# Patient Record
Sex: MALE | Race: WHITE | NOT HISPANIC OR LATINO
[De-identification: names, ages, dates, MRNs, and addresses within clinical notes are randomized per-mention and may not be internally consistent; named-entity substitution may affect disease eponyms.]

---

## 2017-04-21 ENCOUNTER — APPOINTMENT (OUTPATIENT)
Dept: INTERNAL MEDICINE | Facility: CLINIC | Age: 42
End: 2017-04-21

## 2017-04-21 VITALS
OXYGEN SATURATION: 98 % | HEIGHT: 70 IN | HEART RATE: 82 BPM | SYSTOLIC BLOOD PRESSURE: 121 MMHG | DIASTOLIC BLOOD PRESSURE: 70 MMHG

## 2017-05-09 ENCOUNTER — LABORATORY RESULT (OUTPATIENT)
Age: 42
End: 2017-05-09

## 2017-05-10 LAB
25(OH)D3 SERPL-MCNC: 24.5 NG/ML
BASOPHILS # BLD AUTO: 0.03 K/UL
BASOPHILS NFR BLD AUTO: 0.5 %
CHOLEST SERPL-MCNC: 203 MG/DL
CHOLEST/HDLC SERPL: 5 RATIO
EOSINOPHIL # BLD AUTO: 0.15 K/UL
EOSINOPHIL NFR BLD AUTO: 2.4 %
HBA1C MFR BLD HPLC: 5.3 %
HCT VFR BLD CALC: 44.6 %
HDLC SERPL-MCNC: 42 MG/DL
HGB BLD-MCNC: 14.8 G/DL
IMM GRANULOCYTES NFR BLD AUTO: 0.2 %
LDLC SERPL CALC-MCNC: 124 MG/DL
LYMPHOCYTES # BLD AUTO: 1.83 K/UL
LYMPHOCYTES NFR BLD AUTO: 29.2 %
MAN DIFF?: NORMAL
MCHC RBC-ENTMCNC: 29.8 PG
MCHC RBC-ENTMCNC: 33.2 GM/DL
MCV RBC AUTO: 89.9 FL
MONOCYTES # BLD AUTO: 0.42 K/UL
MONOCYTES NFR BLD AUTO: 6.7 %
NEUTROPHILS # BLD AUTO: 3.82 K/UL
NEUTROPHILS NFR BLD AUTO: 61 %
PLATELET # BLD AUTO: 277 K/UL
RBC # BLD: 4.96 M/UL
RBC # FLD: 13.9 %
TRIGL SERPL-MCNC: 183 MG/DL
WBC # FLD AUTO: 6.26 K/UL

## 2017-12-01 ENCOUNTER — OUTPATIENT (OUTPATIENT)
Dept: OUTPATIENT SERVICES | Facility: HOSPITAL | Age: 42
LOS: 1 days | End: 2017-12-01
Payer: MEDICAID

## 2017-12-01 PROCEDURE — G9001: CPT

## 2017-12-17 ENCOUNTER — INPATIENT (INPATIENT)
Facility: HOSPITAL | Age: 42
LOS: 4 days | Discharge: HOME | DRG: 59 | End: 2017-12-22
Attending: HOSPITALIST | Admitting: HOSPITALIST
Payer: MEDICARE

## 2017-12-17 VITALS
SYSTOLIC BLOOD PRESSURE: 138 MMHG | WEIGHT: 250 LBS | TEMPERATURE: 99 F | RESPIRATION RATE: 15 BRPM | HEART RATE: 117 BPM | DIASTOLIC BLOOD PRESSURE: 81 MMHG

## 2017-12-17 NOTE — ED ADULT NURSE NOTE - OBJECTIVE STATEMENT
Pt received on stretcher via ems, c/o weakness. Pt stated that he has MS and is in MS exacerbation. Pt c/o weakness, frequent urination. Pt also stated he feels his nerves are shot and feels jittery. Pt denies fever, chills, nausea, vomiting, diarrhea. No acute s/s of distress.

## 2017-12-18 DIAGNOSIS — G35 MULTIPLE SCLEROSIS: ICD-10-CM

## 2017-12-18 DIAGNOSIS — E66.9 OBESITY, UNSPECIFIED: ICD-10-CM

## 2017-12-18 DIAGNOSIS — I26.99 OTHER PULMONARY EMBOLISM WITHOUT ACUTE COR PULMONALE: ICD-10-CM

## 2017-12-18 DIAGNOSIS — R32 UNSPECIFIED URINARY INCONTINENCE: ICD-10-CM

## 2017-12-18 DIAGNOSIS — L03.90 CELLULITIS, UNSPECIFIED: ICD-10-CM

## 2017-12-18 DIAGNOSIS — Z29.9 ENCOUNTER FOR PROPHYLACTIC MEASURES, UNSPECIFIED: ICD-10-CM

## 2017-12-18 DIAGNOSIS — F12.20 CANNABIS DEPENDENCE, UNCOMPLICATED: ICD-10-CM

## 2017-12-18 LAB
ALBUMIN SERPL ELPH-MCNC: 3.7 G/DL — SIGNIFICANT CHANGE UP (ref 3.3–5)
ALP SERPL-CCNC: 73 U/L — SIGNIFICANT CHANGE UP (ref 40–120)
ALT FLD-CCNC: 75 U/L — SIGNIFICANT CHANGE UP (ref 12–78)
ANION GAP SERPL CALC-SCNC: 8 MMOL/L — SIGNIFICANT CHANGE UP (ref 5–17)
ANION GAP SERPL CALC-SCNC: 9 MMOL/L — SIGNIFICANT CHANGE UP (ref 5–17)
AST SERPL-CCNC: 51 U/L — HIGH (ref 15–37)
BASOPHILS # BLD AUTO: 0 K/UL — SIGNIFICANT CHANGE UP (ref 0–0.2)
BASOPHILS # BLD AUTO: 0.1 K/UL — SIGNIFICANT CHANGE UP (ref 0–0.2)
BASOPHILS NFR BLD AUTO: 0.1 % — SIGNIFICANT CHANGE UP (ref 0–2)
BASOPHILS NFR BLD AUTO: 0.8 % — SIGNIFICANT CHANGE UP (ref 0–2)
BILIRUB SERPL-MCNC: 0.8 MG/DL — SIGNIFICANT CHANGE UP (ref 0.2–1.2)
BUN SERPL-MCNC: 13 MG/DL — SIGNIFICANT CHANGE UP (ref 7–23)
BUN SERPL-MCNC: 16 MG/DL — SIGNIFICANT CHANGE UP (ref 7–23)
CALCIUM SERPL-MCNC: 8.7 MG/DL — SIGNIFICANT CHANGE UP (ref 8.5–10.1)
CALCIUM SERPL-MCNC: 9 MG/DL — SIGNIFICANT CHANGE UP (ref 8.5–10.1)
CHLORIDE SERPL-SCNC: 107 MMOL/L — SIGNIFICANT CHANGE UP (ref 96–108)
CHLORIDE SERPL-SCNC: 108 MMOL/L — SIGNIFICANT CHANGE UP (ref 96–108)
CO2 SERPL-SCNC: 25 MMOL/L — SIGNIFICANT CHANGE UP (ref 22–31)
CO2 SERPL-SCNC: 27 MMOL/L — SIGNIFICANT CHANGE UP (ref 22–31)
CREAT SERPL-MCNC: 0.74 MG/DL — SIGNIFICANT CHANGE UP (ref 0.5–1.3)
CREAT SERPL-MCNC: 0.74 MG/DL — SIGNIFICANT CHANGE UP (ref 0.5–1.3)
EOSINOPHIL # BLD AUTO: 0 K/UL — SIGNIFICANT CHANGE UP (ref 0–0.5)
EOSINOPHIL # BLD AUTO: 0.3 K/UL — SIGNIFICANT CHANGE UP (ref 0–0.5)
EOSINOPHIL NFR BLD AUTO: 0 % — SIGNIFICANT CHANGE UP (ref 0–6)
EOSINOPHIL NFR BLD AUTO: 2 % — SIGNIFICANT CHANGE UP (ref 0–6)
GLUCOSE SERPL-MCNC: 179 MG/DL — HIGH (ref 70–99)
GLUCOSE SERPL-MCNC: 92 MG/DL — SIGNIFICANT CHANGE UP (ref 70–99)
HCT VFR BLD CALC: 42.5 % — SIGNIFICANT CHANGE UP (ref 39–50)
HCT VFR BLD CALC: 44.5 % — SIGNIFICANT CHANGE UP (ref 39–50)
HGB BLD-MCNC: 14.4 G/DL — SIGNIFICANT CHANGE UP (ref 13–17)
HGB BLD-MCNC: 15 G/DL — SIGNIFICANT CHANGE UP (ref 13–17)
LYMPHOCYTES # BLD AUTO: 0.3 K/UL — LOW (ref 1–3.3)
LYMPHOCYTES # BLD AUTO: 0.8 K/UL — LOW (ref 1–3.3)
LYMPHOCYTES # BLD AUTO: 5.2 % — LOW (ref 13–44)
LYMPHOCYTES # BLD AUTO: 6 % — LOW (ref 13–44)
MAGNESIUM SERPL-MCNC: 2.3 MG/DL — SIGNIFICANT CHANGE UP (ref 1.6–2.6)
MCHC RBC-ENTMCNC: 29.9 PG — SIGNIFICANT CHANGE UP (ref 27–34)
MCHC RBC-ENTMCNC: 30 PG — SIGNIFICANT CHANGE UP (ref 27–34)
MCHC RBC-ENTMCNC: 33.6 GM/DL — SIGNIFICANT CHANGE UP (ref 32–36)
MCHC RBC-ENTMCNC: 33.8 GM/DL — SIGNIFICANT CHANGE UP (ref 32–36)
MCV RBC AUTO: 88.6 FL — SIGNIFICANT CHANGE UP (ref 80–100)
MCV RBC AUTO: 89.3 FL — SIGNIFICANT CHANGE UP (ref 80–100)
MONOCYTES # BLD AUTO: 0 K/UL — SIGNIFICANT CHANGE UP (ref 0–0.9)
MONOCYTES # BLD AUTO: 0.9 K/UL — SIGNIFICANT CHANGE UP (ref 0–0.9)
MONOCYTES NFR BLD AUTO: 0.5 % — LOW (ref 1–9)
MONOCYTES NFR BLD AUTO: 6.6 % — SIGNIFICANT CHANGE UP (ref 1–9)
NEUTROPHILS # BLD AUTO: 11.2 K/UL — HIGH (ref 1.8–7.4)
NEUTROPHILS # BLD AUTO: 6.2 K/UL — SIGNIFICANT CHANGE UP (ref 1.8–7.4)
NEUTROPHILS NFR BLD AUTO: 84.6 % — HIGH (ref 43–77)
NEUTROPHILS NFR BLD AUTO: 94.2 % — HIGH (ref 43–77)
PLATELET # BLD AUTO: 297 K/UL — SIGNIFICANT CHANGE UP (ref 150–400)
PLATELET # BLD AUTO: 316 K/UL — SIGNIFICANT CHANGE UP (ref 150–400)
POTASSIUM SERPL-MCNC: 3.5 MMOL/L — SIGNIFICANT CHANGE UP (ref 3.5–5.3)
POTASSIUM SERPL-MCNC: 4 MMOL/L — SIGNIFICANT CHANGE UP (ref 3.5–5.3)
POTASSIUM SERPL-SCNC: 3.5 MMOL/L — SIGNIFICANT CHANGE UP (ref 3.5–5.3)
POTASSIUM SERPL-SCNC: 4 MMOL/L — SIGNIFICANT CHANGE UP (ref 3.5–5.3)
PROT SERPL-MCNC: 7.7 G/DL — SIGNIFICANT CHANGE UP (ref 6–8.3)
RBC # BLD: 4.8 M/UL — SIGNIFICANT CHANGE UP (ref 4.2–5.8)
RBC # BLD: 4.98 M/UL — SIGNIFICANT CHANGE UP (ref 4.2–5.8)
RBC # FLD: 11.6 % — SIGNIFICANT CHANGE UP (ref 10.3–14.5)
RBC # FLD: 12 % — SIGNIFICANT CHANGE UP (ref 10.3–14.5)
SODIUM SERPL-SCNC: 142 MMOL/L — SIGNIFICANT CHANGE UP (ref 135–145)
SODIUM SERPL-SCNC: 142 MMOL/L — SIGNIFICANT CHANGE UP (ref 135–145)
WBC # BLD: 13.3 K/UL — HIGH (ref 3.8–10.5)
WBC # BLD: 6.6 K/UL — SIGNIFICANT CHANGE UP (ref 3.8–10.5)
WBC # FLD AUTO: 13.3 K/UL — HIGH (ref 3.8–10.5)
WBC # FLD AUTO: 6.6 K/UL — SIGNIFICANT CHANGE UP (ref 3.8–10.5)

## 2017-12-18 PROCEDURE — 99223 1ST HOSP IP/OBS HIGH 75: CPT | Mod: AI,GC

## 2017-12-18 PROCEDURE — 99285 EMERGENCY DEPT VISIT HI MDM: CPT

## 2017-12-18 PROCEDURE — 12345: CPT | Mod: NC

## 2017-12-18 RX ORDER — NYSTATIN CREAM 100000 [USP'U]/G
1 CREAM TOPICAL DAILY
Qty: 0 | Refills: 0 | Status: DISCONTINUED | OUTPATIENT
Start: 2017-12-18 | End: 2017-12-22

## 2017-12-18 RX ORDER — SODIUM CHLORIDE 9 MG/ML
1000 INJECTION INTRAMUSCULAR; INTRAVENOUS; SUBCUTANEOUS ONCE
Qty: 0 | Refills: 0 | Status: COMPLETED | OUTPATIENT
Start: 2017-12-18 | End: 2017-12-18

## 2017-12-18 RX ORDER — FAMOTIDINE 10 MG/ML
20 INJECTION INTRAVENOUS
Qty: 0 | Refills: 0 | Status: DISCONTINUED | OUTPATIENT
Start: 2017-12-18 | End: 2017-12-22

## 2017-12-18 RX ORDER — ENOXAPARIN SODIUM 100 MG/ML
40 INJECTION SUBCUTANEOUS DAILY
Qty: 0 | Refills: 0 | Status: DISCONTINUED | OUTPATIENT
Start: 2017-12-18 | End: 2017-12-22

## 2017-12-18 RX ADMIN — Medication 116 MILLIGRAM(S): at 01:00

## 2017-12-18 RX ADMIN — ENOXAPARIN SODIUM 40 MILLIGRAM(S): 100 INJECTION SUBCUTANEOUS at 15:03

## 2017-12-18 RX ADMIN — NYSTATIN CREAM 1 APPLICATION(S): 100000 CREAM TOPICAL at 22:47

## 2017-12-18 RX ADMIN — SODIUM CHLORIDE 1000 MILLILITER(S): 9 INJECTION INTRAMUSCULAR; INTRAVENOUS; SUBCUTANEOUS at 01:40

## 2017-12-18 NOTE — H&P ADULT - NSHPSOCIALHISTORY_GEN_ALL_CORE
Patient admits to recreational TCH use multiple times a day for many years (for his MS exacerbations), admits to edible CBD gummies use daily for past couple of months. Former smoker quit 10 years ago, used 1/2 pack/day . Former social ETOH use. Had home health aide that comes in 12 hr shifts to help with all ADLs, patient is wheelchair bound and unable to ambulate.

## 2017-12-18 NOTE — H&P ADULT - PROBLEM SELECTOR PLAN 7
DVT ppx: Lovenox     IMPROVE VTE Individual Risk Assessment          RISK                                                          Points  [ 3 ] Previous VTE                                                3  [ 0 ] Thrombophilia                                             2  [ 2 ] Lower limb paralysis                                   2        (unable to hold up >15 seconds)    [ 0 ] Current Cancer                                             2         (within 6 months)  [ 0 ] Immobilization > 24 hrs                              1  [ 0 ] ICU/CCU stay > 24 hours                             1  [0  ] Age > 60                                                         1    IMPROVE VTE Score: 5

## 2017-12-18 NOTE — PROGRESS NOTE ADULT - SUBJECTIVE AND OBJECTIVE BOX
CHIEF COMPLAINT/INTERVAL HISTORY:  Pt. seen and evaluated for weakness of the LE/MS flare.  Tolerating IV steroids.  Reports having muscle spasms of the LE.  Noticing mild increase strength of the LE.    REVIEW OF SYSTEMS:  No fever, CP, or SOB.    Vital Signs Last 24 Hrs  T(C): 37.1 (18 Dec 2017 05:38), Max: 37.1 (18 Dec 2017 05:38)  T(F): 98.7 (18 Dec 2017 05:38), Max: 98.7 (18 Dec 2017 05:38)  HR: 97 (18 Dec 2017 05:38) (90 - 117)  BP: 132/86 (18 Dec 2017 05:38) (124/78 - 138/81)  BP(mean): --  RR: 16 (18 Dec 2017 05:38) (15 - 16)  SpO2: 97% (18 Dec 2017 05:38) (97% - 97%)    PHYSICAL EXAM:  GENERAL: NAD  HEENT: EOMI, hearing normal, conjunctiva and sclera clear  Chest: CTA bilaterally, no wheezing  CV: S1S2, RRR,   GI: soft, +BS, NT/ND  Musculoskeletal: no edema  Neuro: AAOx3, CN II-XII intact, 5/5 strength of upper extremities, 2/5 strength of bilateral LE  Psychiatric: affect nL, mood nL  Skin: warm and dry    LABS:                        14.4   6.6   )-----------( 297      ( 18 Dec 2017 09:53 )             42.5     12-18    142  |  108  |  13  ----------------------------<  179<H>  3.5   |  25  |  0.74    Ca    8.7      18 Dec 2017 09:53  Mg     2.3     12-18    TPro  7.7  /  Alb  3.7  /  TBili  0.8  /  DBili  x   /  AST  51<H>  /  ALT  75  /  AlkPhos  73  12-18    Assessment and Plan:  -MS flare:  continue Solu-medrol 1000mg IV daily.  Neurology f/u  -Urinary incontinence:  2/2 MS exacerbation.  Will place condom catheter.  -Hx of PE:  No longer on AC due to history of GI bleed.  -Marijuana dependence:  Pt. has been using marijuana for MS and states that it helps with his symptoms.    -VTE ppx: Lovenox 40mg SQ daily

## 2017-12-18 NOTE — ED PROVIDER NOTE - OBJECTIVE STATEMENT
43yo male bib ems with weakness today. pt has hx of MS and is bed bound, has an aid at home, and tonite could not get up off the toilet, no fever, no vomiting or diarrhea, no other copmalints

## 2017-12-18 NOTE — H&P ADULT - FAMILY HISTORY
Grandparent  Still living? Unknown  Family history of diabetes mellitus (DM), Age at diagnosis: Age Unknown  Family history of breast cancer, Age at diagnosis: Age Unknown     Aunt  Still living? Unknown  Family history of breast cancer, Age at diagnosis: Age Unknown

## 2017-12-18 NOTE — H&P ADULT - HISTORY OF PRESENT ILLNESS
42 year old male with PMHx of MS (dx 1995), PE in 2013 not on anticoagulation, b/l lower ext cellulitis presents to the ED for LE weakness. Health aid is at bedside. Pt states that he was on the toilet when he tried to get up a couple hours ago, however he was unable to even with the health aide's help.  Patient has preserved UE MS however his b/l LE felt much weaker than baseline. He admits that he has had  about 60 similar episodes in the past, many of which prompted hospital admission, with the last admission being in 2015. He states that this episode is similar to presentation with the previous ones. He admits to using daily non medical CBD gummies and inhaling THC which alleviates the symptoms as per patient. He admits to running out of THC and last use was 2 days ago. Patient also admitted to lower lumbar pain which is now resolved and worsening urinary incontinence and bowel incontinence. He admits to increased clonus in the LLE as well. Admits optic neuritis of the left eye (chronic), b/l LE neuropathy (chronic) and denies any f/c, CP, SOB, cough, dysuria, myalgias.    In the ED, V/S were: Temp: 98.6F BP: 138/81  RR 15. Sig labs include WBC 13.3, Neutr 84.6%, AST 51, Na 142, K 4.0, Cr 0.74. In the ED, 1000 mg Solumedrol and 1 L IVF bolus given.

## 2017-12-18 NOTE — CONSULT NOTE ADULT - ASSESSMENT
MS was started on solumedrol cont for total of 5 day  need gi prophylaxis   needs to have urine checked

## 2017-12-18 NOTE — H&P ADULT - ATTENDING COMMENTS
43 yo m pmh MS not on any meds except marijuana and CBD p/w weakness being admitted for MS exacerbation.  Neuro consulted for the AM.  Solumedrol ordered.

## 2017-12-18 NOTE — H&P ADULT - PROBLEM SELECTOR PLAN 1
-Admitted for MS exacerbation  -Neuro (Dr Rivas) consulted   -S/p 1000mg Solumedrol in ED, continue regiment for 5 days, infuse over 60 minutes   -Diet: Regular  -Activity: OOB with assistance  -F/u AM CBC and BMP

## 2017-12-18 NOTE — H&P ADULT - ASSESSMENT
42 year old male with PMHx of MS (dx 1995), PE in 2013 not on anticoagulation, b/l lower ext cellulitis presents to the ED for LE weakness. Admitted for Multiple Sclerosis exacerbation.

## 2017-12-18 NOTE — H&P ADULT - PROBLEM SELECTOR PLAN 4
Stable: currently not on any anticoagulation. Patient states he was on rivaroxaban in the past however he started bleeding in the anus and thus it was discontinued.

## 2017-12-18 NOTE — PROVIDER CONTACT NOTE (CHANGE IN STATUS NOTIFICATION) - SITUATION
Dr Prakash made aware that several attempts were made to place a condom catheter, not successful. MD said just keep the patient on diaper then.

## 2017-12-18 NOTE — H&P ADULT - NSHPREVIEWOFSYSTEMS_GEN_ALL_CORE
Constitutional: denies fever, chills, diaphoresis   HEENT: denies blurry vision, difficulty hearing  Respiratory: denies SOB, BEDOYA, cough, sputum production, wheezing, hemoptysis  Cardiovascular: denies CP, palpitations, edema  Gastrointestinal: denies nausea, vomiting, diarrhea, constipation, abdominal pain, melena, hematochezia   Genitourinary: admits urinary incontinence, denies dysuria, hematuria   Skin/Breast: denies rash, itching  Musculoskeletal: admits muscle weakness, denies myalgias, joint swelling  Neurologic: admits headache, dizziness, paresthesias, admits LE numbness/tingling  Psychiatric: denies feeling anxious, depressed  Hematology/Oncology: denies bruising, tender or enlarged lymph nodes

## 2017-12-18 NOTE — H&P ADULT - PROBLEM SELECTOR PLAN 3
-Patient states that he is using marijuana for MS and that it completely resolves his symptoms   -Educate the patient on drug use and monitor for any signs of THC toxicity

## 2017-12-18 NOTE — H&P ADULT - PROBLEM SELECTOR PLAN 2
-Most likely 2/2 MS exacerbation  - No iraheta catheter indicated at this time, as it would increase the risk of CAUTIs  -Continue using adult diapers

## 2017-12-18 NOTE — H&P ADULT - NSHPPHYSICALEXAM_GEN_ALL_CORE
V/S were: Temp: 98.6F BP: 138/81  RR 15  Physical Exam:  General: obese young white male, NAD  HEENT: NCAT, PERRLA, EOMI bl, moist mucous membranes   Neck: Supple, nontender, no mass  Neurology: A&Ox3, + sustained clonus, + 5 beats nystagmus to the left, + Figner to nose test b/l, + neuropathy in b/l LE R>L up to the distal femoral region, DTR: 3/4 throughout  Respiratory: CTA B/L, No W/R/R  CV: RRR, +S1/S2, no murmurs, rubs or gallops  Abdominal: Soft, NT, ND +BSx4  Extremities: No C/C/E, + peripheral pulses  MSK: MS: 1/5 in the b/l LE, 5/5 b/l UE, no joint erythema or warmth, no joint swelling   Skin: warm, dry, normal color, no rash or abnormal lesions

## 2017-12-19 LAB
ANION GAP SERPL CALC-SCNC: 6 MMOL/L — SIGNIFICANT CHANGE UP (ref 5–17)
APPEARANCE UR: ABNORMAL
BACTERIA # UR AUTO: ABNORMAL
BILIRUB UR-MCNC: NEGATIVE — SIGNIFICANT CHANGE UP
BUN SERPL-MCNC: 14 MG/DL — SIGNIFICANT CHANGE UP (ref 7–23)
CALCIUM SERPL-MCNC: 8.8 MG/DL — SIGNIFICANT CHANGE UP (ref 8.5–10.1)
CHLORIDE SERPL-SCNC: 110 MMOL/L — HIGH (ref 96–108)
CO2 SERPL-SCNC: 26 MMOL/L — SIGNIFICANT CHANGE UP (ref 22–31)
COLOR SPEC: YELLOW — SIGNIFICANT CHANGE UP
CREAT SERPL-MCNC: 0.79 MG/DL — SIGNIFICANT CHANGE UP (ref 0.5–1.3)
DIFF PNL FLD: ABNORMAL
EPI CELLS # UR: SIGNIFICANT CHANGE UP
GLUCOSE SERPL-MCNC: 122 MG/DL — HIGH (ref 70–99)
GLUCOSE UR QL: NEGATIVE — SIGNIFICANT CHANGE UP
HCT VFR BLD CALC: 44.2 % — SIGNIFICANT CHANGE UP (ref 39–50)
HGB BLD-MCNC: 14.8 G/DL — SIGNIFICANT CHANGE UP (ref 13–17)
KETONES UR-MCNC: NEGATIVE — SIGNIFICANT CHANGE UP
LEUKOCYTE ESTERASE UR-ACNC: ABNORMAL
MCHC RBC-ENTMCNC: 29.6 PG — SIGNIFICANT CHANGE UP (ref 27–34)
MCHC RBC-ENTMCNC: 33.4 GM/DL — SIGNIFICANT CHANGE UP (ref 32–36)
MCV RBC AUTO: 88.7 FL — SIGNIFICANT CHANGE UP (ref 80–100)
NITRITE UR-MCNC: POSITIVE
PH UR: 8 — SIGNIFICANT CHANGE UP (ref 5–8)
PLATELET # BLD AUTO: 319 K/UL — SIGNIFICANT CHANGE UP (ref 150–400)
POTASSIUM SERPL-MCNC: 3.6 MMOL/L — SIGNIFICANT CHANGE UP (ref 3.5–5.3)
POTASSIUM SERPL-SCNC: 3.6 MMOL/L — SIGNIFICANT CHANGE UP (ref 3.5–5.3)
PROT UR-MCNC: 25 MG/DL
RBC # BLD: 4.99 M/UL — SIGNIFICANT CHANGE UP (ref 4.2–5.8)
RBC # FLD: 11.7 % — SIGNIFICANT CHANGE UP (ref 10.3–14.5)
RBC CASTS # UR COMP ASSIST: SIGNIFICANT CHANGE UP /HPF (ref 0–4)
SODIUM SERPL-SCNC: 142 MMOL/L — SIGNIFICANT CHANGE UP (ref 135–145)
SP GR SPEC: 1.01 — SIGNIFICANT CHANGE UP (ref 1.01–1.02)
TRI-PHOS CRY UR QL COMP ASSIST: ABNORMAL
UROBILINOGEN FLD QL: NEGATIVE — SIGNIFICANT CHANGE UP
WBC # BLD: 14.2 K/UL — HIGH (ref 3.8–10.5)
WBC # FLD AUTO: 14.2 K/UL — HIGH (ref 3.8–10.5)
WBC UR QL: SIGNIFICANT CHANGE UP

## 2017-12-19 PROCEDURE — 99233 SBSQ HOSP IP/OBS HIGH 50: CPT

## 2017-12-19 RX ORDER — CEFTRIAXONE 500 MG/1
1 INJECTION, POWDER, FOR SOLUTION INTRAMUSCULAR; INTRAVENOUS ONCE
Qty: 0 | Refills: 0 | Status: COMPLETED | OUTPATIENT
Start: 2017-12-19 | End: 2017-12-19

## 2017-12-19 RX ORDER — CEFTRIAXONE 500 MG/1
1 INJECTION, POWDER, FOR SOLUTION INTRAMUSCULAR; INTRAVENOUS EVERY 24 HOURS
Qty: 0 | Refills: 0 | Status: DISCONTINUED | OUTPATIENT
Start: 2017-12-20 | End: 2017-12-22

## 2017-12-19 RX ORDER — CEFTRIAXONE 500 MG/1
INJECTION, POWDER, FOR SOLUTION INTRAMUSCULAR; INTRAVENOUS
Qty: 0 | Refills: 0 | Status: DISCONTINUED | OUTPATIENT
Start: 2017-12-19 | End: 2017-12-22

## 2017-12-19 RX ADMIN — Medication 258 MILLIGRAM(S): at 07:06

## 2017-12-19 RX ADMIN — CEFTRIAXONE 100 GRAM(S): 500 INJECTION, POWDER, FOR SOLUTION INTRAMUSCULAR; INTRAVENOUS at 18:09

## 2017-12-19 NOTE — PHYSICAL THERAPY INITIAL EVALUATION ADULT - IMPAIRMENTS CONTRIBUTING TO GAIT DEVIATIONS, PT EVAL
decreased strength/impaired postural control/impaired coordination/abnormal muscle tone/impaired motor control/impaired balance

## 2017-12-19 NOTE — PHYSICAL THERAPY INITIAL EVALUATION ADULT - PERTINENT HX OF CURRENT PROBLEM, REHAB EVAL
Patient with h/o MS. Patient came to the ER with exacerbation of MS, difficulty transferring even with assist of aide.

## 2017-12-19 NOTE — PHYSICAL THERAPY INITIAL EVALUATION ADULT - ADDITIONAL COMMENTS
Pt lives alone in a handicapped accessible apartment and has a 24 hour aide. pt required 1 person assist with transfers and mostly wheelchair bound due to Multiple Sclerosis since age 20. Pt home is handicapped accessible, no stairs and owns wheelchair, scooter, canes, rolling walker and commode. Pt needed assistance for ADLs. pt also reports having "optic neuritis" in left eye and has difficulties with his hand-eye coordination.

## 2017-12-19 NOTE — PROGRESS NOTE ADULT - SUBJECTIVE AND OBJECTIVE BOX
Neurology follow up note    KENNETH [id number]      Interval History:    Patient feels ok no new complaints.    MEDICATIONS    enoxaparin Injectable 40 milliGRAM(s) SubCutaneous daily  famotidine    Tablet 20 milliGRAM(s) Oral two times a day  nystatin Cream 1 Application(s) Topical daily PRN      Allergies    No Known Allergies    Intolerances            Vital Signs Last 24 Hrs  T(C): 36.7 (19 Dec 2017 12:05), Max: 37.4 (18 Dec 2017 18:33)  T(F): 98 (19 Dec 2017 12:05), Max: 99.3 (18 Dec 2017 18:33)  HR: 89 (19 Dec 2017 12:05) (82 - 113)  BP: 144/74 (19 Dec 2017 12:05) (117/70 - 158/74)  BP(mean): --  RR: 19 (19 Dec 2017 12:05) (16 - 20)  SpO2: 94% (19 Dec 2017 12:05) (94% - 98%)      REVIEW OF SYSTEMS:     Constitutional: No fever, chills, fatigue, weakness better  Eyes: no eye pain, visual disturbances, or discharge  ENT:  No difficulty hearing, tinnitus, vertigo; No sinus or throat pain  Neck: No pain or stiffness  Respiratory: No cough, dyspnea, wheezing   Cardiovascular: No chest pain, palpitations,   Gastrointestinal: No abdominal or epigastric pain. No nausea, vomiting  No diarrhea or constipation.   Genitourinary: No dysuria, frequency, hematuria or incontinence  Neurological: No headaches, lightheadedness, vertigo, numbness or tremors  Psychiatric: No depression, anxiety, mood swings or difficulty sleeping  Musculoskeletal: No joint pain or swelling; No muscle, back or extremity pain  Skin: No itching, burning, rashes or lesions   Lymph Nodes: No enlarged glands  Endocrine: No heat or cold intolerance; No hair loss   Allergy and Immunologic: No hives or eczema    On Neurological Examination:    Mental Status - Patient is alert, awake, oriented       Follow simple commands  Follow complex commands    Speech -   Fluent                     Cranial Nerves - Pupils 3 mm equal and reactive to light,   extraocular eye movements intact.   smile symmetric  intact bilateral NLF    Motor Exam -   Motor, bilateral upper, the patient was able to elevate roughly about 60 degrees, would say overall 3+/5.  The patient did have problems with finger-to-nose, problem with coordination.  Bilateral lower extremities, no movement.  The patient has increased tone in bilateral lower extremities.     Positive Villanueva's sign was noted in bilateral upper extremities and positive clonus in bilateral lower extremities.     Deep tendon reflexes bilateral upper +2, bilateral lower were +4.      Muscle tone - is normal all over.  No asymmetry is seen.      Sensory    Bilateral intact to light touch    GENERAL Exam: Nontoxic , No Acute Distress   	  HEENT:  normocephalic, atraumatic  		  LUNGS: Clear bilaterally    	  HEART: Normal S1S2   No murmur RRR        	  GI/ ABDOMEN:  Soft  Non tender    EXTREMITIES:   No Edema  No Clubbing  No Cyanosis No Edema    MUSCULOSKELETAL: decreased Range of Motion all 4 extremities   	   SKIN: Normal  No Ecchymosis               LABS:  CBC Full  -  ( 19 Dec 2017 08:16 )  WBC Count : 14.2 K/uL  Hemoglobin : 14.8 g/dL  Hematocrit : 44.2 %  Platelet Count - Automated : 319 K/uL  Mean Cell Volume : 88.7 fl  Mean Cell Hemoglobin : 29.6 pg  Mean Cell Hemoglobin Concentration : 33.4 gm/dL  Auto Neutrophil # : x  Auto Lymphocyte # : x  Auto Monocyte # : x  Auto Eosinophil # : x  Auto Basophil # : x  Auto Neutrophil % : x  Auto Lymphocyte % : x  Auto Monocyte % : x  Auto Eosinophil % : x  Auto Basophil % : x    Urinalysis Basic - ( 19 Dec 2017 11:49 )    Color: Yellow / Appearance: Slightly Turbid / S.010 / pH: x  Gluc: x / Ketone: Negative  / Bili: Negative / Urobili: Negative   Blood: x / Protein: 25 mg/dL / Nitrite: Positive   Leuk Esterase: Moderate / RBC: 0-2 /HPF / WBC 0-2   Sq Epi: x / Non Sq Epi: Occasional / Bacteria: Many      -    142  |  110<H>  |  14  ----------------------------<  122<H>  3.6   |  26  |  0.79    Ca    8.8      19 Dec 2017 08:16  Mg     2.3         TPro  7.7  /  Alb  3.7  /  TBili  0.8  /  DBili  x   /  AST  51<H>  /  ALT  75  /  AlkPhos  73  -    Hemoglobin A1C:     LIVER FUNCTIONS - ( 18 Dec 2017 01:22 )  Alb: 3.7 g/dL / Pro: 7.7 g/dL / ALK PHOS: 73 U/L / ALT: 75 U/L / AST: 51 U/L / GGT: x           Vitamin B12         RADIOLOGY    ANALYSIS AND PLAN:  This is a 42-year-old with a history of multiple sclerosis and episode of fall, history of spasm.    1.	For episode of fall, questionable if this is multiple sclerosis exacerbation versus possibly age-related changes.  The patient was already started on Solu-Medrol  will dc it  2.	Recommend GI protection.  3.	Physical therapy evaluation.  4.	For history of spasms, it appears to be stable.  The patient is supposed to be on medications at home but apparently does not take any.  5.	Would recommend the patient needs to have his urine checked.  6.	Would recommend aspiration precaution.  7.	Greater than 55 minutes of time spent with the patient, plan of care, reviewing data, speaking to Dr. Camargo's office and house staff. patient will follow up with patient on      Thank you for the courtesy of consultation.        Physical therapy evaluation as tolerated  OOB to chair/ambulation with assistance only if possible.

## 2017-12-19 NOTE — PHYSICAL THERAPY INITIAL EVALUATION ADULT - MANUAL MUSCLE TESTING RESULTS, REHAB EVAL
bilateral upper extremity at least 4/5 throughout , bilateral lower extremity at least 3+/5 throughout except bilateral ankle DF 2/5

## 2017-12-19 NOTE — PHYSICAL THERAPY INITIAL EVALUATION ADULT - IMPAIRMENTS CONTRIBUTING IMPAIRED BED MOBILITY, REHAB EVAL
impaired motor control/impaired balance/impaired coordination/decreased strength/impaired postural control/abnormal muscle tone

## 2017-12-19 NOTE — PHYSICAL THERAPY INITIAL EVALUATION ADULT - IMPAIRED TRANSFERS: SIT/STAND, REHAB EVAL
decreased strength/abnormal muscle tone/impaired motor control/impaired balance/impaired postural control/impaired coordination

## 2017-12-19 NOTE — PHYSICAL THERAPY INITIAL EVALUATION ADULT - CRITERIA FOR SKILLED THERAPEUTIC INTERVENTIONS
risk reduction/prevention/rehab potential/anticipated discharge recommendation/impairments found/functional limitations in following categories/therapy frequency/predicted duration of therapy intervention

## 2017-12-19 NOTE — PROGRESS NOTE ADULT - SUBJECTIVE AND OBJECTIVE BOX
CHIEF COMPLAINT/INTERVAL HISTORY:  Pt. seen and evaluated for MS flare.  Pt. on high dose IV steroids.  Reports having slight burning on urination and frequency.      REVIEW OF SYSTEMS:  No fever, CP, or SOB.    Vital Signs Last 24 Hrs  T(C): 36.9 (19 Dec 2017 04:25), Max: 37.4 (18 Dec 2017 18:33)  T(F): 98.4 (19 Dec 2017 04:25), Max: 99.3 (18 Dec 2017 18:33)  HR: 85 (19 Dec 2017 07:40) (82 - 113)  BP: 118/84 (19 Dec 2017 07:40) (116/77 - 158/74)  BP(mean): --  RR: 20 (19 Dec 2017 07:40) (16 - 20)  SpO2: 97% (19 Dec 2017 07:40) (94% - 98%)    PHYSICAL EXAM:  GENERAL: NAD  HEENT: EOMI, hearing normal, conjunctiva and sclera clear  Chest: CTA bilaterally, no wheezing  CV: S1S2, RRR,   GI: soft, +BS, NT/ND  Musculoskeletal: no edema  Neuro: AAOx3, CN II-XII intact, 5/5 strength of upper extremities, 2/5 strength of bilateral LE  Psychiatric: affect nL, mood nL  Skin: warm and dry    LABS:                        14.8   14.2  )-----------( 319      ( 19 Dec 2017 08:16 )             44.2     12-19    142  |  110<H>  |  14  ----------------------------<  122<H>  3.6   |  26  |  0.79    Ca    8.8      19 Dec 2017 08:16  Mg     2.3     12-18    TPro  7.7  /  Alb  3.7  /  TBili  0.8  /  DBili  x   /  AST  51<H>  /  ALT  75  /  AlkPhos  73  12-18      Assessment and Plan:  -MS flare:  continue Solu-medrol 1000mg IV daily.  Neurology f/u  -Urinary incontinence:  2/2 MS exacerbation.  Will check UA/Urine cx.  -Hx of PE:  No longer on AC due to history of GI bleed.  -Marijuana dependence:  Pt. has been using marijuana for MS and states that it helps with his symptoms.    -VTE ppx: Lovenox 40mg SQ daily

## 2017-12-20 DIAGNOSIS — R69 ILLNESS, UNSPECIFIED: ICD-10-CM

## 2017-12-20 LAB
ANION GAP SERPL CALC-SCNC: 6 MMOL/L — SIGNIFICANT CHANGE UP (ref 5–17)
BUN SERPL-MCNC: 19 MG/DL — SIGNIFICANT CHANGE UP (ref 7–23)
CALCIUM SERPL-MCNC: 9 MG/DL — SIGNIFICANT CHANGE UP (ref 8.5–10.1)
CHLORIDE SERPL-SCNC: 109 MMOL/L — HIGH (ref 96–108)
CO2 SERPL-SCNC: 28 MMOL/L — SIGNIFICANT CHANGE UP (ref 22–31)
CREAT SERPL-MCNC: 0.65 MG/DL — SIGNIFICANT CHANGE UP (ref 0.5–1.3)
GLUCOSE SERPL-MCNC: 94 MG/DL — SIGNIFICANT CHANGE UP (ref 70–99)
HCT VFR BLD CALC: 42.9 % — SIGNIFICANT CHANGE UP (ref 39–50)
HGB BLD-MCNC: 14.3 G/DL — SIGNIFICANT CHANGE UP (ref 13–17)
IGA FLD-MCNC: 23 MG/DL — LOW (ref 68–378)
IGG FLD-MCNC: 7 MG/DL — LOW (ref 694–1618)
IGM SERPL-MCNC: 19 MG/DL — LOW (ref 40–230)
KAPPA LC SER QL IFE: 3.39 MG/DL — HIGH (ref 0.33–1.94)
KAPPA/LAMBDA FREE LIGHT CHAIN RATIO, SERUM: 16.95 RATIO — HIGH (ref 0.26–1.65)
LAMBDA LC SER QL IFE: 0.2 MG/DL — LOW (ref 0.57–2.63)
MCHC RBC-ENTMCNC: 29.8 PG — SIGNIFICANT CHANGE UP (ref 27–34)
MCHC RBC-ENTMCNC: 33.4 GM/DL — SIGNIFICANT CHANGE UP (ref 32–36)
MCV RBC AUTO: 89.2 FL — SIGNIFICANT CHANGE UP (ref 80–100)
PLATELET # BLD AUTO: 331 K/UL — SIGNIFICANT CHANGE UP (ref 150–400)
POTASSIUM SERPL-MCNC: 3.7 MMOL/L — SIGNIFICANT CHANGE UP (ref 3.5–5.3)
POTASSIUM SERPL-SCNC: 3.7 MMOL/L — SIGNIFICANT CHANGE UP (ref 3.5–5.3)
RBC # BLD: 4.8 M/UL — SIGNIFICANT CHANGE UP (ref 4.2–5.8)
RBC # FLD: 11.6 % — SIGNIFICANT CHANGE UP (ref 10.3–14.5)
SODIUM SERPL-SCNC: 143 MMOL/L — SIGNIFICANT CHANGE UP (ref 135–145)
WBC # BLD: 13.8 K/UL — HIGH (ref 3.8–10.5)
WBC # FLD AUTO: 13.8 K/UL — HIGH (ref 3.8–10.5)

## 2017-12-20 PROCEDURE — 99233 SBSQ HOSP IP/OBS HIGH 50: CPT

## 2017-12-20 RX ADMIN — NYSTATIN CREAM 1 APPLICATION(S): 100000 CREAM TOPICAL at 05:39

## 2017-12-20 RX ADMIN — CEFTRIAXONE 100 GRAM(S): 500 INJECTION, POWDER, FOR SOLUTION INTRAMUSCULAR; INTRAVENOUS at 17:16

## 2017-12-20 NOTE — PROGRESS NOTE ADULT - SUBJECTIVE AND OBJECTIVE BOX
CHIEF COMPLAINT/INTERVAL HISTORY:  Pt. seen and evaluated for MS flare.  Pt. appears to have improved strength of lower extremities.  In no distress.  Tolerating IV antibiotics    REVIEW OF SYSTEMS:  No fever, CP, or SOB.    Vital Signs Last 24 Hrs  T(C): 36.6 (20 Dec 2017 12:06), Max: 36.9 (20 Dec 2017 00:14)  T(F): 97.9 (20 Dec 2017 12:06), Max: 98.4 (20 Dec 2017 00:14)  HR: 86 (20 Dec 2017 12:06) (73 - 109)  BP: 130/76 (20 Dec 2017 12:06) (123/73 - 147/83)  BP(mean): --  RR: 16 (20 Dec 2017 12:06) (16 - 20)  SpO2: 98% (20 Dec 2017 12:06) (95% - 98%)    PHYSICAL EXAM:  GENERAL: NAD  HEENT: EOMI, hearing normal, conjunctiva and sclera clear  Chest: CTA bilaterally, no wheezing  CV: S1S2, RRR,   GI: soft, +BS, NT/ND  Musculoskeletal: no edema  Neuro: AAOx3, CN II-XII intact, 4/5 strength of LLE and 3/5 strength of RLE  Psychiatric: affect nL, mood nL  Skin: warm and dry    LABS:                        14.3   13.8  )-----------( 331      ( 20 Dec 2017 07:00 )             42.9     12-20    143  |  109<H>  |  19  ----------------------------<  94  3.7   |  28  |  0.65    Ca    9.0      20 Dec 2017 07:00        Urinalysis Basic - ( 19 Dec 2017 11:49 )    Color: Yellow / Appearance: Slightly Turbid / S.010 / pH: x  Gluc: x / Ketone: Negative  / Bili: Negative / Urobili: Negative   Blood: x / Protein: 25 mg/dL / Nitrite: Positive   Leuk Esterase: Moderate / RBC: 0-2 /HPF / WBC 0-2   Sq Epi: x / Non Sq Epi: Occasional / Bacteria: Many        Assessment and Plan:  -MS flare:  continue Solu-medrol 1000mg IV daily.  Neurology f/u  -Urinary incontinence:  2/2 MS exacerbation.  Will check UA/Urine cx.  -Hx of PE:  No longer on AC due to history of GI bleed.  -Marijuana dependence:  Pt. has been using marijuana for MS and states that it helps with his symptoms.    -VTE ppx: Lovenox 40mg SQ daily CHIEF COMPLAINT/INTERVAL HISTORY:  Pt. seen and evaluated for MS flare.  Pt. appears to have improved strength of lower extremities.  In no distress.  Tolerating IV antibiotics    REVIEW OF SYSTEMS:  No fever, CP, or SOB.    Vital Signs Last 24 Hrs  T(C): 36.6 (20 Dec 2017 12:06), Max: 36.9 (20 Dec 2017 00:14)  T(F): 97.9 (20 Dec 2017 12:06), Max: 98.4 (20 Dec 2017 00:14)  HR: 86 (20 Dec 2017 12:06) (73 - 109)  BP: 130/76 (20 Dec 2017 12:06) (123/73 - 147/83)  BP(mean): --  RR: 16 (20 Dec 2017 12:06) (16 - 20)  SpO2: 98% (20 Dec 2017 12:06) (95% - 98%)    PHYSICAL EXAM:  GENERAL: NAD  HEENT: EOMI, hearing normal, conjunctiva and sclera clear  Chest: CTA bilaterally, no wheezing  CV: S1S2, RRR,   GI: soft, +BS, NT/ND  Musculoskeletal: no edema  Neuro: AAOx3, CN II-XII intact, 4/5 strength of LLE and 3/5 strength of RLE  Psychiatric: affect nL, mood nL  Skin: warm and dry    LABS:                        14.3   13.8  )-----------( 331      ( 20 Dec 2017 07:00 )             42.9     12-20    143  |  109<H>  |  19  ----------------------------<  94  3.7   |  28  |  0.65    Ca    9.0      20 Dec 2017 07:00        Urinalysis Basic - ( 19 Dec 2017 11:49 )    Color: Yellow / Appearance: Slightly Turbid / S.010 / pH: x  Gluc: x / Ketone: Negative  / Bili: Negative / Urobili: Negative   Blood: x / Protein: 25 mg/dL / Nitrite: Positive   Leuk Esterase: Moderate / RBC: 0-2 /HPF / WBC 0-2   Sq Epi: x / Non Sq Epi: Occasional / Bacteria: Many        Assessment and Plan:  -MS flare:  Pt. does not want IV steroids.  Now off IV solu-medrol.  Neurology f/u  -Urinary incontinence/Possible UTI:  2/2 MS exacerbation.  Continue Ceftriaxone.  Check urine cx.     -Hx of PE:  No longer on AC due to history of GI bleed.  -Marijuana dependence:  Pt. has been using marijuana for MS and states that it helps with his symptoms.    -VTE ppx: Lovenox 40mg SQ daily

## 2017-12-20 NOTE — PROGRESS NOTE ADULT - SUBJECTIVE AND OBJECTIVE BOX
Neurology follow up note    KENNETH [id number]      Interval History:    Patient feels ok no new complaints.    MEDICATIONS    cefTRIAXone   IVPB      cefTRIAXone   IVPB 1 Gram(s) IV Intermittent every 24 hours  enoxaparin Injectable 40 milliGRAM(s) SubCutaneous daily  famotidine    Tablet 20 milliGRAM(s) Oral two times a day  nystatin Cream 1 Application(s) Topical daily PRN      Allergies    No Known Allergies    Intolerances            Vital Signs Last 24 Hrs  T(C): 36.6 (20 Dec 2017 12:06), Max: 36.9 (20 Dec 2017 00:14)  T(F): 97.9 (20 Dec 2017 12:06), Max: 98.4 (20 Dec 2017 00:14)  HR: 86 (20 Dec 2017 12:06) (73 - 87)  BP: 130/76 (20 Dec 2017 12:06) (123/73 - 147/83)  BP(mean): --  RR: 16 (20 Dec 2017 12:06) (16 - 20)  SpO2: 98% (20 Dec 2017 12:06) (95% - 98%)      REVIEW OF SYSTEMS:     Constitutional: No fever, chills, fatigue, weakness better  Eyes: no eye pain, visual disturbances, or discharge  ENT:  No difficulty hearing, tinnitus, vertigo; No sinus or throat pain  Neck: No pain or stiffness  Respiratory: No cough, dyspnea, wheezing   Cardiovascular: No chest pain, palpitations,   Gastrointestinal: No abdominal or epigastric pain. No nausea, vomiting  No diarrhea or constipation.   Genitourinary: No dysuria, frequency, hematuria or incontinence  Neurological: No headaches, lightheadedness, vertigo, numbness or tremors  Psychiatric: No depression, anxiety, mood swings or difficulty sleeping  Musculoskeletal: No joint pain or swelling; No muscle, back or extremity pain  Skin: No itching, burning, rashes or lesions   Lymph Nodes: No enlarged glands  Endocrine: No heat or cold intolerance; No hair loss   Allergy and Immunologic: No hives or eczema    On Neurological Examination:    Mental Status - Patient is alert, awake, oriented       Follow simple commands  Follow complex commands    Speech -   Fluent                     Cranial Nerves - Pupils 3 mm equal and reactive to light,   extraocular eye movements intact.   smile symmetric  intact bilateral NLF    Motor Exam -   Motor, bilateral upper, the patient was able to elevate roughly about 60 degrees, would say overall 3+/5.  The patient did have problems with finger-to-nose, problem with coordination.  Bilateral lower extremities, no movement.  The patient has increased tone in bilateral lower extremities.     Positive Villanueva's sign was noted in bilateral upper extremities and positive clonus in bilateral lower extremities.     Deep tendon reflexes bilateral upper +2, bilateral lower were +4.      Muscle tone - is normal all over.  No asymmetry is seen.      Sensory    Bilateral intact to light touch    GENERAL Exam: Nontoxic , No Acute Distress   	  HEENT:  normocephalic, atraumatic  		  LUNGS: Clear bilaterally    	  HEART: Normal S1S2   No murmur RRR        	  GI/ ABDOMEN:  Soft  Non tender    EXTREMITIES:   No Edema  No Clubbing  No Cyanosis No Edema    MUSCULOSKELETAL: decreased Range of Motion all 4 extremities   	   SKIN: Normal  No Ecchymosis             LABS:  CBC Full  -  ( 20 Dec 2017 07:00 )  WBC Count : 13.8 K/uL  Hemoglobin : 14.3 g/dL  Hematocrit : 42.9 %  Platelet Count - Automated : 331 K/uL  Mean Cell Volume : 89.2 fl  Mean Cell Hemoglobin : 29.8 pg  Mean Cell Hemoglobin Concentration : 33.4 gm/dL  Auto Neutrophil # : x  Auto Lymphocyte # : x  Auto Monocyte # : x  Auto Eosinophil # : x  Auto Basophil # : x  Auto Neutrophil % : x  Auto Lymphocyte % : x  Auto Monocyte % : x  Auto Eosinophil % : x  Auto Basophil % : x    Urinalysis Basic - ( 19 Dec 2017 11:49 )    Color: Yellow / Appearance: Slightly Turbid / S.010 / pH: x  Gluc: x / Ketone: Negative  / Bili: Negative / Urobili: Negative   Blood: x / Protein: 25 mg/dL / Nitrite: Positive   Leuk Esterase: Moderate / RBC: 0-2 /HPF / WBC 0-2   Sq Epi: x / Non Sq Epi: Occasional / Bacteria: Many      12-20    143  |  109<H>  |  19  ----------------------------<  94  3.7   |  28  |  0.65    Ca    9.0      20 Dec 2017 07:00      Hemoglobin A1C:       Vitamin B12         RADIOLOGY          ANALYSIS AND PLAN:  This is a 42-year-old with a history of multiple sclerosis and episode of fall, history of spasm.    1.	For episode of fall, questionable if this is multiple sclerosis exacerbation versus possibly age-related changes.  The patient was already started on Solu-Medrol  will dc it  2.	Recommend GI protection.  3.	Physical therapy evaluation.  4.	For history of spasms, it appears to be stable.  The patient is supposed to be on medications at home but apparently does not take any.  5.	Would recommend the patient needs to have his urine checked.  6.	Would recommend aspiration precaution.  7.	Greater than 50 minutes of time spent with the patient, plan of care, reviewing data, speaking to Dr. Camargo's office and house staff. patient will follow up.    Thank you for the courtesy of consultation.        Physical therapy evaluation as tolerated  OOB to chair/ambulation with assistance only if possible.

## 2017-12-21 ENCOUNTER — TRANSCRIPTION ENCOUNTER (OUTPATIENT)
Age: 42
End: 2017-12-21

## 2017-12-21 LAB
-  AMIKACIN: SIGNIFICANT CHANGE UP
-  AMPICILLIN/SULBACTAM: SIGNIFICANT CHANGE UP
-  AMPICILLIN: SIGNIFICANT CHANGE UP
-  AZTREONAM: SIGNIFICANT CHANGE UP
-  CEFAZOLIN: SIGNIFICANT CHANGE UP
-  CEFEPIME: SIGNIFICANT CHANGE UP
-  CEFOXITIN: SIGNIFICANT CHANGE UP
-  CEFTAZIDIME: SIGNIFICANT CHANGE UP
-  CEFTRIAXONE: SIGNIFICANT CHANGE UP
-  CIPROFLOXACIN: SIGNIFICANT CHANGE UP
-  ERTAPENEM: SIGNIFICANT CHANGE UP
-  GENTAMICIN: SIGNIFICANT CHANGE UP
-  LEVOFLOXACIN: SIGNIFICANT CHANGE UP
-  MEROPENEM: SIGNIFICANT CHANGE UP
-  NITROFURANTOIN: SIGNIFICANT CHANGE UP
-  PIPERACILLIN/TAZOBACTAM: SIGNIFICANT CHANGE UP
-  TOBRAMYCIN: SIGNIFICANT CHANGE UP
-  TRIMETHOPRIM/SULFAMETHOXAZOLE: SIGNIFICANT CHANGE UP
ANION GAP SERPL CALC-SCNC: 5 MMOL/L — SIGNIFICANT CHANGE UP (ref 5–17)
BUN SERPL-MCNC: 22 MG/DL — SIGNIFICANT CHANGE UP (ref 7–23)
CALCIUM SERPL-MCNC: 8.6 MG/DL — SIGNIFICANT CHANGE UP (ref 8.5–10.1)
CHLORIDE SERPL-SCNC: 109 MMOL/L — HIGH (ref 96–108)
CO2 SERPL-SCNC: 30 MMOL/L — SIGNIFICANT CHANGE UP (ref 22–31)
CREAT SERPL-MCNC: 0.75 MG/DL — SIGNIFICANT CHANGE UP (ref 0.5–1.3)
CULTURE RESULTS: SIGNIFICANT CHANGE UP
GLUCOSE SERPL-MCNC: 82 MG/DL — SIGNIFICANT CHANGE UP (ref 70–99)
HCT VFR BLD CALC: 44.6 % — SIGNIFICANT CHANGE UP (ref 39–50)
HGB BLD-MCNC: 14.7 G/DL — SIGNIFICANT CHANGE UP (ref 13–17)
MCHC RBC-ENTMCNC: 29.6 PG — SIGNIFICANT CHANGE UP (ref 27–34)
MCHC RBC-ENTMCNC: 32.9 GM/DL — SIGNIFICANT CHANGE UP (ref 32–36)
MCV RBC AUTO: 89.9 FL — SIGNIFICANT CHANGE UP (ref 80–100)
METHOD TYPE: SIGNIFICANT CHANGE UP
ORGANISM # SPEC MICROSCOPIC CNT: SIGNIFICANT CHANGE UP
ORGANISM # SPEC MICROSCOPIC CNT: SIGNIFICANT CHANGE UP
PLATELET # BLD AUTO: 276 K/UL — SIGNIFICANT CHANGE UP (ref 150–400)
POTASSIUM SERPL-MCNC: 3.6 MMOL/L — SIGNIFICANT CHANGE UP (ref 3.5–5.3)
POTASSIUM SERPL-SCNC: 3.6 MMOL/L — SIGNIFICANT CHANGE UP (ref 3.5–5.3)
RBC # BLD: 4.96 M/UL — SIGNIFICANT CHANGE UP (ref 4.2–5.8)
RBC # FLD: 11.7 % — SIGNIFICANT CHANGE UP (ref 10.3–14.5)
SODIUM SERPL-SCNC: 144 MMOL/L — SIGNIFICANT CHANGE UP (ref 135–145)
SPECIMEN SOURCE: SIGNIFICANT CHANGE UP
WBC # BLD: 8.4 K/UL — SIGNIFICANT CHANGE UP (ref 3.8–10.5)
WBC # FLD AUTO: 8.4 K/UL — SIGNIFICANT CHANGE UP (ref 3.8–10.5)

## 2017-12-21 PROCEDURE — 99233 SBSQ HOSP IP/OBS HIGH 50: CPT

## 2017-12-21 RX ADMIN — CEFTRIAXONE 100 GRAM(S): 500 INJECTION, POWDER, FOR SOLUTION INTRAMUSCULAR; INTRAVENOUS at 17:40

## 2017-12-21 NOTE — DISCHARGE NOTE ADULT - MEDICATION SUMMARY - MEDICATIONS TO TAKE
I will START or STAY ON the medications listed below when I get home from the hospital:    reagan lift  -- Use as directed  ICD 10: Multiple sclerosis G35  ICD 10: Functional paraplegia G82.20  -- Indication: For transfering    Augmentin 875 mg-125 mg oral tablet  -- 1 tab(s) by mouth every 12 hours  -- Indication: For UTI

## 2017-12-21 NOTE — DISCHARGE NOTE ADULT - CARE PROVIDER_API CALL
Kobe Alejandra), Internal Medicine  [address] [facility]  [address]  [place] [address]  Phone: [phone]  Fax: [phone]    Shon Camargo), Neurology  [address] [place] [id number]  Phone: [phone]  Fax: [phone]

## 2017-12-21 NOTE — DISCHARGE NOTE ADULT - CARE PROVIDERS DIRECT ADDRESSES
[email or web address]

## 2017-12-21 NOTE — DISCHARGE NOTE ADULT - PLAN OF CARE
Pursue treatment with your neurologist. Follow up with your PMD in 1 week and Neurologist Dr. Camargo in 1 week, activity as tolerable, regular diet complete course of antibiotics.

## 2017-12-21 NOTE — DISCHARGE NOTE ADULT - CARE PLAN
Principal Discharge DX:	MS (multiple sclerosis)  Goal:	Pursue treatment with your neurologist.  Instructions for follow-up, activity and diet:	Follow up with your PMD in 1 week and Neurologist Dr. Camargo in 1 week, activity as tolerable, regular diet  Secondary Diagnosis:	UTI (urinary tract infection)  Goal:	complete course of antibiotics.

## 2017-12-21 NOTE — PROGRESS NOTE ADULT - SUBJECTIVE AND OBJECTIVE BOX
CHIEF COMPLAINT/INTERVAL HISTORY:  Pt. seen and evaluated for MS exacerbation.  Pt. is in no distress.  Still with signficant weakness of the lower extremities.  Tolerating IV antibiotics.    REVIEW OF SYSTEMS:  No fever, CP, or SOB.    Vital Signs Last 24 Hrs  T(C): 36.6 (21 Dec 2017 12:13), Max: 37.1 (20 Dec 2017 20:31)  T(F): 97.9 (21 Dec 2017 12:13), Max: 98.8 (20 Dec 2017 20:31)  HR: 97 (21 Dec 2017 12:13) (68 - 97)  BP: 135/84 (21 Dec 2017 12:13) (118/78 - 138/90)  BP(mean): --  RR: 16 (21 Dec 2017 12:13) (16 - 17)  SpO2: 97% (21 Dec 2017 12:13) (94% - 98%)    PHYSICAL EXAM:  GENERAL: NAD  HEENT: EOMI, hearing normal, conjunctiva and sclera clear  Chest: CTA bilaterally, no wheezing  CV: S1S2, RRR,   GI: soft, +BS, NT/ND  Musculoskeletal: no edema  Neuro: CN II-XII intact, 5/5 strength of upper extremities.  3/5 strength of bilateral LE  Psychiatric: affect nL, mood nL  Skin: warm and dry    LABS:                        14.7   8.4   )-----------( 276      ( 21 Dec 2017 07:17 )             44.6     12-21    144  |  109<H>  |  22  ----------------------------<  82  3.6   |  30  |  0.75    Ca    8.6      21 Dec 2017 07:17    Assessment and Plan: CHIEF COMPLAINT/INTERVAL HISTORY:  Pt. seen and evaluated for MS exacerbation.  Pt. is in no distress.  Still with signficant weakness of the lower extremities.  Tolerating IV antibiotics.    REVIEW OF SYSTEMS:  No fever, CP, or SOB.    Vital Signs Last 24 Hrs  T(C): 36.6 (21 Dec 2017 12:13), Max: 37.1 (20 Dec 2017 20:31)  T(F): 97.9 (21 Dec 2017 12:13), Max: 98.8 (20 Dec 2017 20:31)  HR: 97 (21 Dec 2017 12:13) (68 - 97)  BP: 135/84 (21 Dec 2017 12:13) (118/78 - 138/90)  BP(mean): --  RR: 16 (21 Dec 2017 12:13) (16 - 17)  SpO2: 97% (21 Dec 2017 12:13) (94% - 98%)    PHYSICAL EXAM:  GENERAL: NAD  HEENT: EOMI, hearing normal, conjunctiva and sclera clear  Chest: CTA bilaterally, no wheezing  CV: S1S2, RRR,   GI: soft, +BS, NT/ND  Musculoskeletal: no edema  Neuro: CN II-XII intact, 5/5 strength of upper extremities.  3/5 strength of bilateral LE  Psychiatric: affect nL, mood nL  Skin: warm and dry    LABS:                        14.7   8.4   )-----------( 276      ( 21 Dec 2017 07:17 )             44.6     12-21    144  |  109<H>  |  22  ----------------------------<  82  3.6   |  30  |  0.75    Ca    8.6      21 Dec 2017 07:17    Assessment and Plan:  -MS flare:  Pt. does not want IV steroids.  Now off IV solu-medrol.  Neurology f/u  -Urinary incontinence/UTI:  +Proteus.  Continue Ceftriaxone.  Check urine cx sensitivities    -Hx of PE:  No longer on AC due to history of GI bleed.  -Marijuana dependence:  Pt. has been using marijuana for MS and states that it helps with his symptoms.    -VTE ppx: Lovenox 40mg SQ daily

## 2017-12-21 NOTE — DISCHARGE NOTE ADULT - PATIENT PORTAL LINK FT
“You can access the [facility] Patient Portal, offered by [facility], by registering with the following website: [email or web address]”

## 2017-12-21 NOTE — PROGRESS NOTE ADULT - SUBJECTIVE AND OBJECTIVE BOX
Neurology follow up note    KENNETH [id number]      Interval History:    Patient feels ok no new complaints.    MEDICATIONS    cefTRIAXone   IVPB      cefTRIAXone   IVPB 1 Gram(s) IV Intermittent every 24 hours  enoxaparin Injectable 40 milliGRAM(s) SubCutaneous daily  famotidine    Tablet 20 milliGRAM(s) Oral two times a day  nystatin Cream 1 Application(s) Topical daily PRN      Allergies    No Known Allergies    Intolerances            Vital Signs Last 24 Hrs  T(C): 36.6 (21 Dec 2017 12:13), Max: 37.1 (20 Dec 2017 20:31)  T(F): 97.9 (21 Dec 2017 12:13), Max: 98.8 (20 Dec 2017 20:31)  HR: 97 (21 Dec 2017 12:13) (68 - 97)  BP: 135/84 (21 Dec 2017 12:13) (118/78 - 138/90)  BP(mean): --  RR: 16 (21 Dec 2017 12:13) (16 - 17)  SpO2: 97% (21 Dec 2017 12:13) (94% - 98%)        REVIEW OF SYSTEMS:     Constitutional: No fever, chills, fatigue, weakness better  Eyes: no eye pain, visual disturbances, or discharge  ENT:  No difficulty hearing, tinnitus, vertigo; No sinus or throat pain  Neck: No pain or stiffness  Respiratory: No cough, dyspnea, wheezing   Cardiovascular: No chest pain, palpitations,   Gastrointestinal: No abdominal or epigastric pain. No nausea, vomiting  No diarrhea or constipation.   Genitourinary: No dysuria, frequency, hematuria or incontinence  Neurological: No headaches, lightheadedness, vertigo, numbness or tremors  Psychiatric: No depression, anxiety, mood swings or difficulty sleeping  Musculoskeletal: No joint pain or swelling; No muscle, back or extremity pain  Skin: No itching, burning, rashes or lesions   Lymph Nodes: No enlarged glands  Endocrine: No heat or cold intolerance; No hair loss   Allergy and Immunologic: No hives or eczema    On Neurological Examination:    Mental Status - Patient is alert, awake, oriented       Follow simple commands  Follow complex commands    Speech -   Fluent                     Cranial Nerves - Pupils 3 mm equal and reactive to light,   extraocular eye movements intact.   smile symmetric  intact bilateral NLF    Motor Exam -   Motor, bilateral upper, the patient was able to elevate roughly about 60 degrees, would say overall 3+/5.  The patient did have problems with finger-to-nose, problem with coordination.  Bilateral lower extremities, no movement.  The patient has increased tone in bilateral lower extremities.     Positive Villanueva's sign was noted in bilateral upper extremities and positive clonus in bilateral lower extremities.     Deep tendon reflexes bilateral upper +2, bilateral lower were +4.      Muscle tone - is normal all over.  No asymmetry is seen.      Sensory    Bilateral intact to light touch    GENERAL Exam: Nontoxic , No Acute Distress   	  HEENT:  normocephalic, atraumatic  		  LUNGS: Clear bilaterally    	  HEART: Normal S1S2   No murmur RRR        	  GI/ ABDOMEN:  Soft  Non tender    EXTREMITIES:   No Edema  No Clubbing  No Cyanosis No Edema    MUSCULOSKELETAL: decreased Range of Motion all 4 extremities   	   SKIN: Normal  No Ecchymosis                LABS:  CBC Full  -  ( 21 Dec 2017 07:17 )  WBC Count : 8.4 K/uL  Hemoglobin : 14.7 g/dL  Hematocrit : 44.6 %  Platelet Count - Automated : 276 K/uL  Mean Cell Volume : 89.9 fl  Mean Cell Hemoglobin : 29.6 pg  Mean Cell Hemoglobin Concentration : 32.9 gm/dL  Auto Neutrophil # : x  Auto Lymphocyte # : x  Auto Monocyte # : x  Auto Eosinophil # : x  Auto Basophil # : x  Auto Neutrophil % : x  Auto Lymphocyte % : x  Auto Monocyte % : x  Auto Eosinophil % : x  Auto Basophil % : x      12-21    144  |  109<H>  |  22  ----------------------------<  82  3.6   |  30  |  0.75    Ca    8.6      21 Dec 2017 07:17      Hemoglobin A1C:       Vitamin B12         RADIOLOGY      ANALYSIS AND PLAN:  This is a 42-year-old with a history of multiple sclerosis and episode of fall, history of spasm.    1.	For episode of fall, questionable if this is multiple sclerosis exacerbation versus possibly age-related changes.  The patient was already started on Solu-Medrol  will dc it  2.	Recommend GI protection.  3.	Physical therapy evaluation.  4.	For history of spasms, it appears to be stable.  The patient is supposed to be on medications at home but apparently does not take any.  5.	Would recommend the patient needs to have his urine checked.  6.	Would recommend aspiration precaution.  7.	awaiting home services   8.	Greater than 40 minutes of time spent with the patient, plan of care, reviewing data, speaking to Dr. Camargo's office and house staff. patient will follow up.    Thank you for the courtesy of consultation.        Physical therapy evaluation as tolerated  OOB to chair/ambulation with assistance only if possible.

## 2017-12-21 NOTE — DISCHARGE NOTE ADULT - HOSPITAL COURSE
42 year old male with PMHx of MS (dx 1995), PE in 2013 not on anticoagulation, b/l lower ext cellulitis presents to the ED for LE weakness. Health aid is at bedside. Pt states that he was on the toilet when he tried to get up a couple hours ago, however he was unable to even with the health aide's help.  Patient has preserved UE MS however his b/l LE felt much weaker than baseline. He admits that he has had  about 60 similar episodes in the past, many of which prompted hospital admission, with the last admission being in 2015. He states that this episode is similar to presentation with the previous ones. He admits to using daily non medical CBD gummies and inhaling THC which alleviates the symptoms as per patient. He admits to running out of THC and last use was 2 days ago. Patient also admitted to lower lumbar pain which is now resolved and worsening urinary incontinence and bowel incontinence. He admits to increased clonus in the LLE as well. Admits optic neuritis of the left eye (chronic), b/l LE neuropathy (chronic) and denies any f/c, CP, SOB, cough, dysuria, myalgias.    In the ED, V/S were: Temp: 98.6F BP: 138/81  RR 15. Sig labs include WBC 13.3, Neutr 84.6%, AST 51, Na 142, K 4.0, Cr 0.74. In the ED, 1000 mg Solumedrol and 1 L IVF bolus given.    Pt. admitted for MS flare and continued on high dose steroids.  Reports having dysuria.  UA without WBC, although +bacteria and LE/nitrite.  Started on Ceftriaxone.  Pt. had refused further IV steroids and was taken off steroids as patient reports adverse psych response to high dose steroids in the past that required inpatient psych treatment.  Urine cx with proteus.  Pt. now will follow up with his neurologist next week for monoclonal antibody inject for his MS.      On day of discharge patient is in no distress.  Afebrile and hemodynamically stable.      Completion of discharge in 35 minutes.

## 2017-12-21 NOTE — DISCHARGE NOTE ADULT - MEDICATION SUMMARY - MEDICATIONS TO STOP TAKING
I will STOP taking the medications listed below when I get home from the hospital:    rivaroxaban 20 mg oral tablet  -- 1 tab(s) by mouth once a day    Tecfidera 240 mg oral delayed release capsule  -- 1 cap(s) by mouth 2 times a day    Lotrimin AF Cream 1% topical cream  -- Apply on skin to affected area 2 times a day    Multiple Vitamins with Minerals oral tablet  -- 1 tab(s) by mouth once a day    baclofen 10 mg oral tablet  -- 1 tab(s) by mouth 2 times a day    cefadroxil 500 mg oral capsule  -- 1 cap(s) by mouth 2 times a day  -- Finish all this medication unless otherwise directed by prescriber.    Bactrim  mg-160 mg oral tablet  -- 1 tab(s) by mouth 2 times a day x 7 days  -- Avoid prolonged or excessive exposure to direct and/or artificial sunlight while taking this medication.  Finish all this medication unless otherwise directed by prescriber.  Medication should be taken with plenty of water.

## 2017-12-22 VITALS
OXYGEN SATURATION: 97 % | SYSTOLIC BLOOD PRESSURE: 131 MMHG | DIASTOLIC BLOOD PRESSURE: 86 MMHG | RESPIRATION RATE: 15 BRPM | HEART RATE: 112 BPM | TEMPERATURE: 99 F

## 2017-12-22 PROCEDURE — 81001 URINALYSIS AUTO W/SCOPE: CPT

## 2017-12-22 PROCEDURE — 87086 URINE CULTURE/COLONY COUNT: CPT

## 2017-12-22 PROCEDURE — 97162 PT EVAL MOD COMPLEX 30 MIN: CPT

## 2017-12-22 PROCEDURE — 82784 ASSAY IGA/IGD/IGG/IGM EACH: CPT

## 2017-12-22 PROCEDURE — 97530 THERAPEUTIC ACTIVITIES: CPT

## 2017-12-22 PROCEDURE — 99285 EMERGENCY DEPT VISIT HI MDM: CPT | Mod: 25

## 2017-12-22 PROCEDURE — 83735 ASSAY OF MAGNESIUM: CPT

## 2017-12-22 PROCEDURE — 36415 COLL VENOUS BLD VENIPUNCTURE: CPT

## 2017-12-22 PROCEDURE — 85027 COMPLETE CBC AUTOMATED: CPT

## 2017-12-22 PROCEDURE — 80048 BASIC METABOLIC PNL TOTAL CA: CPT

## 2017-12-22 PROCEDURE — 96372 THER/PROPH/DIAG INJ SC/IM: CPT

## 2017-12-22 PROCEDURE — 99239 HOSP IP/OBS DSCHRG MGMT >30: CPT

## 2017-12-22 PROCEDURE — 80053 COMPREHEN METABOLIC PANEL: CPT

## 2017-12-22 PROCEDURE — 87186 SC STD MICRODIL/AGAR DIL: CPT

## 2017-12-22 RX ADMIN — CEFTRIAXONE 100 GRAM(S): 500 INJECTION, POWDER, FOR SOLUTION INTRAMUSCULAR; INTRAVENOUS at 17:53

## 2017-12-22 NOTE — PROGRESS NOTE ADULT - SUBJECTIVE AND OBJECTIVE BOX
Neurology follow up note    KENNETH [id number]      Interval History:    Patient feels ok no new complaints.    MEDICATIONS    cefTRIAXone   IVPB      cefTRIAXone   IVPB 1 Gram(s) IV Intermittent every 24 hours  enoxaparin Injectable 40 milliGRAM(s) SubCutaneous daily  famotidine    Tablet 20 milliGRAM(s) Oral two times a day  nystatin Cream 1 Application(s) Topical daily PRN      Allergies    No Known Allergies    Intolerances            Vital Signs Last 24 Hrs  T(C): 36.6 (22 Dec 2017 07:39), Max: 37.4 (22 Dec 2017 05:28)  T(F): 97.9 (22 Dec 2017 07:39), Max: 99.4 (22 Dec 2017 05:28)  HR: 92 (22 Dec 2017 07:39) (72 - 97)  BP: 138/88 (22 Dec 2017 07:39) (114/71 - 138/88)  BP(mean): --  RR: 16 (22 Dec 2017 07:39) (16 - 16)  SpO2: 95% (22 Dec 2017 07:39) (95% - 98%)      REVIEW OF SYSTEMS:     Constitutional: No fever, chills, fatigue, weakness better  Eyes: no eye pain, visual disturbances, or discharge  ENT:  No difficulty hearing, tinnitus, vertigo; No sinus or throat pain  Neck: No pain or stiffness  Respiratory: No cough, dyspnea, wheezing   Cardiovascular: No chest pain, palpitations,   Gastrointestinal: No abdominal or epigastric pain. No nausea, vomiting  No diarrhea or constipation.   Genitourinary: No dysuria, frequency, hematuria or incontinence  Neurological: No headaches, lightheadedness, vertigo, numbness or tremors  Psychiatric: No depression, anxiety, mood swings or difficulty sleeping  Musculoskeletal: No joint pain or swelling; No muscle, back or extremity pain  Skin: No itching, burning, rashes or lesions   Lymph Nodes: No enlarged glands  Endocrine: No heat or cold intolerance; No hair loss   Allergy and Immunologic: No hives or eczema    On Neurological Examination:    Mental Status - Patient is alert, awake, oriented       Follow simple commands  Follow complex commands    Speech -   Fluent                     Cranial Nerves - Pupils 3 mm equal and reactive to light,   extraocular eye movements intact.   smile symmetric  intact bilateral NLF    Motor Exam -   Motor, bilateral upper, the patient was able to elevate roughly about 60 degrees, would say overall 3+/5.  The patient did have problems with finger-to-nose, problem with coordination.  Bilateral lower extremities, no movement.  The patient has increased tone in bilateral lower extremities.     Positive Villanueva's sign was noted in bilateral upper extremities and positive clonus in bilateral lower extremities.     Deep tendon reflexes bilateral upper +2, bilateral lower were +4.      Muscle tone - is normal all over.  No asymmetry is seen.      Sensory    Bilateral intact to light touch    GENERAL Exam: Nontoxic , No Acute Distress   	  HEENT:  normocephalic, atraumatic  		  LUNGS: Clear bilaterally    	  HEART: Normal S1S2   No murmur RRR        	  GI/ ABDOMEN:  Soft  Non tender    EXTREMITIES:   No Edema  No Clubbing  No Cyanosis No Edema    MUSCULOSKELETAL: decreased Range of Motion all 4 extremities   	   SKIN: Normal  No Ecchymosis             LABS:  CBC Full  -  ( 21 Dec 2017 07:17 )  WBC Count : 8.4 K/uL  Hemoglobin : 14.7 g/dL  Hematocrit : 44.6 %  Platelet Count - Automated : 276 K/uL  Mean Cell Volume : 89.9 fl  Mean Cell Hemoglobin : 29.6 pg  Mean Cell Hemoglobin Concentration : 32.9 gm/dL  Auto Neutrophil # : x  Auto Lymphocyte # : x  Auto Monocyte # : x  Auto Eosinophil # : x  Auto Basophil # : x  Auto Neutrophil % : x  Auto Lymphocyte % : x  Auto Monocyte % : x  Auto Eosinophil % : x  Auto Basophil % : x      12-21    144  |  109<H>  |  22  ----------------------------<  82  3.6   |  30  |  0.75    Ca    8.6      21 Dec 2017 07:17      Hemoglobin A1C:       Vitamin B12         RADIOLOGY      ANALYSIS AND PLAN:  This is a 42-year-old with a history of multiple sclerosis and episode of fall, history of spasm.    1.	For episode of fall, questionable if this is multiple sclerosis exacerbation versus possibly age-related changes.  The patient was already started on Solu-Medrol  will dc it  2.	Recommend GI protection.  3.	Physical therapy evaluation.  4.	For history of spasms, it appears to be stable.  The patient is supposed to be on medications at home but apparently does not take any.  5.	Would recommend the patient needs to have his urine checked.  6.	Would recommend aspiration precaution.  7.	awaiting home services   8.	Greater than 40 minutes of time spent with the patient, plan of care, reviewing data, speaking to Dr. Camargo's office and house staff. patient will follow up.  9.	no new events will follow up as needed     Thank you for the courtesy of consultation.        Physical therapy evaluation as tolerated  OOB to chair/ambulation with assistance only if possible.

## 2017-12-22 NOTE — PROGRESS NOTE ADULT - SUBJECTIVE AND OBJECTIVE BOX
CHIEF COMPLAINT/INTERVAL HISTORY:  Pt. seen and evaluated for MS flare.  Pt. is in no distress.  Having weakness of the lower extremities.  Pt. has refused high dose steroids during this hospitalization.  Tolerating IV antibiotics for UTI.      REVIEW OF SYSTEMS:  No fever, CP, or SOB.    Vital Signs Last 24 Hrs  T(C): 36.6 (22 Dec 2017 07:39), Max: 37.4 (22 Dec 2017 05:28)  T(F): 97.9 (22 Dec 2017 07:39), Max: 99.4 (22 Dec 2017 05:28)  HR: 92 (22 Dec 2017 07:39) (72 - 97)  BP: 138/88 (22 Dec 2017 07:39) (114/71 - 138/88)  BP(mean): --  RR: 16 (22 Dec 2017 07:39) (16 - 16)  SpO2: 95% (22 Dec 2017 07:39) (95% - 98%)    PHYSICAL EXAM:  GENERAL: NAD  HEENT: EOMI, hearing normal, conjunctiva and sclera clear  Chest: CTA bilaterally, no wheezing  CV: S1S2, RRR,   GI: soft, +BS, NT/ND  Musculoskeletal: no edema  Nuero: CN intact, 5/5 strength of upper extremities, 4/5 strength of LLE, 3/5 strength of RLE  Psychiatric: affect nL, mood nL  Skin: warm and dry    LABS:                        14.7   8.4   )-----------( 276      ( 21 Dec 2017 07:17 )             44.6     12-21    144  |  109<H>  |  22  ----------------------------<  82  3.6   |  30  |  0.75    Ca    8.6      21 Dec 2017 07:17      Assessment and Plan:  -MS flare:  Pt. does not want IV steroids.  Now off IV solu-medrol.  Pt. reports that he has scheduled appointment with his neurologist on 12/27/17 for monoclono antibody treatment.    -Urinary incontinence/UTI:  +Proteus.  Will switch to augmentin 875mg PO BID.  -Hx of PE:  No longer on AC due to history of GI bleed.  -Marijuana dependence:  Pt. has been using marijuana for MS and states that it helps with his symptoms.    -VTE ppx: Lovenox 40mg SQ daily

## 2018-04-01 ENCOUNTER — OUTPATIENT (OUTPATIENT)
Dept: OUTPATIENT SERVICES | Facility: HOSPITAL | Age: 43
LOS: 1 days | End: 2018-04-01
Payer: MEDICAID

## 2018-04-01 PROCEDURE — G9001: CPT

## 2018-04-05 DIAGNOSIS — R69 ILLNESS, UNSPECIFIED: ICD-10-CM

## 2018-04-27 ENCOUNTER — RX RENEWAL (OUTPATIENT)
Age: 43
End: 2018-04-27

## 2018-04-30 ENCOUNTER — MEDICATION RENEWAL (OUTPATIENT)
Age: 43
End: 2018-04-30

## 2018-08-29 ENCOUNTER — APPOINTMENT (OUTPATIENT)
Dept: INTERNAL MEDICINE | Facility: CLINIC | Age: 43
End: 2018-08-29
Payer: COMMERCIAL

## 2018-08-29 VITALS
DIASTOLIC BLOOD PRESSURE: 77 MMHG | OXYGEN SATURATION: 97 % | HEART RATE: 67 BPM | RESPIRATION RATE: 14 BRPM | SYSTOLIC BLOOD PRESSURE: 115 MMHG

## 2018-08-29 DIAGNOSIS — E66.9 OBESITY, UNSPECIFIED: ICD-10-CM

## 2018-08-29 PROCEDURE — 99396 PREV VISIT EST AGE 40-64: CPT

## 2018-08-29 NOTE — HEALTH RISK ASSESSMENT
[Poor] : ~his/her~ mood as  poor [FreeTextEntry1] : very depressed [] : Yes [Patient not ambulatory (Wheelchair)] : Patient is not ambulatory (Wheelchair) [3] : 2) Feeling down, depressed, or hopeless for nearly every day (3) [de-identified] : chronic marijuana use [Reports changes in hearing] : Reports no changes in hearing [Reports changes in vision] : Reports changes in vision [Reports changes in dental health] : Reports no changes in dental health

## 2018-08-31 LAB
ABO + RH PNL BLD: NORMAL
ALBUMIN SERPL ELPH-MCNC: 4.7 G/DL
ALP BLD-CCNC: 76 U/L
ALT SERPL-CCNC: 45 U/L
ANION GAP SERPL CALC-SCNC: 16 MMOL/L
AST SERPL-CCNC: 25 U/L
BASOPHILS # BLD AUTO: 0.03 K/UL
BASOPHILS NFR BLD AUTO: 0.5 %
BILIRUB SERPL-MCNC: 0.4 MG/DL
BUN SERPL-MCNC: 11 MG/DL
CALCIUM SERPL-MCNC: 9.5 MG/DL
CHLORIDE SERPL-SCNC: 105 MMOL/L
CHOLEST SERPL-MCNC: 168 MG/DL
CHOLEST/HDLC SERPL: 3.9 RATIO
CO2 SERPL-SCNC: 22 MMOL/L
CREAT SERPL-MCNC: 0.78 MG/DL
EOSINOPHIL # BLD AUTO: 0.17 K/UL
EOSINOPHIL NFR BLD AUTO: 2.6 %
GLUCOSE SERPL-MCNC: 87 MG/DL
HBA1C MFR BLD HPLC: 5.2 %
HCT VFR BLD CALC: 45.2 %
HDLC SERPL-MCNC: 43 MG/DL
HGB BLD-MCNC: 15.1 G/DL
IMM GRANULOCYTES NFR BLD AUTO: 0.3 %
LDLC SERPL CALC-MCNC: 102 MG/DL
LYMPHOCYTES # BLD AUTO: 1.65 K/UL
LYMPHOCYTES NFR BLD AUTO: 25.2 %
MAN DIFF?: NORMAL
MCHC RBC-ENTMCNC: 30.2 PG
MCHC RBC-ENTMCNC: 33.4 GM/DL
MCV RBC AUTO: 90.4 FL
MONOCYTES # BLD AUTO: 0.8 K/UL
MONOCYTES NFR BLD AUTO: 12.2 %
NEUTROPHILS # BLD AUTO: 3.88 K/UL
NEUTROPHILS NFR BLD AUTO: 59.2 %
PLATELET # BLD AUTO: 243 K/UL
POTASSIUM SERPL-SCNC: 4.6 MMOL/L
PROT SERPL-MCNC: 7.5 G/DL
RBC # BLD: 5 M/UL
RBC # FLD: 13.7 %
SODIUM SERPL-SCNC: 143 MMOL/L
TRIGL SERPL-MCNC: 117 MG/DL
WBC # FLD AUTO: 6.55 K/UL

## 2019-02-14 NOTE — ED ADULT NURSE NOTE - TIMING
1043: Patient from radiology to PPU via gurney. Patient is awake and on bedrest/flat X 2 hours. VSS. LLE CMS intact. L groin incision site soft and dressing clean, dry and intact. Vascular access care management per MD order (see assessment). No c/o pain or nausea at this time. Will monitor closely. Vital signs per MD order.   1130: VSS. L groin site intact.   1215: VSS. HOB up 30 degrees.   1300: Patient ambulated to and from the bathroom without a problem.   1320: DC instructions given. Questions answered. L groin site  soft,CDI. No c/o pain or nausea. Patient wide awake. VSS. Patient met criteria for discharge.        sudden onset/worsening

## 2019-02-21 ENCOUNTER — INPATIENT (INPATIENT)
Facility: HOSPITAL | Age: 44
LOS: 4 days | Discharge: HOME HEALTH | DRG: 59 | End: 2019-02-26
Attending: FAMILY MEDICINE | Admitting: HOSPITALIST
Payer: MEDICARE

## 2019-02-21 VITALS
RESPIRATION RATE: 24 BRPM | TEMPERATURE: 98 F | SYSTOLIC BLOOD PRESSURE: 125 MMHG | DIASTOLIC BLOOD PRESSURE: 70 MMHG | HEART RATE: 85 BPM | WEIGHT: 267.64 LBS | OXYGEN SATURATION: 92 %

## 2019-02-21 DIAGNOSIS — Z29.9 ENCOUNTER FOR PROPHYLACTIC MEASURES, UNSPECIFIED: ICD-10-CM

## 2019-02-21 DIAGNOSIS — R82.90 UNSPECIFIED ABNORMAL FINDINGS IN URINE: ICD-10-CM

## 2019-02-21 DIAGNOSIS — R60.9 EDEMA, UNSPECIFIED: ICD-10-CM

## 2019-02-21 DIAGNOSIS — G35 MULTIPLE SCLEROSIS: ICD-10-CM

## 2019-02-21 DIAGNOSIS — R07.89 OTHER CHEST PAIN: ICD-10-CM

## 2019-02-21 DIAGNOSIS — F12.20 CANNABIS DEPENDENCE, UNCOMPLICATED: ICD-10-CM

## 2019-02-21 LAB
ALBUMIN SERPL ELPH-MCNC: 3.6 G/DL — SIGNIFICANT CHANGE UP (ref 3.3–5)
ALP SERPL-CCNC: 64 U/L — SIGNIFICANT CHANGE UP (ref 40–120)
ALT FLD-CCNC: 31 U/L — SIGNIFICANT CHANGE UP (ref 12–78)
ANION GAP SERPL CALC-SCNC: 9 MMOL/L — SIGNIFICANT CHANGE UP (ref 5–17)
APPEARANCE UR: ABNORMAL
AST SERPL-CCNC: 20 U/L — SIGNIFICANT CHANGE UP (ref 15–37)
BILIRUB SERPL-MCNC: 0.8 MG/DL — SIGNIFICANT CHANGE UP (ref 0.2–1.2)
BILIRUB UR-MCNC: ABNORMAL
BUN SERPL-MCNC: 12 MG/DL — SIGNIFICANT CHANGE UP (ref 7–23)
CALCIUM SERPL-MCNC: 8.5 MG/DL — SIGNIFICANT CHANGE UP (ref 8.5–10.1)
CHLORIDE SERPL-SCNC: 108 MMOL/L — SIGNIFICANT CHANGE UP (ref 96–108)
CO2 SERPL-SCNC: 26 MMOL/L — SIGNIFICANT CHANGE UP (ref 22–31)
COLOR SPEC: YELLOW — SIGNIFICANT CHANGE UP
CREAT SERPL-MCNC: 0.63 MG/DL — SIGNIFICANT CHANGE UP (ref 0.5–1.3)
DIFF PNL FLD: ABNORMAL
GLUCOSE SERPL-MCNC: 99 MG/DL — SIGNIFICANT CHANGE UP (ref 70–99)
GLUCOSE UR QL: NEGATIVE — SIGNIFICANT CHANGE UP
HCT VFR BLD CALC: 43 % — SIGNIFICANT CHANGE UP (ref 39–50)
HGB BLD-MCNC: 14.6 G/DL — SIGNIFICANT CHANGE UP (ref 13–17)
KETONES UR-MCNC: NEGATIVE — SIGNIFICANT CHANGE UP
LEUKOCYTE ESTERASE UR-ACNC: ABNORMAL
MCHC RBC-ENTMCNC: 29.9 PG — SIGNIFICANT CHANGE UP (ref 27–34)
MCHC RBC-ENTMCNC: 34 GM/DL — SIGNIFICANT CHANGE UP (ref 32–36)
MCV RBC AUTO: 88.1 FL — SIGNIFICANT CHANGE UP (ref 80–100)
NITRITE UR-MCNC: POSITIVE
NRBC # BLD: 0 /100 WBCS — SIGNIFICANT CHANGE UP (ref 0–0)
PH UR: 8 — SIGNIFICANT CHANGE UP (ref 5–8)
PLATELET # BLD AUTO: 254 K/UL — SIGNIFICANT CHANGE UP (ref 150–400)
POTASSIUM SERPL-MCNC: 3.8 MMOL/L — SIGNIFICANT CHANGE UP (ref 3.5–5.3)
POTASSIUM SERPL-SCNC: 3.8 MMOL/L — SIGNIFICANT CHANGE UP (ref 3.5–5.3)
PROT SERPL-MCNC: 7.1 G/DL — SIGNIFICANT CHANGE UP (ref 6–8.3)
PROT UR-MCNC: 75 MG/DL
RBC # BLD: 4.88 M/UL — SIGNIFICANT CHANGE UP (ref 4.2–5.8)
RBC # FLD: 13.2 % — SIGNIFICANT CHANGE UP (ref 10.3–14.5)
SODIUM SERPL-SCNC: 143 MMOL/L — SIGNIFICANT CHANGE UP (ref 135–145)
SP GR SPEC: 1.01 — SIGNIFICANT CHANGE UP (ref 1.01–1.02)
UROBILINOGEN FLD QL: 1
WBC # BLD: 9.48 K/UL — SIGNIFICANT CHANGE UP (ref 3.8–10.5)
WBC # FLD AUTO: 9.48 K/UL — SIGNIFICANT CHANGE UP (ref 3.8–10.5)

## 2019-02-21 PROCEDURE — 99285 EMERGENCY DEPT VISIT HI MDM: CPT

## 2019-02-21 PROCEDURE — 93010 ELECTROCARDIOGRAM REPORT: CPT

## 2019-02-21 PROCEDURE — 99223 1ST HOSP IP/OBS HIGH 75: CPT | Mod: AI,GC

## 2019-02-21 PROCEDURE — 71045 X-RAY EXAM CHEST 1 VIEW: CPT | Mod: 26

## 2019-02-21 RX ORDER — SODIUM CHLORIDE 9 MG/ML
1000 INJECTION INTRAMUSCULAR; INTRAVENOUS; SUBCUTANEOUS ONCE
Qty: 0 | Refills: 0 | Status: COMPLETED | OUTPATIENT
Start: 2019-02-21 | End: 2019-02-21

## 2019-02-21 RX ORDER — ENOXAPARIN SODIUM 100 MG/ML
40 INJECTION SUBCUTANEOUS DAILY
Qty: 0 | Refills: 0 | Status: DISCONTINUED | OUTPATIENT
Start: 2019-02-21 | End: 2019-02-26

## 2019-02-21 RX ADMIN — Medication 58 MILLIGRAM(S): at 19:45

## 2019-02-21 RX ADMIN — SODIUM CHLORIDE 1000 MILLILITER(S): 9 INJECTION INTRAMUSCULAR; INTRAVENOUS; SUBCUTANEOUS at 19:53

## 2019-02-21 RX ADMIN — SODIUM CHLORIDE 1000 MILLILITER(S): 9 INJECTION INTRAMUSCULAR; INTRAVENOUS; SUBCUTANEOUS at 19:52

## 2019-02-21 RX ADMIN — SODIUM CHLORIDE 1000 MILLILITER(S): 9 INJECTION INTRAMUSCULAR; INTRAVENOUS; SUBCUTANEOUS at 18:52

## 2019-02-21 RX ADMIN — SODIUM CHLORIDE 1000 MILLILITER(S): 9 INJECTION INTRAMUSCULAR; INTRAVENOUS; SUBCUTANEOUS at 18:53

## 2019-02-21 NOTE — H&P ADULT - PROBLEM/PLAN-1
Medicare Wellness Visit  Plan for Preventive Care      An important way to stay healthy is to use preventive services provided by doctors and health care providers.  Preventive services can find health problems early when treatment works best and can keep you from getting certain diseases or illnesses.  Medicare pays for many preventive services to help keep you healthy. To complete your personal preventive care plan, you are in need of the following Health Maintenance screening services. The next due date is listed after the specific service.     Health Maintenance Summary     Topic Due On Due Status Completed On Postpone Until Reason    Colorectal Cancer Screening - Colonoscopy Jan 1, 2018 Not Due Jan 1, 2008      Immunization - Td/Tdap Sep 14, 2021 Not Due Sep 14, 2011      Immunization-Zoster  Completed Dec 20, 2013      Immunization - Pneumococcal Dec 16, 2016 Overdue       Medicare Wellness Visit Jan 5, 2018 Not Due Jan 5, 2017      Immunization-Influenza Sep 1, 2016 Postponed  Apr 1, 2017 Patient Refused           Preventive Care for Women and Men    Cardiovascular Screening:  · Blood tests for cholesterol, lipid and triglyceride levels. High levels increase your risk for heart disease and stroke. High levels can be treated with medications, diet and exercise. Lowering your levels can help keep your heart and blood vessels healthy.  Your provider will order these tests if necessary.    · An xray to detect if you have an abdominal aortic aneurysm (AAA).  Annually 9,000 deaths in the [place] are AAA-related.  You may have no symptoms; as many as 1 in 3 will rupture if left untreated.  Early diagnosis allows for more effective treatment and cure.  If you have a family history of AAA or are a male age 65-75 who has smoked, you are at higher risk of an AAA.  Your provider can order this test if needed.    Colorectal Screening:  · There are several tests to check for colorectal cancer. You and your doctor  will discuss what test is best for you and when to have it done.   · Screening Colonoscopy: exam of the entire colon, seen through a flexible lighted tube.  · Flexible Sigmoidoscopy: exam of the sigmoid portion (last third) of the colon and rectum, seen through a flexible lighted tube.  · Cologuard DNA stool test: a sample of your stool is used to screen for cancer and unseen blood in your stool.  · Fecal Occult Blood Test: a sample of your stool is studied to find any unseen blood    Flu Shot:  · An immunization that helps to prevent influenza. You should get this every year. The best time to get the shot is in the fall.    Pneumococcal Shot:  · An immunization that helps prevent several types of pneumonia. There are now 2 recommended vaccines: previously recommended vaccine that covers 23 types of pneumonia and more recently recommended vaccine that covers 13 additional types of pneumonia.  They are given at least 12 months apart.  Booster immunizations are generally not needed.    Hepatitis B Shot:  · An immunization that helps to protect people from getting Hepatitis B. Hepatitis B is a virus that spreads through contact with infected blood or body fluids. Many people with the virus do not have symptoms.  The virus can lead to serious problems, such as liver disease. Some people are at higher risk than others. Your doctor will tell you if this shot is recommended for you.    Diabetes Screening:  · A test to measure glucose (sugar) in your blood called a fasting blood sugar. Fasting means you cannot have food or drink for at least 8 hours before the test. This test can detect diabetes long before you may notice symptoms.    Glaucoma Screening:  · Glaucoma screening is performed by your eye doctor. The test measures the fluid pressure inside your eyes to detect if you have glaucoma     Hepatitis C Screening:  · A blood test to determine if you have hepatitis C virus, which attacks the liver and is a major cause of  chronic liver disease.  Medicare will cover single screening for all adults born between 1945 & 1965, or high risk patients (current/past history of illicit injection drug use, blood transfusion prior to 1992).  High risk patients with ongoing illicit injection drug use can be screened annually.    Smoking and Tobacco-Use Cessation Counseling:  · Tobacco is the single greatest cause of disease and premature death in our country today. Medication and counseling together can increase a person’s chance of quitting for good.   · Medicare covers 2 quitting attempts per year, with 4 sessions per attempt (8 sessions in a twelve month period)    Preventive Care for Women    Screening Mammograms and Breast Exams:  · An x-ray of your breasts to check for breast cancer before you or your doctor may be able to feel it.  Breast cancer found early can usually be treated with success    Pelvic Exams and Pap Tests:  · An exam to check for cervical and vaginal cancer. A Pap test is a lab test in which cells are taken from your cervix and sent to the lab to detect signs of cervical cancer. When cancer of the cervix is found early, chances for a cure are good. Testing can generally end at age 65, or if a woman has a hysterectomy for a benign condition. A woman's primary care physician will advise more frequent testing if certain abnormalities are found.    Bone Mass Measurements:  · A painless x-ray measurement of your bone density to see if you are at risk for suffering a broken bone. Density refers to the amount of bone or how tightly the bone tissue is packed.    Preventive Care for Men    Prostate Screening:  · PSA - a prostate cancer blood test. The [place] Preventive Services Task Force recommends against routine screening of healthy men with no signs or symptoms of prostate disease. Men should not ignore urinary symptoms, and discuss their family history with their doctor/provider.      Medicare pays for many preventive  services to keep you healthy. For some of these services, you might have to pay a deductible, coinsurance, and / or copayment.  The amounts vary depending on the type of services you need and the kind of Medicare health plan you have.                 DISPLAY PLAN FREE TEXT

## 2019-02-21 NOTE — ED PROVIDER NOTE - CLINICAL SUMMARY MEDICAL DECISION MAKING FREE TEXT BOX
44 yo white male with MS now with increasing weakness mostly in lower extremities requiring evaluation, labs, ecg and meds. DDX=MSFlare/Dehydration/Infection/Metabolic Abnormalities

## 2019-02-21 NOTE — ED PROVIDER NOTE - CONSTITUTIONAL, MLM
normal... Weak appearing white male, well nourished, awake, alert, oriented to person, place, time/situation and in no apparent distress.

## 2019-02-21 NOTE — ED ADULT NURSE REASSESSMENT NOTE - NSIMPLEMENTINTERV_GEN_ALL_ED
Implemented All Fall with Harm Risk Interventions:  [place] to call system. Call bell, personal items and telephone within reach. Instruct patient to call for assistance. Room bathroom lighting operational. Non-slip footwear when patient is off stretcher. Physically safe environment: no spills, clutter or unnecessary equipment. Stretcher in lowest position, wheels locked, appropriate side rails in place. Provide visual cue, wrist band, yellow gown, etc. Monitor gait and stability. Monitor for mental status changes and reorient to person, place, and time. Review medications for side effects contributing to fall risk. Reinforce activity limits and safety measures with patient and family. Provide visual clues: red socks.

## 2019-02-21 NOTE — H&P ADULT - ASSESSMENT
44 y/o m pmhx MS, LE cellulitis, PE presented to the ED with increase weakness x2 days admitted with MS exacerbation.

## 2019-02-21 NOTE — H&P ADULT - HISTORY OF PRESENT ILLNESS
44 y/o m pmhx MS, LE cellulitis, PE presented to the ED with increase weakness x2 days. Pt reported he has had worsening and more frequent exacerbations in the past two years despite trials of multiple medications. He reported that his LE are very weak and that today his UE are weaker than usual as well. He is currently not on any medications for MS, including baclofen because none of the medications have helped his pain or prevented exacerbations. He reported that he uses THC and marijuana at home which does control his symptoms well. He cannot afford to get this from a dispensary so he buys it on the street. He also reported that the only medication that he has had pain relief with in the past is a Fentanyl patch- he is willing to try this but refuses any other pain medications. He denies SOB, but reports a "heaviness" when breathing due to his weakened muscles. He states that he has researched immunotherapy trials for MS and he will be pursuing that next-if this does not work then he is going to "move to a state where assisted suicide is legal". Denies CP, palpitations. Pt is bowel and bladder incontinent (wears condom cath).     In the ED: HR 90, oxygen 95%on RA  CBC/BMP WNL, UA: turbid, small bili, 75 protein, 1 urobili, positive nitrites, mod LE, moderate blood, many bacteria, moderate triphosphate crystals  CXR: No acute disease (unofficial read)  EKG: NSR at 98bpm (unofficial read) 42 y/o m pmhx MS, LE cellulitis, hx of PE in 2012 no longer on a/c given patient refusal due to GIB presented to the ED with increase weakness x2 days. Pt reported he has had worsening and more frequent exacerbations in the past two years despite trials of multiple medications. He reported that his LE are very weak and that today his UE are weaker than usual as well as he cannot transfer from bed to wheelchair. He is currently not on any medications for MS, including baclofen because none of the medications have helped his pain or prevented exacerbations. He reported that he uses THC and marijuana at home which does control his symptoms well. He cannot afford to get this from a dispensary so he buys it on the street. He also reported that the only medication that he has had pain relief with in the past is a Fentanyl patch- he is willing to try this but refuses any other pain medications. He denies SOB, but reports a "heaviness" when breathing due to his weakened muscles. He states that he has researched immunotherapy trials for MS and he will be pursuing that next-if this does not work then he is going to "move to a state where assisted suicide is legal". Denies CP, palpitations. Pt is bowel and bladder incontinent (wears condom cath).     In the ED: HR 90, oxygen 99, %on RA, 126/81, afebrile  CBC/BMP WNL, UA: turbid, small bili, 75 protein, 1 urobili, positive nitrites, mod LE, moderate blood, many bacteria, moderate triphosphate crystals  CXR: No acute disease (unofficial read)  EKG: NSR at 98bpm (unofficial read)

## 2019-02-21 NOTE — ED ADULT NURSE NOTE - NSIMPLEMENTINTERV_GEN_ALL_ED
Implemented All Universal Safety Interventions:  [place] to call system. Call bell, personal items and telephone within reach. Instruct patient to call for assistance. Room bathroom lighting operational. Non-slip footwear when patient is off stretcher. Physically safe environment: no spills, clutter or unnecessary equipment. Stretcher in lowest position, wheels locked, appropriate side rails in place.

## 2019-02-21 NOTE — H&P ADULT - PROBLEM SELECTOR PLAN 2
-F/u b/l LE dopplers -F/u b/l LE dopplers to r/o DVT given immbolity and remote hx of PE  -patient says he would refuse a/c even if positive DVT as hx of GIB while on a/c, explained to him risks of DVT propagation to cause deadly PE

## 2019-02-21 NOTE — H&P ADULT - NSHPREVIEWOFSYSTEMS_GEN_ALL_CORE
Constitutional: denies fever, chills, diaphoresis   HEENT: denies blurry vision, double vision, eye pain, difficulty hearing  Respiratory: denies SOB, BEDOYA, cough, sputum production, wheezing, hemoptysis  Cardiovascular: denies CP, palpitations, admits edema  Gastrointestinal: denies nausea, vomiting, diarrhea, constipation, abdominal pain, melena, hematochezia   Genitourinary: denies dysuria, frequency, urgency, hematuria   Skin/Breast: denies rash, itching  Musculoskeletal: denies myalgias, joint swelling, admits muscle weakness  Neurologic: denies headache, admits to severe weakness, numbness/tingling  Psychiatric: denies anxiety, depression, not acutely suicidal- does wish to seek assisted suicide in the future if condition does not get under control  ROS negative except as noted above

## 2019-02-21 NOTE — H&P ADULT - PROBLEM SELECTOR PLAN 6
-Lovenox for DVT ppx  IMPROVE VTE Individual Risk Assessment          RISK                                                          Points  [ x ] Previous VTE                                                3  [  ] Thrombophilia                                             2  [x  ] Lower limb paralysis                                   2        (unable to hold up >15 seconds)    [  ] Current Cancer                                             2         (within 6 months)  [x  ] Immobilization > 24 hrs                              1  [  ] ICU/CCU stay > 24 hours                             1  [  ] Age > 60                                                         1    IMPROVE VTE Score: 6

## 2019-02-21 NOTE — H&P ADULT - PROBLEM SELECTOR PLAN 1
Admit to [facility]  -Dr. Rivas consulted, appreciate recs  -Continue IV solumed  -Pain control  -F/u am labs Admit to [facility]  -Dr. Rivas consulted, appreciate recs  -Continue IV solumed  -Pain control  -F/u am labs  -F/u urine culture, does not complain of symptoms at this time Admit to [facility]  -Dr. Rivas consulted, appreciate recs  -Continue IV solumed  -Pt refusing pain medication at this time  -F/u trops x2  -F/u am labs  -F/u urine culture, does not complain of symptoms at this time Admit to [facility]  -Dr. Rivas consulted, appreciate recs  -s/p 1gram solumedrol in ER  -will c/w solumedrol 80mg q6 until seen by neuro  -Pt refusing any pain medication at this time  -fall risk

## 2019-02-21 NOTE — H&P ADULT - NSHPLABSRESULTS_GEN_ALL_CORE
personally reviewed labs  cbc and cmp wnl  UA moderate LE, positive LE, moderate bacteria (chronically positive)       personally reviewed CXR: clear lungs      personally reviewed EKG: NSR @ 98, no acute ischemic changes

## 2019-02-21 NOTE — H&P ADULT - NSHPSOCIALHISTORY_GEN_ALL_CORE
Lives at home with 24 hour aide  Denies ETOH  Denies smoking history  Admits to regular marijuana/THC use for pain  Adamantly refused flu shot

## 2019-02-21 NOTE — ED PROVIDER NOTE - OBJECTIVE STATEMENT
44 yo white male with H/O Multiple Sclerosis with Secondary Paraparesis with Chronic Indwelling Steele who now presents to ED c/o generalized weakness and fatigue with feeling that his legs are weaker than usual. Denies any fever, chills, nausea, vomiting, diarrhea, cough, SOB, abdominal or chest pain. Was seen by Dr. Angulo last and given pulse dose of steroids (12/17). Patient has decided to stop taking all his medications for MS.

## 2019-02-21 NOTE — ED ADULT NURSE REASSESSMENT NOTE - NS ED NURSE REASSESS COMMENT FT1
Report taken from Shankar MARTINES RN. Pt received alert and oriented x 4 c/o upper and lower extremity pain r/t ms flare up. Pt is being evaluated by admitting MD and reseident. Awaiting orders at this time. No acute distress. 22 g noted to right thumb.

## 2019-02-21 NOTE — H&P ADULT - NSHPPHYSICALEXAM_GEN_ALL_CORE
Physical Exam:  General: Well developed, obese, moderate distress 2/2 pain  HEENT: NCAT, PERRLA, EOMI bl, moist mucous membranes   Neck: Supple, nontender, no mass  Neurology: A&Ox3, CN II-XII grossly intact, sensation intact, paralysis of b/l LE, L.  strength 4/5, R.  strength 2/5 (baseline per patient), decreased sensation to b/l LE, decreased sensation to b/l UE  Respiratory: CTA B/L, No W/R/R  CV: RRR, +S1/S2, no murmurs, rubs or gallops  Abdominal: Soft, NT, ND +BSx4  Extremities: positive for erythema b/l LE from ankle to knee, b/l LE edema  Skin: warm, dry, non-painful, non-pruritis raised, pink rash on extensor surface of LUE, 4cm x 2cm scab on LLE (pt reports from fall 2 weeks ago) Physical Exam:  General: Well developed, obese, moderate distress 2/2 pain  HEENT: NCAT, PERRLA, EOMI bl, moist mucous membranes   Neck: Supple, nontender, no mass  Neurology: A&Ox3, CN II-XII grossly intact, sensation intact, paralysis of b/l LE, L.  strength 4/5, R.  strength 2/5 (baseline per patient), decreased sensation to b/l LE, decreased sensation to b/l UE  Respiratory: CTA B/L, No W/R/R  CV: RRR, +S1/S2, no murmurs, rubs or gallops  Abdominal: Soft, NT, ND +BSx4  Extremities: positive for erythema b/l LE from ankle to knee, trace b/l pitting LE edema,   Skin: warm, dry, non-painful, non-pruritis raised, pink rash on extensor surface of LUE, 4cm x 2cm scab on LLE (pt reports from fall 2 weeks ago) Physical Exam:  General: Well developed, obese, moderate distress 2/2 pain  HEENT: NCAT, PERRLA, EOMI bl, moist mucous membranes   Neck: Supple, nontender, no mass  Neurology: A&Ox3, CN II-XII grossly intact, sensation intact, paralysis of b/l LE, L.  strength 4/5, R.  strength 2/5 (baseline per patient), decreased sensation to b/l LE, decreased sensation to b/l UE  Respiratory: CTA B/L, No W/R/R  CV: RRR, +S1/S2, no murmurs, rubs or gallops  Abdominal: Soft, NT, ND +BSx4  Extremities: , 1+ b/l pitting LE edema,   Skin: warm, dry, non-painful, non-pruritis raised, pink rash on extensor surface of LUE, 4cm x 2cm scab on LLE (pt reports from fall 2 weeks ago), chronic venous changes, no sign of celuitis

## 2019-02-21 NOTE — H&P ADULT - PROBLEM SELECTOR PLAN 5
-Lovenox for DVT ppx  IMPROVE VTE Individual Risk Assessment          RISK                                                          Points  [ x ] Previous VTE                                                3  [  ] Thrombophilia                                             2  [x  ] Lower limb paralysis                                   2        (unable to hold up >15 seconds)    [  ] Current Cancer                                             2         (within 6 months)  [x  ] Immobilization > 24 hrs                              1  [  ] ICU/CCU stay > 24 hours                             1  [  ] Age > 60                                                         1    IMPROVE VTE Score: 6 chronically positive UA, no symptoms, afebrile, no leukocytosis, nontoxic, likely colonized  -will hold off on antibiotics

## 2019-02-22 DIAGNOSIS — G62.9 POLYNEUROPATHY, UNSPECIFIED: ICD-10-CM

## 2019-02-22 LAB
ANION GAP SERPL CALC-SCNC: 9 MMOL/L — SIGNIFICANT CHANGE UP (ref 5–17)
BUN SERPL-MCNC: 11 MG/DL — SIGNIFICANT CHANGE UP (ref 7–23)
CALCIUM SERPL-MCNC: 8.6 MG/DL — SIGNIFICANT CHANGE UP (ref 8.5–10.1)
CHLORIDE SERPL-SCNC: 110 MMOL/L — HIGH (ref 96–108)
CK MB BLD-MCNC: 1.4 % — SIGNIFICANT CHANGE UP (ref 0–3.5)
CK MB CFR SERPL CALC: 3.2 NG/ML — SIGNIFICANT CHANGE UP (ref 0–3.6)
CK SERPL-CCNC: 222 U/L — SIGNIFICANT CHANGE UP (ref 26–308)
CO2 SERPL-SCNC: 26 MMOL/L — SIGNIFICANT CHANGE UP (ref 22–31)
CREAT SERPL-MCNC: 0.64 MG/DL — SIGNIFICANT CHANGE UP (ref 0.5–1.3)
CULTURE RESULTS: SIGNIFICANT CHANGE UP
GLUCOSE SERPL-MCNC: 132 MG/DL — HIGH (ref 70–99)
HCT VFR BLD CALC: 39.8 % — SIGNIFICANT CHANGE UP (ref 39–50)
HGB BLD-MCNC: 13.6 G/DL — SIGNIFICANT CHANGE UP (ref 13–17)
MCHC RBC-ENTMCNC: 29.8 PG — SIGNIFICANT CHANGE UP (ref 27–34)
MCHC RBC-ENTMCNC: 34.2 GM/DL — SIGNIFICANT CHANGE UP (ref 32–36)
MCV RBC AUTO: 87.3 FL — SIGNIFICANT CHANGE UP (ref 80–100)
NRBC # BLD: 0 /100 WBCS — SIGNIFICANT CHANGE UP (ref 0–0)
PLATELET # BLD AUTO: 243 K/UL — SIGNIFICANT CHANGE UP (ref 150–400)
POTASSIUM SERPL-MCNC: 3.8 MMOL/L — SIGNIFICANT CHANGE UP (ref 3.5–5.3)
POTASSIUM SERPL-SCNC: 3.8 MMOL/L — SIGNIFICANT CHANGE UP (ref 3.5–5.3)
RBC # BLD: 4.56 M/UL — SIGNIFICANT CHANGE UP (ref 4.2–5.8)
RBC # FLD: 12.8 % — SIGNIFICANT CHANGE UP (ref 10.3–14.5)
SODIUM SERPL-SCNC: 145 MMOL/L — SIGNIFICANT CHANGE UP (ref 135–145)
SPECIMEN SOURCE: SIGNIFICANT CHANGE UP
TROPONIN I SERPL-MCNC: <.015 NG/ML — SIGNIFICANT CHANGE UP (ref 0.01–0.04)
WBC # BLD: 6.31 K/UL — SIGNIFICANT CHANGE UP (ref 3.8–10.5)
WBC # FLD AUTO: 6.31 K/UL — SIGNIFICANT CHANGE UP (ref 3.8–10.5)

## 2019-02-22 PROCEDURE — 99233 SBSQ HOSP IP/OBS HIGH 50: CPT

## 2019-02-22 RX ORDER — PANTOPRAZOLE SODIUM 20 MG/1
40 TABLET, DELAYED RELEASE ORAL
Qty: 0 | Refills: 0 | Status: DISCONTINUED | OUTPATIENT
Start: 2019-02-22 | End: 2019-02-26

## 2019-02-22 RX ORDER — FENTANYL CITRATE 50 UG/ML
1 INJECTION INTRAVENOUS
Qty: 0 | Refills: 0 | Status: DISCONTINUED | OUTPATIENT
Start: 2019-02-22 | End: 2019-02-26

## 2019-02-22 RX ADMIN — FENTANYL CITRATE 1 PATCH: 50 INJECTION INTRAVENOUS at 13:47

## 2019-02-22 RX ADMIN — Medication 80 MILLIGRAM(S): at 11:42

## 2019-02-22 RX ADMIN — Medication 80 MILLIGRAM(S): at 07:21

## 2019-02-22 RX ADMIN — ENOXAPARIN SODIUM 40 MILLIGRAM(S): 100 INJECTION SUBCUTANEOUS at 11:41

## 2019-02-22 RX ADMIN — FENTANYL CITRATE 1 PATCH: 50 INJECTION INTRAVENOUS at 19:45

## 2019-02-22 RX ADMIN — Medication 80 MILLIGRAM(S): at 01:36

## 2019-02-22 RX ADMIN — Medication 258 MILLIGRAM(S): at 13:51

## 2019-02-22 NOTE — PROGRESS NOTE ADULT - PROBLEM SELECTOR PLAN 1
Admit to [facility]  -Dr. Rivas consulted, appreciate recs  -s/p 1gram solumedrol in ER  -continue 1 gram q24h.  -fall risk

## 2019-02-22 NOTE — CHART NOTE - NSCHARTNOTEFT_GEN_A_CORE
Called for concern for bedbugs, NA claimed to see bug but not caught. Patient with no skin complaints other than L arm itchy for a while but denies knowing rash present.     T(C): 37.3 (02-21-19 @ 20:36), Max: 37.3 (02-21-19 @ 20:36)  HR: 89 (02-21-19 @ 20:36) (85 - 90)  BP: 111/72 (02-21-19 @ 20:36) (111/72 - 126/81)  RR: 16 (02-21-19 @ 20:36) (15 - 24)  SpO2: 99% (02-21-19 @ 20:36) (92% - 99%)    INTEGUMENT: Telangiectasias noted on cheeks L>R; Scattered erythematous papules on extensor surface of L forearm with lightly erythematous excoriated patches on posterior RUE; no lesions noted on chest or abdomen, few scattered papules on posterior neck at base of hairline; hyperpigmentation under pannus with scaling may represent resolving candidiasis; b/l erythema of lower extremities 2/2 venous stasis with evidence of healed ulcers; L anterior shin with crusted over healing injurious ulcer; B/l posterior upper thighs with hyperpigmentation/scaling and lichenification representing eczema vs healing candidiasis vs healed pressure ulcers    RNs with concern for bed bugs based on papules seen on L extensor surface of forearm; no bugs noted on skin exam; unsure etiology but likely not bedbugs  - NA claims found bug but bug not caught/isolated so cannot identify  - No wheals found on exam however papules could possible represent bites depending on context  - Patient denies pets at home or history of bedbugs  - Could be eczematous skin changes vs folliculitis  - Will isolate for now and place on contact  - Continue to monitor, if bugs found; will place in biohazard bag  - RN to alert if change in status Called for concern for bedbugs, NA claimed to see bug but not caught. Patient with no skin complaints other than L arm itchy for a while but denies knowing rash present. Patient with history of MS not on treatment. Full skin exam performed with help of nurses.    T(C): 37.3 (02-21-19 @ 20:36), Max: 37.3 (02-21-19 @ 20:36)  HR: 89 (02-21-19 @ 20:36) (85 - 90)  BP: 111/72 (02-21-19 @ 20:36) (111/72 - 126/81)  RR: 16 (02-21-19 @ 20:36) (15 - 24)  SpO2: 99% (02-21-19 @ 20:36) (92% - 99%)    SKIN: Telangiectasias noted on cheeks L>R; Scattered erythematous papules on extensor surface of L forearm with lightly erythematous excoriated patches on posterior RUE; no lesions noted on chest or abdomen, few scattered papules on posterior neck at base of hairline and mid-back; hyperpigmentation under pannus with scaling may represent resolving candidiasis; b/l erythema of lower extremities 2/2 venous stasis with evidence of healed ulcers and overlying scaling; L anterior shin with crusted over healing injurious ulcer (injury from Rebeca lift recently); B/l posterior upper thighs with hyperpigmentation/scaling and lichenification representing eczema vs healing candidiasis vs healed pressure ulcers    RNs with concern for bed bugs based on papules seen on L extensor surface of forearm; no bugs noted on skin exam; unsure etiology but unsure if this truly indicated bedbugs based on clinical presentation  - NA claims found bug on pillow but bug not caught/isolated so cannot identify; No additional bugs identified on exam  - No wheals found on exam however papules could possible represent bites depending on context; patient unsure when rash first was present  - Patient denies pets at home or history of bedbugs in house or close contacts  - Involvement of concerning rash is isolated to distinct distribution of arm, may represent contact allergy  - Possibly eczematous skin changes vs folliculitis  - Will isolate for now and place on contact; nurse manager states that [facility] policy requires contact if any suspicion for bedbugs  - Continue to monitor, if bugs found; will place in biohazard bag and analyze more closely  - RN to alert if change in status

## 2019-02-22 NOTE — PROGRESS NOTE ADULT - SUBJECTIVE AND OBJECTIVE BOX
ADMISSION HPI:  44 y/o m pmhx MS, LE cellulitis, hx of PE in 2012 no longer on a/c given patient refusal due to GIB presented to the ED with increase weakness x2 days. Pt reported he has had worsening and more frequent exacerbations in the past two years despite trials of multiple medications. He reported that his LE are very weak and that today his UE are weaker than usual as well as he cannot transfer from bed to wheelchair. He is currently not on any medications for MS, including baclofen because none of the medications have helped his pain or prevented exacerbations. He reported that he uses THC and marijuana at home which does control his symptoms well. He cannot afford to get this from a dispensary so he buys it on the street. He also reported that the only medication that he has had pain relief with in the past is a Fentanyl patch- he is willing to try this but refuses any other pain medications. He denies SOB, but reports a "heaviness" when breathing due to his weakened muscles. He states that he has researched immunotherapy trials for MS and he will be pursuing that next-if this does not work then he is going to "move to a state where assisted suicide is legal". Denies CP, palpitations. Pt is bowel and bladder incontinent (wears condom cath).     In the ED: HR 90, oxygen 99, %on RA, 126/81, afebrile  CBC/BMP WNL, UA: turbid, small bili, 75 protein, 1 urobili, positive nitrites, mod LE, moderate blood, many bacteria, moderate triphosphate crystals  CXR: No acute disease (unofficial read)  EKG: NSR at 98bpm (unofficial read) (21 Feb 2019 20:22)    INTERVAL HPI:  Patient seen and examined at bedside. Small bug seen overnight, and patient now on isolation for suspected bedbugs. Complains of severe leg pain. Requesting Fentanyl patch for pain control.    REVIEW OF SYSTEMS:    CONSTITUTIONAL: +weakness, no fevers, or chills  EYES/ENT: No visual changes, no throat pain   RESPIRATORY: No cough, wheezing, hemoptysis; No shortness of breath  CARDIOVASCULAR: No chest pain or palpitations  GASTROINTESTINAL: No abdominal pain, nausea, vomiting, or diarrhea  GENITOURINARY: No dysuria, frequency or hematuria  NEUROLOGICAL: No dizziness, neuropathy in both legs.  SKIN: No itching, burning, rashes, or lesions   All other review of systems is negative unless indicated above.    VITAL SIGNS:  Vital Signs Last 24 Hrs  T(C): 36.3 (02-22-19 @ 05:26), Max: 37.3 (02-21-19 @ 20:36)  T(F): 97.4 (02-22-19 @ 05:26), Max: 99.2 (02-21-19 @ 20:36)  HR: 96 (02-22-19 @ 05:26) (89 - 96)  BP: 126/74 (02-22-19 @ 05:26) (111/72 - 126/81)  BP(mean): --  RR: 20 (02-22-19 @ 05:26) (15 - 20)  SpO2: 95% (02-22-19 @ 05:26) (95% - 99%)      PHYSICAL EXAM:     GENERAL: patine in distress due to pain, non-toxic appearing  HEENT: NC/AT, EOMI, neck supple, MMM  RESPIRATORY: LCTAB/L, no rhonchi, rales, or wheezing  CARDIOVASCULAR: RRR, no murmurs, gallops, rubs  ABDOMINAL: soft, non-tender, non-distended, positive bowel sounds   EXTREMITIES: no clubbing, cyanosis, or edema  NEUROLOGICAL: alert and oriented x 3, non-focal  SKIN: left shin eschar, no surrounding erythema or drainage.  MUSCULOSKELETAL: no gross joint deformity                          13.6   6.31  )-----------( 243      ( 22 Feb 2019 05:06 )             39.8     02-22    145  |  110<H>  |  11  ----------------------------<  132<H>  3.8   |  26  |  0.64    Ca    8.6      22 Feb 2019 05:06    TPro  7.1  /  Alb  3.6  /  TBili  0.8  /  DBili  x   /  AST  20  /  ALT  31  /  AlkPhos  64  02-21        MEDICATIONS  (STANDING):  enoxaparin Injectable 40 milliGRAM(s) SubCutaneous daily  fentaNYL   Patch  12 MICROgram(s)/Hr. 1 Patch Transdermal every 48 hours  methylPREDNISolone sodium succinate IVPB 1000 milliGRAM(s) IV Intermittent every 24 hours    MEDICATIONS  (PRN):

## 2019-02-22 NOTE — PROGRESS NOTE ADULT - PROBLEM SELECTOR PLAN 3
patient reports whole body heaviness including in chest but not pleuritic and no chest pain, satting 99% on RA, normal vitals  -symptoms likely due to MS flare, very low suspicion for PE, will hold off on CTA for now, f/u LE duplex  -EKG without ischemic changes, add on set up trops, if elevated then trend

## 2019-02-22 NOTE — PHYSICAL THERAPY INITIAL EVALUATION ADULT - PERTINENT HX OF CURRENT PROBLEM, REHAB EVAL
As per H&P "44 y/o m pmhx MS, LE cellulitis, hx of PE in 2012 no longer on a/c given patient refusal due to GIB presented to the ED with increase weakness x2 days. Pt reported he has had worsening and more frequent exacerbations in the past two years despite trials of multiple medications. He reported that his LE are very weak and that today his UE are weaker than usual as well as he cannot transfer from bed to wheelchair."

## 2019-02-22 NOTE — PROGRESS NOTE ADULT - PROBLEM SELECTOR PLAN 2
-F/u b/l LE dopplers to r/o DVT given immobility and remote hx of PE although patient reports he would refuse A/C even if testing is positive.

## 2019-02-22 NOTE — OCCUPATIONAL THERAPY INITIAL EVALUATION ADULT - ADDITIONAL COMMENTS
Lives in a home alone with 24 hour care from aide. Has hospital bed, reagan, W/C, commode and showerseat.

## 2019-02-22 NOTE — PHYSICAL THERAPY INITIAL EVALUATION ADULT - ADDITIONAL COMMENTS
Prior to arriving at the hospital, pt was living in private home with 24 hour care, no ABDOULAYE. Pt states he is reagan lift dependent for transfers into and out of bed. Pt has W/C, showerseat and requires assistance for all ADLs

## 2019-02-22 NOTE — PROGRESS NOTE ADULT - ASSESSMENT
42 y/o m pmhx MS, LE cellulitis, PE presented to the ED with increase weakness x2 days admitted with MS exacerbation.

## 2019-02-22 NOTE — PROGRESS NOTE ADULT - PROBLEM SELECTOR PLAN 5
chronically positive UA, no symptoms, afebrile, no leukocytosis, nontoxic, likely colonized  -will hold off on antibiotics

## 2019-02-23 LAB
ANION GAP SERPL CALC-SCNC: 9 MMOL/L — SIGNIFICANT CHANGE UP (ref 5–17)
BUN SERPL-MCNC: 13 MG/DL — SIGNIFICANT CHANGE UP (ref 7–23)
CALCIUM SERPL-MCNC: 8.7 MG/DL — SIGNIFICANT CHANGE UP (ref 8.5–10.1)
CHLORIDE SERPL-SCNC: 109 MMOL/L — HIGH (ref 96–108)
CO2 SERPL-SCNC: 27 MMOL/L — SIGNIFICANT CHANGE UP (ref 22–31)
CREAT SERPL-MCNC: 0.65 MG/DL — SIGNIFICANT CHANGE UP (ref 0.5–1.3)
GLUCOSE SERPL-MCNC: 127 MG/DL — HIGH (ref 70–99)
HCT VFR BLD CALC: 41.6 % — SIGNIFICANT CHANGE UP (ref 39–50)
HGB BLD-MCNC: 14.3 G/DL — SIGNIFICANT CHANGE UP (ref 13–17)
MCHC RBC-ENTMCNC: 30 PG — SIGNIFICANT CHANGE UP (ref 27–34)
MCHC RBC-ENTMCNC: 34.4 GM/DL — SIGNIFICANT CHANGE UP (ref 32–36)
MCV RBC AUTO: 87.2 FL — SIGNIFICANT CHANGE UP (ref 80–100)
NRBC # BLD: 0 /100 WBCS — SIGNIFICANT CHANGE UP (ref 0–0)
PLATELET # BLD AUTO: 298 K/UL — SIGNIFICANT CHANGE UP (ref 150–400)
POTASSIUM SERPL-MCNC: 3.9 MMOL/L — SIGNIFICANT CHANGE UP (ref 3.5–5.3)
POTASSIUM SERPL-SCNC: 3.9 MMOL/L — SIGNIFICANT CHANGE UP (ref 3.5–5.3)
RBC # BLD: 4.77 M/UL — SIGNIFICANT CHANGE UP (ref 4.2–5.8)
RBC # FLD: 12.7 % — SIGNIFICANT CHANGE UP (ref 10.3–14.5)
SODIUM SERPL-SCNC: 145 MMOL/L — SIGNIFICANT CHANGE UP (ref 135–145)
WBC # BLD: 15.02 K/UL — HIGH (ref 3.8–10.5)
WBC # FLD AUTO: 15.02 K/UL — HIGH (ref 3.8–10.5)

## 2019-02-23 PROCEDURE — 93970 EXTREMITY STUDY: CPT | Mod: 26

## 2019-02-23 PROCEDURE — 99233 SBSQ HOSP IP/OBS HIGH 50: CPT

## 2019-02-23 RX ADMIN — PANTOPRAZOLE SODIUM 40 MILLIGRAM(S): 20 TABLET, DELAYED RELEASE ORAL at 05:45

## 2019-02-23 RX ADMIN — FENTANYL CITRATE 1 PATCH: 50 INJECTION INTRAVENOUS at 07:00

## 2019-02-23 RX ADMIN — FENTANYL CITRATE 1 PATCH: 50 INJECTION INTRAVENOUS at 20:36

## 2019-02-23 RX ADMIN — Medication 258 MILLIGRAM(S): at 13:29

## 2019-02-23 RX ADMIN — ENOXAPARIN SODIUM 40 MILLIGRAM(S): 100 INJECTION SUBCUTANEOUS at 13:30

## 2019-02-23 NOTE — PROGRESS NOTE ADULT - PROBLEM SELECTOR PLAN 3
patient reported whole body heaviness including in chest but not pleuritic and no chest pain, satting 99% on RA, normal vitals at time of admission. This has improved.   f/u LE duplex to r/o DVT.  -EKG without changes and trops WNL x2.

## 2019-02-23 NOTE — PROGRESS NOTE ADULT - PROBLEM SELECTOR PLAN 1
Admit to [facility]  -Dr. Rivas consulted, recs appreciated.  -s/p 1gram solumedrol in ER  -continue 1 gram q24h x 3 more days (5 days total)  -fall risk

## 2019-02-23 NOTE — PROGRESS NOTE ADULT - PROBLEM SELECTOR PLAN 6
Uses marijuana at home.  Had severe pain yesterday which responded well to low dose Fentanyl patch.  Will continue for now.

## 2019-02-23 NOTE — PROGRESS NOTE ADULT - SUBJECTIVE AND OBJECTIVE BOX
ADMISSION HPI:  42 y/o m pmhx MS, LE cellulitis, hx of PE in 2012 no longer on a/c given patient refusal due to GIB presented to the ED with increase weakness x2 days. Pt reported he has had worsening and more frequent exacerbations in the past two years despite trials of multiple medications. He reported that his LE are very weak and that today his UE are weaker than usual as well as he cannot transfer from bed to wheelchair. He is currently not on any medications for MS, including baclofen because none of the medications have helped his pain or prevented exacerbations. He reported that he uses THC and marijuana at home which does control his symptoms well. He cannot afford to get this from a dispensary so he buys it on the street. He also reported that the only medication that he has had pain relief with in the past is a Fentanyl patch- he is willing to try this but refuses any other pain medications. He denies SOB, but reports a "heaviness" when breathing due to his weakened muscles. He states that he has researched immunotherapy trials for MS and he will be pursuing that next-if this does not work then he is going to "move to a state where assisted suicide is legal". Denies CP, palpitations. Pt is bowel and bladder incontinent (wears condom cath).     In the ED: HR 90, oxygen 99, %on RA, 126/81, afebrile  CBC/BMP WNL, UA: turbid, small bili, 75 protein, 1 urobili, positive nitrites, mod LE, moderate blood, many bacteria, moderate triphosphate crystals  CXR: No acute disease (unofficial read)  EKG: NSR at 98bpm (unofficial read) (21 Feb 2019 20:22)    INTERVAL HPI:  Patient seen and examined at bedside. Pain is drastically better today after Fentanyl patch applied yesterday. Patient denies any SOB, CP, palpitations. No N/V/D.     REVIEW OF SYSTEMS:    CONSTITUTIONAL: +weakness, no fevers, or chills  EYES/ENT: No visual changes, no throat pain   RESPIRATORY: No cough, wheezing, hemoptysis; No shortness of breath  CARDIOVASCULAR: No chest pain or palpitations  GASTROINTESTINAL: No abdominal pain, nausea, vomiting, or diarrhea  GENITOURINARY: No dysuria, frequency or hematuria  NEUROLOGICAL: No dizziness, neuropathy in both legs.  SKIN: No itching, burning, rashes, or lesions   All other review of systems is negative unless indicated above.    VITAL SIGNS:  Vital Signs Last 24 Hrs  T(C): 36.9 (23 Feb 2019 04:44), Max: 36.9 (23 Feb 2019 04:44)  T(F): 98.4 (23 Feb 2019 04:44), Max: 98.4 (23 Feb 2019 04:44)  HR: 89 (23 Feb 2019 04:44) (89 - 89)  BP: 132/82 (23 Feb 2019 04:44) (131/76 - 135/75)  BP(mean): --  RR: 19 (23 Feb 2019 04:44) (19 - 20)  SpO2: 96% (23 Feb 2019 04:44) (95% - 96%)    PHYSICAL EXAM:     GENERAL: no acute distress, non-toxic appearing  HEENT: NC/AT, EOMI, neck supple, MMM  RESPIRATORY: LCTAB/L, no rhonchi, rales, or wheezing  CARDIOVASCULAR: RRR, no murmurs, gallops, rubs  ABDOMINAL: soft, non-tender, non-distended, positive bowel sounds   EXTREMITIES: no clubbing, cyanosis, or edema  NEUROLOGICAL: alert and oriented x 3, non-focal  SKIN: left shin eschar, no surrounding erythema or drainage, firm edema in b/l LEs, chronic appearing  MUSCULOSKELETAL: no gross joint deformity                          14.3   15.02 )-----------( 298      ( 23 Feb 2019 09:24 )             41.6   02-23    145  |  109<H>  |  13  ----------------------------<  127<H>  3.9   |  27  |  0.65    Ca    8.7      23 Feb 2019 09:24    TPro  7.1  /  Alb  3.6  /  TBili  0.8  /  DBili  x   /  AST  20  /  ALT  31  /  AlkPhos  64  02-21        MEDICATIONS  (STANDING):  enoxaparin Injectable 40 milliGRAM(s) SubCutaneous daily  fentaNYL   Patch  12 MICROgram(s)/Hr. 1 Patch Transdermal every 48 hours  methylPREDNISolone sodium succinate IVPB 1000 milliGRAM(s) IV Intermittent every 24 hours    MEDICATIONS  (PRN):

## 2019-02-24 LAB
ANION GAP SERPL CALC-SCNC: 8 MMOL/L — SIGNIFICANT CHANGE UP (ref 5–17)
BUN SERPL-MCNC: 17 MG/DL — SIGNIFICANT CHANGE UP (ref 7–23)
CALCIUM SERPL-MCNC: 8.7 MG/DL — SIGNIFICANT CHANGE UP (ref 8.5–10.1)
CHLORIDE SERPL-SCNC: 108 MMOL/L — SIGNIFICANT CHANGE UP (ref 96–108)
CO2 SERPL-SCNC: 28 MMOL/L — SIGNIFICANT CHANGE UP (ref 22–31)
CREAT SERPL-MCNC: 0.7 MG/DL — SIGNIFICANT CHANGE UP (ref 0.5–1.3)
GLUCOSE SERPL-MCNC: 140 MG/DL — HIGH (ref 70–99)
HCT VFR BLD CALC: 41 % — SIGNIFICANT CHANGE UP (ref 39–50)
HGB BLD-MCNC: 14.3 G/DL — SIGNIFICANT CHANGE UP (ref 13–17)
MCHC RBC-ENTMCNC: 30 PG — SIGNIFICANT CHANGE UP (ref 27–34)
MCHC RBC-ENTMCNC: 34.9 GM/DL — SIGNIFICANT CHANGE UP (ref 32–36)
MCV RBC AUTO: 86 FL — SIGNIFICANT CHANGE UP (ref 80–100)
NRBC # BLD: 0 /100 WBCS — SIGNIFICANT CHANGE UP (ref 0–0)
PLATELET # BLD AUTO: 286 K/UL — SIGNIFICANT CHANGE UP (ref 150–400)
POTASSIUM SERPL-MCNC: 3.7 MMOL/L — SIGNIFICANT CHANGE UP (ref 3.5–5.3)
POTASSIUM SERPL-SCNC: 3.7 MMOL/L — SIGNIFICANT CHANGE UP (ref 3.5–5.3)
RBC # BLD: 4.77 M/UL — SIGNIFICANT CHANGE UP (ref 4.2–5.8)
RBC # FLD: 12.7 % — SIGNIFICANT CHANGE UP (ref 10.3–14.5)
SODIUM SERPL-SCNC: 144 MMOL/L — SIGNIFICANT CHANGE UP (ref 135–145)
WBC # BLD: 11.48 K/UL — HIGH (ref 3.8–10.5)
WBC # FLD AUTO: 11.48 K/UL — HIGH (ref 3.8–10.5)

## 2019-02-24 PROCEDURE — 99233 SBSQ HOSP IP/OBS HIGH 50: CPT

## 2019-02-24 RX ORDER — HYDROGEN PEROXIDE 0.3 KG/100L
1 LIQUID TOPICAL
Qty: 0 | Refills: 0 | Status: DISCONTINUED | OUTPATIENT
Start: 2019-02-24 | End: 2019-02-26

## 2019-02-24 RX ORDER — BACITRACIN ZINC 500 UNIT/G
1 OINTMENT IN PACKET (EA) TOPICAL
Qty: 0 | Refills: 0 | Status: DISCONTINUED | OUTPATIENT
Start: 2019-02-24 | End: 2019-02-26

## 2019-02-24 RX ADMIN — FENTANYL CITRATE 1 PATCH: 50 INJECTION INTRAVENOUS at 06:04

## 2019-02-24 RX ADMIN — FENTANYL CITRATE 1 PATCH: 50 INJECTION INTRAVENOUS at 13:16

## 2019-02-24 RX ADMIN — Medication 1 APPLICATION(S): at 18:09

## 2019-02-24 RX ADMIN — HYDROGEN PEROXIDE 1 APPLICATION(S): 0.3 LIQUID TOPICAL at 18:09

## 2019-02-24 RX ADMIN — FENTANYL CITRATE 1 PATCH: 50 INJECTION INTRAVENOUS at 13:24

## 2019-02-24 RX ADMIN — ENOXAPARIN SODIUM 40 MILLIGRAM(S): 100 INJECTION SUBCUTANEOUS at 13:24

## 2019-02-24 RX ADMIN — PANTOPRAZOLE SODIUM 40 MILLIGRAM(S): 20 TABLET, DELAYED RELEASE ORAL at 06:03

## 2019-02-24 RX ADMIN — Medication 258 MILLIGRAM(S): at 13:16

## 2019-02-24 RX ADMIN — FENTANYL CITRATE 1 PATCH: 50 INJECTION INTRAVENOUS at 20:04

## 2019-02-24 NOTE — PROGRESS NOTE ADULT - PROBLEM SELECTOR PLAN 1
-s/p 1gram solumedrol in ER  -continue 1 gram q24h x 2 more days (5 days total)  -fall risk  -Neuro Dr. Rivas following.

## 2019-02-24 NOTE — PROGRESS NOTE ADULT - SUBJECTIVE AND OBJECTIVE BOX
Neurology Follow up note    KENNETH [id number]    HPI:  44 y/o m pmhx MS, LE cellulitis, hx of PE in 2012 no longer on a/c given patient refusal due to GIB presented to the ED with increase weakness x2 days. Pt reported he has had worsening and more frequent exacerbations in the past two years despite trials of multiple medications. He reported that his LE are very weak and that today his UE are weaker than usual as well as he cannot transfer from bed to wheelchair. He is currently not on any medications for MS, including baclofen because none of the medications have helped his pain or prevented exacerbations. He reported that he uses THC and marijuana at home which does control his symptoms well. He cannot afford to get this from a dispensary so he buys it on the street. He also reported that the only medication that he has had pain relief with in the past is a Fentanyl patch- he is willing to try this but refuses any other pain medications. He denies SOB, but reports a "heaviness" when breathing due to his weakened muscles. He states that he has researched immunotherapy trials for MS and he will be pursuing that next-if this does not work then he is going to "move to a state where assisted suicide is legal". Denies CP, palpitations. Pt is bowel and bladder incontinent (wears condom cath).     In the ED: HR 90, oxygen 99, %on RA, 126/81, afebrile  CBC/BMP WNL, UA: turbid, small bili, 75 protein, 1 urobili, positive nitrites, mod LE, moderate blood, many bacteria, moderate triphosphate crystals  CXR: No acute disease (unofficial read)  EKG: NSR at 98bpm (unofficial read) (21 Feb 2019 20:22)      Interval History - doing better    Patient is seen, chart was reviewed and case was discussed with the treatment team.  Pt is not in any distress.   Lying on bed comfortably.   No events reported overnight.   No clinical seizure was reported.  Sitting on chair bed comfortably.    is at bedside.    Vital Signs Last 24 Hrs  T(C): 36.4 (24 Feb 2019 05:12), Max: 36.6 (23 Feb 2019 20:33)  T(F): 97.6 (24 Feb 2019 05:12), Max: 97.8 (23 Feb 2019 20:33)  HR: 65 (24 Feb 2019 05:12) (65 - 81)  BP: 122/72 (24 Feb 2019 05:12) (122/72 - 143/85)  BP(mean): --  RR: 18 (24 Feb 2019 05:12) (18 - 18)  SpO2: 95% (24 Feb 2019 05:12) (95% - 96%)        REVIEW OF SYSTEMS:    Constitutional: No fever, weight loss or fatigue  Eyes: No eye pain, visual disturbances, or discharge  ENT:  No difficulty hearing, tinnitus, vertigo; No sinus or throat pain  Neck: No pain or stiffness  Respiratory: No cough, wheezing, chills or hemoptysis  Cardiovascular: No chest pain, palpitations, shortness of breath, dizziness or leg swelling  Gastrointestinal: No abdominal or epigastric pain. No nausea, vomiting or hematemesis;  Genitourinary: No dysuria, frequency, hematuria or incontinence  Neurological: No headaches, memory loss, loss of strength, numbness or tremors  Psychiatric: No depression, anxiety, mood swings or difficulty sleepin  Skin: No itching, burning, rashes or lesions   Lymph Nodes: No enlarged glands  Endocrine: No heat or cold intolerance; No hair loss, No h/o diabetes or thyroid dysfunction  Allergy and Immunologic: No hives or eczema    On Neurological Examination:    Mental Status - Pt is alert, awake, oriented X3.Follows commands well and able to answer questions appropriately.  Mood and affect  normal    Speech - dysarthria.    Cranial Nerves - Pupils 3 mm equal and reactive to light, extraocular eye movements intact.   Pt has no facial asymmetry. Facial sensation is intact.Tongue - is in midline.    Muscle tone - is increased,    Motor Exam - 5/5 of UE.  LE 3/5.    Sensory Exam -. Pt withdraws all extremities equally on stimulation. No asymmetry seen. No complaints of tingling, numbness.      Deep tendon Reflexes - 2 plus all over.          Neck Supple -  Yes.     MEDICATIONS    BACItracin   Ointment 1 Application(s) Topical two times a day  enoxaparin Injectable 40 milliGRAM(s) SubCutaneous daily  fentaNYL   Patch  12 MICROgram(s)/Hr. 1 Patch Transdermal every 48 hours  hydrogen peroxide 3% Solution 1 Application(s) Topical two times a day  methylPREDNISolone sodium succinate IVPB 1000 milliGRAM(s) IV Intermittent every 24 hours  pantoprazole    Tablet 40 milliGRAM(s) Oral before breakfast      Allergies    No Known Drug Allergies  Originally Entered as [Unknown] reaction to [KNA] (Unknown)    Intolerances        LABS:  CBC Full  -  ( 24 Feb 2019 09:50 )  WBC Count : 11.48 K/uL  Hemoglobin : 14.3 g/dL  Hematocrit : 41.0 %  Platelet Count - Automated : 286 K/uL  Mean Cell Volume : 86.0 fl  Mean Cell Hemoglobin : 30.0 pg  Mean Cell Hemoglobin Concentration : 34.9 gm/dL  Auto Neutrophil # : x  Auto Lymphocyte # : x  Auto Monocyte # : x  Auto Eosinophil # : x  % : x      02-24    144  |  108  |  17  ----------------------------<  140<H>  3.7   |  28  |  0.70    Ca    8.7      24 Feb 2019 09:50      Hemoglobin A1C:     Vitamin B12     RADIOLOGY    ASSESSMENT AND PLAN:      EXACERBATION OF MS.    CONTINUE IV SOLUMEDROL.  Physical therapy evaluation.  OOB to chair/ambulation with assistance only.  Pain is accessed and addressed.  Would continue to follow.

## 2019-02-24 NOTE — PROGRESS NOTE ADULT - ATTENDING COMMENTS
Dispo: Will be medically stable for discharge tomorrow, but will need aid from Case management team regarding DME and insurance issues.

## 2019-02-24 NOTE — PROGRESS NOTE ADULT - SUBJECTIVE AND OBJECTIVE BOX
ADMISSION HPI:  44 y/o m pmhx MS, LE cellulitis, hx of PE in 2012 no longer on a/c given patient refusal due to GIB presented to the ED with increase weakness x2 days. Pt reported he has had worsening and more frequent exacerbations in the past two years despite trials of multiple medications. He reported that his LE are very weak and that today his UE are weaker than usual as well as he cannot transfer from bed to wheelchair. He is currently not on any medications for MS, including baclofen because none of the medications have helped his pain or prevented exacerbations. He reported that he uses THC and marijuana at home which does control his symptoms well. He cannot afford to get this from a dispensary so he buys it on the street. He also reported that the only medication that he has had pain relief with in the past is a Fentanyl patch- he is willing to try this but refuses any other pain medications. He denies SOB, but reports a "heaviness" when breathing due to his weakened muscles. He states that he has researched immunotherapy trials for MS and he will be pursuing that next-if this does not work then he is going to "move to a state where assisted suicide is legal". Denies CP, palpitations. Pt is bowel and bladder incontinent (wears condom cath).     In the ED: HR 90, oxygen 99, %on RA, 126/81, afebrile  CBC/BMP WNL, UA: turbid, small bili, 75 protein, 1 urobili, positive nitrites, mod LE, moderate blood, many bacteria, moderate triphosphate crystals  CXR: No acute disease (unofficial read)  EKG: NSR at 98bpm (unofficial read) (21 Feb 2019 20:22)    INTERVAL HPI:  Patient seen and examined at bedside. Pain is better today. Patient denies any SOB, CP, palpitations. No N/V/D.     REVIEW OF SYSTEMS:    CONSTITUTIONAL: +weakness, no fevers, or chills  EYES/ENT: No visual changes, no throat pain   RESPIRATORY: No cough, wheezing, hemoptysis; No shortness of breath  CARDIOVASCULAR: No chest pain or palpitations  GASTROINTESTINAL: No abdominal pain, nausea, vomiting, or diarrhea  GENITOURINARY: No dysuria, frequency or hematuria  NEUROLOGICAL: No dizziness, neuropathy in both legs.  SKIN: No itching, burning, rashes, or lesions   All other review of systems is negative unless indicated above.    VITAL SIGNS:  Vital Signs Last 24 Hrs  T(C): 36.4 (24 Feb 2019 05:12), Max: 36.6 (23 Feb 2019 14:11)  T(F): 97.6 (24 Feb 2019 05:12), Max: 97.8 (23 Feb 2019 14:11)  HR: 65 (24 Feb 2019 05:12) (65 - 82)  BP: 122/72 (24 Feb 2019 05:12) (121/65 - 143/85)  BP(mean): --  RR: 18 (24 Feb 2019 05:12) (18 - 18)  SpO2: 95% (24 Feb 2019 05:12) (95% - 97%)    PHYSICAL EXAM:     GENERAL: no acute distress, non-toxic appearing  HEENT: NC/AT, EOMI, neck supple, MMM  RESPIRATORY: LCTAB/L, no rhonchi, rales, or wheezing  CARDIOVASCULAR: RRR, no murmurs, gallops, rubs  ABDOMINAL: soft, non-tender, non-distended, positive bowel sounds   EXTREMITIES: no clubbing, cyanosis, or edema  NEUROLOGICAL: alert and oriented x 3, non-focal  SKIN: left shin eschar, no surrounding erythema or drainage, firm edema in b/l LEs, chronic appearing  MUSCULOSKELETAL: no gross joint deformity                           14.3   11.48 )-----------( 286      ( 24 Feb 2019 09:50 )             41.0   02-24    144  |  108  |  17  ----------------------------<  140<H>  3.7   |  28  |  0.70    Ca    8.7      24 Feb 2019 09:50      MEDICATIONS  (STANDING):  BACItracin   Ointment 1 Application(s) Topical two times a day  enoxaparin Injectable 40 milliGRAM(s) SubCutaneous daily  fentaNYL   Patch  12 MICROgram(s)/Hr. 1 Patch Transdermal every 48 hours  hydrogen peroxide 3% Solution 1 Application(s) Topical two times a day  methylPREDNISolone sodium succinate IVPB 1000 milliGRAM(s) IV Intermittent every 24 hours  pantoprazole    Tablet 40 milliGRAM(s) Oral before breakfast    MEDICATIONS  (PRN):

## 2019-02-24 NOTE — PROGRESS NOTE ADULT - PROBLEM SELECTOR PLAN 3
Patient reported whole body heaviness including in chest but not pleuritic and no chest pain, satting 99% on RA, normal vitals at time of admission. This has improved.   DVT ruled out, PE unlikely  -EKG without changes and trops WNL x2.

## 2019-02-25 ENCOUNTER — TRANSCRIPTION ENCOUNTER (OUTPATIENT)
Age: 44
End: 2019-02-25

## 2019-02-25 LAB
ANION GAP SERPL CALC-SCNC: 8 MMOL/L — SIGNIFICANT CHANGE UP (ref 5–17)
BUN SERPL-MCNC: 18 MG/DL — SIGNIFICANT CHANGE UP (ref 7–23)
CALCIUM SERPL-MCNC: 8.6 MG/DL — SIGNIFICANT CHANGE UP (ref 8.5–10.1)
CHLORIDE SERPL-SCNC: 106 MMOL/L — SIGNIFICANT CHANGE UP (ref 96–108)
CO2 SERPL-SCNC: 28 MMOL/L — SIGNIFICANT CHANGE UP (ref 22–31)
CREAT SERPL-MCNC: 0.7 MG/DL — SIGNIFICANT CHANGE UP (ref 0.5–1.3)
GLUCOSE SERPL-MCNC: 174 MG/DL — HIGH (ref 70–99)
HCT VFR BLD CALC: 41.8 % — SIGNIFICANT CHANGE UP (ref 39–50)
HGB BLD-MCNC: 14.6 G/DL — SIGNIFICANT CHANGE UP (ref 13–17)
MCHC RBC-ENTMCNC: 30 PG — SIGNIFICANT CHANGE UP (ref 27–34)
MCHC RBC-ENTMCNC: 34.9 GM/DL — SIGNIFICANT CHANGE UP (ref 32–36)
MCV RBC AUTO: 85.8 FL — SIGNIFICANT CHANGE UP (ref 80–100)
NRBC # BLD: 0 /100 WBCS — SIGNIFICANT CHANGE UP (ref 0–0)
PLATELET # BLD AUTO: 302 K/UL — SIGNIFICANT CHANGE UP (ref 150–400)
POTASSIUM SERPL-MCNC: 3.7 MMOL/L — SIGNIFICANT CHANGE UP (ref 3.5–5.3)
POTASSIUM SERPL-SCNC: 3.7 MMOL/L — SIGNIFICANT CHANGE UP (ref 3.5–5.3)
RBC # BLD: 4.87 M/UL — SIGNIFICANT CHANGE UP (ref 4.2–5.8)
RBC # FLD: 12.2 % — SIGNIFICANT CHANGE UP (ref 10.3–14.5)
SODIUM SERPL-SCNC: 142 MMOL/L — SIGNIFICANT CHANGE UP (ref 135–145)
WBC # BLD: 8.22 K/UL — SIGNIFICANT CHANGE UP (ref 3.8–10.5)
WBC # FLD AUTO: 8.22 K/UL — SIGNIFICANT CHANGE UP (ref 3.8–10.5)

## 2019-02-25 PROCEDURE — 99233 SBSQ HOSP IP/OBS HIGH 50: CPT

## 2019-02-25 RX ORDER — POLYETHYLENE GLYCOL 3350 17 G/17G
17 POWDER, FOR SOLUTION ORAL ONCE
Qty: 0 | Refills: 0 | Status: COMPLETED | OUTPATIENT
Start: 2019-02-25 | End: 2019-02-25

## 2019-02-25 RX ADMIN — Medication 1 APPLICATION(S): at 05:55

## 2019-02-25 RX ADMIN — HYDROGEN PEROXIDE 1 APPLICATION(S): 0.3 LIQUID TOPICAL at 18:41

## 2019-02-25 RX ADMIN — FENTANYL CITRATE 1 PATCH: 50 INJECTION INTRAVENOUS at 19:45

## 2019-02-25 RX ADMIN — PANTOPRAZOLE SODIUM 40 MILLIGRAM(S): 20 TABLET, DELAYED RELEASE ORAL at 07:11

## 2019-02-25 RX ADMIN — PANTOPRAZOLE SODIUM 40 MILLIGRAM(S): 20 TABLET, DELAYED RELEASE ORAL at 05:55

## 2019-02-25 RX ADMIN — ENOXAPARIN SODIUM 40 MILLIGRAM(S): 100 INJECTION SUBCUTANEOUS at 14:22

## 2019-02-25 RX ADMIN — FENTANYL CITRATE 1 PATCH: 50 INJECTION INTRAVENOUS at 07:18

## 2019-02-25 RX ADMIN — Medication 1 APPLICATION(S): at 18:41

## 2019-02-25 RX ADMIN — HYDROGEN PEROXIDE 1 APPLICATION(S): 0.3 LIQUID TOPICAL at 05:54

## 2019-02-25 RX ADMIN — Medication 258 MILLIGRAM(S): at 14:22

## 2019-02-25 NOTE — DISCHARGE NOTE ADULT - HOSPITAL COURSE
42 y/o m pmhx MS, LE cellulitis, hx of PE in 2012 no longer on a/c given patient refusal due to GIB presented to the ED with increase weakness x2 days. Pt reported he has had worsening and more frequent exacerbations in the past two years despite trials of multiple medications. He reported that his LE are very weak and that his UE are weaker than usual as well as he cannot transfer from bed to wheelchair. He is currently not on any medications for MS, including baclofen because none of the medications have helped his pain or prevented exacerbations. He reported that he uses THC oil and smokes marijuana at home which does control his symptoms well. He cannot afford to get this from a dispensary so he buys it on the street. He also reported that the only medication that he has had pain relief with in the past is a Fentanyl patch.      Patient admitted for exacerbation of MS. He was seen in consultation by Dr. Rivas from neurology and was treated with Solumedrol 1 gram daily x 5 doses. His symptoms improved drastically. He was treated with a low dose Fentanyl patch for his severe neuropathic pain. Following completion of his steroid regimen, he was felt to be medically stable for discharge home and outpatient follow-up. 42 y/o m pmhx MS, LE cellulitis, hx of PE in 2012 no longer on a/c given patient refusal due to GIB presented to the ED with increase weakness x2 days. Pt reported he has had worsening and more frequent exacerbations in the past two years despite trials of multiple medications. He reported that his LE are very weak and that his UE are weaker than usual as well as he cannot transfer from bed to wheelchair. He is currently not on any medications for MS, including baclofen because none of the medications have helped his pain or prevented exacerbations. He reported that he uses THC oil and smokes marijuana at home which does control his symptoms well. He cannot afford to get this from a dispensary so he buys it on the street. He also reported that the only medication that he has had pain relief with in the past is a Fentanyl patch.      Patient admitted for exacerbation of MS. He was seen in consultation by Dr. Rivas from neurology and was treated with Solumedrol 1 gram daily x 5 doses. His symptoms improved drastically. He was treated with a low dose Fentanyl patch for his severe neuropathic pain. Following completion of his steroid regimen, he was felt to be medically stable for discharge home and outpatient follow-up.     Seen and examined on day of discharge:  Vital Signs Last 24 Hrs  T(C): 36.6 (26 Feb 2019 04:50), Max: 36.6 (25 Feb 2019 21:15)  T(F): 97.9 (26 Feb 2019 04:50), Max: 97.9 (26 Feb 2019 04:50)  HR: 81 (26 Feb 2019 04:50) (77 - 84)  BP: 134/83 (26 Feb 2019 04:50) (123/78 - 141/85)  BP(mean): --  RR: 18 (26 Feb 2019 04:50) (18 - 18)  SpO2: 95% (26 Feb 2019 04:50) (93% - 97%)    PHYSICAL EXAM:     GENERAL: no acute distress, non-toxic appearing  HEENT: NC/AT, EOMI, neck supple, MMM  RESPIRATORY: LCTAB/L, no rhonchi, rales, or wheezing  CARDIOVASCULAR: RRR, no murmurs, gallops, rubs  ABDOMINAL: soft, non-tender, non-distended, positive bowel sounds   EXTREMITIES: no clubbing, cyanosis, or edema  NEUROLOGICAL: alert and oriented x 3, non-focal  SKIN: left shin eschar, no surrounding erythema or drainage, firm edema in b/l LEs, chronic appearing  MUSCULOSKELETAL: no gross joint deformity    Time spent: 40 minutes.

## 2019-02-25 NOTE — PROGRESS NOTE ADULT - ATTENDING COMMENTS
Dispo: Patient is medically stable for discharge home. To work with CM to re-instate home health aide and regarding his concerns over DME in the home.

## 2019-02-25 NOTE — DISCHARGE NOTE ADULT - PATIENT PORTAL LINK FT
You can access the [facility] Patient Portal, offered by [facility], by registering with the following website: [email or web address]

## 2019-02-25 NOTE — PROGRESS NOTE ADULT - SUBJECTIVE AND OBJECTIVE BOX
Neurology follow up note    KENNETH [id number]      Interval History:    Patient upset about his hospital stay     MEDICATIONS    BACItracin   Ointment 1 Application(s) Topical two times a day  enoxaparin Injectable 40 milliGRAM(s) SubCutaneous daily  fentaNYL   Patch  12 MICROgram(s)/Hr. 1 Patch Transdermal every 48 hours  hydrogen peroxide 3% Solution 1 Application(s) Topical two times a day  pantoprazole    Tablet 40 milliGRAM(s) Oral before breakfast      Allergies    No Known Drug Allergies  Originally Entered as [Unknown] reaction to [KNA] (Unknown)    Intolerances            Vital Signs Last 24 Hrs  T(C): 36.4 (25 Feb 2019 13:56), Max: 36.4 (24 Feb 2019 21:00)  T(F): 97.6 (25 Feb 2019 13:56), Max: 97.6 (24 Feb 2019 21:00)  HR: 77 (25 Feb 2019 13:56) (65 - 77)  BP: 123/78 (25 Feb 2019 13:56) (122/75 - 136/79)  BP(mean): --  RR: 18 (25 Feb 2019 13:56) (18 - 18)  SpO2: 97% (25 Feb 2019 13:56) (92% - 97%)    REVIEW OF SYSTEMS:    Constitutional:  No fever, chills, or night sweats.    Head:  No headaches.    Eyes:  No double vision or blurry vision.    Ears:  No ringing in the ears.  Neck:  No neck pain.    Respiratory:  No shortness of breath.    Cardiovascular:  No chest pain.    Abdomen:  No nausea, vomiting, or abdominal pain.    Extremities/Neurological:  Positive numbness and tingling in the extremities not new.    Musculoskeletal:  Positive history of spasms.  Genitourinary:  Has a catheter.    PHYSICAL EXAMINATION:    HEENT:  Head:  Normocephalic and atraumatic.    Eyes:  No scleral icterus.    Ears:  Hearing bilaterally was intact.    NECK:  Supple.    RESPIRATORY:  Good air entry bilaterally.    CARDIOVASCULAR:  S1 and S2 are heard.    ABDOMEN:  Soft and nontender.    EXTREMITIES:  No clubbing or cyanosis were noted.      NEUROLOGIC:  The patient is awake and alert.    Extraocular movements were intact.  Pupils were equal, round, and reactive bilaterally, 3 mm to 2 mm.    Speech was fluent.  Smile was symmetric.    Motor:  Was able to elevate arm to roughly about 60 degrees, would say overall strength was 4/5.    Bilateral lower extremities, no movement of his legs.  Tone was increased.  Positive Villanueva's sign was noted in bilateral upper extremities and positive clonus in bilateral lower extremities.  Deep tendon reflexes bilaterally are +2, bilateral lower were +4.  Sensory:  Bilateral upper and lower intact to light touch.              LABS:  CBC Full  -  ( 25 Feb 2019 09:01 )  WBC Count : 8.22 K/uL  Hemoglobin : 14.6 g/dL  Hematocrit : 41.8 %  Platelet Count - Automated : 302 K/uL  Mean Cell Volume : 85.8 fl  Mean Cell Hemoglobin : 30.0 pg  Mean Cell Hemoglobin Concentration : 34.9 gm/dL  Auto Neutrophil # : x  Auto Lymphocyte # : x  Auto Monocyte # : x  Auto Eosinophil # : x  Auto Basophil # : x  Auto Neutrophil % : x  Auto Lymphocyte % : x  Auto Monocyte % : x  Auto Eosinophil % : x  Auto Basophil % : x      02-25    142  |  106  |  18  ----------------------------<  174<H>  3.7   |  28  |  0.70    Ca    8.6      25 Feb 2019 09:01      Hemoglobin A1C:       Vitamin B12         RADIOLOGY    ANALYSIS AND PLAN:  This is a 43-year-old with a history of multiple sclerosis and leg weakness.  1.	Clinical impression is most likely multiple sclerosis exacerbation.  2.	The patient is on Solu-Medrol, I would recommend a total of five days' treatment, 1000 mg once a day  today day 5  3.	I would recommend GI prophylaxis.  4.	I would recommend Physical Therapy evaluation.  5.	I would recommend fall precautions.  6.	Greater than 45 minutes of time was spent with the patient, plan of care, reviewing data, and speaking to the family and multidisciplinary healthcare team.    Thank you for the courtesy of this consultation.

## 2019-02-25 NOTE — PROGRESS NOTE ADULT - NSHPATTENDINGPLANDISCUSS_GEN_ALL_CORE
patient re: pain control, steroids, management of MS, neurology consultation.
patient re: pain control, steroids, management of MS, outpatient pain management.

## 2019-02-25 NOTE — DISCHARGE NOTE ADULT - MEDICATION SUMMARY - MEDICATIONS TO TAKE
I will START or STAY ON the medications listed below when I get home from the hospital:    fentaNYL 12 mcg/hr transdermal film, extended release  -- 1 patch by transdermal patch every 48 hours MDD:1 patch  -- Indication: For Neuropathy    bacitracin 500 units/g topical ointment  -- 1 application on skin 2 times a day  -- Indication: For leg wound    hydrogen peroxide 3% topical solution  -- 1 application on skin 2 times a day  -- Indication: For leg wound

## 2019-02-25 NOTE — DISCHARGE NOTE ADULT - MEDICATION SUMMARY - MEDICATIONS TO STOP TAKING
I will STOP taking the medications listed below when I get home from the hospital:    reagan lift  -- Use as directed  ICD 10: Multiple sclerosis G35  ICD 10: Functional paraplegia G82.20    Augmentin 875 mg-125 mg oral tablet  -- 1 tab(s) by mouth every 12 hours

## 2019-02-25 NOTE — DISCHARGE NOTE ADULT - CARE PROVIDER_API CALL
Shon Camargo)  Neurology  [address] [place] [id number]  Phone: [phone]  Fax: [phone]  Follow Up Time:

## 2019-02-25 NOTE — DISCHARGE NOTE ADULT - CARE PLAN
Principal Discharge DX:	Multiple sclerosis exacerbation  Goal:	management of symptoms.  Assessment and plan of treatment:	You were treated with 5 days of steroids and showed improvement.  Follow up with your neurologist to discuss further treatment options.  Secondary Diagnosis:	Abnormal urinalysis  Assessment and plan of treatment:	This likely represents colonization and not active infection.  No indication for antibiotics at this time.  Secondary Diagnosis:	Neuropathy  Assessment and plan of treatment:	You are being prescribed fentanyl patches. Use one patch at a time and change every 48 hours.  You will need to follow up with your neurologist or a pain management physician to have this prescribed outpatient.  Secondary Diagnosis:	Functional quadriplegia secondary to MS  Assessment and plan of treatment:	Home health aide  Rebeca lift for transfers.

## 2019-02-25 NOTE — PROGRESS NOTE ADULT - SUBJECTIVE AND OBJECTIVE BOX
ADMISSION HPI:  42 y/o m pmhx MS, LE cellulitis, hx of PE in 2012 no longer on a/c given patient refusal due to GIB presented to the ED with increase weakness x2 days. Pt reported he has had worsening and more frequent exacerbations in the past two years despite trials of multiple medications. He reported that his LE are very weak and that today his UE are weaker than usual as well as he cannot transfer from bed to wheelchair. He is currently not on any medications for MS, including baclofen because none of the medications have helped his pain or prevented exacerbations. He reported that he uses THC and marijuana at home which does control his symptoms well. He cannot afford to get this from a dispensary so he buys it on the street. He also reported that the only medication that he has had pain relief with in the past is a Fentanyl patch- he is willing to try this but refuses any other pain medications. He denies SOB, but reports a "heaviness" when breathing due to his weakened muscles. He states that he has researched immunotherapy trials for MS and he will be pursuing that next-if this does not work then he is going to "move to a state where assisted suicide is legal". Denies CP, palpitations. Pt is bowel and bladder incontinent (wears condom cath).     In the ED: HR 90, oxygen 99, %on RA, 126/81, afebrile  CBC/BMP WNL, UA: turbid, small bili, 75 protein, 1 urobili, positive nitrites, mod LE, moderate blood, many bacteria, moderate triphosphate crystals  CXR: No acute disease (unofficial read)  EKG: NSR at 98bpm (unofficial read) (21 Feb 2019 20:22)    INTERVAL HPI:  Patient seen and examined at bedside. Denies pain. Strength has improved. Patient denies any SOB, CP, palpitations. No N/V/D.     REVIEW OF SYSTEMS:    CONSTITUTIONAL: +weakness, no fevers, or chills  EYES/ENT: No visual changes, no throat pain   RESPIRATORY: No cough, wheezing, hemoptysis; No shortness of breath  CARDIOVASCULAR: No chest pain or palpitations  GASTROINTESTINAL: No abdominal pain, nausea, vomiting, or diarrhea  GENITOURINARY: No dysuria, frequency or hematuria  NEUROLOGICAL: No dizziness, neuropathy in both legs.  SKIN: No itching, burning, rashes, or lesions   All other review of systems is negative unless indicated above.    VITAL SIGNS:  Vital Signs Last 24 Hrs  T(C): 36.4 (25 Feb 2019 13:56), Max: 36.4 (24 Feb 2019 21:00)  T(F): 97.6 (25 Feb 2019 13:56), Max: 97.6 (24 Feb 2019 21:00)  HR: 77 (25 Feb 2019 13:56) (65 - 77)  BP: 123/78 (25 Feb 2019 13:56) (122/75 - 136/79)  BP(mean): --  RR: 18 (25 Feb 2019 13:56) (18 - 18)  SpO2: 97% (25 Feb 2019 13:56) (92% - 97%)    PHYSICAL EXAM:     GENERAL: no acute distress, non-toxic appearing  HEENT: NC/AT, EOMI, neck supple, MMM  RESPIRATORY: LCTAB/L, no rhonchi, rales, or wheezing  CARDIOVASCULAR: RRR, no murmurs, gallops, rubs  ABDOMINAL: soft, non-tender, non-distended, positive bowel sounds   EXTREMITIES: no clubbing, cyanosis, or edema  NEUROLOGICAL: alert and oriented x 3, non-focal  SKIN: left shin eschar, no surrounding erythema or drainage, firm edema in b/l LEs, chronic appearing  MUSCULOSKELETAL: no gross joint deformity                        14.6   8.22  )-----------( 302      ( 25 Feb 2019 09:01 )             41.8   02-25    142  |  106  |  18  ----------------------------<  174<H>  3.7   |  28  |  0.70    Ca    8.6      25 Feb 2019 09:01      MEDICATIONS  (STANDING):  BACItracin   Ointment 1 Application(s) Topical two times a day  enoxaparin Injectable 40 milliGRAM(s) SubCutaneous daily  fentaNYL   Patch  12 MICROgram(s)/Hr. 1 Patch Transdermal every 48 hours  hydrogen peroxide 3% Solution 1 Application(s) Topical two times a day  pantoprazole    Tablet 40 milliGRAM(s) Oral before breakfast    MEDICATIONS  (PRN):

## 2019-02-25 NOTE — DISCHARGE NOTE ADULT - PLAN OF CARE
management of symptoms. You were treated with 5 days of steroids and showed improvement.  Follow up with your neurologist to discuss further treatment options. This likely represents colonization and not active infection.  No indication for antibiotics at this time. You are being prescribed fentanyl patches. Use one patch at a time and change every 48 hours.  You will need to follow up with your neurologist or a pain management physician to have this prescribed outpatient. Home health aide  Rebeca lift for transfers.

## 2019-02-25 NOTE — PROGRESS NOTE ADULT - PROBLEM SELECTOR PLAN 1
-s/p 1gram solumedrol in ER  -Solumedrol 1g daily x 5 doses (last dose today)  -fall risk  -Neuro Dr. Rivas following.

## 2019-02-25 NOTE — PROGRESS NOTE ADULT - PROBLEM SELECTOR PLAN 6
Uses marijuana at home.  Pain has responded well to low dose Fentanyl patch.  Will continue for now. Explained that he will have to follow up with his neurologist for this after discharge.

## 2019-02-26 VITALS
RESPIRATION RATE: 18 BRPM | SYSTOLIC BLOOD PRESSURE: 130 MMHG | DIASTOLIC BLOOD PRESSURE: 80 MMHG | TEMPERATURE: 98 F | HEART RATE: 80 BPM | OXYGEN SATURATION: 95 %

## 2019-02-26 LAB
ANION GAP SERPL CALC-SCNC: 6 MMOL/L — SIGNIFICANT CHANGE UP (ref 5–17)
BUN SERPL-MCNC: 23 MG/DL — SIGNIFICANT CHANGE UP (ref 7–23)
CALCIUM SERPL-MCNC: 8.1 MG/DL — LOW (ref 8.5–10.1)
CHLORIDE SERPL-SCNC: 107 MMOL/L — SIGNIFICANT CHANGE UP (ref 96–108)
CO2 SERPL-SCNC: 30 MMOL/L — SIGNIFICANT CHANGE UP (ref 22–31)
CREAT SERPL-MCNC: 0.75 MG/DL — SIGNIFICANT CHANGE UP (ref 0.5–1.3)
GLUCOSE SERPL-MCNC: 78 MG/DL — SIGNIFICANT CHANGE UP (ref 70–99)
HCT VFR BLD CALC: 44.1 % — SIGNIFICANT CHANGE UP (ref 39–50)
HGB BLD-MCNC: 15.3 G/DL — SIGNIFICANT CHANGE UP (ref 13–17)
MCHC RBC-ENTMCNC: 30.2 PG — SIGNIFICANT CHANGE UP (ref 27–34)
MCHC RBC-ENTMCNC: 34.7 GM/DL — SIGNIFICANT CHANGE UP (ref 32–36)
MCV RBC AUTO: 87.2 FL — SIGNIFICANT CHANGE UP (ref 80–100)
NRBC # BLD: 0 /100 WBCS — SIGNIFICANT CHANGE UP (ref 0–0)
PLATELET # BLD AUTO: 279 K/UL — SIGNIFICANT CHANGE UP (ref 150–400)
POTASSIUM SERPL-MCNC: 3.9 MMOL/L — SIGNIFICANT CHANGE UP (ref 3.5–5.3)
POTASSIUM SERPL-SCNC: 3.9 MMOL/L — SIGNIFICANT CHANGE UP (ref 3.5–5.3)
RBC # BLD: 5.06 M/UL — SIGNIFICANT CHANGE UP (ref 4.2–5.8)
RBC # FLD: 12.9 % — SIGNIFICANT CHANGE UP (ref 10.3–14.5)
SODIUM SERPL-SCNC: 143 MMOL/L — SIGNIFICANT CHANGE UP (ref 135–145)
WBC # BLD: 10.74 K/UL — HIGH (ref 3.8–10.5)
WBC # FLD AUTO: 10.74 K/UL — HIGH (ref 3.8–10.5)

## 2019-02-26 PROCEDURE — 99285 EMERGENCY DEPT VISIT HI MDM: CPT | Mod: 25

## 2019-02-26 PROCEDURE — 80048 BASIC METABOLIC PNL TOTAL CA: CPT

## 2019-02-26 PROCEDURE — 85027 COMPLETE CBC AUTOMATED: CPT

## 2019-02-26 PROCEDURE — 93970 EXTREMITY STUDY: CPT

## 2019-02-26 PROCEDURE — 71045 X-RAY EXAM CHEST 1 VIEW: CPT

## 2019-02-26 PROCEDURE — 97161 PT EVAL LOW COMPLEX 20 MIN: CPT

## 2019-02-26 PROCEDURE — 99239 HOSP IP/OBS DSCHRG MGMT >30: CPT

## 2019-02-26 PROCEDURE — 97166 OT EVAL MOD COMPLEX 45 MIN: CPT

## 2019-02-26 PROCEDURE — 82550 ASSAY OF CK (CPK): CPT

## 2019-02-26 PROCEDURE — 80053 COMPREHEN METABOLIC PANEL: CPT

## 2019-02-26 PROCEDURE — 81001 URINALYSIS AUTO W/SCOPE: CPT

## 2019-02-26 PROCEDURE — 87086 URINE CULTURE/COLONY COUNT: CPT

## 2019-02-26 PROCEDURE — 84484 ASSAY OF TROPONIN QUANT: CPT

## 2019-02-26 PROCEDURE — 82553 CREATINE MB FRACTION: CPT

## 2019-02-26 PROCEDURE — 36415 COLL VENOUS BLD VENIPUNCTURE: CPT

## 2019-02-26 PROCEDURE — 93005 ELECTROCARDIOGRAM TRACING: CPT

## 2019-02-26 RX ORDER — FENTANYL CITRATE 50 UG/ML
1 INJECTION INTRAVENOUS
Qty: 5 | Refills: 0
Start: 2019-02-26

## 2019-02-26 RX ORDER — BACITRACIN ZINC 500 UNIT/G
1 OINTMENT IN PACKET (EA) TOPICAL
Qty: 0 | Refills: 0 | DISCHARGE
Start: 2019-02-26

## 2019-02-26 RX ORDER — HYDROGEN PEROXIDE 0.3 KG/100L
1 LIQUID TOPICAL
Qty: 0 | Refills: 0 | DISCHARGE
Start: 2019-02-26

## 2019-02-26 RX ADMIN — FENTANYL CITRATE 1 PATCH: 50 INJECTION INTRAVENOUS at 12:00

## 2019-02-26 RX ADMIN — Medication 1 APPLICATION(S): at 05:43

## 2019-02-26 RX ADMIN — PANTOPRAZOLE SODIUM 40 MILLIGRAM(S): 20 TABLET, DELAYED RELEASE ORAL at 05:43

## 2019-02-26 RX ADMIN — ENOXAPARIN SODIUM 40 MILLIGRAM(S): 100 INJECTION SUBCUTANEOUS at 12:01

## 2019-02-26 RX ADMIN — HYDROGEN PEROXIDE 1 APPLICATION(S): 0.3 LIQUID TOPICAL at 05:43

## 2019-02-26 RX ADMIN — FENTANYL CITRATE 1 PATCH: 50 INJECTION INTRAVENOUS at 07:01

## 2019-02-26 NOTE — PROGRESS NOTE ADULT - SUBJECTIVE AND OBJECTIVE BOX
Neurology follow up note    KENNETH [id number]      Interval History:    Patient feels ok no new complaints.    MEDICATIONS    BACItracin   Ointment 1 Application(s) Topical two times a day  enoxaparin Injectable 40 milliGRAM(s) SubCutaneous daily  fentaNYL   Patch  12 MICROgram(s)/Hr. 1 Patch Transdermal every 48 hours  hydrogen peroxide 3% Solution 1 Application(s) Topical two times a day  pantoprazole    Tablet 40 milliGRAM(s) Oral before breakfast      Allergies    No Known Drug Allergies  Originally Entered as [Unknown] reaction to [KNA] (Unknown)    Intolerances            Vital Signs Last 24 Hrs  T(C): 36.6 (26 Feb 2019 04:50), Max: 36.6 (25 Feb 2019 21:15)  T(F): 97.9 (26 Feb 2019 04:50), Max: 97.9 (26 Feb 2019 04:50)  HR: 81 (26 Feb 2019 04:50) (77 - 84)  BP: 134/83 (26 Feb 2019 04:50) (123/78 - 141/85)  BP(mean): --  RR: 18 (26 Feb 2019 04:50) (18 - 18)  SpO2: 95% (26 Feb 2019 04:50) (93% - 97%)    REVIEW OF SYSTEMS:    Constitutional:  No fever, chills, or night sweats.    Head:  No headaches.    Eyes:  No double vision or blurry vision.    Ears:  No ringing in the ears.  Neck:  No neck pain.    Respiratory:  No shortness of breath.    Cardiovascular:  No chest pain.    Abdomen:  No nausea, vomiting, or abdominal pain.    Extremities/Neurological:  Positive numbness and tingling in the extremities not new.    Musculoskeletal:  Positive history of spasms.  Genitourinary:  Has a catheter.    PHYSICAL EXAMINATION:    HEENT:  Head:  Normocephalic and atraumatic.    Eyes:  No scleral icterus.    Ears:  Hearing bilaterally was intact.    NECK:  Supple.    RESPIRATORY:  Good air entry bilaterally.    CARDIOVASCULAR:  S1 and S2 are heard.    ABDOMEN:  Soft and nontender.    EXTREMITIES:  No clubbing or cyanosis were noted.      NEUROLOGIC:  The patient is awake and alert.    Extraocular movements were intact.  Pupils were equal, round, and reactive bilaterally, 3 mm to 2 mm.    Speech was fluent.  Smile was symmetric.    Motor:  Was able to elevate arm to roughly about 60 degrees, would say overall strength was 4/5.    Bilateral lower extremities, no movement of his legs.  Tone was increased.  Positive Villanueva's sign was noted in bilateral upper extremities and positive clonus in bilateral lower extremities.  Deep tendon reflexes bilaterally are +2, bilateral lower were +4.  Sensory:  Bilateral upper and lower intact to light touch.              LABS:  CBC Full  -  ( 26 Feb 2019 08:22 )  WBC Count : 10.74 K/uL  Hemoglobin : 15.3 g/dL  Hematocrit : 44.1 %  Platelet Count - Automated : 279 K/uL  Mean Cell Volume : 87.2 fl  Mean Cell Hemoglobin : 30.2 pg  Mean Cell Hemoglobin Concentration : 34.7 gm/dL  Auto Neutrophil # : x  Auto Lymphocyte # : x  Auto Monocyte # : x  Auto Eosinophil # : x  Auto Basophil # : x  Auto Neutrophil % : x  Auto Lymphocyte % : x  Auto Monocyte % : x  Auto Eosinophil % : x  Auto Basophil % : x      02-26    143  |  107  |  23  ----------------------------<  78  3.9   |  30  |  0.75    Ca    8.1<L>      26 Feb 2019 08:22      Hemoglobin A1C:       Vitamin B12         RADIOLOGY    ANALYSIS AND PLAN:  This is a 43-year-old with a history of multiple sclerosis and leg weakness.  1.	Clinical impression is most likely multiple sclerosis exacerbation.  2.	S/P Solu-Medrol  3.	I would recommend GI prophylaxis.  4.	I would recommend Physical Therapy evaluation.  5.	I would recommend fall precautions.  6.	Greater than 45 minutes of time was spent with the patient, plan of care, reviewing data, and speaking to the family and multidisciplinary healthcare team.    Thank you for the courtesy of this consultation.

## 2019-03-04 ENCOUNTER — APPOINTMENT (OUTPATIENT)
Dept: INTERNAL MEDICINE | Facility: CLINIC | Age: 44
End: 2019-03-04
Payer: MEDICARE

## 2019-03-04 VITALS
OXYGEN SATURATION: 98 % | HEART RATE: 84 BPM | DIASTOLIC BLOOD PRESSURE: 60 MMHG | HEIGHT: 70 IN | RESPIRATION RATE: 14 BRPM | SYSTOLIC BLOOD PRESSURE: 109 MMHG

## 2019-03-04 DIAGNOSIS — G62.9 POLYNEUROPATHY, UNSPECIFIED: ICD-10-CM

## 2019-03-04 PROCEDURE — 99024 POSTOP FOLLOW-UP VISIT: CPT

## 2019-03-04 NOTE — PLAN
[FreeTextEntry1] : Neurology f/u indicated.\par Pt. requesting electronic reagan lift and wheel chair. Needs neurology eval.\par Neuropathic pain: Pt. responding to low dose Fentanyl. Presciption renewed. Explained to patient that neurology has to prescribe the patch in the future to continue.\par

## 2019-03-04 NOTE — HISTORY OF PRESENT ILLNESS
[Post-hospitalization from ___ Hospital] : Post-hospitalization from [unfilled] Hospital [Admitted on: ___] : The patient was admitted on [unfilled] [Discharged on ___] : discharged on [unfilled] [Discharge Summary] : discharge summary [FreeTextEntry2] : Pt. with worsening fatigue and neuropathic pain from an MS exacerbation.\par He responded well to steroids. Pain controlled with a Duragesic patch, 12mcg.\par He continues to have pain, and weakness.

## 2019-03-04 NOTE — PHYSICAL EXAM
[Normal Rhythm/Effort] : normal respiratory rhythm and effort [Clear Bilaterally] : the lungs were clear to auscultation bilaterally [Normal to Percussion] : the lungs were normal to percussion [Normal Rate] : normal [Normal S1] : normal S1 [Normal S2] : normal S2 [S3] : no S3 [S4] : no S4 [No Murmur] : no murmurs heard [No Pitting Edema] : no pitting edema present [Right Carotid Bruit] : no bruit heard over the right carotid [Left Carotid Bruit] : no bruit heard over the left carotid [Right Femoral Bruit] : no bruit heard over the right femoral artery [Left Femoral Bruit] : no bruit heard over the left femoral artery [2+] : left 2+ [No Abnormalities] : the abdominal aorta was not enlarged and no bruit was heard [Normal] : normal [Soft, Nontender] : the abdomen was soft and nontender [No Mass] : no masses were palpated [No HSM] : no hepatosplenomegaly noted

## 2019-03-04 NOTE — REVIEW OF SYSTEMS
[Fatigue] : fatigue [Negative] : Gastrointestinal [FreeTextEntry2] : See HPI [de-identified] : Wheelchair bound.

## 2020-02-14 NOTE — PROGRESS NOTE ADULT - PROBLEM/PLAN-1
DISPLAY PLAN FREE TEXT Regular rate and rhythm, Heart sounds S1 S2 present, no murmurs, rubs or gallops

## 2020-04-01 ENCOUNTER — APPOINTMENT (OUTPATIENT)
Dept: INTERNAL MEDICINE | Facility: CLINIC | Age: 45
End: 2020-04-01

## 2020-06-12 ENCOUNTER — APPOINTMENT (OUTPATIENT)
Dept: INTERNAL MEDICINE | Facility: CLINIC | Age: 45
End: 2020-06-12

## 2020-11-16 NOTE — H&P ADULT - PROBLEM SELECTOR PLAN 4
details… -Lovenox for DVT ppx  IMPROVE VTE Individual Risk Assessment          RISK                                                          Points  [ x ] Previous VTE                                                3  [  ] Thrombophilia                                             2  [x  ] Lower limb paralysis                                   2        (unable to hold up >15 seconds)    [  ] Current Cancer                                             2         (within 6 months)  [x  ] Immobilization > 24 hrs                              1  [  ] ICU/CCU stay > 24 hours                             1  [  ] Age > 60                                                         1    IMPROVE VTE Score: 6 -Uses for pain relief at home

## 2021-02-26 DIAGNOSIS — Z20.822 CONTACT WITH AND (SUSPECTED) EXPOSURE TO COVID-19: ICD-10-CM

## 2021-06-21 ENCOUNTER — APPOINTMENT (OUTPATIENT)
Dept: INTERNAL MEDICINE | Facility: CLINIC | Age: 46
End: 2021-06-21
Payer: MEDICARE

## 2021-06-21 DIAGNOSIS — R32 UNSPECIFIED URINARY INCONTINENCE: ICD-10-CM

## 2021-06-21 DIAGNOSIS — N52.9 MALE ERECTILE DYSFUNCTION, UNSPECIFIED: ICD-10-CM

## 2021-06-21 PROCEDURE — 99442: CPT

## 2021-11-22 NOTE — PROGRESS NOTE ADULT - PROVIDER SPECIALTY LIST ADULT
Neurology [Chaperone Present] : A chaperone was present in the examining room during all aspects of the physical examination [Appropriately responsive] : appropriately responsive [Alert] : alert [No Acute Distress] : no acute distress [No Lymphadenopathy] : no lymphadenopathy [No Murmurs] : no murmurs [Non-tender] : non-tender [Non-distended] : non-distended [No HSM] : No HSM [No Lesions] : no lesions [No Mass] : no mass [Oriented x3] : oriented x3 [Examination Of The Breasts] : a normal appearance [Breast Mass Left Breast ___cm] : no mass was palpable [___cm] : a ~M [unfilled] ~Ucm superior medial quadrant mass was palpated [Deep] : deep [Soft] : soft [Firm] : firm [Mobile] : mobile [Nontender] : nontender [___cm Radial Distance from the Nipple] : [unfilled] ~Ucm radially from the nipple [Labia Majora] : normal [Labia Minora] : normal [Normal] : normal [FreeTextEntry6] : unable to palpate uterus or ovaries-likely retroverted uterus. No pain on palpation

## 2022-04-03 ENCOUNTER — FORM ENCOUNTER (OUTPATIENT)
Age: 47
End: 2022-04-03

## 2022-04-13 ENCOUNTER — NON-APPOINTMENT (OUTPATIENT)
Age: 47
End: 2022-04-13

## 2022-04-13 ENCOUNTER — APPOINTMENT (OUTPATIENT)
Dept: HOME HEALTH SERVICES | Facility: HOME HEALTH | Age: 47
End: 2022-04-13

## 2022-04-13 ENCOUNTER — APPOINTMENT (OUTPATIENT)
Dept: HOME HEALTH SERVICES | Facility: HOME HEALTH | Age: 47
End: 2022-04-13
Payer: COMMERCIAL

## 2022-04-13 VITALS
SYSTOLIC BLOOD PRESSURE: 130 MMHG | RESPIRATION RATE: 14 BRPM | DIASTOLIC BLOOD PRESSURE: 70 MMHG | HEART RATE: 88 BPM | TEMPERATURE: 97.7 F | OXYGEN SATURATION: 99 %

## 2022-04-13 DIAGNOSIS — Z80.3 FAMILY HISTORY OF MALIGNANT NEOPLASM OF BREAST: ICD-10-CM

## 2022-04-13 PROCEDURE — 99345 HOME/RES VST NEW HIGH MDM 75: CPT | Mod: 25

## 2022-04-13 PROCEDURE — 99497 ADVNCD CARE PLAN 30 MIN: CPT

## 2022-04-13 RX ORDER — FENTANYL 12 UG/H
12 PATCH, EXTENDED RELEASE TRANSDERMAL
Qty: 1 | Refills: 0 | Status: DISCONTINUED | COMMUNITY
Start: 2019-03-04 | End: 2022-04-13

## 2022-04-15 ENCOUNTER — NON-APPOINTMENT (OUTPATIENT)
Age: 47
End: 2022-04-15

## 2022-04-24 ENCOUNTER — FORM ENCOUNTER (OUTPATIENT)
Age: 47
End: 2022-04-24

## 2022-04-25 NOTE — CURRENT MEDS
[Medication and Allergies Reconciled] : medication and allergies reconciled [Takes no medications] : Patient takes no medications

## 2022-04-28 ENCOUNTER — FORM ENCOUNTER (OUTPATIENT)
Age: 47
End: 2022-04-28

## 2022-05-06 ENCOUNTER — NON-APPOINTMENT (OUTPATIENT)
Age: 47
End: 2022-05-06

## 2022-05-11 ENCOUNTER — APPOINTMENT (OUTPATIENT)
Dept: HOME HEALTH SERVICES | Facility: HOME HEALTH | Age: 47
End: 2022-05-11

## 2022-05-16 ENCOUNTER — NON-APPOINTMENT (OUTPATIENT)
Age: 47
End: 2022-05-16

## 2022-05-23 ENCOUNTER — TRANSCRIPTION ENCOUNTER (OUTPATIENT)
Age: 47
End: 2022-05-23

## 2022-05-23 ENCOUNTER — APPOINTMENT (OUTPATIENT)
Dept: HOME HEALTH SERVICES | Facility: HOME HEALTH | Age: 47
End: 2022-05-23
Payer: MEDICARE

## 2022-05-23 VITALS
TEMPERATURE: 97 F | HEART RATE: 105 BPM | OXYGEN SATURATION: 98 % | DIASTOLIC BLOOD PRESSURE: 72 MMHG | RESPIRATION RATE: 16 BRPM | SYSTOLIC BLOOD PRESSURE: 128 MMHG

## 2022-05-23 DIAGNOSIS — Z86.69 PERSONAL HISTORY OF OTHER DISEASES OF THE NERVOUS SYSTEM AND SENSE ORGANS: ICD-10-CM

## 2022-05-23 DIAGNOSIS — N39.0 URINARY TRACT INFECTION, SITE NOT SPECIFIED: ICD-10-CM

## 2022-05-23 PROCEDURE — 99496 TRANSJ CARE MGMT HIGH F2F 7D: CPT

## 2022-05-23 NOTE — PHYSICAL EXAM
[No Acute Distress] : no acute distress [Well Nourished] : well nourished [Well Developed] : well developed [Normal Sclera/Conjunctiva] : normal sclera/conjunctiva [PERRL] : pupils equal, round and reactive to light [Normal Outer Ear/Nose] : the ears and nose were normal in appearance [Normal Oropharynx] : the oropharynx was normal [No JVD] : no jugular venous distention [Supple] : the neck was supple [No Respiratory Distress] : no respiratory distress [Clear to Auscultation] : lungs were clear to auscultation bilaterally [No Accessory Muscle Use] : no accessory muscle use [No LE Varicosities] : there were no varicosital changes in the lower extremities [Pedal Pulses Present] : the pedal pulses are present [No CVA Tenderness] : no ~M costovertebral angle tenderness [No Spinal Tenderness] : no spinal tenderness [Oriented x3] : oriented to person, place, and time [Normal Affect] : the affect was normal [Normal Mood] : the mood was normal [de-identified] : tachycardia [de-identified] : nephrostomy tube in place  [de-identified] : bed-bound. non-ambulatory. right sided weaker than left  [de-identified] : bed-bound. generalized weakness, right weaker than left

## 2022-05-23 NOTE — CHRONIC CARE ASSESSMENT
[Inadequate social support] : inadequate social support [Can not Exercise (Disability)] : Exercise: The patient can not exercise due to disability [None] : The patient does not exercise [General Adherence] : and is generally adherent [PPS Score: ____] : Palliative Performance Scale (PPS) Score: [unfilled] [de-identified] : continue diet as tolerated

## 2022-05-23 NOTE — PHYSICAL EXAM
[No Acute Distress] : no acute distress [Well Nourished] : well nourished [Normal Sclera/Conjunctiva] : normal sclera/conjunctiva [PERRL] : pupils equal, round and reactive to light [Normal Outer Ear/Nose] : the ears and nose were normal in appearance [Normal Oropharynx] : the oropharynx was normal [No JVD] : no jugular venous distention [Supple] : the neck was supple [No Respiratory Distress] : no respiratory distress [Clear to Auscultation] : lungs were clear to auscultation bilaterally [No Accessory Muscle Use] : no accessory muscle use [No LE Varicosities] : there were no varicosital changes in the lower extremities [Pedal Pulses Present] : the pedal pulses are present [Normal Bowel Sounds] : normal bowel sounds [Non Tender] : non-tender [Soft] : abdomen soft [No Masses] : no abdominal mass palpated [No CVA Tenderness] : no ~M costovertebral angle tenderness [No Spinal Tenderness] : no spinal tenderness [No Rash] : no rash [No Skin Lesions] : no skin lesions [Acne] : no acne [Cranial Nerves Intact] : cranial nerves 2-12 were intact [Oriented x3] : oriented to person, place, and time [Normal Affect] : the affect was normal [Normal Mood] : the mood was normal [de-identified] : non-ambulatory. bed-bound. right side weaker than left  [de-identified] : bed-bound. generalized weakness. right side weaker than left

## 2022-05-23 NOTE — REVIEW OF SYSTEMS
[Vision Problems] : vision problems [Lower Ext Edema] : lower extremity edema [Constipation] : constipation [Incontinence] : incontinence [Joint Pain] : joint pain [Joint Stiffness] : joint stiffness [Muscle Weakness] : muscle weakness [Unsteady Walk] : ataxia [Anxiety] : anxiety [Depression] : depression [Negative] : Heme/Lymph [FreeTextEntry3] : optic neuritis in left eye  [de-identified] : dry skin on right side

## 2022-05-23 NOTE — HISTORY OF PRESENT ILLNESS
[Patient] : patient [FreeTextEntry1] : MS [FreeTextEntry2] : PMH: 47 y/o male with history of MS, bed-bound, PE, depression. Patient is being seen today for post hospital discharge. \par \par Transitional care management macro\par KENNETH SAUL is being seen after discharge home from [facility]. He  was admitted on 05/14/22 for UTI and discharged home on 05/21/22.\par Post-discharge contact was made within 2 days of discharge home.\par Discharge medications were reviewed and reconciled with the current medication list and medications in home\par \par Patient went to [facility] on 5/14/22 for difficulty breathing - he was transferred to [place]. At [place], found to have UTI & kidney stone. Discharged home with nephrostomy tube & oral abx. \par As per patient - he is feeling better - just word. Spasms are basically none --- seems to have resolved & is smoking less. \par \par \par Patient lives in apartment with HHA. Does have sister & brother, but they do not come around. His cousin is his HCP. \par He used to live in his parents basement -- he moved out because of lack of family support. \par Has 24hr/aide. Weekend aide is here all weekend. \par \par MS - first diagnosed at 20. Then, he was in remission for seven years, then he had another attack in 2013 - wheelchair bound since 2013\par Has not gotten OOB since 2020. \par Does not see a neurologist - said he has tried MS medications, but his body "rejected" it. He would be interested in seeing neurologist for a new medication - NRF-2. \par Mood/Behavior: "depends" If he has no pain, in a great mood. WHen he has pain, does not like to be bothered. Always depressed - manages with marijuana. He also makes jokes and tries to have up-beat attitude. \par Appetite: eats well. Gets "Moms meals" from insurance - will eat three meals/day. Also, snacks in between. HHA will feed patient, patient not able to hold spoon/fork. No coughing with liquids. \par GI/: incontinent of urine & stool. Some constipation. Does have daily BM's, but still feels gassy. \par Sleep: sleeps well - now, is sleeping in his own place with AC. Also, spasms are not waking him up, so he is sleeping much better. \par \par Patient denies fever, cough, trouble breathing, rash, vomiting and diarrhea. Patient has not been in close contact with someone covid positive. \par N95, gloves, eye wear and gown used during visit: Y. Total face to face time with patient is 80min.\par \par

## 2022-05-23 NOTE — REVIEW OF SYSTEMS
[Vision Problems] : vision problems [Lower Ext Edema] : lower extremity edema [Constipation] : constipation [Incontinence] : incontinence [Joint Pain] : joint pain [Joint Stiffness] : joint stiffness [Muscle Weakness] : muscle weakness [Unsteady Walk] : ataxia [Anxiety] : anxiety [Depression] : depression [Negative] : Constitutional [FreeTextEntry3] : optic neuritis in left eye  [de-identified] : dry skin on right side

## 2022-05-23 NOTE — REASON FOR VISIT
[Post Hospitalization] : a post hospitalization visit [Formal Caregiver] : formal caregiver [Pre-Visit Preparation] : pre-visit preparation was done [Intercurrent Specialty/Sub-specialty Visits] : the patient has no intercurrent specialty/sub-specialty visits [FreeTextEntry2] : chart review

## 2022-05-23 NOTE — HEALTH RISK ASSESSMENT
[HRA Reviewed] : Health risk assessment reviewed [Independent] : managing finances [Some assistance needed] : using telephone [Full assistance needed] : using transportation [Patient not ambulatory (Wheelchair)] : Patient is not ambulatory (Wheelchair) [Yes] : The patient does have visual impairment [FreeTextEntry7] : no meds

## 2022-05-23 NOTE — COUNSELING
[Overweight - ( BMI 25.1 - 29.9 )] : overweight -  ( BMI 25.1 - 29.9 ) [Continue diet as tolerated] : continue diet as tolerated based on goals of care [Smoke/CO Detectors] : smoke/CO detectors [Remove clutter and unsafe carpeting to avoid falls] : remove clutter and unsafe carpeting to avoid falls [] : diabetic screening [Improve mobility] : improve mobility [Minimize unnecessary interventions] : minimize unnecessary interventions [Completed DNR] : completed DNR [Completed Medical Orders for Life-Sustaining Treatment] : completed medical orders for life-sustaining treatment [DNR] : Code Status: DNR [Limited] : Treatment Guidelines: Limited [DNI] : Intubation: DNI [[facility]: _____] [facility]last [facility] date: [unfilled] [ - New patient with 2 or more chronic conditions; CCM discussed and patient-centered care plan established] : New patient with 2 or more chronic conditions; CCM discussed and patient-centered care plan established

## 2022-05-23 NOTE — LETTER HEADER
[Care Plan reviewed and provided to patient/caregiver] : Care plan reviewed and provided to patient/caregiver [Patient/Caregiver agrees to have other providers send summary of their care to this office] : Patient/caregiver agrees to have other providers send summary of their care to this office

## 2022-05-23 NOTE — HISTORY OF PRESENT ILLNESS
[Patient] : patient [FreeTextEntry1] : MS [FreeTextEntry2] : PMH: 45 y/o male with history of MS, bed-bound, PE, depression. Patient is being seen today to enroll into the [facility]. \par \par Patient lives in apartment with HHA. Does have sister & brother, but they do not come around. His cousin is his HCP. \par He used to live in his parents basement -- he moved out because of lack of family support. \par Has 24hr/aide. Weekend aide is here all weekend. \par \par MS - first diagnosed at 20. Then, he was in remission for seven years, then he had another attack in 2013 - wheelchair bound since 2013\par Has not gotten OOB since 2020. \par Does not see a neurologist - said he has tried MS medications, but his body "rejected" it. He would be interested in seeing neurologist for a new medication - NRF-2. \par Pain: gets intermittent painful spasms throughout the day, rates at 11 of 10. Once the spasms passes - he is not in any pain. While in the hospital, does get baclofen and fentanyl for pain/spasms. Is not interested in anything besides his marijuana for his pain. \par \par Mood/Behavior: "depends" If he has no pain, in a great mood. WHen he has pain, does not like to be bothered. Always depressed - manages with marijuana. He also makes jokes and tries to have up-beat attitude. \par Appetite: eats well. Gets "Moms meals" from insurance - will eat three meals/day. Also, snacks in between. HHA will feed patient, patient not able to hold spoon/fork. No coughing with liquids. \par GI/: incontinent of urine & stool. Some constipation. Does have daily BM's, but still feels gassy. \par Sleep: sleeps well. Does not get 8hrs/night. He will fall asleep & then a spasm will wake him up - he will smoke and then go back to sleep. He states the spams will wake him up every 20mins - the most sleep he will get will be an hour. He is used to his sleep cycle & believes he gets sufficient rest. \par \par Patient denies fever, cough, trouble breathing, rash, vomiting and diarrhea. Patient has not been in close contact with someone covid positive. \par N95, gloves, eye wear and gown used during visit: Y. Total face to face time with patient is 80min.\par \par

## 2022-05-27 ENCOUNTER — NON-APPOINTMENT (OUTPATIENT)
Age: 47
End: 2022-05-27

## 2022-06-02 ENCOUNTER — FORM ENCOUNTER (OUTPATIENT)
Age: 47
End: 2022-06-02

## 2022-06-14 ENCOUNTER — NON-APPOINTMENT (OUTPATIENT)
Age: 47
End: 2022-06-14

## 2022-06-14 ENCOUNTER — EMERGENCY (EMERGENCY)
Facility: HOSPITAL | Age: 47
LOS: 1 days | Discharge: HOME | End: 2022-06-14
Attending: STUDENT IN AN ORGANIZED HEALTH CARE EDUCATION/TRAINING PROGRAM | Admitting: STUDENT IN AN ORGANIZED HEALTH CARE EDUCATION/TRAINING PROGRAM
Payer: MEDICARE

## 2022-06-14 ENCOUNTER — TRANSCRIPTION ENCOUNTER (OUTPATIENT)
Age: 47
End: 2022-06-14

## 2022-06-14 VITALS
HEART RATE: 86 BPM | TEMPERATURE: 98 F | OXYGEN SATURATION: 97 % | DIASTOLIC BLOOD PRESSURE: 74 MMHG | HEIGHT: 70 IN | WEIGHT: 229.94 LBS | SYSTOLIC BLOOD PRESSURE: 109 MMHG | RESPIRATION RATE: 18 BRPM

## 2022-06-14 VITALS
DIASTOLIC BLOOD PRESSURE: 82 MMHG | TEMPERATURE: 98 F | SYSTOLIC BLOOD PRESSURE: 123 MMHG | RESPIRATION RATE: 16 BRPM | HEART RATE: 85 BPM | OXYGEN SATURATION: 96 %

## 2022-06-14 LAB
ALBUMIN SERPL ELPH-MCNC: 2.8 G/DL — LOW (ref 3.3–5)
ALP SERPL-CCNC: 67 U/L — SIGNIFICANT CHANGE UP (ref 40–120)
ALT FLD-CCNC: 26 U/L — SIGNIFICANT CHANGE UP (ref 12–78)
ANION GAP SERPL CALC-SCNC: 3 MMOL/L — LOW (ref 5–17)
APPEARANCE UR: ABNORMAL
AST SERPL-CCNC: 26 U/L — SIGNIFICANT CHANGE UP (ref 15–37)
BASOPHILS # BLD AUTO: 0.06 K/UL — SIGNIFICANT CHANGE UP (ref 0–0.2)
BASOPHILS NFR BLD AUTO: 0.6 % — SIGNIFICANT CHANGE UP (ref 0–2)
BILIRUB SERPL-MCNC: 0.2 MG/DL — SIGNIFICANT CHANGE UP (ref 0.2–1.2)
BILIRUB UR-MCNC: NEGATIVE — SIGNIFICANT CHANGE UP
BUN SERPL-MCNC: 14 MG/DL — SIGNIFICANT CHANGE UP (ref 7–23)
CALCIUM SERPL-MCNC: 8.9 MG/DL — SIGNIFICANT CHANGE UP (ref 8.5–10.1)
CHLORIDE SERPL-SCNC: 112 MMOL/L — HIGH (ref 96–108)
CO2 SERPL-SCNC: 29 MMOL/L — SIGNIFICANT CHANGE UP (ref 22–31)
COLOR SPEC: YELLOW — SIGNIFICANT CHANGE UP
CREAT SERPL-MCNC: 0.62 MG/DL — SIGNIFICANT CHANGE UP (ref 0.5–1.3)
DIFF PNL FLD: ABNORMAL
EGFR: 120 ML/MIN/1.73M2 — SIGNIFICANT CHANGE UP
EOSINOPHIL # BLD AUTO: 0.29 K/UL — SIGNIFICANT CHANGE UP (ref 0–0.5)
EOSINOPHIL NFR BLD AUTO: 3 % — SIGNIFICANT CHANGE UP (ref 0–6)
GLUCOSE SERPL-MCNC: 88 MG/DL — SIGNIFICANT CHANGE UP (ref 70–99)
GLUCOSE UR QL: NEGATIVE — SIGNIFICANT CHANGE UP
HCT VFR BLD CALC: 37.1 % — LOW (ref 39–50)
HGB BLD-MCNC: 11.6 G/DL — LOW (ref 13–17)
IMM GRANULOCYTES NFR BLD AUTO: 0.5 % — SIGNIFICANT CHANGE UP (ref 0–1.5)
KETONES UR-MCNC: NEGATIVE — SIGNIFICANT CHANGE UP
LACTATE SERPL-SCNC: 1 MMOL/L — SIGNIFICANT CHANGE UP (ref 0.7–2)
LEUKOCYTE ESTERASE UR-ACNC: ABNORMAL
LYMPHOCYTES # BLD AUTO: 2.58 K/UL — SIGNIFICANT CHANGE UP (ref 1–3.3)
LYMPHOCYTES # BLD AUTO: 27 % — SIGNIFICANT CHANGE UP (ref 13–44)
MCHC RBC-ENTMCNC: 28.3 PG — SIGNIFICANT CHANGE UP (ref 27–34)
MCHC RBC-ENTMCNC: 31.3 GM/DL — LOW (ref 32–36)
MCV RBC AUTO: 90.5 FL — SIGNIFICANT CHANGE UP (ref 80–100)
MONOCYTES # BLD AUTO: 0.74 K/UL — SIGNIFICANT CHANGE UP (ref 0–0.9)
MONOCYTES NFR BLD AUTO: 7.7 % — SIGNIFICANT CHANGE UP (ref 2–14)
NEUTROPHILS # BLD AUTO: 5.85 K/UL — SIGNIFICANT CHANGE UP (ref 1.8–7.4)
NEUTROPHILS NFR BLD AUTO: 61.2 % — SIGNIFICANT CHANGE UP (ref 43–77)
NITRITE UR-MCNC: POSITIVE
NRBC # BLD: 0 /100 WBCS — SIGNIFICANT CHANGE UP (ref 0–0)
PH UR: 6 — SIGNIFICANT CHANGE UP (ref 5–8)
PLATELET # BLD AUTO: 348 K/UL — SIGNIFICANT CHANGE UP (ref 150–400)
POTASSIUM SERPL-MCNC: 4.2 MMOL/L — SIGNIFICANT CHANGE UP (ref 3.5–5.3)
POTASSIUM SERPL-SCNC: 4.2 MMOL/L — SIGNIFICANT CHANGE UP (ref 3.5–5.3)
PROT SERPL-MCNC: 7.1 G/DL — SIGNIFICANT CHANGE UP (ref 6–8.3)
PROT UR-MCNC: 30 MG/DL
RBC # BLD: 4.1 M/UL — LOW (ref 4.2–5.8)
RBC # FLD: 14.7 % — HIGH (ref 10.3–14.5)
SODIUM SERPL-SCNC: 144 MMOL/L — SIGNIFICANT CHANGE UP (ref 135–145)
SP GR SPEC: 1.02 — SIGNIFICANT CHANGE UP (ref 1.01–1.02)
UROBILINOGEN FLD QL: NEGATIVE — SIGNIFICANT CHANGE UP
WBC # BLD: 9.57 K/UL — SIGNIFICANT CHANGE UP (ref 3.8–10.5)
WBC # FLD AUTO: 9.57 K/UL — SIGNIFICANT CHANGE UP (ref 3.8–10.5)

## 2022-06-14 PROCEDURE — 36415 COLL VENOUS BLD VENIPUNCTURE: CPT

## 2022-06-14 PROCEDURE — 87040 BLOOD CULTURE FOR BACTERIA: CPT

## 2022-06-14 PROCEDURE — 99284 EMERGENCY DEPT VISIT MOD MDM: CPT | Mod: 25

## 2022-06-14 PROCEDURE — 83605 ASSAY OF LACTIC ACID: CPT

## 2022-06-14 PROCEDURE — 74176 CT ABD & PELVIS W/O CONTRAST: CPT | Mod: MG

## 2022-06-14 PROCEDURE — 12001 RPR S/N/AX/GEN/TRNK 2.5CM/<: CPT

## 2022-06-14 PROCEDURE — G1004: CPT

## 2022-06-14 PROCEDURE — 80053 COMPREHEN METABOLIC PANEL: CPT

## 2022-06-14 PROCEDURE — 81001 URINALYSIS AUTO W/SCOPE: CPT

## 2022-06-14 PROCEDURE — 99285 EMERGENCY DEPT VISIT HI MDM: CPT

## 2022-06-14 PROCEDURE — 96365 THER/PROPH/DIAG IV INF INIT: CPT | Mod: XU

## 2022-06-14 PROCEDURE — 85025 COMPLETE CBC W/AUTO DIFF WBC: CPT

## 2022-06-14 PROCEDURE — 74176 CT ABD & PELVIS W/O CONTRAST: CPT | Mod: 26,MG

## 2022-06-14 PROCEDURE — 87086 URINE CULTURE/COLONY COUNT: CPT

## 2022-06-14 RX ORDER — CEPHALEXIN 500 MG
1 CAPSULE ORAL
Qty: 28 | Refills: 0
Start: 2022-06-14 | End: 2022-06-20

## 2022-06-14 RX ORDER — CEFTRIAXONE 500 MG/1
1000 INJECTION, POWDER, FOR SOLUTION INTRAMUSCULAR; INTRAVENOUS ONCE
Refills: 0 | Status: COMPLETED | OUTPATIENT
Start: 2022-06-14 | End: 2022-06-14

## 2022-06-14 RX ORDER — SODIUM CHLORIDE 9 MG/ML
1000 INJECTION INTRAMUSCULAR; INTRAVENOUS; SUBCUTANEOUS ONCE
Refills: 0 | Status: COMPLETED | OUTPATIENT
Start: 2022-06-14 | End: 2022-06-14

## 2022-06-14 RX ADMIN — CEFTRIAXONE 1000 MILLIGRAM(S): 500 INJECTION, POWDER, FOR SOLUTION INTRAMUSCULAR; INTRAVENOUS at 20:36

## 2022-06-14 RX ADMIN — CEFTRIAXONE 100 MILLIGRAM(S): 500 INJECTION, POWDER, FOR SOLUTION INTRAMUSCULAR; INTRAVENOUS at 20:02

## 2022-06-14 RX ADMIN — SODIUM CHLORIDE 1000 MILLILITER(S): 9 INJECTION INTRAMUSCULAR; INTRAVENOUS; SUBCUTANEOUS at 17:24

## 2022-06-14 NOTE — ED ADULT NURSE REASSESSMENT NOTE - NS ED NURSE REASSESS COMMENT FT1
pt reavaluated and medicated as ordered and nephrostomy tube secured by NP and vital signs stable d/c home awaiting EMS transport

## 2022-06-14 NOTE — ED ADULT TRIAGE NOTE - CHIEF COMPLAINT QUOTE
c/o problem with his nephrostomy tube ,inserted the 20th of may c/o problem with his nephrostomy tube ,inserted the 20th of may, visiting nurse told him this morning that the stiches holding the tube is out

## 2022-06-14 NOTE — ED PROVIDER NOTE - PROGRESS NOTE DETAILS
Attempted to call urologist on dc patients Dr. Benjamín Akbar, last visit from patient was 2017, patient would be considered new patient. Case discussed with Dr. Little, labs and imaging results, recommends suturing tube in place and dc with home with keflex, recommend close urologist follow up. based on extensive kidney stones, recommend follow up with Dr. Magaña. Patient does not need to be admitted at this time. Attempted to call urologist on dc patients from [place] Dr. Benjamín Akbar, last visit from patient was 2017, patient would be considered new patient. Results and plan discussed with patient and family who are at bedside, agreeable with plan and understand importance of following up with urology. Patient understands strict return precautions for fever or pain.

## 2022-06-14 NOTE — ED PROVIDER NOTE - CLINICAL SUMMARY MEDICAL DECISION MAKING FREE TEXT BOX
47 yo male with h/o MS bed bound, presents to the ED  concern of left sided nephrostomy tube ?placement, was told by visiting nurse today that the sutures were out. As per patient he was at [facility] 11mm ?kidney stone then transferred to [place] for procedure and nephrostomy tube, dced on 5/22 on cefuroxime. Patient very unclear with what procedure was done at [place]. Patient was suppose to follow up for nephrostomy tube removal 1 week after being dced however patient never followed up as he is bedbound. On dc papers from [place], patient suppose to follow up with Dr. Benjamín Akbar. Patient with no additional complaints. Patient poor historian and unable to clarify details of what procedure was done and what the outpatient follow up plan was. Denies fever, chills, chest pain, sob, abd pain, N/V, UE/LE weakness or paresthesias. urine draining into nephrostomy tube and patient urinating   let nephrostomy tube in place with urine In bag, abdomen nontender, foul smell and purulence surrounding tube, will check labs, and CT, dw urology re need for tube removal     pmd: SHUBHAM Sinha

## 2022-06-14 NOTE — ED ADULT NURSE NOTE - NSICDXPASTMEDICALHX_GEN_ALL_CORE_FT
PAST MEDICAL HISTORY:  Cellulitis november 2014    MS (multiple sclerosis) since 1995    PE (pulmonary embolism) 2013

## 2022-06-14 NOTE — ED PROVIDER NOTE - CARE PROVIDER_API CALL
Adrienne Magaña)  Urology  [address] [place] [address]  Phone: [phone]  Fax: [phone]  Follow Up Time: 1-3 Days

## 2022-06-14 NOTE — ED PROVIDER NOTE - NS ED ATTENDING STATEMENT MOD
This was a shared visit with the ALISON. I reviewed and verified the documentation and independently performed the documented:

## 2022-06-14 NOTE — ED PROVIDER NOTE - PATIENT PORTAL LINK FT
You can access the FollowMyHealth Patient Portal offered by [facility] by registering at the following website: [email or web address]. By joining [employer]’s FollowMyHealth portal, you will also be able to view your health information using other applications (apps) compatible with our system.

## 2022-06-14 NOTE — ED ADULT NURSE NOTE - OBJECTIVE STATEMENT
46yr old male arrived to ED, c/o nephrostomy tube possible being pulled out, drainage noted in nephrostomy drainage bag, drainage noted around tube site, patient denies pain at this time., denies chest pain or shortness of breath. 46yr old male arrived to ED, c/o nephrostomy tube stiches possibly pulled out, drainage noted in nephrostomy drainage bag, foul smelling drainage noted around tube site, patient denies pain at this time, denies chest pain or shortness of breath, speech clear, no facial droop noted

## 2022-06-14 NOTE — ED ADULT NURSE NOTE - CADM POA URETHRAL CATHETER
Fili 1/9/2020  Worsening of his GERD lately -- due to eating probably -- ribs, [place] breakfast burrito and corndogs    No

## 2022-06-14 NOTE — ED ADULT NURSE NOTE - NSICDXFAMILYHX_GEN_ALL_CORE_FT
FAMILY HISTORY:  Grandparent  Still living? Unknown  Family history of breast cancer, Age at diagnosis: Age Unknown  Family history of diabetes mellitus (DM), Age at diagnosis: Age Unknown    Aunt  Still living? Unknown  Family history of breast cancer, Age at diagnosis: Age Unknown

## 2022-06-14 NOTE — ED PROVIDER NOTE - OBJECTIVE STATEMENT
45 yo male with h/o MS presents to the ED  concern of left sided nephrostomy tube ?placement, was told by visiting nurse today that the sutures were out. As per patient he was at [facility] ?kidney stone then transferred to [place] for procedure and nephrostomy tube, dced on 5/22 on cefuroxime. Patient very unclear with what procedure was done at [place]. Patient was suppose to follow up for nephrostomy tube removal 1 week after being dced however patient never followed up. On dc papers from [place], patient suppose to follow up with Dr. Benjamín Akbar. Patient with no additional complaints. Patient poor historian and unable to clarify details of what procedure was done and what the outpatient follow up plan was. Denies fever, chills, chest pain, sob, abd pain, N/V, UE/LE weakness or paresthesias. urine draining into nephrostomy tube and patient urinating     pmd: SHUBHAM Sinha 47 yo male with h/o MS presents to the ED  concern of left sided nephrostomy tube ?placement, was told by visiting nurse today that the sutures were out. As per patient he was at [facility] ?kidney stone then transferred to [place] for procedure and nephrostomy tube, dced on 5/22 on cefuroxime. Patient very unclear with what procedure was done at [place]. Patient was suppose to follow up for nephrostomy tube removal 1 week after being dced however patient never followed up. On dc papers from [place], patient suppose to follow up with Dr. Benjamín Akbar. Patient with no additional complaints. Patient poor historian and unable to clarify details of what procedure was done and what the outpatient follow up plan was. Denies fever, chills, chest pain, sob, abd pain, N/V, UE/LE weakness or paresthesias. urine draining into nephrostomy tube and patient urinating without difficulty.     pmd: SHUBHAM Sinha

## 2022-06-15 LAB
CULTURE RESULTS: SIGNIFICANT CHANGE UP
SPECIMEN SOURCE: SIGNIFICANT CHANGE UP

## 2022-06-18 LAB
CULTURE RESULTS: SIGNIFICANT CHANGE UP
SPECIMEN SOURCE: SIGNIFICANT CHANGE UP

## 2022-06-19 LAB
CULTURE RESULTS: SIGNIFICANT CHANGE UP
CULTURE RESULTS: SIGNIFICANT CHANGE UP
SPECIMEN SOURCE: SIGNIFICANT CHANGE UP
SPECIMEN SOURCE: SIGNIFICANT CHANGE UP

## 2022-07-20 ENCOUNTER — APPOINTMENT (OUTPATIENT)
Dept: HOME HEALTH SERVICES | Facility: HOME HEALTH | Age: 47
End: 2022-07-20

## 2022-07-20 VITALS
RESPIRATION RATE: 16 BRPM | OXYGEN SATURATION: 98 % | DIASTOLIC BLOOD PRESSURE: 80 MMHG | SYSTOLIC BLOOD PRESSURE: 118 MMHG | HEART RATE: 65 BPM | TEMPERATURE: 96 F

## 2022-07-20 DIAGNOSIS — Z79.899 OTHER LONG TERM (CURRENT) DRUG THERAPY: ICD-10-CM

## 2022-07-20 PROCEDURE — 99349 HOME/RES VST EST MOD MDM 40: CPT

## 2022-07-20 RX ORDER — CEFUROXIME AXETIL 500 MG/1
500 TABLET ORAL
Qty: 12 | Refills: 0 | Status: DISCONTINUED | COMMUNITY
Start: 2022-05-23 | End: 2022-07-20

## 2022-07-20 NOTE — HISTORY OF PRESENT ILLNESS
[Patient] : patient [FreeTextEntry1] : MS [FreeTextEntry2] : PMH: 48 y/o male with history of MS, bed-bound, PE, depression. Patient is being seen today for routine follow-up of chronic conditions. \par \par Interval events: admitted to Hospital for kidney stone/UTI. Discharged home with nephrostomy tube. \par \par Patient lives in apartment with HHA. At today's visit, patient seen laying in bed - he is beginning to have spasms again. After he got the nephrostomy tube,spasms seemed to have stopped - but, now they have come back again. \par As per patient - his urine is clear, no odor/drainage. \par Has 24hr/aide. Weekend aide is here all weekend. \par \par MS - first diagnosed at 20. Then, he was in remission for seven years, then he had another attack in 2013 - wheelchair bound since 2013. He did see neurologist in the past. He would like to see neurologist again - gave him number for neurologist. \par Has not gotten OOB since 2020. \par \par Pain: gets intermittent painful spasms throughout the day, rates at 11 of 10. Once the spasms passes - he is not in any pain.. \par \par Mood/Behavior: "depends" If he has no pain, in a great mood. WHen he has pain, does not like to be bothered. Always depressed - manages with marijuana. He also makes jokes and tries to have up-beat attitude. He has very supportive friends. His parents will also come by once a week or so, but he is not close with them. \par Appetite: eats well. Gets "Moms meals" from insurance - will eat three meals/day. Also, snacks in between. HHA will feed patient, patient not able to hold spoon/fork. No coughing with liquids. \par GI/: has nephrostomy tube - making good urine. BM today. \par Sleep: sleeps well. Does not get 8hrs/night. He will fall asleep & then a spasm will wake him up - he will smoke and then go back to sleep. He states the spams will wake him up every 20mins - the most sleep he will get will be an hour. He is used to his sleep cycle & believes he gets sufficient rest. \par \par Patient denies fever, cough, trouble breathing, rash, vomiting and diarrhea. Patient has not been in close contact with someone covid positive. \par N95, gloves, eye wear and gown used during visit: Y. Total face to face time with patient is 80min.\par \par

## 2022-07-20 NOTE — COUNSELING
[Overweight - ( BMI 25.1 - 29.9 )] : overweight -  ( BMI 25.1 - 29.9 ) [Continue diet as tolerated] : continue diet as tolerated based on goals of care [Smoke/CO Detectors] : smoke/CO detectors [Remove clutter and unsafe carpeting to avoid falls] : remove clutter and unsafe carpeting to avoid falls [] : diabetic screening [Improve mobility] : improve mobility [Minimize unnecessary interventions] : minimize unnecessary interventions [Completed DNR] : completed DNR [Completed Medical Orders for Life-Sustaining Treatment] : completed medical orders for life-sustaining treatment [DNR] : Code Status: DNR [Limited] : Treatment Guidelines: Limited [DNI] : Intubation: DNI [[facility]: _____] [facility]/last verification date: [unfilled]

## 2022-07-20 NOTE — REVIEW OF SYSTEMS
[Vision Problems] : vision problems [Lower Ext Edema] : lower extremity edema [Constipation] : constipation [Incontinence] : incontinence [Joint Pain] : joint pain [Joint Stiffness] : joint stiffness [Muscle Weakness] : muscle weakness [Unsteady Walk] : ataxia [Anxiety] : anxiety [Depression] : depression [Negative] : Heme/Lymph [FreeTextEntry3] : optic neuritis in left eye  [de-identified] : dry skin on right side

## 2022-07-20 NOTE — PHYSICAL EXAM
[No Acute Distress] : no acute distress [Well Nourished] : well nourished [Normal Sclera/Conjunctiva] : normal sclera/conjunctiva [PERRL] : pupils equal, round and reactive to light [Normal Outer Ear/Nose] : the ears and nose were normal in appearance [Normal Oropharynx] : the oropharynx was normal [No JVD] : no jugular venous distention [Supple] : the neck was supple [No Respiratory Distress] : no respiratory distress [Clear to Auscultation] : lungs were clear to auscultation bilaterally [No Accessory Muscle Use] : no accessory muscle use [No LE Varicosities] : there were no varicosital changes in the lower extremities [Pedal Pulses Present] : the pedal pulses are present [Normal Bowel Sounds] : normal bowel sounds [Non Tender] : non-tender [Soft] : abdomen soft [No Masses] : no abdominal mass palpated [No CVA Tenderness] : no ~M costovertebral angle tenderness [No Spinal Tenderness] : no spinal tenderness [No Rash] : no rash [No Skin Lesions] : no skin lesions [Cranial Nerves Intact] : cranial nerves 2-12 were intact [Oriented x3] : oriented to person, place, and time [Normal Affect] : the affect was normal [Normal Mood] : the mood was normal [Acne] : no acne [de-identified] : non-ambulatory. bed-bound. right side weaker than left  [de-identified] : bed-bound. generalized weakness. right side weaker than left

## 2022-07-28 ENCOUNTER — FORM ENCOUNTER (OUTPATIENT)
Age: 47
End: 2022-07-28

## 2022-08-11 NOTE — PATIENT PROFILE ADULT - NSPROEDAREADYLEARN_GEN_A_NUR
none Dupixent Pregnancy And Lactation Text: This medication likely crosses the placenta but the risk for the fetus is uncertain. This medication is excreted in breast milk.

## 2022-08-15 ENCOUNTER — APPOINTMENT (OUTPATIENT)
Dept: UROLOGY | Facility: CLINIC | Age: 47
End: 2022-08-15

## 2022-08-17 ENCOUNTER — APPOINTMENT (OUTPATIENT)
Dept: HOME HEALTH SERVICES | Facility: HOME HEALTH | Age: 47
End: 2022-08-17

## 2022-09-28 ENCOUNTER — NON-APPOINTMENT (OUTPATIENT)
Age: 47
End: 2022-09-28

## 2022-10-11 ENCOUNTER — APPOINTMENT (OUTPATIENT)
Dept: HOME HEALTH SERVICES | Facility: HOME HEALTH | Age: 47
End: 2022-10-11

## 2022-10-11 VITALS
SYSTOLIC BLOOD PRESSURE: 140 MMHG | RESPIRATION RATE: 14 BRPM | TEMPERATURE: 97.2 F | DIASTOLIC BLOOD PRESSURE: 68 MMHG | OXYGEN SATURATION: 96 % | HEART RATE: 80 BPM

## 2022-10-11 DIAGNOSIS — Z23 ENCOUNTER FOR IMMUNIZATION: ICD-10-CM

## 2022-10-11 PROCEDURE — 99349 HOME/RES VST EST MOD MDM 40: CPT

## 2022-10-17 PROBLEM — Z23 ENCOUNTER FOR IMMUNIZATION: Status: ACTIVE | Noted: 2022-10-17

## 2022-10-17 NOTE — COUNSELING
[Overweight - ( BMI 25.1 - 29.9 )] : overweight -  ( BMI 25.1 - 29.9 ) [Continue diet as tolerated] : continue diet as tolerated based on goals of care [Smoke/CO Detectors] : smoke/CO detectors [Remove clutter and unsafe carpeting to avoid falls] : remove clutter and unsafe carpeting to avoid falls [] : diabetic screening [Improve mobility] : improve mobility [Minimize unnecessary interventions] : minimize unnecessary interventions [Completed DNR] : completed DNR [Completed Medical Orders for Life-Sustaining Treatment] : completed medical orders for life-sustaining treatment [DNR] : Code Status: DNR [Limited] : Treatment Guidelines: Limited [DNI] : Intubation: DNI [[facility]: _____] [facility]/last verification date: [unfilled]

## 2022-10-17 NOTE — REVIEW OF SYSTEMS
[Vision Problems] : vision problems [Lower Ext Edema] : lower extremity edema [Constipation] : constipation [Incontinence] : incontinence [Joint Pain] : joint pain [Joint Stiffness] : joint stiffness [Muscle Weakness] : muscle weakness [Unsteady Walk] : ataxia [Anxiety] : anxiety [Depression] : depression [Negative] : Heme/Lymph [FreeTextEntry3] : optic neuritis in left eye  [de-identified] : dry skin on right side

## 2022-10-17 NOTE — PHYSICAL EXAM
[No Acute Distress] : no acute distress [Well Nourished] : well nourished [Normal Sclera/Conjunctiva] : normal sclera/conjunctiva [PERRL] : pupils equal, round and reactive to light [Normal Outer Ear/Nose] : the ears and nose were normal in appearance [Normal Oropharynx] : the oropharynx was normal [No JVD] : no jugular venous distention [Supple] : the neck was supple [No Respiratory Distress] : no respiratory distress [Clear to Auscultation] : lungs were clear to auscultation bilaterally [No Accessory Muscle Use] : no accessory muscle use [No LE Varicosities] : there were no varicosital changes in the lower extremities [Pedal Pulses Present] : the pedal pulses are present [Normal Bowel Sounds] : normal bowel sounds [Non Tender] : non-tender [Soft] : abdomen soft [No Masses] : no abdominal mass palpated [No CVA Tenderness] : no ~M costovertebral angle tenderness [No Spinal Tenderness] : no spinal tenderness [No Rash] : no rash [No Skin Lesions] : no skin lesions [Cranial Nerves Intact] : cranial nerves 2-12 were intact [Oriented x3] : oriented to person, place, and time [Normal Affect] : the affect was normal [Normal Mood] : the mood was normal [Acne] : no acne [de-identified] : non-ambulatory. bed-bound. right side weaker than left  [de-identified] : bed-bound. generalized weakness. right side weaker than left

## 2022-10-17 NOTE — HISTORY OF PRESENT ILLNESS
[Patient] : patient [FreeTextEntry1] : MS [FreeTextEntry2] : PMH: 46 y/o male with history of MS, bed-bound, PE, depression. Patient is being seen today for routine follow-up of chronic conditions. \par \par Interval events: \par \par Patient lives in apartment with HHA. At today's visit, patient seen laying in bed - he is beginning to have spasms again. After he got the nephrostomy tube,spasms seemed to have stopped - but, now they have come back again. His nephrostomy tube fell out and he was never able to secure  an appointment with urology.  \par As per patient - his urine is clear, no odor/drainage. He does have some pain right before he urinates, but not as he is urinating. \par Has 24hr/aide. Weekend aide is here all weekend. \par \par MS - first diagnosed at 20. Then, he was in remission for seven years, then he had another attack in 2013 - wheelchair bound since 2013. He did see neurologist in the past. He would like to see neurologist again - gave him number for neurologist. \par Has not gotten OOB since 2020. \par \par Pain: gets intermittent painful spasms throughout the day, rates at 11 of 10. Once the spasms passes - he is not in any pain.. \par \par Mood/Behavior: "depends" If he has no pain, in a great mood. WHen he has pain, does not like to be bothered. Always depressed - manages with marijuana. He also makes jokes and tries to have up-beat attitude. He has very supportive friends. His parents will also come by once a week or so, but he is not close with them. \par Appetite: eats well. Gets "Moms meals" from insurance - will eat three meals/day. Also, snacks in between. HHA will feed patient, patient not able to hold spoon/fork. No coughing with liquids. \par GI/: incontinent of urine and stool. Regular BM's. Urine is non odorous. \par Sleep: sleeps well. Does not get 8hrs/night. He will fall asleep & then a spasm will wake him up - he will smoke and then go back to sleep. He states the spams will wake him up every 20mins - the most sleep he will get will be an hour. He is used to his sleep cycle & believes he gets sufficient rest. \par \par Patient denies fever, cough, trouble breathing, rash, vomiting and diarrhea. Patient has not been in close contact with someone covid positive. \par N95, gloves, eye wear and gown used during visit: Y. Total face to face time with patient is 30min.\par \par

## 2022-11-09 ENCOUNTER — APPOINTMENT (OUTPATIENT)
Dept: HOME HEALTH SERVICES | Facility: HOME HEALTH | Age: 47
End: 2022-11-09

## 2022-11-22 ENCOUNTER — NON-APPOINTMENT (OUTPATIENT)
Age: 47
End: 2022-11-22

## 2022-12-07 ENCOUNTER — NON-APPOINTMENT (OUTPATIENT)
Age: 47
End: 2022-12-07

## 2022-12-07 DIAGNOSIS — M79.2 NEURALGIA AND NEURITIS, UNSPECIFIED: ICD-10-CM

## 2022-12-19 ENCOUNTER — APPOINTMENT (OUTPATIENT)
Dept: NEUROLOGY | Facility: CLINIC | Age: 47
End: 2022-12-19

## 2022-12-28 ENCOUNTER — APPOINTMENT (OUTPATIENT)
Dept: HOME HEALTH SERVICES | Facility: HOME HEALTH | Age: 47
End: 2022-12-28

## 2023-01-03 ENCOUNTER — NON-APPOINTMENT (OUTPATIENT)
Age: 48
End: 2023-01-03

## 2023-01-04 ENCOUNTER — APPOINTMENT (OUTPATIENT)
Dept: HOME HEALTH SERVICES | Facility: HOME HEALTH | Age: 48
End: 2023-01-04
Payer: MEDICARE

## 2023-01-04 VITALS
RESPIRATION RATE: 16 BRPM | SYSTOLIC BLOOD PRESSURE: 142 MMHG | OXYGEN SATURATION: 97 % | DIASTOLIC BLOOD PRESSURE: 80 MMHG | HEART RATE: 75 BPM

## 2023-01-04 DIAGNOSIS — Z93.6 OTHER ARTIFICIAL OPENINGS OF URINARY TRACT STATUS: ICD-10-CM

## 2023-01-04 PROCEDURE — 99349 HOME/RES VST EST MOD MDM 40: CPT

## 2023-01-04 NOTE — HISTORY OF PRESENT ILLNESS
[Patient] : patient [FreeTextEntry1] : MS [FreeTextEntry2] : PMH: 48 y/o male with history of MS, bed-bound, PE, depression. Patient is being seen today for routine follow-up of chronic conditions. \par \par Interval events: CP response for acute pain - was prescribed fentanyl patch.. but, did not receive from pharmacy. \par \par Patient lives in apartment with HHA. At today's visit, patient seen laying in bed - he is beginning to have spasms again. After he got the nephrostomy tube,spasms seemed to have stopped - but, now they have come back again. His nephrostomy tube fell out and he was never able to secure  an appointment with urology.  He does have appointment this week \par Patient in usual state of health -- frustrated at his MS diagnosis and how it prevents from living life he once did. 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 He does have friends who come by to see him. \par Has 24hr/aide. Weekend aide is here all weekend. \par \par MS - first diagnosed at 20. Then, he was in remission for seven years, then he had another attack in 2013 - wheelchair bound since 2013. He did see neurologist in the past. He would like to see neurologist again. \par Has not gotten OOB since 2020. Not able to bend his knees. \par \par Pain: gets intermittent painful spasms throughout the day, rates at 11 of 10. Once the spasms passes - he is not in any pain.. \par \par Mood/Behavior: "depends" If he has no pain, in a great mood. WHen he has pain, does not like to be bothered. Always depressed - manages with marijuana. He also makes jokes and tries to have up-beat attitude. He has very supportive friends. His parents will also come by once a week or so, but he is not close with them. \par Appetite: eats well. Gets "Moms meals" from insurance - will eat three meals/day. Also, snacks in between. HHA will feed patient, patient not able to hold spoon/fork. No coughing with liquids. \par GI/: incontinent of urine and stool. Regular BM's. Urine is non odorous. \par Sleep: sleeps well. Does not get 8hrs/night. He will fall asleep & then a spasm will wake him up - he will smoke and then go back to sleep. He states the spams will wake him up every 20mins - the most sleep he will get will be an hour. He is used to his sleep cycle & believes he gets sufficient rest. \par \par Patient denies fever, cough, trouble breathing, rash, vomiting and diarrhea. Patient has not been in close contact with someone covid positive. \par N95, gloves, eye wear and gown used during visit: Y. Total face to face time with patient is 30min.\par \par

## 2023-01-04 NOTE — REVIEW OF SYSTEMS
[Vision Problems] : vision problems [Lower Ext Edema] : lower extremity edema [Constipation] : constipation [Incontinence] : incontinence [Joint Pain] : joint pain [Joint Stiffness] : joint stiffness [Muscle Weakness] : muscle weakness [Unsteady Walk] : ataxia [Anxiety] : anxiety [Depression] : depression [Negative] : Heme/Lymph [FreeTextEntry3] : optic neuritis in left eye  [de-identified] : dry skin on right side

## 2023-01-04 NOTE — COUNSELING
[Overweight - ( BMI 25.1 - 29.9 )] : overweight -  ( BMI 25.1 - 29.9 ) [Continue diet as tolerated] : continue diet as tolerated based on goals of care [Smoke/CO Detectors] : smoke/CO detectors [Remove clutter and unsafe carpeting to avoid falls] : remove clutter and unsafe carpeting to avoid falls [] : diabetic screening [Improve mobility] : improve mobility [Minimize unnecessary interventions] : minimize unnecessary interventions [Completed DNR] : completed DNR [Completed Medical Orders for Life-Sustaining Treatment] : completed medical orders for life-sustaining treatment [DNR] : Code Status: DNR [Limited] : Treatment Guidelines: Limited [DNI] : Intubation: DNI [[facility]: _____] [facility]/last verification date: [unfilled]

## 2023-01-04 NOTE — PHYSICAL EXAM
[No Acute Distress] : no acute distress [Well Nourished] : well nourished [Normal Sclera/Conjunctiva] : normal sclera/conjunctiva [PERRL] : pupils equal, round and reactive to light [Normal Outer Ear/Nose] : the ears and nose were normal in appearance [Normal Oropharynx] : the oropharynx was normal [No JVD] : no jugular venous distention [Supple] : the neck was supple [No Respiratory Distress] : no respiratory distress [Clear to Auscultation] : lungs were clear to auscultation bilaterally [No Accessory Muscle Use] : no accessory muscle use [No LE Varicosities] : there were no varicosital changes in the lower extremities [Pedal Pulses Present] : the pedal pulses are present [Normal Bowel Sounds] : normal bowel sounds [Non Tender] : non-tender [Soft] : abdomen soft [No Masses] : no abdominal mass palpated [No CVA Tenderness] : no ~M costovertebral angle tenderness [No Spinal Tenderness] : no spinal tenderness [No Rash] : no rash [No Skin Lesions] : no skin lesions [Cranial Nerves Intact] : cranial nerves 2-12 were intact [Oriented x3] : oriented to person, place, and time [Normal Affect] : the affect was normal [Normal Mood] : the mood was normal [Acne] : no acne [de-identified] : non-ambulatory. bed-bound. right side weaker than left  [de-identified] : bed-bound. generalized weakness. right side weaker than left

## 2023-01-09 ENCOUNTER — APPOINTMENT (OUTPATIENT)
Dept: UROLOGY | Facility: CLINIC | Age: 48
End: 2023-01-09

## 2023-01-19 NOTE — H&P ADULT - PROBLEM SELECTOR PLAN 3
Nonverbal and generally spends time in wheelchair at baseline  Family very active in care    Dependent on all IADLs and most ADLS (occasionally able to feed self and signal when need bathroom)  Home VNA referral given  Podiatry referral given  Informed brother to search [employer] for fork/spoon assistive eating devices  Will fill out handicap placard  Patient does occasionally ambulate with walker and family interested in home VNA to improve strength and mobility/ROM  F/u 6 months  Repeat bmp, cbc, and b12 -Lovenox for DVT ppx  IMPROVE VTE Individual Risk Assessment          RISK                                                          Points  [ x ] Previous VTE                                                3  [  ] Thrombophilia                                             2  [x  ] Lower limb paralysis                                   2        (unable to hold up >15 seconds)    [  ] Current Cancer                                             2         (within 6 months)  [x  ] Immobilization > 24 hrs                              1  [  ] ICU/CCU stay > 24 hours                             1  [  ] Age > 60                                                         1    IMPROVE VTE Score: 6 -Uses for pain relief at home patient reports whole body heaviness including in chest but not pleuritic and no chest pain, satting 99% on RA, normal vitals  -symptoms likely due to MS flare, very low suspicion for PE, will hold off on CTA for now, f/u LE duplex  -EKG without ischemic changes, add on set up trops, if elevated then trend

## 2023-02-03 ENCOUNTER — APPOINTMENT (OUTPATIENT)
Dept: HOME HEALTH SERVICES | Facility: HOME HEALTH | Age: 48
End: 2023-02-03

## 2023-02-07 ENCOUNTER — TRANSCRIPTION ENCOUNTER (OUTPATIENT)
Age: 48
End: 2023-02-07

## 2023-02-16 ENCOUNTER — NON-APPOINTMENT (OUTPATIENT)
Age: 48
End: 2023-02-16

## 2023-02-28 ENCOUNTER — NON-APPOINTMENT (OUTPATIENT)
Age: 48
End: 2023-02-28

## 2023-03-03 ENCOUNTER — APPOINTMENT (OUTPATIENT)
Dept: HOME HEALTH SERVICES | Facility: HOME HEALTH | Age: 48
End: 2023-03-03

## 2023-03-03 ENCOUNTER — NON-APPOINTMENT (OUTPATIENT)
Age: 48
End: 2023-03-03

## 2023-03-05 VITALS
HEART RATE: 72 BPM | TEMPERATURE: 97.2 F | RESPIRATION RATE: 16 BRPM | SYSTOLIC BLOOD PRESSURE: 130 MMHG | DIASTOLIC BLOOD PRESSURE: 76 MMHG | OXYGEN SATURATION: 97 %

## 2023-03-09 ENCOUNTER — APPOINTMENT (OUTPATIENT)
Dept: HOME HEALTH SERVICES | Facility: HOME HEALTH | Age: 48
End: 2023-03-09
Payer: MEDICARE

## 2023-03-09 VITALS
DIASTOLIC BLOOD PRESSURE: 80 MMHG | SYSTOLIC BLOOD PRESSURE: 140 MMHG | HEART RATE: 82 BPM | OXYGEN SATURATION: 98 % | TEMPERATURE: 98 F | RESPIRATION RATE: 16 BRPM

## 2023-03-09 PROCEDURE — 99496 TRANSJ CARE MGMT HIGH F2F 7D: CPT

## 2023-03-09 RX ORDER — FENTANYL 12 UG/H
12 PATCH, EXTENDED RELEASE TRANSDERMAL
Qty: 20 | Refills: 0 | Status: DISCONTINUED | COMMUNITY
Start: 2022-12-07 | End: 2023-03-09

## 2023-03-09 RX ORDER — OCRELIZUMAB 300 MG/10ML
300 INJECTION INTRAVENOUS
Qty: 1 | Refills: 0 | Status: DISCONTINUED | COMMUNITY
Start: 2018-08-29 | End: 2023-03-09

## 2023-03-09 NOTE — PHYSICAL EXAM
[No Acute Distress] : no acute distress [Well Nourished] : well nourished [Normal Sclera/Conjunctiva] : normal sclera/conjunctiva [PERRL] : pupils equal, round and reactive to light [Normal Outer Ear/Nose] : the ears and nose were normal in appearance [Normal Oropharynx] : the oropharynx was normal [No JVD] : no jugular venous distention [Supple] : the neck was supple [No Respiratory Distress] : no respiratory distress [Clear to Auscultation] : lungs were clear to auscultation bilaterally [No Accessory Muscle Use] : no accessory muscle use [No LE Varicosities] : there were no varicosital changes in the lower extremities [Pedal Pulses Present] : the pedal pulses are present [Normal Bowel Sounds] : normal bowel sounds [Non Tender] : non-tender [Soft] : abdomen soft [No Masses] : no abdominal mass palpated [No CVA Tenderness] : no ~M costovertebral angle tenderness [No Spinal Tenderness] : no spinal tenderness [No Rash] : no rash [No Skin Lesions] : no skin lesions [Cranial Nerves Intact] : cranial nerves 2-12 were intact [Oriented x3] : oriented to person, place, and time [Normal Affect] : the affect was normal [Normal Mood] : the mood was normal [Acne] : no acne [de-identified] : non-ambulatory. bed-bound. right side weaker than left  [de-identified] : bed-bound. generalized weakness. right side weaker than left

## 2023-03-09 NOTE — REASON FOR VISIT
[Post Hospitalization] : a post hospitalization visit [Formal Caregiver] : formal caregiver [Pre-Visit Preparation] : pre-visit preparation was done [FreeTextEntry2] : chart review

## 2023-03-09 NOTE — REVIEW OF SYSTEMS
[Vision Problems] : vision problems [Lower Ext Edema] : lower extremity edema [Constipation] : constipation [Incontinence] : incontinence [Joint Pain] : joint pain [Joint Stiffness] : joint stiffness [Muscle Weakness] : muscle weakness [Unsteady Walk] : ataxia [Anxiety] : anxiety [Depression] : depression [Negative] : Heme/Lymph [FreeTextEntry3] : optic neuritis in left eye  [FreeTextEntry8] : iraheta in place  [de-identified] : dry skin on right side

## 2023-03-09 NOTE — HISTORY OF PRESENT ILLNESS
[Patient] : patient [FreeTextEntry1] : MS [FreeTextEntry2] : PMH: 48 y/o male with history of MS, bed-bound, PE, depression. Patient is being seen today for post hospital discharge visit. \par \par Transitional care management macro\par KENNETH SAUL is being seen after discharge home from [facility]. He  was admitted on 02/27/2023 for pyleonephritis and discharged home on 03/02/2023. \par Post-discharge contact was made within 2 days of discharge home.\par Discharge medications were reviewed and reconciled with the current medication list and medications in home\par  \par Patient lives in medicaid apartment with HHA. At today's visit, patient seen laying in bed - iraheta in place (was placed in the hospital). \par Patient still has stones - has f/u appt with urology - he had two previous appointments, but medicaid did not pick him up. \par Has 24hr/aide. Weekend aide is here all weekend. \par \par MS - first diagnosed at 20. Then, he was in remission for seven years, then he had another attack in 2013 - wheelchair bound since 2013. He did see neurologist in the past. He would like to see neurologist again. \par Has not gotten OOB since 2020. Not able to bend his knees. \par \par Pain: gets intermittent painful spasms throughout the day, rates at 11 of 10. Once the spasms passes - he is not in any pain.. He was discharged with tramadol - but, is not taking. He is managing his pain with weed - he has tramadol in the home if he needs it. \par \par Mood/Behavior: "depends" If he has no pain, in a great mood. WHen he has pain, does not like to be bothered. Always depressed - manages with marijuana. He also makes jokes and tries to have up-beat attitude. He has very supportive friends. His parents will also come by once a week or so, but he is not close with them. \par Appetite: eats well. Gets "Moms meals" from insurance - will eat three meals/day. Also, snacks in between. HHA will feed patient, patient not able to hold spoon/fork. No coughing with liquids. \par GI/: incontinent of urine and stool. Regular BM's. Urine is non odorous. \par Sleep: sleeps well. Does not get 8hrs/night. He will fall asleep & then a spasm will wake him up - he will smoke and then go back to sleep. He states the spams will wake him up every 20mins - the most sleep he will get will be an hour. He is used to his sleep cycle & believes he gets sufficient rest. \par \par Patient denies fever, cough, trouble breathing, rash, vomiting and diarrhea. Patient has not been in close contact with someone covid positive. \par N95, gloves, eye wear and gown used during visit: Y. Total face to face time with patient is 30min.\par \par

## 2023-03-21 ENCOUNTER — NON-APPOINTMENT (OUTPATIENT)
Age: 48
End: 2023-03-21

## 2023-03-30 ENCOUNTER — OUTPATIENT (OUTPATIENT)
Dept: OUTPATIENT SERVICES | Facility: HOSPITAL | Age: 48
LOS: 1 days | End: 2023-03-30
Payer: MEDICARE

## 2023-03-30 ENCOUNTER — APPOINTMENT (OUTPATIENT)
Dept: UROLOGY | Facility: CLINIC | Age: 48
End: 2023-03-30
Payer: MEDICARE

## 2023-03-30 PROCEDURE — 51702 INSERT TEMP BLADDER CATH: CPT

## 2023-03-30 PROCEDURE — 99205 OFFICE O/P NEW HI 60 MIN: CPT

## 2023-03-31 DIAGNOSIS — N31.9 NEUROMUSCULAR DYSFUNCTION OF BLADDER, UNSPECIFIED: ICD-10-CM

## 2023-03-31 DIAGNOSIS — R35.0 FREQUENCY OF MICTURITION: ICD-10-CM

## 2023-03-31 DIAGNOSIS — N20.0 CALCULUS OF KIDNEY: ICD-10-CM

## 2023-04-10 ENCOUNTER — APPOINTMENT (OUTPATIENT)
Dept: CT IMAGING | Facility: IMAGING CENTER | Age: 48
End: 2023-04-10
Payer: MEDICARE

## 2023-04-10 ENCOUNTER — RESULT REVIEW (OUTPATIENT)
Age: 48
End: 2023-04-10

## 2023-04-10 ENCOUNTER — OUTPATIENT (OUTPATIENT)
Dept: OUTPATIENT SERVICES | Facility: HOSPITAL | Age: 48
LOS: 1 days | End: 2023-04-10
Payer: MEDICARE

## 2023-04-10 DIAGNOSIS — N20.0 CALCULUS OF KIDNEY: ICD-10-CM

## 2023-04-10 PROCEDURE — 76937 US GUIDE VASCULAR ACCESS: CPT

## 2023-04-10 PROCEDURE — 76937 US GUIDE VASCULAR ACCESS: CPT | Mod: 26

## 2023-04-10 PROCEDURE — 74178 CT ABD&PLV WO CNTR FLWD CNTR: CPT | Mod: 26

## 2023-04-10 PROCEDURE — 74178 CT ABD&PLV WO CNTR FLWD CNTR: CPT

## 2023-04-10 PROCEDURE — 36410 VNPNXR 3YR/> PHY/QHP DX/THER: CPT | Mod: 59

## 2023-04-11 ENCOUNTER — APPOINTMENT (OUTPATIENT)
Dept: HOME HEALTH SERVICES | Facility: HOME HEALTH | Age: 48
End: 2023-04-11
Payer: MEDICARE

## 2023-04-11 VITALS
TEMPERATURE: 36.7 F | OXYGEN SATURATION: 97 % | DIASTOLIC BLOOD PRESSURE: 70 MMHG | SYSTOLIC BLOOD PRESSURE: 134 MMHG | HEART RATE: 95 BPM | RESPIRATION RATE: 16 BRPM

## 2023-04-11 PROCEDURE — 99349 HOME/RES VST EST MOD MDM 40: CPT

## 2023-04-13 ENCOUNTER — NON-APPOINTMENT (OUTPATIENT)
Age: 48
End: 2023-04-13

## 2023-04-25 ENCOUNTER — OUTPATIENT (OUTPATIENT)
Dept: OUTPATIENT SERVICES | Facility: HOSPITAL | Age: 48
LOS: 1 days | End: 2023-04-25
Payer: MEDICARE

## 2023-04-25 ENCOUNTER — APPOINTMENT (OUTPATIENT)
Dept: UROLOGY | Facility: CLINIC | Age: 48
End: 2023-04-25
Payer: MEDICARE

## 2023-04-25 VITALS — OXYGEN SATURATION: 99 % | HEART RATE: 79 BPM | DIASTOLIC BLOOD PRESSURE: 74 MMHG | SYSTOLIC BLOOD PRESSURE: 105 MMHG

## 2023-04-25 DIAGNOSIS — R35.0 FREQUENCY OF MICTURITION: ICD-10-CM

## 2023-04-25 PROCEDURE — 51797 INTRAABDOMINAL PRESSURE TEST: CPT | Mod: 26

## 2023-04-25 PROCEDURE — 51728 CYSTOMETROGRAM W/VP: CPT | Mod: 26

## 2023-04-25 PROCEDURE — 51741 ELECTRO-UROFLOWMETRY FIRST: CPT

## 2023-04-25 PROCEDURE — 51784 ANAL/URINARY MUSCLE STUDY: CPT | Mod: 26

## 2023-04-25 PROCEDURE — 51797 INTRAABDOMINAL PRESSURE TEST: CPT

## 2023-04-25 PROCEDURE — 51741 ELECTRO-UROFLOWMETRY FIRST: CPT | Mod: 26

## 2023-04-25 PROCEDURE — 51728 CYSTOMETROGRAM W/VP: CPT

## 2023-04-25 PROCEDURE — 51784 ANAL/URINARY MUSCLE STUDY: CPT

## 2023-04-26 ENCOUNTER — NON-APPOINTMENT (OUTPATIENT)
Age: 48
End: 2023-04-26

## 2023-05-02 PROBLEM — R35.0 URINARY FREQUENCY: Status: ACTIVE | Noted: 2023-03-30

## 2023-05-02 NOTE — COUNSELING
[Overweight - ( BMI 25.1 - 29.9 )] : overweight -  ( BMI 25.1 - 29.9 ) [Continue diet as tolerated] : continue diet as tolerated based on goals of care [Smoke/CO Detectors] : smoke/CO detectors [Remove clutter and unsafe carpeting to avoid falls] : remove clutter and unsafe carpeting to avoid falls [] : diabetic screening [Improve mobility] : improve mobility [Minimize unnecessary interventions] : minimize unnecessary interventions [Completed DNR] : completed DNR [Completed Medical Orders for Life-Sustaining Treatment] : completed medical orders for life-sustaining treatment [DNR] : Code Status: DNR [Limited] : Treatment Guidelines: Limited [DNI] : Intubation: DNI [[facility] Date: _____] [facility] Com[facility]/last verification date: [unfilled]

## 2023-05-02 NOTE — HEALTH RISK ASSESSMENT
[HRA Reviewed] : Health risk assessment reviewed [Independent] : managing finances [Some assistance needed] : using telephone [Full assistance needed] : using transportation [FreeTextEntry7] : no meds [Patient not ambulatory (Wheelchair)] : Patient is not ambulatory (Wheelchair) [Yes] : The patient does have visual impairment

## 2023-05-02 NOTE — PHYSICAL EXAM
[Normal Voice/Communication] : normal voice communication [No LAD] : no lymphadenopathy [Normal Rate] : heart rate was normal  [Regular Rhythm] : with a regular rhythm [Normal S1, S2] : normal S1 and S2 [Kyphosis] : no kyphosis present [No Gross Sensory Deficits] : no gross sensory deficits [No Acute Distress] : no acute distress [Well Nourished] : well nourished [Normal Sclera/Conjunctiva] : normal sclera/conjunctiva [PERRL] : pupils equal, round and reactive to light [Normal Outer Ear/Nose] : the ears and nose were normal in appearance [Normal Oropharynx] : the oropharynx was normal [No JVD] : no jugular venous distention [Supple] : the neck was supple [No Respiratory Distress] : no respiratory distress [Clear to Auscultation] : lungs were clear to auscultation bilaterally [No Accessory Muscle Use] : no accessory muscle use [No LE Varicosities] : there were no varicosital changes in the lower extremities [Pedal Pulses Present] : the pedal pulses are present [Normal Bowel Sounds] : normal bowel sounds [Non Tender] : non-tender [Soft] : abdomen soft [No Masses] : no abdominal mass palpated [No CVA Tenderness] : no ~M costovertebral angle tenderness [No Spinal Tenderness] : no spinal tenderness [No Rash] : no rash [No Skin Lesions] : no skin lesions [Acne] : no acne [Cranial Nerves Intact] : cranial nerves 2-12 were intact [Oriented x3] : oriented to person, place, and time [Normal Affect] : the affect was normal [Normal Mood] : the mood was normal [de-identified] : non-ambulatory. bed-bound. right side weaker than left  [de-identified] : bed-bound. generalized weakness. right side weaker than left

## 2023-05-02 NOTE — HISTORY OF PRESENT ILLNESS
[Patient] : patient [FreeTextEntry1] : MS [FreeTextEntry2] : PMH: 46 y/o male with history of MS, bed-bound, PE, depression. Patient is being seen today for routine follow-up of chronic conditions. \par \par Interval events: admitted to [facility] for kidney stones.  Patient also saw urology for kidney stones. \par \par Patient lives in apartment with HHA. At today's visit, patient seen laying in bed - he is beginning to have spasms again. Manages pain with weed, which works well for him. \par Patient in usual state of health -- frustrated at his MS diagnosis and how it prevents from living life he once did. 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 He does have friends who come by to see him. \par Has 24hr/aide. Weekend aide is here all weekend. \par urine: iraheta in place, draining clear urine. has follow-up with urology for stone management. \par MS - first diagnosed at 20. Then, he was in remission for seven years, then he had another attack in 2013 - wheelchair bound since 2013. He did see neurologist in the past. He would like to see neurologist again. \par Has not gotten OOB since 2020. Not able to bend his knees. \par \par Pain: gets intermittent painful spasms throughout the day, rates at 11 of 10. Once the spasms passes - he is not in any pain.. \par \par Mood/Behavior: "depends" If he has no pain, in a great mood. WHen he has pain, does not like to be bothered. Always depressed - manages with marijuana. He also makes jokes and tries to have up-beat attitude. He has very supportive friends. His parents will also come by once a week or so, but he is not close with them. \par Appetite: eats well. Gets "Moms meals" from insurance - will eat three meals/day. Also, snacks in between. HHA will feed patient, patient not able to hold spoon/fork. No coughing with liquids. \par GI/: incontinent of urine and stool. Regular BM's. Urine is non odorous. \par Sleep: sleeps well. Does not get 8hrs/night. He will fall asleep & then a spasm will wake him up - he will smoke and then go back to sleep. He states the spams will wake him up every 20mins - the most sleep he will get will be an hour. He is used to his sleep cycle & believes he gets sufficient rest. \par \par Patient denies fever, cough, trouble breathing, rash, vomiting and diarrhea. Patient has not been in close contact with someone covid positive. \par N95, gloves, eye wear and gown used during visit: Y. Total face to face time with patient is 30min.\par \par

## 2023-05-02 NOTE — REVIEW OF SYSTEMS
[Vision Problems] : vision problems [Lower Ext Edema] : lower extremity edema [Constipation] : constipation [Incontinence] : incontinence [Joint Pain] : joint pain [Joint Stiffness] : joint stiffness [Muscle Weakness] : muscle weakness [Unsteady Walk] : ataxia [Anxiety] : anxiety [Depression] : depression [Negative] : Heme/Lymph [FreeTextEntry3] : optic neuritis in left eye  [de-identified] : dry skin on right side

## 2023-05-03 LAB — BACTERIA UR CULT: ABNORMAL

## 2023-05-03 RX ORDER — CIPROFLOXACIN HYDROCHLORIDE 500 MG/1
500 TABLET, FILM COATED ORAL
Qty: 20 | Refills: 0 | Status: DISCONTINUED | COMMUNITY
Start: 2023-04-15 | End: 2023-05-03

## 2023-05-03 NOTE — PHYSICAL EXAM
[General Appearance - Well Developed] : well developed [General Appearance - Well Nourished] : well nourished [Heart Rate And Rhythm] : Heart rate and rhythm were normal [] : no respiratory distress [Respiration, Rhythm And Depth] : normal respiratory rhythm and effort [Bowel Sounds] : normal bowel sounds [Abdomen Soft] : soft [Normal Station and Gait] : the gait and station were normal for the patient's age [Skin Color & Pigmentation] : normal skin color and pigmentation [No Focal Deficits] : no focal deficits [Oriented To Time, Place, And Person] : oriented to person, place, and time [Not Anxious] : not anxious [FreeTextEntry1] : iraheta bag with purple bag syndrome, obese; no abdominal incisions

## 2023-05-03 NOTE — HISTORY OF PRESENT ILLNESS
[Urinary Urgency] : urinary urgency [Urinary Frequency] : urinary frequency [FreeTextEntry1] : : 1975 \par Referring Provider: none \par \par HPI: Mr. KENNETH SAUL is a 47 year yo M with a PMHx notable for MS since , bed-bound, PE, depression. Patient is being seen today for consultation of bilateral kidney stones and neurogenic bladder. He has a history of MS - first diagnosed at 20. Then, he was in remission for seven years, then he had another attack in  - wheelchair bound since . He did see neurologist in the past. From a urologic standpoint, he has significant urinary frequency and since February has had a iraheta catheter in place given his difficulty with urinary frequency. He has not had established care with a urologist. Previously in  had a L PCN in place for ureteral and kidney stones, was supposed to have inpatient stone surgery ideally but then was discharged. His L PCN has since fallen out. No overt fevers/chills or symptoms of UTI. \par \par Would like to consider surgery for removal of kidney stones and bladder optimization. We discussed that he will likely need UDS, staged bilateral PCNL and possible SPT given likely long term catheter dependence given neurologic status. \par \par Anticoagulation: None\par All: NKDA\par Social: nonsmoker, lives in home currently\par PMHx: MS, bed-bound, depression, nephrolithiasis, neurogenic bladder\par FHx: None\par PSHx: None\par Labs: Cr 0.78 mg/dL\par \par Imaging:  CT with L PCN in place with kidney stones, large lower pole stone and ureteral stone noted, R sided kidney stone\par  [Urinary Incontinence] : no urinary incontinence [Urinary Retention] : no urinary retention

## 2023-05-03 NOTE — ASSESSMENT
[FreeTextEntry1] : 48 yo M with advanced MS here today with bilateral large kidney stones L >R and neurogenic bladder

## 2023-05-05 ENCOUNTER — NON-APPOINTMENT (OUTPATIENT)
Age: 48
End: 2023-05-05

## 2023-05-08 ENCOUNTER — APPOINTMENT (OUTPATIENT)
Dept: UROLOGY | Facility: CLINIC | Age: 48
End: 2023-05-08

## 2023-05-15 ENCOUNTER — OUTPATIENT (OUTPATIENT)
Dept: OUTPATIENT SERVICES | Facility: HOSPITAL | Age: 48
LOS: 1 days | End: 2023-05-15
Payer: MEDICARE

## 2023-05-15 VITALS
TEMPERATURE: 97 F | DIASTOLIC BLOOD PRESSURE: 83 MMHG | HEART RATE: 76 BPM | RESPIRATION RATE: 16 BRPM | HEIGHT: 70 IN | OXYGEN SATURATION: 95 % | WEIGHT: 250 LBS | SYSTOLIC BLOOD PRESSURE: 141 MMHG

## 2023-05-15 DIAGNOSIS — Z01.818 ENCOUNTER FOR OTHER PREPROCEDURAL EXAMINATION: ICD-10-CM

## 2023-05-15 DIAGNOSIS — Z29.9 ENCOUNTER FOR PROPHYLACTIC MEASURES, UNSPECIFIED: ICD-10-CM

## 2023-05-15 DIAGNOSIS — N20.0 CALCULUS OF KIDNEY: ICD-10-CM

## 2023-05-15 DIAGNOSIS — Z93.6 OTHER ARTIFICIAL OPENINGS OF URINARY TRACT STATUS: Chronic | ICD-10-CM

## 2023-05-15 LAB
ANION GAP SERPL CALC-SCNC: 11 MMOL/L — SIGNIFICANT CHANGE UP (ref 5–17)
BLD GP AB SCN SERPL QL: NEGATIVE — SIGNIFICANT CHANGE UP
BUN SERPL-MCNC: 20 MG/DL — SIGNIFICANT CHANGE UP (ref 7–23)
CALCIUM SERPL-MCNC: 9.4 MG/DL — SIGNIFICANT CHANGE UP (ref 8.4–10.5)
CHLORIDE SERPL-SCNC: 107 MMOL/L — SIGNIFICANT CHANGE UP (ref 96–108)
CO2 SERPL-SCNC: 24 MMOL/L — SIGNIFICANT CHANGE UP (ref 22–31)
CREAT SERPL-MCNC: 1.09 MG/DL — SIGNIFICANT CHANGE UP (ref 0.5–1.3)
EGFR: 85 ML/MIN/1.73M2 — SIGNIFICANT CHANGE UP
GLUCOSE SERPL-MCNC: 84 MG/DL — SIGNIFICANT CHANGE UP (ref 70–99)
HCT VFR BLD CALC: 38.3 % — LOW (ref 39–50)
HGB BLD-MCNC: 12.3 G/DL — LOW (ref 13–17)
MCHC RBC-ENTMCNC: 27.3 PG — SIGNIFICANT CHANGE UP (ref 27–34)
MCHC RBC-ENTMCNC: 32.1 GM/DL — SIGNIFICANT CHANGE UP (ref 32–36)
MCV RBC AUTO: 85.1 FL — SIGNIFICANT CHANGE UP (ref 80–100)
NRBC # BLD: 0 /100 WBCS — SIGNIFICANT CHANGE UP (ref 0–0)
PLATELET # BLD AUTO: 331 K/UL — SIGNIFICANT CHANGE UP (ref 150–400)
POTASSIUM SERPL-MCNC: 4 MMOL/L — SIGNIFICANT CHANGE UP (ref 3.5–5.3)
POTASSIUM SERPL-SCNC: 4 MMOL/L — SIGNIFICANT CHANGE UP (ref 3.5–5.3)
RBC # BLD: 4.5 M/UL — SIGNIFICANT CHANGE UP (ref 4.2–5.8)
RBC # FLD: 14.9 % — HIGH (ref 10.3–14.5)
RH IG SCN BLD-IMP: POSITIVE — SIGNIFICANT CHANGE UP
SODIUM SERPL-SCNC: 142 MMOL/L — SIGNIFICANT CHANGE UP (ref 135–145)
WBC # BLD: 8.89 K/UL — SIGNIFICANT CHANGE UP (ref 3.8–10.5)
WBC # FLD AUTO: 8.89 K/UL — SIGNIFICANT CHANGE UP (ref 3.8–10.5)

## 2023-05-15 PROCEDURE — 86900 BLOOD TYPING SEROLOGIC ABO: CPT

## 2023-05-15 PROCEDURE — 87086 URINE CULTURE/COLONY COUNT: CPT

## 2023-05-15 PROCEDURE — 87186 SC STD MICRODIL/AGAR DIL: CPT

## 2023-05-15 PROCEDURE — 85027 COMPLETE CBC AUTOMATED: CPT

## 2023-05-15 PROCEDURE — 36415 COLL VENOUS BLD VENIPUNCTURE: CPT

## 2023-05-15 PROCEDURE — G0463: CPT

## 2023-05-15 PROCEDURE — 86901 BLOOD TYPING SEROLOGIC RH(D): CPT

## 2023-05-15 PROCEDURE — 80048 BASIC METABOLIC PNL TOTAL CA: CPT

## 2023-05-15 PROCEDURE — 86850 RBC ANTIBODY SCREEN: CPT

## 2023-05-15 NOTE — H&P PST ADULT - NSANTHOSAYNRD_GEN_A_CORE
No. PALOMA screening performed.  STOP BANG Legend: 0-2 = LOW Risk; 3-4 = INTERMEDIATE Risk; 5-8 = HIGH Risk

## 2023-05-15 NOTE — H&P PST ADULT - PROBLEM SELECTOR PLAN 1
Cystoscopy, Left Percutaneous Stone Extraction, Possible Suprapubic Catheter  -cbc, bmp, type and screen, urine culture @ PST  - last medical note on chart  -ABO on admit

## 2023-05-15 NOTE — H&P PST ADULT - NSICDXPASTMEDICALHX_GEN_ALL_CORE_FT
PAST MEDICAL HISTORY:  Cellulitis november 2014    Environmental allergies     MS (multiple sclerosis) since 1995    Nephrolithiasis     PE (pulmonary embolism) 2013

## 2023-05-15 NOTE — H&P PST ADULT - ASSESSMENT
denies loose, shaky or removable teeth  CAPRINI VTE 2.0 SCORE [CLOT updated 2019]    AGE RELATED RISK FACTORS                                                       MOBILITY RELATED FACTORS  [x] Age 41-60 years                                            (1 Point)                    [ ] Bed rest                                                        (1 Point)  [ ] Age: 61-74 years                                           (2 Points)                  [ ] Plaster cast                                                   (2 Points)  [ ] Age= 75 years                                              (3 Points)                    [x] Bed bound for more than 72 hours                 (2 Points)    DISEASE RELATED RISK FACTORS                                               GENDER SPECIFIC FACTORS  [ ] Edema in the lower extremities                       (1 Point)              [ ] Pregnancy                                                     (1 Point)  [ ] Varicose veins                                               (1 Point)                     [ ] Post-partum < 6 weeks                                   (1 Point)             [x] BMI > 25 Kg/m2                                            (1 Point)                     [ ] Hormonal therapy  or oral contraception          (1 Point)                 [ ] Sepsis (in the previous month)                        (1 Point)               [ ] History of pregnancy complications                 (1 point)  [ ] Pneumonia or serious lung disease                                               [ ] Unexplained or recurrent                     (1 Point)           (in the previous month)                               (1 Point)  [ ] Abnormal pulmonary function test                     (1 Point)                 SURGERY RELATED RISK FACTORS  [ ] Acute myocardial infarction                              (1 Point)               [ ]  Section                                             (1 Point)  [ ] Congestive heart failure (in the previous month)  (1 Point)      [ ] Minor surgery                                                  (1 Point)   [ ] Inflammatory bowel disease                             (1 Point)               [ ] Arthroscopic surgery                                        (2 Points)  [ ] Central venous access                                      (2 Points)                [x] General surgery lasting more than 45 minutes (2 points)  [ ] Malignancy- Present or previous                   (2 Points)                [ ] Elective arthroplasty                                         (5 points)    [ ] Stroke (in the previous month)                          (5 Points)                                                                                                                                                           HEMATOLOGY RELATED FACTORS                                                 TRAUMA RELATED RISK FACTORS  [ ] Prior episodes of VTE                                     (3 Points)                [ ] Fracture of the hip, pelvis, or leg                       (5 Points)  [ ] Positive family history for VTE                         (3 Points)             [ ] Acute spinal cord injury (in the previous month)  (5 Points)  [ ] Prothrombin 75962 A                                     (3 Points)               [ ] Paralysis  (less than 1 month)                             (5 Points)  [ ] Factor V Leiden                                             (3 Points)                  [ ] Multiple Trauma within 1 month                        (5 Points)  [ ] Lupus anticoagulants                                     (3 Points)                                                           [ ] Anticardiolipin antibodies                               (3 Points)                                                       [ ] High homocysteine in the blood                      (3 Points)                                             [ ] Other congenital or acquired thrombophilia      (3 Points)                                                [ ] Heparin induced thrombocytopenia                  (3 Points)                                     Total Score [ 6  ]

## 2023-05-15 NOTE — H&P PST ADULT - HISTORY OF PRESENT ILLNESS
47 year old male with a PMH of Advanced MS (diagnosed 1995), Quadriplegia, PE (2019), depression, neurogenic bladder with chronic iraheta catheter, Nephrolithiasis, S/P Left PCN in the past who presents to [facility] prior to scheduled Cystoscopy, Left Percutaneous Stone Extraction, Possible Suprapubic Catheter on 5/17/2023. Patient underwent CT A/P which demonstrated bilateral non-obstructing renal calculi. He Denies fever, chills, gross hematuria.

## 2023-05-17 ENCOUNTER — INPATIENT (INPATIENT)
Facility: HOSPITAL | Age: 48
LOS: 14 days | Discharge: HOME HEALTH | DRG: 659 | End: 2023-06-01
Attending: INTERNAL MEDICINE | Admitting: UROLOGY
Payer: MEDICARE

## 2023-05-17 ENCOUNTER — APPOINTMENT (OUTPATIENT)
Dept: UROLOGY | Facility: HOSPITAL | Age: 48
End: 2023-05-17

## 2023-05-17 VITALS
RESPIRATION RATE: 19 BRPM | TEMPERATURE: 98 F | HEIGHT: 70 IN | HEART RATE: 90 BPM | OXYGEN SATURATION: 96 % | DIASTOLIC BLOOD PRESSURE: 90 MMHG | SYSTOLIC BLOOD PRESSURE: 130 MMHG

## 2023-05-17 DIAGNOSIS — Z93.6 OTHER ARTIFICIAL OPENINGS OF URINARY TRACT STATUS: Chronic | ICD-10-CM

## 2023-05-17 DIAGNOSIS — N23 UNSPECIFIED RENAL COLIC: ICD-10-CM

## 2023-05-17 PROBLEM — Z91.09 OTHER ALLERGY STATUS, OTHER THAN TO DRUGS AND BIOLOGICAL SUBSTANCES: Chronic | Status: ACTIVE | Noted: 2023-05-15

## 2023-05-17 PROBLEM — N20.0 CALCULUS OF KIDNEY: Chronic | Status: ACTIVE | Noted: 2023-05-15

## 2023-05-17 LAB
ALBUMIN SERPL ELPH-MCNC: 3.8 G/DL — SIGNIFICANT CHANGE UP (ref 3.3–5)
ALP SERPL-CCNC: 62 U/L — SIGNIFICANT CHANGE UP (ref 40–120)
ALT FLD-CCNC: 18 U/L — SIGNIFICANT CHANGE UP (ref 10–45)
ANION GAP SERPL CALC-SCNC: 10 MMOL/L — SIGNIFICANT CHANGE UP (ref 5–17)
APTT BLD: 32.7 SEC — SIGNIFICANT CHANGE UP (ref 27.5–35.5)
AST SERPL-CCNC: 17 U/L — SIGNIFICANT CHANGE UP (ref 10–40)
BASE EXCESS BLDV CALC-SCNC: 4.3 MMOL/L — HIGH (ref -2–3)
BASOPHILS # BLD AUTO: 0.05 K/UL — SIGNIFICANT CHANGE UP (ref 0–0.2)
BASOPHILS NFR BLD AUTO: 0.6 % — SIGNIFICANT CHANGE UP (ref 0–2)
BILIRUB SERPL-MCNC: 0.2 MG/DL — SIGNIFICANT CHANGE UP (ref 0.2–1.2)
BUN SERPL-MCNC: 19 MG/DL — SIGNIFICANT CHANGE UP (ref 7–23)
CA-I SERPL-SCNC: 1.23 MMOL/L — SIGNIFICANT CHANGE UP (ref 1.15–1.33)
CALCIUM SERPL-MCNC: 9.3 MG/DL — SIGNIFICANT CHANGE UP (ref 8.4–10.5)
CHLORIDE BLDV-SCNC: 108 MMOL/L — SIGNIFICANT CHANGE UP (ref 96–108)
CHLORIDE SERPL-SCNC: 107 MMOL/L — SIGNIFICANT CHANGE UP (ref 96–108)
CO2 BLDV-SCNC: 31 MMOL/L — HIGH (ref 22–26)
CO2 SERPL-SCNC: 26 MMOL/L — SIGNIFICANT CHANGE UP (ref 22–31)
CREAT SERPL-MCNC: 1.13 MG/DL — SIGNIFICANT CHANGE UP (ref 0.5–1.3)
EGFR: 81 ML/MIN/1.73M2 — SIGNIFICANT CHANGE UP
EOSINOPHIL # BLD AUTO: 0.33 K/UL — SIGNIFICANT CHANGE UP (ref 0–0.5)
EOSINOPHIL NFR BLD AUTO: 3.9 % — SIGNIFICANT CHANGE UP (ref 0–6)
GAS PNL BLDV: 141 MMOL/L — SIGNIFICANT CHANGE UP (ref 136–145)
GAS PNL BLDV: SIGNIFICANT CHANGE UP
GAS PNL BLDV: SIGNIFICANT CHANGE UP
GLUCOSE BLDV-MCNC: 89 MG/DL — SIGNIFICANT CHANGE UP (ref 70–99)
GLUCOSE SERPL-MCNC: 88 MG/DL — SIGNIFICANT CHANGE UP (ref 70–99)
HCO3 BLDV-SCNC: 30 MMOL/L — HIGH (ref 22–29)
HCT VFR BLD CALC: 39 % — SIGNIFICANT CHANGE UP (ref 39–50)
HCT VFR BLDA CALC: 39 % — SIGNIFICANT CHANGE UP (ref 39–51)
HGB BLD CALC-MCNC: 12.9 G/DL — SIGNIFICANT CHANGE UP (ref 12.6–17.4)
HGB BLD-MCNC: 12.1 G/DL — LOW (ref 13–17)
IMM GRANULOCYTES NFR BLD AUTO: 0.4 % — SIGNIFICANT CHANGE UP (ref 0–0.9)
INR BLD: 1.12 RATIO — SIGNIFICANT CHANGE UP (ref 0.88–1.16)
LACTATE BLDV-MCNC: 1.3 MMOL/L — SIGNIFICANT CHANGE UP (ref 0.5–2)
LYMPHOCYTES # BLD AUTO: 2.51 K/UL — SIGNIFICANT CHANGE UP (ref 1–3.3)
LYMPHOCYTES # BLD AUTO: 29.5 % — SIGNIFICANT CHANGE UP (ref 13–44)
MCHC RBC-ENTMCNC: 27.2 PG — SIGNIFICANT CHANGE UP (ref 27–34)
MCHC RBC-ENTMCNC: 31 GM/DL — LOW (ref 32–36)
MCV RBC AUTO: 87.6 FL — SIGNIFICANT CHANGE UP (ref 80–100)
MONOCYTES # BLD AUTO: 0.67 K/UL — SIGNIFICANT CHANGE UP (ref 0–0.9)
MONOCYTES NFR BLD AUTO: 7.9 % — SIGNIFICANT CHANGE UP (ref 2–14)
NEUTROPHILS # BLD AUTO: 4.91 K/UL — SIGNIFICANT CHANGE UP (ref 1.8–7.4)
NEUTROPHILS NFR BLD AUTO: 57.7 % — SIGNIFICANT CHANGE UP (ref 43–77)
NRBC # BLD: 0 /100 WBCS — SIGNIFICANT CHANGE UP (ref 0–0)
PCO2 BLDV: 47 MMHG — SIGNIFICANT CHANGE UP (ref 42–55)
PH BLDV: 7.41 — SIGNIFICANT CHANGE UP (ref 7.32–7.43)
PLATELET # BLD AUTO: 355 K/UL — SIGNIFICANT CHANGE UP (ref 150–400)
PO2 BLDV: 43 MMHG — SIGNIFICANT CHANGE UP (ref 25–45)
POTASSIUM BLDV-SCNC: 4.5 MMOL/L — SIGNIFICANT CHANGE UP (ref 3.5–5.1)
POTASSIUM SERPL-MCNC: 4.1 MMOL/L — SIGNIFICANT CHANGE UP (ref 3.5–5.3)
POTASSIUM SERPL-SCNC: 4.1 MMOL/L — SIGNIFICANT CHANGE UP (ref 3.5–5.3)
PROT SERPL-MCNC: 7.5 G/DL — SIGNIFICANT CHANGE UP (ref 6–8.3)
PROTHROM AB SERPL-ACNC: 13 SEC — SIGNIFICANT CHANGE UP (ref 10.5–13.4)
RBC # BLD: 4.45 M/UL — SIGNIFICANT CHANGE UP (ref 4.2–5.8)
RBC # FLD: 15.1 % — HIGH (ref 10.3–14.5)
SAO2 % BLDV: 79.2 % — SIGNIFICANT CHANGE UP (ref 67–88)
SODIUM SERPL-SCNC: 143 MMOL/L — SIGNIFICANT CHANGE UP (ref 135–145)
WBC # BLD: 8.5 K/UL — SIGNIFICANT CHANGE UP (ref 3.8–10.5)
WBC # FLD AUTO: 8.5 K/UL — SIGNIFICANT CHANGE UP (ref 3.8–10.5)

## 2023-05-17 PROCEDURE — 99285 EMERGENCY DEPT VISIT HI MDM: CPT

## 2023-05-17 PROCEDURE — 99223 1ST HOSP IP/OBS HIGH 75: CPT

## 2023-05-17 RX ORDER — HEPARIN SODIUM 5000 [USP'U]/ML
5000 INJECTION INTRAVENOUS; SUBCUTANEOUS EVERY 8 HOURS
Refills: 0 | Status: DISCONTINUED | OUTPATIENT
Start: 2023-05-17 | End: 2023-05-24

## 2023-05-17 RX ORDER — ACETAMINOPHEN 500 MG
1000 TABLET ORAL ONCE
Refills: 0 | Status: COMPLETED | OUTPATIENT
Start: 2023-05-17 | End: 2023-05-17

## 2023-05-17 RX ORDER — SENNA PLUS 8.6 MG/1
2 TABLET ORAL AT BEDTIME
Refills: 0 | Status: DISCONTINUED | OUTPATIENT
Start: 2023-05-17 | End: 2023-05-24

## 2023-05-17 RX ORDER — MORPHINE SULFATE 50 MG/1
4 CAPSULE, EXTENDED RELEASE ORAL ONCE
Refills: 0 | Status: DISCONTINUED | OUTPATIENT
Start: 2023-05-17 | End: 2023-05-17

## 2023-05-17 RX ORDER — ACETAMINOPHEN 500 MG
650 TABLET ORAL EVERY 6 HOURS
Refills: 0 | Status: DISCONTINUED | OUTPATIENT
Start: 2023-05-17 | End: 2023-05-24

## 2023-05-17 RX ORDER — SODIUM CHLORIDE 9 MG/ML
1000 INJECTION, SOLUTION INTRAVENOUS
Refills: 0 | Status: DISCONTINUED | OUTPATIENT
Start: 2023-05-17 | End: 2023-05-20

## 2023-05-17 RX ORDER — ONDANSETRON 8 MG/1
4 TABLET, FILM COATED ORAL EVERY 6 HOURS
Refills: 0 | Status: DISCONTINUED | OUTPATIENT
Start: 2023-05-17 | End: 2023-05-24

## 2023-05-17 RX ORDER — SODIUM CHLORIDE 9 MG/ML
1000 INJECTION, SOLUTION INTRAVENOUS ONCE
Refills: 0 | Status: COMPLETED | OUTPATIENT
Start: 2023-05-17 | End: 2023-05-17

## 2023-05-17 RX ADMIN — HEPARIN SODIUM 5000 UNIT(S): 5000 INJECTION INTRAVENOUS; SUBCUTANEOUS at 22:49

## 2023-05-17 RX ADMIN — Medication 1 APPLICATION(S): at 22:49

## 2023-05-17 RX ADMIN — Medication 400 MILLIGRAM(S): at 12:31

## 2023-05-17 RX ADMIN — SODIUM CHLORIDE 75 MILLILITER(S): 9 INJECTION, SOLUTION INTRAVENOUS at 17:44

## 2023-05-17 RX ADMIN — MORPHINE SULFATE 4 MILLIGRAM(S): 50 CAPSULE, EXTENDED RELEASE ORAL at 12:37

## 2023-05-17 RX ADMIN — SODIUM CHLORIDE 1000 MILLILITER(S): 9 INJECTION, SOLUTION INTRAVENOUS at 12:37

## 2023-05-17 NOTE — CHART NOTE - NSCHARTNOTEFT_GEN_A_CORE
Full note to follow:   Okay to d/c isolation precautions from a dermatologic perspective     ddx: sebopsoriasis - plan for topical treatment   KOH prep sent, see full note to follow

## 2023-05-17 NOTE — CONSULT NOTE ADULT - ASSESSMENT
47 year old male with a PMH of Advanced MS (diagnosed 1995), Quadriplegia, PE (2019), depression, neurogenic bladder with chronic iraheta catheter, Nephrolithiasis, S/P Left PCN in the past who presents to [facility] on 5/17/23 for scheduled Cystoscopy, Left Percutaneous Stone Extraction, Procedure Abated as patient presented with diffuse rash x 2 weeks       Rash Seborrheic  Dermatitis   Derm ID consulted   triamcinolone cream   Ketoconazole     MS Supportive care     Patient with no Cardiac history medically optimized fro the procedure     .

## 2023-05-17 NOTE — ED PROVIDER NOTE - OBJECTIVE STATEMENT
46yo M with PMH of advanced MS (dx 1995), Quadriplegia, PE, depression, neurogenic bladder with chronic iraheta catheter, nephrolithiasis, recent CT a/p with b/l non-obstructing renal calculi, scheduled for cystoscopy, L Percutaneous Stone Extraction, and possible suprapubic cath today BIBEMS from home c/o pain and missed surgery appointment. 48yo M with PMH of advanced MS (dx 1995), Quadriplegia, PE, depression, neurogenic bladder with chronic iraheta catheter, nephrolithiasis, recent CT a/p with b/l non-obstructing renal calculi, scheduled for cystoscopy, L Percutaneous Stone Extraction, and possible suprapubic cath today BIBEMS from home c/o pain and missed surgery appointment. Patient reports his ride did not come this morning and he missed scheduled surgery. Reports intermittent back pain radiating to his b/l buttock x 3 days, worsening today. Reports marijuana is the only thing that helps pain, did not take any pain meds today. Spoke with his urologist Dr. Rooney and was advised to come to ED for admission/surgery. Denies fever/chills, n/v, abdominal pain, testicular pain, hematuria. 48yo M with PMH of advanced MS (dx 1995), Quadriplegia, PE, depression, neurogenic bladder with chronic iraheta catheter, nephrolithiasis, recent CT a/p with b/l non-obstructing renal calculi, scheduled for cystoscopy, L Percutaneous Stone Extraction, and possible suprapubic cath today BIBEMS from home with HHA at bedside c/o pain and missed surgery appointment. Patient reports his ride did not come this morning and he missed scheduled surgery. Reports intermittent back pain radiating to his b/l buttock x 3 days, worsening today. Reports marijuana is the only thing that helps pain, did not take any pain meds today. Spoke with his urologist Dr. Rooney and was advised to come to ED for admission/surgery. Denies fever/chills, n/v, abdominal pain, testicular pain, hematuria.

## 2023-05-17 NOTE — H&P ADULT - HISTORY OF PRESENT ILLNESS
47 year old male with a PMH of Advanced MS (diagnosed 1995), Quadriplegia, PE (2019), depression, neurogenic bladder with chronic iraheta catheter, Nephrolithiasis, S/P Left PCN in the past who presents to PST prior to scheduled Cystoscopy, Left Percutaneous Stone Extraction, Possible Suprapubic Catheter on 5/17/2023     47 year old male with a PMH of Advanced MS (diagnosed 1995), Quadriplegia, PE (2019), depression, neurogenic bladder with chronic iraheta catheter, Nephrolithiasis, S/P Left PCN in the past who presents to the ER for persistent flank pain.  He is scheduled Cystoscopy, Left Percutaneous Stone Extraction, Possible Suprapubic Catheter today, 5/17 for known nephrolithiasis.  However, he also states that he noticed a rash on his body that started after he started taking flomax and the antibiotic (he was given cipro).  Denies fevers, chills, N/V, CP, SOB. 47 year old male with a PMH of Advanced MS (diagnosed 1995), Quadriplegia, PE (2019), depression, neurogenic bladder with chronic iraheta catheter, Nephrolithiasis, S/P Left PCN in the past who presents to the ER for persistent flank pain.  He is scheduled Cystoscopy, Left Percutaneous Stone Extraction, Possible Suprapubic Catheter today, 5/17 for known nephrolithiasis.  However, he also states that he noticed a rash on his body that started after he started taking flomax and the antibiotic (he was given cipro).  Denies fevers, chills, N/V, CP, SOB.  Pt takes no home medications

## 2023-05-17 NOTE — H&P ADULT - ASSESSMENT
47 year old male with PMHx of MS, quadriplegia, neurogenic bladder s/p SPT tube, with known nephrolithiasis; also found to a diffuse rash over trunk forearms, thighs    -keep NPO  -CBC/BMP  -isolation precautions in ER for rash  -urgent ID consult to clear patient prior to OR for L PCNL today  -case d/w Dr Rooney 47 year old male with PMHx of MS, quadriplegia, neurogenic bladder s/p SPT tube, with known nephrolithiasis; also found to a diffuse rash over trunk forearms, thighs    -keep NPO  -CBC/BMP  -isolation precautions in ER for rash  -ID consult for rash  -Dermatology consult for rash  -possible medicine transfer  -case d/w Dr Rooney

## 2023-05-17 NOTE — ED ADULT NURSE NOTE - OBJECTIVE STATEMENT
Pt brought in by ambulance from home with complaint of "I need surgery."  Pt bed bound with hx MS scheduled for cystoscopy, L Percutaneous Stone Extraction, and possible suprapubic cath today was unable to get ambulette to take him here today so was advised by Dr Rooney to come through ER for admission. Pt brought in by ambulance from home with complaint of "I need surgery."  Pt bed bound with hx MS scheduled for cystoscopy, L Percutaneous Stone Extraction, and possible suprapubic cath today was unable to get ambulette to take him here today so was advised by Dr Rooney to come through ER for admission.  Upon arrival to ER pt is noted to have diffuse red rash over entire chest and flaky skin b/l arms with some redness, pt says he noticed it shortly after beginning Flomax last week.  Pt conversive, abdomen soft/non-distended/non-tender, +lumbar back pain radiating to buttocks, denies chest pain, shortness of breath, cough, abdominal pain, nausea, vomiting, diarrhea, fevers, chills.  Steele in place draining cloudy yellow urine.

## 2023-05-17 NOTE — ED PROVIDER NOTE - PROGRESS NOTE DETAILS
Spoke with urology, no need for t&s, pt to be NPO, reviewed + prelim UCx, plan to d/w Dr. Rooney and give abx in OR, no abx at this time. - JENNIFER CabanC D/w Urology, plan for admission to Dr. Rooney. - Nanette Espino PA-C

## 2023-05-17 NOTE — ED ADULT TRIAGE NOTE - PAIN: PRESENCE, MLM
complains of pain/discomfort

## 2023-05-17 NOTE — CONSULT NOTE ADULT - SUBJECTIVE AND OBJECTIVE BOX
HPI:  47 year old male with a PMH of Advanced MS (diagnosed 1995), Quadriplegia, PE (2019), depression, neurogenic bladder with chronic iraheta catheter, Nephrolithiasis, S/P Left PCN in the past who presents to the ER for persistent flank pain.  He is scheduled Cystoscopy, Left Percutaneous Stone Extraction, Possible Suprapubic Catheter today, 5/17 for known nephrolithiasis.  However, he also states that he noticed a rash on his body that started after he started taking flomax and the antibiotic (he was given cipro).  Denies fevers, chills, N/V, CP, SOB.  Pt takes no home medications (17 May 2023 13:52)    Derm HPI:   Rash x 2 weeks, no h/o psoriasis. Maybe mildly itchy. No prior tx.       PAST MEDICAL & SURGICAL HISTORY:  MS (multiple sclerosis)  since 1995      PE (pulmonary embolism)  2013      Cellulitis  november 2014      Nephrolithiasis      Environmental allergies      Nephrostomy status          Review of Systems:    All other review of systems negative, except as noted in HPI    MEDICATIONS  (STANDING):  clotrimazole 1% Cream 1 Application(s) Topical two times a day  dextrose 5% + sodium chloride 0.45%. 1000 milliLiter(s) IV Continuous <Continuous>  heparin   Injectable 5000 Unit(s) SubCutaneous every 8 hours  triamcinolone 0.1% Cream 1 Application(s) Topical every 12 hours    ALLERGIES: No Known Drug Allergies  Originally Entered as [Unknown] reaction to [KNA]        SOCIAL HISTORY:  ____________________________________  Social History:  Former smoker, quit 20 years ago  FAMILY HISTORY:  Family history of diabetes mellitus (DM) (Grandparent)    Family history of breast cancer (Grandparent, Aunt)          VITAL SIGNS LAST 24 HOURS:  T(F): 98.1 (05-17 @ 17:51), Max: 98.1 (05-17 @ 17:51)  HR: 78 (05-17 @ 17:51) (78 - 90)  BP: 149/100 (05-17 @ 17:51) (130/90 - 149/100)  RR: 18 (05-17 @ 17:51) (18 - 20)    PHYSICAL EXAM:     The patient was alert and conversant and in no apparent distress.  There was no visible lymphadenopathy.  Conjunctiva were non injected  There was no clubbing or edema of extremities.  The scalp, hair, face, eyebrows, lips, OP, neck, chest, back,   extremities X 4, nails were examined.  There was no hyperhidrosis or bromhidrosis.    Of note on skin exam:   - well demarcated erythematous plaques on the trunk + arms >> LE with greasy scale   - mild scale in the scalp   ____________________________________    LABS:                        12.1   8.50  )-----------( 355      ( 17 May 2023 14:01 )             39.0     05-17    143  |  107  |  19  ----------------------------<  88  4.1   |  26  |  1.13    Ca    9.3      17 May 2023 14:01    TPro  7.5  /  Alb  3.8  /  TBili  0.2  /  DBili  x   /  AST  17  /  ALT  18  /  AlkPhos  62  05-17    PT/INR - ( 17 May 2023 14:01 )   PT: 13.0 sec;   INR: 1.12 ratio         PTT - ( 17 May 2023 14:01 )  PTT:32.7 sec  
HPI:  47 year old male with a PMH of Advanced MS (diagnosed 1995), Quadriplegia, PE (2019), depression, neurogenic bladder with chronic iraheta catheter, Nephrolithiasis, S/P Left PCN in the past who presents to the ER for persistent flank pain.  He is scheduled Cystoscopy, Left Percutaneous Stone Extraction, Possible Suprapubic Catheter today, 5/17 for known nephrolithiasis.  However, he also states that he noticed a rash on his body that started after he started taking flomax and the antibiotic (he was given cipro).  Denies fevers, chills, N/V, CP, SOB.  Pt takes no home medications (17 May 2023 13:52)      PAST MEDICAL & SURGICAL HISTORY:  MS (multiple sclerosis)  since 1995      PE (pulmonary embolism)  2013      Cellulitis  november 2014      Nephrolithiasis      Environmental allergies      Nephrostomy status          Review of Systems:   CONSTITUTIONAL: No fever, weight loss, or fatigue  EYES: No eye pain, visual disturbances, or discharge  ENMT:  No difficulty hearing, tinnitus, vertigo; No sinus or throat pain  NECK: No pain or stiffness  BREASTS: No pain, masses, or nipple discharge  RESPIRATORY: No cough, wheezing, chills or hemoptysis; No shortness of breath  CARDIOVASCULAR: No chest pain, palpitations, dizziness, or leg swelling  GASTROINTESTINAL: No abdominal or epigastric pain. No nausea, vomiting, or hematemesis; No diarrhea or constipation. No melena or hematochezia.  GENITOURINARY: No dysuria, frequency, hematuria, or incontinence  NEUROLOGICAL: No headaches, memory loss, loss of strength, numbness, or tremors  SKIN: No itching, burning, rashes, or lesions   LYMPH NODES: No enlarged glands  ENDOCRINE: No heat or cold intolerance; No hair loss  MUSCULOSKELETAL: No joint pain or swelling; No muscle, back, or extremity pain  PSYCHIATRIC: No depression, anxiety, mood swings, or difficulty sleeping  HEME/LYMPH: No easy bruising, or bleeding gums  ALLERY AND IMMUNOLOGIC: No hives or eczema    Allergies    No Known Drug Allergies  Originally Entered as [Unknown] reaction to [KNA] (Unknown)    Intolerances        Social History:     FAMILY HISTORY:  Family history of diabetes mellitus (DM) (Grandparent)    Family history of breast cancer (Grandparent, Aunt)        MEDICATIONS  (STANDING):  clotrimazole 1% Cream 1 Application(s) Topical two times a day  dextrose 5% + sodium chloride 0.45%. 1000 milliLiter(s) (75 mL/Hr) IV Continuous <Continuous>  heparin   Injectable 5000 Unit(s) SubCutaneous every 8 hours  triamcinolone 0.1% Cream 1 Application(s) Topical every 12 hours    MEDICATIONS  (PRN):  acetaminophen     Tablet .. 650 milliGRAM(s) Oral every 6 hours PRN Temp greater or equal to 38.5C (101.3F), Mild Pain (1 - 3)  ondansetron Injectable 4 milliGRAM(s) IV Push every 6 hours PRN Nausea  senna 2 Tablet(s) Oral at bedtime PRN Constipation        CAPILLARY BLOOD GLUCOSE        I&O's Summary      PHYSICAL EXAM:  GENERAL: NAD, well-developed  HEAD:  Atraumatic, Normocephalic  EYES: EOMI, PERRLA, conjunctiva and sclera clear  NECK: Supple, No JVD  CHEST/LUNG: Clear to auscultation bilaterally; No wheeze  HEART: Regular rate and rhythm; No murmurs, rubs, or gallops  ABDOMEN: Soft, Nontender, Nondistended; Bowel sounds present  EXTREMITIES:  2+ Peripheral Pulses, No clubbing, cyanosis, or edema  PSYCH: AAOx3  NEUROLOGY: non-focal  SKIN: No rashes or lesions    LABS:                        12.1   8.50  )-----------( 355      ( 17 May 2023 14:01 )             39.0     05-17    143  |  107  |  19  ----------------------------<  88  4.1   |  26  |  1.13    Ca    9.3      17 May 2023 14:01    TPro  7.5  /  Alb  3.8  /  TBili  0.2  /  DBili  x   /  AST  17  /  ALT  18  /  AlkPhos  62  05-17    PT/INR - ( 17 May 2023 14:01 )   PT: 13.0 sec;   INR: 1.12 ratio         PTT - ( 17 May 2023 14:01 )  PTT:32.7 sec          RADIOLOGY & ADDITIONAL TESTS:    Imaging Personally Reviewed:    Consultant(s) Notes Reviewed:      Care Discussed with Consultants/Other Providers:  
Patient is a 47y old  Male who presents with a chief complaint of pt scheduled for left PCNL (17 May 2023 13:52)      HPI:  47 year old male with a PMH of Advanced MS (diagnosed 1995), Quadriplegia, PE (2019), depression, neurogenic bladder with chronic iraheta catheter, Nephrolithiasis, S/P Left PCN in the past who presents to the ER for scheduled Cystoscopy, Left Percutaneous Stone Extraction, Possible Suprapubic Catheter 5/17 for known nephrolithiasis.  However, he also states that he noticed a rash on his body that started after he started taking flomax (~1-2 weeks ago) and the antibiotic (he was given cipro).  Denies fevers, chills, N/V, CP, SOB.  Pt takes no home medications (17 May 2023 13:52)     prior hospital charts reviewed [  ]  primary team notes reviewed [ x ]  other consultant notes reviewed [x  ]    PAST MEDICAL & SURGICAL HISTORY:  MS (multiple sclerosis)  since 1995      PE (pulmonary embolism)  2013      Cellulitis  november 2014      Nephrolithiasis      Environmental allergies      Nephrostomy status          Allergies  No Known Drug Allergies  Originally Entered as [Unknown] reaction to [KNA] (Unknown)    ANTIMICROBIALS (past 90 days)  MEDICATIONS  (STANDING):      ANTIMICROBIALS:      OTHER MEDS: MEDICATIONS  (STANDING):  acetaminophen     Tablet .. 650 every 6 hours PRN  heparin   Injectable 5000 every 8 hours  ondansetron Injectable 4 every 6 hours PRN  senna 2 at bedtime PRN    SOCIAL HISTORY:  denies smoking or etoh use.     FAMILY HISTORY:  Family history of diabetes mellitus (DM) (Grandparent)    Family history of breast cancer (Grandparent, Aunt)      REVIEW OF SYSTEMS  [  ] ROS unobtainable because:    x] All other systems negative except as noted below:	    Constitutional:  [ ] fever [ ] chills  [ ] weight loss  [ ] weakness  Skin:  [ x] rash [ ] phlebitis	  Eyes: [ ] icterus [ ] pain  [ ] discharge	  ENMT: [ ] sore throat  [ ] thrush [ ] ulcers [ ] exudates  Respiratory: [ ] dyspnea [ ] hemoptysis [ ] cough [ ] sputum	  Cardiovascular:  [ ] chest pain [ ] palpitations [ ] edema	  Gastrointestinal:  [ ] nausea [ ] vomiting [ ] diarrhea [ ] constipation [ ] pain	  Genitourinary:  [ ] dysuria [ ] frequency [ ] hematuria [ ] discharge [ ] flank pain  [ ] incontinence  Musculoskeletal:  [ ] myalgias [ ] arthralgias [ ] arthritis  [ ] back pain  Neurological:  [ ] headache [ ] seizures  [ ] confusion/altered mental status  Psychiatric:  [ ] anxiety [ ] depression	  Hematology/Lymphatics:  [ ] lymphadenopathy  Endocrine:  [ ] adrenal [ ] thyroid  Allergic/Immunologic:	 [ ] transplant [ ] seasonal    Vital Signs Last 24 Hrs  T(F): 97.8 (05-17-23 @ 11:16), Max: 97.8 (05-17-23 @ 11:16)  Vital Signs Last 24 Hrs  HR: 81 (05-17-23 @ 12:41) (81 - 90)  BP: 139/90 (05-17-23 @ 12:41) (130/90 - 139/90)  RR: 20 (05-17-23 @ 12:41)  SpO2: 98% (05-17-23 @ 12:41) (96% - 98%)  Wt(kg): --    PHYSICAL EXAMINATION:  General: Alert and Awake, NAD  HEENT: PERRL, EOMI, No subconjunctival hemorrhages, Oropharynx Clear, MMM  Neck: Supple, No TELMA  Cardiac: RRR, No M/R/G  Resp: CTAB, No Wh/Rh/Ra  Abdomen: NBS, NT/ND, No HSM, No rigidity or guarding  MSK: No LE edema. No stigmata of IE. No evidence of phlebitis. No evidence of synovitis.  : + iraheta  Skin: Scaling rash on bilateral forearms, chest and abdomen with papular rash and patches. Also with papular / patch involvement of the legs. No palm / sole involvement. Minimal back involvement. . Skin is warm and dry to the touch.   Neuro: Alert and Awake. CN 2-12 Grossly intact. Moves all four extremities spontaneously.  Psych: Calm, Pleasant, Cooperative                          12.1   8.50  )-----------( 355      ( 17 May 2023 14:01 )             39.0     05-17    143  |  107  |  19  ----------------------------<  88  4.1   |  26  |  1.13    Ca    9.3      17 May 2023 14:01    TPro  7.5  /  Alb  3.8  /  TBili  0.2  /  DBili  x   /  AST  17  /  ALT  18  /  AlkPhos  62  05-17    MICROBIOLOGY:    Culture - Urine (collected 15 May 2023 19:18)  Source: Catheterized Catheterized  Preliminary Report (17 May 2023 06:17):    >100,000 CFU/ml Escherichia coli    RADIOLOGY:    <The imaging below has been reviewed and visualized by me independently. Findings as detailed in report below>    < from: CT Abdomen and Pelvis w/wo IV Cont (04.10.23 @ 10:45) >  IMPRESSION:  Patient has developed a new right obstructive uropathy with delayed   nephrogram and mild to moderate right hydronephrosis secondary to 1.2 cm   calculus at the UPJ.    Both kidneys also contain bilateral nonobstructing renal calculi.    Previously seen indeterminate lesion right kidney corresponds to a   nonenhancing cyst.    Urinary bladder calculi      < end of copied text >

## 2023-05-17 NOTE — ED PROVIDER NOTE - CARE PLAN
1 Principal Discharge DX:	Renal colic   Principal Discharge DX:	Renal colic  Secondary Diagnosis:	Dermatitis

## 2023-05-17 NOTE — CONSULT NOTE ADULT - ASSESSMENT
47 year old male with a PMH of Advanced MS (diagnosed 1995), Quadriplegia, PE (2019), depression, neurogenic bladder with chronic iraheta catheter, Nephrolithiasis, S/P Left PCN in the past who presents to [facility] on 5/17/23 for scheduled Cystoscopy, Left Percutaneous Stone Extraction, Possible Suprapubic Catheter 5/17 for known nephrolithiasis.     Developed rash on his UE, LE, Chest and Abdomen over the last 2 weeks  Noted starting to develop gradual onset of rash since Flomax initiation.   Denies any other OTC medications  Denies taking any treatment for the rash  Denies being on any immunosuppressive medications for MS recently.     Rash on UE forearms and is scaling which may be suggestive of tinea infection  The rash on chest and abdomen is more atypical and more extensive than what is anticipated with Tinea corporis    #Rash, Pre-Procedural Evaluation  --Recommend dermatology consult to help confirm diagnosis  --Recommend checking HIV Test and Hgb A1C  --Patient doesn't have significant back involvement with rash so PCNL may be technically feasible but if procedure is elective it would be best to wait until Dermatology's recommendations / implementing treatment.     #Positive Urine Culture (E coli on 5/15 UCx)  Present despite preceding Ciprofloxacin course  Likely representative of colonization of iraheta catheter   --If ultimately proceeding with urologic procedure would empirically cover with Ceftriaxone for estela-operative coverage    I will continue to follow. Please feel free to contact me with any further questions.    Smith Verdin M.D.  [facility] Division of Infectious Disease  8AM-5PM Monday - Friday: Available on [employer] Teams  After Hours and Holidays (or if no response on [employer] Teams): Please contact the Infectious Diseases Office at [phone]    The above assessment and plan were discussed with Urology PA 47 year old male with a PMH of Advanced MS (diagnosed 1995), Quadriplegia, PE (2019), depression, neurogenic bladder with chronic iraheta catheter, Nephrolithiasis, S/P Left PCN in the past who presents to [facility] on 5/17/23 for scheduled Cystoscopy, Left Percutaneous Stone Extraction, Possible Suprapubic Catheter 5/17 for known nephrolithiasis.     Developed rash on his UE, LE, Chest and Abdomen over the last 2 weeks  Noted starting to develop gradual onset of rash since Flomax initiation.   Denies any other OTC medications  Denies taking any treatment for the rash  Denies being on any immunosuppressive medications for MS recently.     Rash on UE forearms and is scaling which may be suggestive of tinea infection  The rash on chest and abdomen is more atypical and more extensive than what is anticipated with Tinea corporis    #Rash, Pre-Procedural Evaluation  --Recommend dermatology consult to help confirm diagnosis  --Recommend checking HIV Test and Hgb A1C  --Patient doesn't have significant back involvement with rash so Left Percutaneous Stone Extraction may be technically feasible but if procedure is elective it would be best to wait until Dermatology's recommendations / implementing treatment.     #Positive Urine Culture (E coli on 5/15 UCx)  Present despite preceding Ciprofloxacin course  Likely representative of colonization of iraheta catheter   --If ultimately proceeding with urologic procedure would empirically cover with Ceftriaxone for estela-operative coverage    I will continue to follow. Please feel free to contact me with any further questions.    Smith Verdin M.D.  [facility] Division of Infectious Disease  8AM-5PM Monday - Friday: Available on [employer] Teams  After Hours and Holidays (or if no response on [employer] Teams): Please contact the Infectious Diseases Office at [phone]    The above assessment and plan were discussed with Urology PA

## 2023-05-17 NOTE — CONSULT NOTE ADULT - ASSESSMENT
___________________________  ASSESSMENT AND PLAN  Sebopsoriasis / seborrheic dermatitis      Recommendations:   - Start ketoconazole cream BID until resolved ; if not available inpatient may substitute for clotrimazole   - Start ketoconazole shampoo 2-3x/week if feasible   - START triamcinolone 0.1% ointment BID for 2 weeks on / 1 week off. Repeat cycles as necessary. Please ask the pharmacy to dispense multiple tubes or 454g jar at once to cover the large body surface area affected.    Patient can follow up with us in the [facility] located at [address] [place] [address] [address], [place] [address] upon discharge. Our office will call to schedule an appointment but if patient does not hear from us within a few days of discharge, please instruct patient to call our office. Office phone number is [phone].    - Dermatology will sign off.    Thank you for allowing us to participate in the care of this patient. Please re-consult Dermatology for any new or worsening developments.    The patient's chart was reviewed in addition to being seen and examined at bedside with the dermatology attending Dr. Ventura.  Recommendations were communicated with the primary team.  Please page [phone] with a 10-digit call-back number for further related questions.    Usha Lima MD  Resident Physician, PGY-2  [facility]  Pager: [phone]  Office: [phone]

## 2023-05-17 NOTE — ED ADULT NURSE NOTE - NSSEPSISSUSPECTED_ED_A_ED
71yo M w/ pmh as above p/w transient wetzel catheter obstruction 2/2 to hematuria w/ blood clots (obstruction self-resolved PTA), consulted urology and pt was irrigated at bedside w/ good return of blood clots. Was going to DC pt when noticed his FS glucose is 443, compliant w/ Janumet and yet finger sticks have been in 300s-400s for weeks. Accepted to the CDU for endocrine evaluation/recommendations. No

## 2023-05-17 NOTE — CONSULT NOTE ADULT - ATTENDING COMMENTS
Favor extensive sebopsoriasis, likely propagated by bedbound status and neurogenic disease. KOH stain performed to rule out diffuse tinea-- however this is unlikely. Topical treatment as above. May benefit from weekly PO fluconazole (as anti yeast which is thought to be a secondary factor in this inflammatory rash)

## 2023-05-17 NOTE — ED PROVIDER NOTE - ATTENDING APP SHARED VISIT CONTRIBUTION OF CARE
Pt sent in by Urologist for planned procedure for cystoscopy and possible stone removal.  Pt with iraheta catheter already in place, was started on flomax one week ago, and then noted rash of chest/abdomen and arms.  Few days later, pt was prescribed antibiotics for a UTI (cannot recall which one yet), and noticed it getting slightly worse but felt rash was initially with timing of flomax. Denies fever. Not itchy. No prior rash like this.   On exam, pt with maculopapular rash of chest/abdomen, warm, red, with plaque, some raised borders. +fluorescence with UV lamp, mostly green and white. Similar appearance of dorsum of arms b/l and upper thighs with similar fluoroscence.  Spares face, neck, back.  No weeping. No annular lesions. Nonblanching.  No signs of urticaria.  No mucous membrane involvement.    MDM: advanced multiple sclerosis patient with urinary retention and kidney stone with +bacterial colonization for planned cystoscopy and possible stone retrieval.  Urology consult, pre-op labs.  Pt also with new dermatitis of mostly trunk and dorsal extremity surfaces, unclear etiology.  Not typical allergic dermatitis, could consider erythema multiforme but not typical appearance.  Would consider invasive fungal rash given exam and risk factors.  WIll place patient on contact isolation, consult ID.  Defer further management to inpatient team.     Progress Note 1345: pt admitted to urology, made aware of dermatitis and concerns.  Paged ID fellow, pt placed in isolation. Stable for admission.

## 2023-05-17 NOTE — H&P ADULT - NSHPPHYSICALEXAM_GEN_ALL_CORE
PHYSICAL EXAM:      Constitutional: NAD, A+Ox3    Neck: no JVD    Gastrointestinal: soft, NT/ND, + diffuse rash over chest and abdomen with flaky skin; +SPT draining urine with sediment    Skin: +rash with flaky/dry skin over forearms and thighs, chest, abdomen    Musculoskeletal: FROM

## 2023-05-18 ENCOUNTER — NON-APPOINTMENT (OUTPATIENT)
Age: 48
End: 2023-05-18

## 2023-05-18 LAB
-  AMIKACIN: SIGNIFICANT CHANGE UP
-  AMOXICILLIN/CLAVULANIC ACID: SIGNIFICANT CHANGE UP
-  AMPICILLIN/SULBACTAM: SIGNIFICANT CHANGE UP
-  AMPICILLIN: SIGNIFICANT CHANGE UP
-  AZTREONAM: SIGNIFICANT CHANGE UP
-  CEFAZOLIN: SIGNIFICANT CHANGE UP
-  CEFEPIME: SIGNIFICANT CHANGE UP
-  CEFOXITIN: SIGNIFICANT CHANGE UP
-  CEFTRIAXONE: SIGNIFICANT CHANGE UP
-  CEFUROXIME: SIGNIFICANT CHANGE UP
-  CIPROFLOXACIN: SIGNIFICANT CHANGE UP
-  ERTAPENEM: SIGNIFICANT CHANGE UP
-  GENTAMICIN: SIGNIFICANT CHANGE UP
-  IMIPENEM: SIGNIFICANT CHANGE UP
-  LEVOFLOXACIN: SIGNIFICANT CHANGE UP
-  MEROPENEM: SIGNIFICANT CHANGE UP
-  NITROFURANTOIN: SIGNIFICANT CHANGE UP
-  PIPERACILLIN/TAZOBACTAM: SIGNIFICANT CHANGE UP
-  TOBRAMYCIN: SIGNIFICANT CHANGE UP
-  TRIMETHOPRIM/SULFAMETHOXAZOLE: SIGNIFICANT CHANGE UP
ANION GAP SERPL CALC-SCNC: 13 MMOL/L — SIGNIFICANT CHANGE UP (ref 5–17)
APPEARANCE UR: ABNORMAL
BACTERIA # UR AUTO: ABNORMAL
BASOPHILS # BLD AUTO: 0.06 K/UL — SIGNIFICANT CHANGE UP (ref 0–0.2)
BASOPHILS NFR BLD AUTO: 0.8 % — SIGNIFICANT CHANGE UP (ref 0–2)
BILIRUB UR-MCNC: NEGATIVE — SIGNIFICANT CHANGE UP
BUN SERPL-MCNC: 20 MG/DL — SIGNIFICANT CHANGE UP (ref 7–23)
CALCIUM SERPL-MCNC: 8.9 MG/DL — SIGNIFICANT CHANGE UP (ref 8.4–10.5)
CALCOFLUOR WHITE SPEC: SIGNIFICANT CHANGE UP
CHLORIDE SERPL-SCNC: 108 MMOL/L — SIGNIFICANT CHANGE UP (ref 96–108)
CO2 SERPL-SCNC: 22 MMOL/L — SIGNIFICANT CHANGE UP (ref 22–31)
COLOR SPEC: COLORLESS — SIGNIFICANT CHANGE UP
COMMENT - URINE: SIGNIFICANT CHANGE UP
CREAT SERPL-MCNC: 1.17 MG/DL — SIGNIFICANT CHANGE UP (ref 0.5–1.3)
CULTURE RESULTS: SIGNIFICANT CHANGE UP
DIFF PNL FLD: ABNORMAL
EGFR: 78 ML/MIN/1.73M2 — SIGNIFICANT CHANGE UP
EOSINOPHIL # BLD AUTO: 0.35 K/UL — SIGNIFICANT CHANGE UP (ref 0–0.5)
EOSINOPHIL NFR BLD AUTO: 4.4 % — SIGNIFICANT CHANGE UP (ref 0–6)
EPI CELLS # UR: 1 — SIGNIFICANT CHANGE UP
GLUCOSE SERPL-MCNC: 75 MG/DL — SIGNIFICANT CHANGE UP (ref 70–99)
GLUCOSE UR QL: NEGATIVE — SIGNIFICANT CHANGE UP
HCT VFR BLD CALC: 39.7 % — SIGNIFICANT CHANGE UP (ref 39–50)
HGB BLD-MCNC: 12.9 G/DL — LOW (ref 13–17)
HIV 1 & 2 AB SERPL IA.RAPID: SIGNIFICANT CHANGE UP
HYALINE CASTS # UR AUTO: 2 /LPF — SIGNIFICANT CHANGE UP (ref 0–7)
IMM GRANULOCYTES NFR BLD AUTO: 0.6 % — SIGNIFICANT CHANGE UP (ref 0–0.9)
KETONES UR-MCNC: NEGATIVE — SIGNIFICANT CHANGE UP
LEUKOCYTE ESTERASE UR-ACNC: ABNORMAL
LYMPHOCYTES # BLD AUTO: 2.39 K/UL — SIGNIFICANT CHANGE UP (ref 1–3.3)
LYMPHOCYTES # BLD AUTO: 29.9 % — SIGNIFICANT CHANGE UP (ref 13–44)
MCHC RBC-ENTMCNC: 27.9 PG — SIGNIFICANT CHANGE UP (ref 27–34)
MCHC RBC-ENTMCNC: 32.5 GM/DL — SIGNIFICANT CHANGE UP (ref 32–36)
MCV RBC AUTO: 85.9 FL — SIGNIFICANT CHANGE UP (ref 80–100)
METHOD TYPE: SIGNIFICANT CHANGE UP
MONOCYTES # BLD AUTO: 0.69 K/UL — SIGNIFICANT CHANGE UP (ref 0–0.9)
MONOCYTES NFR BLD AUTO: 8.6 % — SIGNIFICANT CHANGE UP (ref 2–14)
NEUTROPHILS # BLD AUTO: 4.44 K/UL — SIGNIFICANT CHANGE UP (ref 1.8–7.4)
NEUTROPHILS NFR BLD AUTO: 55.7 % — SIGNIFICANT CHANGE UP (ref 43–77)
NITRITE UR-MCNC: POSITIVE
NRBC # BLD: 0 /100 WBCS — SIGNIFICANT CHANGE UP (ref 0–0)
ORGANISM # SPEC MICROSCOPIC CNT: SIGNIFICANT CHANGE UP
ORGANISM # SPEC MICROSCOPIC CNT: SIGNIFICANT CHANGE UP
PH UR: 7.5 — SIGNIFICANT CHANGE UP (ref 5–8)
PLATELET # BLD AUTO: 354 K/UL — SIGNIFICANT CHANGE UP (ref 150–400)
POTASSIUM SERPL-MCNC: 4.1 MMOL/L — SIGNIFICANT CHANGE UP (ref 3.5–5.3)
POTASSIUM SERPL-SCNC: 4.1 MMOL/L — SIGNIFICANT CHANGE UP (ref 3.5–5.3)
PROT UR-MCNC: ABNORMAL
RBC # BLD: 4.62 M/UL — SIGNIFICANT CHANGE UP (ref 4.2–5.8)
RBC # FLD: 14.8 % — HIGH (ref 10.3–14.5)
RBC CASTS # UR COMP ASSIST: 7 /HPF — HIGH (ref 0–4)
SODIUM SERPL-SCNC: 143 MMOL/L — SIGNIFICANT CHANGE UP (ref 135–145)
SP GR SPEC: 1.01 — SIGNIFICANT CHANGE UP (ref 1.01–1.02)
SPECIMEN SOURCE: SIGNIFICANT CHANGE UP
SPECIMEN SOURCE: SIGNIFICANT CHANGE UP
UROBILINOGEN FLD QL: NEGATIVE — SIGNIFICANT CHANGE UP
WBC # BLD: 7.98 K/UL — SIGNIFICANT CHANGE UP (ref 3.8–10.5)
WBC # FLD AUTO: 7.98 K/UL — SIGNIFICANT CHANGE UP (ref 3.8–10.5)
WBC UR QL: 21 /HPF — HIGH (ref 0–5)

## 2023-05-18 PROCEDURE — 99222 1ST HOSP IP/OBS MODERATE 55: CPT

## 2023-05-18 PROCEDURE — 99232 SBSQ HOSP IP/OBS MODERATE 35: CPT

## 2023-05-18 RX ORDER — KETOCONAZOLE 20 MG/G
1 AEROSOL, FOAM TOPICAL
Refills: 0 | Status: DISCONTINUED | OUTPATIENT
Start: 2023-05-18 | End: 2023-05-24

## 2023-05-18 RX ADMIN — HEPARIN SODIUM 5000 UNIT(S): 5000 INJECTION INTRAVENOUS; SUBCUTANEOUS at 06:47

## 2023-05-18 RX ADMIN — KETOCONAZOLE 1 APPLICATION(S): 20 AEROSOL, FOAM TOPICAL at 08:14

## 2023-05-18 RX ADMIN — Medication 1 APPLICATION(S): at 06:47

## 2023-05-18 RX ADMIN — HEPARIN SODIUM 5000 UNIT(S): 5000 INJECTION INTRAVENOUS; SUBCUTANEOUS at 21:01

## 2023-05-18 RX ADMIN — Medication 1 APPLICATION(S): at 17:00

## 2023-05-18 RX ADMIN — HEPARIN SODIUM 5000 UNIT(S): 5000 INJECTION INTRAVENOUS; SUBCUTANEOUS at 13:38

## 2023-05-18 NOTE — PROVIDER CONTACT NOTE (OTHER) - SITUATION
Pt verbalized that he "wants to die" multiple times. On further eval, pt stated that he has had multiple SI attempts in the past & hospitalized at [facility]. Pt doesn't want to see psych here

## 2023-05-18 NOTE — PATIENT PROFILE ADULT - FALL HARM RISK - HARM RISK INTERVENTIONS

## 2023-05-18 NOTE — BH CONSULTATION LIAISON ASSESSMENT NOTE - RISK ASSESSMENT
Risk: Chronic pain, poor reaction to psychiatric medications in past  Protective: Ep[employer], 24H HHA, forthcoming, willing to participate in treatment

## 2023-05-18 NOTE — BH CONSULTATION LIAISON ASSESSMENT NOTE - DESCRIPTION
REports he was dx'd with MS in 1995. Since then his quality of life began to decline. Reports having poor relationship with parents and his brother and sister. Only speaks to his parents at this point

## 2023-05-18 NOTE — PROVIDER CONTACT NOTE (OTHER) - ASSESSMENT
Pt stated he's seen psych in the past who prescribed meds, soon after taking meds he attempts SI. He doesn't want to see psych here because "it doesn't help". Pt has been saying he wants to die multiple times today because he is miserable d/t his condition

## 2023-05-18 NOTE — PROGRESS NOTE ADULT - ASSESSMENT
47 year old male with a PMH of Advanced MS (diagnosed 1995), Quadriplegia, PE (2019), depression, neurogenic bladder with chronic iraheta catheter, Nephrolithiasis, S/P Left PCN in the past who presents to [facility] on 5/17/23 for scheduled Cystoscopy, Left Percutaneous Stone Extraction, Possible Suprapubic Catheter 5/17 for known nephrolithiasis.     Developed rash on his UE, LE, Chest and Abdomen over the last 2 weeks  Noted starting to develop gradual onset of rash since Flomax initiation.   Denies any other OTC medications  Denies taking any treatment for the rash  Denies being on any immunosuppressive medications for MS recently.     Per Dermatology concern for Sebopsoriasis / seborrheic dermatitis      #Rash, Pre-Procedural Evaluation  HIV Negative  --Dermatology recommendations appreciated - continue Ketoconazole Cream BID and Ketoconazole Shampoo   --Follow up on HgBA1C  --Patient doesn't have significant back involvement with rash so Left Percutaneous Stone Extraction may be technically feasible but if procedure is elective it would be best to wait until improvement / resolution of rash.     #Positive Urine Culture (E coli on 5/15 UCx)  Present despite preceding Ciprofloxacin course  Likely representative of colonization of iraheta catheter   --If ultimately proceeding with urologic procedure would empirically cover with Ceftriaxone for estela-operative coverage    I will continue to follow. Please feel free to contact me with any further questions.    Smith Verdin M.D.  [facility] Division of Infectious Disease  8AM-5PM Monday - Friday: Available on [employer] Teams  After Hours and Holidays (or if no response on [employer] Teams): Please contact the Infectious Diseases Office at [phone] 47 year old male with a PMH of Advanced MS (diagnosed 1995), Quadriplegia, PE (2019), depression, neurogenic bladder with chronic iraheta catheter, Nephrolithiasis, S/P Left PCN in the past who presents to [facility] on 5/17/23 for scheduled Cystoscopy, Left Percutaneous Stone Extraction, Possible Suprapubic Catheter 5/17 for known nephrolithiasis.     Developed rash on his UE, LE, Chest and Abdomen over the last 2 weeks  Noted starting to develop gradual onset of rash since Flomax initiation.   Denies any other OTC medications  Denies taking any treatment for the rash  Denies being on any immunosuppressive medications for MS recently.     Per Dermatology concern for Sebopsoriasis / seborrheic dermatitis      #Rash, Pre-Procedural Evaluation  HIV Negative  --Dermatology recommendations appreciated - continue Ketoconazole Cream BID and Ketoconazole Shampoo   --Follow up on HgBA1C  --Patient doesn't have significant back involvement with rash so Left Percutaneous Stone Extraction may be technically feasible but if procedure is elective it would be best to wait until improvement / resolution of rash.     #Positive Urine Culture (E coli on 5/15 UCx)  Present despite preceding Ciprofloxacin course  Likely representative of colonization of iraheta catheter   --If ultimately proceeding with urologic procedure would empirically cover with Cefepime (given prior positive cultures with Pseudomonas) for estela-operative coverage    I will continue to follow. Please feel free to contact me with any further questions.    Smith Verdin M.D.  [facility] Division of Infectious Disease  8AM-5PM Monday - Friday: Available on [employer] Teams  After Hours and Holidays (or if no response on [employer] Teams): Please contact the Infectious Diseases Office at [phone]

## 2023-05-18 NOTE — BH CONSULTATION LIAISON ASSESSMENT NOTE - NSBHREFERDETAILS_PSY_A_CORE_FT
dvanced MS (dx 1995), Quadriplegia, PE, depression, neurogenic bladder with chronic iraheta catheter, nephrolithiasis, recent CT a/p with b/l non-obstructing renal calculi, scheduled for cystoscopy, L Percutaneous Stone Extraction, and possible suprapubic cath today BIBEMS from home with HHA at bedside c/o pain

## 2023-05-18 NOTE — BH CONSULTATION LIAISON ASSESSMENT NOTE - NSBHINFOSOURCE_PSY_ALL_CORE
DATE OF ADMISSION:  10/18/2018    CHIEF COMPLAINT:  Previous  section and desires an elective repeat.    HISTORY OF PRESENT ILLNESS:  This is a 30-year-old  2, para 1    female who had a primary  section for breech fetal presentation in   .  During this pregnancy, patient has wanted to have a trial of labor for   a vaginal birth after .  She has been 2 cm dilated since 37 weeks and   the patient came in to see me yesterday complaining just tired of being   pregnant and with occasional contractions and at this time since her cervix is   still 2, she wants to proceed with a repeat  section.  I reviewed   with the patient the risks, benefits, indications and alternatives to repeat    section versus a trial of labor after  section and the   patient and her  now want to proceed with a repeat  section.    PAST MEDICAL HISTORY:  1.  Migraines.  2.  Oral HSV.    PAST SURGICAL HISTORY:   section in , laparoscopic fulguration of   endometriosis in , tonsillectomy in , and ankle surgery in .    MEDICATIONS:  1.  She is on Valtrex 500 mg 1 p.o. daily.  2.  Prenatal vitamins.  3.  Flonase.  4.  Zyrtec.  5.  Propranolol.    ALLERGIES:  SULFA.    OBSTETRICAL HISTORY:  She is a  2, para 1.  On 2013 at 38 weeks,   she had a primary  section for breech fetal presentation and labor, 6   pound 14 ounce infant.    GYNECOLOGIC HISTORY:  Patient started menstruating at age 12, has menstrual   cycles every 28 days, lasts about 5 days.  No history of STDs.  Patient has   oral herpes, but otherwise no genital herpes.  Her last Pap smear was on   2018, negative Pap, negative HPV, negative chlamydia, negative   gonorrhea.    SOCIAL HISTORY:  Patient is a teacher.  She is .  She denies tobacco,   alcohol or drug use.    PHYSICAL EXAMINATION:  VITAL SIGNS:  Blood pressure 129/56, total weight gain 28  pounds.  GENERAL:  Pleasant female, in no acute distress.  LUNGS:  Clear to auscultation bilaterally.  CARDIOVASCULAR:  Regular rhythm.  No murmur.  ABDOMEN:  Soft, gravid.  Leopold's to 7 pounds.  EXTREMITIES:  No calf tenderness.    PRENATAL LABS:  Group B strep is negative.  1-hour glucose is 70.  H and H at   28 weeks 11.3 and 35.1 and platelets are 169.  Quadruple screen was negative,   hepatitis C is negative.  Cystic fibrosis is negative, rubella immune,   hepatitis B surface antigen negative.  She is O positive, antibody screen   negative, chlamydia and gonorrhea negative.  Pap smear is negative.    IMAGING:  An ultrasound performed, the fetus is in the vertex presentation.    The placenta is anterior, but not low lying.  Vertex fetal presentation, 6   pounds 2 ounces on 2018.    ASSESSMENT AND PLAN:  A 30-year-old  2, para 1, at 39 weeks and 1 day   with a due date of 10/24/2018.  1.  Previous  section.  Patient at this time is scheduled for a repeat    section.  She initially wanted a trial of labor, but at this time as   she has been 2 cm dilated since 35 weeks and does not desire to wait to go   into labor; therefore, patient will have a  section tomorrow.  Risks,   benefits, indications and alternatives have been discussed with the patient   and she has no unanswered questions and wants to proceed.  2.  Group B streptococcus is negative.  3.  History of herpes simplex virus oral infection.  Patient has been on   Valtrex 500 mg 1 p.o. daily.       ____________________________________     MD ALICIA BRYAN / PERRY    DD:  10/17/2018 19:48:18  DT:  10/17/2018 20:25:24    D#:  [id number]  Job#:  [id number]   Patient

## 2023-05-18 NOTE — BH CONSULTATION LIAISON ASSESSMENT NOTE - GENERAL APPEARANCE
Please clarify if vaccine will happen  2/24/2021 12:15 PM Lynn COVID VACCINE p Covid Vaccine  [id number] [facility]   
Deformities present

## 2023-05-18 NOTE — PROGRESS NOTE ADULT - ASSESSMENT
47 year old male with PMHx of MS, quadriplegia, neurogenic bladder s/p SPT tube, with known nephrolithiasis; also found to a diffuse rash over trunk forearms, thighs. Admitted to medicine for management of rash and other comorbidities    --Appreciate derm recs. Rash likely seborrheic dermatitis v psoriasis. C/w antifungal and steroids  --Abx per ID. C/w cipro for now  --Patient now expressing suicidal ideation. Appreciating psych recs  --C/w medical management  --Will plan for OR next Wed, 5/24 for PCNL and SPT placement.

## 2023-05-18 NOTE — BH CONSULTATION LIAISON ASSESSMENT NOTE - NSBHCHARTREVIEWVS_PSY_A_CORE FT
Vital Signs Last 24 Hrs  T(C): 36.2 (18 May 2023 13:11), Max: 36.8 (18 May 2023 05:20)  T(F): 97.1 (18 May 2023 13:11), Max: 98.2 (18 May 2023 05:20)  HR: 92 (18 May 2023 13:11) (78 - 92)  BP: 131/94 (18 May 2023 13:11) (124/78 - 149/100)  BP(mean): --  RR: 18 (18 May 2023 13:11) (18 - 18)  SpO2: 98% (18 May 2023 13:11) (96% - 98%)    Parameters below as of 18 May 2023 13:11  Patient On (Oxygen Delivery Method): room air

## 2023-05-18 NOTE — PROGRESS NOTE ADULT - SUBJECTIVE AND OBJECTIVE BOX
DAILY PROGRESS NOTE:         24 hr events:  admitted from ER for trunk rash. PCNL cancelled  per medical team, pt expressing suicidal ideation.     Objective:    Vital Signs Last 24 Hrs  T(C): 36.2 (18 May 2023 13:11), Max: 36.8 (18 May 2023 05:20)  T(F): 97.1 (18 May 2023 13:11), Max: 98.2 (18 May 2023 05:20)  HR: 92 (18 May 2023 13:11) (78 - 92)  BP: 131/94 (18 May 2023 13:11) (124/78 - 149/100)  BP(mean): --  RR: 18 (18 May 2023 13:11) (18 - 18)  SpO2: 98% (18 May 2023 13:11) (96% - 98%)    Parameters below as of 18 May 2023 13:11  Patient On (Oxygen Delivery Method): room air        I&O's Detail    17 May 2023 07:01  -  18 May 2023 07:00  --------------------------------------------------------  IN:    dextrose 5% + sodium chloride 0.45%: 900 mL    Oral Fluid: 300 mL  Total IN: 1200 mL    OUT:    Indwelling Catheter - Urethral (mL): 1850 mL  Total OUT: 1850 mL    Total NET: -650 mL      18 May 2023 07:01  -  18 May 2023 15:52  --------------------------------------------------------  IN:    Oral Fluid: 600 mL  Total IN: 600 mL    OUT:    Indwelling Catheter - Urethral (mL): 1200 mL  Total OUT: 1200 mL    Total NET: -600 mL          Physical Exam:    General: NAD, well-nourished  Resp: Breathing comfortably on RA  CV: regular rate   Skin: erythematous rash over the trunk w/ flaking     Laboratory Results:                          12.9   7.98  )-----------( 354      ( 18 May 2023 08:38 )             39.7     05-18    143  |  108  |  20  ----------------------------<  75  4.1   |  22  |  1.17    Ca    8.9      18 May 2023 08:38    TPro  7.5  /  Alb  3.8  /  TBili  0.2  /  DBili  x   /  AST  17  /  ALT  18  /  AlkPhos  62  05-17    PT/INR - ( 17 May 2023 14:01 )   PT: 13.0 sec;   INR: 1.12 ratio         PTT - ( 17 May 2023 14:01 )  PTT:32.7 sec  Urinalysis Basic - ( 18 May 2023 00:06 )    Color: Colorless / Appearance: Slightly Turbid / S.013 / pH: x  Gluc: x / Ketone: Negative  / Bili: Negative / Urobili: Negative   Blood: x / Protein: 30 mg/dL / Nitrite: Positive   Leuk Esterase: Large / RBC: 7 /hpf / WBC 21 /HPF   Sq Epi: x / Non Sq Epi: x / Bacteria: Many

## 2023-05-18 NOTE — PROGRESS NOTE ADULT - SUBJECTIVE AND OBJECTIVE BOX
Follow Up:  Rash    Interval History: afebrile. no acute events.     REVIEW OF SYSTEMS  [  ] ROS unobtainable because:    [ x ] All other systems negative except as noted below    Constitutional:  [ ] fever [ ] chills  [ ] weight loss  [ ] weakness  Skin:  [x ] rash [ ] phlebitis	  Eyes: [ ] icterus [ ] pain  [ ] discharge	  ENMT: [ ] sore throat  [ ] thrush [ ] ulcers [ ] exudates  Respiratory: [ ] dyspnea [ ] hemoptysis [ ] cough [ ] sputum	  Cardiovascular:  [ ] chest pain [ ] palpitations [ ] edema	  Gastrointestinal:  [ ] nausea [ ] vomiting [ ] diarrhea [ ] constipation [ ] pain	  Genitourinary:  [ ] dysuria [ ] frequency [ ] hematuria [ ] discharge [ ] flank pain  [ ] incontinence  Musculoskeletal:  [ ] myalgias [ ] arthralgias [ ] arthritis  [ ] back pain  Neurological:  [ ] headache [ ] seizures  [ ] confusion/altered mental status    Allergies  No Known Drug Allergies  Originally Entered as [Unknown] reaction to [KNA] (Unknown)        ANTIMICROBIALS:      OTHER MEDS:  MEDICATIONS  (STANDING):  acetaminophen     Tablet .. 650 every 6 hours PRN  heparin   Injectable 5000 every 8 hours  ondansetron Injectable 4 every 6 hours PRN  senna 2 at bedtime PRN      Vital Signs Last 24 Hrs  T(C): 36.2 (18 May 2023 13:11), Max: 36.8 (18 May 2023 05:20)  T(F): 97.1 (18 May 2023 13:11), Max: 98.2 (18 May 2023 05:20)  HR: 92 (18 May 2023 13:11) (78 - 92)  BP: 131/94 (18 May 2023 13:11) (124/78 - 149/100)  BP(mean): --  RR: 18 (18 May 2023 13:11) (18 - 18)  SpO2: 98% (18 May 2023 13:11) (96% - 98%)    Parameters below as of 18 May 2023 13:11  Patient On (Oxygen Delivery Method): room air    PHYSICAL EXAMINATION:  General: Alert and Awake, NAD  HEENT: Normocephalic / Atraumatic  Resp: No accessory muscles of respiration utilized  Abdomen: Not distended.  MSK: No LE edema.   : No iraheta  Skin: Scaling rash on bilateral forearms, chest and abdomen with papular rash and patches. Also with papular / patch involvement of the legs. No palm / sole involvement. Minimal back involvement. . Skin is warm and dry to the touch.   Neuro: Alert and Awake. CN 2-12 Grossly intact. Moves all four extremities spontaneously.  Psych: Calm, Pleasant, Cooperative                          12.9   7.98  )-----------( 354      ( 18 May 2023 08:38 )             39.7           143  |  108  |  20  ----------------------------<  75  4.1   |  22  |  1.17    Ca    8.9      18 May 2023 08:38    TPro  7.5  /  Alb  3.8  /  TBili  0.2  /  DBili  x   /  AST  17  /  ALT  18  /  AlkPhos  62  -      Urinalysis Basic - ( 18 May 2023 00:06 )    Color: Colorless / Appearance: Slightly Turbid / S.013 / pH: x  Gluc: x / Ketone: Negative  / Bili: Negative / Urobili: Negative   Blood: x / Protein: 30 mg/dL / Nitrite: Positive   Leuk Esterase: Large / RBC: 7 /hpf / WBC 21 /HPF   Sq Epi: x / Non Sq Epi: x / Bacteria: Many        MICROBIOLOGY:  theresa Nunez  23 --  --  --      Catheterized Catheterized  05-15-23   >100,000 CFU/ml Escherichia coli  --  Escherichia coli    RADIOLOGY:    <The imaging below has been reviewed and visualized by me independently. Findings as detailed in report below>    < from: CT Abdomen and Pelvis w/wo IV Cont (04.10.23 @ 10:45) >  IMPRESSION:  Patient has developed a new right obstructive uropathy with delayed   nephrogram and mild to moderate right hydronephrosis secondary to 1.2 cm   calculus at the UPJ.    Both kidneys also contain bilateral nonobstructing renal calculi.    Previously seen indeterminate lesion right kidney corresponds to a   nonenhancing cyst.    Urinary bladder calculi      < end of copied text >

## 2023-05-18 NOTE — BH CONSULTATION LIAISON ASSESSMENT NOTE - NSBHSATHC_PSY_A_CORE FT
Reports smoking MJ daily x several years as it is the only substance that alleviates any of his pain

## 2023-05-18 NOTE — BH CONSULTATION LIAISON ASSESSMENT NOTE - HPI (INCLUDE ILLNESS QUALITY, SEVERITY, DURATION, TIMING, CONTEXT, MODIFYING FACTORS, ASSOCIATED SIGNS AND SYMPTOMS)
This is a 48 y/o M, domiciled at home w/ 24 hour HHA, disabled, pphx of depression, 3 remote suicide attempts in context of antidepressant med trials triggering SI, 1 inpatient hospitalization, no hx of SIB, hi, current daily MJ use to alleviate chronic pain, denies other substances and pmhx of advanced MS (dx 1995), Quadriplegia, PE, neurogenic bladder with chronic iraheta catheter, nephrolithiasis, recent CT a/p with b/l non-obstructing renal calculi, scheduled for cystoscopy, L Percutaneous Stone Extraction BIBEMS from home c/o pain and missed surgery appointment. Patient's ride did not come this morning and he missed scheduled surgery.      Psychiatry consulted for evaluation of suicidality     Patient seen and evaluated, is awake, easy to engage, A&OX4. He is forthcoming regarding his medical and psychiatric history. Says he currently cannot stand being alive "because every day is like torture. I pray every day that something will happen to me that will end my life". He states that he would never act on these thoughts because "I am a man of god and I know that if I kill myself, that would prevent me from getting to [facility] and that is the only place I would want to go".  He reports having these chronic passive suicidal thoughts for years and will not act on them. He denies any active suicidal ideation, intention, method, planning. Faith is his strongest protective factor. While talking to patient, urology team came in informing patient that there was a chance he may be discharged today and return to the hospital next week for his planned surgery at which time patient said "I can't go home, what if something happens and I get worse? I would rather wait here to get treated before returning home".   Patient denies that he is experiencing depression and reports that he is only mad and upset over his current medical conditions that completely limit his quality of life. He does note that he has tried three antidepressants in the past (only remembers wellbutrin) and that each of them caused him to have suicidal thoughts and lead to him having suicide attempts. He states that one attempt was via overdose and one was via carbon monoxide poisoning, does not recall what happened with third attempt. Reports he only sought medical attention for one of them and was psychiatrically hospitalized at that time. Denies manic and psychotic sxs, no delusions elicited.     Patient provided verbal consent to speak with his parents and friends   Called Rajesh Quezada [phone], phone did not ring, left VM with writer's callback number

## 2023-05-18 NOTE — BH CONSULTATION LIAISON ASSESSMENT NOTE - SUMMARY
This is a 48 y/o M, domiciled at home w/ 24 hour HHA, disabled, pphx of depression, 3 remote suicide attempts in context of antidepressant med trials triggering SI, 1 inpatient hospitalization, no hx of SIB, hi, current daily MJ use to alleviate chronic pain, denies other substances and pmhx of advanced MS (dx 1995), Quadriplegia, PE, neurogenic bladder with chronic iraheta catheter, nephrolithiasis, recent CT a/p with b/l non-obstructing renal calculi, scheduled for cystoscopy, L Percutaneous Stone Extraction BIBEMS from home c/o pain and missed surgery appointment. Patient's ride did not come this morning and he missed scheduled surgery.      Psychiatry consulted for evaluation of suicidality     Patient seen and evaluated, is awake, easy to engage, A&OX4. He is forthcoming regarding his medical and psychiatric history. Says he currently cannot stand being alive "because every day is like torture. I pray every day that something will happen to me that will end my life". 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  He reports having these chronic passive suicidal thoughts for years and will not act on them. He denies any active suicidal ideation, intention, method, planning. [employer] is his strongest protective factor. While talking to patient, urology team came in informing patient that there was a chance he may be discharged today and return to the hospital next week for his planned surgery at which time patient said "I can't go home, what if something happens and I get worse? I would rather wait here to get treated before returning home".   Patient denies that he is experiencing depression and reports that he is only mad and upset over his current medical conditions that completely limit his quality of life. He does note that he has tried three antidepressants in the past (only remembers wellbutrin) and that each of them caused him to have suicidal thoughts and lead to him having suicide attempts. He states that one attempt was via overdose and one was via carbon monoxide poisoning, does not recall what happened with third attempt. Reports he only sought medical attention for one of them and was psychiatrically hospitalized at that time. Denies manic and psychotic sxs, no delusions elicited.     Patient visibly uncomfortable throughout encounter in pain. Notes his pain and discomfort have been what drives his passive SI. Daily MJ use is only alleviating factor of pain. Previous trials of antidepressants have triggered SI leading to suicide attempts. Given his severe chronic pain, patient would benefit from multidisciplinary treatment approach including but not limited to pain management, psychiatric medications, psychotherapy, medical management, relaxation techniques.     Recommendations:  - No indication for psychiatric CO as patient currently endorsing chronic passive SI that has not changed in nature anytime recently  - Labs: tsh, b12, folate, vit d, b1, b6, mag, phos, UA, utox  - EKG to monitor QTc  - Medications:   # Anxiety: ativan 0.5mg po q6h PRN; if ineffective can increase to 1mg Q6H PRN   #Will consider initiation of mood stabilizer if pt amenable pending further discussion   - Consults: Patient advocate, pain management  - Dispo: [facility] [phone]

## 2023-05-18 NOTE — BH CONSULTATION LIAISON ASSESSMENT NOTE - NSBHCHARTREVIEWLAB_PSY_A_CORE FT
12.8   9.00  )-----------( 322      ( 19 May 2023 06:52 )             40.7     05-18    143  |  108  |  20  ----------------------------<  75  4.1   |  22  |  1.17    Ca    8.9      18 May 2023 08:38  Phos  2.8     05-18  Mg     1.9     05-18    TPro  7.5  /  Alb  3.8  /  TBili  0.2  /  DBili  x   /  AST  17  /  ALT  18  /  AlkPhos  62  05-17

## 2023-05-18 NOTE — PROGRESS NOTE ADULT - ASSESSMENT
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

## 2023-05-18 NOTE — PROGRESS NOTE ADULT - SUBJECTIVE AND OBJECTIVE BOX
Patient is a 47y old  Male who presents with a chief complaint of pt scheduled for left PCNL (18 May 2023 15:07)      SUBJECTIVE / OVERNIGHT EVENTS: Patient reports suicidal ideation       T(C): 37.1 (23 @ 21:10), Max: 37.1 (23 @ 21:10)  HR: 90 (23 @ 21:10) (90 - 102)  BP: 124/78 (23 @ 21:10) (124/78 - 141/91)  RR: 18 (23 @ 21:10) (18 - 18)  SpO2: 98% (23 @ 21:10) (98% - 98%)    MEDICATIONS  (STANDING):  clotrimazole 1% Cream 1 Application(s) Topical two times a day  dextrose 5% + sodium chloride 0.45%. 1000 milliLiter(s) (75 mL/Hr) IV Continuous <Continuous>  heparin   Injectable 5000 Unit(s) SubCutaneous every 8 hours  ketoconazole 2% Shampoo 1 Application(s) Topical <User Schedule>  triamcinolone 0.1% Cream 1 Application(s) Topical every 12 hours    MEDICATIONS  (PRN):  acetaminophen     Tablet .. 650 milliGRAM(s) Oral every 6 hours PRN Temp greater or equal to 38.5C (101.3F), Mild Pain (1 - 3)  ondansetron Injectable 4 milliGRAM(s) IV Push every 6 hours PRN Nausea  senna 2 Tablet(s) Oral at bedtime PRN Constipation      CAPILLARY BLOOD GLUCOSE        I&O's Summary    17 May 2023 07:  -  18 May 2023 07:00  --------------------------------------------------------  IN: 1200 mL / OUT: 1850 mL / NET: -650 mL    18 May 2023 07:  -  18 May 2023 22:58  --------------------------------------------------------  IN: 2100 mL / OUT: 2100 mL / NET: 0 mL      T(C): 37.1 (23 @ 21:10), Max: 37.1 (23 @ 21:10)  HR: 90 (23 @ 21:10) (90 - 102)  BP: 124/78 (23 @ 21:10) (124/78 - 141/91)  RR: 18 (23 @ 21:10) (18 - 18)  SpO2: 98% (23 @ 21:10) (98% - 98%)    PHYSICAL EXAM:  GENERAL: NAD, well-developed  HEAD:  Atraumatic, Normocephalic  EYES: EOMI, PERRLA, conjunctiva and sclera clear  NECK: Supple, No JVD  CHEST/LUNG: Clear to auscultation bilaterally; No wheeze  HEART: Regular rate and rhythm; No murmurs, rubs, or gallops  ABDOMEN: Soft, Nontender, Nondistended; Bowel sounds present  EXTREMITIES:  2+ Peripheral Pulses, No clubbing, cyanosis, or edema  PSYCH: AAOx3  NEUROLOGY: non-focal  SKIN: Rash diffuse     LABS:                        12.9   7.98  )-----------( 354      ( 18 May 2023 08:38 )             39.7         143  |  108  |  20  ----------------------------<  75  4.1   |  22  |  1.17    Ca    8.9      18 May 2023 08:38    TPro  7.5  /  Alb  3.8  /  TBili  0.2  /  DBili  x   /  AST  17  /  ALT  18  /  AlkPhos  62  -    PT/INR - ( 17 May 2023 14:01 )   PT: 13.0 sec;   INR: 1.12 ratio         PTT - ( 17 May 2023 14:01 )  PTT:32.7 sec      Urinalysis Basic - ( 18 May 2023 00:06 )    Color: Colorless / Appearance: Slightly Turbid / S.013 / pH: x  Gluc: x / Ketone: Negative  / Bili: Negative / Urobili: Negative   Blood: x / Protein: 30 mg/dL / Nitrite: Positive   Leuk Esterase: Large / RBC: 7 /hpf / WBC 21 /HPF   Sq Epi: x / Non Sq Epi: x / Bacteria: Many        RADIOLOGY & ADDITIONAL TESTS:    Imaging Personally Reviewed:    Consultant(s) Notes Reviewed:      Care Discussed with Consultants/Other Providers:

## 2023-05-18 NOTE — BH CONSULTATION LIAISON ASSESSMENT NOTE - NSBHATTESTCOMMENTATTENDFT_PSY_A_CORE
This is a 46 y/o M, domiciled at home w/ 24 hour HHA, disabled, pphx of depression, 3 remote suicide attempts in context of antidepressant med trials triggering SI, 1 inpatient hospitalization, no hx of SIB, hi, current daily MJ use to alleviate chronic pain, denies other substances and pmhx of advanced MS (dx 1995), Quadriplegia, PE, neurogenic bladder with chronic iraheta catheter, nephrolithiasis, recent CT a/p with b/l non-obstructing renal calculi, scheduled for cystoscopy, L Percutaneous Stone Extraction BIBEMS from home c/o pain and missed surgery appointment. Patient's ride did not come this morning and he missed scheduled surgery.      Psychiatry consulted for evaluation of suicidality     Patient seen and evaluated, is awake, easy to engage, A&OX4. He is forthcoming regarding his medical and psychiatric hx, reports SI int eh past when he took antidipressants. pt frustrated dealing with chronic pain, medical issues. pt denies current plan. no access to guns. pt not interested in standing psych meds at this time. can f/u at [facility] clinic, can give ativan prn anxiety. pain mgt referral

## 2023-05-19 LAB
A1C WITH ESTIMATED AVERAGE GLUCOSE RESULT: 5.5 % — SIGNIFICANT CHANGE UP (ref 4–5.6)
AMPHET UR-MCNC: NEGATIVE — SIGNIFICANT CHANGE UP
BARBITURATES UR SCN-MCNC: NEGATIVE — SIGNIFICANT CHANGE UP
BENZODIAZ UR-MCNC: NEGATIVE — SIGNIFICANT CHANGE UP
COCAINE METAB.OTHER UR-MCNC: NEGATIVE — SIGNIFICANT CHANGE UP
ESTIMATED AVERAGE GLUCOSE: 111 MG/DL — SIGNIFICANT CHANGE UP (ref 68–114)
FUNGITELL: <31 PG/ML — SIGNIFICANT CHANGE UP
HCT VFR BLD CALC: 40.7 % — SIGNIFICANT CHANGE UP (ref 39–50)
HGB BLD-MCNC: 12.8 G/DL — LOW (ref 13–17)
MAGNESIUM SERPL-MCNC: 1.9 MG/DL — SIGNIFICANT CHANGE UP (ref 1.6–2.6)
MCHC RBC-ENTMCNC: 27.6 PG — SIGNIFICANT CHANGE UP (ref 27–34)
MCHC RBC-ENTMCNC: 31.4 GM/DL — LOW (ref 32–36)
MCV RBC AUTO: 87.7 FL — SIGNIFICANT CHANGE UP (ref 80–100)
METHADONE UR-MCNC: NEGATIVE — SIGNIFICANT CHANGE UP
NRBC # BLD: 0 /100 WBCS — SIGNIFICANT CHANGE UP (ref 0–0)
OPIATES UR-MCNC: NEGATIVE — SIGNIFICANT CHANGE UP
OXYCODONE UR-MCNC: NEGATIVE — SIGNIFICANT CHANGE UP
PCP SPEC-MCNC: SIGNIFICANT CHANGE UP
PCP UR-MCNC: NEGATIVE — SIGNIFICANT CHANGE UP
PHOSPHATE SERPL-MCNC: 2.8 MG/DL — SIGNIFICANT CHANGE UP (ref 2.5–4.5)
PLATELET # BLD AUTO: 322 K/UL — SIGNIFICANT CHANGE UP (ref 150–400)
RBC # BLD: 4.64 M/UL — SIGNIFICANT CHANGE UP (ref 4.2–5.8)
RBC # FLD: 15.2 % — HIGH (ref 10.3–14.5)
THC UR QL: POSITIVE
WBC # BLD: 9 K/UL — SIGNIFICANT CHANGE UP (ref 3.8–10.5)
WBC # FLD AUTO: 9 K/UL — SIGNIFICANT CHANGE UP (ref 3.8–10.5)

## 2023-05-19 PROCEDURE — 93010 ELECTROCARDIOGRAM REPORT: CPT

## 2023-05-19 RX ORDER — TRAMADOL HYDROCHLORIDE 50 MG/1
25 TABLET ORAL EVERY 8 HOURS
Refills: 0 | Status: DISCONTINUED | OUTPATIENT
Start: 2023-05-19 | End: 2023-05-24

## 2023-05-19 RX ADMIN — HEPARIN SODIUM 5000 UNIT(S): 5000 INJECTION INTRAVENOUS; SUBCUTANEOUS at 21:46

## 2023-05-19 RX ADMIN — TRAMADOL HYDROCHLORIDE 25 MILLIGRAM(S): 50 TABLET ORAL at 15:30

## 2023-05-19 RX ADMIN — Medication 1 APPLICATION(S): at 18:11

## 2023-05-19 RX ADMIN — TRAMADOL HYDROCHLORIDE 25 MILLIGRAM(S): 50 TABLET ORAL at 14:26

## 2023-05-19 RX ADMIN — Medication 1 APPLICATION(S): at 05:26

## 2023-05-19 RX ADMIN — HEPARIN SODIUM 5000 UNIT(S): 5000 INJECTION INTRAVENOUS; SUBCUTANEOUS at 14:26

## 2023-05-19 RX ADMIN — HEPARIN SODIUM 5000 UNIT(S): 5000 INJECTION INTRAVENOUS; SUBCUTANEOUS at 05:25

## 2023-05-19 NOTE — PROGRESS NOTE ADULT - ASSESSMENT
47 year old male with PMHx of MS, quadriplegia, neurogenic bladder, known nephrolithiasis; also found to a diffuse rash over trunk forearms, thighs. Admitted to medicine for management of rash and other comorbidities    --Appreciate derm recs. Rash likely seborrheic dermatitis v psoriasis. C/w antifungal and steroids per derm  --UCx w/ Ecoli resistant to cipro. In this setting and hx of Pseudomonal UTI, would recommend starting IV cefepime and continuing this through surgery date.   --Please exchange iraheta catheter  --Appreciate psych consults.   --C/w medical management  --Will plan for OR next Wed, 5/24 for PCNL and SPT placement.

## 2023-05-19 NOTE — PROGRESS NOTE ADULT - ASSESSMENT
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

## 2023-05-19 NOTE — PROGRESS NOTE ADULT - SUBJECTIVE AND OBJECTIVE BOX
DAILY PROGRESS NOTE:         24 hr events:  s/p eval by psychiatry.   rash improving w/ ointment per derm     Objective:    Vital Signs Last 24 Hrs  T(C): 36.3 (19 May 2023 09:28), Max: 37.1 (18 May 2023 21:10)  T(F): 97.3 (19 May 2023 09:28), Max: 98.7 (18 May 2023 21:10)  HR: 83 (19 May 2023 09:28) (82 - 102)  BP: 148/89 (19 May 2023 09:28) (122/83 - 148/92)  BP(mean): --  RR: 18 (19 May 2023 09:28) (18 - 18)  SpO2: 98% (19 May 2023 09:28) (96% - 98%)    Parameters below as of 19 May 2023 09:28  Patient On (Oxygen Delivery Method): room air        I&O's Detail    18 May 2023 07:01  -  19 May 2023 07:00  --------------------------------------------------------  IN:    dextrose 5% + sodium chloride 0.45%: 1725 mL    Oral Fluid: 1140 mL  Total IN: 2865 mL    OUT:    Indwelling Catheter - Urethral (mL): 2850 mL  Total OUT: 2850 mL    Total NET: 15 mL      19 May 2023 07:01  -  19 May 2023 12:08  --------------------------------------------------------  IN:    Oral Fluid: 360 mL  Total IN: 360 mL    OUT:  Total OUT: 0 mL    Total NET: 360 mL          Physical Exam:    General: NAD, well-nourished  Resp: Breathing comfortably on RA  CV: regular rate   Skin: extremity erythema improving     Laboratory Results:                          12.8   9.00  )-----------( 322      ( 19 May 2023 06:52 )             40.7     05-18    143  |  108  |  20  ----------------------------<  75  4.1   |  22  |  1.17    Ca    8.9      18 May 2023 08:38  Phos  2.8     05-  Mg     1.9     05-    TPro  7.5  /  Alb  3.8  /  TBili  0.2  /  DBili  x   /  AST  17  /  ALT  18  /  AlkPhos  62  05-17    PT/INR - ( 17 May 2023 14:01 )   PT: 13.0 sec;   INR: 1.12 ratio         PTT - ( 17 May 2023 14:01 )  PTT:32.7 sec  Urinalysis Basic - ( 18 May 2023 00:06 )    Color: Colorless / Appearance: Slightly Turbid / S.013 / pH: x  Gluc: x / Ketone: Negative  / Bili: Negative / Urobili: Negative   Blood: x / Protein: 30 mg/dL / Nitrite: Positive   Leuk Esterase: Large / RBC: 7 /hpf / WBC 21 /HPF   Sq Epi: x / Non Sq Epi: x / Bacteria: Many

## 2023-05-19 NOTE — CHART NOTE - NSCHARTNOTEFT_GEN_A_CORE
Dermatology Chart Note  KOH -     Sebopsoriasis / seborrheic dermatitis ,  likely propagated by bedbound status and neurogenic disease    Recommendations:   - Discussed results with pt - no contraindication to procedures from dermatologic perspective. This is a chronic condition and will likely wax and wane.   - c/w ketoconazole cream BID until resolved ; if not available inpatient may substitute for clotrimazole   - c/w ketoconazole shampoo 2-3x/week if feasible   - c/w triamcinolone 0.1% ointment BID for 2 weeks on / 1 week off. Repeat cycles as necessary.   - May benefit from weekly PO fluconazole (as anti yeast which is thought to be a secondary factor in this inflammatory rash) .      Discussed with primary team.  Discussed with attending, Dr. Lorenzo Lima MD   Resident Physician, PGY2  [facility]  Office: [phone]  Pager: [phone]   **Please page with [phone] callback # for further related questions.**

## 2023-05-19 NOTE — PROGRESS NOTE ADULT - SUBJECTIVE AND OBJECTIVE BOX
Patient is a 47y old  Male who presents with a chief complaint of pt scheduled for left PCNL (18 May 2023 15:07)      SUBJECTIVE / OVERNIGHT EVENTS: Patient reports suicidal ideation       T(C): 36.6 (05-19-23 @ 20:09), Max: 36.8 (05-19-23 @ 16:12)  HR: 86 (05-19-23 @ 20:09) (84 - 89)  BP: 153/92 (05-19-23 @ 20:09) (148/96 - 153/92)  RR: 18 (05-19-23 @ 20:09) (18 - 18)  SpO2: 97% (05-19-23 @ 20:09) (95% - 97%)      MEDICATIONS  (STANDING):  clotrimazole 1% Cream 1 Application(s) Topical two times a day  dextrose 5% + sodium chloride 0.45%. 1000 milliLiter(s) (75 mL/Hr) IV Continuous <Continuous>  heparin   Injectable 5000 Unit(s) SubCutaneous every 8 hours  ketoconazole 2% Shampoo 1 Application(s) Topical <User Schedule>  triamcinolone 0.1% Cream 1 Application(s) Topical every 12 hours    MEDICATIONS  (PRN):  acetaminophen     Tablet .. 650 milliGRAM(s) Oral every 6 hours PRN Temp greater or equal to 38.5C (101.3F), Mild Pain (1 - 3)  ondansetron Injectable 4 milliGRAM(s) IV Push every 6 hours PRN Nausea  senna 2 Tablet(s) Oral at bedtime PRN Constipation  traMADol 25 milliGRAM(s) Oral every 8 hours PRN Moderate Pain (4 - 6)  CAPILLARY BLOOD GLUCOSE          PHYSICAL EXAM:  GENERAL: NAD, well-developed  HEAD:  Atraumatic, Normocephalic  EYES: EOMI, PERRLA, conjunctiva and sclera clear  NECK: Supple, No JVD  CHEST/LUNG: Clear to auscultation bilaterally; No wheeze  HEART: Regular rate and rhythm; No murmurs, rubs, or gallops  ABDOMEN: Soft, Nontender, Nondistended; Bowel sounds present  EXTREMITIES:  2+ Peripheral Pulses, No clubbing, cyanosis, or edema  PSYCH: AAOx3  NEUROLOGY: non-focal  SKIN: Rash diffuse                             12.8   9.00  )-----------( 322      ( 19 May 2023 06:52 )             40.7                     RADIOLOGY & ADDITIONAL TESTS:    Imaging Personally Reviewed:    Consultant(s) Notes Reviewed:      Care Discussed with Consultants/Other Providers:

## 2023-05-20 RX ORDER — CEFEPIME 1 G/1
2000 INJECTION, POWDER, FOR SOLUTION INTRAMUSCULAR; INTRAVENOUS ONCE
Refills: 0 | Status: COMPLETED | OUTPATIENT
Start: 2023-05-20 | End: 2023-05-20

## 2023-05-20 RX ORDER — SODIUM CHLORIDE 9 MG/ML
1000 INJECTION INTRAMUSCULAR; INTRAVENOUS; SUBCUTANEOUS
Refills: 0 | Status: DISCONTINUED | OUTPATIENT
Start: 2023-05-20 | End: 2023-05-31

## 2023-05-20 RX ORDER — CEFEPIME 1 G/1
2000 INJECTION, POWDER, FOR SOLUTION INTRAMUSCULAR; INTRAVENOUS EVERY 12 HOURS
Refills: 0 | Status: DISCONTINUED | OUTPATIENT
Start: 2023-05-21 | End: 2023-05-24

## 2023-05-20 RX ORDER — CEFEPIME 1 G/1
INJECTION, POWDER, FOR SOLUTION INTRAMUSCULAR; INTRAVENOUS
Refills: 0 | Status: DISCONTINUED | OUTPATIENT
Start: 2023-05-20 | End: 2023-05-24

## 2023-05-20 RX ORDER — CEFEPIME 1 G/1
2000 INJECTION, POWDER, FOR SOLUTION INTRAMUSCULAR; INTRAVENOUS EVERY 12 HOURS
Refills: 0 | Status: DISCONTINUED | OUTPATIENT
Start: 2023-05-20 | End: 2023-05-20

## 2023-05-20 RX ADMIN — Medication 1 APPLICATION(S): at 18:32

## 2023-05-20 RX ADMIN — Medication 1 APPLICATION(S): at 05:55

## 2023-05-20 RX ADMIN — HEPARIN SODIUM 5000 UNIT(S): 5000 INJECTION INTRAVENOUS; SUBCUTANEOUS at 14:26

## 2023-05-20 RX ADMIN — Medication 1 APPLICATION(S): at 18:53

## 2023-05-20 RX ADMIN — SODIUM CHLORIDE 50 MILLILITER(S): 9 INJECTION INTRAMUSCULAR; INTRAVENOUS; SUBCUTANEOUS at 14:28

## 2023-05-20 RX ADMIN — HEPARIN SODIUM 5000 UNIT(S): 5000 INJECTION INTRAVENOUS; SUBCUTANEOUS at 21:40

## 2023-05-20 RX ADMIN — TRAMADOL HYDROCHLORIDE 25 MILLIGRAM(S): 50 TABLET ORAL at 08:38

## 2023-05-20 RX ADMIN — HEPARIN SODIUM 5000 UNIT(S): 5000 INJECTION INTRAVENOUS; SUBCUTANEOUS at 05:54

## 2023-05-20 RX ADMIN — CEFEPIME 100 MILLIGRAM(S): 1 INJECTION, POWDER, FOR SOLUTION INTRAMUSCULAR; INTRAVENOUS at 14:57

## 2023-05-20 RX ADMIN — TRAMADOL HYDROCHLORIDE 25 MILLIGRAM(S): 50 TABLET ORAL at 09:30

## 2023-05-20 NOTE — PROGRESS NOTE ADULT - ASSESSMENT
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

## 2023-05-20 NOTE — PROGRESS NOTE ADULT - SUBJECTIVE AND OBJECTIVE BOX
Patient is a 47y old  Male who presents with a chief complaint of pt scheduled for left PCNL (18 May 2023 15:07)      SUBJECTIVE / OVERNIGHT EVENTS: Patient fells ok   No events       T(C): 36.7 (05-20-23 @ 17:21), Max: 36.7 (05-20-23 @ 17:21)  HR: 104 (05-20-23 @ 17:21) (87 - 104)  BP: 139/96 (05-20-23 @ 17:21) (119/66 - 139/96)  RR: 18 (05-20-23 @ 17:21) (18 - 20)  SpO2: 95% (05-20-23 @ 17:21) (95% - 97%)      MEDICATIONS  (STANDING):  cefepime   IVPB      clotrimazole 1% Cream 1 Application(s) Topical two times a day  heparin   Injectable 5000 Unit(s) SubCutaneous every 8 hours  ketoconazole 2% Shampoo 1 Application(s) Topical <User Schedule>  sodium chloride 0.9%. 1000 milliLiter(s) (50 mL/Hr) IV Continuous <Continuous>  triamcinolone 0.1% Cream 1 Application(s) Topical every 12 hours    MEDICATIONS  (PRN):  acetaminophen     Tablet .. 650 milliGRAM(s) Oral every 6 hours PRN Temp greater or equal to 38.5C (101.3F), Mild Pain (1 - 3)  ondansetron Injectable 4 milliGRAM(s) IV Push every 6 hours PRN Nausea  senna 2 Tablet(s) Oral at bedtime PRN Constipation  traMADol 25 milliGRAM(s) Oral every 8 hours PRN Moderate Pain (4 - 6)            PHYSICAL EXAM:  GENERAL: NAD, well-developed  HEAD:  Atraumatic, Normocephalic  EYES: EOMI, PERRLA, conjunctiva and sclera clear  NECK: Supple, No JVD  CHEST/LUNG: Clear to auscultation bilaterally; No wheeze  HEART: Regular rate and rhythm; No murmurs, rubs, or gallops  ABDOMEN: Soft, Nontender, Nondistended; Bowel sounds present  EXTREMITIES:  2+ Peripheral Pulses, No clubbing, cyanosis, or edema  PSYCH: AAOx3  NEUROLOGY: non-focal  SKIN: Rash diffuse                                        12.8   9.00  )-----------( 322      ( 19 May 2023 06:52 )             40.7                     RADIOLOGY & ADDITIONAL TESTS:    Imaging Personally Reviewed:    Consultant(s) Notes Reviewed:      Care Discussed with Consultants/Other Providers:

## 2023-05-21 RX ADMIN — HEPARIN SODIUM 5000 UNIT(S): 5000 INJECTION INTRAVENOUS; SUBCUTANEOUS at 21:57

## 2023-05-21 RX ADMIN — TRAMADOL HYDROCHLORIDE 25 MILLIGRAM(S): 50 TABLET ORAL at 03:15

## 2023-05-21 RX ADMIN — TRAMADOL HYDROCHLORIDE 25 MILLIGRAM(S): 50 TABLET ORAL at 02:15

## 2023-05-21 RX ADMIN — CEFEPIME 100 MILLIGRAM(S): 1 INJECTION, POWDER, FOR SOLUTION INTRAMUSCULAR; INTRAVENOUS at 05:32

## 2023-05-21 RX ADMIN — Medication 1 APPLICATION(S): at 05:32

## 2023-05-21 RX ADMIN — HEPARIN SODIUM 5000 UNIT(S): 5000 INJECTION INTRAVENOUS; SUBCUTANEOUS at 05:31

## 2023-05-21 RX ADMIN — HEPARIN SODIUM 5000 UNIT(S): 5000 INJECTION INTRAVENOUS; SUBCUTANEOUS at 13:15

## 2023-05-21 RX ADMIN — Medication 1 APPLICATION(S): at 17:58

## 2023-05-21 RX ADMIN — CEFEPIME 100 MILLIGRAM(S): 1 INJECTION, POWDER, FOR SOLUTION INTRAMUSCULAR; INTRAVENOUS at 17:27

## 2023-05-22 PROCEDURE — 99232 SBSQ HOSP IP/OBS MODERATE 35: CPT | Mod: GC

## 2023-05-22 PROCEDURE — 99232 SBSQ HOSP IP/OBS MODERATE 35: CPT

## 2023-05-22 RX ADMIN — Medication 1 APPLICATION(S): at 06:26

## 2023-05-22 RX ADMIN — HEPARIN SODIUM 5000 UNIT(S): 5000 INJECTION INTRAVENOUS; SUBCUTANEOUS at 22:20

## 2023-05-22 RX ADMIN — CEFEPIME 100 MILLIGRAM(S): 1 INJECTION, POWDER, FOR SOLUTION INTRAMUSCULAR; INTRAVENOUS at 06:25

## 2023-05-22 RX ADMIN — Medication 1 APPLICATION(S): at 18:14

## 2023-05-22 RX ADMIN — TRAMADOL HYDROCHLORIDE 25 MILLIGRAM(S): 50 TABLET ORAL at 22:19

## 2023-05-22 RX ADMIN — HEPARIN SODIUM 5000 UNIT(S): 5000 INJECTION INTRAVENOUS; SUBCUTANEOUS at 06:25

## 2023-05-22 RX ADMIN — SENNA PLUS 2 TABLET(S): 8.6 TABLET ORAL at 22:19

## 2023-05-22 RX ADMIN — TRAMADOL HYDROCHLORIDE 25 MILLIGRAM(S): 50 TABLET ORAL at 14:00

## 2023-05-22 RX ADMIN — Medication 1 APPLICATION(S): at 06:25

## 2023-05-22 RX ADMIN — TRAMADOL HYDROCHLORIDE 25 MILLIGRAM(S): 50 TABLET ORAL at 13:14

## 2023-05-22 RX ADMIN — HEPARIN SODIUM 5000 UNIT(S): 5000 INJECTION INTRAVENOUS; SUBCUTANEOUS at 13:14

## 2023-05-22 RX ADMIN — CEFEPIME 100 MILLIGRAM(S): 1 INJECTION, POWDER, FOR SOLUTION INTRAMUSCULAR; INTRAVENOUS at 18:14

## 2023-05-22 RX ADMIN — TRAMADOL HYDROCHLORIDE 25 MILLIGRAM(S): 50 TABLET ORAL at 23:19

## 2023-05-22 NOTE — PROGRESS NOTE ADULT - SUBJECTIVE AND OBJECTIVE BOX
DAILY PROGRESS NOTE:         24 hr events:  seen on rounds     Objective:    Vital Signs Last 24 Hrs  T(C): 36.7 (22 May 2023 09:16), Max: 36.9 (21 May 2023 21:12)  T(F): 98.1 (22 May 2023 09:16), Max: 98.4 (21 May 2023 21:12)  HR: 99 (22 May 2023 09:16) (79 - 100)  BP: 138/96 (22 May 2023 09:16) (118/80 - 138/96)  BP(mean): --  RR: 18 (22 May 2023 09:16) (18 - 18)  SpO2: 97% (22 May 2023 09:16) (94% - 97%)    Parameters below as of 22 May 2023 09:16  Patient On (Oxygen Delivery Method): room air        I&O's Detail    21 May 2023 07:01  -  22 May 2023 07:00  --------------------------------------------------------  IN:    Oral Fluid: 780 mL  Total IN: 780 mL    OUT:    Indwelling Catheter - Urethral (mL): 2200 mL  Total OUT: 2200 mL    Total NET: -1420 mL      22 May 2023 07:01  -  22 May 2023 11:59  --------------------------------------------------------  IN:    Oral Fluid: 240 mL  Total IN: 240 mL    OUT:    Voided (mL): 400 mL  Total OUT: 400 mL    Total NET: -160 mL          Physical Exam:    General: NAD, well-nourished  Resp: Breathing comfortably on RA  CV: regular rate   skin: erythema on extremities and trunk has improved       Laboratory Results:

## 2023-05-22 NOTE — PROGRESS NOTE ADULT - ASSESSMENT
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

## 2023-05-22 NOTE — PROGRESS NOTE ADULT - ATTENDING COMMENTS
Had long conversation with legal and risk management. This patient has the capacity to consent for his own procedures and while risk for PCNL is minimal it still carries risk for sepsis and transfusion. We may elect for serial URS if need be in order to minimize perioperative risk for the patient.

## 2023-05-22 NOTE — PROGRESS NOTE ADULT - SUBJECTIVE AND OBJECTIVE BOX
Patient is a 47y old  Male who presents with a chief complaint of pt scheduled for left PCNL (22 May 2023 15:35)      SUBJECTIVE / OVERNIGHT EVENTS:    Events noted.  CONSTITUTIONAL: No fever,  or fatigue  RESPIRATORY: No cough, wheezing,  No shortness of breath  CARDIOVASCULAR: No chest pain, palpitations, dizziness, or leg swelling  GASTROINTESTINAL: No abdominal or epigastric pain. No nausea, vomiting.      MEDICATIONS  (STANDING):  cefepime   IVPB      cefepime   IVPB 2000 milliGRAM(s) IV Intermittent every 12 hours  clotrimazole 1% Cream 1 Application(s) Topical two times a day  heparin   Injectable 5000 Unit(s) SubCutaneous every 8 hours  ketoconazole 2% Shampoo 1 Application(s) Topical <User Schedule>  sodium chloride 0.9%. 1000 milliLiter(s) (50 mL/Hr) IV Continuous <Continuous>  triamcinolone 0.1% Cream 1 Application(s) Topical every 12 hours    MEDICATIONS  (PRN):  acetaminophen     Tablet .. 650 milliGRAM(s) Oral every 6 hours PRN Temp greater or equal to 38.5C (101.3F), Mild Pain (1 - 3)  ondansetron Injectable 4 milliGRAM(s) IV Push every 6 hours PRN Nausea  senna 2 Tablet(s) Oral at bedtime PRN Constipation  traMADol 25 milliGRAM(s) Oral every 8 hours PRN Moderate Pain (4 - 6)        CAPILLARY BLOOD GLUCOSE        I&O's Summary    21 May 2023 07:01  -  22 May 2023 07:00  --------------------------------------------------------  IN: 780 mL / OUT: 2200 mL / NET: -1420 mL    22 May 2023 07:01  -  22 May 2023 22:21  --------------------------------------------------------  IN: 770 mL / OUT: 1075 mL / NET: -305 mL        T(C): 36.7 (05-22-23 @ 17:28), Max: 36.9 (05-22-23 @ 13:28)  HR: 105 (05-22-23 @ 17:28) (79 - 105)  BP: 157/94 (05-22-23 @ 17:28) (128/88 - 157/94)  RR: 18 (05-22-23 @ 17:28) (18 - 18)  SpO2: 96% (05-22-23 @ 17:28) (94% - 97%)    PHYSICAL EXAM:  GENERAL: NAD  NECK: Supple, No JVD  CHEST/LUNG: Clear to auscultation bilaterally; No wheezing.  HEART: Regular rate and rhythm; No murmurs, rubs, or gallops  ABDOMEN: Soft, Nontender, Nondistended; Bowel sounds present  EXTREMITIES:   No edema  NEUROLOGY: AAO X 3      LABS:                  CAPILLARY BLOOD GLUCOSE            RADIOLOGY & ADDITIONAL TESTS:    Imaging Personally Reviewed:    Consultant(s) Notes Reviewed:      Care Discussed with Consultants/Other Providers:    Onel Salvador MD, CMD, FACP    [address] [place] [address]  Office Tel: [phone]  Cell: [phone]

## 2023-05-22 NOTE — PROGRESS NOTE ADULT - ASSESSMENT
47 year old male with a PMH of Advanced MS (diagnosed 1995), Quadriplegia, PE (2019), depression, neurogenic bladder with chronic iraheta catheter, Nephrolithiasis, S/P Left PCN in the past who presents to [facility] on 5/17/23 for scheduled Cystoscopy, Left Percutaneous Stone Extraction, Possible Suprapubic Catheter 5/17 for known nephrolithiasis.     Developed rash on his UE, LE, Chest and Abdomen over the last 2 weeks  Noted starting to develop gradual onset of rash since Flomax initiation.   Denies any other OTC medications  Denies taking any treatment for the rash  Denies being on any immunosuppressive medications for MS recently.     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 Will defer to urology.     I will sign off at this time. Please feel free to contact me with any further questions or concerns.    Smith Verdin M.D.  [facility] Division of Infectious Disease  8AM-5PM Monday - Friday: Available on [employer] Teams  After Hours and Holidays (or if no response on [employer] Teams): Please contact the Infectious Diseases Office at [phone]

## 2023-05-22 NOTE — PROGRESS NOTE ADULT - ASSESSMENT
47 year old male with PMHx of MS, quadriplegia, neurogenic bladder, known nephrolithiasis; also found to a diffuse rash over trunk forearms, thighs. Admitted to medicine for management of rash and other comorbidities    --Appreciate derm recs. Rash likely seborrheic dermatitis v psoriasis. C/w antifungal and steroids per derm  --UCx w/ Ecoli resistant to cipro. In this setting and hx of Pseudomonal UTI, would recommend starting IV cefepime and continuing this through surgery date.   --Appreciate psych consults.   --C/w medical management  --Consented patient this morning for left PCNL. In course of conversation patient made it clearly known that he would not want blood. Counseled the patient regarding the high bleeding risk for this surgery and pt reaffirmed that he does not want blood. Furthermore, he stated he has a DNR and that he would want this continued fully through the perioperative period. While the patient did agree to having a breathing tube placed for the duration of general anesthesia, patient expresses his desire to decline chest compressions or further heroic efforts for resuscitation during the perioperative period. He would like to not rescind his DNR for the surgery.  --Given his medical comorbidities, expressed suicidality for which  psych was consulted and the desire to avoid DNR rescinding for the surgery will discuss case w/ risk management prior to proceeding with surgery.

## 2023-05-22 NOTE — PROGRESS NOTE ADULT - SUBJECTIVE AND OBJECTIVE BOX
Follow Up:      Interval History:    REVIEW OF SYSTEMS  [  ] ROS unobtainable because:    [  ] All other systems negative except as noted below    Constitutional:  [ ] fever [ ] chills  [ ] weight loss  [ ] weakness  Skin:  [ ] rash [ ] phlebitis	  Eyes: [ ] icterus [ ] pain  [ ] discharge	  ENMT: [ ] sore throat  [ ] thrush [ ] ulcers [ ] exudates  Respiratory: [ ] dyspnea [ ] hemoptysis [ ] cough [ ] sputum	  Cardiovascular:  [ ] chest pain [ ] palpitations [ ] edema	  Gastrointestinal:  [ ] nausea [ ] vomiting [ ] diarrhea [ ] constipation [ ] pain	  Genitourinary:  [ ] dysuria [ ] frequency [ ] hematuria [ ] discharge [ ] flank pain  [ ] incontinence  Musculoskeletal:  [ ] myalgias [ ] arthralgias [ ] arthritis  [ ] back pain  Neurological:  [ ] headache [ ] seizures  [ ] confusion/altered mental status    Allergies  Flomax (Rash)  Originally Entered as [Unknown] reaction to [KNA] (Unknown)        ANTIMICROBIALS:  cefepime   IVPB    cefepime   IVPB 2000 every 12 hours      OTHER MEDS:  MEDICATIONS  (STANDING):  acetaminophen     Tablet .. 650 every 6 hours PRN  heparin   Injectable 5000 every 8 hours  ondansetron Injectable 4 every 6 hours PRN  senna 2 at bedtime PRN  traMADol 25 every 8 hours PRN      Vital Signs Last 24 Hrs  T(C): 36.9 (22 May 2023 13:28), Max: 36.9 (21 May 2023 21:12)  T(F): 98.4 (22 May 2023 13:28), Max: 98.4 (21 May 2023 21:12)  HR: 100 (22 May 2023 13:28) (79 - 100)  BP: 134/84 (22 May 2023 13:28) (118/80 - 138/96)  BP(mean): --  RR: 18 (22 May 2023 13:28) (18 - 18)  SpO2: 97% (22 May 2023 13:28) (94% - 97%)    Parameters below as of 22 May 2023 13:28  Patient On (Oxygen Delivery Method): room air        PHYSICAL EXAMINATION:  General: Alert and Awake, NAD  HEENT: PERRL, EOMI  Neck: Supple  Cardiac: RRR, No M/R/G  Resp: CTAB, No Wh/Rh/Ra  Abdomen: NBS, NT/ND, No HSM, No rigidity or guarding  MSK: No LE edema. No Calf tenderness  : No iraheta  Skin: No rashes or lesions. Skin is warm and dry to the touch.   Neuro: Alert and Awake. CN 2-12 Grossly intact. Moves all four extremities spontaneously.  Psych: Calm, Pleasant, Cooperative                    MICROBIOLOGY:  v  Clean Catch Clean Catch (Midstream)  05-18-23   >100,000 CFU/ml Escherichia coli  --  Escherichia coli      .KOH Other  05-17-23 --  --  --      Catheterized Catheterized  05-15-23   >100,000 CFU/ml Escherichia coli  --  Escherichia coli                RADIOLOGY:    <The imaging below has been reviewed and visualized by me independently. Findings as detailed in report below> Follow Up:  Rash    Interval History: rash is improving. afebrile. no acute events.     REVIEW OF SYSTEMS  [  ] ROS unobtainable because:    [ x ] All other systems negative except as noted below    Constitutional:  [ ] fever [ ] chills  [ ] weight loss  [ ] weakness  Skin:  [ x] rash [ ] phlebitis	  Eyes: [ ] icterus [ ] pain  [ ] discharge	  ENMT: [ ] sore throat  [ ] thrush [ ] ulcers [ ] exudates  Respiratory: [ ] dyspnea [ ] hemoptysis [ ] cough [ ] sputum	  Cardiovascular:  [ ] chest pain [ ] palpitations [ ] edema	  Gastrointestinal:  [ ] nausea [ ] vomiting [ ] diarrhea [ ] constipation [ ] pain	  Genitourinary:  [ ] dysuria [ ] frequency [ ] hematuria [ ] discharge [ ] flank pain  [ ] incontinence  Musculoskeletal:  [ ] myalgias [ ] arthralgias [ ] arthritis  [ ] back pain  Neurological:  [ ] headache [ ] seizures  [ ] confusion/altered mental status    Allergies  Flomax (Rash)  Originally Entered as [Unknown] reaction to [KNA] (Unknown)        ANTIMICROBIALS:  cefepime   IVPB    cefepime   IVPB 2000 every 12 hours      OTHER MEDS:  MEDICATIONS  (STANDING):  acetaminophen     Tablet .. 650 every 6 hours PRN  heparin   Injectable 5000 every 8 hours  ondansetron Injectable 4 every 6 hours PRN  senna 2 at bedtime PRN  traMADol 25 every 8 hours PRN      Vital Signs Last 24 Hrs  T(C): 36.9 (22 May 2023 13:28), Max: 36.9 (21 May 2023 21:12)  T(F): 98.4 (22 May 2023 13:28), Max: 98.4 (21 May 2023 21:12)  HR: 100 (22 May 2023 13:28) (79 - 100)  BP: 134/84 (22 May 2023 13:28) (118/80 - 138/96)  BP(mean): --  RR: 18 (22 May 2023 13:28) (18 - 18)  SpO2: 97% (22 May 2023 13:28) (94% - 97%)    Parameters below as of 22 May 2023 13:28  Patient On (Oxygen Delivery Method): room air        PHYSICAL EXAMINATION:  General: Alert and Awake, NAD  HEENT: Normocephalic / Atraumatic  Resp: No accessory muscles of respiration utilized  Abdomen: Not distended.  MSK: No LE edema.   : + iraheta  Skin: Scaling rash on bilateral forearms improved, minimal residual rash on abdomen and chest  Neuro: Alert and Awake. CN 2-12 Grossly intact. Moves all four extremities spontaneously.  Psych: Calm, Pleasant, Cooperative                    MICROBIOLOGY:  v  Clean Catch Clean Catch (Midstream)  05-18-23   >100,000 CFU/ml Escherichia coli  --  Escherichia coli      .KOH Other  05-17-23 --  --  --      Catheterized Catheterized  05-15-23   >100,000 CFU/ml Escherichia coli  --  Escherichia coli                RADIOLOGY:    <The imaging below has been reviewed and visualized by me independently. Findings as detailed in report below>    < from: CT Abdomen and Pelvis w/wo IV Cont (04.10.23 @ 10:45) >  IMPRESSION:  Patient has developed a new right obstructive uropathy with delayed   nephrogram and mild to moderate right hydronephrosis secondary to 1.2 cm     < end of copied text >

## 2023-05-23 ENCOUNTER — TRANSCRIPTION ENCOUNTER (OUTPATIENT)
Age: 48
End: 2023-05-23

## 2023-05-23 RX ADMIN — CEFEPIME 100 MILLIGRAM(S): 1 INJECTION, POWDER, FOR SOLUTION INTRAMUSCULAR; INTRAVENOUS at 17:57

## 2023-05-23 RX ADMIN — SENNA PLUS 2 TABLET(S): 8.6 TABLET ORAL at 21:27

## 2023-05-23 RX ADMIN — KETOCONAZOLE 1 APPLICATION(S): 20 AEROSOL, FOAM TOPICAL at 13:11

## 2023-05-23 RX ADMIN — Medication 1 APPLICATION(S): at 17:57

## 2023-05-23 RX ADMIN — HEPARIN SODIUM 5000 UNIT(S): 5000 INJECTION INTRAVENOUS; SUBCUTANEOUS at 21:27

## 2023-05-23 RX ADMIN — HEPARIN SODIUM 5000 UNIT(S): 5000 INJECTION INTRAVENOUS; SUBCUTANEOUS at 05:07

## 2023-05-23 RX ADMIN — Medication 1 APPLICATION(S): at 05:07

## 2023-05-23 RX ADMIN — CEFEPIME 100 MILLIGRAM(S): 1 INJECTION, POWDER, FOR SOLUTION INTRAMUSCULAR; INTRAVENOUS at 05:07

## 2023-05-23 RX ADMIN — HEPARIN SODIUM 5000 UNIT(S): 5000 INJECTION INTRAVENOUS; SUBCUTANEOUS at 13:10

## 2023-05-23 NOTE — PROGRESS NOTE ADULT - ASSESSMENT
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

## 2023-05-23 NOTE — PROGRESS NOTE ADULT - SUBJECTIVE AND OBJECTIVE BOX
DAILY PROGRESS NOTE:         24 hr events:  naeon  itching on left flank, no rash seen     Objective:    Vital Signs Last 24 Hrs  T(C): 36.4 (23 May 2023 05:14), Max: 36.9 (22 May 2023 13:28)  T(F): 97.6 (23 May 2023 05:14), Max: 98.4 (22 May 2023 13:28)  HR: 98 (23 May 2023 05:14) (87 - 106)  BP: 138/85 (23 May 2023 05:14) (113/85 - 157/94)  BP(mean): --  RR: 18 (23 May 2023 05:14) (18 - 18)  SpO2: 98% (23 May 2023 05:14) (96% - 98%)    Parameters below as of 23 May 2023 05:14  Patient On (Oxygen Delivery Method): room air        I&O's Detail    22 May 2023 07:01  -  23 May 2023 07:00  --------------------------------------------------------  IN:    IV PiggyBack: 50 mL    Oral Fluid: 1080 mL  Total IN: 1130 mL    OUT:    Indwelling Catheter - Urethral (mL): 1775 mL  Total OUT: 1775 mL    Total NET: -645 mL          Physical Exam:    General: NAD, well-nourished  Resp: Breathing comfortably on RA  CV: regular rate   : myrtle storey/ william        Laboratory Results:

## 2023-05-23 NOTE — PROGRESS NOTE ADULT - SUBJECTIVE AND OBJECTIVE BOX
Patient is a 47y old  Male who presents with a chief complaint of pt scheduled for left PCNL (22 May 2023 15:35)      SUBJECTIVE / OVERNIGHT EVENTS:    Events noted.  CONSTITUTIONAL: No fever,  or fatigue  RESPIRATORY: No cough, wheezing,  No shortness of breath  CARDIOVASCULAR: No chest pain, palpitations, dizziness, or leg swelling  GASTROINTESTINAL: No abdominal or epigastric pain. No nausea, vomiting.      T(C): 36.6 (05-23-23 @ 21:32), Max: 36.6 (05-23-23 @ 21:32)  HR: 101 (05-23-23 @ 21:32) (95 - 101)  BP: 121/88 (05-23-23 @ 21:32) (121/88 - 141/95)  RR: 18 (05-23-23 @ 21:32) (17 - 18)  SpO2: 96% (05-23-23 @ 21:32) (95% - 96%)      MEDICATIONS  (STANDING):  cefepime   IVPB      cefepime   IVPB 2000 milliGRAM(s) IV Intermittent every 12 hours  clotrimazole 1% Cream 1 Application(s) Topical two times a day  heparin   Injectable 5000 Unit(s) SubCutaneous every 8 hours  ketoconazole 2% Shampoo 1 Application(s) Topical <User Schedule>  sodium chloride 0.9%. 1000 milliLiter(s) (50 mL/Hr) IV Continuous <Continuous>  triamcinolone 0.1% Cream 1 Application(s) Topical every 12 hours    MEDICATIONS  (PRN):  acetaminophen     Tablet .. 650 milliGRAM(s) Oral every 6 hours PRN Temp greater or equal to 38.5C (101.3F), Mild Pain (1 - 3)  ondansetron Injectable 4 milliGRAM(s) IV Push every 6 hours PRN Nausea  senna 2 Tablet(s) Oral at bedtime PRN Constipation  traMADol 25 milliGRAM(s) Oral every 8 hours PRN Moderate Pain (4 - 6)    CAPILLARY BLOOD GLUCOSE        PHYSICAL EXAM:  GENERAL: NAD  NECK: Supple, No JVD  CHEST/LUNG: Clear to auscultation bilaterally; No wheezing.  HEART: Regular rate and rhythm; No murmurs, rubs, or gallops  ABDOMEN: Soft, Nontender, Nondistended; Bowel sounds present  EXTREMITIES:   No edema  NEUROLOGY: AAO X 3            CAPILLARY BLOOD GLUCOSE            RADIOLOGY & ADDITIONAL TESTS:    Imaging Personally Reviewed:    Consultant(s) Notes Reviewed:      Care Discussed with Consultants/Other Providers:

## 2023-05-23 NOTE — PROGRESS NOTE ADULT - ASSESSMENT
47 year old male with PMHx of MS, quadriplegia, neurogenic bladder, known nephrolithiasis; also found to a diffuse rash over trunk forearms, thighs. Admitted to medicine for management of rash and other comorbidities    --Appreciate derm recs. Rash likely seborrheic dermatitis v psoriasis. C/w antifungal and steroids per derm  --UCx w/ Ecoli resistant to cipro. In this setting and hx of Pseudomonal UTI, would recommend starting IV cefepime and continuing this through surgery date.   --Appreciate psych consults.   --C/w medical management  --Pending d/w risk management prior to surgery tmrw for L PCNL/SPT. See progress note from 5/22 for full details of pt conversation            47 year old male with PMHx of MS, quadriplegia, neurogenic bladder, known nephrolithiasis; also found to a diffuse rash over trunk forearms, thighs. Admitted to medicine for management of rash and other comorbidities    --Appreciate derm recs. Rash likely seborrheic dermatitis v psoriasis. C/w antifungal and steroids per derm  --UCx w/ Ecoli resistant to cipro. In this setting and hx of Pseudomonal UTI, would recommend starting IV cefepime and continuing this through surgery date.   --Appreciate psych consults.   --C/w medical management  --Planned for surgery tmrw for L PCNL/SPT. See progress note from 5/22 for full details of pt conversation regarding patient desire to continue his DNR through the surgery  --Please order AM labs  --Please order STAT COVID  --NPO after midnight

## 2023-05-24 ENCOUNTER — RESULT REVIEW (OUTPATIENT)
Age: 48
End: 2023-05-24

## 2023-05-24 LAB
ANION GAP SERPL CALC-SCNC: 12 MMOL/L — SIGNIFICANT CHANGE UP (ref 5–17)
ANION GAP SERPL CALC-SCNC: 14 MMOL/L — SIGNIFICANT CHANGE UP (ref 5–17)
APTT BLD: 30.4 SEC — SIGNIFICANT CHANGE UP (ref 27.5–35.5)
BUN SERPL-MCNC: 29 MG/DL — HIGH (ref 7–23)
BUN SERPL-MCNC: 30 MG/DL — HIGH (ref 7–23)
CALCIUM SERPL-MCNC: 9.2 MG/DL — SIGNIFICANT CHANGE UP (ref 8.4–10.5)
CALCIUM SERPL-MCNC: 9.6 MG/DL — SIGNIFICANT CHANGE UP (ref 8.4–10.5)
CHLORIDE SERPL-SCNC: 107 MMOL/L — SIGNIFICANT CHANGE UP (ref 96–108)
CHLORIDE SERPL-SCNC: 109 MMOL/L — HIGH (ref 96–108)
CO2 SERPL-SCNC: 19 MMOL/L — LOW (ref 22–31)
CO2 SERPL-SCNC: 20 MMOL/L — LOW (ref 22–31)
CREAT SERPL-MCNC: 1.05 MG/DL — SIGNIFICANT CHANGE UP (ref 0.5–1.3)
CREAT SERPL-MCNC: 1.2 MG/DL — SIGNIFICANT CHANGE UP (ref 0.5–1.3)
EGFR: 76 ML/MIN/1.73M2 — SIGNIFICANT CHANGE UP
EGFR: 89 ML/MIN/1.73M2 — SIGNIFICANT CHANGE UP
GAS PNL BLDA: SIGNIFICANT CHANGE UP
GLUCOSE SERPL-MCNC: 107 MG/DL — HIGH (ref 70–99)
GLUCOSE SERPL-MCNC: 107 MG/DL — HIGH (ref 70–99)
HCT VFR BLD CALC: 38.6 % — LOW (ref 39–50)
HCT VFR BLD CALC: 39.2 % — SIGNIFICANT CHANGE UP (ref 39–50)
HGB BLD-MCNC: 12.5 G/DL — LOW (ref 13–17)
HGB BLD-MCNC: 12.9 G/DL — LOW (ref 13–17)
INR BLD: 1.13 RATIO — SIGNIFICANT CHANGE UP (ref 0.88–1.16)
MAGNESIUM SERPL-MCNC: 2 MG/DL — SIGNIFICANT CHANGE UP (ref 1.6–2.6)
MCHC RBC-ENTMCNC: 28.1 PG — SIGNIFICANT CHANGE UP (ref 27–34)
MCHC RBC-ENTMCNC: 28.4 PG — SIGNIFICANT CHANGE UP (ref 27–34)
MCHC RBC-ENTMCNC: 32.4 GM/DL — SIGNIFICANT CHANGE UP (ref 32–36)
MCHC RBC-ENTMCNC: 32.9 GM/DL — SIGNIFICANT CHANGE UP (ref 32–36)
MCV RBC AUTO: 86.3 FL — SIGNIFICANT CHANGE UP (ref 80–100)
MCV RBC AUTO: 86.7 FL — SIGNIFICANT CHANGE UP (ref 80–100)
NRBC # BLD: 0 /100 WBCS — SIGNIFICANT CHANGE UP (ref 0–0)
NRBC # BLD: 0 /100 WBCS — SIGNIFICANT CHANGE UP (ref 0–0)
PHOSPHATE SERPL-MCNC: 3.1 MG/DL — SIGNIFICANT CHANGE UP (ref 2.5–4.5)
PLATELET # BLD AUTO: 305 K/UL — SIGNIFICANT CHANGE UP (ref 150–400)
PLATELET # BLD AUTO: 326 K/UL — SIGNIFICANT CHANGE UP (ref 150–400)
POTASSIUM SERPL-MCNC: 3.9 MMOL/L — SIGNIFICANT CHANGE UP (ref 3.5–5.3)
POTASSIUM SERPL-MCNC: 4.2 MMOL/L — SIGNIFICANT CHANGE UP (ref 3.5–5.3)
POTASSIUM SERPL-SCNC: 3.9 MMOL/L — SIGNIFICANT CHANGE UP (ref 3.5–5.3)
POTASSIUM SERPL-SCNC: 4.2 MMOL/L — SIGNIFICANT CHANGE UP (ref 3.5–5.3)
PROTHROM AB SERPL-ACNC: 13.1 SEC — SIGNIFICANT CHANGE UP (ref 10.5–13.4)
RBC # BLD: 4.45 M/UL — SIGNIFICANT CHANGE UP (ref 4.2–5.8)
RBC # BLD: 4.54 M/UL — SIGNIFICANT CHANGE UP (ref 4.2–5.8)
RBC # FLD: 15.5 % — HIGH (ref 10.3–14.5)
RBC # FLD: 15.7 % — HIGH (ref 10.3–14.5)
SARS-COV-2 RNA SPEC QL NAA+PROBE: SIGNIFICANT CHANGE UP
SODIUM SERPL-SCNC: 140 MMOL/L — SIGNIFICANT CHANGE UP (ref 135–145)
SODIUM SERPL-SCNC: 141 MMOL/L — SIGNIFICANT CHANGE UP (ref 135–145)
TROPONIN T, HIGH SENSITIVITY RESULT: 34 NG/L — SIGNIFICANT CHANGE UP (ref 0–51)
WBC # BLD: 10.4 K/UL — SIGNIFICANT CHANGE UP (ref 3.8–10.5)
WBC # BLD: 12.25 K/UL — HIGH (ref 3.8–10.5)
WBC # FLD AUTO: 10.4 K/UL — SIGNIFICANT CHANGE UP (ref 3.8–10.5)
WBC # FLD AUTO: 12.25 K/UL — HIGH (ref 3.8–10.5)

## 2023-05-24 PROCEDURE — 71045 X-RAY EXAM CHEST 1 VIEW: CPT | Mod: 26

## 2023-05-24 PROCEDURE — 93010 ELECTROCARDIOGRAM REPORT: CPT

## 2023-05-24 PROCEDURE — 51040 INCISE & DRAIN BLADDER: CPT

## 2023-05-24 PROCEDURE — 52356 CYSTO/URETERO W/LITHOTRIPSY: CPT | Mod: RT

## 2023-05-24 PROCEDURE — 53899 UNLISTED PX URINARY SYSTEM: CPT

## 2023-05-24 PROCEDURE — 88300 SURGICAL PATH GROSS: CPT | Mod: 26

## 2023-05-24 DEVICE — URETERAL SHEATH NAVIGATOR HD 12/14FR X 46CM: Type: IMPLANTABLE DEVICE | Status: FUNCTIONAL

## 2023-05-24 DEVICE — STENT URET INLAY OPTIMA 6FRX28CM: Type: IMPLANTABLE DEVICE | Status: FUNCTIONAL

## 2023-05-24 DEVICE — LASER FIBER SOLTIVE 200 BALL TIP: Type: IMPLANTABLE DEVICE | Status: FUNCTIONAL

## 2023-05-24 DEVICE — CATH INTRODUCER LAWERENCE SUPRA-FOLEY 16FR: Type: IMPLANTABLE DEVICE | Status: FUNCTIONAL

## 2023-05-24 DEVICE — CATH STEERABLE IRR ASPIRATION 70CM: Type: IMPLANTABLE DEVICE | Status: FUNCTIONAL

## 2023-05-24 DEVICE — URETERAL CATH OPEN END 5FR 70CM: Type: IMPLANTABLE DEVICE | Status: FUNCTIONAL

## 2023-05-24 DEVICE — STONE BASKET ZEROTIP NITINOL 4-WIRE 1.9FR 120CM X 12MM: Type: IMPLANTABLE DEVICE | Status: FUNCTIONAL

## 2023-05-24 DEVICE — NEPHROSTOMY BALLOON CATH NEPHROMAX 24FR 17CM PTFE: Type: IMPLANTABLE DEVICE | Status: FUNCTIONAL

## 2023-05-24 DEVICE — GUIDEWIRE SENSOR DUAL-FLEX NITINOL STRAIGHT .035" X 150CM: Type: IMPLANTABLE DEVICE | Status: FUNCTIONAL

## 2023-05-24 RX ORDER — ACETAMINOPHEN 500 MG
650 TABLET ORAL EVERY 6 HOURS
Refills: 0 | Status: DISCONTINUED | OUTPATIENT
Start: 2023-05-24 | End: 2023-05-31

## 2023-05-24 RX ORDER — HYDROMORPHONE HYDROCHLORIDE 2 MG/ML
1 INJECTION INTRAMUSCULAR; INTRAVENOUS; SUBCUTANEOUS ONCE
Refills: 0 | Status: DISCONTINUED | OUTPATIENT
Start: 2023-05-24 | End: 2023-05-24

## 2023-05-24 RX ORDER — OXYBUTYNIN CHLORIDE 5 MG
5 TABLET ORAL ONCE
Refills: 0 | Status: COMPLETED | OUTPATIENT
Start: 2023-05-24 | End: 2023-05-24

## 2023-05-24 RX ORDER — CEFEPIME 1 G/1
2000 INJECTION, POWDER, FOR SOLUTION INTRAMUSCULAR; INTRAVENOUS EVERY 12 HOURS
Refills: 0 | Status: DISCONTINUED | OUTPATIENT
Start: 2023-05-24 | End: 2023-05-31

## 2023-05-24 RX ORDER — ONDANSETRON 8 MG/1
4 TABLET, FILM COATED ORAL ONCE
Refills: 0 | Status: DISCONTINUED | OUTPATIENT
Start: 2023-05-24 | End: 2023-05-25

## 2023-05-24 RX ORDER — SENNA PLUS 8.6 MG/1
2 TABLET ORAL AT BEDTIME
Refills: 0 | Status: DISCONTINUED | OUTPATIENT
Start: 2023-05-24 | End: 2023-05-31

## 2023-05-24 RX ORDER — FENTANYL CITRATE 50 UG/ML
25 INJECTION INTRAVENOUS
Refills: 0 | Status: DISCONTINUED | OUTPATIENT
Start: 2023-05-24 | End: 2023-05-25

## 2023-05-24 RX ORDER — HEPARIN SODIUM 5000 [USP'U]/ML
5000 INJECTION INTRAVENOUS; SUBCUTANEOUS EVERY 8 HOURS
Refills: 0 | Status: DISCONTINUED | OUTPATIENT
Start: 2023-05-24 | End: 2023-05-31

## 2023-05-24 RX ORDER — HYDROMORPHONE HYDROCHLORIDE 2 MG/ML
0.5 INJECTION INTRAMUSCULAR; INTRAVENOUS; SUBCUTANEOUS ONCE
Refills: 0 | Status: DISCONTINUED | OUTPATIENT
Start: 2023-05-24 | End: 2023-05-24

## 2023-05-24 RX ORDER — TRAMADOL HYDROCHLORIDE 50 MG/1
25 TABLET ORAL EVERY 8 HOURS
Refills: 0 | Status: DISCONTINUED | OUTPATIENT
Start: 2023-05-24 | End: 2023-05-31

## 2023-05-24 RX ORDER — ONDANSETRON 8 MG/1
4 TABLET, FILM COATED ORAL EVERY 6 HOURS
Refills: 0 | Status: DISCONTINUED | OUTPATIENT
Start: 2023-05-24 | End: 2023-05-31

## 2023-05-24 RX ADMIN — FENTANYL CITRATE 25 MICROGRAM(S): 50 INJECTION INTRAVENOUS at 22:35

## 2023-05-24 RX ADMIN — CEFEPIME 100 MILLIGRAM(S): 1 INJECTION, POWDER, FOR SOLUTION INTRAMUSCULAR; INTRAVENOUS at 05:20

## 2023-05-24 RX ADMIN — Medication 5 MILLIGRAM(S): at 23:30

## 2023-05-24 RX ADMIN — HEPARIN SODIUM 5000 UNIT(S): 5000 INJECTION INTRAVENOUS; SUBCUTANEOUS at 13:09

## 2023-05-24 RX ADMIN — HYDROMORPHONE HYDROCHLORIDE 0.5 MILLIGRAM(S): 2 INJECTION INTRAMUSCULAR; INTRAVENOUS; SUBCUTANEOUS at 23:30

## 2023-05-24 RX ADMIN — HYDROMORPHONE HYDROCHLORIDE 1 MILLIGRAM(S): 2 INJECTION INTRAMUSCULAR; INTRAVENOUS; SUBCUTANEOUS at 23:15

## 2023-05-24 RX ADMIN — Medication 1 APPLICATION(S): at 05:20

## 2023-05-24 RX ADMIN — Medication 1 APPLICATION(S): at 05:19

## 2023-05-24 RX ADMIN — FENTANYL CITRATE 25 MICROGRAM(S): 50 INJECTION INTRAVENOUS at 22:50

## 2023-05-24 RX ADMIN — HEPARIN SODIUM 5000 UNIT(S): 5000 INJECTION INTRAVENOUS; SUBCUTANEOUS at 05:19

## 2023-05-24 RX ADMIN — HYDROMORPHONE HYDROCHLORIDE 1 MILLIGRAM(S): 2 INJECTION INTRAMUSCULAR; INTRAVENOUS; SUBCUTANEOUS at 23:00

## 2023-05-24 RX ADMIN — HYDROMORPHONE HYDROCHLORIDE 0.5 MILLIGRAM(S): 2 INJECTION INTRAMUSCULAR; INTRAVENOUS; SUBCUTANEOUS at 23:15

## 2023-05-24 NOTE — PROGRESS NOTE ADULT - SUBJECTIVE AND OBJECTIVE BOX
Patient is a 47y old  Male who presents with a chief complaint of pt scheduled for left PCNL (22 May 2023 15:35)      SUBJECTIVE / OVERNIGHT EVENTS:    Events noted.  CONSTITUTIONAL: No fever,  or fatigue  RESPIRATORY: No cough, wheezing,  No shortness of breath  CARDIOVASCULAR: No chest pain, palpitations, dizziness, or leg swelling  GASTROINTESTINAL: No abdominal or epigastric pain. No nausea, vomiting.    T(C): 36.2 (05-24-23 @ 21:00), Max: 36.9 (05-24-23 @ 18:20)  HR: 87 (05-24-23 @ 21:45) (87 - 98)  BP: 143/92 (05-24-23 @ 21:45) (115/76 - 148/87)  RR: 16 (05-24-23 @ 21:00) (16 - 18)  SpO2: 97% (05-24-23 @ 21:45) (95% - 99%)        MEDICATIONS  (STANDING):  cefepime   IVPB 2000 milliGRAM(s) IV Intermittent every 12 hours  heparin   Injectable 5000 Unit(s) SubCutaneous every 8 hours  sodium chloride 0.9%. 1000 milliLiter(s) (50 mL/Hr) IV Continuous <Continuous>    MEDICATIONS  (PRN):  acetaminophen     Tablet .. 650 milliGRAM(s) Oral every 6 hours PRN Temp greater or equal to 38.5C (101.3F), Mild Pain (1 - 3)  fentaNYL    Injectable 25 MICROGram(s) IV Push every 5 minutes PRN Moderate Pain (4 - 6)  ondansetron Injectable 4 milliGRAM(s) IV Push once PRN Nausea and/or Vomiting  ondansetron Injectable 4 milliGRAM(s) IV Push every 6 hours PRN Nausea  senna 2 Tablet(s) Oral at bedtime PRN Constipation  traMADol 25 milliGRAM(s) Oral every 8 hours PRN Moderate Pain (4 - 6)                 PHYSICAL EXAM:  GENERAL: NAD  NECK: Supple, No JVD  CHEST/LUNG: Clear to auscultation bilaterally; No wheezing.  HEART: Regular rate and rhythm; No murmurs, rubs, or gallops  ABDOMEN: Soft, Nontender, Nondistended; Bowel sounds present  EXTREMITIES:   No edema  NEUROLOGY: AAO X 3                            12.5   10.40 )-----------( 326      ( 24 May 2023 04:21 )             38.6             PT/INR - ( 24 May 2023 04:22 )   PT: 13.1 sec;   INR: 1.13 ratio         PTT - ( 24 May 2023 04:21 )  PTT:30.4 sec  141|109|30<107  3.9|20|1.20  9.6,--,--  05-24 @ 04:21    RADIOLOGY & ADDITIONAL TESTS:    Imaging Personally Reviewed:    Consultant(s) Notes Reviewed:      Care Discussed with Consultants/Other Providers:

## 2023-05-24 NOTE — PROGRESS NOTE ADULT - ASSESSMENT
47 year old male with PMHx of MS, quadriplegia, neurogenic bladder, known nephrolithiasis; also found to a diffuse rash over trunk forearms, thighs. Admitted to medicine for management of rash and other comorbidities    --Appreciate derm recs. Rash likely seborrheic dermatitis v psoriasis. C/w antifungal and steroids per derm  --UCx w/ Ecoli resistant to cipro. In this setting and hx of Pseudomonal UTI, would recommend starting IV cefepime and continuing this through surgery date.   --Appreciate psych consults.   --C/w medical management  --Planned for surgery today for L PCNL/SPT/L URS. See progress note from 5/22 for full details of pt conversation regarding patient desire to continue his DNR through the surgery  --AM labs, renny reviewed.   --Keep NPO

## 2023-05-24 NOTE — CHART NOTE - NSCHARTNOTEFT_GEN_A_CORE
46 y/o M with a PMH of Advanced MS (diagnosed 1995), Quadriplegia, PE (2019), depression, neurogenic bladder with chronic iraheta catheter, Nephrolithiasis, S/P Left PCN in the past who presents to the ER for persistent flank pain. Patient is now s/p cystoscopy, bladder stone removal, right ureteroscopy, laser lithotripsy, right ureteral stent, and suprapubic tube placement.      Called to bedside RN approximately 21:25, patient endorsing 8/10 chest pain and shortness of breath, stating, "It feels like there are bricks sitting on my chest" Patient denies fevers, chills, palpitations, N/V. Patient has remained hemodynamically stable during PACU stay. HR 81 NSR, /93, 98% on while on 2L NC, afebrile. Patient placed on 100% non-rebreather, EKG performed showing NSR with no apparent evidence of ischemia. ABG with lytes, CBC, BMP, mag, phos, troponins, and chest x-ray performed.     Dr. Pena, Dr. Diop and urology team notified regarding PACU course. Patient remains hemodynamically stable at this time. Troponins negative       MEDICATIONS  (STANDING):  cefepime   IVPB 2000 milliGRAM(s) IV Intermittent every 12 hours  heparin   Injectable 5000 Unit(s) SubCutaneous every 8 hours  sodium chloride 0.9%. 1000 milliLiter(s) (50 mL/Hr) IV Continuous <Continuous>    MEDICATIONS  (PRN):  acetaminophen     Tablet .. 650 milliGRAM(s) Oral every 6 hours PRN Temp greater or equal to 38.5C (101.3F), Mild Pain (1 - 3)  fentaNYL    Injectable 25 MICROGram(s) IV Push every 5 minutes PRN Moderate Pain (4 - 6)  ondansetron Injectable 4 milliGRAM(s) IV Push once PRN Nausea and/or Vomiting  ondansetron Injectable 4 milliGRAM(s) IV Push every 6 hours PRN Nausea  senna 2 Tablet(s) Oral at bedtime PRN Constipation  traMADol 25 milliGRAM(s) Oral every 8 hours PRN Moderate Pain (4 - 6)      PHYSICAL EXAM:  ICU Vital Signs Last 24 Hrs  T(C): 36.2 (24 May 2023 21:00), Max: 36.9 (24 May 2023 18:20)  T(F): 97.2 (24 May 2023 21:00), Max: 98.4 (24 May 2023 18:20)  HR: 87 (24 May 2023 21:45) (72 - 101)  BP: 143/92 (24 May 2023 21:45) (115/76 - 148/87)  BP(mean): 112 (24 May 2023 21:45) (91 - 112)  ABP: --  ABP(mean): --  RR: 16 (24 May 2023 21:00) (16 - 18)  SpO2: 97% (24 May 2023 21:45) (95% - 99%)    O2 Parameters below as of 24 May 2023 21:45  Patient On (Oxygen Delivery Method): nasal cannula  O2 Flow (L/min): 2        I&O's Summary    23 May 2023 07:01  -  24 May 2023 07:00  --------------------------------------------------------  IN: 1160 mL / OUT: 1850 mL / NET: -690 mL    24 May 2023 07:01  -  24 May 2023 22:15  --------------------------------------------------------  IN: 0 mL / OUT: 950 mL / NET: -950 mL        PHYSICAL EXAM:  GENERAL: No acute distress  HEAD:  Atraumatic, Normocephalic  NECK: Supple, no lymphadenopathy, no JVD  CHEST/LUNG: Clear to auscultation bilaterally; No wheezes, rales, or rhonchi  HEART: Regular rate and rhythm. Normal S1/S2. No murmurs, rubs, or gallops. Normotensive, non-tachycardic   ABDOMEN: Soft, non-tender, non-distended  GENITOURINARY: Iraheta is in place, slight blood tinged post-procedure stent placement  NEUROLOGY: A&O x 3, no focal deficits  SKIN: No rashes, lesions or breakdown. Post-op surgical site clean, dry, intact.      LABS:  ABG - ( 24 May 2023 21:56 )  pH, Arterial: 7.37  pH, Blood: x     /  pCO2: 40    /  pO2: 97    / HCO3: 23    / Base Excess: -2.0  /  SaO2: 98.4                            12.9   12.25 )-----------( 305      ( 24 May 2023 21:57 )             39.2     05-24    141  |  109<H>  |  30<H>  ----------------------------<  107<H>  3.9   |  20<L>  |  1.20    Ca    9.6      24 May 2023 04:21      A/P:    47 year old male with a PMH of Advanced MS (diagnosed 1995), Quadriplegia, PE (2019), depression, neurogenic bladder with chronic iraheta catheter, Nephrolithiasis. Patient is now s/p cystoscopy, bladder stone removal, right ureteroscopy, laser lithotripsy, right ureteral stent, and suprapubic tube placement. Post-operatively c/o chest pain     -EKG performed - NSR no s/s of ischemia   -ABG, CBC, BMP, mag, phos, troponins negative  -Chest x-ray performed no acute abnormalities noted   -Patient now on NC and tolerating well  -Patient stated chest pain feels better upon re-assessment    Urology team aware of PACU course, will continue to monitor . 46 y/o M with a PMH of Advanced MS (diagnosed 1995), Quadriplegia, PE (2019), depression, neurogenic bladder with chronic iraheta catheter, Nephrolithiasis, S/P Left PCN in the past who presents to the ER for persistent flank pain. Patient is now s/p cystoscopy, bladder stone removal, right ureteroscopy, laser lithotripsy, right ureteral stent, and suprapubic tube placement.      Called to bedside RN approximately 21:25, patient endorsing 8/10 chest pain and shortness of breath, stating, "It feels like there are bricks sitting on my chest" Patient denies fevers, chills, palpitations, N/V. Patient has remained hemodynamically stable during PACU stay. HR 81 NSR, /93, 98% on while on 2L NC, afebrile. Patient placed on 100% non-rebreather, EKG performed showing NSR with no apparent evidence of ischemia. ABG with lytes, CBC, BMP, mag, phos, troponins, and chest x-ray performed.     Dr. Pena, Dr. Diop and urology team notified regarding PACU course. Patient remains hemodynamically stable at this time. Troponins negative       MEDICATIONS  (STANDING):  cefepime   IVPB 2000 milliGRAM(s) IV Intermittent every 12 hours  heparin   Injectable 5000 Unit(s) SubCutaneous every 8 hours  sodium chloride 0.9%. 1000 milliLiter(s) (50 mL/Hr) IV Continuous <Continuous>    MEDICATIONS  (PRN):  acetaminophen     Tablet .. 650 milliGRAM(s) Oral every 6 hours PRN Temp greater or equal to 38.5C (101.3F), Mild Pain (1 - 3)  fentaNYL    Injectable 25 MICROGram(s) IV Push every 5 minutes PRN Moderate Pain (4 - 6)  ondansetron Injectable 4 milliGRAM(s) IV Push once PRN Nausea and/or Vomiting  ondansetron Injectable 4 milliGRAM(s) IV Push every 6 hours PRN Nausea  senna 2 Tablet(s) Oral at bedtime PRN Constipation  traMADol 25 milliGRAM(s) Oral every 8 hours PRN Moderate Pain (4 - 6)      PHYSICAL EXAM:  ICU Vital Signs Last 24 Hrs  T(C): 36.2 (24 May 2023 21:00), Max: 36.9 (24 May 2023 18:20)  T(F): 97.2 (24 May 2023 21:00), Max: 98.4 (24 May 2023 18:20)  HR: 87 (24 May 2023 21:45) (72 - 101)  BP: 143/92 (24 May 2023 21:45) (115/76 - 148/87)  BP(mean): 112 (24 May 2023 21:45) (91 - 112)  ABP: --  ABP(mean): --  RR: 16 (24 May 2023 21:00) (16 - 18)  SpO2: 97% (24 May 2023 21:45) (95% - 99%)    O2 Parameters below as of 24 May 2023 21:45  Patient On (Oxygen Delivery Method): nasal cannula  O2 Flow (L/min): 2        I&O's Summary    23 May 2023 07:01  -  24 May 2023 07:00  --------------------------------------------------------  IN: 1160 mL / OUT: 1850 mL / NET: -690 mL    24 May 2023 07:01  -  24 May 2023 22:15  --------------------------------------------------------  IN: 0 mL / OUT: 950 mL / NET: -950 mL        PHYSICAL EXAM:  GENERAL: No acute distress  HEAD:  Atraumatic, Normocephalic  NECK: Supple, no lymphadenopathy, no JVD  CHEST/LUNG: Clear to auscultation bilaterally; No wheezes, rales, or rhonchi  HEART: Regular rate and rhythm. Normal S1/S2. No murmurs, rubs, or gallops. Normotensive, non-tachycardic   ABDOMEN: Soft, non-tender, non-distended  GENITOURINARY: Iraheta is in place, slight blood tinged post-procedure stent placement  NEUROLOGY: A&O x 3, no focal deficits  SKIN: Post-op surgical site clean, dry, intact.      LABS:  ABG - ( 24 May 2023 21:56 )  pH, Arterial: 7.37  pH, Blood: x     /  pCO2: 40    /  pO2: 97    / HCO3: 23    / Base Excess: -2.0  /  SaO2: 98.4                            12.9   12.25 )-----------( 305      ( 24 May 2023 21:57 )             39.2     05-24    141  |  109<H>  |  30<H>  ----------------------------<  107<H>  3.9   |  20<L>  |  1.20    Ca    9.6      24 May 2023 04:21      A/P:    47 year old male with a PMH of Advanced MS (diagnosed 1995), Quadriplegia, PE (2019), depression, neurogenic bladder with chronic iraheta catheter, Nephrolithiasis. Patient is now s/p cystoscopy, bladder stone removal, right ureteroscopy, laser lithotripsy, right ureteral stent, and suprapubic tube placement. Post-operatively c/o chest pain     -EKG performed - NSR no s/s of ischemia   -ABG, CBC, BMP, mag, phos, troponins negative  -Chest x-ray performed no acute abnormalities noted - no focal abnormalities on preliminary     -Patient now on NC and tolerating well  -Patient stated chest pain feels better upon re-assessment    Urology team aware of PACU course, will continue to monitor.

## 2023-05-24 NOTE — PRE-ANESTHESIA EVALUATION ADULT - NSANTHPMHFT_GEN_ALL_CORE
47 year old male with PMHx of MS, quadriplegia, neurogenic bladder s/p SPT tube, with known nephrolithiasis

## 2023-05-24 NOTE — PROGRESS NOTE ADULT - ASSESSMENT
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

## 2023-05-24 NOTE — PROGRESS NOTE ADULT - SUBJECTIVE AND OBJECTIVE BOX
DAILY PROGRESS NOTE:         24 hr events:  planned for OR today     Objective:    Vital Signs Last 24 Hrs  T(C): 36.4 (24 May 2023 08:46), Max: 36.7 (24 May 2023 01:31)  T(F): 97.6 (24 May 2023 08:46), Max: 98 (24 May 2023 01:31)  HR: 72 (24 May 2023 08:46) (72 - 101)  BP: 138/88 (24 May 2023 08:46) (121/88 - 141/95)  BP(mean): --  RR: 18 (24 May 2023 08:46) (17 - 18)  SpO2: 97% (24 May 2023 08:46) (95% - 98%)    Parameters below as of 24 May 2023 08:46  Patient On (Oxygen Delivery Method): room air        I&O's Detail    23 May 2023 07:01  -  24 May 2023 07:00  --------------------------------------------------------  IN:    IV PiggyBack: 50 mL    Oral Fluid: 1110 mL  Total IN: 1160 mL    OUT:    Indwelling Catheter - Urethral (mL): 1850 mL  Total OUT: 1850 mL    Total NET: -690 mL      24 May 2023 07:01  -  24 May 2023 11:48  --------------------------------------------------------  IN:  Total IN: 0 mL    OUT:    Indwelling Catheter - Urethral (mL): 350 mL    Oral Fluid: 0 mL  Total OUT: 350 mL    Total NET: -350 mL          Physical Exam:    General: NAD, well-nourished  Resp: Breathing comfortably on RA  Laboratory Results:                          12.5   10.40 )-----------( 326      ( 24 May 2023 04:21 )             38.6     05-24    141  |  109<H>  |  30<H>  ----------------------------<  107<H>  3.9   |  20<L>  |  1.20    Ca    9.6      24 May 2023 04:21      PT/INR - ( 24 May 2023 04:22 )   PT: 13.1 sec;   INR: 1.13 ratio         PTT - ( 24 May 2023 04:21 )  PTT:30.4 sec

## 2023-05-25 RX ORDER — DIAZEPAM 5 MG
2 TABLET ORAL EVERY 6 HOURS
Refills: 0 | Status: DISCONTINUED | OUTPATIENT
Start: 2023-05-25 | End: 2023-05-25

## 2023-05-25 RX ORDER — DIAZEPAM 5 MG
2 TABLET ORAL EVERY 6 HOURS
Refills: 0 | Status: DISCONTINUED | OUTPATIENT
Start: 2023-05-25 | End: 2023-05-31

## 2023-05-25 RX ORDER — OXYBUTYNIN CHLORIDE 5 MG
5 TABLET ORAL EVERY 8 HOURS
Refills: 0 | Status: DISCONTINUED | OUTPATIENT
Start: 2023-05-25 | End: 2023-05-26

## 2023-05-25 RX ADMIN — TRAMADOL HYDROCHLORIDE 25 MILLIGRAM(S): 50 TABLET ORAL at 09:33

## 2023-05-25 RX ADMIN — Medication 2 MILLIGRAM(S): at 13:38

## 2023-05-25 RX ADMIN — CEFEPIME 100 MILLIGRAM(S): 1 INJECTION, POWDER, FOR SOLUTION INTRAMUSCULAR; INTRAVENOUS at 05:13

## 2023-05-25 RX ADMIN — TRAMADOL HYDROCHLORIDE 25 MILLIGRAM(S): 50 TABLET ORAL at 01:49

## 2023-05-25 RX ADMIN — TRAMADOL HYDROCHLORIDE 25 MILLIGRAM(S): 50 TABLET ORAL at 10:33

## 2023-05-25 RX ADMIN — HEPARIN SODIUM 5000 UNIT(S): 5000 INJECTION INTRAVENOUS; SUBCUTANEOUS at 05:13

## 2023-05-25 RX ADMIN — Medication 5 MILLIGRAM(S): at 03:06

## 2023-05-25 RX ADMIN — HEPARIN SODIUM 5000 UNIT(S): 5000 INJECTION INTRAVENOUS; SUBCUTANEOUS at 21:20

## 2023-05-25 RX ADMIN — CEFEPIME 100 MILLIGRAM(S): 1 INJECTION, POWDER, FOR SOLUTION INTRAMUSCULAR; INTRAVENOUS at 17:10

## 2023-05-25 RX ADMIN — TRAMADOL HYDROCHLORIDE 25 MILLIGRAM(S): 50 TABLET ORAL at 02:49

## 2023-05-25 NOTE — PROGRESS NOTE ADULT - SUBJECTIVE AND OBJECTIVE BOX
Patient is a 47y old  Male who presents with a chief complaint of pt scheduled for left PCNL (25 May 2023 09:28)      SUBJECTIVE / OVERNIGHT EVENTS:     MEDICATIONS  (STANDING):  cefepime   IVPB 2000 milliGRAM(s) IV Intermittent every 12 hours  heparin   Injectable 5000 Unit(s) SubCutaneous every 8 hours  sodium chloride 0.9%. 1000 milliLiter(s) (50 mL/Hr) IV Continuous <Continuous>    MEDICATIONS  (PRN):  acetaminophen     Tablet .. 650 milliGRAM(s) Oral every 6 hours PRN Temp greater or equal to 38.5C (101.3F), Mild Pain (1 - 3)  diazepam    Tablet 2 milliGRAM(s) Oral every 6 hours PRN SEVERE BLADDER SPASM  ondansetron Injectable 4 milliGRAM(s) IV Push every 6 hours PRN Nausea  oxybutynin 5 milliGRAM(s) Oral every 8 hours PRN Bladder spasms  senna 2 Tablet(s) Oral at bedtime PRN Constipation  traMADol 25 milliGRAM(s) Oral every 8 hours PRN Moderate Pain (4 - 6)      CAPILLARY BLOOD GLUCOSE        I&O's Summary    24 May 2023 07:01  -  25 May 2023 07:00  --------------------------------------------------------  IN: 370 mL / OUT: 1700 mL / NET: -1330 mL    25 May 2023 07:01  -  25 May 2023 21:18  --------------------------------------------------------  IN: 570 mL / OUT: 1150 mL / NET: -580 mL      T(C): 36.6 (05-25-23 @ 17:33), Max: 36.7 (05-25-23 @ 13:03)  HR: 84 (05-25-23 @ 17:33) (84 - 98)  BP: 136/90 (05-25-23 @ 17:33) (136/90 - 166/99)  RR: 18 (05-25-23 @ 17:33) (18 - 18)  SpO2: 94% (05-25-23 @ 17:33) (94% - 96%)    PHYSICAL EXAM:  GENERAL: NAD, well-developed  HEAD:  Atraumatic, Normocephalic  EYES: EOMI, PERRLA, conjunctiva and sclera clear  NECK: Supple, No JVD  CHEST/LUNG: Clear to auscultation bilaterally; No wheeze  HEART: Regular rate and rhythm; No murmurs, rubs, or gallops  ABDOMEN: Soft, Nontender, Nondistended; Bowel sounds present  EXTREMITIES:  2+ Peripheral Pulses, No clubbing, cyanosis, or edema  PSYCH: AAOx3  NEUROLOGY: non-focal  SKIN: No rashes or lesions    LABS:                        12.9   12.25 )-----------( 305      ( 24 May 2023 21:57 )             39.2     05-24    140  |  107  |  29<H>  ----------------------------<  107<H>  4.2   |  19<L>  |  1.05    Ca    9.2      24 May 2023 21:57  Phos  3.1     05-24  Mg     2.0     05-24      PT/INR - ( 24 May 2023 04:22 )   PT: 13.1 sec;   INR: 1.13 ratio         PTT - ( 24 May 2023 04:21 )  PTT:30.4 sec          RADIOLOGY & ADDITIONAL TESTS:    Imaging Personally Reviewed:    Consultant(s) Notes Reviewed:      Care Discussed with Consultants/Other Providers:

## 2023-05-25 NOTE — PROGRESS NOTE ADULT - ASSESSMENT
A/P: 47y Male s/p R URS, LL, stent placement, cystolithalopaxy, SPT placement  -encouraged incentive spirometry given symptoms  -recommend repeat troponins in AM, can consider cardiology eval if no improvement in AM/or soon if change in clinical symptoms/status  -acceptable for urine output with recent suprapubic tube/ureteroscopy to be intermittently blood tinged, however currently yellow  -recommend ditropan 5mg q8h prn for bladder spasms  -can try valium 2mg q6h for severe or intractable bladder spasms  -recommend AM labs/AM troponins

## 2023-05-25 NOTE — PROGRESS NOTE ADULT - SUBJECTIVE AND OBJECTIVE BOX
DAILY PROGRESS NOTE:         24 hr events:  s/p l urs, spt placement, cystolithalopaxy  continues to have very bothersome bladder spasms.     Objective:    Vital Signs Last 24 Hrs  T(C): 36.6 (25 May 2023 04:36), Max: 36.9 (24 May 2023 18:20)  T(F): 97.8 (25 May 2023 04:36), Max: 98.4 (24 May 2023 18:20)  HR: 90 (25 May 2023 04:36) (77 - 98)  BP: 128/89 (25 May 2023 04:36) (115/76 - 148/87)  BP(mean): 103 (25 May 2023 00:30) (91 - 114)  RR: 18 (25 May 2023 04:36) (16 - 18)  SpO2: 97% (25 May 2023 04:36) (95% - 100%)    Parameters below as of 25 May 2023 04:36  Patient On (Oxygen Delivery Method): room air        I&O's Detail    24 May 2023 07:01  -  25 May 2023 07:00  --------------------------------------------------------  IN:    Oral Fluid: 120 mL    sodium chloride 0.9%: 250 mL  Total IN: 370 mL    OUT:    Indwelling Catheter - Suprapubic (mL): 350 mL    Indwelling Catheter - Urethral (mL): 1350 mL  Total OUT: 1700 mL    Total NET: -1330 mL          Physical Exam:    General: NAD, well-nourished  Resp: Breathing comfortably on RA  CV: regular rate   : spt to drainage, cyu. some drainage around iraheta onto dressing.     Laboratory Results:                          12.9   12.25 )-----------( 305      ( 24 May 2023 21:57 )             39.2     05-24    140  |  107  |  29<H>  ----------------------------<  107<H>  4.2   |  19<L>  |  1.05    Ca    9.2      24 May 2023 21:57  Phos  3.1     05-24  Mg     2.0     05-24      PT/INR - ( 24 May 2023 04:22 )   PT: 13.1 sec;   INR: 1.13 ratio         PTT - ( 24 May 2023 04:21 )  PTT:30.4 sec

## 2023-05-25 NOTE — PROGRESS NOTE ADULT - ASSESSMENT
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 In this setting and hx of Pseudomonal UTI, continue on IV cefepime.         s/p L URS/SPT/cystolithalopaxy   --Lower abdominal cramps is likely bladder spasms. Continue w/ ditropan and valium as needed for spasms    -     Rash likely seborrheic dermatitis v psoriasis. C/w antifungal and steroids per derm      Patient medically optimized for the procedure

## 2023-05-25 NOTE — PROGRESS NOTE ADULT - ASSESSMENT
47 year old male with PMHx of MS, quadriplegia, neurogenic bladder, known nephrolithiasis; also found to a diffuse rash over trunk forearms, thighs. Admitted to medicine for management of rash and other comorbidities    --Now s/p L URS/SPT/cystolithalopaxy   --Lower abdominal cramps is likely bladder spasms. Continue w/ ditropan and valium as needed for spasms along with other PO/IV pain medication   --Appreciate derm recs. Rash likely seborrheic dermatitis v psoriasis. C/w antifungal and steroids per derm  --UCx w/ Ecoli resistant to cipro. In this setting and hx of Pseudomonal UTI, continue on IV cefepime.   --Appreciate psych consults.   --C/w medical management

## 2023-05-25 NOTE — PROGRESS NOTE ADULT - SUBJECTIVE AND OBJECTIVE BOX
Post op Check    Pt seen and examined at bedside. Initially post op, he complaining of chest pain and sob. EKG was done, which showed NSR and CXR was done, which is benign on prelim read. Vital signs stable. He was also evaluated and cleared by anesthesia. Upon reassessment, states he is having suprapubic pain, which is worse than his chest pain. States that now his chest pain and SOB have resolved, however he feels like he is having trouble taking the full deep breath in. Appears comfortable and breathing well with conversation.  Suprapubic pain is improved with ditropan.     Vital Signs Last 24 Hrs  T(C): 36 (24 May 2023 23:45), Max: 36.9 (24 May 2023 18:20)  T(F): 96.8 (24 May 2023 23:45), Max: 98.4 (24 May 2023 18:20)  HR: 84 (25 May 2023 00:00) (72 - 101)  BP: 134/82 (25 May 2023 00:00) (115/76 - 148/87)  BP(mean): 103 (25 May 2023 00:00) (91 - 114)  RR: 17 (25 May 2023 00:00) (16 - 18)  SpO2: 98% (25 May 2023 00:00) (95% - 100%)    Parameters below as of 25 May 2023 00:00  Patient On (Oxygen Delivery Method): room air        I&O's Summary    23 May 2023 07:01  -  24 May 2023 07:00  --------------------------------------------------------  IN: 1160 mL / OUT: 1850 mL / NET: -690 mL    24 May 2023 07:01  -  25 May 2023 00:27  --------------------------------------------------------  IN: 250 mL / OUT: 1350 mL / NET: -1100 mL        Physical Exam  Gen: NAD, A&Ox3  Pulm: off nasal cannula, no subcostal retractions  Abd: Soft, NT, ND  : SPT in place, output layered peach and yellow                          12.9   12.25 )-----------( 305      ( 24 May 2023 21:57 )             39.2       05-24    140  |  107  |  29<H>  ----------------------------<  107<H>  4.2   |  19<L>  |  1.05    Ca    9.2      24 May 2023 21:57  Phos  3.1     05-24  Mg     2.0     05-24

## 2023-05-26 LAB
ANION GAP SERPL CALC-SCNC: 13 MMOL/L — SIGNIFICANT CHANGE UP (ref 5–17)
BUN SERPL-MCNC: 30 MG/DL — HIGH (ref 7–23)
CALCIUM SERPL-MCNC: 8.8 MG/DL — SIGNIFICANT CHANGE UP (ref 8.4–10.5)
CHLORIDE SERPL-SCNC: 105 MMOL/L — SIGNIFICANT CHANGE UP (ref 96–108)
CO2 SERPL-SCNC: 21 MMOL/L — LOW (ref 22–31)
CREAT SERPL-MCNC: 1.32 MG/DL — HIGH (ref 0.5–1.3)
EGFR: 67 ML/MIN/1.73M2 — SIGNIFICANT CHANGE UP
GLUCOSE SERPL-MCNC: 96 MG/DL — SIGNIFICANT CHANGE UP (ref 70–99)
HCT VFR BLD CALC: 39.2 % — SIGNIFICANT CHANGE UP (ref 39–50)
HGB BLD-MCNC: 12.7 G/DL — LOW (ref 13–17)
MCHC RBC-ENTMCNC: 28 PG — SIGNIFICANT CHANGE UP (ref 27–34)
MCHC RBC-ENTMCNC: 32.4 GM/DL — SIGNIFICANT CHANGE UP (ref 32–36)
MCV RBC AUTO: 86.3 FL — SIGNIFICANT CHANGE UP (ref 80–100)
NRBC # BLD: 0 /100 WBCS — SIGNIFICANT CHANGE UP (ref 0–0)
PLATELET # BLD AUTO: 300 K/UL — SIGNIFICANT CHANGE UP (ref 150–400)
POTASSIUM SERPL-MCNC: 3.9 MMOL/L — SIGNIFICANT CHANGE UP (ref 3.5–5.3)
POTASSIUM SERPL-SCNC: 3.9 MMOL/L — SIGNIFICANT CHANGE UP (ref 3.5–5.3)
RBC # BLD: 4.54 M/UL — SIGNIFICANT CHANGE UP (ref 4.2–5.8)
RBC # FLD: 15.8 % — HIGH (ref 10.3–14.5)
SODIUM SERPL-SCNC: 139 MMOL/L — SIGNIFICANT CHANGE UP (ref 135–145)
WBC # BLD: 13.42 K/UL — HIGH (ref 3.8–10.5)
WBC # FLD AUTO: 13.42 K/UL — HIGH (ref 3.8–10.5)

## 2023-05-26 PROCEDURE — 99231 SBSQ HOSP IP/OBS SF/LOW 25: CPT

## 2023-05-26 RX ORDER — OXYBUTYNIN CHLORIDE 5 MG
5 TABLET ORAL EVERY 8 HOURS
Refills: 0 | Status: DISCONTINUED | OUTPATIENT
Start: 2023-05-26 | End: 2023-05-31

## 2023-05-26 RX ADMIN — Medication 5 MILLIGRAM(S): at 12:58

## 2023-05-26 RX ADMIN — Medication 2 MILLIGRAM(S): at 18:00

## 2023-05-26 RX ADMIN — HEPARIN SODIUM 5000 UNIT(S): 5000 INJECTION INTRAVENOUS; SUBCUTANEOUS at 05:04

## 2023-05-26 RX ADMIN — CEFEPIME 100 MILLIGRAM(S): 1 INJECTION, POWDER, FOR SOLUTION INTRAMUSCULAR; INTRAVENOUS at 18:00

## 2023-05-26 RX ADMIN — HEPARIN SODIUM 5000 UNIT(S): 5000 INJECTION INTRAVENOUS; SUBCUTANEOUS at 12:59

## 2023-05-26 RX ADMIN — TRAMADOL HYDROCHLORIDE 25 MILLIGRAM(S): 50 TABLET ORAL at 05:04

## 2023-05-26 RX ADMIN — Medication 5 MILLIGRAM(S): at 21:42

## 2023-05-26 RX ADMIN — HEPARIN SODIUM 5000 UNIT(S): 5000 INJECTION INTRAVENOUS; SUBCUTANEOUS at 21:42

## 2023-05-26 RX ADMIN — Medication 2 MILLIGRAM(S): at 08:35

## 2023-05-26 RX ADMIN — TRAMADOL HYDROCHLORIDE 25 MILLIGRAM(S): 50 TABLET ORAL at 06:00

## 2023-05-26 RX ADMIN — CEFEPIME 100 MILLIGRAM(S): 1 INJECTION, POWDER, FOR SOLUTION INTRAMUSCULAR; INTRAVENOUS at 05:04

## 2023-05-26 NOTE — PROGRESS NOTE ADULT - ASSESSMENT
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 In this setting and hx of Pseudomonal UTI, continue on IV cefepime.         s/p L URS/SPT/cystolithalopaxy   --Lower abdominal cramps is likely bladder spasms. Continue w/ ditropan and valium as needed for spasms    -     Rash likely seborrheic dermatitis v psoriasis. C/w antifungal and steroids per derm      Patient medically optimized for the procedure

## 2023-05-26 NOTE — PROGRESS NOTE ADULT - SUBJECTIVE AND OBJECTIVE BOX
Patient is a 47y old  Male who presents with a chief complaint of pt scheduled for left PCNL (25 May 2023 09:28)      SUBJECTIVE / OVERNIGHT EVENTS: no events   Patient reports bladder spasms     MEDICATIONS  (STANDING):  cefepime   IVPB 2000 milliGRAM(s) IV Intermittent every 12 hours  heparin   Injectable 5000 Unit(s) SubCutaneous every 8 hours  sodium chloride 0.9%. 1000 milliLiter(s) (50 mL/Hr) IV Continuous <Continuous>    MEDICATIONS  (PRN):  acetaminophen     Tablet .. 650 milliGRAM(s) Oral every 6 hours PRN Temp greater or equal to 38.5C (101.3F), Mild Pain (1 - 3)  diazepam    Tablet 2 milliGRAM(s) Oral every 6 hours PRN SEVERE BLADDER SPASM  ondansetron Injectable 4 milliGRAM(s) IV Push every 6 hours PRN Nausea  oxybutynin 5 milliGRAM(s) Oral every 8 hours PRN Bladder spasms  senna 2 Tablet(s) Oral at bedtime PRN Constipation  traMADol 25 milliGRAM(s) Oral every 8 hours PRN Moderate Pain (4 - 6)      CAPILLARY BLOOD GLUCOSE        I&O's Summary    24 May 2023 07:01  -  25 May 2023 07:00  --------------------------------------------------------  IN: 370 mL / OUT: 1700 mL / NET: -1330 mL    25 May 2023 07:01  -  25 May 2023 21:18  --------------------------------------------------------  IN: 570 mL / OUT: 1150 mL / NET: -580 mL      T(C): 36.6 (05-25-23 @ 17:33), Max: 36.7 (05-25-23 @ 13:03)  HR: 84 (05-25-23 @ 17:33) (84 - 98)  BP: 136/90 (05-25-23 @ 17:33) (136/90 - 166/99)  RR: 18 (05-25-23 @ 17:33) (18 - 18)  SpO2: 94% (05-25-23 @ 17:33) (94% - 96%)    PHYSICAL EXAM:  GENERAL: NAD, well-developed  HEAD:  Atraumatic, Normocephalic  EYES: EOMI, PERRLA, conjunctiva and sclera clear  NECK: Supple, No JVD  CHEST/LUNG: Clear to auscultation bilaterally; No wheeze  HEART: Regular rate and rhythm; No murmurs, rubs, or gallops  ABDOMEN: Soft, Nontender, Nondistended; Bowel sounds present  EXTREMITIES:  2+ Peripheral Pulses, No clubbing, cyanosis, or edema  PSYCH: AAOx3  NEUROLOGY: non-focal  SKIN: No rashes or lesions    LABS:                        12.9   12.25 )-----------( 305      ( 24 May 2023 21:57 )             39.2     05-24    140  |  107  |  29<H>  ----------------------------<  107<H>  4.2   |  19<L>  |  1.05    Ca    9.2      24 May 2023 21:57  Phos  3.1     05-24  Mg     2.0     05-24      PT/INR - ( 24 May 2023 04:22 )   PT: 13.1 sec;   INR: 1.13 ratio         PTT - ( 24 May 2023 04:21 )  PTT:30.4 sec          RADIOLOGY & ADDITIONAL TESTS:    Imaging Personally Reviewed:    Consultant(s) Notes Reviewed:      Care Discussed with Consultants/Other Providers:

## 2023-05-26 NOTE — PROGRESS NOTE ADULT - ASSESSMENT
47 year old male with PMHx of MS, quadriplegia, neurogenic bladder, known nephrolithiasis; also found to a diffuse rash over trunk forearms, thighs. Admitted to medicine for management of rash and other comorbidities      --Now s/p L URS/SPT/cystolithalopaxy   --Lower abdominal cramps are  bladder spasms. Continue w/ ditropan - will make standing - and valium as needed for spasms along with other PO/IV pain medication     SPT irrigated successfully with NS.

## 2023-05-26 NOTE — PROGRESS NOTE ADULT - SUBJECTIVE AND OBJECTIVE BOX
Subjective  called by RN for severe lower abd pain - intermittent sudden and associated with the sensation of uriantion. RN has successfully irrigated spt.    Objective    Vital signs  T(F): , Max: 98.3 (05-25-23 @ 21:36)  HR: 100 (05-26-23 @ 10:17)  BP: 112/80 (05-26-23 @ 10:17)  SpO2: 94% (05-26-23 @ 10:17)  Wt(kg): --    Output     05-25 @ 07:01  -  05-26 @ 07:00  --------------------------------------------------------  IN: 930 mL / OUT: 1650 mL / NET: -720 mL    05-26 @ 07:01  -  05-26 @ 10:19  --------------------------------------------------------  IN: 340 mL / OUT: 250 mL / NET: 90 mL        Gen awake alert nad axox3  Abd obese soft ntnd   spt in place dressing c/d/i   urine yellow with sediment       Labs      05-24 @ 21:57    WBC 12.25 / Hct 39.2  / SCr 1.05     Culture - Urine (05.18.23 @ 00:06)    -  Meropenem: S <=1   -  Nitrofurantoin: S <=32 Should not be used to treat pyelonephritis   -  Piperacillin/Tazobactam: S <=8   -  Tobramycin: R >8   -  Trimethoprim/Sulfamethoxazole: S <=0.5/9.5   -  Cefazolin: S <=2 For uncomplicated UTI with K. pneumoniae, E. coli, or P. mirablis: CHADD <=16 is sensitive and CHADD >=32 is resistant. This also predicts results for oral agents cefaclor, cefdinir, cefpodoxime, cefprozil, cefuroxime axetil, cephalexin and locarbef for uncomplicated UTI. Note that some isolates may be susceptible to these agents while testing resistant to cefazolin.   -  Cefepime: S <=2   -  Cefoxitin: S <=8   -  Ceftriaxone: S <=1 Enterobacter, Klebsiella aerogenes, Citrobacter, and Serratia may develop resistance during prolonged therapy   -  Cefuroxime: S 8   -  Ciprofloxacin: R >2   -  Ertapenem: S <=0.5   -  Gentamicin: R >8   -  Imipenem: S <=1   -  Levofloxacin: R >4   -  Amikacin: S <=16   -  Amoxicillin/Clavulanic Acid: I 16/8   -  Ampicillin: R >16 These ampicillin results predict results for amoxicillin   -  Ampicillin/Sulbactam: S 8/4 Enterobacter, Klebsiella aerogenes, Citrobacter, and Serratia may develop resistance during prolonged therapy (3-4 days)   -  Aztreonam: S <=4   Specimen Source: Clean Catch Clean Catch (Midstream)   Culture Results:   >100,000 CFU/ml Escherichia coli   Organism Identification: Escherichia coli   Organism: Escherichia coli   Method Type: CHADD

## 2023-05-27 RX ORDER — SODIUM CHLORIDE 9 MG/ML
1000 INJECTION INTRAMUSCULAR; INTRAVENOUS; SUBCUTANEOUS
Refills: 0 | Status: DISCONTINUED | OUTPATIENT
Start: 2023-05-27 | End: 2023-05-31

## 2023-05-27 RX ADMIN — HEPARIN SODIUM 5000 UNIT(S): 5000 INJECTION INTRAVENOUS; SUBCUTANEOUS at 13:58

## 2023-05-27 RX ADMIN — TRAMADOL HYDROCHLORIDE 25 MILLIGRAM(S): 50 TABLET ORAL at 04:41

## 2023-05-27 RX ADMIN — CEFEPIME 100 MILLIGRAM(S): 1 INJECTION, POWDER, FOR SOLUTION INTRAMUSCULAR; INTRAVENOUS at 17:49

## 2023-05-27 RX ADMIN — Medication 2 MILLIGRAM(S): at 00:15

## 2023-05-27 RX ADMIN — CEFEPIME 100 MILLIGRAM(S): 1 INJECTION, POWDER, FOR SOLUTION INTRAMUSCULAR; INTRAVENOUS at 05:45

## 2023-05-27 RX ADMIN — Medication 2 MILLIGRAM(S): at 16:46

## 2023-05-27 RX ADMIN — HEPARIN SODIUM 5000 UNIT(S): 5000 INJECTION INTRAVENOUS; SUBCUTANEOUS at 05:45

## 2023-05-27 RX ADMIN — TRAMADOL HYDROCHLORIDE 25 MILLIGRAM(S): 50 TABLET ORAL at 05:41

## 2023-05-27 RX ADMIN — Medication 5 MILLIGRAM(S): at 05:45

## 2023-05-27 RX ADMIN — TRAMADOL HYDROCHLORIDE 25 MILLIGRAM(S): 50 TABLET ORAL at 18:55

## 2023-05-27 RX ADMIN — TRAMADOL HYDROCHLORIDE 25 MILLIGRAM(S): 50 TABLET ORAL at 17:55

## 2023-05-27 RX ADMIN — HEPARIN SODIUM 5000 UNIT(S): 5000 INJECTION INTRAVENOUS; SUBCUTANEOUS at 21:14

## 2023-05-27 RX ADMIN — Medication 5 MILLIGRAM(S): at 13:58

## 2023-05-27 RX ADMIN — Medication 5 MILLIGRAM(S): at 21:14

## 2023-05-27 RX ADMIN — SODIUM CHLORIDE 75 MILLILITER(S): 9 INJECTION INTRAMUSCULAR; INTRAVENOUS; SUBCUTANEOUS at 19:32

## 2023-05-27 NOTE — PROGRESS NOTE ADULT - SUBJECTIVE AND OBJECTIVE BOX
Patient is a 47y old  Male who presents with a chief complaint of pt scheduled for left PCNL (25 May 2023 09:28)      SUBJECTIVE / OVERNIGHT EVENTS: no events   Patient reports bladder spasms     T(C): 36.8 (05-27-23 @ 17:30), Max: 36.8 (05-27-23 @ 17:30)  HR: 102 (05-27-23 @ 17:30) (96 - 102)  BP: 128/80 (05-27-23 @ 17:30) (121/84 - 147/97)  RR: 18 (05-27-23 @ 17:30) (17 - 18)  SpO2: 95% (05-27-23 @ 17:30) (95% - 97%)        MEDICATIONS  (STANDING):  cefepime   IVPB 2000 milliGRAM(s) IV Intermittent every 12 hours  heparin   Injectable 5000 Unit(s) SubCutaneous every 8 hours  oxybutynin 5 milliGRAM(s) Oral every 8 hours  sodium chloride 0.9%. 1000 milliLiter(s) (50 mL/Hr) IV Continuous <Continuous>  sodium chloride 0.9%. 1000 milliLiter(s) (75 mL/Hr) IV Continuous <Continuous>    MEDICATIONS  (PRN):  acetaminophen     Tablet .. 650 milliGRAM(s) Oral every 6 hours PRN Temp greater or equal to 38.5C (101.3F), Mild Pain (1 - 3)  diazepam    Tablet 2 milliGRAM(s) Oral every 6 hours PRN SEVERE BLADDER SPASM  ondansetron Injectable 4 milliGRAM(s) IV Push every 6 hours PRN Nausea  senna 2 Tablet(s) Oral at bedtime PRN Constipation  traMADol 25 milliGRAM(s) Oral every 8 hours PRN Moderate Pain (4 - 6)      PHYSICAL EXAM:  GENERAL: NAD, well-developed  HEAD:  Atraumatic, Normocephalic  EYES: EOMI, PERRLA, conjunctiva and sclera clear  NECK: Supple, No JVD  CHEST/LUNG: Clear to auscultation bilaterally; No wheeze  HEART: Regular rate and rhythm; No murmurs, rubs, or gallops  ABDOMEN: Soft, Nontender, Nondistended; Bowel sounds present  EXTREMITIES:  2+ Peripheral Pulses, No clubbing, cyanosis, or edema  PSYCH: AAOx3  NEUROLOGY: non-focal  SKIN: No rashes or lesions                          12.7   13.42 )-----------( 300      ( 26 May 2023 16:41 )             39.2               CAPILLARY BLOOD GLUCOSE            RADIOLOGY & ADDITIONAL TESTS:    Imaging Personally Reviewed:    Consultant(s) Notes Reviewed:      Care Discussed with Consultants/Other Providers:

## 2023-05-28 LAB
ANION GAP SERPL CALC-SCNC: 12 MMOL/L — SIGNIFICANT CHANGE UP (ref 5–17)
BUN SERPL-MCNC: 30 MG/DL — HIGH (ref 7–23)
CALCIUM SERPL-MCNC: 9.6 MG/DL — SIGNIFICANT CHANGE UP (ref 8.4–10.5)
CHLORIDE SERPL-SCNC: 108 MMOL/L — SIGNIFICANT CHANGE UP (ref 96–108)
CO2 SERPL-SCNC: 21 MMOL/L — LOW (ref 22–31)
CREAT SERPL-MCNC: 1.19 MG/DL — SIGNIFICANT CHANGE UP (ref 0.5–1.3)
EGFR: 76 ML/MIN/1.73M2 — SIGNIFICANT CHANGE UP
GLUCOSE SERPL-MCNC: 104 MG/DL — HIGH (ref 70–99)
POTASSIUM SERPL-MCNC: 4.2 MMOL/L — SIGNIFICANT CHANGE UP (ref 3.5–5.3)
POTASSIUM SERPL-SCNC: 4.2 MMOL/L — SIGNIFICANT CHANGE UP (ref 3.5–5.3)
SODIUM SERPL-SCNC: 141 MMOL/L — SIGNIFICANT CHANGE UP (ref 135–145)

## 2023-05-28 RX ADMIN — Medication 5 MILLIGRAM(S): at 06:24

## 2023-05-28 RX ADMIN — HEPARIN SODIUM 5000 UNIT(S): 5000 INJECTION INTRAVENOUS; SUBCUTANEOUS at 06:25

## 2023-05-28 RX ADMIN — CEFEPIME 100 MILLIGRAM(S): 1 INJECTION, POWDER, FOR SOLUTION INTRAMUSCULAR; INTRAVENOUS at 06:25

## 2023-05-28 RX ADMIN — HEPARIN SODIUM 5000 UNIT(S): 5000 INJECTION INTRAVENOUS; SUBCUTANEOUS at 12:02

## 2023-05-28 RX ADMIN — SODIUM CHLORIDE 75 MILLILITER(S): 9 INJECTION INTRAMUSCULAR; INTRAVENOUS; SUBCUTANEOUS at 18:22

## 2023-05-28 RX ADMIN — Medication 2 MILLIGRAM(S): at 01:50

## 2023-05-28 RX ADMIN — HEPARIN SODIUM 5000 UNIT(S): 5000 INJECTION INTRAVENOUS; SUBCUTANEOUS at 21:43

## 2023-05-28 RX ADMIN — CEFEPIME 100 MILLIGRAM(S): 1 INJECTION, POWDER, FOR SOLUTION INTRAMUSCULAR; INTRAVENOUS at 18:22

## 2023-05-28 RX ADMIN — Medication 1 ENEMA: at 11:52

## 2023-05-28 RX ADMIN — Medication 5 MILLIGRAM(S): at 13:25

## 2023-05-28 RX ADMIN — Medication 5 MILLIGRAM(S): at 21:43

## 2023-05-28 RX ADMIN — TRAMADOL HYDROCHLORIDE 25 MILLIGRAM(S): 50 TABLET ORAL at 06:23

## 2023-05-28 RX ADMIN — TRAMADOL HYDROCHLORIDE 25 MILLIGRAM(S): 50 TABLET ORAL at 07:23

## 2023-05-28 NOTE — PROGRESS NOTE ADULT - SUBJECTIVE AND OBJECTIVE BOX
Subjective  No acute events overnight. In discomfort this morning due to feeling very constipated. Continuing to experience R leg spasticity.    Objective    Vital signs  T(F): , Max: 98.3 (05-27-23 @ 17:30)  HR: 99 (05-28-23 @ 10:11)  BP: 105/66 (05-28-23 @ 10:11)  SpO2: 96% (05-28-23 @ 10:11)  Wt(kg): --    Output         Gen: NAD  Abd: soft, nontender, nondistended  : SP tube in place, draining clear yellow urine    Labs      05-28 @ 07:02    WBC --    / Hct --    / SCr 1.19     05-26 @ 16:42    WBC --    / Hct --    / SCr 1.32

## 2023-05-28 NOTE — PROGRESS NOTE ADULT - SUBJECTIVE AND OBJECTIVE BOX
Patient is a 47y old  Male who presents with a chief complaint of pt scheduled for left PCNL (25 May 2023 09:28)      SUBJECTIVE / OVERNIGHT EVENTS: no events   Patient reports bladder spasms     T(C): 36.7 (05-28-23 @ 17:29), Max: 36.7 (05-28-23 @ 17:29)  HR: 97 (05-28-23 @ 17:29) (73 - 97)  BP: 130/91 (05-28-23 @ 17:29) (124/86 - 130/91)  RR: 18 (05-28-23 @ 17:29) (18 - 18)  SpO2: 97% (05-28-23 @ 17:29) (97% - 99%)      MEDICATIONS  (STANDING):  cefepime   IVPB 2000 milliGRAM(s) IV Intermittent every 12 hours  heparin   Injectable 5000 Unit(s) SubCutaneous every 8 hours  oxybutynin 5 milliGRAM(s) Oral every 8 hours  sodium chloride 0.9%. 1000 milliLiter(s) (50 mL/Hr) IV Continuous <Continuous>  sodium chloride 0.9%. 1000 milliLiter(s) (75 mL/Hr) IV Continuous <Continuous>    MEDICATIONS  (PRN):  acetaminophen     Tablet .. 650 milliGRAM(s) Oral every 6 hours PRN Temp greater or equal to 38.5C (101.3F), Mild Pain (1 - 3)  diazepam    Tablet 2 milliGRAM(s) Oral every 6 hours PRN SEVERE BLADDER SPASM  ondansetron Injectable 4 milliGRAM(s) IV Push every 6 hours PRN Nausea  senna 2 Tablet(s) Oral at bedtime PRN Constipation  traMADol 25 milliGRAM(s) Oral every 8 hours PRN Moderate Pain (4 - 6)  PHYSICAL EXAM:  GENERAL: NAD, well-developed  HEAD:  Atraumatic, Normocephalic  EYES: EOMI, PERRLA, conjunctiva and sclera clear  NECK: Supple, No JVD  CHEST/LUNG: Clear to auscultation bilaterally; No wheeze  HEART: Regular rate and rhythm; No murmurs, rubs, or gallops  ABDOMEN: Soft, Nontender, Nondistended; Bowel sounds present  EXTREMITIES:  2+ Peripheral Pulses, No clubbing, cyanosis, or edema  PSYCH: AAOx3  NEUROLOGY: non-focal  SKIN: No rashes or lesions                       141|108|30<104  4.2|21|1.19  9.6,--,--  05-28 @ 07:02      RADIOLOGY & ADDITIONAL TESTS:    Imaging Personally Reviewed:    Consultant(s) Notes Reviewed:      Care Discussed with Consultants/Other Providers:

## 2023-05-28 NOTE — PROGRESS NOTE ADULT - ASSESSMENT
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 Continue w/ ditropan and valium as needed for spasms    - Stage 2 URS planning for Wednesday, May 31      -     Rash likely seborrheic dermatitis v psoriasis. C/w antifungal and steroids per derm      Patient medically optimized for the procedure

## 2023-05-28 NOTE — PROGRESS NOTE ADULT - ASSESSMENT
47 year old male with PMHx of MS, quadriplegia, neurogenic bladder, known nephrolithiasis; also found to a diffuse rash over trunk forearms, thighs. Admitted to medicine for management of rash and other comorbidities    - Labs reviewed  - Continue oxybutynin/valium for bladder spasms PRN  - Stage 2 URS planning for Wednesday, May 31

## 2023-05-29 RX ADMIN — CEFEPIME 100 MILLIGRAM(S): 1 INJECTION, POWDER, FOR SOLUTION INTRAMUSCULAR; INTRAVENOUS at 05:17

## 2023-05-29 RX ADMIN — HEPARIN SODIUM 5000 UNIT(S): 5000 INJECTION INTRAVENOUS; SUBCUTANEOUS at 21:58

## 2023-05-29 RX ADMIN — Medication 5 MILLIGRAM(S): at 05:16

## 2023-05-29 RX ADMIN — TRAMADOL HYDROCHLORIDE 25 MILLIGRAM(S): 50 TABLET ORAL at 05:16

## 2023-05-29 RX ADMIN — TRAMADOL HYDROCHLORIDE 25 MILLIGRAM(S): 50 TABLET ORAL at 06:16

## 2023-05-29 RX ADMIN — Medication 5 MILLIGRAM(S): at 13:27

## 2023-05-29 RX ADMIN — Medication 2 MILLIGRAM(S): at 08:50

## 2023-05-29 RX ADMIN — CEFEPIME 100 MILLIGRAM(S): 1 INJECTION, POWDER, FOR SOLUTION INTRAMUSCULAR; INTRAVENOUS at 17:25

## 2023-05-29 RX ADMIN — Medication 5 MILLIGRAM(S): at 21:58

## 2023-05-29 RX ADMIN — HEPARIN SODIUM 5000 UNIT(S): 5000 INJECTION INTRAVENOUS; SUBCUTANEOUS at 05:17

## 2023-05-29 RX ADMIN — HEPARIN SODIUM 5000 UNIT(S): 5000 INJECTION INTRAVENOUS; SUBCUTANEOUS at 13:28

## 2023-05-29 NOTE — PROGRESS NOTE ADULT - SUBJECTIVE AND OBJECTIVE BOX
DAILY PROGRESS NOTE:         24 hr events:  naeon  spasms are improving    Objective:    Vital Signs Last 24 Hrs  T(C): 36.7 (29 May 2023 08:57), Max: 36.7 (28 May 2023 17:29)  T(F): 98 (29 May 2023 08:57), Max: 98 (28 May 2023 17:29)  HR: 115 (29 May 2023 08:57) (73 - 115)  BP: 125/79 (29 May 2023 08:57) (114/78 - 130/91)  BP(mean): --  RR: 18 (29 May 2023 08:57) (18 - 18)  SpO2: 98% (29 May 2023 08:57) (97% - 99%)    Parameters below as of 29 May 2023 08:57  Patient On (Oxygen Delivery Method): room air        I&O's Detail    28 May 2023 07:01  -  29 May 2023 07:00  --------------------------------------------------------  IN:    IV PiggyBack: 100 mL    Oral Fluid: 1140 mL    sodium chloride 0.9%: 900 mL  Total IN: 2140 mL    OUT:    Indwelling Catheter - Suprapubic (mL): 2000 mL  Total OUT: 2000 mL    Total NET: 140 mL      29 May 2023 07:01  -  29 May 2023 11:54  --------------------------------------------------------  IN:    Oral Fluid: 240 mL  Total IN: 240 mL    OUT:    Indwelling Catheter - Suprapubic (mL): 300 mL  Total OUT: 300 mL    Total NET: -60 mL          Physical Exam:    General: NAD, well-nourished  Resp: Breathing comfortably on RA  CV: regular rate   : spt w/ cyu     Laboratory Results:      05-28    141  |  108  |  30<H>  ----------------------------<  104<H>  4.2   |  21<L>  |  1.19    Ca    9.6      28 May 2023 07:02

## 2023-05-29 NOTE — PROGRESS NOTE ADULT - ASSESSMENT
47 year old male with PMHx of MS, quadriplegia, neurogenic bladder, known nephrolithiasis; also found to a diffuse rash over trunk forearms, thighs. Admitted to medicine for management of rash and other comorbidities    - Labs reviewed  - Continue oxybutynin/valium for bladder spasms PRN  - Stage 2 URS planning for Wednesday, May 31.   - please documented medical clearance   - please continue IV cefepime

## 2023-05-29 NOTE — PROGRESS NOTE ADULT - SUBJECTIVE AND OBJECTIVE BOX
Patient is a 47y old  Male who presents with a chief complaint of pt scheduled for left PCNL (25 May 2023 09:28)      SUBJECTIVE / OVERNIGHT EVENTS: no events       T(C): 36.8 (05-29-23 @ 21:11), Max: 36.8 (05-29-23 @ 21:11)  HR: 95 (05-29-23 @ 21:11) (76 - 104)  BP: 123/86 (05-29-23 @ 21:11) (123/86 - 128/88)  RR: 18 (05-29-23 @ 21:11) (18 - 18)  SpO2: 98% (05-29-23 @ 21:11) (96% - 98%)      MEDICATIONS  (STANDING):  cefepime   IVPB 2000 milliGRAM(s) IV Intermittent every 12 hours  heparin   Injectable 5000 Unit(s) SubCutaneous every 8 hours  oxybutynin 5 milliGRAM(s) Oral every 8 hours  sodium chloride 0.9%. 1000 milliLiter(s) (50 mL/Hr) IV Continuous <Continuous>  sodium chloride 0.9%. 1000 milliLiter(s) (75 mL/Hr) IV Continuous <Continuous>    MEDICATIONS  (PRN):  acetaminophen     Tablet .. 650 milliGRAM(s) Oral every 6 hours PRN Temp greater or equal to 38.5C (101.3F), Mild Pain (1 - 3)  diazepam    Tablet 2 milliGRAM(s) Oral every 6 hours PRN SEVERE BLADDER SPASM  ondansetron Injectable 4 milliGRAM(s) IV Push every 6 hours PRN Nausea  senna 2 Tablet(s) Oral at bedtime PRN Constipation  traMADol 25 milliGRAM(s) Oral every 8 hours PRN Moderate Pain (4 - 6)          PHYSICAL EXAM:  GENERAL: NAD, well-developed  HEAD:  Atraumatic, Normocephalic  EYES: EOMI, PERRLA, conjunctiva and sclera clear  NECK: Supple, No JVD  CHEST/LUNG: Clear to auscultation bilaterally; No wheeze  HEART: Regular rate and rhythm; No murmurs, rubs, or gallops  ABDOMEN: Soft, Nontender, Nondistended; Bowel sounds present  EXTREMITIES:  2+ Peripheral Pulses, No clubbing, cyanosis, or edema  PSYCH: AAOx3  NEUROLOGY: non-focal  SKIN: No rashes or lesions                     RADIOLOGY & ADDITIONAL TESTS:    Imaging Personally Reviewed:    Consultant(s) Notes Reviewed:      Care Discussed with Consultants/Other Providers:

## 2023-05-30 LAB
ANION GAP SERPL CALC-SCNC: 13 MMOL/L — SIGNIFICANT CHANGE UP (ref 5–17)
BUN SERPL-MCNC: 32 MG/DL — HIGH (ref 7–23)
CALCIUM SERPL-MCNC: 10 MG/DL — SIGNIFICANT CHANGE UP (ref 8.4–10.5)
CHLORIDE SERPL-SCNC: 108 MMOL/L — SIGNIFICANT CHANGE UP (ref 96–108)
CO2 SERPL-SCNC: 20 MMOL/L — LOW (ref 22–31)
CREAT SERPL-MCNC: 1.25 MG/DL — SIGNIFICANT CHANGE UP (ref 0.5–1.3)
EGFR: 72 ML/MIN/1.73M2 — SIGNIFICANT CHANGE UP
GLUCOSE SERPL-MCNC: 89 MG/DL — SIGNIFICANT CHANGE UP (ref 70–99)
HCT VFR BLD CALC: 38.3 % — LOW (ref 39–50)
HGB BLD-MCNC: 12.1 G/DL — LOW (ref 13–17)
MCHC RBC-ENTMCNC: 27.9 PG — SIGNIFICANT CHANGE UP (ref 27–34)
MCHC RBC-ENTMCNC: 31.6 GM/DL — LOW (ref 32–36)
MCV RBC AUTO: 88.2 FL — SIGNIFICANT CHANGE UP (ref 80–100)
NRBC # BLD: 0 /100 WBCS — SIGNIFICANT CHANGE UP (ref 0–0)
PLATELET # BLD AUTO: 323 K/UL — SIGNIFICANT CHANGE UP (ref 150–400)
POTASSIUM SERPL-MCNC: 4 MMOL/L — SIGNIFICANT CHANGE UP (ref 3.5–5.3)
POTASSIUM SERPL-SCNC: 4 MMOL/L — SIGNIFICANT CHANGE UP (ref 3.5–5.3)
RBC # BLD: 4.34 M/UL — SIGNIFICANT CHANGE UP (ref 4.2–5.8)
RBC # FLD: 15.8 % — HIGH (ref 10.3–14.5)
SODIUM SERPL-SCNC: 141 MMOL/L — SIGNIFICANT CHANGE UP (ref 135–145)
WBC # BLD: 9.73 K/UL — SIGNIFICANT CHANGE UP (ref 3.8–10.5)
WBC # FLD AUTO: 9.73 K/UL — SIGNIFICANT CHANGE UP (ref 3.8–10.5)

## 2023-05-30 RX ADMIN — HEPARIN SODIUM 5000 UNIT(S): 5000 INJECTION INTRAVENOUS; SUBCUTANEOUS at 22:16

## 2023-05-30 RX ADMIN — Medication 5 MILLIGRAM(S): at 06:47

## 2023-05-30 RX ADMIN — CEFEPIME 100 MILLIGRAM(S): 1 INJECTION, POWDER, FOR SOLUTION INTRAMUSCULAR; INTRAVENOUS at 18:28

## 2023-05-30 RX ADMIN — HEPARIN SODIUM 5000 UNIT(S): 5000 INJECTION INTRAVENOUS; SUBCUTANEOUS at 06:46

## 2023-05-30 RX ADMIN — CEFEPIME 100 MILLIGRAM(S): 1 INJECTION, POWDER, FOR SOLUTION INTRAMUSCULAR; INTRAVENOUS at 06:47

## 2023-05-30 RX ADMIN — Medication 5 MILLIGRAM(S): at 22:16

## 2023-05-30 RX ADMIN — HEPARIN SODIUM 5000 UNIT(S): 5000 INJECTION INTRAVENOUS; SUBCUTANEOUS at 12:06

## 2023-05-30 NOTE — PROGRESS NOTE ADULT - SUBJECTIVE AND OBJECTIVE BOX
Patient is a 47y old  Male who presents with a chief complaint of pt scheduled for left PCNL (25 May 2023 09:28)      SUBJECTIVE / OVERNIGHT EVENTS: no events       T(C): 36.4 (05-30-23 @ 21:28), Max: 36.7 (05-30-23 @ 18:04)  HR: 76 (05-30-23 @ 21:28) (76 - 100)  BP: 132/86 (05-30-23 @ 21:28) (130/84 - 132/86)  RR: 18 (05-30-23 @ 21:28) (18 - 18)  SpO2: 99% (05-30-23 @ 21:28) (98% - 99%)    MEDICATIONS  (STANDING):  cefepime   IVPB 2000 milliGRAM(s) IV Intermittent every 12 hours  heparin   Injectable 5000 Unit(s) SubCutaneous every 8 hours  oxybutynin 5 milliGRAM(s) Oral every 8 hours  sodium chloride 0.9%. 1000 milliLiter(s) (50 mL/Hr) IV Continuous <Continuous>  sodium chloride 0.9%. 1000 milliLiter(s) (75 mL/Hr) IV Continuous <Continuous>    MEDICATIONS  (PRN):  acetaminophen     Tablet .. 650 milliGRAM(s) Oral every 6 hours PRN Temp greater or equal to 38.5C (101.3F), Mild Pain (1 - 3)  diazepam    Tablet 2 milliGRAM(s) Oral every 6 hours PRN SEVERE BLADDER SPASM  ondansetron Injectable 4 milliGRAM(s) IV Push every 6 hours PRN Nausea  senna 2 Tablet(s) Oral at bedtime PRN Constipation  traMADol 25 milliGRAM(s) Oral every 8 hours PRN Moderate Pain (4 - 6)          PHYSICAL EXAM:  GENERAL: NAD, well-developed  HEAD:  Atraumatic, Normocephalic  EYES: EOMI, PERRLA, conjunctiva and sclera clear  NECK: Supple, No JVD  CHEST/LUNG: Clear to auscultation bilaterally; No wheeze  HEART: Regular rate and rhythm; No murmurs, rubs, or gallops  ABDOMEN: Soft, Nontender, Nondistended; Bowel sounds present  EXTREMITIES:  2+ Peripheral Pulses, No clubbing, cyanosis, or edema  PSYCH: AAOx3  NEUROLOGY: non-focal  SKIN: No rashes or lesions                                 12.1   9.73  )-----------( 323      ( 30 May 2023 08:23 )             38.3               141|108|32<89  4.0|20|1.25  10.0,--,--  05-30 @ 08:23    CAPILLARY BLOOD GLUCOSE              RADIOLOGY & ADDITIONAL TESTS:    Imaging Personally Reviewed:    Consultant(s) Notes Reviewed:      Care Discussed with Consultants/Other Providers:

## 2023-05-30 NOTE — PROGRESS NOTE ADULT - ASSESSMENT
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 Continue w/ ditropan and valium as needed for spasms    - Stage 2 URS planning for Wednesday, May 31      -Patient medically optimized for the procedure

## 2023-05-30 NOTE — PROGRESS NOTE ADULT - ASSESSMENT
47 year old male with PMHx of MS, quadriplegia, neurogenic bladder, known nephrolithiasis; also found to a diffuse rash over trunk forearms, thighs. Admitted to medicine for management of rash and other comorbidities    - Labs reviewed  - Continue oxybutynin/valium for bladder spasms PRN  - Stage 2 URS planning for Wednesday, May 31.   - please documented medical clearance   - please continue IV cefepime   - Will consent once procedure confirmed for tomorrow. Booking sheet sent this AM.

## 2023-05-30 NOTE — PROGRESS NOTE ADULT - SUBJECTIVE AND OBJECTIVE BOX
24 hr events:  naeon  spasms are improving    Objective    Vital signs  T(F): , Max: 98.3 (05-29-23 @ 21:11)  HR: 92 (05-30-23 @ 05:09)  BP: 123/69 (05-30-23 @ 05:09)  SpO2: 98% (05-30-23 @ 05:09)  Wt(kg): --    Output       General: NAD, well-nourished  Resp: Breathing comfortably on RA  CV: regular rate   : spt w/ cyu     Labs

## 2023-05-31 ENCOUNTER — TRANSCRIPTION ENCOUNTER (OUTPATIENT)
Age: 48
End: 2023-05-31

## 2023-05-31 ENCOUNTER — RESULT REVIEW (OUTPATIENT)
Age: 48
End: 2023-05-31

## 2023-05-31 LAB
ANION GAP SERPL CALC-SCNC: 13 MMOL/L — SIGNIFICANT CHANGE UP (ref 5–17)
APTT BLD: 30.2 SEC — SIGNIFICANT CHANGE UP (ref 27.5–35.5)
BLD GP AB SCN SERPL QL: NEGATIVE — SIGNIFICANT CHANGE UP
BUN SERPL-MCNC: 33 MG/DL — HIGH (ref 7–23)
CALCIUM SERPL-MCNC: 9.6 MG/DL — SIGNIFICANT CHANGE UP (ref 8.4–10.5)
CHLORIDE SERPL-SCNC: 106 MMOL/L — SIGNIFICANT CHANGE UP (ref 96–108)
CO2 SERPL-SCNC: 20 MMOL/L — LOW (ref 22–31)
CREAT SERPL-MCNC: 1.17 MG/DL — SIGNIFICANT CHANGE UP (ref 0.5–1.3)
EGFR: 78 ML/MIN/1.73M2 — SIGNIFICANT CHANGE UP
GLUCOSE SERPL-MCNC: 97 MG/DL — SIGNIFICANT CHANGE UP (ref 70–99)
HCT VFR BLD CALC: 36.7 % — LOW (ref 39–50)
HGB BLD-MCNC: 11.9 G/DL — LOW (ref 13–17)
INR BLD: 1.13 RATIO — SIGNIFICANT CHANGE UP (ref 0.88–1.16)
MCHC RBC-ENTMCNC: 28.1 PG — SIGNIFICANT CHANGE UP (ref 27–34)
MCHC RBC-ENTMCNC: 32.4 GM/DL — SIGNIFICANT CHANGE UP (ref 32–36)
MCV RBC AUTO: 86.6 FL — SIGNIFICANT CHANGE UP (ref 80–100)
NRBC # BLD: 0 /100 WBCS — SIGNIFICANT CHANGE UP (ref 0–0)
PLATELET # BLD AUTO: 296 K/UL — SIGNIFICANT CHANGE UP (ref 150–400)
POTASSIUM SERPL-MCNC: 4.5 MMOL/L — SIGNIFICANT CHANGE UP (ref 3.5–5.3)
POTASSIUM SERPL-SCNC: 4.5 MMOL/L — SIGNIFICANT CHANGE UP (ref 3.5–5.3)
PROTHROM AB SERPL-ACNC: 13 SEC — SIGNIFICANT CHANGE UP (ref 10.5–13.4)
RBC # BLD: 4.24 M/UL — SIGNIFICANT CHANGE UP (ref 4.2–5.8)
RBC # FLD: 15.7 % — HIGH (ref 10.3–14.5)
RH IG SCN BLD-IMP: POSITIVE — SIGNIFICANT CHANGE UP
SODIUM SERPL-SCNC: 139 MMOL/L — SIGNIFICANT CHANGE UP (ref 135–145)
WBC # BLD: 9.42 K/UL — SIGNIFICANT CHANGE UP (ref 3.8–10.5)
WBC # FLD AUTO: 9.42 K/UL — SIGNIFICANT CHANGE UP (ref 3.8–10.5)

## 2023-05-31 PROCEDURE — 88300 SURGICAL PATH GROSS: CPT | Mod: 26

## 2023-05-31 PROCEDURE — 52356 CYSTO/URETERO W/LITHOTRIPSY: CPT | Mod: LT

## 2023-05-31 PROCEDURE — 74420 UROGRAPHY RTRGR +-KUB: CPT | Mod: 26

## 2023-05-31 DEVICE — CATH ANG BERENSTN 5FRX65CM
Type: IMPLANTABLE DEVICE | Site: LEFT | Status: NON-FUNCTIONAL
Removed: 2023-05-31

## 2023-05-31 DEVICE — STENT URET 7FR 26CM
Type: IMPLANTABLE DEVICE | Site: LEFT | Status: NON-FUNCTIONAL
Removed: 2023-05-31

## 2023-05-31 DEVICE — URETERAL CATH DUAL LUMEN 10FR 54CM
Type: IMPLANTABLE DEVICE | Site: LEFT | Status: NON-FUNCTIONAL
Removed: 2023-05-31

## 2023-05-31 RX ORDER — OXYBUTYNIN CHLORIDE 5 MG
5 TABLET ORAL EVERY 8 HOURS
Refills: 0 | Status: DISCONTINUED | OUTPATIENT
Start: 2023-05-31 | End: 2023-06-01

## 2023-05-31 RX ORDER — BENZOCAINE AND MENTHOL 5; 1 G/100ML; G/100ML
1 LIQUID ORAL ONCE
Refills: 0 | Status: COMPLETED | OUTPATIENT
Start: 2023-05-31 | End: 2023-05-31

## 2023-05-31 RX ORDER — ACETAMINOPHEN 500 MG
650 TABLET ORAL EVERY 6 HOURS
Refills: 0 | Status: DISCONTINUED | OUTPATIENT
Start: 2023-05-31 | End: 2023-06-01

## 2023-05-31 RX ORDER — ONDANSETRON 8 MG/1
4 TABLET, FILM COATED ORAL ONCE
Refills: 0 | Status: DISCONTINUED | OUTPATIENT
Start: 2023-05-31 | End: 2023-05-31

## 2023-05-31 RX ORDER — SENNA PLUS 8.6 MG/1
2 TABLET ORAL AT BEDTIME
Refills: 0 | Status: DISCONTINUED | OUTPATIENT
Start: 2023-05-31 | End: 2023-06-01

## 2023-05-31 RX ORDER — FENTANYL CITRATE 50 UG/ML
25 INJECTION INTRAVENOUS
Refills: 0 | Status: DISCONTINUED | OUTPATIENT
Start: 2023-05-31 | End: 2023-05-31

## 2023-05-31 RX ORDER — HEPARIN SODIUM 5000 [USP'U]/ML
5000 INJECTION INTRAVENOUS; SUBCUTANEOUS EVERY 8 HOURS
Refills: 0 | Status: DISCONTINUED | OUTPATIENT
Start: 2023-05-31 | End: 2023-06-01

## 2023-05-31 RX ORDER — DIAZEPAM 5 MG
2 TABLET ORAL EVERY 6 HOURS
Refills: 0 | Status: DISCONTINUED | OUTPATIENT
Start: 2023-05-31 | End: 2023-06-01

## 2023-05-31 RX ORDER — SODIUM CHLORIDE 9 MG/ML
1000 INJECTION INTRAMUSCULAR; INTRAVENOUS; SUBCUTANEOUS
Refills: 0 | Status: DISCONTINUED | OUTPATIENT
Start: 2023-05-31 | End: 2023-06-01

## 2023-05-31 RX ORDER — CEFEPIME 1 G/1
2000 INJECTION, POWDER, FOR SOLUTION INTRAMUSCULAR; INTRAVENOUS EVERY 12 HOURS
Refills: 0 | Status: DISCONTINUED | OUTPATIENT
Start: 2023-05-31 | End: 2023-06-01

## 2023-05-31 RX ADMIN — SODIUM CHLORIDE 75 MILLILITER(S): 9 INJECTION INTRAMUSCULAR; INTRAVENOUS; SUBCUTANEOUS at 20:46

## 2023-05-31 RX ADMIN — Medication 5 MILLIGRAM(S): at 05:01

## 2023-05-31 RX ADMIN — HEPARIN SODIUM 5000 UNIT(S): 5000 INJECTION INTRAVENOUS; SUBCUTANEOUS at 21:34

## 2023-05-31 RX ADMIN — Medication 5 MILLIGRAM(S): at 21:34

## 2023-05-31 RX ADMIN — CEFEPIME 100 MILLIGRAM(S): 1 INJECTION, POWDER, FOR SOLUTION INTRAMUSCULAR; INTRAVENOUS at 05:00

## 2023-05-31 RX ADMIN — HEPARIN SODIUM 5000 UNIT(S): 5000 INJECTION INTRAVENOUS; SUBCUTANEOUS at 05:01

## 2023-05-31 RX ADMIN — Medication 5 MILLIGRAM(S): at 13:12

## 2023-05-31 RX ADMIN — BENZOCAINE AND MENTHOL 1 LOZENGE: 5; 1 LIQUID ORAL at 20:46

## 2023-05-31 RX ADMIN — HEPARIN SODIUM 5000 UNIT(S): 5000 INJECTION INTRAVENOUS; SUBCUTANEOUS at 13:12

## 2023-05-31 NOTE — DISCHARGE NOTE PROVIDER - NSDCFUSCHEDAPPT_GEN_ALL_CORE_FT
El Rooney  [facility]  UROLOGY [address]  Scheduled Appointment: 06/02/2023    El Rooney  [facility]  [facility]  Scheduled Appointment: 06/02/2023

## 2023-05-31 NOTE — PROGRESS NOTE ADULT - ASSESSMENT
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 Continue w/ ditropan and valium as needed for spasms    - Stage 2 URS planning for Wednesday, Today May 31      -Patient medically optimized for the procedure

## 2023-05-31 NOTE — DISCHARGE NOTE PROVIDER - NSDCCPCAREPLAN_GEN_ALL_CORE_FT
PRINCIPAL DISCHARGE DIAGNOSIS  Diagnosis: Nephrolithiasis  Assessment and Plan of Treatment: You underwent procedures in the OR with urology. You ahd a suprapubic tube placed and two procedures to break up the stones. You had a stent placed to the left kidney/ureter. Right side stent was removed.  Follow up with Dr. Rooney after discharge. Follow instructions from your surgical team.      SECONDARY DISCHARGE DIAGNOSES  Diagnosis: Dermatitis  Assessment and Plan of Treatment: You were treated with ketoconazole cream and triamcinolone. Follow up with [facility] located at [address] [place] [address] [address], [place] [address] upon discharge. Our office will call to schedule an appointment but if patient does not hear from us within a few days of discharge, please instruct patient to call our office. Office phone number is [phone]..     PRINCIPAL DISCHARGE DIAGNOSIS  Diagnosis: Nephrolithiasis  Assessment and Plan of Treatment: You underwent procedures in the OR with urology. You ahd a suprapubic tube placed and two procedures to break up the stones. You had a stent placed to the left kidney/ureter. Right side stent was removed. Take Oxybutynin every 8 hours as needed for bladder spasm. Take Valium every 6 hours as needed for SEVERE intractable bladder spasms.   Follow up with Dr. Rooney after discharge. Follow instructions from your surgical team.      SECONDARY DISCHARGE DIAGNOSES  Diagnosis: Dermatitis  Assessment and Plan of Treatment: You were treated with ketoconazole cream and triamcinolone. Follow up with [facility] located at [address] [place] [address]. [address], [place] [address] upon discharge. Our office will call to schedule an appointment but if patient does not hear from us within a few days of discharge, please instruct patient to call our office. Office phone number is [phone]..

## 2023-05-31 NOTE — DISCHARGE NOTE PROVIDER - NSFOLLOWUPCLINICS_GEN_ALL_ED_FT
[facility]  Psychiatry  [address] [place] [address]  Phone: [phone]  Fax:     [facility] [place]  Dermatology  [address] [place] [address], [address]  [place] [address]  Phone: [phone]  Fax: [phone]

## 2023-05-31 NOTE — DISCHARGE NOTE PROVIDER - HOSPITAL COURSE
HPI:  47 year old male with a PMH of Advanced MS (diagnosed 1995), Quadriplegia, PE (2019), depression, neurogenic bladder with chronic iraheta catheter, Nephrolithiasis, S/P Left PCN in the past who presents to the ER for persistent flank pain.  He is scheduled Cystoscopy, Left Percutaneous Stone Extraction, Possible Suprapubic Catheter today, 5/17 for known nephrolithiasis.  However, he also states that he noticed a rash on his body that started after he started the antibiotic (he was given cipro).  Denies fevers, chills, N/V, CP, SOB   47 year old male with a PMH of Advanced MS (diagnosed 1995), Quadriplegia, PE (2019), depression, neurogenic bladder with chronic iraheta catheter, Nephrolithiasis, S/P Left PCN in the past who presents to the ER for persistent flank pain.  He is scheduled Cystoscopy, Left Percutaneous Stone Extraction, Possible Suprapubic Catheter today, 5/17 for known nephrolithiasis.  However, he also states that he noticed a rash on his body that started after he started the antibiotic (he was given cipro).  Denies fevers, chills, N/V, CP, SOB    In the ED, Dermatology was consulted for skin rash. Fungal YESI was negative. Rash was treated with ketoconazole and triamcinolone. Urine Culture was collected, grew E.coli despite prior course of Cipro. Infectious Disease was consulted. 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 Behavioral Health was consulted for depression and anxiety, hx of suicidal ideation.    Urology took patient for OR on 5/24 for percutaneous nephrolithotomy and suprapubic tube placement. Bladder spasms were treated with oxybutynin and prn diazepam. Patient returned to the OR on 5/31 for left ureteroscopy, laser lithotripsy, L stent placement, R stent removal.    Discharge plan with medication reconciliation discussed with attending Dr. Mendoza. Patient is medically cleared for discharge home with resumption of HHA, outpatient urology, derm and behavioral health follow ups       47 year old male with a PMH of Advanced MS (diagnosed 1995), Quadriplegia, PE (2019), depression, neurogenic bladder with chronic iraheta catheter, Nephrolithiasis, S/P Left PCN in the past who presents to the ER for persistent flank pain.  He is scheduled Cystoscopy, Left Percutaneous Stone Extraction, Possible Suprapubic Catheter today, 5/17 for known nephrolithiasis.  However, he also states that he noticed a rash on his body that started after he started the antibiotic (he was given cipro).  Denies fevers, chills, N/V, CP, SOB    In the ED, Dermatology was consulted for skin rash. Fungal YESI was negative. Rash was treated with ketoconazole and triamcinolone. Urine Culture was collected, grew E.coli despite prior course of Cipro. Infectious Disease was consulted. 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 Behavioral Health was consulted for depression and anxiety, hx of suicidal ideation.    Urology took patient for OR on 5/24 for percutaneous nephrolithotomy and suprapubic tube placement. Bladder spasms were treated with oxybutynin and prn diazepam. Patient returned to the OR on 5/31 for left ureteroscopy, laser lithotripsy, L stent placement, R stent removal. Urology and ID determined no need for further antibiotics after all OR procedures were completed.    Discharge plan with medication reconciliation discussed with attending Dr. Mendoza. Patient is medically cleared for discharge home with resumption of HHA, outpatient urology, derm and behavioral health follow ups    Patient is being discharged with suprapubic tube in place and L stent attached on a string to penis.

## 2023-05-31 NOTE — PROGRESS NOTE ADULT - ASSESSMENT
47 year old male with PMHx of MS, quadriplegia, neurogenic bladder, known nephrolithiasis; also found to a diffuse rash over trunk forearms, thighs. Admitted to medicine for management of rash and other comorbidities    - Labs reviewed  - Continue oxybutynin/valium for bladder spasms PRN  - Stage 2 URS planning for Wednesday, May 31.   - NPO since midnight   - please continue IV cefepime   - Preop labs reviewed  - Plan for OR today  - discharge planning after OR

## 2023-05-31 NOTE — DISCHARGE NOTE PROVIDER - NSDCMRMEDTOKEN_GEN_ALL_CORE_FT
acetaminophen 325 mg oral tablet: 2 tab(s) orally every 6 hours As needed Temp greater or equal to 38.5C (101.3F), Mild Pain (1 - 3)  diazePAM 2 mg oral tablet: 1 tab(s) orally every 6 hours as needed for SEVERE BLADDER SPASM MDD: 4 tabs  oxyBUTYnin 5 mg oral tablet: 1 tab(s) orally every 8 hours as needed for  bladder spasms

## 2023-05-31 NOTE — PROGRESS NOTE ADULT - SUBJECTIVE AND OBJECTIVE BOX
Subjective  Denies fevers, chills, nausea, vomiting, SOB, CP.  NPO since midnight    Objective    Vital signs  T(F): , Max: 98.1 (05-30-23 @ 18:04)  HR: 92 (05-31-23 @ 05:00)  BP: 132/92 (05-31-23 @ 05:00)  SpO2: 96% (05-31-23 @ 05:00)  Wt(kg): --    Output     General: NAD, well-nourished  Resp: Breathing comfortably on RA  CV: regular rate   : spt w/ cyu     Labs      05-31 @ 05:15    WBC 9.42  / Hct 36.7  / SCr --       05-31 @ 05:14    WBC --    / Hct --    / SCr 1.17

## 2023-05-31 NOTE — DISCHARGE NOTE PROVIDER - CARE PROVIDER_API CALL
El Rooney  Urology  [address] [place] [id number]  Phone: [phone]  Fax: [phone]  Scheduled Appointment: 06/02/2023

## 2023-05-31 NOTE — PROGRESS NOTE ADULT - SUBJECTIVE AND OBJECTIVE BOX
Patient is a 47y old  Male who presents with a chief complaint of pt scheduled for left PCNL (25 May 2023 09:28)      SUBJECTIVE / OVERNIGHT EVENTS: no events       T(C): 36.4 (05-31-23 @ 19:35), Max: 37 (05-31-23 @ 13:46)  HR: 82 (05-31-23 @ 20:35) (79 - 95)  BP: 149/88 (05-31-23 @ 20:35) (112/81 - 153/87)  RR: 18 (05-31-23 @ 20:35) (17 - 20)  SpO2: 95% (05-31-23 @ 20:35) (94% - 99%)    MEDICATIONS  (STANDING):  benzocaine/menthol Lozenge 1 Lozenge Oral once  cefepime   IVPB 2000 milliGRAM(s) IV Intermittent every 12 hours  heparin   Injectable 5000 Unit(s) SubCutaneous every 8 hours  oxybutynin 5 milliGRAM(s) Oral every 8 hours  sodium chloride 0.9%. 1000 milliLiter(s) (75 mL/Hr) IV Continuous <Continuous>    MEDICATIONS  (PRN):  acetaminophen     Tablet .. 650 milliGRAM(s) Oral every 6 hours PRN Temp greater or equal to 38.5C (101.3F), Mild Pain (1 - 3)  diazepam    Tablet 2 milliGRAM(s) Oral every 6 hours PRN SEVERE BLADDER SPASM  fentaNYL    Injectable 25 MICROGram(s) IV Push every 15 minutes PRN Moderate Pain (4 - 6)  ondansetron Injectable 4 milliGRAM(s) IV Push once PRN Nausea and/or Vomiting  senna 2 Tablet(s) Oral at bedtime PRN Constipation    PHYSICAL EXAM:  GENERAL: NAD, well-developed  HEAD:  Atraumatic, Normocephalic  EYES: EOMI, PERRLA, conjunctiva and sclera clear  NECK: Supple, No JVD  CHEST/LUNG: Clear to auscultation bilaterally; No wheeze  HEART: Regular rate and rhythm; No murmurs, rubs, or gallops  ABDOMEN: Soft, Nontender, Nondistended; Bowel sounds present  EXTREMITIES:  2+ Peripheral Pulses, No clubbing, cyanosis, or edema  PSYCH: AAOx3  NEUROLOGY: non-focal  SKIN: No rashes or lesions                                                   11.9   9.42  )-----------( 296      ( 31 May 2023 05:15 )             36.7             PT/INR - ( 31 May 2023 06:27 )   PT: 13.0 sec;   INR: 1.13 ratio         PTT - ( 31 May 2023 06:27 )  PTT:30.2 sec  139|106|33<97  4.5|20|1.17  9.6,--,--  05-31 @ 05:14    CAPILLARY BLOOD GLUCOSE              RADIOLOGY & ADDITIONAL TESTS:    Imaging Personally Reviewed:    Consultant(s) Notes Reviewed:      Care Discussed with Consultants/Other Providers:

## 2023-05-31 NOTE — DISCHARGE NOTE PROVIDER - NSDCFUADDINST_GEN_ALL_CORE_FT
Patient is being discharged with suprapubic tube in place and L stent attached on a string to penis.

## 2023-06-01 ENCOUNTER — TRANSCRIPTION ENCOUNTER (OUTPATIENT)
Age: 48
End: 2023-06-01

## 2023-06-01 VITALS
RESPIRATION RATE: 18 BRPM | DIASTOLIC BLOOD PRESSURE: 94 MMHG | TEMPERATURE: 98 F | SYSTOLIC BLOOD PRESSURE: 137 MMHG | HEART RATE: 90 BPM | OXYGEN SATURATION: 97 %

## 2023-06-01 LAB
ANION GAP SERPL CALC-SCNC: 14 MMOL/L — SIGNIFICANT CHANGE UP (ref 5–17)
BUN SERPL-MCNC: 29 MG/DL — HIGH (ref 7–23)
CALCIUM SERPL-MCNC: 9.7 MG/DL — SIGNIFICANT CHANGE UP (ref 8.4–10.5)
CHLORIDE SERPL-SCNC: 104 MMOL/L — SIGNIFICANT CHANGE UP (ref 96–108)
CO2 SERPL-SCNC: 20 MMOL/L — LOW (ref 22–31)
CREAT SERPL-MCNC: 1.19 MG/DL — SIGNIFICANT CHANGE UP (ref 0.5–1.3)
EGFR: 76 ML/MIN/1.73M2 — SIGNIFICANT CHANGE UP
GLUCOSE SERPL-MCNC: 109 MG/DL — HIGH (ref 70–99)
HCT VFR BLD CALC: 38.8 % — LOW (ref 39–50)
HGB BLD-MCNC: 12.7 G/DL — LOW (ref 13–17)
MCHC RBC-ENTMCNC: 28.2 PG — SIGNIFICANT CHANGE UP (ref 27–34)
MCHC RBC-ENTMCNC: 32.7 GM/DL — SIGNIFICANT CHANGE UP (ref 32–36)
MCV RBC AUTO: 86.2 FL — SIGNIFICANT CHANGE UP (ref 80–100)
NRBC # BLD: 0 /100 WBCS — SIGNIFICANT CHANGE UP (ref 0–0)
PLATELET # BLD AUTO: 338 K/UL — SIGNIFICANT CHANGE UP (ref 150–400)
POTASSIUM SERPL-MCNC: 4.1 MMOL/L — SIGNIFICANT CHANGE UP (ref 3.5–5.3)
POTASSIUM SERPL-SCNC: 4.1 MMOL/L — SIGNIFICANT CHANGE UP (ref 3.5–5.3)
RBC # BLD: 4.5 M/UL — SIGNIFICANT CHANGE UP (ref 4.2–5.8)
RBC # FLD: 15.1 % — HIGH (ref 10.3–14.5)
SODIUM SERPL-SCNC: 138 MMOL/L — SIGNIFICANT CHANGE UP (ref 135–145)
WBC # BLD: 8.87 K/UL — SIGNIFICANT CHANGE UP (ref 3.8–10.5)
WBC # FLD AUTO: 8.87 K/UL — SIGNIFICANT CHANGE UP (ref 3.8–10.5)

## 2023-06-01 PROCEDURE — 80053 COMPREHEN METABOLIC PANEL: CPT

## 2023-06-01 PROCEDURE — C1769: CPT

## 2023-06-01 PROCEDURE — 85730 THROMBOPLASTIN TIME PARTIAL: CPT

## 2023-06-01 PROCEDURE — 82947 ASSAY GLUCOSE BLOOD QUANT: CPT

## 2023-06-01 PROCEDURE — 88300 SURGICAL PATH GROSS: CPT

## 2023-06-01 PROCEDURE — 87186 SC STD MICRODIL/AGAR DIL: CPT

## 2023-06-01 PROCEDURE — 71045 X-RAY EXAM CHEST 1 VIEW: CPT

## 2023-06-01 PROCEDURE — 82330 ASSAY OF CALCIUM: CPT

## 2023-06-01 PROCEDURE — C1887: CPT

## 2023-06-01 PROCEDURE — C9399: CPT

## 2023-06-01 PROCEDURE — 83735 ASSAY OF MAGNESIUM: CPT

## 2023-06-01 PROCEDURE — 93005 ELECTROCARDIOGRAM TRACING: CPT

## 2023-06-01 PROCEDURE — 96374 THER/PROPH/DIAG INJ IV PUSH: CPT

## 2023-06-01 PROCEDURE — 83605 ASSAY OF LACTIC ACID: CPT

## 2023-06-01 PROCEDURE — 82365 CALCULUS SPECTROSCOPY: CPT

## 2023-06-01 PROCEDURE — 36415 COLL VENOUS BLD VENIPUNCTURE: CPT

## 2023-06-01 PROCEDURE — 81001 URINALYSIS AUTO W/SCOPE: CPT

## 2023-06-01 PROCEDURE — 85025 COMPLETE CBC W/AUTO DIFF WBC: CPT

## 2023-06-01 PROCEDURE — 80048 BASIC METABOLIC PNL TOTAL CA: CPT

## 2023-06-01 PROCEDURE — 87449 NOS EACH ORGANISM AG IA: CPT

## 2023-06-01 PROCEDURE — 99285 EMERGENCY DEPT VISIT HI MDM: CPT

## 2023-06-01 PROCEDURE — 80307 DRUG TEST PRSMV CHEM ANLYZR: CPT

## 2023-06-01 PROCEDURE — 86850 RBC ANTIBODY SCREEN: CPT

## 2023-06-01 PROCEDURE — 84484 ASSAY OF TROPONIN QUANT: CPT

## 2023-06-01 PROCEDURE — 87220 TISSUE EXAM FOR FUNGI: CPT

## 2023-06-01 PROCEDURE — C1726: CPT

## 2023-06-01 PROCEDURE — 84132 ASSAY OF SERUM POTASSIUM: CPT

## 2023-06-01 PROCEDURE — C2617: CPT

## 2023-06-01 PROCEDURE — 85610 PROTHROMBIN TIME: CPT

## 2023-06-01 PROCEDURE — 84100 ASSAY OF PHOSPHORUS: CPT

## 2023-06-01 PROCEDURE — C1758: CPT

## 2023-06-01 PROCEDURE — 87635 SARS-COV-2 COVID-19 AMP PRB: CPT

## 2023-06-01 PROCEDURE — 82803 BLOOD GASES ANY COMBINATION: CPT

## 2023-06-01 PROCEDURE — 85027 COMPLETE CBC AUTOMATED: CPT

## 2023-06-01 PROCEDURE — 76000 FLUOROSCOPY <1 HR PHYS/QHP: CPT

## 2023-06-01 PROCEDURE — 86901 BLOOD TYPING SEROLOGIC RH(D): CPT

## 2023-06-01 PROCEDURE — 83036 HEMOGLOBIN GLYCOSYLATED A1C: CPT

## 2023-06-01 PROCEDURE — 86900 BLOOD TYPING SEROLOGIC ABO: CPT

## 2023-06-01 PROCEDURE — C2627: CPT

## 2023-06-01 PROCEDURE — 82435 ASSAY OF BLOOD CHLORIDE: CPT

## 2023-06-01 PROCEDURE — C1889: CPT

## 2023-06-01 PROCEDURE — 96375 TX/PRO/DX INJ NEW DRUG ADDON: CPT

## 2023-06-01 PROCEDURE — 84295 ASSAY OF SERUM SODIUM: CPT

## 2023-06-01 PROCEDURE — 87086 URINE CULTURE/COLONY COUNT: CPT

## 2023-06-01 PROCEDURE — 85014 HEMATOCRIT: CPT

## 2023-06-01 PROCEDURE — 86703 HIV-1/HIV-2 1 RESULT ANTBDY: CPT

## 2023-06-01 PROCEDURE — C2625: CPT

## 2023-06-01 PROCEDURE — 85018 HEMOGLOBIN: CPT

## 2023-06-01 RX ORDER — OXYBUTYNIN CHLORIDE 5 MG
1 TABLET ORAL
Qty: 90 | Refills: 0
Start: 2023-06-01 | End: 2023-06-30

## 2023-06-01 RX ORDER — DIAZEPAM 5 MG
1 TABLET ORAL
Qty: 28 | Refills: 0
Start: 2023-06-01 | End: 2023-06-07

## 2023-06-01 RX ORDER — ACETAMINOPHEN 500 MG
2 TABLET ORAL
Qty: 0 | Refills: 0 | DISCHARGE
Start: 2023-06-01

## 2023-06-01 RX ADMIN — Medication 5 MILLIGRAM(S): at 13:48

## 2023-06-01 RX ADMIN — Medication 5 MILLIGRAM(S): at 06:44

## 2023-06-01 RX ADMIN — CEFEPIME 100 MILLIGRAM(S): 1 INJECTION, POWDER, FOR SOLUTION INTRAMUSCULAR; INTRAVENOUS at 06:43

## 2023-06-01 RX ADMIN — HEPARIN SODIUM 5000 UNIT(S): 5000 INJECTION INTRAVENOUS; SUBCUTANEOUS at 06:42

## 2023-06-01 NOTE — PROGRESS NOTE ADULT - SUBJECTIVE AND OBJECTIVE BOX
Patient is a 47y old  Male who presents with a chief complaint of pt scheduled for left PCNL (25 May 2023 09:28)      SUBJECTIVE / OVERNIGHT EVENTS: no events       MEDICATIONS  (STANDING):    MEDICATIONS  (PRN):  PHYSICAL EXAM:  GENERAL: NAD, well-developed  HEAD:  Atraumatic, Normocephalic  EYES: EOMI, PERRLA, conjunctiva and sclera clear  NECK: Supple, No JVD  CHEST/LUNG: Clear to auscultation bilaterally; No wheeze  HEART: Regular rate and rhythm; No murmurs, rubs, or gallops  ABDOMEN: Soft, Nontender, Nondistended; Bowel sounds present  EXTREMITIES:  2+ Peripheral Pulses, No clubbing, cyanosis, or edema  PSYCH: AAOx3  NEUROLOGY: non-focal  SKIN: No rashes or lesions                                                              12.7   8.87  )-----------( 338      ( 01 Jun 2023 07:13 )             38.8                 CAPILLARY BLOOD GLUCOSE              RADIOLOGY & ADDITIONAL TESTS:    Imaging Personally Reviewed:    Consultant(s) Notes Reviewed:      Care Discussed with Consultants/Other Providers:

## 2023-06-01 NOTE — DISCHARGE NOTE NURSING/CASE MANAGEMENT/SOCIAL WORK - NSDCCRTYPESERV_GEN_ALL_CORE_FT
Resumption of 24/7 [facility] home health aides. Home health aide will be at your home at 2pm on 6/1 to receive you.

## 2023-06-01 NOTE — DISCHARGE NOTE NURSING/CASE MANAGEMENT/SOCIAL WORK - NSDCPEFALRISK_GEN_ALL_CORE
For information on Fall & Injury Prevention, visit: [email or web address] OR  [email or web address] OR  https://www.cdc.gov/steadi/patient.html

## 2023-06-01 NOTE — PROGRESS NOTE ADULT - REASON FOR ADMISSION
pt scheduled for left PCNL

## 2023-06-01 NOTE — PROGRESS NOTE ADULT - SUBJECTIVE AND OBJECTIVE BOX
Post op Check    Pt seen and examined without complaints. Pain is controlled. Denies SOB/CP/N/V.     Vital Signs Last 24 Hrs  T(C): 36.6 (01 Jun 2023 01:28), Max: 37 (31 May 2023 13:46)  T(F): 97.9 (01 Jun 2023 01:28), Max: 98.6 (31 May 2023 13:46)  HR: 86 (01 Jun 2023 01:28) (74 - 97)  BP: 131/82 (01 Jun 2023 01:28) (111/76 - 164/77)  BP(mean): 117 (31 May 2023 21:30) (107 - 118)  RR: 18 (01 Jun 2023 01:28) (17 - 20)  SpO2: 97% (01 Jun 2023 01:28) (94% - 99%)    Parameters below as of 01 Jun 2023 01:28  Patient On (Oxygen Delivery Method): room air        I&O's Summary    30 May 2023 07:01  -  31 May 2023 07:00  --------------------------------------------------------  IN: 1190 mL / OUT: 2825 mL / NET: -1635 mL    31 May 2023 07:01  -  01 Jun 2023 02:26  --------------------------------------------------------  IN: 390 mL / OUT: 1450 mL / NET: -1060 mL        Physical Exam  Gen: NAD, A&Ox3  Pulm: No respiratory distress, no subcostal retractions  CV: RRR, no JVD  Abd: Soft, NT, ND  : SPT in place, urine light peach/yellow; stent on a string to phallus                          11.9   9.42  )-----------( 296      ( 31 May 2023 05:15 )             36.7       05-31    139  |  106  |  33<H>  ----------------------------<  97  4.5   |  20<L>  |  1.17    Ca    9.6      31 May 2023 05:14

## 2023-06-01 NOTE — PROGRESS NOTE ADULT - ASSESSMENT
A/P: 47y Male s/p L URS, laser lithotripsy, L stent placement, R stent removal  -acceptable for urine output with recent suprapubic tube/ureteroscopy to be intermittently blood tinged, however currently yellow/peach  -recommend ditropan 5mg q8h prn for bladder spasms  -can try valium 2mg q6h for severe or intractable bladder spasms  -encourage hydration per primary team protocol

## 2023-06-01 NOTE — PROGRESS NOTE ADULT - ASSESSMENT
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 Continue w/ ditropan and valium as needed for spasms    s/p - Stage 2 URS d/c planning

## 2023-06-01 NOTE — PROGRESS NOTE ADULT - ASSESSMENT
47 year old male with PMHx of MS, quadriplegia, neurogenic bladder, known nephrolithiasis; also found to a diffuse rash over trunk forearms, thighs. Admitted to medicine for management of rash and other comorbidities    - Labs reviewed  - Continue oxybutynin/valium for bladder spasms PRN  - S/p Stage 2 L URS on 5/31. R stent removed, now has SPT and L stent attached on a string to penis.    - Plan for discharge today with stent attached to penis  - No antibiotics needed on discharge  - Follow up with Dr. Rooney in 2 weeks for stent removal in the office

## 2023-06-01 NOTE — PROGRESS NOTE ADULT - SUBJECTIVE AND OBJECTIVE BOX
Subjective  Pt seen and examined without complaints. Pain is controlled. Denies SOB/CP/N/V.     Objective    Vital signs  T(F): , Max: 98.6 (05-31-23 @ 13:46)  HR: 82 (06-01-23 @ 09:06)  BP: 138/98 (06-01-23 @ 09:06)  SpO2: 98% (06-01-23 @ 09:06)  Wt(kg): --    Output     Physical Exam  Gen: NAD, A&Ox3  Pulm: No respiratory distress, no subcostal retractions  CV: RRR, no JVD  Abd: Soft, NT, ND  : SPT in place, urine light peach/yellow; stent on a string to phallus    Labs      06-01 @ 07:14    WBC --    / Hct --    / SCr 1.19     06-01 @ 07:13    WBC 8.87  / Hct 38.8  / SCr --             Urine Cx: ?  Blood Cx: ?    Imaging

## 2023-06-01 NOTE — DISCHARGE NOTE NURSING/CASE MANAGEMENT/SOCIAL WORK - PATIENT PORTAL LINK FT
You can access the FollowMyHealth Patient Portal offered by [facility] by registering at the following website: [email or web address]. By joining [facility]’s FollowMyHealth portal, you will also be able to view your health information using other applications (apps) compatible with our system.

## 2023-06-01 NOTE — PROGRESS NOTE ADULT - PROVIDER SPECIALTY LIST ADULT
Internal Medicine
Urology
Hospitalist
Hospitalist
Infectious Disease
Internal Medicine
Urology
Urology
Hospitalist
Infectious Disease
Internal Medicine
Urology
Hospitalist
Internal Medicine
Urology

## 2023-06-02 ENCOUNTER — LABORATORY RESULT (OUTPATIENT)
Age: 48
End: 2023-06-02

## 2023-06-02 ENCOUNTER — APPOINTMENT (OUTPATIENT)
Dept: UROLOGY | Facility: HOSPITAL | Age: 48
End: 2023-06-02

## 2023-06-02 ENCOUNTER — APPOINTMENT (OUTPATIENT)
Dept: HOME HEALTH SERVICES | Facility: HOME HEALTH | Age: 48
End: 2023-06-02

## 2023-06-02 ENCOUNTER — NON-APPOINTMENT (OUTPATIENT)
Age: 48
End: 2023-06-02

## 2023-06-02 DIAGNOSIS — R30.0 DYSURIA: ICD-10-CM

## 2023-06-05 LAB
APPEARANCE: ABNORMAL
BILIRUBIN URINE: NEGATIVE
BLOOD URINE: ABNORMAL
COLOR: YELLOW
GLUCOSE QUALITATIVE U: NEGATIVE MG/DL
KETONES URINE: NEGATIVE MG/DL
LEUKOCYTE ESTERASE URINE: ABNORMAL
NITRITE URINE: NEGATIVE
PH URINE: 6
PROTEIN URINE: 100 MG/DL
SPECIFIC GRAVITY URINE: 1.02
UROBILINOGEN URINE: 0.2 MG/DL

## 2023-06-06 ENCOUNTER — APPOINTMENT (OUTPATIENT)
Dept: UROLOGY | Facility: CLINIC | Age: 48
End: 2023-06-06

## 2023-06-06 ENCOUNTER — NON-APPOINTMENT (OUTPATIENT)
Age: 48
End: 2023-06-06

## 2023-06-06 LAB
CELL MATERIAL STONE EST-MCNT: SIGNIFICANT CHANGE UP
CELL MATERIAL STONE EST-MCNT: SIGNIFICANT CHANGE UP
LABORATORY COMMENT REPORT: SIGNIFICANT CHANGE UP
LABORATORY COMMENT REPORT: SIGNIFICANT CHANGE UP
NIDUS STONE QN: SIGNIFICANT CHANGE UP
NIDUS STONE QN: SIGNIFICANT CHANGE UP

## 2023-06-07 ENCOUNTER — TRANSCRIPTION ENCOUNTER (OUTPATIENT)
Age: 48
End: 2023-06-07

## 2023-06-07 ENCOUNTER — APPOINTMENT (OUTPATIENT)
Dept: HOME HEALTH SERVICES | Facility: HOME HEALTH | Age: 48
End: 2023-06-07
Payer: MEDICARE

## 2023-06-07 VITALS
DIASTOLIC BLOOD PRESSURE: 80 MMHG | OXYGEN SATURATION: 97 % | SYSTOLIC BLOOD PRESSURE: 122 MMHG | HEART RATE: 100 BPM | TEMPERATURE: 36.7 F | RESPIRATION RATE: 16 BRPM

## 2023-06-07 LAB
BACTERIA UR CULT: ABNORMAL
SURGICAL PATHOLOGY STUDY: SIGNIFICANT CHANGE UP

## 2023-06-07 PROCEDURE — 99496 TRANSJ CARE MGMT HIGH F2F 7D: CPT

## 2023-06-09 ENCOUNTER — APPOINTMENT (OUTPATIENT)
Dept: HOME HEALTH SERVICES | Facility: HOME HEALTH | Age: 48
End: 2023-06-09

## 2023-06-09 LAB
CELL MATERIAL STONE EST-MCNT: SIGNIFICANT CHANGE UP
LABORATORY COMMENT REPORT: SIGNIFICANT CHANGE UP
NIDUS STONE QN: SIGNIFICANT CHANGE UP

## 2023-06-13 ENCOUNTER — NON-APPOINTMENT (OUTPATIENT)
Age: 48
End: 2023-06-13

## 2023-06-14 RX ORDER — NITROFURANTOIN (MONOHYDRATE/MACROCRYSTALS) 25; 75 MG/1; MG/1
100 CAPSULE ORAL TWICE DAILY
Qty: 20 | Refills: 0 | Status: DISCONTINUED | COMMUNITY
Start: 2023-06-02 | End: 2023-06-14

## 2023-06-14 RX ORDER — CIPROFLOXACIN HYDROCHLORIDE 500 MG/1
500 TABLET, FILM COATED ORAL
Qty: 20 | Refills: 0 | Status: DISCONTINUED | COMMUNITY
Start: 2023-05-03 | End: 2023-06-14

## 2023-06-14 RX ORDER — TAMSULOSIN HYDROCHLORIDE 0.4 MG/1
0.4 CAPSULE ORAL
Qty: 1 | Refills: 3 | Status: DISCONTINUED | COMMUNITY
Start: 2023-03-09 | End: 2023-06-14

## 2023-06-14 NOTE — HISTORY OF PRESENT ILLNESS
[Patient] : patient [FreeTextEntry1] : MS [FreeTextEntry2] : PMH: 46 y/o male with history of MS, bed-bound, PE, depression. Patient is being seen today for post hospital discharge visit. \par \par Patient admitted [facility] - now has suprapubic tube in place - needs to make follow up appt with urology. He has L stent attached on a string  to penis and the suprapubic tube is sutured in place. He has some leakage - which is okay, as long as draining into iraheta. \par \par He did call earlier today and yesterday c/o constipation - his aide gave him an enema & he did have a large BM this morning/afternoon. \par Patient lives in apartment with [facility]. At today's visit, patient seen laying in bed - he is beginning to have spasms again. Manages pain with weed, which works well for him. He is also on \par Patient in usual state of health -- frustrated at his MS diagnosis and how it prevents from living life he once did. 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 He does have friends who come by to see him. \par Has 24hr/aide. Weekend aide is here all weekend. \par urine: iraheta in place, draining clear urine. has follow-up with urology for stone management. \par MS - first diagnosed at 20. Then, he was in remission for seven years, then he had another attack in 2013 - wheelchair bound since 2013. He did see neurologist in the past. He would like to see neurologist again. \par Has not gotten OOB since 2020. Not able to bend his knees. \par \par Pain: gets intermittent painful spasms throughout the day, rates at 11 of 10. Once the spasms passes - he is not in any pain.. \par \par Mood/Behavior: "depends" If he has no pain, in a great mood. WHen he has pain, does not like to be bothered. Always depressed - manages with marijuana. He also makes jokes and tries to have up-beat attitude. He has very supportive friends. His parents will also come by once a week or so, but he is not close with them. \par Appetite: eats well. Gets "Moms meals" from insurance - will eat three meals/day. Also, snacks in between. HHA will feed patient, patient not able to hold spoon/fork. No coughing with liquids. \par \par Sleep: sleeps well. Does not get 8hrs/night. He will fall asleep & then a spasm will wake him up - he will smoke and then go back to sleep. He states the spams will wake him up every 20mins - the most sleep he will get will be an hour. He is used to his sleep cycle & believes he gets sufficient rest. \par \par \par

## 2023-06-14 NOTE — REVIEW OF SYSTEMS
[Vision Problems] : vision problems [Lower Ext Edema] : lower extremity edema [Constipation] : constipation [Incontinence] : incontinence [Joint Pain] : joint pain [Joint Stiffness] : joint stiffness [Muscle Weakness] : muscle weakness [Unsteady Walk] : ataxia [Anxiety] : anxiety [Depression] : depression [Negative] : Heme/Lymph [FreeTextEntry3] : optic neuritis in left eye  [de-identified] : dry skin on right side

## 2023-06-14 NOTE — PHYSICAL EXAM
[Normal Voice/Communication] : normal voice communication [No LAD] : no lymphadenopathy [Normal Rate] : heart rate was normal  [Regular Rhythm] : with a regular rhythm [Normal S1, S2] : normal S1 and S2 [No Gross Sensory Deficits] : no gross sensory deficits [No Acute Distress] : no acute distress [Well Nourished] : well nourished [Normal Sclera/Conjunctiva] : normal sclera/conjunctiva [PERRL] : pupils equal, round and reactive to light [Normal Outer Ear/Nose] : the ears and nose were normal in appearance [Normal Oropharynx] : the oropharynx was normal [No JVD] : no jugular venous distention [Supple] : the neck was supple [No Respiratory Distress] : no respiratory distress [Clear to Auscultation] : lungs were clear to auscultation bilaterally [No Accessory Muscle Use] : no accessory muscle use [No LE Varicosities] : there were no varicosital changes in the lower extremities [Pedal Pulses Present] : the pedal pulses are present [Normal Bowel Sounds] : normal bowel sounds [Non Tender] : non-tender [Soft] : abdomen soft [No Masses] : no abdominal mass palpated [No CVA Tenderness] : no ~M costovertebral angle tenderness [No Spinal Tenderness] : no spinal tenderness [No Rash] : no rash [No Skin Lesions] : no skin lesions [Cranial Nerves Intact] : cranial nerves 2-12 were intact [Oriented x3] : oriented to person, place, and time [Normal Affect] : the affect was normal [Normal Mood] : the mood was normal [Kyphosis] : no kyphosis present [Acne] : no acne [de-identified] : non-ambulatory. bed-bound. right side weaker than left  [de-identified] : bed-bound. generalized weakness. right side weaker than left

## 2023-06-14 NOTE — REASON FOR VISIT
[Post-hospitalization from ___ Hospital] : Post-hospitalization from [unfilled] Hospital [Admitted on: ___] : The patient was admitted on [unfilled] [Discharged on ___] : discharged on [unfilled] [Discharge Med List] : discharge medication list [Med Reconciliation] : medication reconciliation has been completed [Patient Contacted By: ____] : and contacted by [unfilled] [FreeTextEntry2] : Hospital Course: \par Discharge Date	01-Jun-2023 \par Admission Date	17-May-2023 13:09 \par Reason for Admission	pt scheduled for left PCNL \par Hospital Course	 \par 47 year old male with a PMH of Advanced MS (diagnosed 1995), Quadriplegia, PE \par (2019), depression, neurogenic bladder with chronic iraheta catheter, \par Nephrolithiasis, S/P Left PCN in the past who presents to the ER for persistent \par flank pain.  He is scheduled Cystoscopy, Left Percutaneous Stone Extraction, \par Possible Suprapubic Catheter today, 5/17 for known nephrolithiasis.  However, \par he also states that he noticed a rash on his body that started after he started \par the antibiotic (he was given cipro).  Denies fevers, chills, N/V, CP, SOB \par \par In the ED, Dermatology was consulted for skin rash. Fungal KOH was negative. \par Rash was treated with ketoconazole and triamcinolone. Urine Culture was \par collected, grew E.coli despite prior course of Cipro. Infectious Disease was \par consulted. 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 Behavioral Health was consulted for \par depression and anxiety, hx of suicidal ideation. \par \par Urology took patient for OR on 5/24 for percutaneous nephrolithotomy and \par suprapubic tube placement. Bladder spasms were treated with oxybutynin and prn \par diazepam. Patient returned to the OR on 5/31 for left ureteroscopy, laser \par lithotripsy, L stent placement, R stent removal. Urology and ID determined no \par need for further antibiotics after all OR procedures were completed. \par \par Discharge plan with medication reconciliation discussed with attending  \kip Mendoza. Patient is medically cleared for discharge home with resumption of \par HHA, outpatient urology, derm and behavioral health follow ups \par \par Patient is being discharged with suprapubic tube in place and L stent attached \par on a string to penis. \par

## 2023-06-15 ENCOUNTER — OUTPATIENT (OUTPATIENT)
Dept: OUTPATIENT SERVICES | Facility: HOSPITAL | Age: 48
LOS: 1 days | End: 2023-06-15
Payer: MEDICARE

## 2023-06-15 ENCOUNTER — APPOINTMENT (OUTPATIENT)
Dept: UROLOGY | Facility: CLINIC | Age: 48
End: 2023-06-15
Payer: MEDICARE

## 2023-06-15 DIAGNOSIS — R30.0 DYSURIA: ICD-10-CM

## 2023-06-15 DIAGNOSIS — R35.0 FREQUENCY OF MICTURITION: ICD-10-CM

## 2023-06-15 DIAGNOSIS — Z93.6 OTHER ARTIFICIAL OPENINGS OF URINARY TRACT STATUS: Chronic | ICD-10-CM

## 2023-06-15 PROCEDURE — 51705 CHANGE OF BLADDER TUBE: CPT

## 2023-06-15 PROCEDURE — 99214 OFFICE O/P EST MOD 30 MIN: CPT | Mod: 25

## 2023-06-15 PROCEDURE — C2627: CPT

## 2023-06-15 NOTE — PHYSICAL EXAM
[General Appearance - Well Developed] : well developed [General Appearance - Well Nourished] : well nourished [Bowel Sounds] : normal bowel sounds [Abdomen Soft] : soft [FreeTextEntry1] : iraheta bag with orange tinted urine, SPT in place [Skin Color & Pigmentation] : normal skin color and pigmentation [Heart Rate And Rhythm] : Heart rate and rhythm were normal [] : no respiratory distress [Oriented To Time, Place, And Person] : oriented to person, place, and time [Respiration, Rhythm And Depth] : normal respiratory rhythm and effort [Not Anxious] : not anxious [Normal Station and Gait] : the gait and station were normal for the patient's age [No Focal Deficits] : no focal deficits

## 2023-06-15 NOTE — HISTORY OF PRESENT ILLNESS
[FreeTextEntry1] : : 1975 \par Referring Provider: none \par \par HPI: Mr. KENNETH SAUL is a 47 year yo M with a PMHx notable for MS since , bed-bound, PE, depression. Patient is being seen today for consultation of bilateral kidney stones and neurogenic bladder. He has a history of MS - first diagnosed at 20. Then, he was in remission for seven years, then he had another attack in  - wheelchair bound since . He did see neurologist in the past. From a urologic standpoint, he has significant urinary frequency and since February has had a iraheta catheter in place given his difficulty with urinary frequency. He has not had established care with a urologist. Previously in  had a L PCN in place for ureteral and kidney stones, was supposed to have inpatient stone surgery ideally but then was discharged. His L PCN has since fallen out. No overt fevers/chills or symptoms of UTI. \par \par Would like to consider surgery for removal of kidney stones and bladder optimization. We discussed that he will likely need UDS, staged bilateral PCNL and possible SPT given likely long term catheter dependence given neurologic status. \par \par Anticoagulation: None\par All: NKDA\par Social: nonsmoker, lives in home currently\par PMHx: MS, bed-bound, depression, nephrolithiasis, neurogenic bladder\par FHx: None\par PSHx: None\par Labs: Cr 0.78 mg/dL\par \par Imaging:  CT with L PCN in place with kidney stones, large lower pole stone and ureteral stone noted, R sided kidney stone\par \par \par 6/15:\par Given DNR/DNI status (refused to waive) underwent bilateral URS/LL//Stent with CVAC and SPT Placement. Here today for stent removal and stone counseling with SPT exchange. Diet modification reviewed at length- increasing fluids (primarily water), citrus is good, and decreasing/moderating salt, animal flesh protein, oxalate containing foods, and moderation of calcium intake (1000 mg/day is USRDA).\par \par I reviewed with the patient the risks of metabolic stone disease given their underlying risk parameters (all of which include large stones, multiple stones, bilateral stones, family history, and young age), and the indications for 24 hour urine metabolic assessment.\par \par We also discussed benefits of regular exercise and weight loss as independent risk reducers for stones. SPT exchanged today.  [Urinary Incontinence] : no urinary incontinence [Urinary Retention] : no urinary retention [Urinary Urgency] : urinary urgency [Urinary Frequency] : urinary frequency

## 2023-06-16 DIAGNOSIS — N32.89 OTHER SPECIFIED DISORDERS OF BLADDER: ICD-10-CM

## 2023-06-16 DIAGNOSIS — R30.0 DYSURIA: ICD-10-CM

## 2023-06-16 DIAGNOSIS — N20.0 CALCULUS OF KIDNEY: ICD-10-CM

## 2023-07-11 ENCOUNTER — TRANSCRIPTION ENCOUNTER (OUTPATIENT)
Age: 48
End: 2023-07-11

## 2023-07-12 ENCOUNTER — APPOINTMENT (OUTPATIENT)
Dept: HOME HEALTH SERVICES | Facility: HOME HEALTH | Age: 48
End: 2023-07-12
Payer: MEDICARE

## 2023-07-12 VITALS
HEART RATE: 103 BPM | TEMPERATURE: 36.6 F | OXYGEN SATURATION: 98 % | RESPIRATION RATE: 16 BRPM | SYSTOLIC BLOOD PRESSURE: 110 MMHG | DIASTOLIC BLOOD PRESSURE: 74 MMHG

## 2023-07-12 DIAGNOSIS — Z86.69 PERSONAL HISTORY OF OTHER DISEASES OF THE NERVOUS SYSTEM AND SENSE ORGANS: ICD-10-CM

## 2023-07-12 PROCEDURE — 99349 HOME/RES VST EST MOD MDM 40: CPT

## 2023-07-12 NOTE — REVIEW OF SYSTEMS
[Vision Problems] : vision problems [Lower Ext Edema] : lower extremity edema [Constipation] : constipation [Incontinence] : incontinence [Joint Pain] : joint pain [Joint Stiffness] : joint stiffness [Muscle Weakness] : muscle weakness [Unsteady Walk] : ataxia [Anxiety] : anxiety [Depression] : depression [Negative] : Heme/Lymph [FreeTextEntry3] : optic neuritis in left eye  [FreeTextEntry8] : suprapubic iraheta in place  [de-identified] : dry skin on right side

## 2023-07-12 NOTE — COUNSELING
[Overweight - ( BMI 25.1 - 29.9 )] : overweight -  ( BMI 25.1 - 29.9 ) [Smoker, not interested in quitting] : smoker, not interested in quitting [Medical alert] : medical alert [DNR] : Code Status: DNR [Limited] : Treatment Guidelines: Limited [DNI] : Intubation: DNI [[facility] Date: _____] [facility]: [unfilled] [FreeTextEntry2] : bed-bound, non-ambulatory.

## 2023-07-12 NOTE — REASON FOR VISIT
[Follow-Up] : a follow-up visit [Pre-Visit Preparation] : pre-visit preparation was done [Intercurrent Specialty/Sub-specialty Visits] : the patient has intercurrent specialty/sub-specialty visits [FreeTextEntry2] : chart review  [FreeTextEntry3] : urology

## 2023-07-12 NOTE — PHYSICAL EXAM
[No Acute Distress] : no acute distress [Normal Voice/Communication] : normal voice communication [Normal Sclera/Conjunctiva] : normal sclera/conjunctiva [PERRL] : pupils equal, round and reactive to light [Normal Outer Ear/Nose] : the ears and nose were normal in appearance [Normal Oropharynx] : the oropharynx was normal [No JVD] : no jugular venous distention [Supple] : the neck was supple [No LAD] : no lymphadenopathy [No Respiratory Distress] : no respiratory distress [Clear to Auscultation] : lungs were clear to auscultation bilaterally [Normal Rate] : heart rate was normal  [Regular Rhythm] : with a regular rhythm [Normal S1, S2] : normal S1 and S2 [No LE Varicosities] : there were no varicosital changes in the lower extremities [Pedal Pulses Present] : the pedal pulses are present [Normal Bowel Sounds] : normal bowel sounds [Non Tender] : non-tender [Soft] : abdomen soft [No Masses] : no abdominal mass palpated [No Spinal Tenderness] : no spinal tenderness [No Rash] : no rash [No Skin Lesions] : no skin lesions [Cranial Nerves Intact] : cranial nerves 2-12 were intact [No Gross Sensory Deficits] : no gross sensory deficits [Kyphosis] : no kyphosis present [Acne] : no acne [de-identified] : bed-bound, non-ambulatory. right side weaker than left  [de-identified] : bed-bound. generalized weakness. right side weaker than left

## 2023-07-12 NOTE — HEALTH RISK ASSESSMENT
[HRA Reviewed] : Health risk assessment reviewed [Some assistance needed] : managing finances [Full assistance needed] : using transportation [Patient not ambulatory (Wheelchair)] : Patient is not ambulatory (Wheelchair) [FreeTextEntry8] : na

## 2023-07-12 NOTE — HISTORY OF PRESENT ILLNESS
[Patient] : patient [FreeTextEntry1] : MS [FreeTextEntry2] : PMH: 47 y/o male with history of MS, bed-bound, PE, depression, neurogenic bladder, suprapubic Iraheta. Patient is being seen today for routine follow-up of chronic conditions. \par \par \par  \par  At today's visit, patient seen laying in bed - he has suprapubic iraheta in place - urine draining well. He is going back to urologist on 7/18/23 for cath exchange. \par Patient in usual state of health -- frustrated at his MS diagnosis and how it prevents from living life he once did. 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 He does have friends who come by to see him. \par He does have painful spasms - manages with tramadol, oxybutin and smoking weed. Most recently had tramadol yesterday. \par Has 24hr/aide. Weekend aide is here all weekend. \par urine: iraheta in place, draining clear urine. has follow-up with urology for stone management. \par MS - first diagnosed at 20. Then, he was in remission for seven years, then he had another attack in 2013 - wheelchair bound since 2013. Bed-bound X three years (2020).  \par Has not gotten OOB since 2020. Not able to bend his knees. \par \par Pain: gets intermittent painful spasms throughout the day, rates at 11 of 10. Once the spasms passes - he is not in any pain.. \par \par Mood/Behavior: "depends" If he has no pain, in a great mood. WHen he has pain, does not like to be bothered. Always depressed - manages with marijuana. He also makes jokes and tries to have up-beat attitude. He has very supportive friends. His parents will also come by once a week or so, but he is not close with them. \par Appetite: eats well. Gets "Moms meals" from insurance - will eat three meals/day. Also, snacks in between. HHA will feed patient, patient not able to hold spoon/fork. No coughing with liquids. \par \par Sleep: sleeps well. Does not get 8hrs/night. He will fall asleep & then a spasm will wake him up - he will smoke and then go back to sleep. He states the spams will wake him up every 20mins - the most sleep he will get will be an hour. He is used to his sleep cycle & believes he gets sufficient rest. \par \par \par

## 2023-07-13 ENCOUNTER — NON-APPOINTMENT (OUTPATIENT)
Age: 48
End: 2023-07-13

## 2023-07-14 ENCOUNTER — NON-APPOINTMENT (OUTPATIENT)
Age: 48
End: 2023-07-14

## 2023-07-18 ENCOUNTER — APPOINTMENT (OUTPATIENT)
Dept: UROLOGY | Facility: CLINIC | Age: 48
End: 2023-07-18
Payer: MEDICARE

## 2023-07-18 ENCOUNTER — OUTPATIENT (OUTPATIENT)
Dept: OUTPATIENT SERVICES | Facility: HOSPITAL | Age: 48
LOS: 1 days | End: 2023-07-18
Payer: MEDICARE

## 2023-07-18 VITALS
SYSTOLIC BLOOD PRESSURE: 130 MMHG | HEART RATE: 73 BPM | OXYGEN SATURATION: 96 % | RESPIRATION RATE: 16 BRPM | DIASTOLIC BLOOD PRESSURE: 90 MMHG

## 2023-07-18 DIAGNOSIS — R35.0 FREQUENCY OF MICTURITION: ICD-10-CM

## 2023-07-18 DIAGNOSIS — N20.0 CALCULUS OF KIDNEY: ICD-10-CM

## 2023-07-18 DIAGNOSIS — Z93.6 OTHER ARTIFICIAL OPENINGS OF URINARY TRACT STATUS: Chronic | ICD-10-CM

## 2023-07-18 PROCEDURE — 99213 OFFICE O/P EST LOW 20 MIN: CPT | Mod: 25

## 2023-07-18 PROCEDURE — 51705 CHANGE OF BLADDER TUBE: CPT | Mod: 58

## 2023-07-18 PROCEDURE — 51705 CHANGE OF BLADDER TUBE: CPT

## 2023-07-18 PROCEDURE — C2627: CPT

## 2023-07-18 NOTE — PHYSICAL EXAM
[General Appearance - Well Developed] : well developed [General Appearance - Well Nourished] : well nourished [Bowel Sounds] : normal bowel sounds [Abdomen Soft] : soft [FreeTextEntry1] : iraheta bag with orange tinted urine, SPT in place [Skin Color & Pigmentation] : normal skin color and pigmentation [Heart Rate And Rhythm] : Heart rate and rhythm were normal [] : no respiratory distress [Respiration, Rhythm And Depth] : normal respiratory rhythm and effort [Oriented To Time, Place, And Person] : oriented to person, place, and time [Not Anxious] : not anxious [Normal Station and Gait] : the gait and station were normal for the patient's age [No Focal Deficits] : no focal deficits

## 2023-07-18 NOTE — HISTORY OF PRESENT ILLNESS
[FreeTextEntry1] : : 1975 \par Referring Provider: none \par \par HPI: Mr. KENNETH SAUL is a 47 year yo M with a PMHx notable for MS since , bed-bound, PE, depression. Patient is being seen today for consultation of bilateral kidney stones and neurogenic bladder. He has a history of MS - first diagnosed at 20. Then, he was in remission for seven years, then he had another attack in  - wheelchair bound since . He did see neurologist in the past. From a urologic standpoint, he has significant urinary frequency and since February has had a iraheta catheter in place given his difficulty with urinary frequency. He has not had established care with a urologist. Previously in  had a L PCN in place for ureteral and kidney stones, was supposed to have inpatient stone surgery ideally but then was discharged. His L PCN has since fallen out. No overt fevers/chills or symptoms of UTI. \par \par Would like to consider surgery for removal of kidney stones and bladder optimization. We discussed that he will likely need UDS, staged bilateral PCNL and possible SPT given likely long term catheter dependence given neurologic status. \par \par Anticoagulation: None\par All: NKDA\par Social: nonsmoker, lives in home currently\par PMHx: MS, bed-bound, depression, nephrolithiasis, neurogenic bladder\par FHx: None\par PSHx: None\par Labs: Cr 0.78 mg/dL\par \par Imaging:  CT with L PCN in place with kidney stones, large lower pole stone and ureteral stone noted, R sided kidney stone\par \par \par 6/15:\par Given DNR/DNI status (refused to waive) underwent bilateral URS/LL//Stent with CVAC and SPT Placement. Here today for stent removal and stone counseling with SPT exchange. Diet modification reviewed at length- increasing fluids (primarily water), citrus is good, and decreasing/moderating salt, animal flesh protein, oxalate containing foods, and moderation of calcium intake (1000 mg/day is USRDA).\par \par I reviewed with the patient the risks of metabolic stone disease given their underlying risk parameters (all of which include large stones, multiple stones, bilateral stones, family history, and young age), and the indications for 24 hour urine metabolic assessment.\par \par We also discussed benefits of regular exercise and weight loss as independent risk reducers for stones. SPT exchanged today. \par \par :\par Here today for follow up SPT and kidney stones. Has not done litholink. Discussed importance of increasing citrus intake and High fluid intake. The goal should be to drink enough to produce 2.5 liters (quarts) of urine daily. Most studies have shown that high fluid volume intake will decrease the risk of stone formation. Research generally indicates that there appears to be little difference between hard and soft water in the formation of kidney stones. Lemon juice added to the water may also aid with increasing urine pH, urine citric acid and reducing stone formation.\par  [Urinary Incontinence] : no urinary incontinence [Urinary Retention] : no urinary retention [Urinary Urgency] : urinary urgency [Urinary Frequency] : urinary frequency

## 2023-07-19 DIAGNOSIS — N31.9 NEUROMUSCULAR DYSFUNCTION OF BLADDER, UNSPECIFIED: ICD-10-CM

## 2023-07-19 DIAGNOSIS — N20.0 CALCULUS OF KIDNEY: ICD-10-CM

## 2023-07-21 ENCOUNTER — APPOINTMENT (OUTPATIENT)
Dept: HOME HEALTH SERVICES | Facility: HOME HEALTH | Age: 48
End: 2023-07-21

## 2023-07-22 LAB — SURGICAL PATHOLOGY STUDY: SIGNIFICANT CHANGE UP

## 2023-08-14 ENCOUNTER — APPOINTMENT (OUTPATIENT)
Dept: HOME HEALTH SERVICES | Facility: HOME HEALTH | Age: 48
End: 2023-08-14

## 2023-08-22 ENCOUNTER — APPOINTMENT (OUTPATIENT)
Dept: UROLOGY | Facility: CLINIC | Age: 48
End: 2023-08-22
Payer: MEDICARE

## 2023-08-22 ENCOUNTER — OUTPATIENT (OUTPATIENT)
Dept: OUTPATIENT SERVICES | Facility: HOSPITAL | Age: 48
LOS: 1 days | End: 2023-08-22
Payer: MEDICARE

## 2023-08-22 VITALS — SYSTOLIC BLOOD PRESSURE: 123 MMHG | HEART RATE: 73 BPM | DIASTOLIC BLOOD PRESSURE: 85 MMHG

## 2023-08-22 DIAGNOSIS — R35.0 FREQUENCY OF MICTURITION: ICD-10-CM

## 2023-08-22 DIAGNOSIS — R33.9 RETENTION OF URINE, UNSPECIFIED: ICD-10-CM

## 2023-08-22 DIAGNOSIS — N31.9 NEUROMUSCULAR DYSFUNCTION OF BLADDER, UNSPECIFIED: ICD-10-CM

## 2023-08-22 DIAGNOSIS — Z93.6 OTHER ARTIFICIAL OPENINGS OF URINARY TRACT STATUS: Chronic | ICD-10-CM

## 2023-08-22 PROCEDURE — 51702 INSERT TEMP BLADDER CATH: CPT

## 2023-08-22 PROCEDURE — C2627: CPT

## 2023-08-22 PROCEDURE — 51702 INSERT TEMP BLADDER CATH: CPT | Mod: 58

## 2023-08-22 PROCEDURE — 51705 CHANGE OF BLADDER TUBE: CPT

## 2023-08-24 DIAGNOSIS — Z93.59 OTHER CYSTOSTOMY STATUS: ICD-10-CM

## 2023-08-24 DIAGNOSIS — R33.9 RETENTION OF URINE, UNSPECIFIED: ICD-10-CM

## 2023-08-24 DIAGNOSIS — N31.9 NEUROMUSCULAR DYSFUNCTION OF BLADDER, UNSPECIFIED: ICD-10-CM

## 2023-08-28 LAB — BACTERIA UR CULT: ABNORMAL

## 2023-09-06 ENCOUNTER — NON-APPOINTMENT (OUTPATIENT)
Age: 48
End: 2023-09-06

## 2023-09-07 ENCOUNTER — APPOINTMENT (OUTPATIENT)
Dept: HOME HEALTH SERVICES | Facility: HOME HEALTH | Age: 48
End: 2023-09-07

## 2023-09-07 ENCOUNTER — NON-APPOINTMENT (OUTPATIENT)
Age: 48
End: 2023-09-07

## 2023-09-07 VITALS
DIASTOLIC BLOOD PRESSURE: 84 MMHG | RESPIRATION RATE: 16 BRPM | HEART RATE: 80 BPM | OXYGEN SATURATION: 98 % | SYSTOLIC BLOOD PRESSURE: 128 MMHG | TEMPERATURE: 99.1 F

## 2023-09-07 RX ORDER — LORATADINE 10 MG
17 TABLET,DISINTEGRATING ORAL DAILY
Qty: 3 | Refills: 2 | Status: COMPLETED | COMMUNITY
Start: 2023-06-06 | End: 2023-09-07

## 2023-09-07 RX ORDER — CEPHALEXIN 500 MG/1
500 TABLET ORAL 3 TIMES DAILY
Qty: 15 | Refills: 0 | Status: COMPLETED | COMMUNITY
Start: 2023-08-28 | End: 2023-09-07

## 2023-09-07 RX ORDER — OXYBUTYNIN CHLORIDE 5 MG/1
5 TABLET ORAL EVERY 8 HOURS
Qty: 180 | Refills: 3 | Status: COMPLETED | COMMUNITY
Start: 2023-06-14 | End: 2023-09-07

## 2023-09-07 RX ORDER — CIPROFLOXACIN HYDROCHLORIDE 500 MG/1
500 TABLET, FILM COATED ORAL
Qty: 10 | Refills: 0 | Status: COMPLETED | COMMUNITY
Start: 2023-08-28 | End: 2023-09-07

## 2023-09-07 RX ORDER — PHENAZOPYRIDINE 100 MG/1
100 TABLET, FILM COATED ORAL 3 TIMES DAILY
Qty: 9 | Refills: 0 | Status: COMPLETED | COMMUNITY
End: 2023-09-07

## 2023-09-08 ENCOUNTER — INPATIENT (INPATIENT)
Facility: HOSPITAL | Age: 48
LOS: 4 days | Discharge: HOME | DRG: 698 | End: 2023-09-13
Attending: INTERNAL MEDICINE | Admitting: HOSPITALIST
Payer: MEDICARE

## 2023-09-08 ENCOUNTER — NON-APPOINTMENT (OUTPATIENT)
Age: 48
End: 2023-09-08

## 2023-09-08 VITALS
RESPIRATION RATE: 16 BRPM | HEART RATE: 101 BPM | TEMPERATURE: 98 F | DIASTOLIC BLOOD PRESSURE: 75 MMHG | WEIGHT: 250 LBS | OXYGEN SATURATION: 96 % | SYSTOLIC BLOOD PRESSURE: 108 MMHG | HEIGHT: 70 IN

## 2023-09-08 DIAGNOSIS — Z93.6 OTHER ARTIFICIAL OPENINGS OF URINARY TRACT STATUS: Chronic | ICD-10-CM

## 2023-09-08 DIAGNOSIS — N17.9 ACUTE KIDNEY FAILURE, UNSPECIFIED: ICD-10-CM

## 2023-09-08 LAB
ALBUMIN SERPL ELPH-MCNC: 2 G/DL — LOW (ref 3.3–5)
ALBUMIN SERPL ELPH-MCNC: 2.6 G/DL — LOW (ref 3.3–5)
ALP SERPL-CCNC: 108 U/L — SIGNIFICANT CHANGE UP (ref 40–120)
ALP SERPL-CCNC: 92 U/L — SIGNIFICANT CHANGE UP (ref 40–120)
ALT FLD-CCNC: 32 U/L — SIGNIFICANT CHANGE UP (ref 10–45)
ALT FLD-CCNC: 36 U/L — SIGNIFICANT CHANGE UP (ref 10–45)
ANION GAP SERPL CALC-SCNC: 17 MMOL/L — SIGNIFICANT CHANGE UP (ref 5–17)
ANION GAP SERPL CALC-SCNC: 17 MMOL/L — SIGNIFICANT CHANGE UP (ref 5–17)
ANION GAP SERPL CALC-SCNC: 18 MMOL/L — HIGH (ref 5–17)
ANION GAP SERPL CALC-SCNC: 19 MMOL/L — HIGH (ref 5–17)
APPEARANCE UR: ABNORMAL
APTT BLD: 26.8 SEC — SIGNIFICANT CHANGE UP (ref 24.5–35.6)
APTT BLD: 31.7 SEC — SIGNIFICANT CHANGE UP (ref 24.5–35.6)
AST SERPL-CCNC: 21 U/L — SIGNIFICANT CHANGE UP (ref 10–40)
AST SERPL-CCNC: 52 U/L — HIGH (ref 10–40)
BACTERIA # UR AUTO: ABNORMAL /HPF
BASOPHILS # BLD AUTO: 0.04 K/UL — SIGNIFICANT CHANGE UP (ref 0–0.2)
BASOPHILS # BLD AUTO: 0.05 K/UL — SIGNIFICANT CHANGE UP (ref 0–0.2)
BASOPHILS NFR BLD AUTO: 0.3 % — SIGNIFICANT CHANGE UP (ref 0–2)
BASOPHILS NFR BLD AUTO: 0.3 % — SIGNIFICANT CHANGE UP (ref 0–2)
BILIRUB SERPL-MCNC: 0.4 MG/DL — SIGNIFICANT CHANGE UP (ref 0.2–1.2)
BILIRUB SERPL-MCNC: 0.6 MG/DL — SIGNIFICANT CHANGE UP (ref 0.2–1.2)
BILIRUB UR-MCNC: ABNORMAL
BUN SERPL-MCNC: 115 MG/DL — HIGH (ref 7–23)
BUN SERPL-MCNC: 119 MG/DL — HIGH (ref 7–23)
BUN SERPL-MCNC: 120 MG/DL — HIGH (ref 7–23)
BUN SERPL-MCNC: 124 MG/DL — HIGH (ref 7–23)
CALCIUM SERPL-MCNC: 8.1 MG/DL — LOW (ref 8.4–10.5)
CALCIUM SERPL-MCNC: 8.3 MG/DL — LOW (ref 8.4–10.5)
CALCIUM SERPL-MCNC: 8.6 MG/DL — SIGNIFICANT CHANGE UP (ref 8.4–10.5)
CALCIUM SERPL-MCNC: 9.3 MG/DL — SIGNIFICANT CHANGE UP (ref 8.4–10.5)
CHLORIDE SERPL-SCNC: 96 MMOL/L — SIGNIFICANT CHANGE UP (ref 96–108)
CHLORIDE SERPL-SCNC: 97 MMOL/L — SIGNIFICANT CHANGE UP (ref 96–108)
CHLORIDE SERPL-SCNC: 98 MMOL/L — SIGNIFICANT CHANGE UP (ref 96–108)
CHLORIDE SERPL-SCNC: 99 MMOL/L — SIGNIFICANT CHANGE UP (ref 96–108)
CO2 SERPL-SCNC: 16 MMOL/L — LOW (ref 22–31)
CO2 SERPL-SCNC: 17 MMOL/L — LOW (ref 22–31)
CO2 SERPL-SCNC: 18 MMOL/L — LOW (ref 22–31)
CO2 SERPL-SCNC: 21 MMOL/L — LOW (ref 22–31)
COLOR SPEC: ABNORMAL
CREAT SERPL-MCNC: 10.13 MG/DL — HIGH (ref 0.5–1.3)
CREAT SERPL-MCNC: 10.58 MG/DL — HIGH (ref 0.5–1.3)
CREAT SERPL-MCNC: 10.69 MG/DL — HIGH (ref 0.5–1.3)
CREAT SERPL-MCNC: 11.34 MG/DL — HIGH (ref 0.5–1.3)
DIFF PNL FLD: ABNORMAL
EGFR: 5 ML/MIN/1.73M2 — LOW
EGFR: 5 ML/MIN/1.73M2 — LOW
EGFR: 6 ML/MIN/1.73M2 — LOW
EGFR: 6 ML/MIN/1.73M2 — LOW
EOSINOPHIL # BLD AUTO: 0.13 K/UL — SIGNIFICANT CHANGE UP (ref 0–0.5)
EOSINOPHIL # BLD AUTO: 0.16 K/UL — SIGNIFICANT CHANGE UP (ref 0–0.5)
EOSINOPHIL NFR BLD AUTO: 1 % — SIGNIFICANT CHANGE UP (ref 0–6)
EOSINOPHIL NFR BLD AUTO: 1.1 % — SIGNIFICANT CHANGE UP (ref 0–6)
EPI CELLS # UR: 0 — SIGNIFICANT CHANGE UP
GLUCOSE BLDC GLUCOMTR-MCNC: 114 MG/DL — HIGH (ref 70–99)
GLUCOSE BLDC GLUCOMTR-MCNC: 114 MG/DL — HIGH (ref 70–99)
GLUCOSE SERPL-MCNC: 114 MG/DL — HIGH (ref 70–99)
GLUCOSE SERPL-MCNC: 136 MG/DL — HIGH (ref 70–99)
GLUCOSE SERPL-MCNC: 203 MG/DL — HIGH (ref 70–99)
GLUCOSE SERPL-MCNC: 97 MG/DL — SIGNIFICANT CHANGE UP (ref 70–99)
GLUCOSE UR QL: NEGATIVE MG/DL — SIGNIFICANT CHANGE UP
HCT VFR BLD CALC: 33 % — LOW (ref 39–50)
HCT VFR BLD CALC: 38.4 % — LOW (ref 39–50)
HGB BLD-MCNC: 10.8 G/DL — LOW (ref 13–17)
HGB BLD-MCNC: 12.9 G/DL — LOW (ref 13–17)
IMM GRANULOCYTES NFR BLD AUTO: 0.8 % — SIGNIFICANT CHANGE UP (ref 0–0.9)
IMM GRANULOCYTES NFR BLD AUTO: 0.9 % — SIGNIFICANT CHANGE UP (ref 0–0.9)
INR BLD: 1.31 RATIO — HIGH (ref 0.85–1.18)
INR BLD: 1.36 RATIO — HIGH (ref 0.85–1.18)
KETONES UR-MCNC: NEGATIVE MG/DL — SIGNIFICANT CHANGE UP
LACTATE SERPL-SCNC: 0.9 MMOL/L — SIGNIFICANT CHANGE UP (ref 0.7–2)
LEUKOCYTE ESTERASE UR-ACNC: ABNORMAL
LIDOCAIN IGE QN: 55 U/L — SIGNIFICANT CHANGE UP (ref 16–77)
LYMPHOCYTES # BLD AUTO: 1.19 K/UL — SIGNIFICANT CHANGE UP (ref 1–3.3)
LYMPHOCYTES # BLD AUTO: 1.3 K/UL — SIGNIFICANT CHANGE UP (ref 1–3.3)
LYMPHOCYTES # BLD AUTO: 8.5 % — LOW (ref 13–44)
LYMPHOCYTES # BLD AUTO: 9.7 % — LOW (ref 13–44)
MAGNESIUM SERPL-MCNC: 3 MG/DL — HIGH (ref 1.6–2.6)
MCHC RBC-ENTMCNC: 29.7 PG — SIGNIFICANT CHANGE UP (ref 27–34)
MCHC RBC-ENTMCNC: 30.1 PG — SIGNIFICANT CHANGE UP (ref 27–34)
MCHC RBC-ENTMCNC: 32.7 GM/DL — SIGNIFICANT CHANGE UP (ref 32–36)
MCHC RBC-ENTMCNC: 33.6 GM/DL — SIGNIFICANT CHANGE UP (ref 32–36)
MCV RBC AUTO: 89.5 FL — SIGNIFICANT CHANGE UP (ref 80–100)
MCV RBC AUTO: 90.7 FL — SIGNIFICANT CHANGE UP (ref 80–100)
MONOCYTES # BLD AUTO: 0.56 K/UL — SIGNIFICANT CHANGE UP (ref 0–0.9)
MONOCYTES # BLD AUTO: 1.75 K/UL — HIGH (ref 0–0.9)
MONOCYTES NFR BLD AUTO: 11.5 % — SIGNIFICANT CHANGE UP (ref 2–14)
MONOCYTES NFR BLD AUTO: 4.6 % — SIGNIFICANT CHANGE UP (ref 2–14)
NEUTROPHILS # BLD AUTO: 10.26 K/UL — HIGH (ref 1.8–7.4)
NEUTROPHILS # BLD AUTO: 11.86 K/UL — HIGH (ref 1.8–7.4)
NEUTROPHILS NFR BLD AUTO: 77.8 % — HIGH (ref 43–77)
NEUTROPHILS NFR BLD AUTO: 83.5 % — HIGH (ref 43–77)
NITRITE UR-MCNC: POSITIVE
NRBC # BLD: 0 /100 WBCS — SIGNIFICANT CHANGE UP (ref 0–0)
NRBC # BLD: 0 /100 WBCS — SIGNIFICANT CHANGE UP (ref 0–0)
PH UR: 7.5 — SIGNIFICANT CHANGE UP (ref 5–8)
PLATELET # BLD AUTO: 313 K/UL — SIGNIFICANT CHANGE UP (ref 150–400)
PLATELET # BLD AUTO: 332 K/UL — SIGNIFICANT CHANGE UP (ref 150–400)
POTASSIUM SERPL-MCNC: 5.2 MMOL/L — SIGNIFICANT CHANGE UP (ref 3.5–5.3)
POTASSIUM SERPL-MCNC: 5.7 MMOL/L — HIGH (ref 3.5–5.3)
POTASSIUM SERPL-MCNC: 6.3 MMOL/L — CRITICAL HIGH (ref 3.5–5.3)
POTASSIUM SERPL-MCNC: 7 MMOL/L — CRITICAL HIGH (ref 3.5–5.3)
POTASSIUM SERPL-SCNC: 5.2 MMOL/L — SIGNIFICANT CHANGE UP (ref 3.5–5.3)
POTASSIUM SERPL-SCNC: 5.7 MMOL/L — HIGH (ref 3.5–5.3)
POTASSIUM SERPL-SCNC: 6.3 MMOL/L — CRITICAL HIGH (ref 3.5–5.3)
POTASSIUM SERPL-SCNC: 7 MMOL/L — CRITICAL HIGH (ref 3.5–5.3)
PROT SERPL-MCNC: 6.7 G/DL — SIGNIFICANT CHANGE UP (ref 6–8.3)
PROT SERPL-MCNC: 7.8 G/DL — SIGNIFICANT CHANGE UP (ref 6–8.3)
PROT UR-MCNC: 300 MG/DL
PROTHROM AB SERPL-ACNC: 14.8 SEC — HIGH (ref 9.5–13)
PROTHROM AB SERPL-ACNC: 15.4 SEC — HIGH (ref 9.5–13)
RAPID RVP RESULT: DETECTED
RBC # BLD: 3.64 M/UL — LOW (ref 4.2–5.8)
RBC # BLD: 4.29 M/UL — SIGNIFICANT CHANGE UP (ref 4.2–5.8)
RBC # FLD: 14.2 % — SIGNIFICANT CHANGE UP (ref 10.3–14.5)
RBC # FLD: 14.5 % — SIGNIFICANT CHANGE UP (ref 10.3–14.5)
RBC CASTS # UR COMP ASSIST: 30 /HPF — HIGH (ref 0–4)
RV+EV RNA SPEC QL NAA+PROBE: DETECTED
SARS-COV-2 RNA SPEC QL NAA+PROBE: SIGNIFICANT CHANGE UP
SODIUM SERPL-SCNC: 131 MMOL/L — LOW (ref 135–145)
SODIUM SERPL-SCNC: 134 MMOL/L — LOW (ref 135–145)
SP GR SPEC: 1.01 — SIGNIFICANT CHANGE UP (ref 1–1.03)
UROBILINOGEN FLD QL: 0.2 MG/DL — SIGNIFICANT CHANGE UP (ref 0.2–1)
WBC # BLD: 12.28 K/UL — HIGH (ref 3.8–10.5)
WBC # BLD: 15.26 K/UL — HIGH (ref 3.8–10.5)
WBC # FLD AUTO: 12.28 K/UL — HIGH (ref 3.8–10.5)
WBC # FLD AUTO: 15.26 K/UL — HIGH (ref 3.8–10.5)
WBC UR QL: >50 /HPF — HIGH (ref 0–5)

## 2023-09-08 PROCEDURE — 93010 ELECTROCARDIOGRAM REPORT: CPT

## 2023-09-08 PROCEDURE — 99285 EMERGENCY DEPT VISIT HI MDM: CPT | Mod: 25

## 2023-09-08 PROCEDURE — 99285 EMERGENCY DEPT VISIT HI MDM: CPT | Mod: GC

## 2023-09-08 PROCEDURE — 99223 1ST HOSP IP/OBS HIGH 75: CPT

## 2023-09-08 PROCEDURE — G1004: CPT

## 2023-09-08 PROCEDURE — 51702 INSERT TEMP BLADDER CATH: CPT

## 2023-09-08 PROCEDURE — 74176 CT ABD & PELVIS W/O CONTRAST: CPT | Mod: 26,MG

## 2023-09-08 PROCEDURE — 71045 X-RAY EXAM CHEST 1 VIEW: CPT | Mod: 26

## 2023-09-08 RX ORDER — IPRATROPIUM/ALBUTEROL SULFATE 18-103MCG
3 AEROSOL WITH ADAPTER (GRAM) INHALATION ONCE
Refills: 0 | Status: COMPLETED | OUTPATIENT
Start: 2023-09-08 | End: 2023-09-08

## 2023-09-08 RX ORDER — MORPHINE SULFATE 50 MG/1
4 CAPSULE, EXTENDED RELEASE ORAL ONCE
Refills: 0 | Status: DISCONTINUED | OUTPATIENT
Start: 2023-09-08 | End: 2023-09-08

## 2023-09-08 RX ORDER — KETOROLAC TROMETHAMINE 30 MG/ML
15 SYRINGE (ML) INJECTION ONCE
Refills: 0 | Status: DISCONTINUED | OUTPATIENT
Start: 2023-09-08 | End: 2023-09-08

## 2023-09-08 RX ORDER — ACETAMINOPHEN 500 MG
650 TABLET ORAL EVERY 6 HOURS
Refills: 0 | Status: DISCONTINUED | OUTPATIENT
Start: 2023-09-08 | End: 2023-09-13

## 2023-09-08 RX ORDER — ACETAMINOPHEN 500 MG
1000 TABLET ORAL ONCE
Refills: 0 | Status: COMPLETED | OUTPATIENT
Start: 2023-09-08 | End: 2023-09-08

## 2023-09-08 RX ORDER — LANOLIN ALCOHOL/MO/W.PET/CERES
3 CREAM (GRAM) TOPICAL AT BEDTIME
Refills: 0 | Status: DISCONTINUED | OUTPATIENT
Start: 2023-09-08 | End: 2023-09-13

## 2023-09-08 RX ORDER — TRAMADOL HYDROCHLORIDE 50 MG/1
50 TABLET ORAL
Refills: 0 | Status: DISCONTINUED | OUTPATIENT
Start: 2023-09-08 | End: 2023-09-13

## 2023-09-08 RX ORDER — IOHEXOL 300 MG/ML
30 INJECTION, SOLUTION INTRAVENOUS ONCE
Refills: 0 | Status: DISCONTINUED | OUTPATIENT
Start: 2023-09-08 | End: 2023-09-13

## 2023-09-08 RX ORDER — INFLUENZA VIRUS VACCINE 15; 15; 15; 15 UG/.5ML; UG/.5ML; UG/.5ML; UG/.5ML
0.5 SUSPENSION INTRAMUSCULAR ONCE
Refills: 0 | Status: DISCONTINUED | OUTPATIENT
Start: 2023-09-08 | End: 2023-09-13

## 2023-09-08 RX ORDER — ONDANSETRON 8 MG/1
4 TABLET, FILM COATED ORAL EVERY 8 HOURS
Refills: 0 | Status: DISCONTINUED | OUTPATIENT
Start: 2023-09-08 | End: 2023-09-13

## 2023-09-08 RX ORDER — SODIUM ZIRCONIUM CYCLOSILICATE 10 G/10G
10 POWDER, FOR SUSPENSION ORAL EVERY 8 HOURS
Refills: 0 | Status: DISCONTINUED | OUTPATIENT
Start: 2023-09-08 | End: 2023-09-09

## 2023-09-08 RX ORDER — DIATRIZOATE MEGLUMINE 180 MG/ML
30 INJECTION, SOLUTION INTRAVESICAL ONCE
Refills: 0 | Status: DISCONTINUED | OUTPATIENT
Start: 2023-09-08 | End: 2023-09-08

## 2023-09-08 RX ORDER — SODIUM CHLORIDE 9 MG/ML
1000 INJECTION INTRAMUSCULAR; INTRAVENOUS; SUBCUTANEOUS ONCE
Refills: 0 | Status: COMPLETED | OUTPATIENT
Start: 2023-09-08 | End: 2023-09-08

## 2023-09-08 RX ORDER — INSULIN HUMAN 100 [IU]/ML
5 INJECTION, SOLUTION SUBCUTANEOUS ONCE
Refills: 0 | Status: COMPLETED | OUTPATIENT
Start: 2023-09-08 | End: 2023-09-08

## 2023-09-08 RX ORDER — INSULIN HUMAN 100 [IU]/ML
2.5 INJECTION, SOLUTION SUBCUTANEOUS ONCE
Refills: 0 | Status: DISCONTINUED | OUTPATIENT
Start: 2023-09-08 | End: 2023-09-08

## 2023-09-08 RX ORDER — DEXTROSE 50 % IN WATER 50 %
50 SYRINGE (ML) INTRAVENOUS ONCE
Refills: 0 | Status: COMPLETED | OUTPATIENT
Start: 2023-09-08 | End: 2023-09-08

## 2023-09-08 RX ORDER — HEPARIN SODIUM 5000 [USP'U]/ML
5000 INJECTION INTRAVENOUS; SUBCUTANEOUS EVERY 12 HOURS
Refills: 0 | Status: DISCONTINUED | OUTPATIENT
Start: 2023-09-08 | End: 2023-09-13

## 2023-09-08 RX ORDER — PIPERACILLIN AND TAZOBACTAM 4; .5 G/20ML; G/20ML
3.38 INJECTION, POWDER, LYOPHILIZED, FOR SOLUTION INTRAVENOUS ONCE
Refills: 0 | Status: COMPLETED | OUTPATIENT
Start: 2023-09-08 | End: 2023-09-08

## 2023-09-08 RX ORDER — SODIUM CHLORIDE 9 MG/ML
1000 INJECTION, SOLUTION INTRAVENOUS
Refills: 0 | Status: DISCONTINUED | OUTPATIENT
Start: 2023-09-08 | End: 2023-09-11

## 2023-09-08 RX ORDER — CEFTRIAXONE 500 MG/1
1000 INJECTION, POWDER, FOR SOLUTION INTRAMUSCULAR; INTRAVENOUS EVERY 24 HOURS
Refills: 0 | Status: DISCONTINUED | OUTPATIENT
Start: 2023-09-08 | End: 2023-09-11

## 2023-09-08 RX ADMIN — MORPHINE SULFATE 4 MILLIGRAM(S): 50 CAPSULE, EXTENDED RELEASE ORAL at 14:23

## 2023-09-08 RX ADMIN — Medication 400 MILLIGRAM(S): at 12:46

## 2023-09-08 RX ADMIN — TRAMADOL HYDROCHLORIDE 50 MILLIGRAM(S): 50 TABLET ORAL at 23:09

## 2023-09-08 RX ADMIN — SODIUM CHLORIDE 1000 MILLILITER(S): 9 INJECTION INTRAMUSCULAR; INTRAVENOUS; SUBCUTANEOUS at 12:43

## 2023-09-08 RX ADMIN — PIPERACILLIN AND TAZOBACTAM 3.38 GRAM(S): 4; .5 INJECTION, POWDER, LYOPHILIZED, FOR SOLUTION INTRAVENOUS at 14:30

## 2023-09-08 RX ADMIN — SODIUM CHLORIDE 1000 MILLILITER(S): 9 INJECTION INTRAMUSCULAR; INTRAVENOUS; SUBCUTANEOUS at 13:49

## 2023-09-08 RX ADMIN — Medication 15 MILLIGRAM(S): at 18:09

## 2023-09-08 RX ADMIN — Medication 3 MILLILITER(S): at 15:58

## 2023-09-08 RX ADMIN — PIPERACILLIN AND TAZOBACTAM 200 GRAM(S): 4; .5 INJECTION, POWDER, LYOPHILIZED, FOR SOLUTION INTRAVENOUS at 14:03

## 2023-09-08 RX ADMIN — SODIUM CHLORIDE 100 MILLILITER(S): 9 INJECTION, SOLUTION INTRAVENOUS at 18:14

## 2023-09-08 RX ADMIN — SODIUM CHLORIDE 100 MILLILITER(S): 9 INJECTION, SOLUTION INTRAVENOUS at 22:54

## 2023-09-08 RX ADMIN — Medication 1000 MILLIGRAM(S): at 14:23

## 2023-09-08 RX ADMIN — Medication 100 MILLIGRAM(S): at 22:54

## 2023-09-08 RX ADMIN — HEPARIN SODIUM 5000 UNIT(S): 5000 INJECTION INTRAVENOUS; SUBCUTANEOUS at 19:34

## 2023-09-08 RX ADMIN — Medication 15 MILLIGRAM(S): at 15:58

## 2023-09-08 RX ADMIN — Medication 50 MILLILITER(S): at 13:37

## 2023-09-08 RX ADMIN — CEFTRIAXONE 100 MILLIGRAM(S): 500 INJECTION, POWDER, FOR SOLUTION INTRAMUSCULAR; INTRAVENOUS at 18:03

## 2023-09-08 RX ADMIN — SODIUM ZIRCONIUM CYCLOSILICATE 10 GRAM(S): 10 POWDER, FOR SUSPENSION ORAL at 22:54

## 2023-09-08 RX ADMIN — Medication 1000 MILLIGRAM(S): at 13:48

## 2023-09-08 RX ADMIN — MORPHINE SULFATE 4 MILLIGRAM(S): 50 CAPSULE, EXTENDED RELEASE ORAL at 12:46

## 2023-09-08 RX ADMIN — INSULIN HUMAN 5 UNIT(S): 100 INJECTION, SOLUTION SUBCUTANEOUS at 13:47

## 2023-09-08 NOTE — H&P ADULT - NSHPREVIEWOFSYSTEMS_GEN_ALL_CORE
CONSTITUTIONAL: No fever, weight loss, or fatigue  EYES: No eye pain, visual disturbances, or discharge  ENMT:  No difficulty hearing, tinnitus, vertigo; No sinus or throat pain  NECK: No pain or stiffness  RESPIRATORY: No cough, wheezing, chills or hemoptysis; No shortness of breath  CARDIOVASCULAR: No chest pain, palpitations, dizziness, or leg swelling  GASTROINTESTINAL: + lower abdominal pain; No nausea, vomiting, or hematemesis; No diarrhea or constipation. No melena or hematochezia.  GENITOURINARY: No dysuria, frequency, hematuria, or incontinence, discomfort to right inner thigh  NEUROLOGICAL: No headaches, memory loss, loss of strength, numbness, or tremors  SKIN: No itching, burning, rashes, or lesions   MUSCULOSKELETAL: No joint pain or swelling; No muscle, back, or extremity pain  PSYCHIATRIC: No depression, anxiety, mood swings, or difficulty sleeping  ALLERGY AND IMMUNOLOGIC: No hives or eczema    ALL ROS REVIEWED AND NORMAL EXCEPT AS STATED ABOVE

## 2023-09-08 NOTE — H&P ADULT - HISTORY OF PRESENT ILLNESS
48 year old male with PMHx of MS, quadriplegia, neurogenic bladder with known nephrolithiasis comes to ED after having significant low abdominal pain from iraheta being placed at home yesterday. Patient reports that he had a iraheta placed and since placement he has been having pain in his lower abdomen. No nausea or vomiting but the pain was bad enough that he call to come to the ED. In the ED, the iraheta was removed and there was significant amount of blood that came out. Urology was consulted. They came to the ED and placed a iraheta. Patient states he feels better. He feels a "little off" . His creatinine is also elevated to 11.3 Nephrology was consulted as well and she reports no need for urgent HD. Patient denies any other complaints.

## 2023-09-08 NOTE — ED ADULT TRIAGE NOTE - CHIEF COMPLAINT QUOTE
BIb EMS from home for c/o left flank pain x 3 days and blood noted in iraheta catheter today. Iraheta placed yesterday. Denies taking any blood thinners.

## 2023-09-08 NOTE — ED PROVIDER NOTE - NS ED ROS FT
GENERAL: No fever, no chills  EYES: No change in vision  HEENT: No trouble swallowing or speaking  CARDIAC: No chest pain  PULMONARY: No cough, no SOB  GI: No abdominal pain, no nausea, no vomiting, no diarrhea, no constipation  : +hematuria   SKIN: No rashes  NEURO: No headache, no numbness  MSK: No joint pain  Otherwise as HPI or negative.

## 2023-09-08 NOTE — ED PROVIDER NOTE - PROGRESS NOTE DETAILS
Efren, PGY3 - per bladder scan patient has >450cc of urine in bladder. iraheta was removed with subsequent tam blood coming from urethra. Concern for urethral injury, consider iraheta catheter incorrectly placed. Urology paged. Efren, PGY3 - Dr. Delgado placed iraheta at bedside with subsequent drainage of blood tinted urine. Due to elevated cr, will get a dry CT. patient tba. nephrology consulted for concern for emergent dialysis, nephrology holding off at this time Shailesh Bolanos [facility] Attending   spoke with Lab, 7.0 k IS HEMOLYZED  pending repeat Shailesh Bolanos ER Attending   patient noted to have 500cc in bladder on arrival to ER  irhaeta pulled, likely was not in the bladder, balloon was likely in urethra  gross bleeding noted from penis. concern for urethral trauma. Controlled with pressure dressing.  Urology consulted, Labs show kidney failure with elevated K, given insulin d50. ekg does not show peaked T, wide qrs, or flat Ps  Nephrology consulted, kidney failure likely 2/2 to post obstructive uropathy. will not perform RRT at this time  Urology arrived bedside, replaced iraheta.   repeat labs sent, stable for floor admission

## 2023-09-08 NOTE — ED PROVIDER NOTE - OBJECTIVE STATEMENT
49yo man with PMH advanced MS (diagnosed 1995), quadriplegia, PE (2019), neurogenic bladder with suprapubic iraheta catheter, nephrolithiasis, presenting with left flank pain for 4 days and heamturia x1 day. Per chart review patient had a recent hospitalization in May 2023 for flank pain, during admission had suprapubic catheter placed, and had left ureter stent placed and right stent removed. Urologist Dr. Rooney. Due to suprapubic catheter leakage he transitioned back to iraheta catheters last month. Patient states that he started having hematuria after his iraheta was changed yesterday, intermittent suprapubic pain.  Patient also endorsing 1 month of cough and congestion, denies fevers or chills.  During coughing episodes has some chest pain, however does not have chest pain or shortness of breath at baseline.

## 2023-09-08 NOTE — CONSULT NOTE ADULT - SUBJECTIVE AND OBJECTIVE BOX
NEPHROLOGY CONSULTATION    CHIEF COMPLAINT: abd pain    HPI:  Pt is 48 year old male with PMH of MS, quadriplegia, neurogenic bladder with known nephrolithiasis comes to ED after having significant low abdominal pain. Patient reports that he had a iraheta placed and since placement he has been having pain in his lower abdomen. No nausea or vomiting. In the ED, the iraheta was determined not to be in place and was removed, there was significant amount of blood that came out. Urology was consulted. New iraheta was placed. On initial blood work noted to have NAY, hyperkalemia. Denies CP, SOB, D/C/F/C.    ROS:  as above    Allergies:  ciprofloxacin (Rash Mild to Mod)    PAST MEDICAL & SURGICAL HISTORY:  MS (multiple sclerosis)  since 1995  PE (pulmonary embolism)  2013  Cellulitis  november 2014  Nephrolithiasis  Environmental allergies  Nephrostomy status    SOCIAL HISTORY:  negative    FAMILY HISTORY:  Family history of diabetes mellitus (DM) (Grandparent)  Family history of breast cancer (Grandparent, Aunt)    MEDICATIONS  (STANDING):  benzonatate 100 milliGRAM(s) Oral three times a day  cefTRIAXone   IVPB 1000 milliGRAM(s) IV Intermittent every 24 hours  dextrose 5% 1000 milliLiter(s) (100 mL/Hr) IV Continuous <Continuous>  heparin   Injectable 5000 Unit(s) SubCutaneous every 12 hours  iohexol 300 mG (iodine)/mL Oral Solution 30 milliLiter(s) Oral once  sodium zirconium cyclosilicate 10 Gram(s) Oral every 8 hours    Vital Signs Last 24 Hrs  T(C): 36.8 (09-08-23 @ 19:56), Max: 36.8 (09-08-23 @ 16:02)  T(F): 98.3 (09-08-23 @ 19:56), Max: 98.3 (09-08-23 @ 19:56)  HR: 103 (09-08-23 @ 19:56) (101 - 119)  BP: 114/62 (09-08-23 @ 19:56) (108/75 - 141/86)  RR: 17 (09-08-23 @ 19:56) (16 - 17)  SpO2: 94% (09-08-23 @ 19:56) (94% - 98%)    s1s2  b/l air entry  soft, ND  tr edema    LABS:                        10.8   12.28 )-----------( 313      ( 08 Sep 2023 14:15 )             33.0     09-08    134<L>  |  98  |  119<H>  ----------------------------<  114<H>  5.7<H>   |  17<L>  |  10.58<H>    Ca    8.6      08 Sep 2023 16:18  Mg     3.0     09-08    TPro  6.7  /  Alb  2.0<L>  /  TBili  0.6  /  DBili  x   /  AST  52<H>  /  ALT  32  /  AlkPhos  92  09-08    LIVER FUNCTIONS - ( 08 Sep 2023 14:15 )  Alb: 2.0 g/dL / Pro: 6.7 g/dL / ALK PHOS: 92 U/L / ALT: 32 U/L / AST: 52 U/L / GGT: x           PT/INR - ( 08 Sep 2023 16:18 )   PT: 15.4 sec;   INR: 1.36 ratio       PTT - ( 08 Sep 2023 16:18 )  PTT:26.8 sec    A/P:    Obstructive NAY iso iraheta not in place (Cr 1.19 - 7/1/23)  Hematuria iso above   Iraheta w/good UO  CT A/P noted   input noted  IVF w/Bicarb  Lokelma as ordered  Low K diet  Avoid nephrotoxins  Will f/u BMP, UO, urine cx  Hoping to avoid the need for RRT    [phone]  
  HPI:  Patient is a 48y Male who presented with MS and neurogenic bladder presents to ED with blood in Steele after it was changed yesterday by Visiting RN. Upon deflating balloon and removal there was clots and copious amount of bleeding.  Appears malaised     16 F Coude inserted without difficult with cloudy light pink urine. Penile bleeding had subsided.   Currently with ARF CR. 10.+    PAST MEDICAL & SURGICAL HISTORY:  MS (multiple sclerosis)  since 1995      PE (pulmonary embolism)  2013      Cellulitis  november 2014      Nephrolithiasis      Environmental allergies      Nephrostomy status        FAMILY HISTORY:  Family history of diabetes mellitus (DM) (Grandparent)    Family history of breast cancer (Grandparent, Aunt)      SOCIAL HISTORY:   Tobacco hx:  MEDICATIONS  (STANDING):  albuterol/ipratropium for Nebulization. 3 milliLiter(s) Nebulizer once  iohexol 300 mG (iodine)/mL Oral Solution 30 milliLiter(s) Oral once  ketorolac   Injectable 15 milliGRAM(s) IV Push once    MEDICATIONS  (PRN):    Allergies    Originally Entered as [Unknown] reaction to [KNA] (Unknown)  ciprofloxacin (Rash (Mild to Mod))    Intolerances        REVIEW OF SYSTEMS: Pertinent positives and negatives as stated in HPI, otherwise negative    Vital signs  T(C): 36.7 (09-08-23 @ 11:25), Max: 36.7 (09-08-23 @ 11:25)  HR: 108 (09-08-23 @ 13:49)  BP: 125/73 (09-08-23 @ 13:49)  SpO2: 98% (09-08-23 @ 13:49)  Wt(kg): --    Output      Physical Exam  Gen: NAD  Pulm: No respiratory distress, no subcostal retractions  CV: RRR, no JVD  Abd: Soft, NT, ND  : Uncircumcised blood at meatus.  Testes descended bilaterally.    MSK: No edema present, minimal sensation.     LABS:        09-08 @ 14:15    WBC 12.28 / Hct 33.0  / SCr 10.69    09-08 @ 12:41    WBC 15.26 / Hct 38.4  / SCr 11.34    09-08    131<L>  |  97  |  115<H>  ----------------------------<  203<H>  7.0<HH>   |  16<L>  |  10.69<H>    Ca    8.1<L>      08 Sep 2023 14:15  Mg     3.0     09-08    TPro  6.7  /  Alb  2.0<L>  /  TBili  0.6  /  DBili  x   /  AST  52<H>  /  ALT  32  /  AlkPhos  92  09-08    PT/INR - ( 08 Sep 2023 12:41 )   PT: 14.8 sec;   INR: 1.31 ratio         PTT - ( 08 Sep 2023 12:41 )  PTT:31.7 sec  Urinalysis Basic - ( 08 Sep 2023 15:05 )    Urinalysis + Microscopic Examination (09.08.23 @ 15:05)    pH Urine: 7.5   Urine Appearance: Turbid   Color: Orange   Specific Gravity: 1.014   Protein, Urine: 300 mg/dL   Glucose Qualitative, Urine: Negative mg/dL   Ketone - Urine: Negative mg/dL   Blood, Urine: Large   Bilirubin: Small   Urobilinogen: 0.2 mg/dL   Leukocyte Esterase Concentration: Large   Nitrite: Positive        ACC: [id number] EXAM:  CT ABDOMEN AND PELVIS   ORDERED BY: CHAPARRO ALMANZA     PROCEDURE DATE:  09/08/2023          INTERPRETATION:  CLINICAL INFORMATION: Left flank pain    COMPARISON: None.    CONTRAST/COMPLICATIONS:  IV Contrast: NONE  Oral Contrast:NONE  Complications: None reported at time of study completion    PROCEDURE:  CT of the Abdomen and Pelvis was performed.  Sagittal and coronal reformats were performed.    FINDINGS:  LOWER CHEST: Trace left pleural effusion.    LIVER: Marked diffusesteatosis. Question of a 1.9 cm hyperattenuating   lesion medial segment of the left hepatic lobe (2, 39) versus focal fatty   sparing correlate with hepatic MR.  BILE DUCTS: Normal caliber.  GALLBLADDER: Within normal limits.  SPLEEN: Within normal limits.  PANCREAS: Within normal limits.  ADRENALS: Within normal limits.  KIDNEYS/URETERS: Several punctate nonobstructive left renal calculi. Mild   left hydroureteronephrosis without struggling calculus may reflect a   recently expelled calculus. Bilateral perinephric stranding is   nonspecific. Correlate for infection.    BLADDER: Partially decompressed with Steele.  REPRODUCTIVE ORGANS: Normal prostate    BOWEL: No bowel obstruction. Appendix no appendicitis  PERITONEUM: No ascites.  VESSELS: Nonaneurysmal.  RETROPERITONEUM/LYMPH NODES: No lymphadenopathy.  ABDOMINAL WALL: Within normal limits.  BONES: Degenerative changes.    IMPRESSION:  Mild left hydroureteronephrosis without obstructing calculus may reflect   a recently expelled calculus.  Nonobstructive left nephrolithiasis.  Indeterminate hyperattenuation hepatic lobe, focal fatty sparing versus   lesion. Correlate with hepatic MR      I/P  Urine retention with displaced Steele causing hematuria.  Irrigate as needed. Hydronephrosis ? Retention. Unsure  IV abx. Monitor for sepsis.  Nephrology consult     Irrigate as needed  Expect blood in Steele, and around Steele.

## 2023-09-08 NOTE — ED ADULT NURSE NOTE - OBJECTIVE STATEMENT
c/o blood in urine iraheta bag and discomfort around meatus and pelvic area. also co left flank pain when coughs

## 2023-09-08 NOTE — ED ADULT NURSE REASSESSMENT NOTE - NS ED NURSE REASSESS COMMENT FT1
blood noted around meatus and pelvic area-gross hematuria noted in urine bag-pt states this 19F iraheta was placed by homecare nurse yesterday and he noticed blood in bag and "leaking" around meatus.  iraheta balloon deflated of 10ml and only distal portion of iraheta cath tip seems to be in urethra- blood noted from meatus on removal-Dr Bolanos present and aware

## 2023-09-08 NOTE — ED PROVIDER NOTE - CARE PLAN
1 Principal Discharge DX:	Acute kidney failure  Secondary Diagnosis:	Malfunction of Steele catheter

## 2023-09-08 NOTE — ED PROVIDER NOTE - PHYSICAL EXAMINATION
Gen: NAD, AOx3, able to make needs known, non-toxic  Head: NCAT  HEENT: EOMI, normal conjunctiva  Lung: CTAB, no respiratory distress, significant congestion in the upper airways resulting in poor auscultation quality of the bilateral lung fields, speaking in full sentences  CV: RRR, no M/R/G, pulses bilaterally   Abd: soft, NTND, no guarding, +L CVA tenderness  MSK: no visible bony deformities  Neuro: No focal sensory or motor deficits  Skin: Warm, well perfused, no rash  Psych: normal affect

## 2023-09-08 NOTE — H&P ADULT - NSHPPHYSICALEXAM_GEN_ALL_CORE
T(C): 36.8 (09-08-23 @ 16:02), Max: 36.8 (09-08-23 @ 16:02)  HR: 108 (09-08-23 @ 13:49) (101 - 108)  BP: 125/73 (09-08-23 @ 13:49) (108/75 - 141/86)  RR: 17 (09-08-23 @ 13:49) (16 - 17)  SpO2: 98% (09-08-23 @ 13:49) (96% - 98%)  Wt(kg): --Vital Signs Last 24 Hrs  T(C): 36.8 (08 Sep 2023 16:02), Max: 36.8 (08 Sep 2023 16:02)  T(F): 98.2 (08 Sep 2023 16:02), Max: 98.2 (08 Sep 2023 16:02)  HR: 108 (08 Sep 2023 13:49) (101 - 108)  BP: 125/73 (08 Sep 2023 13:49) (108/75 - 141/86)  BP(mean): --  RR: 17 (08 Sep 2023 13:49) (16 - 17)  SpO2: 98% (08 Sep 2023 13:49) (96% - 98%)    Parameters below as of 08 Sep 2023 13:49  Patient On (Oxygen Delivery Method): room air        PHYSICAL EXAM:  GENERAL: NAD  HENT:  Atraumatic, Normocephalic; No tonsillar erythema, exudates, or enlargement; Moist mucous membranes;   EYES: EOMI, PERRLA, conjunctiva and sclera clear, no lid-lag  NECK: Supple, No JVD, Normal thyroid  NERVOUS SYSTEM:  CN II - XII intact; Sensation intact; Motor Strength 5/5 B/L upper and lower extremities  CHEST/LUNG: Clear to percussion bilaterally; No rales, rhonchi, wheezing, or rubs; normal respiratory effort, no intercostal retractions; No pitting edema  HEART: Regular rate and rhythm; No murmurs, rubs, or gallops  ABDOMEN: Soft, Nontender, Nondistended; Bowel sounds present; No HSM  MUSCULOSKELETAL/EXTREMITIES:  2+ Peripheral Pulses, No clubbing, or digital cyanosis; swelling to B/L LE  SKIN: No rashes or lesions; normal texture and temperature  PSYCH: Appropriate affect, Alert & Oriented x 3, some confusion  : + iraheta with orangish urine

## 2023-09-08 NOTE — H&P ADULT - ASSESSMENT
48 year old male with PMHx of MS, quadriplegia, neurogenic bladder with known nephrolithiasis; also found to a diffuse rash over trunk fore    #Acute Renal Failure due to Iraheta Misplacement  #Hyperkalemia, Uremia  -Urology on board  -Nephrology on board  -EKG WNL, repeat EKG ordered for AM  -Continue telemetry monitoring.  -Given cocktail for hyperkalemia in the ED.   -IVFs as per nephrology  -monitor I/O strictly.   -monitor BPM, Mg and Phos q 6hrs     #History MS, quadreplegia, neurogenic bladder  -iraheta placed in ED 9/8  -Continue with Ceftriaxone  -F/U UCx  -Continue w/ ditropan and valium as needed for spasms      DVT Prophylaxis with heparin    Updated mother Kady [phone]   48 year old male with PMHx of MS, quadriplegia, neurogenic bladder with known nephrolithiasis; also found to a diffuse rash over trunk fore    #Acute Renal Failure due to Iraheta Misplacement  #Hyperkalemia, Uremia  -Urology on board  -Nephrology on board  -EKG WNL, repeat EKG ordered for AM  -Continue telemetry monitoring.  -Given cocktail for hyperkalemia in the ED.   -IVFs as per nephrology  -monitor I/O strictly.   -monitor BPM, Mg and Phos q 6hrs     #History MS, quadreplegia, neurogenic bladder  -iraheta placed in ED 9/8  -Continue with Ceftriaxone  -F/U UCx  -Continue w/ ditropan and valium as needed for spasms      #Cough due to Rhinovirus  -Symptomatic treatment  -Teri Hunter ATC    DVT Prophylaxis with heparin    Updated mother Kady [phone], aware of plan

## 2023-09-08 NOTE — ED PROVIDER NOTE - CLINICAL SUMMARY MEDICAL DECISION MAKING FREE TEXT BOX
Efren, PGY3 - 48-year-old man with PMH of advanced MS, quadriplegia, residing in a group home, presenting with left flank pain for 4 days, hematuria from Iraheta catheter and intermittent suprapubic fullness since it was replaced yesterday.  Labs, CAT scan, UA and urine culture.  Consider nephrolithiasis, however would be less likely considering the patient still has his left ureteral stent. +bladder scan, consider clotting of iraheta catheter vs incorrectly placed. Urology consult as needed. *The above represents an initial assessment/impression. Please refer to progress notes for potential changes in patient clinical course*

## 2023-09-08 NOTE — ED PROVIDER NOTE - ATTENDING CONTRIBUTION TO CARE
Dr. Bolanos: I have personally performed a face to face bedside history and physical examination of this patient. I have discussed the history, examination, review of systems, assessment and plan of management with the resident. I have reviewed the electronic medical record and amended it to reflect my history, review of systems, physical exam, assessment and plan.    48-year-old man with PMH of advanced MS, quadriplegia, residing in a group home, presenting with left flank pain for 4 days, hematuria from Iraheta catheter and intermittent suprapubic fullness since it was replaced yesterday.  also endorsing cough for past few days    Vital signs reviewed  GENERAL: Patient nontoxic appearing, NAD  HEAD: NCAT  EYES: Anicteric  ENT: MMM  NECK: Supple, non tender  RESPIRATORY: coarse breath sounds   CARDIOVASCULAR: Regular rate and rhythm  ABDOMEN: Soft. lower abd tenderness, iraheta not draining   MUSCULOSKELETAL/EXTREMITIES: quadraplegic   SKIN:  Warm and dry  NEURO: AAOx3. No gross FND.  PSYCHIATRIC: Cooperative. Affect appropriate.     ddx likely iraheta malfunction, uti, pyelo, pneumonia  plan for labs, bcx, abx, replace iraheta  likely admission

## 2023-09-09 LAB
ALBUMIN SERPL ELPH-MCNC: 2.2 G/DL — LOW (ref 3.3–5)
ALP SERPL-CCNC: 97 U/L — SIGNIFICANT CHANGE UP (ref 40–120)
ALT FLD-CCNC: 29 U/L — SIGNIFICANT CHANGE UP (ref 10–45)
ANION GAP SERPL CALC-SCNC: 14 MMOL/L — SIGNIFICANT CHANGE UP (ref 5–17)
AST SERPL-CCNC: 20 U/L — SIGNIFICANT CHANGE UP (ref 10–40)
BILIRUB SERPL-MCNC: 0.3 MG/DL — SIGNIFICANT CHANGE UP (ref 0.2–1.2)
BUN SERPL-MCNC: 108 MG/DL — HIGH (ref 7–23)
CALCIUM SERPL-MCNC: 8.3 MG/DL — LOW (ref 8.4–10.5)
CHLORIDE SERPL-SCNC: 103 MMOL/L — SIGNIFICANT CHANGE UP (ref 96–108)
CO2 SERPL-SCNC: 21 MMOL/L — LOW (ref 22–31)
CREAT SERPL-MCNC: 7.39 MG/DL — HIGH (ref 0.5–1.3)
EGFR: 8 ML/MIN/1.73M2 — LOW
GLUCOSE SERPL-MCNC: 103 MG/DL — HIGH (ref 70–99)
HCT VFR BLD CALC: 31.2 % — LOW (ref 39–50)
HGB BLD-MCNC: 10.3 G/DL — LOW (ref 13–17)
MAGNESIUM SERPL-MCNC: 2.6 MG/DL — SIGNIFICANT CHANGE UP (ref 1.6–2.6)
MCHC RBC-ENTMCNC: 29.3 PG — SIGNIFICANT CHANGE UP (ref 27–34)
MCHC RBC-ENTMCNC: 33 GM/DL — SIGNIFICANT CHANGE UP (ref 32–36)
MCV RBC AUTO: 88.6 FL — SIGNIFICANT CHANGE UP (ref 80–100)
NRBC # BLD: 0 /100 WBCS — SIGNIFICANT CHANGE UP (ref 0–0)
PHOSPHATE SERPL-MCNC: 5.7 MG/DL — HIGH (ref 2.5–4.5)
PLATELET # BLD AUTO: 303 K/UL — SIGNIFICANT CHANGE UP (ref 150–400)
POTASSIUM SERPL-MCNC: 3.8 MMOL/L — SIGNIFICANT CHANGE UP (ref 3.5–5.3)
POTASSIUM SERPL-SCNC: 3.8 MMOL/L — SIGNIFICANT CHANGE UP (ref 3.5–5.3)
PROT SERPL-MCNC: 6.6 G/DL — SIGNIFICANT CHANGE UP (ref 6–8.3)
RBC # BLD: 3.52 M/UL — LOW (ref 4.2–5.8)
RBC # FLD: 14.3 % — SIGNIFICANT CHANGE UP (ref 10.3–14.5)
SODIUM SERPL-SCNC: 138 MMOL/L — SIGNIFICANT CHANGE UP (ref 135–145)
WBC # BLD: 12.34 K/UL — HIGH (ref 3.8–10.5)
WBC # FLD AUTO: 12.34 K/UL — HIGH (ref 3.8–10.5)

## 2023-09-09 PROCEDURE — 99233 SBSQ HOSP IP/OBS HIGH 50: CPT

## 2023-09-09 PROCEDURE — 93970 EXTREMITY STUDY: CPT | Mod: 26

## 2023-09-09 RX ORDER — OXYCODONE HYDROCHLORIDE 5 MG/1
5 TABLET ORAL EVERY 6 HOURS
Refills: 0 | Status: DISCONTINUED | OUTPATIENT
Start: 2023-09-09 | End: 2023-09-13

## 2023-09-09 RX ORDER — POLYETHYLENE GLYCOL 3350 17 G/17G
17 POWDER, FOR SOLUTION ORAL DAILY
Refills: 0 | Status: DISCONTINUED | OUTPATIENT
Start: 2023-09-09 | End: 2023-09-13

## 2023-09-09 RX ADMIN — Medication 100 MILLIGRAM(S): at 15:05

## 2023-09-09 RX ADMIN — TRAMADOL HYDROCHLORIDE 50 MILLIGRAM(S): 50 TABLET ORAL at 00:50

## 2023-09-09 RX ADMIN — HEPARIN SODIUM 5000 UNIT(S): 5000 INJECTION INTRAVENOUS; SUBCUTANEOUS at 06:05

## 2023-09-09 RX ADMIN — HEPARIN SODIUM 5000 UNIT(S): 5000 INJECTION INTRAVENOUS; SUBCUTANEOUS at 17:30

## 2023-09-09 RX ADMIN — SODIUM ZIRCONIUM CYCLOSILICATE 10 GRAM(S): 10 POWDER, FOR SUSPENSION ORAL at 06:05

## 2023-09-09 RX ADMIN — Medication 100 MILLIGRAM(S): at 21:35

## 2023-09-09 RX ADMIN — SODIUM CHLORIDE 100 MILLILITER(S): 9 INJECTION, SOLUTION INTRAVENOUS at 21:35

## 2023-09-09 RX ADMIN — Medication 100 MILLIGRAM(S): at 06:05

## 2023-09-09 RX ADMIN — CEFTRIAXONE 100 MILLIGRAM(S): 500 INJECTION, POWDER, FOR SOLUTION INTRAMUSCULAR; INTRAVENOUS at 17:30

## 2023-09-09 NOTE — PROGRESS NOTE ADULT - SUBJECTIVE AND OBJECTIVE BOX
Medicine Progress Note    Patient is a 48y old  Male who presents with a chief complaint of Lower abdominal pain (08 Sep 2023 20:13)      SUBJECTIVE / OVERNIGHT EVENTS:    ADDITIONAL REVIEW OF SYSTEMS:    MEDICATIONS  (STANDING):  benzonatate 100 milliGRAM(s) Oral three times a day  cefTRIAXone   IVPB 1000 milliGRAM(s) IV Intermittent every 24 hours  dextrose 5% 1000 milliLiter(s) (100 mL/Hr) IV Continuous <Continuous>  heparin   Injectable 5000 Unit(s) SubCutaneous every 12 hours  influenza   Vaccine 0.5 milliLiter(s) IntraMuscular once  iohexol 300 mG (iodine)/mL Oral Solution 30 milliLiter(s) Oral once  sodium zirconium cyclosilicate 10 Gram(s) Oral every 8 hours    MEDICATIONS  (PRN):  acetaminophen     Tablet .. 650 milliGRAM(s) Oral every 6 hours PRN Mild Pain (1 - 3)  melatonin 3 milliGRAM(s) Oral at bedtime PRN Insomnia  ondansetron Injectable 4 milliGRAM(s) IV Push every 8 hours PRN Nausea and/or Vomiting  traMADol 50 milliGRAM(s) Oral two times a day PRN Moderate Pain (4 - 6)    CAPILLARY BLOOD GLUCOSE      POCT Blood Glucose.: 114 mg/dL (08 Sep 2023 16:14)  POCT Blood Glucose.: 114 mg/dL (08 Sep 2023 13:41)    I&O's Summary    08 Sep 2023 07:01  -  09 Sep 2023 07:00  --------------------------------------------------------  IN: 1240 mL / OUT: 2400 mL / NET: -1160 mL        PHYSICAL EXAM:  Vital Signs Last 24 Hrs  T(C): 36.4 (09 Sep 2023 06:06), Max: 36.8 (08 Sep 2023 16:02)  T(F): 97.5 (09 Sep 2023 06:06), Max: 98.3 (08 Sep 2023 19:56)  HR: 101 (09 Sep 2023 06:06) (101 - 119)  BP: 113/75 (09 Sep 2023 06:06) (108/75 - 141/86)  BP(mean): --  RR: 16 (09 Sep 2023 06:06) (16 - 17)  SpO2: 97% (09 Sep 2023 06:06) (94% - 98%)    Parameters below as of 09 Sep 2023 06:06  Patient On (Oxygen Delivery Method): room air        LABS:                        10.8   12.28 )-----------( 313      ( 08 Sep 2023 14:15 )             33.0     09-08    134<L>  |  99  |  124<H>  ----------------------------<  136<H>  5.2   |  18<L>  |  10.13<H>    Ca    8.3<L>      08 Sep 2023 20:21  Mg     3.0     09-08    TPro  6.7  /  Alb  2.0<L>  /  TBili  0.6  /  DBili  x   /  AST  52<H>  /  ALT  32  /  AlkPhos  92  09-08    PT/INR - ( 08 Sep 2023 16:18 )   PT: 15.4 sec;   INR: 1.36 ratio         PTT - ( 08 Sep 2023 16:18 )  PTT:26.8 sec      Urinalysis Basic - ( 08 Sep 2023 20:21 )    Color: x / Appearance: x / SG: x / pH: x  Gluc: 136 mg/dL / Ketone: x  / Bili: x / Urobili: x   Blood: x / Protein: x / Nitrite: x   Leuk Esterase: x / RBC: x / WBC x   Sq Epi: x / Non Sq Epi: x / Bacteria: x        SARS-CoV-2: NotDetec (08 Sep 2023 12:50)  COVID-19 PCR: NotDetec (23 May 2023 23:24)      RADIOLOGY & ADDITIONAL TESTS:  Imaging from Last 24 Hours:    Electrocardiogram/QTc Interval:    COORDINATION OF CARE:  Care Discussed with Consultants/Other Providers:   Medicine Progress Note    Patient is a 48y old  Male who presents with a chief complaint of Lower abdominal pain (08 Sep 2023 20:13)      SUBJECTIVE / OVERNIGHT EVENTS:  pain over the left thigh when moved to clean him. left left pain. chronic b/l leg weakness due to MS. bedbound.     ADDITIONAL REVIEW OF SYSTEMS:  denied fever/chills/CP/SOB/cough/palpitation/dizziness/abdominal pian/nausea/vomiting/diarrhoea/constipation/dysuria/leg or calf pain/headaches.all other ROS neg    MEDICATIONS  (STANDING):  benzonatate 100 milliGRAM(s) Oral three times a day  cefTRIAXone   IVPB 1000 milliGRAM(s) IV Intermittent every 24 hours  dextrose 5% 1000 milliLiter(s) (100 mL/Hr) IV Continuous <Continuous>  heparin   Injectable 5000 Unit(s) SubCutaneous every 12 hours  influenza   Vaccine 0.5 milliLiter(s) IntraMuscular once  iohexol 300 mG (iodine)/mL Oral Solution 30 milliLiter(s) Oral once  sodium zirconium cyclosilicate 10 Gram(s) Oral every 8 hours    MEDICATIONS  (PRN):  acetaminophen     Tablet .. 650 milliGRAM(s) Oral every 6 hours PRN Mild Pain (1 - 3)  melatonin 3 milliGRAM(s) Oral at bedtime PRN Insomnia  ondansetron Injectable 4 milliGRAM(s) IV Push every 8 hours PRN Nausea and/or Vomiting  traMADol 50 milliGRAM(s) Oral two times a day PRN Moderate Pain (4 - 6)    CAPILLARY BLOOD GLUCOSE      POCT Blood Glucose.: 114 mg/dL (08 Sep 2023 16:14)  POCT Blood Glucose.: 114 mg/dL (08 Sep 2023 13:41)    I&O's Summary    08 Sep 2023 07:01  -  09 Sep 2023 07:00  --------------------------------------------------------  IN: 1240 mL / OUT: 2400 mL / NET: -1160 mL        PHYSICAL EXAM:  Vital Signs Last 24 Hrs  T(C): 36.4 (09 Sep 2023 06:06), Max: 36.8 (08 Sep 2023 16:02)  T(F): 97.5 (09 Sep 2023 06:06), Max: 98.3 (08 Sep 2023 19:56)  HR: 101 (09 Sep 2023 06:06) (101 - 119)  BP: 113/75 (09 Sep 2023 06:06) (108/75 - 141/86)  BP(mean): --  RR: 16 (09 Sep 2023 06:06) (16 - 17)  SpO2: 97% (09 Sep 2023 06:06) (94% - 98%)    Parameters below as of 09 Sep 2023 06:06  Patient On (Oxygen Delivery Method): room air    GENERAL: Not in distress. Alert    HEENT: AT/NC. clear conjuctiva, MMM.   no pallor or icterus  CARDIOVASCULAR: RRR S1, S2. No murmur/rubs/gallop  LUNGS: BLAE+, no rales, no wheezing, no rhonchi.  [ dianne lungs, did not want to turn]  ABDOMEN: ND. Soft,  NT, no guarding / rebound / rigidity. BS normoactive. bladder not distended  EXTREMITIES: no edema. no leg or calf TP.  SKIN: no rash. or skin break or ulcer on exposed skin. No cellulitis.  NEUROLOGIC: AAO*3. b/l LE weakness strength is symmetric, sensation intact, speech fluent.    PSYCHIATRIC: Calm.  No agitation.          LABS:                        10.8   12.28 )-----------( 313      ( 08 Sep 2023 14:15 )             33.0     09-08    134<L>  |  99  |  124<H>  ----------------------------<  136<H>  5.2   |  18<L>  |  10.13<H>    Ca    8.3<L>      08 Sep 2023 20:21  Mg     3.0     09-08    TPro  6.7  /  Alb  2.0<L>  /  TBili  0.6  /  DBili  x   /  AST  52<H>  /  ALT  32  /  AlkPhos  92  09-08    PT/INR - ( 08 Sep 2023 16:18 )   PT: 15.4 sec;   INR: 1.36 ratio         PTT - ( 08 Sep 2023 16:18 )  PTT:26.8 sec      Urinalysis Basic - ( 08 Sep 2023 20:21 )    Color: x / Appearance: x / SG: x / pH: x  Gluc: 136 mg/dL / Ketone: x  / Bili: x / Urobili: x   Blood: x / Protein: x / Nitrite: x   Leuk Esterase: x / RBC: x / WBC x   Sq Epi: x / Non Sq Epi: x / Bacteria: x        SARS-CoV-2: NotDetec (08 Sep 2023 12:50)  COVID-19 PCR: NotDetec (23 May 2023 23:24)      RADIOLOGY & ADDITIONAL TESTS:  Imaging from Last 24 Hours:    Electrocardiogram/QTc Interval:    COORDINATION OF CARE:  Care Discussed with Consultants/Other Providers:

## 2023-09-09 NOTE — PROGRESS NOTE ADULT - ASSESSMENT
48 year old male with PMHx of MS, quadriplegia, neurogenic bladder with known nephrolithiasis; also found to a diffuse rash over trunk fore    #Acute Renal Failure due to Iraheta Misplacement  #Hyperkalemia, Uremia  -Urology on board  -Nephrology on board  -EKG WNL, f/u repeat EKG ordered in AM  -Continue telemetry monitoring.  -Given cocktail for hyperkalemia in the ED.   -On Lokelma 4 doses  -IVFs as per nephrology  - sodibicarb  -monitor I/O strictly.   -monitor BPM, Mg and Phos q 6hrs   - no plan for RRT now    #History MS, quadreplegia, neurogenic bladder  -iraheta placed in ED 9/8  -Continue with Ceftriaxone  -F/U UCx  -Continue w/ ditropan and valium as needed for spasms      #Cough due to Rhinovirus  -Symptomatic treatment  -Teri Hunter ATC    DVT Prophylaxis with heparin    Update mother Kady [phone], aware of plan   48 year old male with PMHx of MS, quadriplegia, neurogenic bladder with known nephrolithiasis; also found to a diffuse rash over trunk fore    #Acute Renal Failure due to Iraheta Misplacement  #Hyperkalemia, Uremia  -Urology on board  -Nephrology on board  -EKG WNL, f/u repeat EKG ordered in AM  -Continue telemetry monitoring.  -Given cocktail for hyperkalemia in the ED.   -On Lokelma 4 doses  -IVFs as per nephrology  - sodibicarb  -monitor I/O strictly.   -monitor BPM, Mg and Phos q 6hrs   - no plan for RRT now  - irrigate iraheta PRN  - baldder scan Q8hrs to ensure no retention    # b/l leg swelling  - Duplex  - elevate legs  - ace wrap    #History MS, quadriplegia  #  neurogenic bladder  -iraheta placed in ED 9/8  -Continue with Ceftriaxone  -F/U UCx  -Continue w/ ditropan and valium as needed for spasms      #Cough due to Rhinovirus  -Symptomatic treatment  -Teri Hunter ATC    DVT Prophylaxis with heparin    will Update mother Kady [phone],

## 2023-09-09 NOTE — PROGRESS NOTE ADULT - SUBJECTIVE AND OBJECTIVE BOX
[facility] NEPHROLOGY SERVICES, [facility]  NEPHROLOGY AND HYPERTENSION  [address]  [address]  [place] [address]  [phone]    MD CATHIE SHELBY, MD MARY JANE HARDWICK MD CHRISTOPHER CAPUTO, MD SKINNY GODOY MD          Patient events noted    MEDICATIONS  (STANDING):  benzonatate 100 milliGRAM(s) Oral three times a day  cefTRIAXone   IVPB 1000 milliGRAM(s) IV Intermittent every 24 hours  dextrose 5% 1000 milliLiter(s) (100 mL/Hr) IV Continuous <Continuous>  heparin   Injectable 5000 Unit(s) SubCutaneous every 12 hours  influenza   Vaccine 0.5 milliLiter(s) IntraMuscular once  iohexol 300 mG (iodine)/mL Oral Solution 30 milliLiter(s) Oral once    MEDICATIONS  (PRN):  acetaminophen     Tablet .. 650 milliGRAM(s) Oral every 6 hours PRN Mild Pain (1 - 3)  melatonin 3 milliGRAM(s) Oral at bedtime PRN Insomnia  ondansetron Injectable 4 milliGRAM(s) IV Push every 8 hours PRN Nausea and/or Vomiting  oxyCODONE    IR 5 milliGRAM(s) Oral every 6 hours PRN Severe Pain (7 - 10)  polyethylene glycol 3350 17 Gram(s) Oral daily PRN Constipation  traMADol 50 milliGRAM(s) Oral two times a day PRN Moderate Pain (4 - 6)      09-08-23 @ 07:01  -  09-09-23 @ 07:00  --------------------------------------------------------  IN: 1240 mL / OUT: 2400 mL / NET: -1160 mL    09-09-23 @ 07:01  -  09-09-23 @ 20:53  --------------------------------------------------------  IN: 400 mL / OUT: 1200 mL / NET: -800 mL      PHYSICAL EXAM:      T(C): 36.8 (09-09-23 @ 20:05), Max: 36.9 (09-09-23 @ 16:39)  HR: 115 (09-09-23 @ 20:05) (101 - 120)  BP: 116/81 (09-09-23 @ 20:05) (113/75 - 127/80)  RR: 17 (09-09-23 @ 20:05) (16 - 18)  SpO2: 96% (09-09-23 @ 20:05) (96% - 98%)  Wt(kg): --  Lungs clear  Heart S1S2  Abd soft NT ND  Extremities:   tr edema                                    10.3   12.34 )-----------( 303      ( 09 Sep 2023 07:30 )             31.2     09-09    138  |  103  |  108<H>  ----------------------------<  103<H>  3.8   |  21<L>  |  7.39<H>    Ca    8.3<L>      09 Sep 2023 07:30  Phos  5.7     09-09  Mg     2.6     09-09    TPro  6.6  /  Alb  2.2<L>  /  TBili  0.3  /  DBili  x   /  AST  20  /  ALT  29  /  AlkPhos  97  09-09      LIVER FUNCTIONS - ( 09 Sep 2023 07:30 )  Alb: 2.2 g/dL / Pro: 6.6 g/dL / ALK PHOS: 97 U/L / ALT: 29 U/L / AST: 20 U/L / GGT: x           Creatinine Trend: 7.39<--, 10.13<--, 10.58<--, 10.69<--, 11.34<--      A/P:    Obstructive NAY iso iraheta not in place (Cr 1.19 - 7/1/23)  Hematuria iso above   Iraheta w/good UO  CT A/P noted   input noted  Lokelma as ordered  Low K diet  Avoid nephrotoxins  Will f/u BMP, UO, urine cx  Hoping to avoid the need for RRT    Gerber Moreno MD

## 2023-09-10 ENCOUNTER — TRANSCRIPTION ENCOUNTER (OUTPATIENT)
Age: 48
End: 2023-09-10

## 2023-09-10 LAB
ANION GAP SERPL CALC-SCNC: 8 MMOL/L — SIGNIFICANT CHANGE UP (ref 5–17)
BUN SERPL-MCNC: 75 MG/DL — HIGH (ref 7–23)
CALCIUM SERPL-MCNC: 8.6 MG/DL — SIGNIFICANT CHANGE UP (ref 8.4–10.5)
CHLORIDE SERPL-SCNC: 105 MMOL/L — SIGNIFICANT CHANGE UP (ref 96–108)
CO2 SERPL-SCNC: 32 MMOL/L — HIGH (ref 22–31)
CREAT SERPL-MCNC: 3.82 MG/DL — HIGH (ref 0.5–1.3)
EGFR: 19 ML/MIN/1.73M2 — LOW
GLUCOSE BLDC GLUCOMTR-MCNC: 111 MG/DL — HIGH (ref 70–99)
GLUCOSE SERPL-MCNC: 135 MG/DL — HIGH (ref 70–99)
POTASSIUM SERPL-MCNC: 3.5 MMOL/L — SIGNIFICANT CHANGE UP (ref 3.5–5.3)
POTASSIUM SERPL-SCNC: 3.5 MMOL/L — SIGNIFICANT CHANGE UP (ref 3.5–5.3)
SODIUM SERPL-SCNC: 145 MMOL/L — SIGNIFICANT CHANGE UP (ref 135–145)

## 2023-09-10 PROCEDURE — 99233 SBSQ HOSP IP/OBS HIGH 50: CPT

## 2023-09-10 PROCEDURE — 71045 X-RAY EXAM CHEST 1 VIEW: CPT | Mod: 26

## 2023-09-10 RX ORDER — FLUTICASONE PROPIONATE 50 MCG
1 SPRAY, SUSPENSION NASAL
Refills: 0 | Status: DISCONTINUED | OUTPATIENT
Start: 2023-09-10 | End: 2023-09-13

## 2023-09-10 RX ADMIN — Medication 1200 MILLIGRAM(S): at 17:20

## 2023-09-10 RX ADMIN — Medication 100 MILLIGRAM(S): at 05:39

## 2023-09-10 RX ADMIN — HEPARIN SODIUM 5000 UNIT(S): 5000 INJECTION INTRAVENOUS; SUBCUTANEOUS at 05:38

## 2023-09-10 RX ADMIN — Medication 100 MILLIGRAM(S): at 13:32

## 2023-09-10 RX ADMIN — Medication 100 MILLIGRAM(S): at 21:11

## 2023-09-10 RX ADMIN — SODIUM CHLORIDE 100 MILLILITER(S): 9 INJECTION, SOLUTION INTRAVENOUS at 10:49

## 2023-09-10 RX ADMIN — CEFTRIAXONE 100 MILLIGRAM(S): 500 INJECTION, POWDER, FOR SOLUTION INTRAMUSCULAR; INTRAVENOUS at 17:20

## 2023-09-10 RX ADMIN — SODIUM CHLORIDE 100 MILLILITER(S): 9 INJECTION, SOLUTION INTRAVENOUS at 20:34

## 2023-09-10 RX ADMIN — HEPARIN SODIUM 5000 UNIT(S): 5000 INJECTION INTRAVENOUS; SUBCUTANEOUS at 17:21

## 2023-09-10 NOTE — PROGRESS NOTE ADULT - SUBJECTIVE AND OBJECTIVE BOX
Medicine Progress Note    Patient is a 48y old  Male who presents with a chief complaint of Lower abdominal pain (10 Sep 2023 07:42)      SUBJECTIVE / OVERNIGHT EVENTS:  cough. no PND. reported no difficulty in swallowing.  on CTX. UC pending    ADDITIONAL REVIEW OF SYSTEMS:  denied fever/chills/CP/SOB/cough/palpitation/dizziness/abdominal pian/nausea/vomiting/diarrhoea/constipation/dysuria/leg or calf pain/headaches.all other ROS neg    MEDICATIONS  (STANDING):  benzonatate 100 milliGRAM(s) Oral three times a day  cefTRIAXone   IVPB 1000 milliGRAM(s) IV Intermittent every 24 hours  dextrose 5% 1000 milliLiter(s) (100 mL/Hr) IV Continuous <Continuous>  heparin   Injectable 5000 Unit(s) SubCutaneous every 12 hours  influenza   Vaccine 0.5 milliLiter(s) IntraMuscular once  iohexol 300 mG (iodine)/mL Oral Solution 30 milliLiter(s) Oral once    MEDICATIONS  (PRN):  acetaminophen     Tablet .. 650 milliGRAM(s) Oral every 6 hours PRN Mild Pain (1 - 3)  melatonin 3 milliGRAM(s) Oral at bedtime PRN Insomnia  ondansetron Injectable 4 milliGRAM(s) IV Push every 8 hours PRN Nausea and/or Vomiting  oxyCODONE    IR 5 milliGRAM(s) Oral every 6 hours PRN Severe Pain (7 - 10)  polyethylene glycol 3350 17 Gram(s) Oral daily PRN Constipation  traMADol 50 milliGRAM(s) Oral two times a day PRN Moderate Pain (4 - 6)    CAPILLARY BLOOD GLUCOSE      POCT Blood Glucose.: 111 mg/dL (10 Sep 2023 08:25)    I&O's Summary    09 Sep 2023 07:01  -  10 Sep 2023 07:00  --------------------------------------------------------  IN: 1300 mL / OUT: 5000 mL / NET: -3700 mL        PHYSICAL EXAM:  Vital Signs Last 24 Hrs  T(C): 37.3 (10 Sep 2023 05:38), Max: 37.3 (10 Sep 2023 05:38)  T(F): 99.2 (10 Sep 2023 05:38), Max: 99.2 (10 Sep 2023 05:38)  HR: 102 (10 Sep 2023 05:38) (102 - 120)  BP: 124/83 (10 Sep 2023 05:38) (116/81 - 127/80)  BP(mean): --  RR: 17 (10 Sep 2023 05:38) (17 - 18)  SpO2: 96% (10 Sep 2023 05:38) (96% - 98%)    Parameters below as of 10 Sep 2023 05:38  Patient On (Oxygen Delivery Method): room air      GENERAL: Not in distress. Alert    HEENT: AT/NC. clear conjuctiva, MMM.   no pallor or icterus  CARDIOVASCULAR: RRR S1, S2. No murmur/rubs/gallop  LUNGS: BLAE+, no rales, no wheezing, no rhonchi.  [ ant lungs, did not want to turn]  ABDOMEN: ND. Soft,  NT, no guarding / rebound / rigidity. BS normoactive. bladder not distended. iraheta is draining light yellow urine  EXTREMITIES: no edema. no leg or calf TP.  SKIN: no rash.   NEUROLOGIC: AAO*3. b/l LE weakness strength is symmetric, sensation intact, speech fluent.    PSYCHIATRIC: Calm.  No agitation.    LABS:                        10.3   12.34 )-----------( 303      ( 09 Sep 2023 07:30 )             31.2     09-09    138  |  103  |  108<H>  ----------------------------<  103<H>  3.8   |  21<L>  |  7.39<H>    Ca    8.3<L>      09 Sep 2023 07:30  Phos  5.7     09-09  Mg     2.6     09-09    TPro  6.6  /  Alb  2.2<L>  /  TBili  0.3  /  DBili  x   /  AST  20  /  ALT  29  /  AlkPhos  97  09-09    PT/INR - ( 08 Sep 2023 16:18 )   PT: 15.4 sec;   INR: 1.36 ratio         PTT - ( 08 Sep 2023 16:18 )  PTT:26.8 sec      Urinalysis Basic - ( 09 Sep 2023 07:30 )    Color: x / Appearance: x / SG: x / pH: x  Gluc: 103 mg/dL / Ketone: x  / Bili: x / Urobili: x   Blood: x / Protein: x / Nitrite: x   Leuk Esterase: x / RBC: x / WBC x   Sq Epi: x / Non Sq Epi: x / Bacteria: x        SARS-CoV-2: NotDetec (08 Sep 2023 12:50)  COVID-19 PCR: NotDetec (23 May 2023 23:24)      RADIOLOGY & ADDITIONAL TESTS:  Imaging from Last 24 Hours:    Electrocardiogram/QTc Interval:    COORDINATION OF CARE:  Care Discussed with Consultants/Other Providers:

## 2023-09-10 NOTE — DISCHARGE NOTE PROVIDER - PROVIDER TOKENS
PROVIDER:[TOKEN:[[id number]],FOLLOWUP:[2 weeks]],PROVIDER:[TOKEN:[[id number]],FOLLOWUP:[1 week]],FREE:[LAST:[house call MD],PHONE:[(   )    -],FAX:[(   )    -],FOLLOWUP:[1-3 days]]

## 2023-09-10 NOTE — DISCHARGE NOTE PROVIDER - CARE PROVIDERS DIRECT ADDRESSES
[email or web address]

## 2023-09-10 NOTE — PROGRESS NOTE ADULT - SUBJECTIVE AND OBJECTIVE BOX
[facility] NEPHROLOGY SERVICES, [facility]  NEPHROLOGY AND HYPERTENSION  [address]  [address]  [place] [address]  [phone]    MD CATHIE SHELBY MD YELENA ROSENBERG, MD BINNY KOSHY, MD CHRISTOPHER CAPUTO, MD EDWARD BOVER, MD          Patient events noted    MEDICATIONS  (STANDING):  benzonatate 100 milliGRAM(s) Oral three times a day  cefTRIAXone   IVPB 1000 milliGRAM(s) IV Intermittent every 24 hours  dextrose 5% 1000 milliLiter(s) (100 mL/Hr) IV Continuous <Continuous>  fluticasone propionate 50 MICROgram(s)/spray Nasal Spray 1 Spray(s) Both Nostrils two times a day  guaiFENesin ER 1200 milliGRAM(s) Oral every 12 hours  heparin   Injectable 5000 Unit(s) SubCutaneous every 12 hours  influenza   Vaccine 0.5 milliLiter(s) IntraMuscular once  iohexol 300 mG (iodine)/mL Oral Solution 30 milliLiter(s) Oral once    MEDICATIONS  (PRN):  acetaminophen     Tablet .. 650 milliGRAM(s) Oral every 6 hours PRN Mild Pain (1 - 3)  melatonin 3 milliGRAM(s) Oral at bedtime PRN Insomnia  ondansetron Injectable 4 milliGRAM(s) IV Push every 8 hours PRN Nausea and/or Vomiting  oxyCODONE    IR 5 milliGRAM(s) Oral every 6 hours PRN Severe Pain (7 - 10)  polyethylene glycol 3350 17 Gram(s) Oral daily PRN Constipation  traMADol 50 milliGRAM(s) Oral two times a day PRN Moderate Pain (4 - 6)      09-09-23 @ 07:01  -  09-10-23 @ 07:00  --------------------------------------------------------  IN: 1300 mL / OUT: 5000 mL / NET: -3700 mL      PHYSICAL EXAM:      T(C): 36.8 (09-10-23 @ 20:37), Max: 37.3 (09-10-23 @ 05:38)  HR: 98 (09-10-23 @ 20:37) (97 - 102)  BP: 125/82 (09-10-23 @ 20:37) (109/69 - 125/82)  RR: 17 (09-10-23 @ 20:37) (17 - 17)  SpO2: 97% (09-10-23 @ 20:37) (96% - 99%)  Wt(kg): --  Lungs clear  Heart S1S2  Abd soft NT ND  Extremities:   tr edema                                    10.3   12.34 )-----------( 303      ( 09 Sep 2023 07:30 )             31.2     09-10    145  |  105  |  75<H>  ----------------------------<  135<H>  3.5   |  32<H>  |  3.82<H>    Ca    8.6      10 Sep 2023 10:15  Phos  5.7     09-09  Mg     2.6     09-09    TPro  6.6  /  Alb  2.2<L>  /  TBili  0.3  /  DBili  x   /  AST  20  /  ALT  29  /  AlkPhos  97  09-09      LIVER FUNCTIONS - ( 09 Sep 2023 07:30 )  Alb: 2.2 g/dL / Pro: 6.6 g/dL / ALK PHOS: 97 U/L / ALT: 29 U/L / AST: 20 U/L / GGT: x           Creatinine Trend: 3.82<--, 7.39<--, 10.13<--, 10.58<--, 10.69<--, 11.34<--      A/P:    Obstructive NAY iso iraheta not in place (Cr 1.19 - 7/1/23)  Hematuria iso above   Iraheta w/good UO  CT A/P noted   input noted  Lokelma as ordered  Low K diet  Avoid nephrotoxins  Will f/u BMP, UO, urine cx  Hoping to avoid the need for RRT    Gerber Moreno MD [facility] NEPHROLOGY SERVICES, [facility]  NEPHROLOGY AND HYPERTENSION  [address]  [address]  [place] [address]  [phone]    MD CATHIE SHELBY MD YELENA ROSENBERG, MD BINNY KOSHY, MD CHRISTOPHER CAPUTO, MD EDWARD BOVER, MD          Patient events noted    MEDICATIONS  (STANDING):  benzonatate 100 milliGRAM(s) Oral three times a day  cefTRIAXone   IVPB 1000 milliGRAM(s) IV Intermittent every 24 hours  dextrose 5% 1000 milliLiter(s) (100 mL/Hr) IV Continuous <Continuous>  fluticasone propionate 50 MICROgram(s)/spray Nasal Spray 1 Spray(s) Both Nostrils two times a day  guaiFENesin ER 1200 milliGRAM(s) Oral every 12 hours  heparin   Injectable 5000 Unit(s) SubCutaneous every 12 hours  influenza   Vaccine 0.5 milliLiter(s) IntraMuscular once  iohexol 300 mG (iodine)/mL Oral Solution 30 milliLiter(s) Oral once    MEDICATIONS  (PRN):  acetaminophen     Tablet .. 650 milliGRAM(s) Oral every 6 hours PRN Mild Pain (1 - 3)  melatonin 3 milliGRAM(s) Oral at bedtime PRN Insomnia  ondansetron Injectable 4 milliGRAM(s) IV Push every 8 hours PRN Nausea and/or Vomiting  oxyCODONE    IR 5 milliGRAM(s) Oral every 6 hours PRN Severe Pain (7 - 10)  polyethylene glycol 3350 17 Gram(s) Oral daily PRN Constipation  traMADol 50 milliGRAM(s) Oral two times a day PRN Moderate Pain (4 - 6)      09-09-23 @ 07:01  -  09-10-23 @ 07:00  --------------------------------------------------------  IN: 1300 mL / OUT: 5000 mL / NET: -3700 mL      PHYSICAL EXAM:      T(C): 36.8 (09-10-23 @ 20:37), Max: 37.3 (09-10-23 @ 05:38)  HR: 98 (09-10-23 @ 20:37) (97 - 102)  BP: 125/82 (09-10-23 @ 20:37) (109/69 - 125/82)  RR: 17 (09-10-23 @ 20:37) (17 - 17)  SpO2: 97% (09-10-23 @ 20:37) (96% - 99%)  Wt(kg): --  Lungs clear  Heart S1S2  Abd soft NT ND  Extremities:   tr edema                                    10.3   12.34 )-----------( 303      ( 09 Sep 2023 07:30 )             31.2     09-10    145  |  105  |  75<H>  ----------------------------<  135<H>  3.5   |  32<H>  |  3.82<H>    Ca    8.6      10 Sep 2023 10:15  Phos  5.7     09-09  Mg     2.6     09-09    TPro  6.6  /  Alb  2.2<L>  /  TBili  0.3  /  DBili  x   /  AST  20  /  ALT  29  /  AlkPhos  97  09-09      LIVER FUNCTIONS - ( 09 Sep 2023 07:30 )  Alb: 2.2 g/dL / Pro: 6.6 g/dL / ALK PHOS: 97 U/L / ALT: 29 U/L / AST: 20 U/L / GGT: x           Creatinine Trend: 3.82<--, 7.39<--, 10.13<--, 10.58<--, 10.69<--, 11.34<--      A/P:  NAY post renal azotemia; improving with iraheta  Obstructive NAY iso iraheta not in place (Cr 1.19 - 7/1/23)  Hematuria iso above   Iraheta w/good UO  CT A/P noted   input noted  Low K diet  Avoid nephrotoxins  Will f/u BMP, UO, urine cx      Gerber Moreno MD

## 2023-09-10 NOTE — PROGRESS NOTE ADULT - ASSESSMENT
48 year old male with PMHx of MS, quadriplegia, neurogenic bladder with known nephrolithiasis; also found to a diffuse rash over trunk fore    #Acute Renal Failure due to Iraheta Misplacement  #Hyperkalemia, Uremia  -Urology on board  -Nephrology on board  -EKG WNL, f/u repeat EKG ordered in AM  -DC telemetry monitoring.  -Given cocktail for hyperkalemia in the ED.   -On Lokelma 4 doses  -IVFs as per nephrology  - sodibicarb  -monitor I/O strictly.   -monitor BPM, Mg and Phos q 6hrs   - no plan for RRT now  - irrigate iraheta PRN  - bladder scan Q8hrs to ensure no retention. can DC if  no retention    # b/l leg swelling  - Duplex neg  - elevate legs  - ace wrap    #History MS, quadriplegia  #  neurogenic bladder  -iraheta placed in ED 9/8  -Continue with Ceftriaxone  -F/U UCx  -Continue w/ ditropan and valium as needed for spasms      #Cough due to Rhinovirus  -Symptomatic treatment  -Teri Hunter ATC    DVT Prophylaxis with heparin    will Update mother Kady [phone],   48 year old male with PMHx of MS, quadriplegia, neurogenic bladder with known nephrolithiasis; also found to a diffuse rash over trunk fore    #Acute Renal Failure due to Iraheta Misplacement  #Hyperkalemia, Uremia  -Urology on board  -Nephrology on board  -EKG WNL, f/u repeat EKG ordered in AM  -DC telemetry monitoring.  -Given cocktail for hyperkalemia in the ED.   -On Lokelma 4 doses  -IVFs as per nephrology  - sodibicarb  -monitor I/O strictly.   -monitor BPM, Mg and Phos q 6hrs   - no plan for RRT now  - irrigate iraheta PRN  - bladder scan Q8hrs to ensure no retention. can DC if  no retention    # b/l leg swelling  - Duplex neg  - elevate legs  - ace wrap    #History MS, quadriplegia  #  neurogenic bladder  # UTI  -iraheta placed in ED 9/8  -Continue with Ceftriaxone  -F/U UCx  -Continue w/ ditropan and valium as needed for spasms      #Cough due to Rhinovirus  - Tylenol PRN for fever  -Teri Hunter ATC  -  added Mucinex, flobase  - SLP eval to r/o aspiration as patient has MS and bedbound   - aspiration precautions    DVT Prophylaxis with heparin    will Update mother Kayd [phone],   48 year old male with PMHx of MS, quadriplegia, neurogenic bladder with known nephrolithiasis; also found to a diffuse rash over trunk fore    #Acute Renal Failure due to Iraheta Misplacement  #Hyperkalemia, Uremia  -Urology on board  -Nephrology on board  -EKG WNL, f/u repeat EKG ordered in AM  -DC telemetry monitoring.  -Given cocktail for hyperkalemia in the ED.   -On Lokelma 4 doses  -IVFs as per nephrology  - sodibicarb  -monitor I/O strictly.   -monitor BPM, Mg and Phos q 6hrs   - no plan for RRT now  - irrigate iraheta PRN  - bladder scan Q8hrs to ensure no retention. can DC if  no retention    # b/l leg swelling  - Duplex neg  - elevate legs  - ace wrap    #History MS, quadriplegia  #  neurogenic bladder  # UTI  -iraheta placed in ED 9/8  -Continue with Ceftriaxone  -F/U UCx  -Continue w/ ditropan and valium as needed for spasms      #Cough due to Rhinovirus  - Tylenol PRN for fever  -Teri Hunter ATC  -  added Mucinex, flobase  - SLP eval to r/o aspiration as patient has MS and bedbound   - aspiration precautions    DVT Prophylaxis with heparin  Dipso: anticipates DC home with home care tomorrow. needs SLP eval and UC pending    will Update mother Kady [phone],   48 year old male with PMHx of MS, quadriplegia, neurogenic bladder with known nephrolithiasis; also found to a diffuse rash over trunk fore    #Acute Renal Failure due to Iraheta Misplacement  #Hyperkalemia, Uremia  -Urology on board  -Nephrology on board  -EKG WNL, f/u repeat EKG ordered in AM  -DC telemetry monitoring.  -Given cocktail for hyperkalemia in the ED.   -On Lokelma 4 doses  -IVFs as per nephrology  - sodibicarb  -monitor I/O strictly.   -monitor BPM, Mg and Phos q 6hrs   - no plan for RRT now  - irrigate iraheta PRN  - bladder scan Q8hrs to ensure no retention. can DC if  no retention    # b/l leg swelling  - Duplex neg  - elevate legs  - ace wrap    #History MS, quadriplegia  #  neurogenic bladder  # UTI  -iraheta placed in ED 9/8  -Continue with Ceftriaxone  -F/U UCx  -Continue w/ ditropan and valium as needed for spasms      #Cough due to Rhinovirus  - Tylenol PRN for fever  -Teri uHnter ATC  -  added Mucinex, flobase  - SLP eval to r/o aspiration as patient has MS and bedbound   - aspiration precautions    DVT Prophylaxis with heparin  Dipso: anticipates DC home with home care tomorrow. needs SLP eval and UC pending. informed CM about broken hospital bed. patient needs to call vendor to replace it    will Update mother Kady [phone], patient says he will inform family

## 2023-09-10 NOTE — DISCHARGE NOTE PROVIDER - CARE PROVIDER_API CALL
Brandon Sharma  Urology  [address] [facility] [address], [address]  [place] [address]  Phone: [phone]  Fax: [phone]  Follow Up Time: 2 weeks    Amy Marinelli  Nephrology  [address] [facility], [address] [place] [id number]  Phone: [phone]  Fax: [phone]  Follow Up Time: 1 week    house call MD,   Phone: (   )    -  Fax: (   )    -  Follow Up Time: 1-3 days

## 2023-09-10 NOTE — DISCHARGE NOTE PROVIDER - NSDCFUADDINST_GEN_ALL_CORE_FT
please bring all DC papers and medications to all health care providers. Return to ER if symptoms recur and any new concerning symptoms.  please bring all DC papers and medications to all health care providers. Return to ER if symptoms recur and any new concerning symptoms.     please monitor urine output through iraheta. watch for lower abdominal fullness/pain/bladder distension/less urine out. visiting RN needs to monitor for the same. if any sign of less drainage via iraheta noted, RN needs to flush it with normal saline to make sure it is not clogged. if suspected malfunction or clogged iraheta, RN needs to change iraheta and monitor drainage. come to ER if you suspect iraheta malfunction and visiting RN is not there or any problem with iraheta catheter that cannot be managed at home or lower abdominal pain, fever, or any other concerning symptoms. visiting RN needs to inform urology office if he/she find any iraheta sign of iraheta dysfunction and managed at home.

## 2023-09-10 NOTE — DISCHARGE NOTE PROVIDER - HOSPITAL COURSE
48 year old male with PMHx of MS, quadriplegia, neurogenic bladder with known nephrolithiasis; also found to a diffuse rash over trunk fore    #Acute Renal Failure due to Iraheta Misplacement  #Hyperkalemia, now hypokalemia,  # Uremia  - Creatinine improving  - K normalized  -Urology on board. f/u OP  -Nephrology on board. f/u OP  -EKG WNL,   -DC edtelemetry monitoring. no events   -s/p Lokelma 4 doses  - s/p Sodibiarb drip as per nephrology  -monitor I/O strictly.   -monitor BPM, Mg and Phos q 6hrs   - no plan for RRT now  - irrigate iraheta PRN  - Dced bladder scan. low PVR    # b/l leg swelling  - Duplex neg  - elevate legs  - ace wrap    #History MS, quadriplegia  #  neurogenic bladder  # UTI  -iraheta placed in ED 9/8  -Continue with Ceftriaxone  -F/U UCx--> E.coli.   - discussed with pharmacy. changed Abx to vantin per c/s  -Continue w/ ditropan and valium as needed for spasms      #Cough due to Rhinovirus  - Improving  - Tylenol PRN for fever  -Teri Hunter ATC  - Mucinex, flonase  - SLP eval to r/o aspiration as patient has MS and bedbound   - aspiration precautions  - CXR: reviewed by me: none acute. follow up final result    # Agitation: none today  # possible adjustment disorder  - psych eval pending. Dr. Courtney dueñas will se today.   - no SI/HI    DVT Prophylaxis with heparin  Dipso: stable for  DC home with home care today  source of infection: UTI  Abx: vantin for 2 days  mother Kady [phone], patient says he will inform family  PCP: house call program  Discharging MD: Hoa morales MD. [phone]. cakk with Qs

## 2023-09-10 NOTE — DISCHARGE NOTE PROVIDER - NSDCHHNEEDSERVICEOTHER_GEN_ALL_CORE_FT
chronic iraheta, neurogenic bladder. admitted for obstructive uropathy due to iraheta dislodged. paraplegia, MS

## 2023-09-10 NOTE — DISCHARGE NOTE PROVIDER - NSDCMRMEDTOKEN_GEN_ALL_CORE_FT
acetaminophen 325 mg oral tablet: 2 tab(s) orally every 6 hours As needed Temp greater or equal to 38.5C (101.3F), Mild Pain (1 - 3)   acetaminophen 325 mg oral tablet: 2 tab(s) orally every 6 hours As needed Temp greater or equal to 38.5C (101.3F), Mild Pain (1 - 3)  benzonatate 100 mg oral capsule: 1 cap(s) orally 3 times a day as needed for  cough  cefpodoxime 200 mg oral tablet: 1 tab(s) orally every 12 hours  fluticasone 50 mcg/inh nasal spray: 1 spray(s) nasal 2 times a day stop if cough is better  guaiFENesin 1200 mg oral tablet, extended release: 1 tab(s) orally every 12 hours  MiraLax oral powder for reconstitution: 17 gram(s) orally once a day as needed for  constipation

## 2023-09-10 NOTE — DISCHARGE NOTE PROVIDER - NSDCFUSCHEDAPPT_GEN_ALL_CORE_FT
[facility]  UROLOGY [address] [place] [address]  Scheduled Appointment: 09/26/2023    Teresa Sharma  [facility]  UROLOGY [address] [place] [address]  Scheduled Appointment: 09/26/2023

## 2023-09-10 NOTE — DISCHARGE NOTE PROVIDER - NSDCCPCAREPLAN_GEN_ALL_CORE_FT
PRINCIPAL DISCHARGE DIAGNOSIS  Diagnosis: Acute kidney failure  Assessment and Plan of Treatment: likely due to iraheta malfunction. iraheta was chanegd on admission.  complete antibiotics for UTI. see urology in 2 week. see renal in 1 week. see house call MD in 2-3 days. let your house call team check kidney function in 2-3 days and find kidney doctor who does house dheeraj. i gave you Dr. Marinelli's number just in case you dont find hose call MD for nephrology. fall precautions      SECONDARY DISCHARGE DIAGNOSES  Diagnosis: Malfunction of Iraheta catheter  Assessment and Plan of Treatment: as above     PRINCIPAL DISCHARGE DIAGNOSIS  Diagnosis: Acute kidney failure  Assessment and Plan of Treatment: likely due to iraheta malfunction. iraheta was chanegd on admission.  complete antibiotics for UTI. see urology in 2 week. see renal in 1 week. see house call MD in 2-3 days. let your house call team check kidney function in 2-3 days and find kidney doctor who does house dheeraj. i gave you Dr. Marinelli's number just in case you dont find hose call MD for nephrology. fall precautions      SECONDARY DISCHARGE DIAGNOSES  Diagnosis: Malfunction of Iraheta catheter  Assessment and Plan of Treatment: as above.  please monitor urine output through iraheta. watch for lower abdominal fullness/pain/bladder distension/less urine out. visiting RN needs to monitor for the same. if any sign of less drainage via iraheta noted, RN needs to flush it with normal saline to make sure it is not clogged. if suspected malfunction or clogged iraheta, RN needs to change iraheta and monitor drainage. come to ER if you suspect iraheta malfunction and visiting RN is not there or any problem with iraheta catheter that cannot be managed at home or lower abdominal pain, fever, or any other concerning symptoms. visiting RN needs to inform urology office if he/she find any iraheta sign of iraheta dysfunction and managed at home.

## 2023-09-11 LAB
ANION GAP SERPL CALC-SCNC: 8 MMOL/L — SIGNIFICANT CHANGE UP (ref 5–17)
BUN SERPL-MCNC: 44 MG/DL — HIGH (ref 7–23)
CALCIUM SERPL-MCNC: 8.8 MG/DL — SIGNIFICANT CHANGE UP (ref 8.4–10.5)
CHLORIDE SERPL-SCNC: 104 MMOL/L — SIGNIFICANT CHANGE UP (ref 96–108)
CO2 SERPL-SCNC: 34 MMOL/L — HIGH (ref 22–31)
CREAT SERPL-MCNC: 1.96 MG/DL — HIGH (ref 0.5–1.3)
EGFR: 42 ML/MIN/1.73M2 — LOW
GLUCOSE SERPL-MCNC: 106 MG/DL — HIGH (ref 70–99)
POTASSIUM SERPL-MCNC: 3.4 MMOL/L — LOW (ref 3.5–5.3)
POTASSIUM SERPL-SCNC: 3.4 MMOL/L — LOW (ref 3.5–5.3)
SODIUM SERPL-SCNC: 146 MMOL/L — HIGH (ref 135–145)

## 2023-09-11 PROCEDURE — 99232 SBSQ HOSP IP/OBS MODERATE 35: CPT

## 2023-09-11 RX ADMIN — HEPARIN SODIUM 5000 UNIT(S): 5000 INJECTION INTRAVENOUS; SUBCUTANEOUS at 17:32

## 2023-09-11 RX ADMIN — HEPARIN SODIUM 5000 UNIT(S): 5000 INJECTION INTRAVENOUS; SUBCUTANEOUS at 05:40

## 2023-09-11 RX ADMIN — Medication 100 MILLIGRAM(S): at 14:26

## 2023-09-11 RX ADMIN — Medication 100 MILLIGRAM(S): at 05:40

## 2023-09-11 RX ADMIN — Medication 1200 MILLIGRAM(S): at 17:31

## 2023-09-11 RX ADMIN — Medication 100 MILLIGRAM(S): at 21:20

## 2023-09-11 RX ADMIN — Medication 1200 MILLIGRAM(S): at 05:40

## 2023-09-11 NOTE — SWALLOW BEDSIDE ASSESSMENT ADULT - SWALLOW EVAL: DIAGNOSIS
Patient presented with oropharyngeal swallow to be grossly within functional limits with the exception of mildly reduced AP transit on puree and regular solids.  Adequate bolus reception and  formulation/manipulation.  Fairly timely initiation of pharyngeal swallow with adequate hyolaryngeal elevation without overt clinical signs penetration and/or aspiration.

## 2023-09-11 NOTE — PROGRESS NOTE ADULT - SUBJECTIVE AND OBJECTIVE BOX
Medicine Progress Note    Patient is a 48y old  Male who presents with a chief complaint of Lower abdominal pain (10 Sep 2023 22:19)      SUBJECTIVE / OVERNIGHT EVENTS:  frustrated about overall situation that he needs help for everything. no SI reported to me.  UC still pending  cough+    ADDITIONAL REVIEW OF SYSTEMS:  denied fever/chills/CP/SOB/cough/palpitation/dizziness/abdominal pian/nausea/vomiting/diarrhoea/constipation/dysuria/leg or calf pain/headaches.all other ROS neg    MEDICATIONS  (STANDING):  benzonatate 100 milliGRAM(s) Oral three times a day  dextrose 5% 1000 milliLiter(s) (100 mL/Hr) IV Continuous <Continuous>  fluticasone propionate 50 MICROgram(s)/spray Nasal Spray 1 Spray(s) Both Nostrils two times a day  guaiFENesin ER 1200 milliGRAM(s) Oral every 12 hours  heparin   Injectable 5000 Unit(s) SubCutaneous every 12 hours  influenza   Vaccine 0.5 milliLiter(s) IntraMuscular once  iohexol 300 mG (iodine)/mL Oral Solution 30 milliLiter(s) Oral once    MEDICATIONS  (PRN):  acetaminophen     Tablet .. 650 milliGRAM(s) Oral every 6 hours PRN Mild Pain (1 - 3)  melatonin 3 milliGRAM(s) Oral at bedtime PRN Insomnia  ondansetron Injectable 4 milliGRAM(s) IV Push every 8 hours PRN Nausea and/or Vomiting  oxyCODONE    IR 5 milliGRAM(s) Oral every 6 hours PRN Severe Pain (7 - 10)  polyethylene glycol 3350 17 Gram(s) Oral daily PRN Constipation  traMADol 50 milliGRAM(s) Oral two times a day PRN Moderate Pain (4 - 6)    CAPILLARY BLOOD GLUCOSE        I&O's Summary    10 Sep 2023 07:01  -  11 Sep 2023 07:00  --------------------------------------------------------  IN: 1800 mL / OUT: 2025 mL / NET: -225 mL        PHYSICAL EXAM:  Vital Signs Last 24 Hrs  T(C): 37 (11 Sep 2023 06:22), Max: 37.3 (10 Sep 2023 13:43)  T(F): 98.6 (11 Sep 2023 06:22), Max: 99.2 (10 Sep 2023 13:43)  HR: 99 (11 Sep 2023 06:22) (97 - 99)  BP: 126/85 (11 Sep 2023 06:22) (109/69 - 126/85)  BP(mean): --  RR: 17 (11 Sep 2023 06:22) (17 - 17)  SpO2: 95% (11 Sep 2023 06:22) (95% - 99%)    Parameters below as of 11 Sep 2023 06:22  Patient On (Oxygen Delivery Method): room air    GENERAL: Not in distress. Alert    HEENT: AT/NC. clear conjuctiva, MMM.   no pallor or icterus  CARDIOVASCULAR: RRR S1, S2. No murmur/rubs/gallop  LUNGS: BLAE+, no rales, no wheezing, no rhonchi.  [ ant lungs, did not want to turn]  ABDOMEN: ND. Soft,  NT, no guarding / rebound / rigidity. BS normoactive. bladder not distended. iraheta is draining light yellow urine  EXTREMITIES: no edema. no leg or calf TP.  SKIN: no rash.   NEUROLOGIC: AAO*3. b/l LE weakness strength is symmetric, sensation intact, speech fluent.    PSYCHIATRIC: mild agitation and irritability. frustrated about overall situation. no SI. mood/affect: irritable      LABS:    09-11    146<H>  |  104  |  44<H>  ----------------------------<  106<H>  3.4<L>   |  34<H>  |  1.96<H>    Ca    8.8      11 Sep 2023 07:24      Urinalysis Basic - ( 11 Sep 2023 07:24 )    Color: x / Appearance: x / SG: x / pH: x  Gluc: 106 mg/dL / Ketone: x  / Bili: x / Urobili: x   Blood: x / Protein: x / Nitrite: x   Leuk Esterase: x / RBC: x / WBC x   Sq Epi: x / Non Sq Epi: x / Bacteria: x        SARS-CoV-2: NotDetec (08 Sep 2023 12:50)  COVID-19 PCR: NotDetec (23 May 2023 23:24)      RADIOLOGY & ADDITIONAL TESTS:  Imaging from Last 24 Hours:    Electrocardiogram/QTc Interval:    COORDINATION OF CARE:  Care Discussed with Consultants/Other Providers:

## 2023-09-11 NOTE — PROGRESS NOTE ADULT - SUBJECTIVE AND OBJECTIVE BOX
NEPHROLOGY PROGRESS NOTE    CHIEF COMPLAINT:  NAY    HPI:  Renal function recovering,.  No further gross hematuria.     EXAM:  Vital Signs Last 24 Hrs  T(C): 37.4 (11 Sep 2023 15:42), Max: 37.4 (11 Sep 2023 15:42)  T(F): 99.4 (11 Sep 2023 15:42), Max: 99.4 (11 Sep 2023 15:42)  HR: 109 (11 Sep 2023 15:42) (98 - 109)  BP: 103/77 (11 Sep 2023 15:42) (103/77 - 126/85)  BP(mean): --  RR: 17 (11 Sep 2023 15:42) (17 - 17)  SpO2: 96% (11 Sep 2023 15:42) (95% - 97%)    Parameters below as of 11 Sep 2023 15:42  Patient On (Oxygen Delivery Method): room air      I&O's Summary    10 Sep 2023 07:01  -  11 Sep 2023 07:00  --------------------------------------------------------  IN: 1800 mL / OUT: 2025 mL / NET: -225 mL    11 Sep 2023 07:01  -  11 Sep 2023 15:55  --------------------------------------------------------  IN: 1200 mL / OUT: 700 mL / NET: 500 mL      Conversant, in no apparent distress  Normal respiratory effort, lungs clear bilaterally  Heart RRR with no murmur, no peripheral edema    LABS    09-11    146<H>  |  104  |  44<H>  ----------------------------<  106<H>  3.4<L>   |  34<H>  |  1.96<H>    Ca    8.8      11 Sep 2023 07:24      Urinalysis Basic - ( 11 Sep 2023 07:24 )    Color: x / Appearance: x / SG: x / pH: x  Gluc: 106 mg/dL / Ketone: x  / Bili: x / Urobili: x   Blood: x / Protein: x / Nitrite: x   Leuk Esterase: x / RBC: x / WBC x   Sq Epi: x / Non Sq Epi: x / Bacteria: x        Impression:  NAY post renal azotemia; improving with iraheta  Obstructive NAY iso iraheta not in place (Cr 1.19 - 7/1/23)  Hematuria, resolved    Recommend:  Maintain iraheta  Urology followup         NEPHROLOGY PROGRESS NOTE    CHIEF COMPLAINT:  NAY    HPI:  Renal function recovering,.  No further gross hematuria.     EXAM:  Vital Signs Last 24 Hrs  T(C): 37.4 (11 Sep 2023 15:42), Max: 37.4 (11 Sep 2023 15:42)  T(F): 99.4 (11 Sep 2023 15:42), Max: 99.4 (11 Sep 2023 15:42)  HR: 109 (11 Sep 2023 15:42) (98 - 109)  BP: 103/77 (11 Sep 2023 15:42) (103/77 - 126/85)  BP(mean): --  RR: 17 (11 Sep 2023 15:42) (17 - 17)  SpO2: 96% (11 Sep 2023 15:42) (95% - 97%)    Parameters below as of 11 Sep 2023 15:42  Patient On (Oxygen Delivery Method): room air      I&O's Summary    10 Sep 2023 07:01  -  11 Sep 2023 07:00  --------------------------------------------------------  IN: 1800 mL / OUT: 2025 mL / NET: -225 mL    11 Sep 2023 07:01  -  11 Sep 2023 15:55  --------------------------------------------------------  IN: 1200 mL / OUT: 700 mL / NET: 500 mL      Conversant, in no apparent distress  Normal respiratory effort, lungs clear bilaterally  Heart RRR with no murmur, no peripheral edema    LABS    09-11    146<H>  |  104  |  44<H>  ----------------------------<  106<H>  3.4<L>   |  34<H>  |  1.96<H>    Ca    8.8      11 Sep 2023 07:24      Urinalysis Basic - ( 11 Sep 2023 07:24 )    Color: x / Appearance: x / SG: x / pH: x  Gluc: 106 mg/dL / Ketone: x  / Bili: x / Urobili: x   Blood: x / Protein: x / Nitrite: x   Leuk Esterase: x / RBC: x / WBC x   Sq Epi: x / Non Sq Epi: x / Bacteria: x        Impression:  NAY post renal azotemia; improving with iraheta  Obstructive NAY iso iraheta not in place (Cr 1.19 - 7/1/23)  Hematuria, resolved    Recommend:  Maintain iraheta  No further bicarb drip  Urology followup

## 2023-09-11 NOTE — PROGRESS NOTE ADULT - ASSESSMENT
48 year old male with PMHx of MS, quadriplegia, neurogenic bladder with known nephrolithiasis; also found to a diffuse rash over trunk fore    #Acute Renal Failure due to Iraheta Misplacement  #Hyperkalemia, now hypokalemia,  # Uremia  - Creatinine improving  - now potassium low  -Urology on board  -Nephrology on board  -EKG WNL,   -DC telemetry monitoring.  -Given cocktail for hyperkalemia in the ED.   -s/p Lokelma 4 doses  -IVFs Sodibiarb as per nephrology  -monitor I/O strictly.   -monitor BPM, Mg and Phos q 6hrs   - no plan for RRT now  - irrigate iraheta PRN  - bladder scan Q8hrs to ensure no retention. low PVR. DC.     # b/l leg swelling  - Duplex neg  - elevate legs  - ace wrap    #History MS, quadriplegia  #  neurogenic bladder  # UTI  -iraheta placed in ED 9/8  -Continue with Ceftriaxone  -F/U UCx  -Continue w/ ditropan and valium as needed for spasms      #Cough due to Rhinovirus  - Tylenol PRN for fever  -Teri Hunter ATC  -  added Mucinex, flobase  - SLP eval to r/o aspiration as patient has MS and bedbound   - aspiration precautions  - CXR: reviewed by me: none acute. follow up final result\    # Agitation  # possible adjustment disorder  - psych eval.     DVT Prophylaxis with heparin  Dipso: anticipates DC home with home care tomorrow. needs SLP eval and UC pending. informed CM about broken hospital bed. someone needs to be at home for bed delivered. CM working on it    mother Kady [phone], patient says he will inform family

## 2023-09-11 NOTE — SWALLOW BEDSIDE ASSESSMENT ADULT - SLP GENERAL OBSERVATIONS
Patient alert and oriented x 4, follows commands and able to make verbal wants and needs known in a clear/cohesive manner.

## 2023-09-12 LAB
-  AMIKACIN: SIGNIFICANT CHANGE UP
-  AMOXICILLIN/CLAVULANIC ACID: SIGNIFICANT CHANGE UP
-  AMPICILLIN/SULBACTAM: SIGNIFICANT CHANGE UP
-  AMPICILLIN: SIGNIFICANT CHANGE UP
-  AZTREONAM: SIGNIFICANT CHANGE UP
-  CEFAZOLIN: SIGNIFICANT CHANGE UP
-  CEFEPIME: SIGNIFICANT CHANGE UP
-  CEFOXITIN: SIGNIFICANT CHANGE UP
-  CEFTRIAXONE: SIGNIFICANT CHANGE UP
-  CEFUROXIME: SIGNIFICANT CHANGE UP
-  CIPROFLOXACIN: SIGNIFICANT CHANGE UP
-  ERTAPENEM: SIGNIFICANT CHANGE UP
-  GENTAMICIN: SIGNIFICANT CHANGE UP
-  IMIPENEM: SIGNIFICANT CHANGE UP
-  LEVOFLOXACIN: SIGNIFICANT CHANGE UP
-  MEROPENEM: SIGNIFICANT CHANGE UP
-  NITROFURANTOIN: SIGNIFICANT CHANGE UP
-  PIPERACILLIN/TAZOBACTAM: SIGNIFICANT CHANGE UP
-  TOBRAMYCIN: SIGNIFICANT CHANGE UP
-  TRIMETHOPRIM/SULFAMETHOXAZOLE: SIGNIFICANT CHANGE UP
ANION GAP SERPL CALC-SCNC: 8 MMOL/L — SIGNIFICANT CHANGE UP (ref 5–17)
BASOPHILS # BLD AUTO: 0 K/UL — SIGNIFICANT CHANGE UP (ref 0–0.2)
BASOPHILS NFR BLD AUTO: 0 % — SIGNIFICANT CHANGE UP (ref 0–2)
BUN SERPL-MCNC: 30 MG/DL — HIGH (ref 7–23)
CALCIUM SERPL-MCNC: 8.8 MG/DL — SIGNIFICANT CHANGE UP (ref 8.4–10.5)
CHLORIDE SERPL-SCNC: 105 MMOL/L — SIGNIFICANT CHANGE UP (ref 96–108)
CO2 SERPL-SCNC: 32 MMOL/L — HIGH (ref 22–31)
CREAT SERPL-MCNC: 1.71 MG/DL — HIGH (ref 0.5–1.3)
CULTURE RESULTS: SIGNIFICANT CHANGE UP
EGFR: 49 ML/MIN/1.73M2 — LOW
EOSINOPHIL # BLD AUTO: 0.36 K/UL — SIGNIFICANT CHANGE UP (ref 0–0.5)
EOSINOPHIL NFR BLD AUTO: 3 % — SIGNIFICANT CHANGE UP (ref 0–6)
GLUCOSE SERPL-MCNC: 131 MG/DL — HIGH (ref 70–99)
HCT VFR BLD CALC: 31.5 % — LOW (ref 39–50)
HGB BLD-MCNC: 10.1 G/DL — LOW (ref 13–17)
HYPOCHROMIA BLD QL: SLIGHT — SIGNIFICANT CHANGE UP
LYMPHOCYTES # BLD AUTO: 29 % — SIGNIFICANT CHANGE UP (ref 13–44)
LYMPHOCYTES # BLD AUTO: 3.47 K/UL — HIGH (ref 1–3.3)
MANUAL SMEAR VERIFICATION: SIGNIFICANT CHANGE UP
MCHC RBC-ENTMCNC: 29.3 PG — SIGNIFICANT CHANGE UP (ref 27–34)
MCHC RBC-ENTMCNC: 32.1 GM/DL — SIGNIFICANT CHANGE UP (ref 32–36)
MCV RBC AUTO: 91.3 FL — SIGNIFICANT CHANGE UP (ref 80–100)
METHOD TYPE: SIGNIFICANT CHANGE UP
MONOCYTES # BLD AUTO: 0.6 K/UL — SIGNIFICANT CHANGE UP (ref 0–0.9)
MONOCYTES NFR BLD AUTO: 5 % — SIGNIFICANT CHANGE UP (ref 2–14)
MYELOCYTES NFR BLD: 1 % — HIGH (ref 0–0)
NEUTROPHILS # BLD AUTO: 7.42 K/UL — HIGH (ref 1.8–7.4)
NEUTROPHILS NFR BLD AUTO: 60 % — SIGNIFICANT CHANGE UP (ref 43–77)
NEUTS BAND # BLD: 2 % — SIGNIFICANT CHANGE UP (ref 0–8)
NRBC # BLD: 0 /100 — SIGNIFICANT CHANGE UP (ref 0–0)
ORGANISM # SPEC MICROSCOPIC CNT: SIGNIFICANT CHANGE UP
ORGANISM # SPEC MICROSCOPIC CNT: SIGNIFICANT CHANGE UP
PLAT MORPH BLD: NORMAL — SIGNIFICANT CHANGE UP
PLATELET # BLD AUTO: 406 K/UL — HIGH (ref 150–400)
POTASSIUM SERPL-MCNC: 3.7 MMOL/L — SIGNIFICANT CHANGE UP (ref 3.5–5.3)
POTASSIUM SERPL-SCNC: 3.7 MMOL/L — SIGNIFICANT CHANGE UP (ref 3.5–5.3)
RBC # BLD: 3.45 M/UL — LOW (ref 4.2–5.8)
RBC # FLD: 13.8 % — SIGNIFICANT CHANGE UP (ref 10.3–14.5)
RBC BLD AUTO: ABNORMAL
SODIUM SERPL-SCNC: 145 MMOL/L — SIGNIFICANT CHANGE UP (ref 135–145)
SPECIMEN SOURCE: SIGNIFICANT CHANGE UP
WBC # BLD: 11.97 K/UL — HIGH (ref 3.8–10.5)
WBC # FLD AUTO: 11.97 K/UL — HIGH (ref 3.8–10.5)

## 2023-09-12 PROCEDURE — 90791 PSYCH DIAGNOSTIC EVALUATION: CPT

## 2023-09-12 PROCEDURE — 99232 SBSQ HOSP IP/OBS MODERATE 35: CPT

## 2023-09-12 RX ORDER — CEFTRIAXONE 500 MG/1
1000 INJECTION, POWDER, FOR SOLUTION INTRAMUSCULAR; INTRAVENOUS EVERY 24 HOURS
Refills: 0 | Status: DISCONTINUED | OUTPATIENT
Start: 2023-09-12 | End: 2023-09-12

## 2023-09-12 RX ORDER — CEFUROXIME AXETIL 250 MG
500 TABLET ORAL EVERY 12 HOURS
Refills: 0 | Status: DISCONTINUED | OUTPATIENT
Start: 2023-09-12 | End: 2023-09-12

## 2023-09-12 RX ORDER — CEFPODOXIME PROXETIL 100 MG
200 TABLET ORAL EVERY 12 HOURS
Refills: 0 | Status: DISCONTINUED | OUTPATIENT
Start: 2023-09-12 | End: 2023-09-13

## 2023-09-12 RX ADMIN — Medication 1200 MILLIGRAM(S): at 05:06

## 2023-09-12 RX ADMIN — Medication 200 MILLIGRAM(S): at 17:28

## 2023-09-12 RX ADMIN — HEPARIN SODIUM 5000 UNIT(S): 5000 INJECTION INTRAVENOUS; SUBCUTANEOUS at 17:27

## 2023-09-12 RX ADMIN — Medication 1200 MILLIGRAM(S): at 17:27

## 2023-09-12 RX ADMIN — Medication 100 MILLIGRAM(S): at 21:05

## 2023-09-12 RX ADMIN — HEPARIN SODIUM 5000 UNIT(S): 5000 INJECTION INTRAVENOUS; SUBCUTANEOUS at 05:07

## 2023-09-12 RX ADMIN — Medication 100 MILLIGRAM(S): at 05:06

## 2023-09-12 NOTE — BH CONSULTATION LIAISON ASSESSMENT NOTE - NSCOMMENTSUICRISKFACT_PSY_ALL_CORE
Pt reports he tried telehealth but does not have the attention span to do it, would prefer face to face encounters, but as he is bed bound transportation is problematic, would benefit from house calls from a mental health provider.

## 2023-09-12 NOTE — BH CONSULTATION LIAISON ASSESSMENT NOTE - NSBHCHARTREVIEWVS_PSY_A_CORE FT
Vital Signs Last 24 Hrs  T(C): 36.9 (12 Sep 2023 16:14), Max: 37.1 (11 Sep 2023 19:48)  T(F): 98.4 (12 Sep 2023 16:14), Max: 98.7 (11 Sep 2023 19:48)  HR: 97 (12 Sep 2023 16:14) (94 - 100)  BP: 121/85 (12 Sep 2023 16:14) (121/85 - 129/78)  BP(mean): --  RR: 16 (12 Sep 2023 16:14) (16 - 17)  SpO2: 97% (12 Sep 2023 16:14) (95% - 98%)    Parameters below as of 12 Sep 2023 16:14  Patient On (Oxygen Delivery Method): room air

## 2023-09-12 NOTE — BH CONSULTATION LIAISON ASSESSMENT NOTE - CURRENT MEDICATION
MEDICATIONS  (STANDING):  benzonatate 100 milliGRAM(s) Oral three times a day  cefpodoxime 200 milliGRAM(s) Oral every 12 hours  fluticasone propionate 50 MICROgram(s)/spray Nasal Spray 1 Spray(s) Both Nostrils two times a day  guaiFENesin ER 1200 milliGRAM(s) Oral every 12 hours  heparin   Injectable 5000 Unit(s) SubCutaneous every 12 hours  influenza   Vaccine 0.5 milliLiter(s) IntraMuscular once  iohexol 300 mG (iodine)/mL Oral Solution 30 milliLiter(s) Oral once    MEDICATIONS  (PRN):  acetaminophen     Tablet .. 650 milliGRAM(s) Oral every 6 hours PRN Mild Pain (1 - 3)  melatonin 3 milliGRAM(s) Oral at bedtime PRN Insomnia  ondansetron Injectable 4 milliGRAM(s) IV Push every 8 hours PRN Nausea and/or Vomiting  oxyCODONE    IR 5 milliGRAM(s) Oral every 6 hours PRN Severe Pain (7 - 10)  polyethylene glycol 3350 17 Gram(s) Oral daily PRN Constipation  traMADol 50 milliGRAM(s) Oral two times a day PRN Moderate Pain (4 - 6)

## 2023-09-12 NOTE — BH CONSULTATION LIAISON ASSESSMENT NOTE - RISK ASSESSMENT
Pt appears to be low risk of self harm currently in that he has significant [employer] beliefs, is open about his past, is accepting of his illness, and   is not anhedonic, risk factors would be previous attempts - although as per patient antidepressants worsened this , which may have been due to MS itself or gathering enough energy physically to put in place an attempt.

## 2023-09-12 NOTE — BH CONSULTATION LIAISON ASSESSMENT NOTE - NSBHCONSULTFOLLOWAFTERCARE_PSY_A_CORE FT
If possible home visits with a therapist or / .    Pt focus would be on resilience and not on his vulnerabilities.

## 2023-09-12 NOTE — BH CONSULTATION LIAISON ASSESSMENT NOTE - DETAILS
as per above.  Remote history of suicide attempts, ( in past  Assessment 3 were mentioned), Pt reported to MD one attempt with overdose on Paxil, per chart review one attempt was not recalled, the other was via carbon monoxide poisoning. Pt reports he had been to [place] [id number] with those attempts, last in mid to late twenties.  Mother anxiety/depression.

## 2023-09-12 NOTE — BH CONSULTATION LIAISON ASSESSMENT NOTE - NSBHCHARTREVIEWINVESTIGATE_PSY_A_CORE FT
< from: 12 Lead ECG (09.08.23 @ 13:20) >    Ventricular Rate 107 BPM    Atrial Rate 107 BPM    P-R Interval 134 ms    QRS Duration 96 ms    Q-T Interval 342 ms    QTC Calculation(Bazett) 456 ms    P Axis 26 degrees    R Axis -5 degrees    T Axis 22 degrees    Diagnosis Line Sinus tachycardia  Otherwise normal ECG  No previous ECGs available  Confirmed by MICHELLE GRAHAM, NEO GONZALEZ ([id number]) on 9/9/2023 9:10:29 AM    < end of copied text >    < from: Xray Chest 1 View- PORTABLE-Urgent (Xray Chest 1 View- PORTABLE-Urgent .) (09.10.23 @ 10:53) >    ACC: [id number] EXAM:  XR CHEST PORTABLE URGENT 1V   ORDERED BY: ONEIDA CASTILLO     PROCEDURE DATE:  09/10/2023          INTERPRETATION:  EXAM:XR CHEST URGENT.     CLINICAL INDICATION: cough, r/o aspiration .     TECHNIQUE: Portable AP view.     PRIOR EXAM: 09/08/2023.     FINDINGS:     Trace left basilar atelectasis, improved. The rest of the visualized   lung fields are clear. Cardiac and mediastinal silhouette are within   normal limits.      IMPRESSION:    Improved left basilar atelectasis.    --- End of Report ---            EV BOWERS MD; Attending Radiologist  This document has been electronically signed. Sep 11 2023 11:45AM    < end of copied text >

## 2023-09-12 NOTE — BH CONSULTATION LIAISON ASSESSMENT NOTE - NSBHCONSULTRECOMMENDOTHER_PSY_A_CORE FT
Pt would benefit from having clergy visit.   Would not prescribe any antidepressants, discussed possible use of psychostimulant for MS fatigue.   Pt prefers to continue cannabis use.

## 2023-09-12 NOTE — BH CONSULTATION LIAISON ASSESSMENT NOTE - DESCRIPTION
Pt reports he is single, has a 24 hour HHA, has 2 siblings which he is estranged from.   Has cousins that are supportive and a friend group.

## 2023-09-12 NOTE — BH CONSULTATION LIAISON ASSESSMENT NOTE - OTHER PAST PSYCHIATRIC HISTORY (INCLUDE DETAILS REGARDING ONSET, COURSE OF ILLNESS, INPATIENT/OUTPATIENT TREATMENT)
see HPI, pt recalls seeking both psychotherapy and psychiatry in his twenties, reports 2 remote stays at [facility].   no current psychiatric treatment, has been seen by C/L service for similar complaint in May of 2023.

## 2023-09-12 NOTE — BH CONSULTATION LIAISON ASSESSMENT NOTE - HPI (INCLUDE ILLNESS QUALITY, SEVERITY, DURATION, TIMING, CONTEXT, MODIFYING FACTORS, ASSOCIATED SIGNS AND SYMPTOMS)
HPI:  48 year old male with PMHx of MS, quadriplegia, neurogenic bladder with known nephrolithiasis comes to ED after having significant low abdominal pain from iraheta being placed at home yesterday. Patient reports that he had a iraheta placed and since placement he has been having pain in his lower abdomen. No nausea or vomiting but the pain was bad enough that he call to come to the ED. In the ED, the iraheta was removed and there was significant amount of blood that came out. Urology was consulted. They came to the ED and placed a iraheta. Patient states he feels better. He feels a "little off" . His creatinine is also elevated to 11.3 Nephrology was consulted as well and she reports no need for urgent HD. Patient denies any other complaints.   (08 Sep 2023 16:38)    Psychiatry C/L Note: Pt seen and evaluated today, writer asked to see patient for recurrent depression and passive suicidal statements. Pt reports he does not think of suicide , unless someone comes to question him about it. He states he is "handcuffed to my own body", and recounts how he has lost equilibrium, hand/eye coordination, and bladder control. He states his life is "torture from breath to breath", but he has no intentions to be dead. He states he hates his life due to his physical state and that he has lost a lot of years due to Multiple sclerosis. He states he was diagnosed around the age of 20. He states he feels better when his friends visit, when he has good conversations, when he is able to make others laugh and smile, as well as when he feels "treated like a mel", which he states was the case the last time he was hospitalized as his needs were anticipated by his care givers and he felt he was getting better, his MS has worsened over the past 6 years.   He states he has tried everything, - Copaxone, Ocrevus, Betaseron, Avonex for his MS to no avail, has tried antidepressants which worsened his depression and pt feels Paxil may have triggered a suicide attempt. Pt denied being hopeless or helpless, but endorses anger and frustration. No issues, with sleep, energy, or appetite, reports limited attention span, and does NOT want a trial of any psychiatric medications, although he appreciates consults and benefits most "from someone to talk to " , with the focus NOT being his illness or what he cannot do , but what he can enjoy. He did enjoy having a  come to bless him and he states no matter what happens God is there for him. No megha, no psychosis, no delusions or paranoid ideations, no homicidality either elicited or noted in the interview. Rest of psychiatric ROS as noted below.

## 2023-09-12 NOTE — BH CONSULTATION LIAISON ASSESSMENT NOTE - DIFFERENTIAL
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   Demoralization- which is characterized by hopelessness, loss of meaning, and existential distress, accompanied by a subjective sense of incompetence.   Pt reports he gets endorphins from staying positive and having a positive focus.

## 2023-09-12 NOTE — BH CONSULTATION LIAISON ASSESSMENT NOTE - PAST PSYCHOTROPIC MEDICATION
Wellbutrin, Lexapro, Paxil ( ? worsened suicidal ideation), Zoloft, Prozac, possibly Lithium.   Per pt antidepressants worsened suicidal thoughts.

## 2023-09-12 NOTE — BH CONSULTATION LIAISON ASSESSMENT NOTE - NSBHCHARTREVIEWLAB_PSY_A_CORE FT
09-12    145  |  105  |  30<H>  ----------------------------<  131<H>  3.7   |  32<H>  |  1.71<H>    Ca    8.8      12 Sep 2023 07:08                          10.1   11.97 )-----------( 406      ( 12 Sep 2023 07:08 )             31.5

## 2023-09-12 NOTE — BH CONSULTATION LIAISON ASSESSMENT NOTE - SUMMARY
48 year old male with PMHx of MS, quadriplegia, neurogenic bladder with known nephrolithiasis;  also found to a diffuse rash over trunk , pt admitted with Acute Renal Failure  due to Steele , Hyperkalemia, Uremia.   Psychiatry called to see patient due to passive suicidal statements.

## 2023-09-12 NOTE — BH CONSULTATION LIAISON ASSESSMENT NOTE - NSBHMSEREMMEM_PSY_A_CORE
Subjective     Kelly Barnes is a 80 year old female who presents to clinic today for the following health issues:    HPI   Diabetes Follow-up    How often are you checking your blood sugar? One time daily  What time of day are you checking your blood sugars (select all that apply)?  morning; 130's to high of 194, evenings 180's  Have you had any blood sugars above 200?  No  Have you had any blood sugars below 70?  No  Blood sugars are best when she takes 1000 mg bid    What symptoms do you notice when your blood sugar is low?  None    What concerns do you have today about your diabetes? None and Other: blood sugars are not at goal, and dermatitis has not gone away     Do you have any of these symptoms? (Select all that apply)  No numbness or tingling in feet.  No redness, sores or blisters on feet.  No complaints of excessive thirst.  No reports of blurry vision.  No significant changes to weight.    Have you had a diabetic eye exam in the last 12 months? No    Wt Readings from Last 4 Encounters:   06/25/20 72.9 kg (160 lb 12.8 oz)   05/26/20 76.2 kg (168 lb)   05/01/20 77.1 kg (170 lb)   04/24/20 74.8 kg (165 lb)       BP Readings from Last 2 Encounters:   06/25/20 (!) 146/84   05/26/20 (!) 142/88     Hemoglobin A1C (%)   Date Value   10/15/2019 7.6 (H)   10/25/2018 6.7 (H)     LDL Cholesterol Calculated (mg/dL)   Date Value   10/15/2019 94   10/25/2018 126 (H)       Hypertension Follow-up      Do you check your blood pressure regularly outside of the clinic? Yes     Are you following a low salt diet? sometimes    Are your blood pressures ever more than 140 on the top number (systolic) OR more   than 90 on the bottom number (diastolic), for example 140/90? Yes sometimes top nyumber  132/89      How many servings of fruits and vegetables do you eat daily?  4 or more    On average, how many sweetened beverages do you drink each day (Examples: soda, juice, sweet tea, etc.  Do NOT count diet or artificially  sweetened beverages)?   0    How many days per week do you exercise enough to make your heart beat faster? 7    How many minutes a day do you exercise enough to make your heart beat faster? 30 - 60    How many days per week do you miss taking your medication? 0    Patient Active Problem List   Diagnosis     Allergic rhinitis     Osteopenia     Obesity     Hypertension goal BP (blood pressure) < 140/90     Hyperlipidemia LDL goal <100     Leg edema, left     Atypical chest pain     Dizzy spells     Obesity, Class I, BMI 30-34.9     Aortic stenosis     Type 2 diabetes mellitus without complication, without long-term current use of insulin (H)     Musculoskeletal chest pain     Nonrheumatic aortic valve stenosis     Family history of breast cancer in sister     Hepatitis A immune     Left leg swelling     Allergic dermatitis     Dermatitis     Past Surgical History:   Procedure Laterality Date     C NONSPECIFIC PROCEDURE      none       Social History     Tobacco Use     Smoking status: Never Smoker     Smokeless tobacco: Never Used   Substance Use Topics     Alcohol use: Yes     Alcohol/week: 10.0 standard drinks     Comment: 1-2 drinks per week     Family History   Problem Relation Age of Onset     Hypertension Mother      Diabetes No family hx of      Cerebrovascular Disease No family hx of      Breast Cancer No family hx of      Cancer - colorectal No family hx of      Prostate Cancer No family hx of          Current Outpatient Medications   Medication Sig Dispense Refill     ASPIRIN 81 MG OR TABS ONE DAILY 100 3     blood glucose (ONETOUCH ULTRA) test strip Use to test blood sugar twice daily. Dispense 1 box of 200 test strips, #3 refills. 1 Box 3     blood glucose monitor KIT 1 kit daily. 1 kit 0     blood glucose monitoring (ONE TOUCH ULTRA 2) meter device kit Use to test blood sugar 3 times daily 1 kit 0     metFORMIN (GLUCOPHAGE) 500 MG tablet Take 2 tablets in the morning and one tablet at night 270 tablet 3      NIFEdipine ER (ADALAT CC) 30 MG 24 hr tablet Take 1 tablet (30 mg) by mouth At Bedtime 30 tablet 3     ONE TOUCH LANCETS MISC        ONETOUCH DELICA LANCETS 33G MISC 1 Device by In Vitro route 2 times daily 100 each 11     simvastatin (ZOCOR) 10 MG tablet TAKE ONE TABLET BY MOUTH AT BEDTIME 90 tablet 3     Allergies   Allergen Reactions     Losartan Rash     Metoprolol Rash     Ibuprofen      numbness in hands and feet     Recent Labs   Lab Test 10/15/19  0920 10/25/18  0920 10/26/17  0808 03/28/17  0941   A1C 7.6* 6.7* 7.3* 8.4*   LDL 94 126*  --  131*   HDL 73 74  --  67   TRIG 90 76  --  83   ALT 21 18  --  18   CR 0.78 0.82  --  0.70   GFRESTIMATED 72 67  --  81   GFRESTBLACK 84 82  --  >90   GFR Calc     POTASSIUM 4.6 4.3  --  4.2   TSH 1.11  --  1.52  --       BP Readings from Last 3 Encounters:   06/25/20 (!) 146/84   05/26/20 (!) 142/88   05/01/20 136/78    Wt Readings from Last 3 Encounters:   06/25/20 72.9 kg (160 lb 12.8 oz)   05/26/20 76.2 kg (168 lb)   05/01/20 77.1 kg (170 lb)         Reviewed and updated as needed this visit by Provider         Review of Systems   Constitutional, HEENT, cardiovascular, pulmonary, GI, , musculoskeletal, neuro, skin, endocrine and psych systems are negative, except as otherwise noted.      Objective    BP (!) 146/84 (BP Location: Left arm, Patient Position: Chair, Cuff Size: Adult Regular)   Pulse 80   Temp 98.5  F (36.9  C) (Oral)   Resp 16   Wt 72.9 kg (160 lb 12.8 oz)   SpO2 98%   BMI 29.41 kg/m    Body mass index is 29.41 kg/m .  Physical Exam   GENERAL: healthy, alert and no distress  NECK: no adenopathy, no asymmetry, masses, or scars and thyroid normal to palpation  RESP: lungs clear to auscultation - no rales, rhonchi or wheezes  CV: regular rate and rhythm, normal S1 S2, no S3 or S4, no murmur, click or rub, no peripheral edema and peripheral pulses strong  ABDOMEN: soft, nontender, no hepatosplenomegaly, no masses and bowel sounds  normal  MS: no gross musculoskeletal defects noted, no edema    Diagnostic Test Results:  Labs reviewed in Epic        Assessment & Plan     1. Type 2 diabetes mellitus without complication, without long-term current use of insulin (H)  Not at goal, possibly having dermatitis due to metformin, so will stop metformin and start glimepiride as below  Follow closely as we adjust her diabetes medication  Could take 8 weeks for dermatitis to resolve after cessation offending agent  She had her eye exam recently [facility]  - glimepiride (AMARYL) 2 MG tablet; Take 1 tablet (2 mg) by mouth every morning (before breakfast)  Dispense: 30 tablet; Refill: 1    2.  Hypertension goal BP (blood pressure) < 140/90  Not at goal, restart metoprolol 25 mg at bedtime and follow closely    Return in about 2 weeks (around 7/9/2020) for follow up.    Lea Lopez MD  [facility]         Not a good historian for dates, or specific time line./Impaired

## 2023-09-12 NOTE — PROGRESS NOTE ADULT - SUBJECTIVE AND OBJECTIVE BOX
No complaints    Vital Signs Last 24 Hrs  T(C): 37.1 (09-12-23 @ 20:14), Max: 37.1 (09-12-23 @ 20:14)  T(F): 98.8 (09-12-23 @ 20:14), Max: 98.8 (09-12-23 @ 20:14)  HR: 97 (09-12-23 @ 20:14) (94 - 97)  BP: 125/84 (09-12-23 @ 20:14) (121/85 - 129/78)  RR: 18 (09-12-23 @ 20:14) (16 - 18)  SpO2: 98% (09-12-23 @ 20:14) (95% - 98%)    I&O's Detail    11 Sep 2023 07:01  -  12 Sep 2023 07:00  --------------------------------------------------------  IN:    Oral Fluid: 1440 mL  Total IN: 1440 mL    OUT:    Indwelling Catheter - Urethral (mL): 2600 mL  Total OUT: 2600 mL    12 Sep 2023 07:01  -  12 Sep 2023 22:00  --------------------------------------------------------  IN:    Oral Fluid: 1120 mL  Total IN: 1120 mL    OUT:    Indwelling Catheter - Urethral (mL): 1400 mL  Total OUT: 1400 mL    s1s2  b/l air entry  soft, ND  tr edema                        10.1   11.97 )-----------( 406      ( 12 Sep 2023 07:08 )             31.5     12 Sep 2023 07:08    145    |  105    |  30     ----------------------------<  131    3.7     |  32     |  1.71     Ca    8.8        12 Sep 2023 07:08    acetaminophen     Tablet .. 650 milliGRAM(s) Oral every 6 hours PRN  benzonatate 100 milliGRAM(s) Oral three times a day  cefpodoxime 200 milliGRAM(s) Oral every 12 hours  fluticasone propionate 50 MICROgram(s)/spray Nasal Spray 1 Spray(s) Both Nostrils two times a day  guaiFENesin ER 1200 milliGRAM(s) Oral every 12 hours  heparin   Injectable 5000 Unit(s) SubCutaneous every 12 hours  influenza   Vaccine 0.5 milliLiter(s) IntraMuscular once  iohexol 300 mG (iodine)/mL Oral Solution 30 milliLiter(s) Oral once  melatonin 3 milliGRAM(s) Oral at bedtime PRN  ondansetron Injectable 4 milliGRAM(s) IV Push every 8 hours PRN  oxyCODONE    IR 5 milliGRAM(s) Oral every 6 hours PRN  polyethylene glycol 3350 17 Gram(s) Oral daily PRN  traMADol 50 milliGRAM(s) Oral two times a day PRN    A/P:    Obstructive NAY iso iraheta not in place (Cr 1.19 - 7/1/23)  Hematuria iso above   Iraheta w/good UO, clear urine   Renal fx is improving  Abx for UTI  Avoid nephrotoxins  No NSAID's  F/u BMP, UO  Renal f/u as op    [phone]

## 2023-09-12 NOTE — BH CONSULTATION LIAISON ASSESSMENT NOTE - NSSUICPROTFACT_PSY_ALL_CORE
Identifies reasons for living/Supportive social network of family or friends/Cultural, spiritual and/or moral attitudes against suicide/[employer] beliefs

## 2023-09-12 NOTE — PROGRESS NOTE ADULT - ASSESSMENT
48 year old male with PMHx of MS, quadriplegia, neurogenic bladder with known nephrolithiasis; also found to a diffuse rash over trunk fore    #Acute Renal Failure due to Iraheta Misplacement  #Hyperkalemia, now hypokalemia,  # Uremia  - Creatinine improving  - now potassium low  -Urology on board  -Nephrology on board  -EKG WNL,   -DC telemetry monitoring.  -Given cocktail for hyperkalemia in the ED.   -s/p Lokelma 4 doses  -IVFs Sodibiarb as per nephrology  -monitor I/O strictly.   -monitor BPM, Mg and Phos q 6hrs   - no plan for RRT now  - irrigate iraheta PRN  - Dced bladder scan. low PVR    # b/l leg swelling  - Duplex neg  - elevate legs  - ace wrap    #History MS, quadriplegia  #  neurogenic bladder  # UTI  -iraheta placed in ED 9/8  -Continue with Ceftriaxone  -F/U UCx--> E.coli. sensitivity pending  -Continue w/ ditropan and valium as needed for spasms      #Cough due to Rhinovirus  - Improving  - Tylenol PRN for fever  -Teri Hunter ATC  - Mucinex, flonase  - SLP eval to r/o aspiration as patient has MS and bedbound   - aspiration precautions  - CXR: reviewed by me: none acute. follow up final result    # Agitation: none today  # possible adjustment disorder  - psych eval pending. Dr. Courtney dueñas will se today.   - no SI/HI    DVT Prophylaxis with heparin  Dipso: anticipates DC home with home care today if sensitivity result back. psych eval pending. per patient, hospital bed is fixed hy his HHA. will reorder if not working.     mother Kady [phone], patient says he will inform family

## 2023-09-12 NOTE — PROGRESS NOTE ADULT - SUBJECTIVE AND OBJECTIVE BOX
Medicine Progress Note    Patient is a 48y old  Male who presents with a chief complaint of Lower abdominal pain (11 Sep 2023 15:54)      SUBJECTIVE / OVERNIGHT EVENTS:  no complaints  psych cx pending  on iraheta    ADDITIONAL REVIEW OF SYSTEMS:  denied fever/chills/CP/SOB/cough/palpitation/dizziness/abdominal pian/nausea/vomiting/diarrhoea/constipation/dysuria/leg or calf pain/headaches.all other ROS neg    MEDICATIONS  (STANDING):  benzonatate 100 milliGRAM(s) Oral three times a day  fluticasone propionate 50 MICROgram(s)/spray Nasal Spray 1 Spray(s) Both Nostrils two times a day  guaiFENesin ER 1200 milliGRAM(s) Oral every 12 hours  heparin   Injectable 5000 Unit(s) SubCutaneous every 12 hours  influenza   Vaccine 0.5 milliLiter(s) IntraMuscular once  iohexol 300 mG (iodine)/mL Oral Solution 30 milliLiter(s) Oral once    MEDICATIONS  (PRN):  acetaminophen     Tablet .. 650 milliGRAM(s) Oral every 6 hours PRN Mild Pain (1 - 3)  melatonin 3 milliGRAM(s) Oral at bedtime PRN Insomnia  ondansetron Injectable 4 milliGRAM(s) IV Push every 8 hours PRN Nausea and/or Vomiting  oxyCODONE    IR 5 milliGRAM(s) Oral every 6 hours PRN Severe Pain (7 - 10)  polyethylene glycol 3350 17 Gram(s) Oral daily PRN Constipation  traMADol 50 milliGRAM(s) Oral two times a day PRN Moderate Pain (4 - 6)    CAPILLARY BLOOD GLUCOSE        I&O's Summary    11 Sep 2023 07:01  -  12 Sep 2023 07:00  --------------------------------------------------------  IN: 1440 mL / OUT: 2600 mL / NET: -1160 mL        PHYSICAL EXAM:  Vital Signs Last 24 Hrs  T(C): 36.8 (12 Sep 2023 05:24), Max: 37.4 (11 Sep 2023 15:42)  T(F): 98.2 (12 Sep 2023 05:24), Max: 99.4 (11 Sep 2023 15:42)  HR: 94 (12 Sep 2023 05:24) (94 - 109)  BP: 129/78 (12 Sep 2023 05:24) (103/77 - 129/78)  BP(mean): --  RR: 17 (12 Sep 2023 05:24) (17 - 17)  SpO2: 95% (12 Sep 2023 05:24) (95% - 98%)    Parameters below as of 12 Sep 2023 05:24  Patient On (Oxygen Delivery Method): room air    GENERAL: Not in distress. Alert    HEENT: AT/NC. clear conjuctiva, MMM.   no pallor or icterus  CARDIOVASCULAR: RRR S1, S2. No murmur/rubs/gallop  LUNGS: BLAE+, no rales, no wheezing, no rhonchi.  [ ant lungs, did not want to turn]  ABDOMEN: ND. Soft,  NT, no guarding / rebound / rigidity. BS normoactive. bladder not distended. iraheta is draining light yellow urine  EXTREMITIES: no edema. no leg or calf TP.  SKIN: no rash.   NEUROLOGIC: AAO*3. b/l LE weakness strength is symmetric, sensation intact, speech fluent.    PSYCHIATRIC: no agitation or irritability.     LABS:                        10.1   11.97 )-----------( 406      ( 12 Sep 2023 07:08 )             31.5     09-12    145  |  105  |  30<H>  ----------------------------<  131<H>  3.7   |  32<H>  |  1.71<H>    Ca    8.8      12 Sep 2023 07:08            Urinalysis Basic - ( 12 Sep 2023 07:08 )    Color: x / Appearance: x / SG: x / pH: x  Gluc: 131 mg/dL / Ketone: x  / Bili: x / Urobili: x   Blood: x / Protein: x / Nitrite: x   Leuk Esterase: x / RBC: x / WBC x   Sq Epi: x / Non Sq Epi: x / Bacteria: x        SARS-CoV-2: NotDetec (08 Sep 2023 12:50)  COVID-19 PCR: NotDetec (23 May 2023 23:24)      RADIOLOGY & ADDITIONAL TESTS:  Imaging from Last 24 Hours:    Electrocardiogram/QTc Interval:    COORDINATION OF CARE:  Care Discussed with Consultants/Other Providers:

## 2023-09-13 ENCOUNTER — APPOINTMENT (OUTPATIENT)
Dept: HOME HEALTH SERVICES | Facility: HOME HEALTH | Age: 48
End: 2023-09-13

## 2023-09-13 ENCOUNTER — TRANSCRIPTION ENCOUNTER (OUTPATIENT)
Age: 48
End: 2023-09-13

## 2023-09-13 VITALS
HEART RATE: 98 BPM | WEIGHT: 264.33 LBS | SYSTOLIC BLOOD PRESSURE: 124 MMHG | DIASTOLIC BLOOD PRESSURE: 86 MMHG | RESPIRATION RATE: 16 BRPM | OXYGEN SATURATION: 95 % | TEMPERATURE: 98 F

## 2023-09-13 LAB
ANION GAP SERPL CALC-SCNC: 5 MMOL/L — SIGNIFICANT CHANGE UP (ref 5–17)
BUN SERPL-MCNC: 25 MG/DL — HIGH (ref 7–23)
CALCIUM SERPL-MCNC: 8.9 MG/DL — SIGNIFICANT CHANGE UP (ref 8.4–10.5)
CHLORIDE SERPL-SCNC: 106 MMOL/L — SIGNIFICANT CHANGE UP (ref 96–108)
CO2 SERPL-SCNC: 31 MMOL/L — SIGNIFICANT CHANGE UP (ref 22–31)
CREAT SERPL-MCNC: 1.41 MG/DL — HIGH (ref 0.5–1.3)
EGFR: 62 ML/MIN/1.73M2 — SIGNIFICANT CHANGE UP
GLUCOSE SERPL-MCNC: 99 MG/DL — SIGNIFICANT CHANGE UP (ref 70–99)
POTASSIUM SERPL-MCNC: 4.1 MMOL/L — SIGNIFICANT CHANGE UP (ref 3.5–5.3)
POTASSIUM SERPL-SCNC: 4.1 MMOL/L — SIGNIFICANT CHANGE UP (ref 3.5–5.3)
SODIUM SERPL-SCNC: 142 MMOL/L — SIGNIFICANT CHANGE UP (ref 135–145)

## 2023-09-13 PROCEDURE — 99239 HOSP IP/OBS DSCHRG MGMT >30: CPT

## 2023-09-13 RX ORDER — FLUTICASONE PROPIONATE 50 MCG
1 SPRAY, SUSPENSION NASAL
Qty: 1 | Refills: 0
Start: 2023-09-13 | End: 2023-09-19

## 2023-09-13 RX ORDER — CEFPODOXIME PROXETIL 100 MG
1 TABLET ORAL
Qty: 4 | Refills: 0
Start: 2023-09-13 | End: 2023-09-14

## 2023-09-13 RX ORDER — CEFPODOXIME PROXETIL 100 MG/1
100 TABLET, FILM COATED ORAL
Qty: 7 | Refills: 0 | Status: COMPLETED | COMMUNITY
Start: 2023-08-28 | End: 2023-09-13

## 2023-09-13 RX ORDER — POLYETHYLENE GLYCOL 3350 17 G/17G
17 POWDER, FOR SOLUTION ORAL
Qty: 1 | Refills: 0
Start: 2023-09-13

## 2023-09-13 RX ORDER — POLYETHYLENE GLYCOL 3350 17 G/17G
17 POWDER, FOR SOLUTION ORAL
Refills: 0
Start: 2023-09-13

## 2023-09-13 RX ADMIN — Medication 200 MILLIGRAM(S): at 05:26

## 2023-09-13 RX ADMIN — Medication 100 MILLIGRAM(S): at 05:26

## 2023-09-13 RX ADMIN — HEPARIN SODIUM 5000 UNIT(S): 5000 INJECTION INTRAVENOUS; SUBCUTANEOUS at 05:26

## 2023-09-13 RX ADMIN — Medication 1200 MILLIGRAM(S): at 05:26

## 2023-09-13 NOTE — DISCHARGE NOTE NURSING/CASE MANAGEMENT/SOCIAL WORK - NSDCPEFALRISK_GEN_ALL_CORE
For information on Fall & Injury Prevention, visit: [email or web address] OR  [email or web address] OR  https://www.cdc.gov/steadi/patient.html

## 2023-09-13 NOTE — DISCHARGE NOTE NURSING/CASE MANAGEMENT/SOCIAL WORK - PATIENT PORTAL LINK FT
You can access the FollowMyHealth Patient Portal offered by [facility] by registering at the following website: [email or web address]. By joining [employer]’s FollowMyHealth portal, you will also be able to view your health information using other applications (apps) compatible with our system.

## 2023-09-15 ENCOUNTER — APPOINTMENT (OUTPATIENT)
Dept: HOME HEALTH SERVICES | Facility: HOME HEALTH | Age: 48
End: 2023-09-15
Payer: MEDICARE

## 2023-09-15 VITALS
TEMPERATURE: 36.5 F | SYSTOLIC BLOOD PRESSURE: 132 MMHG | HEART RATE: 102 BPM | RESPIRATION RATE: 16 BRPM | DIASTOLIC BLOOD PRESSURE: 66 MMHG | OXYGEN SATURATION: 98 %

## 2023-09-15 DIAGNOSIS — E87.6 HYPOKALEMIA: ICD-10-CM

## 2023-09-15 PROCEDURE — 99496 TRANSJ CARE MGMT HIGH F2F 7D: CPT

## 2023-09-18 ENCOUNTER — LABORATORY RESULT (OUTPATIENT)
Age: 48
End: 2023-09-18

## 2023-09-21 ENCOUNTER — NON-APPOINTMENT (OUTPATIENT)
Age: 48
End: 2023-09-21

## 2023-09-25 ENCOUNTER — APPOINTMENT (OUTPATIENT)
Dept: HOME HEALTH SERVICES | Facility: HOME HEALTH | Age: 48
End: 2023-09-25

## 2023-09-26 ENCOUNTER — APPOINTMENT (OUTPATIENT)
Dept: UROLOGY | Facility: CLINIC | Age: 48
End: 2023-09-26

## 2023-09-26 VITALS
RESPIRATION RATE: 16 BRPM | OXYGEN SATURATION: 100 % | TEMPERATURE: 97.6 F | SYSTOLIC BLOOD PRESSURE: 121 MMHG | HEART RATE: 77 BPM | DIASTOLIC BLOOD PRESSURE: 78 MMHG

## 2023-09-27 ENCOUNTER — NON-APPOINTMENT (OUTPATIENT)
Age: 48
End: 2023-09-27

## 2023-09-28 ENCOUNTER — APPOINTMENT (OUTPATIENT)
Dept: HOME HEALTH SERVICES | Facility: HOME HEALTH | Age: 48
End: 2023-09-28

## 2023-10-01 PROBLEM — Z79.899 MEDICAL MARIJUANA USE: Status: ACTIVE | Noted: 2022-05-11

## 2023-10-10 ENCOUNTER — APPOINTMENT (OUTPATIENT)
Dept: UROLOGY | Facility: CLINIC | Age: 48
End: 2023-10-10

## 2023-10-26 ENCOUNTER — NON-APPOINTMENT (OUTPATIENT)
Age: 48
End: 2023-10-26

## 2023-10-26 NOTE — PROGRESS NOTE ADULT - REASON FOR ADMISSION
Lower abdominal pain
Doxycycline Counseling:  Patient counseled regarding possible photosensitivity and increased risk for sunburn.  Patient instructed to avoid sunlight, if possible.  When exposed to sunlight, patients should wear protective clothing, sunglasses, and sunscreen.  The patient was instructed to call the office immediately if the following severe adverse effects occur:  hearing changes, easy bruising/bleeding, severe headache, or vision changes.  The patient verbalized understanding of the proper use and possible adverse effects of doxycycline.  All of the patient's questions and concerns were addressed.

## 2023-10-27 ENCOUNTER — APPOINTMENT (OUTPATIENT)
Dept: HOME HEALTH SERVICES | Facility: HOME HEALTH | Age: 48
End: 2023-10-27

## 2023-11-03 PROBLEM — E87.6 HYPOKALEMIA: Status: ACTIVE | Noted: 2023-11-03

## 2023-11-07 NOTE — ED ADULT TRIAGE NOTE - WEIGHT IN LBS
Group Topic: BH Coping Skills Education    Date: 11/6/2023  Start Time: 1000  End Time: 1050  Facilitators: Leonel Bingham    Focus: Spirituality  Number in attendance: 14    The group discussed the process of decision-making. The poem by Jonathon Mariano \"The Road Not Taken\" helped to frame the discussion.    Method: Group  Attendance: Present  Participation: Active  Patient Response: Attentive  Mood: Normal  Affect: Type: Euthymic (normal mood)   Range: Full (normal)   Congruency: Congruent   Stability: Stable  Behavior/Socialization: Appropriate to group  Thought Process: Tracking  Task Performance: Follows directions  Patient Evaluation: Independent - full participation         250

## 2023-11-13 PROCEDURE — 86900 BLOOD TYPING SEROLOGIC ABO: CPT

## 2023-11-13 PROCEDURE — 0225U NFCT DS DNA&RNA 21 SARSCOV2: CPT

## 2023-11-13 PROCEDURE — 92610 EVALUATE SWALLOWING FUNCTION: CPT

## 2023-11-13 PROCEDURE — 80048 BASIC METABOLIC PNL TOTAL CA: CPT

## 2023-11-13 PROCEDURE — 87186 SC STD MICRODIL/AGAR DIL: CPT

## 2023-11-13 PROCEDURE — 83605 ASSAY OF LACTIC ACID: CPT

## 2023-11-13 PROCEDURE — 93970 EXTREMITY STUDY: CPT

## 2023-11-13 PROCEDURE — 85610 PROTHROMBIN TIME: CPT

## 2023-11-13 PROCEDURE — 85730 THROMBOPLASTIN TIME PARTIAL: CPT

## 2023-11-13 PROCEDURE — 85025 COMPLETE CBC W/AUTO DIFF WBC: CPT

## 2023-11-13 PROCEDURE — 87086 URINE CULTURE/COLONY COUNT: CPT

## 2023-11-13 PROCEDURE — 71045 X-RAY EXAM CHEST 1 VIEW: CPT

## 2023-11-13 PROCEDURE — 80053 COMPREHEN METABOLIC PANEL: CPT

## 2023-11-13 PROCEDURE — 84100 ASSAY OF PHOSPHORUS: CPT

## 2023-11-13 PROCEDURE — 86850 RBC ANTIBODY SCREEN: CPT

## 2023-11-13 PROCEDURE — 96374 THER/PROPH/DIAG INJ IV PUSH: CPT

## 2023-11-13 PROCEDURE — 99285 EMERGENCY DEPT VISIT HI MDM: CPT | Mod: 25

## 2023-11-13 PROCEDURE — 36415 COLL VENOUS BLD VENIPUNCTURE: CPT

## 2023-11-13 PROCEDURE — 94640 AIRWAY INHALATION TREATMENT: CPT

## 2023-11-13 PROCEDURE — 86901 BLOOD TYPING SEROLOGIC RH(D): CPT

## 2023-11-13 PROCEDURE — 74176 CT ABD & PELVIS W/O CONTRAST: CPT | Mod: MG

## 2023-11-13 PROCEDURE — 96375 TX/PRO/DX INJ NEW DRUG ADDON: CPT

## 2023-11-13 PROCEDURE — 83735 ASSAY OF MAGNESIUM: CPT

## 2023-11-13 PROCEDURE — G1004: CPT

## 2023-11-13 PROCEDURE — 81001 URINALYSIS AUTO W/SCOPE: CPT

## 2023-11-13 PROCEDURE — 83690 ASSAY OF LIPASE: CPT

## 2023-11-13 PROCEDURE — 82962 GLUCOSE BLOOD TEST: CPT

## 2023-11-13 PROCEDURE — 85027 COMPLETE CBC AUTOMATED: CPT

## 2023-11-13 PROCEDURE — 93005 ELECTROCARDIOGRAM TRACING: CPT

## 2023-11-22 ENCOUNTER — NON-APPOINTMENT (OUTPATIENT)
Age: 48
End: 2023-11-22

## 2023-11-30 ENCOUNTER — APPOINTMENT (OUTPATIENT)
Dept: HOME HEALTH SERVICES | Facility: HOME HEALTH | Age: 48
End: 2023-11-30
Payer: MEDICARE

## 2023-11-30 VITALS
OXYGEN SATURATION: 99 % | HEART RATE: 89 BPM | DIASTOLIC BLOOD PRESSURE: 90 MMHG | RESPIRATION RATE: 18 BRPM | SYSTOLIC BLOOD PRESSURE: 124 MMHG | TEMPERATURE: 98.4 F

## 2023-11-30 DIAGNOSIS — J30.1 ALLERGIC RHINITIS DUE TO POLLEN: ICD-10-CM

## 2023-11-30 DIAGNOSIS — Z98.890 OTHER SPECIFIED POSTPROCEDURAL STATES: ICD-10-CM

## 2023-11-30 PROCEDURE — 99350 HOME/RES VST EST HIGH MDM 60: CPT

## 2023-12-13 ENCOUNTER — TRANSCRIPTION ENCOUNTER (OUTPATIENT)
Age: 48
End: 2023-12-13

## 2023-12-13 ENCOUNTER — EMERGENCY (EMERGENCY)
Facility: HOSPITAL | Age: 48
LOS: 1 days | Discharge: HOME | End: 2023-12-13
Attending: STUDENT IN AN ORGANIZED HEALTH CARE EDUCATION/TRAINING PROGRAM | Admitting: INTERNAL MEDICINE
Payer: MEDICARE

## 2023-12-13 VITALS
OXYGEN SATURATION: 96 % | SYSTOLIC BLOOD PRESSURE: 102 MMHG | DIASTOLIC BLOOD PRESSURE: 75 MMHG | WEIGHT: 259.93 LBS | TEMPERATURE: 98 F | HEART RATE: 105 BPM | RESPIRATION RATE: 19 BRPM

## 2023-12-13 DIAGNOSIS — Z93.6 OTHER ARTIFICIAL OPENINGS OF URINARY TRACT STATUS: Chronic | ICD-10-CM

## 2023-12-13 LAB
ALBUMIN SERPL ELPH-MCNC: 2.5 G/DL — LOW (ref 3.3–5)
ALBUMIN SERPL ELPH-MCNC: 2.5 G/DL — LOW (ref 3.3–5)
ALP SERPL-CCNC: 72 U/L — SIGNIFICANT CHANGE UP (ref 40–120)
ALP SERPL-CCNC: 72 U/L — SIGNIFICANT CHANGE UP (ref 40–120)
ALT FLD-CCNC: 20 U/L — SIGNIFICANT CHANGE UP (ref 10–45)
ALT FLD-CCNC: 20 U/L — SIGNIFICANT CHANGE UP (ref 10–45)
ANION GAP SERPL CALC-SCNC: 8 MMOL/L — SIGNIFICANT CHANGE UP (ref 5–17)
ANION GAP SERPL CALC-SCNC: 8 MMOL/L — SIGNIFICANT CHANGE UP (ref 5–17)
APPEARANCE UR: ABNORMAL
APPEARANCE UR: ABNORMAL
AST SERPL-CCNC: 17 U/L — SIGNIFICANT CHANGE UP (ref 10–40)
AST SERPL-CCNC: 17 U/L — SIGNIFICANT CHANGE UP (ref 10–40)
BACTERIA # UR AUTO: ABNORMAL /HPF
BACTERIA # UR AUTO: ABNORMAL /HPF
BASOPHILS # BLD AUTO: 0.07 K/UL — SIGNIFICANT CHANGE UP (ref 0–0.2)
BASOPHILS # BLD AUTO: 0.07 K/UL — SIGNIFICANT CHANGE UP (ref 0–0.2)
BASOPHILS NFR BLD AUTO: 0.4 % — SIGNIFICANT CHANGE UP (ref 0–2)
BASOPHILS NFR BLD AUTO: 0.4 % — SIGNIFICANT CHANGE UP (ref 0–2)
BILIRUB SERPL-MCNC: 0.2 MG/DL — SIGNIFICANT CHANGE UP (ref 0.2–1.2)
BILIRUB SERPL-MCNC: 0.2 MG/DL — SIGNIFICANT CHANGE UP (ref 0.2–1.2)
BILIRUB UR-MCNC: NEGATIVE — SIGNIFICANT CHANGE UP
BILIRUB UR-MCNC: NEGATIVE — SIGNIFICANT CHANGE UP
BUN SERPL-MCNC: 28 MG/DL — HIGH (ref 7–23)
BUN SERPL-MCNC: 28 MG/DL — HIGH (ref 7–23)
CALCIUM SERPL-MCNC: 9.2 MG/DL — SIGNIFICANT CHANGE UP (ref 8.4–10.5)
CALCIUM SERPL-MCNC: 9.2 MG/DL — SIGNIFICANT CHANGE UP (ref 8.4–10.5)
CHLORIDE SERPL-SCNC: 103 MMOL/L — SIGNIFICANT CHANGE UP (ref 96–108)
CHLORIDE SERPL-SCNC: 103 MMOL/L — SIGNIFICANT CHANGE UP (ref 96–108)
CO2 SERPL-SCNC: 28 MMOL/L — SIGNIFICANT CHANGE UP (ref 22–31)
CO2 SERPL-SCNC: 28 MMOL/L — SIGNIFICANT CHANGE UP (ref 22–31)
COLOR SPEC: YELLOW — SIGNIFICANT CHANGE UP
COLOR SPEC: YELLOW — SIGNIFICANT CHANGE UP
CREAT SERPL-MCNC: 1.89 MG/DL — HIGH (ref 0.5–1.3)
CREAT SERPL-MCNC: 1.89 MG/DL — HIGH (ref 0.5–1.3)
DIFF PNL FLD: ABNORMAL
DIFF PNL FLD: ABNORMAL
EGFR: 43 ML/MIN/1.73M2 — LOW
EGFR: 43 ML/MIN/1.73M2 — LOW
EOSINOPHIL # BLD AUTO: 0.22 K/UL — SIGNIFICANT CHANGE UP (ref 0–0.5)
EOSINOPHIL # BLD AUTO: 0.22 K/UL — SIGNIFICANT CHANGE UP (ref 0–0.5)
EOSINOPHIL NFR BLD AUTO: 1.4 % — SIGNIFICANT CHANGE UP (ref 0–6)
EOSINOPHIL NFR BLD AUTO: 1.4 % — SIGNIFICANT CHANGE UP (ref 0–6)
EPI CELLS # UR: 2 — SIGNIFICANT CHANGE UP
EPI CELLS # UR: 2 — SIGNIFICANT CHANGE UP
GLUCOSE SERPL-MCNC: 98 MG/DL — SIGNIFICANT CHANGE UP (ref 70–99)
GLUCOSE SERPL-MCNC: 98 MG/DL — SIGNIFICANT CHANGE UP (ref 70–99)
GLUCOSE UR QL: NEGATIVE MG/DL — SIGNIFICANT CHANGE UP
GLUCOSE UR QL: NEGATIVE MG/DL — SIGNIFICANT CHANGE UP
HCT VFR BLD CALC: 33.9 % — LOW (ref 39–50)
HCT VFR BLD CALC: 33.9 % — LOW (ref 39–50)
HGB BLD-MCNC: 11.2 G/DL — LOW (ref 13–17)
HGB BLD-MCNC: 11.2 G/DL — LOW (ref 13–17)
IMM GRANULOCYTES NFR BLD AUTO: 0.6 % — SIGNIFICANT CHANGE UP (ref 0–0.9)
IMM GRANULOCYTES NFR BLD AUTO: 0.6 % — SIGNIFICANT CHANGE UP (ref 0–0.9)
KETONES UR-MCNC: NEGATIVE MG/DL — SIGNIFICANT CHANGE UP
KETONES UR-MCNC: NEGATIVE MG/DL — SIGNIFICANT CHANGE UP
LEUKOCYTE ESTERASE UR-ACNC: ABNORMAL
LEUKOCYTE ESTERASE UR-ACNC: ABNORMAL
LYMPHOCYTES # BLD AUTO: 12.9 % — LOW (ref 13–44)
LYMPHOCYTES # BLD AUTO: 12.9 % — LOW (ref 13–44)
LYMPHOCYTES # BLD AUTO: 2.05 K/UL — SIGNIFICANT CHANGE UP (ref 1–3.3)
LYMPHOCYTES # BLD AUTO: 2.05 K/UL — SIGNIFICANT CHANGE UP (ref 1–3.3)
MCHC RBC-ENTMCNC: 28.4 PG — SIGNIFICANT CHANGE UP (ref 27–34)
MCHC RBC-ENTMCNC: 28.4 PG — SIGNIFICANT CHANGE UP (ref 27–34)
MCHC RBC-ENTMCNC: 33 GM/DL — SIGNIFICANT CHANGE UP (ref 32–36)
MCHC RBC-ENTMCNC: 33 GM/DL — SIGNIFICANT CHANGE UP (ref 32–36)
MCV RBC AUTO: 86 FL — SIGNIFICANT CHANGE UP (ref 80–100)
MCV RBC AUTO: 86 FL — SIGNIFICANT CHANGE UP (ref 80–100)
MONOCYTES # BLD AUTO: 1.09 K/UL — HIGH (ref 0–0.9)
MONOCYTES # BLD AUTO: 1.09 K/UL — HIGH (ref 0–0.9)
MONOCYTES NFR BLD AUTO: 6.8 % — SIGNIFICANT CHANGE UP (ref 2–14)
MONOCYTES NFR BLD AUTO: 6.8 % — SIGNIFICANT CHANGE UP (ref 2–14)
NEUTROPHILS # BLD AUTO: 12.41 K/UL — HIGH (ref 1.8–7.4)
NEUTROPHILS # BLD AUTO: 12.41 K/UL — HIGH (ref 1.8–7.4)
NEUTROPHILS NFR BLD AUTO: 77.9 % — HIGH (ref 43–77)
NEUTROPHILS NFR BLD AUTO: 77.9 % — HIGH (ref 43–77)
NITRITE UR-MCNC: POSITIVE
NITRITE UR-MCNC: POSITIVE
NRBC # BLD: 0 /100 WBCS — SIGNIFICANT CHANGE UP (ref 0–0)
NRBC # BLD: 0 /100 WBCS — SIGNIFICANT CHANGE UP (ref 0–0)
PH UR: 7 — SIGNIFICANT CHANGE UP (ref 5–8)
PH UR: 7 — SIGNIFICANT CHANGE UP (ref 5–8)
PLATELET # BLD AUTO: 463 K/UL — HIGH (ref 150–400)
PLATELET # BLD AUTO: 463 K/UL — HIGH (ref 150–400)
POTASSIUM SERPL-MCNC: 4.4 MMOL/L — SIGNIFICANT CHANGE UP (ref 3.5–5.3)
POTASSIUM SERPL-MCNC: 4.4 MMOL/L — SIGNIFICANT CHANGE UP (ref 3.5–5.3)
POTASSIUM SERPL-SCNC: 4.4 MMOL/L — SIGNIFICANT CHANGE UP (ref 3.5–5.3)
POTASSIUM SERPL-SCNC: 4.4 MMOL/L — SIGNIFICANT CHANGE UP (ref 3.5–5.3)
PROT SERPL-MCNC: 8.2 G/DL — SIGNIFICANT CHANGE UP (ref 6–8.3)
PROT SERPL-MCNC: 8.2 G/DL — SIGNIFICANT CHANGE UP (ref 6–8.3)
PROT UR-MCNC: 100 MG/DL
PROT UR-MCNC: 100 MG/DL
RBC # BLD: 3.94 M/UL — LOW (ref 4.2–5.8)
RBC # BLD: 3.94 M/UL — LOW (ref 4.2–5.8)
RBC # FLD: 14 % — SIGNIFICANT CHANGE UP (ref 10.3–14.5)
RBC # FLD: 14 % — SIGNIFICANT CHANGE UP (ref 10.3–14.5)
RBC CASTS # UR COMP ASSIST: 6 /HPF — HIGH (ref 0–4)
RBC CASTS # UR COMP ASSIST: 6 /HPF — HIGH (ref 0–4)
SODIUM SERPL-SCNC: 139 MMOL/L — SIGNIFICANT CHANGE UP (ref 135–145)
SODIUM SERPL-SCNC: 139 MMOL/L — SIGNIFICANT CHANGE UP (ref 135–145)
SP GR SPEC: 1.01 — SIGNIFICANT CHANGE UP (ref 1–1.03)
SP GR SPEC: 1.01 — SIGNIFICANT CHANGE UP (ref 1–1.03)
UROBILINOGEN FLD QL: 0.2 MG/DL — SIGNIFICANT CHANGE UP (ref 0.2–1)
UROBILINOGEN FLD QL: 0.2 MG/DL — SIGNIFICANT CHANGE UP (ref 0.2–1)
WBC # BLD: 15.93 K/UL — HIGH (ref 3.8–10.5)
WBC # BLD: 15.93 K/UL — HIGH (ref 3.8–10.5)
WBC # FLD AUTO: 15.93 K/UL — HIGH (ref 3.8–10.5)
WBC # FLD AUTO: 15.93 K/UL — HIGH (ref 3.8–10.5)
WBC UR QL: 20 /HPF — HIGH (ref 0–5)
WBC UR QL: 20 /HPF — HIGH (ref 0–5)

## 2023-12-13 PROCEDURE — 99283 EMERGENCY DEPT VISIT LOW MDM: CPT

## 2023-12-13 PROCEDURE — 81001 URINALYSIS AUTO W/SCOPE: CPT

## 2023-12-13 PROCEDURE — 99285 EMERGENCY DEPT VISIT HI MDM: CPT

## 2023-12-13 PROCEDURE — 85025 COMPLETE CBC W/AUTO DIFF WBC: CPT

## 2023-12-13 PROCEDURE — 80053 COMPREHEN METABOLIC PANEL: CPT

## 2023-12-13 PROCEDURE — 51702 INSERT TEMP BLADDER CATH: CPT

## 2023-12-13 PROCEDURE — 87086 URINE CULTURE/COLONY COUNT: CPT

## 2023-12-13 PROCEDURE — 87186 SC STD MICRODIL/AGAR DIL: CPT

## 2023-12-13 PROCEDURE — 36415 COLL VENOUS BLD VENIPUNCTURE: CPT

## 2023-12-13 NOTE — ED PROVIDER NOTE - PATIENT PORTAL LINK FT
You can access the FollowMyHealth Patient Portal offered by [facility] by registering at the following website: [email or web address]. By joining [employer]’s FollowMyHealth portal, you will also be able to view your health information using other applications (apps) compatible with our system. You can access the FollowMyHealth Patient Portal offered by [facility] by registering at the following website: [email or web address]. By joining [employer]’s FollowMyHealth portal, you will also be able to view your health information using other applications (apps) compatible with our system.

## 2023-12-13 NOTE — ED ADULT NURSE NOTE - OBJECTIVE STATEMENT
Pt BIBA from home c/o leaking iraheta catheter, stated it was working ok 2 weeks ago when it was changed, generalized body rash noted, leaking urine from suprapubic site, pt very anxious

## 2023-12-13 NOTE — ED ADULT NURSE NOTE - NSFALLRISKINTERV_ED_ALL_ED
Assistance OOB with selected safe patient handling equipment if applicable/Assistance with ambulation/Communicate fall risk and risk factors to all staff, patient, and family/Provide visual cue: yellow wristband, yellow gown, etc/Reinforce activity limits and safety measures with patient and family/Toileting schedule using arm’s reach rule for commode and bathroom/Call bell, personal items and telephone in reach/Instruct patient to call for assistance before getting out of bed/chair/stretcher/Non-slip footwear applied when patient is off stretcher/[place] to call system/Physically safe environment - no spills, clutter or unnecessary equipment/Purposeful Proactive Rounding/Room/bathroom lighting operational, light cord in reach Assistance OOB with selected safe patient handling equipment if applicable/Assistance with ambulation/Communicate fall risk and risk factors to all staff, patient, and family/Provide visual cue: yellow wristband, yellow gown, etc/Reinforce activity limits and safety measures with patient and family/Toileting schedule using arm’s reach rule for commode and bathroom/Call bell, personal items and telephone in reach/Instruct patient to call for assistance before getting out of bed/chair/stretcher/Non-slip footwear applied when patient is off stretcher/[place] to call system/Physically safe environment - no spills, clutter or unnecessary equipment/Purposeful Proactive Rounding/Room/bathroom lighting operational, light cord in reach

## 2023-12-13 NOTE — ED ADULT TRIAGE NOTE - CHIEF COMPLAINT QUOTE
PT BIBA from home Bed bound c/o abdominal discomfort and rash to groin. Pt has iraheta catheter which needs to be changed and is leaking as well. Was recently treated for UTI

## 2023-12-13 NOTE — ED PROVIDER NOTE - PHYSICAL EXAMINATION
Gen: comfortable, awake, alert  Head: NC/AT  Neck: trachea midline  Resp:  No distress  Ext: no deformities  Neuro: paralyzed waist down (chronic due to MS).   : iraheta in place, no drainage in bag.   Skin: chronic inflammation in lower abdomen, b/l proximal thighs. +superpubic cath access site drainign urine.   Psych:  Normal affect and mood

## 2023-12-13 NOTE — ED PROVIDER NOTE - OBJECTIVE STATEMENT
49 yo M hx of MS, bedbound with on chronic iraheta, presents with no output from iraheta and leaking from suprapubic iraheta access site. Pt states suprapubic cath was in place for about 2 weeks about 1 year ago, today started leaking. current iraheta placed ~20 days ago. Pt denies fever, chills, pain, GI/ symptoms. 47 yo M hx of MS, bedbound with on chronic iraheta, presents with no output from iraheta and leaking from suprapubic iraheta access site. Pt states suprapubic cath was in place for about 2 weeks about 1 year ago, today started leaking. current iraheta placed ~20 days ago. Pt denies fever, chills, pain, GI/ symptoms.

## 2023-12-13 NOTE — ED PROVIDER NOTE - NSFOLLOWUPINSTRUCTIONS_ED_ALL_ED_FT
** Your iraheta catheter was replaced. An ostomy bag was placed over your suprapubic catheter site to catch the urine.     ** Follow up with your urologist.      ** Go to the nearest Emergency Department if you experience any new or concerning symptoms, such as:   - abdominal pressure, no drainage from your iraheta or suprapubic site.   - chest pain  - difficulty breathing  - passing out  - unable to eat or drink  - unable to move or feel part of your body  - fever, chills

## 2023-12-13 NOTE — ED CLERICAL - NS ED CLERK NOTE PRE-ARRIVAL INFORMATION; ADDITIONAL PRE-ARRIVAL INFORMATION
This patient is enrolled in the [facility] Program and receives comprehensive home-based primary care.    - To obtain additional clinical information , or to discuss any questions or concerns, you can call the [facility] team at [phone], 24 hours a day.    - If discharged, this patient will be followed up by the [facility] team within 2 days.    - If this patient requires admission, please use the hospitalist service. This patient is enrolled in the [facility] Program and receives comprehensive home-based primary care.    - To obtain additional clinical information , or to discuss any questions or concerns, you can call the [facility] team at [phone], 24 hours a day.    - If discharged, this patient will be followed up by the [facility] team within 2 days.    - If this patient requires admission, please use the hospitalist service.

## 2023-12-13 NOTE — ED PROVIDER NOTE - CLINICAL SUMMARY MEDICAL DECISION MAKING FREE TEXT BOX
ddx includes, but is not limited to the following: iraheta malfunction, UTI, NAY.     plan: change iraheta, screening labs for infection and cr, UA and UCX, no empiric abx, skin care, oupt follow up with urology.

## 2023-12-13 NOTE — ED PROVIDER NOTE - PROGRESS NOTE DETAILS
Krista Bush MD, Attending  spoke to Dr. Marcus regarding leaking from suprapubic cath opening. States as long as he has a working iraheta, nothing to do with suprapubic opening. Recommend outpt follow up with urologist. Krista Bush MD, Attending  no output in iraheta catheter. Krista Bush MD, Attending  Cedric not in place. will replace. Krista Bush MD, Attending  Steele was not in place, replaced, now draining clear yellow urine. Will send UA and UCX give WBC of 15, but will not empirically treat as pt likely chronically infected and does not have any systemic symptoms. Patient signed out pending skin care and ride home. Krista Bush MD, Attending  spoke to Dr. Marcus regarding leaking from suprapubic cath opening. States as long as he has a working iraheta, nothing to do with suprapubic opening (could consider placing colostomy bag over it to reduct leaking) Recommend outpt follow up with urologist.

## 2023-12-17 LAB
-  AMOXICILLIN/CLAVULANIC ACID: SIGNIFICANT CHANGE UP
-  AMOXICILLIN/CLAVULANIC ACID: SIGNIFICANT CHANGE UP
-  AMPICILLIN/SULBACTAM: SIGNIFICANT CHANGE UP
-  AMPICILLIN/SULBACTAM: SIGNIFICANT CHANGE UP
-  AMPICILLIN: SIGNIFICANT CHANGE UP
-  AMPICILLIN: SIGNIFICANT CHANGE UP
-  AZTREONAM: SIGNIFICANT CHANGE UP
-  AZTREONAM: SIGNIFICANT CHANGE UP
-  CEFAZOLIN: SIGNIFICANT CHANGE UP
-  CEFAZOLIN: SIGNIFICANT CHANGE UP
-  CEFEPIME: SIGNIFICANT CHANGE UP
-  CEFEPIME: SIGNIFICANT CHANGE UP
-  CEFOXITIN: SIGNIFICANT CHANGE UP
-  CEFOXITIN: SIGNIFICANT CHANGE UP
-  CEFTRIAXONE: SIGNIFICANT CHANGE UP
-  CEFTRIAXONE: SIGNIFICANT CHANGE UP
-  CEFUROXIME: SIGNIFICANT CHANGE UP
-  CEFUROXIME: SIGNIFICANT CHANGE UP
-  CIPROFLOXACIN: SIGNIFICANT CHANGE UP
-  CIPROFLOXACIN: SIGNIFICANT CHANGE UP
-  ERTAPENEM: SIGNIFICANT CHANGE UP
-  ERTAPENEM: SIGNIFICANT CHANGE UP
-  GENTAMICIN: SIGNIFICANT CHANGE UP
-  GENTAMICIN: SIGNIFICANT CHANGE UP
-  IMIPENEM: SIGNIFICANT CHANGE UP
-  IMIPENEM: SIGNIFICANT CHANGE UP
-  LEVOFLOXACIN: SIGNIFICANT CHANGE UP
-  LEVOFLOXACIN: SIGNIFICANT CHANGE UP
-  MEROPENEM: SIGNIFICANT CHANGE UP
-  MEROPENEM: SIGNIFICANT CHANGE UP
-  NITROFURANTOIN: SIGNIFICANT CHANGE UP
-  NITROFURANTOIN: SIGNIFICANT CHANGE UP
-  PIPERACILLIN/TAZOBACTAM: SIGNIFICANT CHANGE UP
-  PIPERACILLIN/TAZOBACTAM: SIGNIFICANT CHANGE UP
-  TOBRAMYCIN: SIGNIFICANT CHANGE UP
-  TOBRAMYCIN: SIGNIFICANT CHANGE UP
-  TRIMETHOPRIM/SULFAMETHOXAZOLE: SIGNIFICANT CHANGE UP
-  TRIMETHOPRIM/SULFAMETHOXAZOLE: SIGNIFICANT CHANGE UP
CULTURE RESULTS: ABNORMAL
CULTURE RESULTS: ABNORMAL
METHOD TYPE: SIGNIFICANT CHANGE UP
METHOD TYPE: SIGNIFICANT CHANGE UP
ORGANISM # SPEC MICROSCOPIC CNT: ABNORMAL
ORGANISM # SPEC MICROSCOPIC CNT: ABNORMAL
ORGANISM # SPEC MICROSCOPIC CNT: SIGNIFICANT CHANGE UP
ORGANISM # SPEC MICROSCOPIC CNT: SIGNIFICANT CHANGE UP
SPECIMEN SOURCE: SIGNIFICANT CHANGE UP
SPECIMEN SOURCE: SIGNIFICANT CHANGE UP

## 2023-12-22 ENCOUNTER — APPOINTMENT (OUTPATIENT)
Dept: HOME HEALTH SERVICES | Facility: HOME HEALTH | Age: 48
End: 2023-12-22

## 2023-12-27 ENCOUNTER — APPOINTMENT (OUTPATIENT)
Dept: HOME HEALTH SERVICES | Facility: HOME HEALTH | Age: 48
End: 2023-12-27

## 2023-12-31 ENCOUNTER — INPATIENT (INPATIENT)
Facility: HOSPITAL | Age: 48
LOS: 4 days | Discharge: HOME | DRG: 698 | End: 2024-01-05
Attending: INTERNAL MEDICINE | Admitting: STUDENT IN AN ORGANIZED HEALTH CARE EDUCATION/TRAINING PROGRAM
Payer: MEDICARE

## 2023-12-31 VITALS
HEART RATE: 150 BPM | OXYGEN SATURATION: 95 % | DIASTOLIC BLOOD PRESSURE: 84 MMHG | TEMPERATURE: 99 F | WEIGHT: 270.07 LBS | RESPIRATION RATE: 20 BRPM | HEIGHT: 70 IN | SYSTOLIC BLOOD PRESSURE: 122 MMHG

## 2023-12-31 DIAGNOSIS — Z93.6 OTHER ARTIFICIAL OPENINGS OF URINARY TRACT STATUS: Chronic | ICD-10-CM

## 2023-12-31 LAB
ALBUMIN SERPL ELPH-MCNC: 3.3 G/DL — SIGNIFICANT CHANGE UP (ref 3.3–5)
ALP SERPL-CCNC: 71 U/L — SIGNIFICANT CHANGE UP (ref 40–120)
ALT FLD-CCNC: 51 U/L — SIGNIFICANT CHANGE UP (ref 12–78)
ANION GAP SERPL CALC-SCNC: 7 MMOL/L — SIGNIFICANT CHANGE UP (ref 5–17)
APPEARANCE UR: ABNORMAL
APTT BLD: 31.2 SEC — SIGNIFICANT CHANGE UP (ref 24.5–35.6)
AST SERPL-CCNC: 23 U/L — SIGNIFICANT CHANGE UP (ref 15–37)
BASE EXCESS BLDA CALC-SCNC: -0.4 MMOL/L — SIGNIFICANT CHANGE UP (ref -2–3)
BASOPHILS # BLD AUTO: 0 K/UL — SIGNIFICANT CHANGE UP (ref 0–0.2)
BASOPHILS NFR BLD AUTO: 0 % — SIGNIFICANT CHANGE UP (ref 0–2)
BILIRUB SERPL-MCNC: 0.6 MG/DL — SIGNIFICANT CHANGE UP (ref 0.2–1.2)
BILIRUB UR-MCNC: NEGATIVE — SIGNIFICANT CHANGE UP
BLOOD GAS COMMENTS ARTERIAL: SIGNIFICANT CHANGE UP
BUN SERPL-MCNC: 29 MG/DL — HIGH (ref 7–23)
CALCIUM SERPL-MCNC: 9.2 MG/DL — SIGNIFICANT CHANGE UP (ref 8.5–10.1)
CHLORIDE SERPL-SCNC: 110 MMOL/L — HIGH (ref 96–108)
CK MB BLD-MCNC: <4 % — HIGH (ref 0–3.5)
CK MB CFR SERPL CALC: <1 NG/ML — SIGNIFICANT CHANGE UP (ref 0–3.6)
CK SERPL-CCNC: 25 U/L — LOW (ref 26–308)
CO2 SERPL-SCNC: 22 MMOL/L — SIGNIFICANT CHANGE UP (ref 22–31)
COLOR SPEC: YELLOW — SIGNIFICANT CHANGE UP
CREAT SERPL-MCNC: 1.7 MG/DL — HIGH (ref 0.5–1.3)
DIFF PNL FLD: ABNORMAL
EGFR: 49 ML/MIN/1.73M2 — LOW
EOSINOPHIL # BLD AUTO: 0 K/UL — SIGNIFICANT CHANGE UP (ref 0–0.5)
EOSINOPHIL NFR BLD AUTO: 0 % — SIGNIFICANT CHANGE UP (ref 0–6)
FLUAV AG NPH QL: SIGNIFICANT CHANGE UP
FLUBV AG NPH QL: SIGNIFICANT CHANGE UP
GAS PNL BLDA: SIGNIFICANT CHANGE UP
GLUCOSE SERPL-MCNC: 112 MG/DL — HIGH (ref 70–99)
GLUCOSE UR QL: NEGATIVE MG/DL — SIGNIFICANT CHANGE UP
HCO3 BLDA-SCNC: 24 MMOL/L — SIGNIFICANT CHANGE UP (ref 21–28)
HCT VFR BLD CALC: 45.4 % — SIGNIFICANT CHANGE UP (ref 39–50)
HGB BLD-MCNC: 14.6 G/DL — SIGNIFICANT CHANGE UP (ref 13–17)
HOROWITZ INDEX BLDA+IHG-RTO: 40 — SIGNIFICANT CHANGE UP
INR BLD: 0.98 RATIO — SIGNIFICANT CHANGE UP (ref 0.85–1.18)
KETONES UR-MCNC: NEGATIVE MG/DL — SIGNIFICANT CHANGE UP
LACTATE SERPL-SCNC: 2.8 MMOL/L — HIGH (ref 0.7–2)
LACTATE SERPL-SCNC: 2.9 MMOL/L — HIGH (ref 0.7–2)
LEUKOCYTE ESTERASE UR-ACNC: ABNORMAL
LIDOCAIN IGE QN: 41 U/L — SIGNIFICANT CHANGE UP (ref 13–75)
LYMPHOCYTES # BLD AUTO: 2 K/UL — SIGNIFICANT CHANGE UP (ref 1–3.3)
LYMPHOCYTES # BLD AUTO: 7 % — LOW (ref 13–44)
MAGNESIUM SERPL-MCNC: 2.1 MG/DL — SIGNIFICANT CHANGE UP (ref 1.6–2.6)
MCHC RBC-ENTMCNC: 28.1 PG — SIGNIFICANT CHANGE UP (ref 27–34)
MCHC RBC-ENTMCNC: 32.2 GM/DL — SIGNIFICANT CHANGE UP (ref 32–36)
MCV RBC AUTO: 87.3 FL — SIGNIFICANT CHANGE UP (ref 80–100)
MONOCYTES # BLD AUTO: 2 K/UL — HIGH (ref 0–0.9)
MONOCYTES NFR BLD AUTO: 7 % — SIGNIFICANT CHANGE UP (ref 2–14)
NEUTROPHILS # BLD AUTO: 24.6 K/UL — HIGH (ref 1.8–7.4)
NEUTROPHILS NFR BLD AUTO: 84 % — HIGH (ref 43–77)
NITRITE UR-MCNC: NEGATIVE — SIGNIFICANT CHANGE UP
NRBC # BLD: SIGNIFICANT CHANGE UP /100 WBCS (ref 0–0)
NT-PROBNP SERPL-SCNC: 113 PG/ML — SIGNIFICANT CHANGE UP (ref 0–125)
PCO2 BLDA: 36 MMHG — SIGNIFICANT CHANGE UP (ref 35–48)
PH BLDA: 7.43 — SIGNIFICANT CHANGE UP (ref 7.35–7.45)
PH UR: 7 — SIGNIFICANT CHANGE UP (ref 5–8)
PLATELET # BLD AUTO: 379 K/UL — SIGNIFICANT CHANGE UP (ref 150–400)
PO2 BLDA: 144 MMHG — HIGH (ref 83–108)
POTASSIUM SERPL-MCNC: 4.2 MMOL/L — SIGNIFICANT CHANGE UP (ref 3.5–5.3)
POTASSIUM SERPL-SCNC: 4.2 MMOL/L — SIGNIFICANT CHANGE UP (ref 3.5–5.3)
PROT SERPL-MCNC: 8.3 G/DL — SIGNIFICANT CHANGE UP (ref 6–8.3)
PROT UR-MCNC: 300 MG/DL
PROTHROM AB SERPL-ACNC: 11.5 SEC — SIGNIFICANT CHANGE UP (ref 9.5–13)
RBC # BLD: 5.2 M/UL — SIGNIFICANT CHANGE UP (ref 4.2–5.8)
RBC # FLD: 15.8 % — HIGH (ref 10.3–14.5)
RSV RNA NPH QL NAA+NON-PROBE: SIGNIFICANT CHANGE UP
SAO2 % BLDA: 99.4 % — HIGH (ref 94–98)
SARS-COV-2 RNA SPEC QL NAA+PROBE: SIGNIFICANT CHANGE UP
SODIUM SERPL-SCNC: 139 MMOL/L — SIGNIFICANT CHANGE UP (ref 135–145)
SP GR SPEC: 1.01 — SIGNIFICANT CHANGE UP (ref 1–1.03)
TROPONIN I, HIGH SENSITIVITY RESULT: 5.4 NG/L — SIGNIFICANT CHANGE UP
TSH SERPL-MCNC: 1.14 UIU/ML — SIGNIFICANT CHANGE UP (ref 0.36–3.74)
UROBILINOGEN FLD QL: 0.2 MG/DL — SIGNIFICANT CHANGE UP (ref 0.2–1)
WBC # BLD: 28.6 K/UL — HIGH (ref 3.8–10.5)
WBC # FLD AUTO: 28.6 K/UL — HIGH (ref 3.8–10.5)

## 2023-12-31 PROCEDURE — 71045 X-RAY EXAM CHEST 1 VIEW: CPT | Mod: 26

## 2023-12-31 PROCEDURE — 74177 CT ABD & PELVIS W/CONTRAST: CPT | Mod: 26,QQ

## 2023-12-31 PROCEDURE — 99285 EMERGENCY DEPT VISIT HI MDM: CPT

## 2023-12-31 PROCEDURE — 93010 ELECTROCARDIOGRAM REPORT: CPT

## 2023-12-31 PROCEDURE — 71275 CT ANGIOGRAPHY CHEST: CPT | Mod: 26,QQ

## 2023-12-31 RX ORDER — PIPERACILLIN AND TAZOBACTAM 4; .5 G/20ML; G/20ML
3.38 INJECTION, POWDER, LYOPHILIZED, FOR SOLUTION INTRAVENOUS ONCE
Refills: 0 | Status: COMPLETED | OUTPATIENT
Start: 2023-12-31 | End: 2023-12-31

## 2023-12-31 RX ORDER — ACETAMINOPHEN 500 MG
1000 TABLET ORAL ONCE
Refills: 0 | Status: COMPLETED | OUTPATIENT
Start: 2023-12-31 | End: 2023-12-31

## 2023-12-31 RX ORDER — VANCOMYCIN HCL 1 G
1000 VIAL (EA) INTRAVENOUS ONCE
Refills: 0 | Status: COMPLETED | OUTPATIENT
Start: 2023-12-31 | End: 2023-12-31

## 2023-12-31 RX ORDER — SODIUM CHLORIDE 9 MG/ML
1000 INJECTION INTRAMUSCULAR; INTRAVENOUS; SUBCUTANEOUS ONCE
Refills: 0 | Status: COMPLETED | OUTPATIENT
Start: 2023-12-31 | End: 2023-12-31

## 2023-12-31 RX ADMIN — PIPERACILLIN AND TAZOBACTAM 200 GRAM(S): 4; .5 INJECTION, POWDER, LYOPHILIZED, FOR SOLUTION INTRAVENOUS at 22:14

## 2023-12-31 RX ADMIN — Medication 250 MILLIGRAM(S): at 23:16

## 2023-12-31 RX ADMIN — Medication 400 MILLIGRAM(S): at 22:01

## 2023-12-31 RX ADMIN — SODIUM CHLORIDE 1000 MILLILITER(S): 9 INJECTION INTRAMUSCULAR; INTRAVENOUS; SUBCUTANEOUS at 21:20

## 2023-12-31 NOTE — ED ADULT NURSE NOTE - CAS EDP DISCH DISPOSITION ADMI
[facility]

## 2023-12-31 NOTE — CONSULT NOTE ADULT - ASSESSMENT
Patient is a 48y with pmhx of MS and paraplegia presents for SOB.    Assessment     1) Type 1 RF  2) UTI  3) Severe sepsis   4) NAY  5) Dehydration/hypovolemia     Plan     Patient comfortable on my examination, tachypnea resolved. BiPAP removed. Patient able to sustain 5 minute conversation with easy. Suspect tachypnea to be secodnary to febrile epsiode   Would order Tylenol ATC  ABx for urosepsis   Patient hypovolemic. Would administer 30cc/kg bolus. Will likely need additional fluid aside from the 30cc/kg bolus.   Trend renal indices and UO. Suspect injury 2/2 hypovolemia vs ATN, expect resolution   PE study negative. ACS negative  Assess for wounds and treat accordingly to NW standards   Repeat lactate after fluid    Discussed with eICU and ED provider     Time spent on this patient encounter, which includes documenting this note in the electronic medical record, was 77 minutes including assessing the presenting problems with associated risks, reviewing the medical record to prepare for the encounter, and meeting face to face with patient to obtain additional history. I have also performed an appropriate physical exam, made interventions listed and ordered and interpreted appropriate diagnostic studies as documented. To improve communication and patient saftey, I have coordinated care with the multidisciplinary team including the bedside nurse, appropriate attending of record and consultants as needed.

## 2023-12-31 NOTE — ED PROVIDER NOTE - OBJECTIVE STATEMENT
The patient is a 48y Male with pmhx of MS and paraplegia with chronic iraheta p/w shortness of breath. Pt says that he started having progressively worsening SOB earlier today and came to the ED for evaluation. Pt also endorsing generalized weakness/fatigue. Denies fever, HA, dizziness, CP, palpitations, abd pain, n/v/d, leg swelling, urinary symptoms. Pt says he lives alone with 24-hour aides. Pt is bedbound at baseline due to his paraplegia. Pt says he's not on any daily medications for his MS. Allergic to cipro.

## 2023-12-31 NOTE — ED PROVIDER NOTE - PROGRESS NOTE DETAILS
Ace Guajardo MD (Attending Physician): Pt's blood gas reassuring. MICU evaluated pt, said that pt can be trialled off BIPAP and can most likely be admitted to the Floor. Pt's sob and wob has improved, satting 95% on RA. Pt admitted to Medicine.

## 2023-12-31 NOTE — ED ADULT NURSE NOTE - OBJECTIVE STATEMENT
pt c/o SOB. bedbound parapelegic pt c/o SOB. bedbound parapelegic. says he was at home when he couldn't catch his breath. on assessment pr was tachypneic, febrile. A&Ox4. Iraheta draining puss. replaced with new iraheta, hematura and puss draining. pt placed on bipap. denies chest pain.

## 2023-12-31 NOTE — ED PROVIDER NOTE - PHYSICAL EXAMINATION
GEN - +In mild distress, A&Ox3, +febrile  HEAD - NC/AT  EYES - PERRL, EOMI  ENT - Airway patent, +mucous membranes dry  PULMONARY - CTA b/l, symmetric breath sounds, no W/R/R. +Increased wob with tachypnea, satting 95% on RA.  CARDIAC - +S1S2, +NSR but tachy to 110s, no M/G/R, no JVD  ABDOMEN - +BS, ND, +mildly TTP diffusely, soft, no guarding, no rebound, no masses, no rigidity   - No CVA TTP b/l. +Steele catheter in place draining purulent discharge.  EXTREMITIES - Symmetric pulses, 1+ edema in b/l LE. +Paraplegic from MS, so unable to move b/l LE.  SKIN - No rash or bruising  NEUROLOGIC - Alert, speech clear. +Paraplegic from MS, so unable to move b/l LE.  PSYCH - Normal mood/affect, normal insight

## 2023-12-31 NOTE — PROVIDER CONTACT NOTE (EICU) - SITUATION
Visit performed via Telehealth which precludes Physical examination. Physical examination as per bedside clinical team (Physician, ACP, and/or Resident)- pertinent examination findings discussed in detail; all available vitals signs, laboratory, radiographic, and available electronic documentation I have access to reviewed. While I am currently unable to visualize or talk to the patient, I have received a detailed report/handoff from the clinical team, and have discussed in the details of this encounter.

## 2023-12-31 NOTE — ED ADULT NURSE NOTE - PAIN: PRESENCE, MLM
Rigo Harper Patient Age: 66 year old  MESSAGE:   Patient presented to clinic for Select MDx urine test. Postponing for follow up on results.      WEIGHT AND HEIGHT:   Wt Readings from Last 1 Encounters:   10/19/23 76.2 kg (168 lb)     Ht Readings from Last 1 Encounters:   10/19/23 5' 3\" (1.6 m)     BMI Readings from Last 1 Encounters:   10/19/23 29.76 kg/m²       ALLERGIES:  Albuterol sulfate  Current Outpatient Medications   Medication Sig Dispense Refill   • tadalafil (CIALIS) 20 MG tablet Take 1 tablet by mouth as needed for Erectile Dysfunction. 10 tablet 11   • omeprazole (PrilOSEC) 20 MG capsule Take 1 capsule by mouth daily. 120 capsule 0   • tiZANidine (ZANAFLEX) 4 MG tablet Take 1 tablet by mouth every 6 hours as needed (severe abdomnal pain). 5 tablet 0   • tamsulosin (FLOMAX) 0.4 MG Cap Take 2 capsules by mouth daily after a meal. 90 capsule 3     No current facility-administered medications for this visit.     PHARMACY to use:           Pharmacy preference(s) on file:   [id number] [place], [place] - [address] AT [address]  [address]  [facility] [address]  Phone: [phone] Fax: [phone]      CALL BACK INFO: Ok to leave response (including medical information) with family member or on answering machine  ROUTING: Patient's physician/staff        PCP: Dreyer, Roman, MD         INS: Payor: MEDICARE / Plan: PARTA AND B / Product Type: MEDICARE   PATIENT ADDRESS:  [address] [facility] [address]    
PER SM:I would offer prostate biopsy vs continued PSA surveillance in 6 months     Up to pt, negative MRI and negative genomics testing is about 90% sensitive for prostate cancer     Spoke to patient. Patient was told 's recommendations. Patient states he is not doing the biopsy due to it being painful. Patient was informed a PSA order will be place for 6 months. Once resulted we will reach out with further recommendations. Patient verbalized understanding.     FYI only.  
Received patient's Select MDX results via fax. Placed in scanning folder.     \"Patient Result:  Very Low Risk  The Select MDx test result for this patient indicates a very low risk for the detection of [place] score > to 7 (GS > 7) prostate cancer upon biopsy with a negative predictive value of 95% and a negative predictive value of > 99% for Marjan score > 8 prostate cancer.\"    _____________________________________________________    Spoke with patient, informed him of his Select MDX test results. Patient is inquiring on Dr. Grant's recommendations for follow up.    Routing to Dr. Grant. Please advise, thank you.  
complains of pain/discomfort

## 2023-12-31 NOTE — ED ADULT TRIAGE NOTE - NS ED NURSE AMBULANCES
[facility]

## 2023-12-31 NOTE — ED PROVIDER NOTE - CLINICAL SUMMARY MEDICAL DECISION MAKING FREE TEXT BOX
Ace Guajardo MD (Attending Physician): The patient is a 48y Male with pmhx of MS and paraplegia with chronic iraheta p/w shortness of breath. DDx includes, but not limited to: PE, PNA, ACS, CHF, PTX, UTI, appendicitis, pancreatitis, diverticulitis, kidney stone. ekg, cxr, CTA chest, CT a/p, b/l LE duplex venous US, labs, urine, tylenol, bipap, MICU consult. Will most likely require admission.

## 2023-12-31 NOTE — ED PROVIDER NOTE - NS ED MD DISPO DIVISION
[facility]

## 2023-12-31 NOTE — CONSULT NOTE ADULT - SUBJECTIVE AND OBJECTIVE BOX
Date of entry of this note is equal to the date of services rendered    Patient is a 48y with pmhx of MS and paraplegia presents for SOB. Patient placed on BiPAP for increased work of breathing. Patient found to be tachypneic/tachycardic and febrile. ICU consulted for type 1 RF. Patient denies CP and SOB has now resolved.       PAST MEDICAL & SURGICAL HISTORY:  MS (multiple sclerosis)          Review of Systems:  Negative unless stated      Medications:  vancomycin  IVPB. 1000 milliGRAM(s) IV Intermittent once                                  ICU Vital Signs Last 24 Hrs  T(C): 39.3 (31 Dec 2023 20:20), Max: 39.3 (31 Dec 2023 20:20)  T(F): 102.7 (31 Dec 2023 20:20), Max: 102.7 (31 Dec 2023 20:20)  HR: 133 (31 Dec 2023 22:02) (133 - 153)  BP: 131/67 (31 Dec 2023 22:02) (122/84 - 131/67)  BP(mean): --  ABP: --  ABP(mean): --  RR: 14 (31 Dec 2023 22:02) (14 - 20)  SpO2: 99% (31 Dec 2023 22:02) (95% - 99%)    O2 Parameters below as of 31 Dec 2023 22:02  Patient On (Oxygen Delivery Method): BiPAP/CPAP    O2 Concentration (%): 40        ABG - ( 31 Dec 2023 21:16 )  pH, Arterial: 7.43  pH, Blood: x     /  pCO2: 36    /  pO2: 144   / HCO3: 24    / Base Excess: -0.4  /  SaO2: 99.4                I&O's Detail        LABS:                        14.6   28.60 )-----------( 379      ( 31 Dec 2023 20:55 )             45.4     12-    139  |  110<H>  |  29<H>  ----------------------------<  112<H>  4.2   |  22  |  1.70<H>    Ca    9.2      31 Dec 2023 20:55  Mg     2.1     12-    TPro  8.3  /  Alb  3.3  /  TBili  0.6  /  DBili  x   /  AST  23  /  ALT  51  /  AlkPhos  71  12-      CARDIAC MARKERS ( 31 Dec 2023 20:55 )  x     / x     / 25 U/L / x     / <1.0 ng/mL      CAPILLARY BLOOD GLUCOSE        PT/INR - ( 31 Dec 2023 20:55 )   PT: 11.5 sec;   INR: 0.98 ratio         PTT - ( 31 Dec 2023 20:55 )  PTT:31.2 sec  Urinalysis Basic - ( 31 Dec 2023 20:55 )    Color: Yellow / Appearance: Turbid / S.012 / pH: x  Gluc: 112 mg/dL / Ketone: Negative mg/dL  / Bili: Negative / Urobili: 0.2 mg/dL   Blood: x / Protein: 300 mg/dL / Nitrite: Negative   Leuk Esterase: Large / RBC: 20 /HPF / WBC Too Numerous to count /HPF   Sq Epi: x / Non Sq Epi: x / Bacteria: Many /HPF      CULTURES:      Physical Examination:    General: No acute distress.  on bipap    PULM: Clear to auscultation bilaterally    CVS: Tachycardia    ABD: Soft, nondistended, nontender    SKIN: Warm and well perfused    NEURO: Alert, oriented, interactive      < from: CT Abdomen and Pelvis w/ IV Cont (23 @ 21:50) >  IMPRESSION:  No pulmonary embolism to the proximal segmental level, although distal   evaluation is limited due to suboptimal bolus timing.    Steele catheter within the markedly thickened urinary bladder with   perivesicular stranding. Bilateral hydroureteronephrosis to the level of   the ureterovesicular junction without obstructing stone identified.   Findings are most compatible with ascending urinary tract infection,   including possible pyelonephritis.    Small left pleural effusion with left lower lobe segmental atelectasis   with associated left hemidiaphragm elevation.    Hepatomegaly and hepatic steatosis.    Fluid adjacent to loops of small bowel in the left lower quadrant as well   as near the tip of the appendix, likely secondary to above inflammatory   changes. However recommend clinical correlation.    Incidental and chronic findings as above.        --- End of Report ---             DEANDRE ROBERTS MD; Attending Radiologist  This document has been electronically signed. Dec 31 2023 10:20PM    < end of copied text >

## 2023-12-31 NOTE — ED PROVIDER NOTE - NSICDXPASTMEDICALHX_GEN_ALL_CORE_FT
PAST MEDICAL HISTORY:  DVT, lower extremity     Lower paraplegia     MS (multiple sclerosis)     Pulmonary embolism

## 2023-12-31 NOTE — ED ADULT NURSE NOTE - NSFALLRISKINTERV_ED_ALL_ED
Assistance OOB with selected safe patient handling equipment if applicable/Assistance with ambulation/Communicate fall risk and risk factors to all staff, patient, and family/Monitor gait and stability/Provide visual cue: yellow wristband, yellow gown, etc/Reinforce activity limits and safety measures with patient and family/Call bell, personal items and telephone in reach/Instruct patient to call for assistance before getting out of bed/chair/stretcher/Non-slip footwear applied when patient is off stretcher/[place] to call system/Physically safe environment - no spills, clutter or unnecessary equipment/Purposeful Proactive Rounding/Room/bathroom lighting operational, light cord in reach Assistance OOB with selected safe patient handling equipment if applicable/Assistance with ambulation/Communicate fall risk and risk factors to all staff, patient, and family/Monitor gait and stability/Provide visual cue: yellow wristband, yellow gown, etc/Reinforce activity limits and safety measures with patient and family/Call bell, personal items and telephone in reach/Instruct patient to call for assistance before getting out of bed/chair/stretcher/Non-slip footwear applied when patient is off stretcher/[place] to call system/Physically safe environment - no spills, clutter or unnecessary equipment/Purposeful Proactive Rounding/Room/bathroom lighting operational, light cord in reach Assistance OOB with selected safe patient handling equipment if applicable/Assistance with ambulation/Communicate fall risk and risk factors to all staff, patient, and family/Monitor gait and stability/Provide visual cue: yellow wristband, yellow gown, etc/Reinforce activity limits and safety measures with patient and family/Call bell, personal items and telephone in reach/Instruct patient to call for assistance before getting out of bed/chair/stretcher/Non-slip footwear applied when patient is off stretcher/[place] to call system/Physically safe environment - no spills, clutter or unnecessary equipment/Purposeful Proactive Rounding/Room/bathroom lighting operational, light cord in reach Assistance OOB with selected safe patient handling equipment if applicable/Assistance with ambulation/Communicate fall risk and risk factors to all staff, patient, and family/Monitor gait and stability/Provide visual cue: yellow wristband, yellow gown, etc/Reinforce activity limits and safety measures with patient and family/Call bell, personal items and telephone in reach/Instruct patient to call for assistance before getting out of bed/chair/stretcher/Non-slip footwear applied when patient is off stretcher/[place] to call system/Physically safe environment - no spills, clutter or unnecessary equipment/Purposeful Proactive Rounding/Room/bathroom lighting operational, light cord in reach

## 2024-01-01 DIAGNOSIS — N17.9 ACUTE KIDNEY FAILURE, UNSPECIFIED: ICD-10-CM

## 2024-01-01 DIAGNOSIS — A41.9 SEPSIS, UNSPECIFIED ORGANISM: ICD-10-CM

## 2024-01-01 DIAGNOSIS — R06.82 TACHYPNEA, NOT ELSEWHERE CLASSIFIED: ICD-10-CM

## 2024-01-01 DIAGNOSIS — Z29.9 ENCOUNTER FOR PROPHYLACTIC MEASURES, UNSPECIFIED: ICD-10-CM

## 2024-01-01 DIAGNOSIS — G35 MULTIPLE SCLEROSIS: ICD-10-CM

## 2024-01-01 DIAGNOSIS — R16.0 HEPATOMEGALY, NOT ELSEWHERE CLASSIFIED: ICD-10-CM

## 2024-01-01 DIAGNOSIS — E86.0 DEHYDRATION: ICD-10-CM

## 2024-01-01 DIAGNOSIS — N39.0 URINARY TRACT INFECTION, SITE NOT SPECIFIED: ICD-10-CM

## 2024-01-01 DIAGNOSIS — J90 PLEURAL EFFUSION, NOT ELSEWHERE CLASSIFIED: ICD-10-CM

## 2024-01-01 LAB
ALBUMIN SERPL ELPH-MCNC: 2.8 G/DL — LOW (ref 3.3–5)
ALP SERPL-CCNC: 58 U/L — SIGNIFICANT CHANGE UP (ref 40–120)
ALT FLD-CCNC: 40 U/L — SIGNIFICANT CHANGE UP (ref 12–78)
ANION GAP SERPL CALC-SCNC: 5 MMOL/L — SIGNIFICANT CHANGE UP (ref 5–17)
AST SERPL-CCNC: 14 U/L — LOW (ref 15–37)
BASOPHILS # BLD AUTO: 0.08 K/UL — SIGNIFICANT CHANGE UP (ref 0–0.2)
BASOPHILS NFR BLD AUTO: 0.3 % — SIGNIFICANT CHANGE UP (ref 0–2)
BILIRUB SERPL-MCNC: 0.9 MG/DL — SIGNIFICANT CHANGE UP (ref 0.2–1.2)
BUN SERPL-MCNC: 29 MG/DL — HIGH (ref 7–23)
CALCIUM SERPL-MCNC: 8.7 MG/DL — SIGNIFICANT CHANGE UP (ref 8.5–10.1)
CHLORIDE SERPL-SCNC: 110 MMOL/L — HIGH (ref 96–108)
CO2 SERPL-SCNC: 25 MMOL/L — SIGNIFICANT CHANGE UP (ref 22–31)
CREAT SERPL-MCNC: 1.4 MG/DL — HIGH (ref 0.5–1.3)
EGFR: 62 ML/MIN/1.73M2 — SIGNIFICANT CHANGE UP
EOSINOPHIL # BLD AUTO: 0.05 K/UL — SIGNIFICANT CHANGE UP (ref 0–0.5)
EOSINOPHIL NFR BLD AUTO: 0.2 % — SIGNIFICANT CHANGE UP (ref 0–6)
GLUCOSE SERPL-MCNC: 103 MG/DL — HIGH (ref 70–99)
HCT VFR BLD CALC: 38.6 % — LOW (ref 39–50)
HGB BLD-MCNC: 12.5 G/DL — LOW (ref 13–17)
IMM GRANULOCYTES NFR BLD AUTO: 0.8 % — SIGNIFICANT CHANGE UP (ref 0–0.9)
LACTATE SERPL-SCNC: 1.8 MMOL/L — SIGNIFICANT CHANGE UP (ref 0.7–2)
LACTATE SERPL-SCNC: 2.4 MMOL/L — HIGH (ref 0.7–2)
LYMPHOCYTES # BLD AUTO: 1.77 K/UL — SIGNIFICANT CHANGE UP (ref 1–3.3)
LYMPHOCYTES # BLD AUTO: 6.7 % — LOW (ref 13–44)
MCHC RBC-ENTMCNC: 28.1 PG — SIGNIFICANT CHANGE UP (ref 27–34)
MCHC RBC-ENTMCNC: 32.4 GM/DL — SIGNIFICANT CHANGE UP (ref 32–36)
MCV RBC AUTO: 86.7 FL — SIGNIFICANT CHANGE UP (ref 80–100)
MONOCYTES # BLD AUTO: 1.64 K/UL — HIGH (ref 0–0.9)
MONOCYTES NFR BLD AUTO: 6.2 % — SIGNIFICANT CHANGE UP (ref 2–14)
NEUTROPHILS # BLD AUTO: 22.5 K/UL — HIGH (ref 1.8–7.4)
NEUTROPHILS NFR BLD AUTO: 85.8 % — HIGH (ref 43–77)
NRBC # BLD: 0 /100 WBCS — SIGNIFICANT CHANGE UP (ref 0–0)
PLATELET # BLD AUTO: 317 K/UL — SIGNIFICANT CHANGE UP (ref 150–400)
POTASSIUM SERPL-MCNC: 4 MMOL/L — SIGNIFICANT CHANGE UP (ref 3.5–5.3)
POTASSIUM SERPL-SCNC: 4 MMOL/L — SIGNIFICANT CHANGE UP (ref 3.5–5.3)
PROT SERPL-MCNC: 7.3 G/DL — SIGNIFICANT CHANGE UP (ref 6–8.3)
RBC # BLD: 4.45 M/UL — SIGNIFICANT CHANGE UP (ref 4.2–5.8)
RBC # FLD: 15.9 % — HIGH (ref 10.3–14.5)
SODIUM SERPL-SCNC: 140 MMOL/L — SIGNIFICANT CHANGE UP (ref 135–145)
TROPONIN I, HIGH SENSITIVITY RESULT: 7.6 NG/L — SIGNIFICANT CHANGE UP
WBC # BLD: 26.25 K/UL — HIGH (ref 3.8–10.5)
WBC # FLD AUTO: 26.25 K/UL — HIGH (ref 3.8–10.5)

## 2024-01-01 PROCEDURE — 99223 1ST HOSP IP/OBS HIGH 75: CPT | Mod: GC

## 2024-01-01 PROCEDURE — 93970 EXTREMITY STUDY: CPT | Mod: 26

## 2024-01-01 PROCEDURE — 99222 1ST HOSP IP/OBS MODERATE 55: CPT

## 2024-01-01 RX ORDER — ACETAMINOPHEN 500 MG
650 TABLET ORAL EVERY 6 HOURS
Refills: 0 | Status: DISCONTINUED | OUTPATIENT
Start: 2024-01-01 | End: 2024-01-05

## 2024-01-01 RX ORDER — SODIUM CHLORIDE 9 MG/ML
1000 INJECTION INTRAMUSCULAR; INTRAVENOUS; SUBCUTANEOUS
Refills: 0 | Status: DISCONTINUED | OUTPATIENT
Start: 2024-01-01 | End: 2024-01-05

## 2024-01-01 RX ORDER — INFLUENZA VIRUS VACCINE 15; 15; 15; 15 UG/.5ML; UG/.5ML; UG/.5ML; UG/.5ML
0.5 SUSPENSION INTRAMUSCULAR ONCE
Refills: 0 | Status: DISCONTINUED | OUTPATIENT
Start: 2024-01-01 | End: 2024-01-05

## 2024-01-01 RX ORDER — CEFTRIAXONE 500 MG/1
2000 INJECTION, POWDER, FOR SOLUTION INTRAMUSCULAR; INTRAVENOUS EVERY 24 HOURS
Refills: 0 | Status: DISCONTINUED | OUTPATIENT
Start: 2024-01-01 | End: 2024-01-03

## 2024-01-01 RX ORDER — HEPARIN SODIUM 5000 [USP'U]/ML
5000 INJECTION INTRAVENOUS; SUBCUTANEOUS EVERY 8 HOURS
Refills: 0 | Status: DISCONTINUED | OUTPATIENT
Start: 2024-01-01 | End: 2024-01-05

## 2024-01-01 RX ORDER — SODIUM CHLORIDE 9 MG/ML
1000 INJECTION INTRAMUSCULAR; INTRAVENOUS; SUBCUTANEOUS ONCE
Refills: 0 | Status: COMPLETED | OUTPATIENT
Start: 2024-01-01 | End: 2024-01-01

## 2024-01-01 RX ADMIN — CEFTRIAXONE 100 MILLIGRAM(S): 500 INJECTION, POWDER, FOR SOLUTION INTRAMUSCULAR; INTRAVENOUS at 17:06

## 2024-01-01 RX ADMIN — SODIUM CHLORIDE 100 MILLILITER(S): 9 INJECTION INTRAMUSCULAR; INTRAVENOUS; SUBCUTANEOUS at 14:22

## 2024-01-01 RX ADMIN — SODIUM CHLORIDE 1000 MILLILITER(S): 9 INJECTION INTRAMUSCULAR; INTRAVENOUS; SUBCUTANEOUS at 15:27

## 2024-01-01 RX ADMIN — HEPARIN SODIUM 5000 UNIT(S): 5000 INJECTION INTRAVENOUS; SUBCUTANEOUS at 21:18

## 2024-01-01 RX ADMIN — HEPARIN SODIUM 5000 UNIT(S): 5000 INJECTION INTRAVENOUS; SUBCUTANEOUS at 14:21

## 2024-01-01 RX ADMIN — SODIUM CHLORIDE 1000 MILLILITER(S): 9 INJECTION INTRAMUSCULAR; INTRAVENOUS; SUBCUTANEOUS at 01:17

## 2024-01-01 NOTE — H&P ADULT - NSHPREVIEWOFSYSTEMS_GEN_ALL_CORE
REVIEW OF SYSTEMS:  CONSTITUTIONAL: +  fever, + fatigue.  HENMT: No HA, dizziness, rhinorrhea  RESPIRATORY: + SOB. No cough   CARDIOVASCULAR: No chest pain, palpitations.  GASTROINTESTINAL: + fecal incontinence. No abdominal pain. No nausea or vomiting. No blood per rectum.  GENITOURINARY: + chronic iraheta,  urinary incontinence. + hematuria .No dysuria, changes in frequency  NEUROLOGICAL: + paraplegia,  SKIN: No itching, rashes  MUSCULOSKELETAL: No joint pain or swelling; No muscle, back, or extremity pain

## 2024-01-01 NOTE — PROGRESS NOTE ADULT - PROBLEM SELECTOR PLAN 7
- CT A/P: Hepatomegaly and hepatic steatosis.  - pt reports social alcohol use on weekends  - LFTs wnl  - FU AM CMP - CT A/P: Hepatomegaly and hepatic steatosis.  - pt reports social alcohol use on weekends  - LFTs wnl  ,  FU AM CMP

## 2024-01-01 NOTE — PATIENT PROFILE ADULT - FUNCTIONAL ASSESSMENT - BASIC MOBILITY 2.
MRN:[id number]                      After Visit Summary   3/23/2018    Elizabeth Blanco    MRN: [id number]           Thank you!     Thank you for choosing [facility] for your care. Our goal is always to provide you with excellent care. Hearing back from our patients is one way we can continue to improve our services. Please take a few minutes to complete the written survey that you may receive in the mail after you visit with us. Thank you!        Patient Information     Date Of Birth          1959        Designated Caregiver       Most Recent Value    Caregiver    Will someone help with your care after discharge? yes    Name of designated caregiver Tiffany    Phone number of caregiver [phone]    Caregiver address [address]      About your hospital stay     You were admitted on:  March 23, 2018 You last received care in the:  [place] [facility] [address] [facility]    You were discharged on:  March 26, 2018        Reason for your hospital stay       Routine right total knee replacement                  Who to Call     For medical emergencies, please call 911.  For non-urgent questions about your medical care, please call your primary care provider or clinic, [phone]  For questions related to your surgery, please call your surgery clinic        Attending Provider     Provider Specialty    Bill Tyler MD Orthopedics       Primary Care Provider Office Phone # Fax #    Onelia Zacarias [phone]      After Care Instructions     Activity       Your activity upon discharge: activity as tolerated            Diet       Follow this diet upon discharge: Regular            Wound care and dressings       Instructions to care for your wound at home: Caring for your dressing: Leave dressing undisturbed until follow up with orthopedic surgeon. Okay to shower with dressing in place. Do not soak incision/wound. If dressing becomes saturated  ">60% then transition to daily dry dressing changes..                  Follow-up Appointments     Follow-up and recommended labs and tests        Follow up with me,  Bill Tyler, within 2 weeks. to evaluate after surgery.  No follow up labs or test are needed.                  Additional Services     Physical Therapy Referral       [facility] [place], [place] physical therapy  Treatment: Evaluation & Treatment  Special Instructions/Modalities: total knee right  Special Programs: TKA    Please be aware that coverage of these services is subject to the terms and limitations of your health insurance plan.  Call member services at your health plan with any benefit or coverage questions.      **Note to Provider:  If you are referring outside of [facility] for the therapy appointment, please list the name of the location in the \"special instructions\" above, print the referral and give to the patient to schedule the appointment.                  Pending Results     No orders found from 3/21/2018 to 3/24/2018.            Statement of Approval     Ordered          03/26/18 1117  I have reviewed and agree with all the recommendations and orders detailed in this document.  EFFECTIVE NOW     Approved and electronically signed by:  Bill Tyler MD             Admission Information     Date & Time Provider Department Dept. Phone    3/23/2018 Bill Tyler MD [place] [address] [facility] [phone]      Your Vitals Were     Blood Pressure Pulse Temperature Respirations Height Weight    132/72 (BP Location: Right arm) 94 98.6  F (37  C) (Oral) 16 1.753 m (5' 9\") 113 kg (249 lb 1.9 oz)    Pulse Oximetry BMI (Body Mass Index)                96% 36.79 kg/m2          [employer] Information     [employer] lets you send messages to your doctor, view your test results, renew your prescriptions, schedule appointments and more. To sign up, go to [email or web address] . Click on \"Log in\" on the left side of the screen, " "which will take you to the Welcome page. Then click on \"Sign up Now\" on the right side of the page.     You will be asked to enter the access code listed below, as well as some personal information. Please follow the directions to create your username and password.     Your access code is: [email or web address]  Expires: 2018  2:12 PM     Your access code will  in 90 days. If you need help or a new code, please call your [facility] clinic or [phone].        Care EveryWhere ID     This is your Care EveryWhere ID. This could be used by other organizations to access your [facility] medical records  [id number]        Equal Access to Services     EDGAR FINCH : Peggy Broderick, tony bond, arsalan lucia, ritchie clark . So [facility] [phone].    ATENCIÓN: Si habla español, tiene a nina disposición servicios gratuitos de asistencia lingüística. Llame al [phone].    We comply with applicable federal civil rights laws and [place] laws. We do not discriminate on the basis of race, color, national origin, age, disability, sex, sexual orientation, or gender identity.               Review of your medicines      START taking        Dose / Directions    aspirin  MG EC tablet        Dose:  325 mg   Take 1 tablet (325 mg) by mouth 2 times daily (with meals) If taking Excedrin for headaches, you do not need to take additional aspirin. When not taking Excedrin, use aspirin to prevent blood clots from forming.   Quantity:  60 tablet   Refills:  0       oxyCODONE IR 5 MG tablet   Commonly known as:  ROXICODONE        Dose:  5-10 mg   Take 1-2 tablets (5-10 mg) by mouth every 4 hours as needed for moderate to severe pain   Quantity:  60 tablet   Refills:  0       senna-docusate 8.6-50 MG per tablet   Commonly known as:  SENOKOT-S;PERICOLACE        Dose:  1 tablet   Take 1 tablet by mouth 2 times daily as needed for constipation   Quantity:  100 tablet   Refills:  0 "         CONTINUE these medicines which have NOT CHANGED        Dose / Directions    EXCEDRIN EXTRA STRENGTH 250-250-65 MG per tablet   Generic drug:  aspirin-acetaminophen-caffeine        Dose:  2 tablet   Take 2 tablets by mouth 2 times daily as needed for headaches   Refills:  0       ferrous sulfate 325 (65 FE) MG tablet   Commonly known as:  IRON        Dose:  325 mg   Take 325 mg by mouth daily   Refills:  0       FLEXERIL PO        Dose:  5 mg   Take 5 mg by mouth daily as needed for muscle spasms   Refills:  0       fluticasone 50 MCG/ACT spray   Commonly known as:  FLONASE        Dose:  2 spray   Spray 2 sprays into both nostrils daily as needed for rhinitis or allergies   Refills:  0       LEVOTHYROXINE SODIUM PO        Dose:  200 mcg   Take 200 mcg by mouth daily   Refills:  0       lisinopril-hydrochlorothiazide 10-12.5 MG per tablet   Commonly known as:  PRINZIDE/ZESTORETIC   Used for:  Hypertension        Dose:  1 tablet   Take 1 tablet by mouth daily   Quantity:  30 tablet   Refills:  0       MELATONIN GUMMIES PO        Dose:  0.5 chew tab   Take 0.5 chew tab by mouth At Bedtime   Refills:  0       SERTRALINE HCL PO        Dose:  150 mg   Take 150 mg by mouth daily (Takes 1.5 x 100mg tablet = 150mg dose)   Refills:  0         STOP taking     mupirocin 2 % nasal ointment   Commonly known as:  BACTROBAN                Where to get your medicines      These medications were sent to [facility] - [address]  [address], [place] [address]     Phone:  [phone]     senna-docusate 8.6-50 MG per tablet         Some of these will need a paper prescription and others can be bought over the counter. Ask your nurse if you have questions.     Bring a paper prescription for each of these medications     aspirin  MG EC tablet    oxyCODONE IR 5 MG tablet                Protect others around you: Learn how to safely use, store and throw away your medicines at  www.disposemymeds.org.        Information about OPIOIDS     PRESCRIPTION OPIOIDS: WHAT YOU NEED TO KNOW    Prescription opioids can be used to help relieve moderate to severe pain and are often prescribed following a surgery or injury, or for certain health conditions. These medications can be an important part of treatment but also come with serious risks. It is important to work with your health care provider to make sure you are getting the safest, most effective care.    WHAT ARE THE RISKS AND SIDE EFFECTS OF OPIOID USE?  Prescription opioids carry serious risks of addiction and overdose, especially with prolonged use. An opioid overdose, often marked by slowed breathing can cause sudden death. The use of prescription opioids can have a number of side effects as well, even when taken as directed:      Tolerance - meaning you might need to take more of a medication for the same pain relief    Physical dependence - meaning you have symptoms of withdrawal when a medication is stopped    Increased sensitivity to pain    Constipation    Nausea, vomiting, and dry mouth    Sleepiness and dizziness    Confusion    Depression    Low levels of testosterone that can result in lower sex drive, energy, and strength    Itching and sweating    RISKS ARE GREATER WITH:    History of drug misuse, substance use disorder, or overdose    Mental health conditions (such as depression or anxiety)    Sleep apnea    Older age (65 years or older)    Pregnancy    Avoid alcohol while taking prescription opioids.   Also, unless specifically advised by your health care provider, medications to avoid include:    Benzodiazepines (such as Xanax or Valium)    Muscle relaxants (such as Soma or Flexeril)    Hypnotics (such as Ambien or Lunesta)    Other prescription opioids    KNOW YOUR OPTIONS:  Talk to your health care provider about ways to manage your pain that do not involve prescription opioids. Some of these options may actually work better  and have fewer risks and side effects:    Pain relievers such as acetaminophen, ibuprofen, and naproxen    Some medications that are also used for depression or seizures    Physical therapy and exercise    Cognitive behavioral therapy, a psychological, goal-directed approach, in which patients learn how to modify physical, behavioral, and emotional triggers of pain and stress    IF YOU ARE PRESCRIBED OPIOIDS FOR PAIN:    Never take opioids in greater amounts or more often than prescribed    Follow up with your primary health care provider and work together to create a plan on how to manage your pain.    Talk about ways to help manage your pain that do not involve prescription opioids    Talk about all concerns and side effects    Help prevent misuse and abuse    Never sell or share prescription opioids    Never use another person's prescription opioids    Store prescription opioids in a secure place and out of reach of others (this may include visitors, children, friends, and family)    Visit www.cdc.gov/drugoverdose to learn about risks of opioid abuse and overdose    If you believe you may be struggling with addiction, tell your health care provider and ask for guidance or call [facility]'s National Helpline at 0-417-850-HELP    LEARN MORE / www.cdc.gov/drugoverdose/prescribing/guideline.html    Safely dispose of unused prescription opioids: Find your local drug take-back programs and more information about the importance of safe disposal at www.doseofreality.mn.gov             Medication List: This is a list of all your medications and when to take them. Check marks below indicate your daily home schedule. Keep this list as a reference.      Medications           Morning Afternoon Evening Bedtime As Needed    aspirin  MG EC tablet   Take 1 tablet (325 mg) by mouth 2 times daily (with meals) If taking Excedrin for headaches, you do not need to take additional aspirin. When not taking Excedrin, use aspirin to  prevent blood clots from forming.   Next Dose Due:  3/26/18 PM                      3/26/18               EXCEDRIN EXTRA STRENGTH 250-250-65 MG per tablet   Take 2 tablets by mouth 2 times daily as needed for headaches   Generic drug:  aspirin-acetaminophen-caffeine   Next Dose Due:  Available as needed                                   ferrous sulfate 325 (65 FE) MG tablet   Commonly known as:  IRON   Take 325 mg by mouth daily   Last time this was given:  325 mg on 3/25/2018  2:11 PM   Next Dose Due:  3/26/18 PM                    3/26/18               FLEXERIL PO   Take 5 mg by mouth daily as needed for muscle spasms   Next Dose Due:  Available as needed                                   fluticasone 50 MCG/ACT spray   Commonly known as:  FLONASE   Spray 2 sprays into both nostrils daily as needed for rhinitis or allergies   Next Dose Due:  Available as needed                                   LEVOTHYROXINE SODIUM PO   Take 200 mcg by mouth daily   Last time this was given:  200 mcg on 3/26/2018  7:51 AM   Next Dose Due:  3/27/18 AM            3/27/18                       lisinopril-hydrochlorothiazide 10-12.5 MG per tablet   Commonly known as:  PRINZIDE/ZESTORETIC   Take 1 tablet by mouth daily   Last time this was given:  1 tablet on 3/26/2018  8:09 AM   Next Dose Due:  3/27/18 AM            3/27/18                       MELATONIN GUMMIES PO   Take 0.5 chew tab by mouth At Bedtime   Next Dose Due:  Resume                                oxyCODONE IR 5 MG tablet   Commonly known as:  ROXICODONE   Take 1-2 tablets (5-10 mg) by mouth every 4 hours as needed for moderate to severe pain   Last time this was given:  10 mg on 3/26/2018  8:08 AM   Next Dose Due:  Available as needed after 4PM                                   senna-docusate 8.6-50 MG per tablet   Commonly known as:  SENOKOT-S;PERICOLACE   Take 1 tablet by mouth 2 times daily as needed for constipation   Last time this was given:  2 tablets on 3/26/2018   8:09 AM   Next Dose Due:  Available as needed                                   SERTRALINE HCL PO   Take 150 mg by mouth daily (Takes 1.5 x 100mg tablet = 150mg dose)   Last time this was given:  150 mg on 3/26/2018  8:09 AM   Next Dose Due:  3/27/18 AM            3/27/18                          1 = Total assistance

## 2024-01-01 NOTE — H&P ADULT - PROBLEM SELECTOR PLAN 3
- likely prerenal, 2/2 dehydration from UTI  - BUN/Cr 29/1.7  - s/p 2 L NS bolus  - start maintenance fluids 100 cc/hr x 12 hours - likely prerenal, 2/2 dehydration from UTI  - BUN/Cr 29/1.7  - baseline Cr 1.16 in 9/2023, pt did have Cr 7 in early 9/2023  - s/p 2 L NS bolus  - start maintenance fluids 100 cc/hr x 12 hours  - FU AM CMP

## 2024-01-01 NOTE — PROGRESS NOTE ADULT - SUBJECTIVE AND OBJECTIVE BOX
Patient is a 48y old  Male who presents with a chief complaint of UTI, pyelonephritis (01 Jan 2024 14:02)    pt seen and examine today   INTERVAL HPI/OVERNIGHT EVENTS:     T(C): 36.9 (01-01-24 @ 13:52), Max: 39.3 (12-31-23 @ 20:20)  HR: 118 (01-01-24 @ 13:52) (118 - 153)  BP: 127/78 (01-01-24 @ 13:52) (122/84 - 131/67)  RR: 18 (01-01-24 @ 13:52) (14 - 20)  SpO2: 95% (01-01-24 @ 13:52) (95% - 99%)  Wt(kg): --  I&O's Summary      REVIEW OF SYSTEMS:  CONSTITUTIONAL: No fever, weight loss, or fatigue  EYES: No eye pain, visual disturbances, or discharge  ENMT:  No difficulty hearing, tinnitus, vertigo; No sinus or throat pain  NECK: No pain or stiffness  BREASTS: No pain, no masses  RESPIRATORY: No cough, wheezing, chills or hemoptysis; No shortness of breath  CARDIOVASCULAR: No chest pain, palpitations, dizziness, or leg swelling  GASTROINTESTINAL: No abdominal or epigastric pain. No nausea, vomiting, or hematemesis; No diarrhea or constipation. No melena or hematochezia.  GENITOURINARY: No dysuria, frequency, hematuria, or incontinence  NEUROLOGICAL: No headaches, memory loss, loss of strength, numbness, or tremors  SKIN: No itching, burning, rashes, or lesions   MUSCULOSKELETAL: No joint pain or swelling; No muscle, back, or extremity pain    PHYSICAL EXAM:  GENERAL: NAD, well-groomed, well-developed  HEAD:  Atraumatic, Normocephalic  EYES: EOMI, PERRLA, conjunctiva and sclera clear  ENMT: No tonsillar erythema, exudates, or enlargement; Moist mucous membranes  NECK: Supple, No JVD  NERVOUS SYSTEM:  Alert & Oriented X3; Motor Strength 5/5 B/L upper and lower extremities; DTRs 2+ intact and symmetric  CHEST/LUNG: Clear to percussion bilaterally; No rales, rhonchi, wheezing, or rubs  HEART: Regular rate and rhythm; No murmurs, rubs, or gallops  ABDOMEN: Soft, Nontender, Nondistended; Bowel sounds present  EXTREMITIES:  2+ Peripheral Pulses, No clubbing, cyanosis, or edema  SKIN: No rashes or lesions    MEDICATIONS  (STANDING):  cefTRIAXone   IVPB 2000 milliGRAM(s) IV Intermittent every 24 hours  heparin   Injectable 5000 Unit(s) SubCutaneous every 8 hours  influenza   Vaccine 0.5 milliLiter(s) IntraMuscular once  sodium chloride 0.9%. 1000 milliLiter(s) (100 mL/Hr) IV Continuous <Continuous>    MEDICATIONS  (PRN):  acetaminophen     Tablet .. 650 milliGRAM(s) Oral every 6 hours PRN Temp greater or equal to 38C (100.4F), Mild Pain (1 - 3)      LABS:                        12.5   26.25 )-----------( 317      ( 01 Jan 2024 08:30 )             38.6     01-01    140  |  110<H>  |  29<H>  ----------------------------<  103<H>  4.0   |  25  |  1.40<H>    Ca    8.7      01 Jan 2024 08:30  Mg     2.1     12-31    TPro  7.3  /  Alb  2.8<L>  /  TBili  0.9  /  DBili  x   /  AST  14<L>  /  ALT  40  /  AlkPhos  58  01-01    PT/INR - ( 31 Dec 2023 20:55 )   PT: 11.5 sec;   INR: 0.98 ratio         PTT - ( 31 Dec 2023 20:55 )  PTT:31.2 sec  Urinalysis Basic - ( 01 Jan 2024 08:30 )    Color: x / Appearance: x / SG: x / pH: x  Gluc: 103 mg/dL / Ketone: x  / Bili: x / Urobili: x   Blood: x / Protein: x / Nitrite: x   Leuk Esterase: x / RBC: x / WBC x   Sq Epi: x / Non Sq Epi: x / Bacteria: x      CAPILLARY BLOOD GLUCOSE        ABG - ( 31 Dec 2023 21:16 )  pH, Arterial: 7.43  pH, Blood: x     /  pCO2: 36    /  pO2: 144   / HCO3: 24    / Base Excess: -0.4  /  SaO2: 99.4                      RADIOLOGY & ADDITIONAL TESTS:    Imaging Personally Reviewed:       Advance Directives:      Palliative Care:  Appropriate     Patient is a 48y old  Male who presents with a chief complaint of UTI, pyelonephritis (01 Jan 2024 14:02)    pt seen and examine today   feeling better / voiding ed Iraheta changed , no fever , tolerating po.   room air 95.  INTERVAL HPI/OVERNIGHT EVENTS:     T(C): 36.9 (01-01-24 @ 13:52), Max: 39.3 (12-31-23 @ 20:20)  HR: 118 (01-01-24 @ 13:52) (118 - 153)  BP: 127/78 (01-01-24 @ 13:52) (122/84 - 131/67)  RR: 18 (01-01-24 @ 13:52) (14 - 20)  SpO2: 95% (01-01-24 @ 13:52) (95% - 99%)  Wt(kg): --  I&O's Summary      REVIEW OF SYSTEMS:  CONSTITUTIONAL: No fever, weight loss,  + fatigue  EYES: No eye pain, visual disturbances, or discharge  ENMT:  No difficulty hearing, tinnitus, vertigo; No sinus or throat pain  NECK: No pain or stiffness  RESPIRATORY: No cough, wheezing, chills   No shortness of breath  CARDIOVASCULAR: No chest pain, palpitations, dizziness, or leg swelling  GASTROINTESTINAL: No abdominal or epigastric pain. No nausea, vomiting,   No diarrhea or constipation.   GENITOURINARY: No dysuria, frequency, hematuria, or incontinence  NEUROLOGICAL: No headaches, memory loss, loss of strength, numbness, or tremors  SKIN: No itching, burning, rashes, or lesions   MUSCULOSKELETAL: No joint pain or swelling; No muscle, back, or extremity pain    PHYSICAL EXAM:  GENERAL: NAD,  HEAD:  Atraumatic, Normocephalic  EYES: EOMI, PERRLA, conjunctiva and sclera clear  ENMT ; Moist mucous membranes  NECK: Supple, No JVD  NERVOUS SYSTEM:  Alert & Oriented X3; Motor Strength 5/5 B/L upper and lower extremities; DTRs 2+ intact and symmetric  CHEST/LUNG:  percussion bilaterally; No rales, rhonchi, wheezing,   HEART: Regular rate and rhythm; No murmurs, no tachy   ABDOMEN: Soft, Nontender, Nondistended; Bowel sounds present  EXTREMITIES:  2+ Peripheral Pulses, No clubbing, cyanosis, or edema  SKIN: No rashes or lesions  gu iraheta       MEDICATIONS  (STANDING):  cefTRIAXone   IVPB 2000 milliGRAM(s) IV Intermittent every 24 hours  heparin   Injectable 5000 Unit(s) SubCutaneous every 8 hours  influenza   Vaccine 0.5 milliLiter(s) IntraMuscular once  sodium chloride 0.9%. 1000 milliLiter(s) (100 mL/Hr) IV Continuous <Continuous>    MEDICATIONS  (PRN):  acetaminophen     Tablet .. 650 milliGRAM(s) Oral every 6 hours PRN Temp greater or equal to 38C (100.4F), Mild Pain (1 - 3)      LABS:                        12.5   26.25 )-----------( 317      ( 01 Jan 2024 08:30 )             38.6     01-01    140  |  110<H>  |  29<H>  ----------------------------<  103<H>  4.0   |  25  |  1.40<H>    Ca    8.7      01 Jan 2024 08:30  Mg     2.1     12-31    TPro  7.3  /  Alb  2.8<L>  /  TBili  0.9  /  DBili  x   /  AST  14<L>  /  ALT  40  /  AlkPhos  58  01-01    PT/INR - ( 31 Dec 2023 20:55 )   PT: 11.5 sec;   INR: 0.98 ratio         PTT - ( 31 Dec 2023 20:55 )  PTT:31.2 sec  Urinalysis Basic - ( 01 Jan 2024 08:30 )    Color: x / Appearance: x / SG: x / pH: x  Gluc: 103 mg/dL / Ketone: x  / Bili: x / Urobili: x   Blood: x / Protein: x / Nitrite: x   Leuk Esterase: x / RBC: x / WBC x   Sq Epi: x / Non Sq Epi: x / Bacteria: x            ABG - ( 31 Dec 2023 21:16 )  pH, Arterial: 7.43  pH, Blood: x     /  pCO2: 36    /  pO2: 144   / HCO3: 24    / Base Excess: -0.4  /  SaO2: 99.4                      RADIOLOGY & ADDITIONAL TESTS:    Imaging Personally Reviewed:     no new test   Advance Directives:    dnr/dni   Palliative Care:  Appropriate

## 2024-01-01 NOTE — CONSULT NOTE ADULT - SUBJECTIVE AND OBJECTIVE BOX
[facility]  INFECTIOUS DISEASES - Yarelis Fonseca, [address], [place] [address]  Tel: [phone]     Fax: [phone]  =======================================================    N-[id number]  KENNETH SAUL     CC: Patient is a 48y old  Male who presents with a chief complaint of UTI, pyelonephritis (01 Jan 2024 02:06)    HPI:  48y Male with hx MS and paraplegia, with chronic iraheta, who presented with shortness of breath. In the ED, he was initially tachypneic, tachycardic, and febrile, and was placed on BIPAP with improvement of his tachypnea. Patient now off BIPAP and denies any SOB. He is complaining of back pain from the stretcher. He denies any nausea, vomiting or diarrhea. Iraheta changed about a week ago.      PAST MEDICAL & SURGICAL HISTORY:  MS (multiple sclerosis)      Lower paraplegia      DVT, lower extremity      Pulmonary embolism      No significant past surgical history          Social Hx:     FAMILY HISTORY:  No pertinent family history in first degree relatives        Allergies    Cipro (Rash)    Intolerances        Antibiotics:  MEDICATIONS  (STANDING):  cefTRIAXone   IVPB 2000 milliGRAM(s) IV Intermittent every 24 hours  heparin   Injectable 5000 Unit(s) SubCutaneous every 8 hours  sodium chloride 0.9%. 1000 milliLiter(s) (100 mL/Hr) IV Continuous <Continuous>    MEDICATIONS  (PRN):  acetaminophen     Tablet .. 650 milliGRAM(s) Oral every 6 hours PRN Temp greater or equal to 38C (100.4F), Mild Pain (1 - 3)       REVIEW OF SYSTEMS:  CONSTITUTIONAL: (+) fever  HEENT:  No sore throat or runny nose.  CARDIOVASCULAR:  No chest pain or SOB.  RESPIRATORY:  No cough, shortness of breath  GASTROINTESTINAL:  No nausea, vomiting or diarrhea.  GENITOURINARY:  see history  MUSCULOSKELETAL:  no joint aches  NEUROLOGIC:  No headache or dizziness  PSYCHIATRIC:  No disorder of thought or mood.    Physical Exam:  Vital Signs Last 24 Hrs  T(C): 36.9 (01 Jan 2024 13:52), Max: 39.3 (31 Dec 2023 20:20)  T(F): 98.4 (01 Jan 2024 13:52), Max: 102.7 (31 Dec 2023 20:20)  HR: 118 (01 Jan 2024 13:52) (118 - 153)  BP: 127/78 (01 Jan 2024 13:52) (122/84 - 131/67)  BP(mean): --  RR: 18 (01 Jan 2024 13:52) (14 - 20)  SpO2: 95% (01 Jan 2024 13:52) (95% - 99%)    Parameters below as of 01 Jan 2024 13:52  Patient On (Oxygen Delivery Method): room air      Height (cm): 177.8 (12-31 @ 19:37)  Weight (kg): 122.5 (12-31 @ 19:37)  BMI (kg/m2): 38.8 (12-31 @ 19:37)  BSA (m2): 2.37 (12-31 @ 19:37)  GEN: NAD  HEENT: normocephalic and atraumatic.   NECK: Supple.   LUNGS: Normal respiratory effort  HEART: Tachycardic  ABDOMEN: Soft, nontender, and nondistended.    : (+) Iraheta with dark colored urine  EXTREMITIES: bipedal edema  NEUROLOGIC: AO x 3  PSYCHIATRIC: Appropriate affect .      Labs:  01-01    140  |  110<H>  |  29<H>  ----------------------------<  103<H>  4.0   |  25  |  1.40<H>    Ca    8.7      01 Jan 2024 08:30  Mg     2.1     12-31    TPro  7.3  /  Alb  2.8<L>  /  TBili  0.9  /  DBili  x   /  AST  14<L>  /  ALT  40  /  AlkPhos  58  01-01                          12.5   26.25 )-----------( 317      ( 01 Jan 2024 08:30 )             38.6     PT/INR - ( 31 Dec 2023 20:55 )   PT: 11.5 sec;   INR: 0.98 ratio         PTT - ( 31 Dec 2023 20:55 )  PTT:31.2 sec  Urinalysis Basic - ( 01 Jan 2024 08:30 )    Color: x / Appearance: x / SG: x / pH: x  Gluc: 103 mg/dL / Ketone: x  / Bili: x / Urobili: x   Blood: x / Protein: x / Nitrite: x   Leuk Esterase: x / RBC: x / WBC x   Sq Epi: x / Non Sq Epi: x / Bacteria: x      LIVER FUNCTIONS - ( 01 Jan 2024 08:30 )  Alb: 2.8 g/dL / Pro: 7.3 g/dL / ALK PHOS: 58 U/L / ALT: 40 U/L / AST: 14 U/L / GGT: x           CARDIAC MARKERS ( 31 Dec 2023 20:55 )  x     / x     / 25 U/L / x     / <1.0 ng/mL      ABG - ( 31 Dec 2023 21:16 )  pH, Arterial: 7.43  pH, Blood: x     /  pCO2: 36    /  pO2: 144   / HCO3: 24    / Base Excess: -0.4  /  SaO2: 99.4                    SARS-CoV-2 Result: NotDetec (12-31-23 @ 22:39)      RECENT CULTURES:        All imaging and other data have been reviewed.    < from: CT Abdomen and Pelvis w/ IV Cont (12.31.23 @ 21:50) >  FINDINGS:  CHEST:  LUNGS AND LARGE AIRWAYS: Patent central airways. No pulmonary nodules.   Left lower lobe segmental atelectasis with associated left hemidiaphragm   elevation.  PLEURA: Small left pleural effusion.  VESSELS: Evaluation limited to the proximal segmental level due to   suboptimal bolus timing. Within this limitation, no large central   pulmonary embolus. The main pulmonary artery is normal in caliber.   Coronary artery calcifications.  HEART: Heart size is normal. No pericardial effusion.  MEDIASTINUM AND JUAN: No lymphadenopathy.  CHEST WALL AND LOWER NECK: Within normal limits.    ABDOMEN AND PELVIS:  LIVER: Hepatomegaly and hepatic steatosis.  BILE DUCTS: Normal caliber.  GALLBLADDER: Within normal limits.  SPLEEN: Within normal limits.  PANCREAS: Within normal limits.  ADRENALS: Within normal limits.  KIDNEYS/URETERS: Bilateral urothelial thickening and mild   hydroureteronephrosis to the level of the ureterovesicular junction, left   greater than right. Mild bilateral perinephric stranding. No obstructive   renal stones.    BLADDER: Iraheta catheter within the decompressed urinary bladder, which   demonstrates marked urinary bladder wall thickening and perivesicular   stranding.  REPRODUCTIVE ORGANS: Prostate within normal limits.    BOWEL: No bowel obstruction. Fluid adjacent to loops of small bowel in   the left lower quadrant. Appendix is normal. Trace fluid near the tip of   the appendix, likely secondary to urinary bladder inflammatory changes.  PERITONEUM: Trace abdominal pelvic free fluid.  VESSELS: Within normal limits.  RETROPERITONEUM/LYMPH NODES: Prominent retroperitoneal lymph nodes,   including 1.0 cm left para-aortic lymph nodes.  ABDOMINAL WALL: Mild subcutaneous edema along the flanks and hips.  BONES: Degenerative changes. Decreased bone mineral density.    IMPRESSION:  No pulmonary embolism to the proximal segmental level, although distal   evaluation is limited due to suboptimal bolus timing.    Iraheta catheter within the markedly thickened urinary bladder with   perivesicular stranding. Bilateral hydroureteronephrosis to the level of   the ureterovesicular junction without obstructing stone identified.   Findings are most compatible with ascending urinary tract infection,   including possible pyelonephritis.    Small left pleural effusion with left lower lobe segmental atelectasis   with associated left hemidiaphragm elevation.    Hepatomegaly and hepatic steatosis.    Fluid adjacent to loops of small bowel in the left lower quadrant as well   as near the tip of the appendix, likely secondary to above inflammatory   changes. However recommend clinical correlation.    Incidental and chronic findings as above.      < end of copied text >   [facility]  INFECTIOUS DISEASES - Yarelis Fonseca, [address], [place] [address]  Tel: [phone]     Fax: [phone]  =======================================================    N-[id number]  KENNETH SAUL     CC: Patient is a 48y old  Male who presents with a chief complaint of UTI, pyelonephritis (01 Jan 2024 02:06)    HPI:  48y Male with hx MS and paraplegia, with chronic iraheta, who presented with shortness of breath. In the ED, he was initially tachypneic, tachycardic, and febrile, and was placed on BIPAP with improvement of his tachypnea. Patient now off BIPAP and denies any SOB. He is complaining of back pain from the stretcher. He denies any nausea, vomiting or diarrhea. Iraheta changed about a week ago.      PAST MEDICAL & SURGICAL HISTORY:  MS (multiple sclerosis)      Lower paraplegia      DVT, lower extremity      Pulmonary embolism      No significant past surgical history          Social Hx:     FAMILY HISTORY:  No pertinent family history in first degree relatives        Allergies    Cipro (Rash)    Intolerances        Antibiotics:  MEDICATIONS  (STANDING):  cefTRIAXone   IVPB 2000 milliGRAM(s) IV Intermittent every 24 hours  heparin   Injectable 5000 Unit(s) SubCutaneous every 8 hours  sodium chloride 0.9%. 1000 milliLiter(s) (100 mL/Hr) IV Continuous <Continuous>    MEDICATIONS  (PRN):  acetaminophen     Tablet .. 650 milliGRAM(s) Oral every 6 hours PRN Temp greater or equal to 38C (100.4F), Mild Pain (1 - 3)       REVIEW OF SYSTEMS:  CONSTITUTIONAL: (+) fever  HEENT:  No sore throat or runny nose.  CARDIOVASCULAR:  No chest pain or SOB.  RESPIRATORY:  No cough, shortness of breath  GASTROINTESTINAL:  No nausea, vomiting or diarrhea.  GENITOURINARY:  see history  MUSCULOSKELETAL:  no joint aches  NEUROLOGIC:  No headache or dizziness  PSYCHIATRIC:  No disorder of thought or mood.    Physical Exam:  Vital Signs Last 24 Hrs  T(C): 36.9 (01 Jan 2024 13:52), Max: 39.3 (31 Dec 2023 20:20)  T(F): 98.4 (01 Jan 2024 13:52), Max: 102.7 (31 Dec 2023 20:20)  HR: 118 (01 Jan 2024 13:52) (118 - 153)  BP: 127/78 (01 Jan 2024 13:52) (122/84 - 131/67)  BP(mean): --  RR: 18 (01 Jan 2024 13:52) (14 - 20)  SpO2: 95% (01 Jan 2024 13:52) (95% - 99%)    Parameters below as of 01 Jan 2024 13:52  Patient On (Oxygen Delivery Method): room air      Height (cm): 177.8 (12-31 @ 19:37)  Weight (kg): 122.5 (12-31 @ 19:37)  BMI (kg/m2): 38.8 (12-31 @ 19:37)  BSA (m2): 2.37 (12-31 @ 19:37)  GEN: NAD  HEENT: normocephalic and atraumatic.   NECK: Supple.   LUNGS: Normal respiratory effort  HEART: Tachycardic  ABDOMEN: Soft, nontender, and nondistended.    : (+) Iraheta with dark colored urine  EXTREMITIES: bipedal edema  NEUROLOGIC: AO x 3  PSYCHIATRIC: Appropriate affect .      Labs:  01-01    140  |  110<H>  |  29<H>  ----------------------------<  103<H>  4.0   |  25  |  1.40<H>    Ca    8.7      01 Jan 2024 08:30  Mg     2.1     12-31    TPro  7.3  /  Alb  2.8<L>  /  TBili  0.9  /  DBili  x   /  AST  14<L>  /  ALT  40  /  AlkPhos  58  01-01                          12.5   26.25 )-----------( 317      ( 01 Jan 2024 08:30 )             38.6     PT/INR - ( 31 Dec 2023 20:55 )   PT: 11.5 sec;   INR: 0.98 ratio         PTT - ( 31 Dec 2023 20:55 )  PTT:31.2 sec  Urinalysis Basic - ( 01 Jan 2024 08:30 )    Color: x / Appearance: x / SG: x / pH: x  Gluc: 103 mg/dL / Ketone: x  / Bili: x / Urobili: x   Blood: x / Protein: x / Nitrite: x   Leuk Esterase: x / RBC: x / WBC x   Sq Epi: x / Non Sq Epi: x / Bacteria: x      LIVER FUNCTIONS - ( 01 Jan 2024 08:30 )  Alb: 2.8 g/dL / Pro: 7.3 g/dL / ALK PHOS: 58 U/L / ALT: 40 U/L / AST: 14 U/L / GGT: x           CARDIAC MARKERS ( 31 Dec 2023 20:55 )  x     / x     / 25 U/L / x     / <1.0 ng/mL      ABG - ( 31 Dec 2023 21:16 )  pH, Arterial: 7.43  pH, Blood: x     /  pCO2: 36    /  pO2: 144   / HCO3: 24    / Base Excess: -0.4  /  SaO2: 99.4                    SARS-CoV-2 Result: NotDetec (12-31-23 @ 22:39)      RECENT CULTURES:        All imaging and other data have been reviewed.    < from: CT Abdomen and Pelvis w/ IV Cont (12.31.23 @ 21:50) >  FINDINGS:  CHEST:  LUNGS AND LARGE AIRWAYS: Patent central airways. No pulmonary nodules.   Left lower lobe segmental atelectasis with associated left hemidiaphragm   elevation.  PLEURA: Small left pleural effusion.  VESSELS: Evaluation limited to the proximal segmental level due to   suboptimal bolus timing. Within this limitation, no large central   pulmonary embolus. The main pulmonary artery is normal in caliber.   Coronary artery calcifications.  HEART: Heart size is normal. No pericardial effusion.  MEDIASTINUM AND JUAN: No lymphadenopathy.  CHEST WALL AND LOWER NECK: Within normal limits.    ABDOMEN AND PELVIS:  LIVER: Hepatomegaly and hepatic steatosis.  BILE DUCTS: Normal caliber.  GALLBLADDER: Within normal limits.  SPLEEN: Within normal limits.  PANCREAS: Within normal limits.  ADRENALS: Within normal limits.  KIDNEYS/URETERS: Bilateral urothelial thickening and mild   hydroureteronephrosis to the level of the ureterovesicular junction, left   greater than right. Mild bilateral perinephric stranding. No obstructive   renal stones.    BLADDER: Iraheta catheter within the decompressed urinary bladder, which   demonstrates marked urinary bladder wall thickening and perivesicular   stranding.  REPRODUCTIVE ORGANS: Prostate within normal limits.    BOWEL: No bowel obstruction. Fluid adjacent to loops of small bowel in   the left lower quadrant. Appendix is normal. Trace fluid near the tip of   the appendix, likely secondary to urinary bladder inflammatory changes.  PERITONEUM: Trace abdominal pelvic free fluid.  VESSELS: Within normal limits.  RETROPERITONEUM/LYMPH NODES: Prominent retroperitoneal lymph nodes,   including 1.0 cm left para-aortic lymph nodes.  ABDOMINAL WALL: Mild subcutaneous edema along the flanks and hips.  BONES: Degenerative changes. Decreased bone mineral density.    IMPRESSION:  No pulmonary embolism to the proximal segmental level, although distal   evaluation is limited due to suboptimal bolus timing.    Iraheta catheter within the markedly thickened urinary bladder with   perivesicular stranding. Bilateral hydroureteronephrosis to the level of   the ureterovesicular junction without obstructing stone identified.   Findings are most compatible with ascending urinary tract infection,   including possible pyelonephritis.    Small left pleural effusion with left lower lobe segmental atelectasis   with associated left hemidiaphragm elevation.    Hepatomegaly and hepatic steatosis.    Fluid adjacent to loops of small bowel in the left lower quadrant as well   as near the tip of the appendix, likely secondary to above inflammatory   changes. However recommend clinical correlation.    Incidental and chronic findings as above.      < end of copied text >   [facility]  INFECTIOUS DISEASES - Yarelis Fonseca, [address], [place] [address]  Tel: [phone]     Fax: [phone]  =======================================================    N-[id number]  KENNETH SAUL     CC: Patient is a 48y old  Male who presents with a chief complaint of UTI, pyelonephritis (01 Jan 2024 02:06)    HPI:  48y Male with hx MS and paraplegia, with chronic iraheta, who presented with shortness of breath. In the ED, he was initially tachypneic, tachycardic, and febrile, and was placed on BIPAP with improvement of his tachypnea. Patient now off BIPAP and denies any SOB. He is complaining of back pain from the stretcher. He denies any nausea, vomiting or diarrhea. Iraheta changed about a week ago.      PAST MEDICAL & SURGICAL HISTORY:  MS (multiple sclerosis)      Lower paraplegia      DVT, lower extremity      Pulmonary embolism      No significant past surgical history          Social Hx:     FAMILY HISTORY:  No pertinent family history in first degree relatives        Allergies    Cipro (Rash)    Intolerances        Antibiotics:  MEDICATIONS  (STANDING):  cefTRIAXone   IVPB 2000 milliGRAM(s) IV Intermittent every 24 hours  heparin   Injectable 5000 Unit(s) SubCutaneous every 8 hours  sodium chloride 0.9%. 1000 milliLiter(s) (100 mL/Hr) IV Continuous <Continuous>    MEDICATIONS  (PRN):  acetaminophen     Tablet .. 650 milliGRAM(s) Oral every 6 hours PRN Temp greater or equal to 38C (100.4F), Mild Pain (1 - 3)       REVIEW OF SYSTEMS:  CONSTITUTIONAL: (+) fever  HEENT:  No sore throat or runny nose.  CARDIOVASCULAR:  No chest pain or SOB.  RESPIRATORY:  No cough, shortness of breath  GASTROINTESTINAL:  No nausea, vomiting or diarrhea.  GENITOURINARY:  see history  MUSCULOSKELETAL:  no joint aches  NEUROLOGIC:  No headache or dizziness  PSYCHIATRIC:  No disorder of thought or mood.    Physical Exam:  Vital Signs Last 24 Hrs  T(C): 36.9 (01 Jan 2024 13:52), Max: 39.3 (31 Dec 2023 20:20)  T(F): 98.4 (01 Jan 2024 13:52), Max: 102.7 (31 Dec 2023 20:20)  HR: 118 (01 Jan 2024 13:52) (118 - 153)  BP: 127/78 (01 Jan 2024 13:52) (122/84 - 131/67)  BP(mean): --  RR: 18 (01 Jan 2024 13:52) (14 - 20)  SpO2: 95% (01 Jan 2024 13:52) (95% - 99%)    Parameters below as of 01 Jan 2024 13:52  Patient On (Oxygen Delivery Method): room air      Height (cm): 177.8 (12-31 @ 19:37)  Weight (kg): 122.5 (12-31 @ 19:37)  BMI (kg/m2): 38.8 (12-31 @ 19:37)  BSA (m2): 2.37 (12-31 @ 19:37)  GEN: NAD  HEENT: normocephalic and atraumatic.   NECK: Supple.   LUNGS: Normal respiratory effort  HEART: Tachycardic  ABDOMEN: Soft, nontender, and nondistended.    : (+) Iraheta with dark colored urine  EXTREMITIES: bipedal edema  NEUROLOGIC: AO x 3  PSYCHIATRIC: Appropriate affect .      Labs:  01-01    140  |  110<H>  |  29<H>  ----------------------------<  103<H>  4.0   |  25  |  1.40<H>    Ca    8.7      01 Jan 2024 08:30  Mg     2.1     12-31    TPro  7.3  /  Alb  2.8<L>  /  TBili  0.9  /  DBili  x   /  AST  14<L>  /  ALT  40  /  AlkPhos  58  01-01                          12.5   26.25 )-----------( 317      ( 01 Jan 2024 08:30 )             38.6     PT/INR - ( 31 Dec 2023 20:55 )   PT: 11.5 sec;   INR: 0.98 ratio         PTT - ( 31 Dec 2023 20:55 )  PTT:31.2 sec  Urinalysis Basic - ( 01 Jan 2024 08:30 )    Color: x / Appearance: x / SG: x / pH: x  Gluc: 103 mg/dL / Ketone: x  / Bili: x / Urobili: x   Blood: x / Protein: x / Nitrite: x   Leuk Esterase: x / RBC: x / WBC x   Sq Epi: x / Non Sq Epi: x / Bacteria: x      LIVER FUNCTIONS - ( 01 Jan 2024 08:30 )  Alb: 2.8 g/dL / Pro: 7.3 g/dL / ALK PHOS: 58 U/L / ALT: 40 U/L / AST: 14 U/L / GGT: x           CARDIAC MARKERS ( 31 Dec 2023 20:55 )  x     / x     / 25 U/L / x     / <1.0 ng/mL      ABG - ( 31 Dec 2023 21:16 )  pH, Arterial: 7.43  pH, Blood: x     /  pCO2: 36    /  pO2: 144   / HCO3: 24    / Base Excess: -0.4  /  SaO2: 99.4                    SARS-CoV-2 Result: NotDetec (12-31-23 @ 22:39)      RECENT CULTURES:        All imaging and other data have been reviewed.    < from: CT Abdomen and Pelvis w/ IV Cont (12.31.23 @ 21:50) >  FINDINGS:  CHEST:  LUNGS AND LARGE AIRWAYS: Patent central airways. No pulmonary nodules.   Left lower lobe segmental atelectasis with associated left hemidiaphragm   elevation.  PLEURA: Small left pleural effusion.  VESSELS: Evaluation limited to the proximal segmental level due to   suboptimal bolus timing. Within this limitation, no large central   pulmonary embolus. The main pulmonary artery is normal in caliber.   Coronary artery calcifications.  HEART: Heart size is normal. No pericardial effusion.  MEDIASTINUM AND JUAN: No lymphadenopathy.  CHEST WALL AND LOWER NECK: Within normal limits.    ABDOMEN AND PELVIS:  LIVER: Hepatomegaly and hepatic steatosis.  BILE DUCTS: Normal caliber.  GALLBLADDER: Within normal limits.  SPLEEN: Within normal limits.  PANCREAS: Within normal limits.  ADRENALS: Within normal limits.  KIDNEYS/URETERS: Bilateral urothelial thickening and mild   hydroureteronephrosis to the level of the ureterovesicular junction, left   greater than right. Mild bilateral perinephric stranding. No obstructive   renal stones.    BLADDER: Iraheta catheter within the decompressed urinary bladder, which   demonstrates marked urinary bladder wall thickening and perivesicular   stranding.  REPRODUCTIVE ORGANS: Prostate within normal limits.    BOWEL: No bowel obstruction. Fluid adjacent to loops of small bowel in   the left lower quadrant. Appendix is normal. Trace fluid near the tip of   the appendix, likely secondary to urinary bladder inflammatory changes.  PERITONEUM: Trace abdominal pelvic free fluid.  VESSELS: Within normal limits.  RETROPERITONEUM/LYMPH NODES: Prominent retroperitoneal lymph nodes,   including 1.0 cm left para-aortic lymph nodes.  ABDOMINAL WALL: Mild subcutaneous edema along the flanks and hips.  BONES: Degenerative changes. Decreased bone mineral density.    IMPRESSION:  No pulmonary embolism to the proximal segmental level, although distal   evaluation is limited due to suboptimal bolus timing.    Iraheta catheter within the markedly thickened urinary bladder with   perivesicular stranding. Bilateral hydroureteronephrosis to the level of   the ureterovesicular junction without obstructing stone identified.   Findings are most compatible with ascending urinary tract infection,   including possible pyelonephritis.    Small left pleural effusion with left lower lobe segmental atelectasis   with associated left hemidiaphragm elevation.    Hepatomegaly and hepatic steatosis.    Fluid adjacent to loops of small bowel in the left lower quadrant as well   as near the tip of the appendix, likely secondary to above inflammatory   changes. However recommend clinical correlation.    Incidental and chronic findings as above.      < end of copied text >   [facility]  INFECTIOUS DISEASES - Yarelis Fonseca, [address], [place] [address]  Tel: [phone]     Fax: [phone]  =======================================================    N-[id number]  KENNETH SAUL     CC: Patient is a 48y old  Male who presents with a chief complaint of UTI, pyelonephritis (01 Jan 2024 02:06)    HPI:  48y Male with hx MS and paraplegia, with chronic iraheta, who presented with shortness of breath. In the ED, he was initially tachypneic, tachycardic, and febrile, and was placed on BIPAP with improvement of his tachypnea. Patient now off BIPAP and denies any SOB. He is complaining of back pain from the stretcher. He denies any nausea, vomiting or diarrhea. Iraheta changed about a week ago.      PAST MEDICAL & SURGICAL HISTORY:  MS (multiple sclerosis)      Lower paraplegia      DVT, lower extremity      Pulmonary embolism      No significant past surgical history          Social Hx:     FAMILY HISTORY:  No pertinent family history in first degree relatives        Allergies    Cipro (Rash)    Intolerances        Antibiotics:  MEDICATIONS  (STANDING):  cefTRIAXone   IVPB 2000 milliGRAM(s) IV Intermittent every 24 hours  heparin   Injectable 5000 Unit(s) SubCutaneous every 8 hours  sodium chloride 0.9%. 1000 milliLiter(s) (100 mL/Hr) IV Continuous <Continuous>    MEDICATIONS  (PRN):  acetaminophen     Tablet .. 650 milliGRAM(s) Oral every 6 hours PRN Temp greater or equal to 38C (100.4F), Mild Pain (1 - 3)       REVIEW OF SYSTEMS:  CONSTITUTIONAL: (+) fever  HEENT:  No sore throat or runny nose.  CARDIOVASCULAR:  No chest pain or SOB.  RESPIRATORY:  No cough, shortness of breath  GASTROINTESTINAL:  No nausea, vomiting or diarrhea.  GENITOURINARY:  see history  MUSCULOSKELETAL:  no joint aches  NEUROLOGIC:  No headache or dizziness  PSYCHIATRIC:  No disorder of thought or mood.    Physical Exam:  Vital Signs Last 24 Hrs  T(C): 36.9 (01 Jan 2024 13:52), Max: 39.3 (31 Dec 2023 20:20)  T(F): 98.4 (01 Jan 2024 13:52), Max: 102.7 (31 Dec 2023 20:20)  HR: 118 (01 Jan 2024 13:52) (118 - 153)  BP: 127/78 (01 Jan 2024 13:52) (122/84 - 131/67)  BP(mean): --  RR: 18 (01 Jan 2024 13:52) (14 - 20)  SpO2: 95% (01 Jan 2024 13:52) (95% - 99%)    Parameters below as of 01 Jan 2024 13:52  Patient On (Oxygen Delivery Method): room air      Height (cm): 177.8 (12-31 @ 19:37)  Weight (kg): 122.5 (12-31 @ 19:37)  BMI (kg/m2): 38.8 (12-31 @ 19:37)  BSA (m2): 2.37 (12-31 @ 19:37)  GEN: NAD  HEENT: normocephalic and atraumatic.   NECK: Supple.   LUNGS: Normal respiratory effort  HEART: Tachycardic  ABDOMEN: Soft, nontender, and nondistended.    : (+) Iraheta with dark colored urine  EXTREMITIES: bipedal edema  NEUROLOGIC: AO x 3  PSYCHIATRIC: Appropriate affect .      Labs:  01-01    140  |  110<H>  |  29<H>  ----------------------------<  103<H>  4.0   |  25  |  1.40<H>    Ca    8.7      01 Jan 2024 08:30  Mg     2.1     12-31    TPro  7.3  /  Alb  2.8<L>  /  TBili  0.9  /  DBili  x   /  AST  14<L>  /  ALT  40  /  AlkPhos  58  01-01                          12.5   26.25 )-----------( 317      ( 01 Jan 2024 08:30 )             38.6     PT/INR - ( 31 Dec 2023 20:55 )   PT: 11.5 sec;   INR: 0.98 ratio         PTT - ( 31 Dec 2023 20:55 )  PTT:31.2 sec  Urinalysis Basic - ( 01 Jan 2024 08:30 )    Color: x / Appearance: x / SG: x / pH: x  Gluc: 103 mg/dL / Ketone: x  / Bili: x / Urobili: x   Blood: x / Protein: x / Nitrite: x   Leuk Esterase: x / RBC: x / WBC x   Sq Epi: x / Non Sq Epi: x / Bacteria: x      LIVER FUNCTIONS - ( 01 Jan 2024 08:30 )  Alb: 2.8 g/dL / Pro: 7.3 g/dL / ALK PHOS: 58 U/L / ALT: 40 U/L / AST: 14 U/L / GGT: x           CARDIAC MARKERS ( 31 Dec 2023 20:55 )  x     / x     / 25 U/L / x     / <1.0 ng/mL      ABG - ( 31 Dec 2023 21:16 )  pH, Arterial: 7.43  pH, Blood: x     /  pCO2: 36    /  pO2: 144   / HCO3: 24    / Base Excess: -0.4  /  SaO2: 99.4                    SARS-CoV-2 Result: NotDetec (12-31-23 @ 22:39)      RECENT CULTURES:        All imaging and other data have been reviewed.    < from: CT Abdomen and Pelvis w/ IV Cont (12.31.23 @ 21:50) >  FINDINGS:  CHEST:  LUNGS AND LARGE AIRWAYS: Patent central airways. No pulmonary nodules.   Left lower lobe segmental atelectasis with associated left hemidiaphragm   elevation.  PLEURA: Small left pleural effusion.  VESSELS: Evaluation limited to the proximal segmental level due to   suboptimal bolus timing. Within this limitation, no large central   pulmonary embolus. The main pulmonary artery is normal in caliber.   Coronary artery calcifications.  HEART: Heart size is normal. No pericardial effusion.  MEDIASTINUM AND JUAN: No lymphadenopathy.  CHEST WALL AND LOWER NECK: Within normal limits.    ABDOMEN AND PELVIS:  LIVER: Hepatomegaly and hepatic steatosis.  BILE DUCTS: Normal caliber.  GALLBLADDER: Within normal limits.  SPLEEN: Within normal limits.  PANCREAS: Within normal limits.  ADRENALS: Within normal limits.  KIDNEYS/URETERS: Bilateral urothelial thickening and mild   hydroureteronephrosis to the level of the ureterovesicular junction, left   greater than right. Mild bilateral perinephric stranding. No obstructive   renal stones.    BLADDER: Iraheta catheter within the decompressed urinary bladder, which   demonstrates marked urinary bladder wall thickening and perivesicular   stranding.  REPRODUCTIVE ORGANS: Prostate within normal limits.    BOWEL: No bowel obstruction. Fluid adjacent to loops of small bowel in   the left lower quadrant. Appendix is normal. Trace fluid near the tip of   the appendix, likely secondary to urinary bladder inflammatory changes.  PERITONEUM: Trace abdominal pelvic free fluid.  VESSELS: Within normal limits.  RETROPERITONEUM/LYMPH NODES: Prominent retroperitoneal lymph nodes,   including 1.0 cm left para-aortic lymph nodes.  ABDOMINAL WALL: Mild subcutaneous edema along the flanks and hips.  BONES: Degenerative changes. Decreased bone mineral density.    IMPRESSION:  No pulmonary embolism to the proximal segmental level, although distal   evaluation is limited due to suboptimal bolus timing.    Iraheta catheter within the markedly thickened urinary bladder with   perivesicular stranding. Bilateral hydroureteronephrosis to the level of   the ureterovesicular junction without obstructing stone identified.   Findings are most compatible with ascending urinary tract infection,   including possible pyelonephritis.    Small left pleural effusion with left lower lobe segmental atelectasis   with associated left hemidiaphragm elevation.    Hepatomegaly and hepatic steatosis.    Fluid adjacent to loops of small bowel in the left lower quadrant as well   as near the tip of the appendix, likely secondary to above inflammatory   changes. However recommend clinical correlation.    Incidental and chronic findings as above.      < end of copied text >

## 2024-01-01 NOTE — H&P ADULT - PROBLEM SELECTOR PLAN 1
Meets sepsis criteria WBC 28.6, T 102.7, , RR 20 - Source: UTI and possible pyelonephritis  Lactate - 2.9 -> 2.8  UA positive for UTI, with large blood  EKG sinus tachycardia, likely 2/2 fever  Sepsis protocol  s/p vanco and zosyn in ED  s/p 2 L NS bolus and ofirmev in ED  ICU consulted for tachypnea: Suspect tachypnea to be secondary to febrile episode. Recommends ATC tylenol. For hypovolemia recommends 30cc/kg bolus. Will likely need additional fluid aside from the 30cc/kg bolus.   start maintenance fluids 100 cc/hr x 12 hours  FU BCx, UCx  prn tylenol for fever  start ceftriaxone for complicated UTI with CAUDI  Trend wbc, fever, lactate Meets sepsis criteria WBC 28.6, T 102.7, , RR 20 - Source: UTI and possible pyelonephritis  Lactate - 2.9 -> 2.8  UA positive for UTI, with large blood  EKG sinus tachycardia, likely 2/2 fever  Sepsis protocol  s/p vanco and zosyn in ED  s/p 2 L NS bolus and ofirmev in ED  ICU consulted for tachypnea: Suspect tachypnea to be secondary to febrile episode. Recommends ATC tylenol. For hypovolemia recommends 30cc/kg bolus. Will likely need additional fluid aside from the 30cc/kg bolus.   start maintenance fluids 100 cc/hr x 12 hours  FU BCx, UCx  prn tylenol for fever  start ceftriaxone for complicated UTI with CAUDI  Trend wbc, fever, lactate  Consult ID, Dr. Fonseca, PHILLY recs Meets sepsis criteria WBC 28.6, T 102.7, , RR 20 - Source: UTI and possible pyelonephritis  Lactate - 2.9 -> 2.8  UA positive for UTI, with large blood  EKG sinus tachycardia, likely 2/2 fever  Sepsis protocol  s/p vanco and zosyn in ED  s/p 2 L NS bolus and ofirmev in ED  ICU consulted for tachypnea: Suspect tachypnea to be secondary to febrile episode. Recommends ATC tylenol. For hypovolemia recommends 30cc/kg bolus. Will likely need additional fluid aside from the 30cc/kg bolus.   start maintenance fluids 100 cc/hr x 12 hours  FU BCx, UCx  prn tylenol for fever  start ceftriaxone 2g QD for complicated UTI with CAUTI  Trend wbc, fever, lactate  Consult ID, Dr. Fonseca, PHILLY recs

## 2024-01-01 NOTE — H&P ADULT - NSHPPHYSICALEXAM_GEN_ALL_CORE
Vital Signs Last 24 Hrs  T(C): 39.3 (31 Dec 2023 20:20), Max: 39.3 (31 Dec 2023 20:20)  T(F): 102.7 (31 Dec 2023 20:20), Max: 102.7 (31 Dec 2023 20:20)  HR: 126 (31 Dec 2023 22:29) (126 - 153)  BP: 131/67 (31 Dec 2023 22:02) (122/84 - 131/67)  RR: 14 (31 Dec 2023 22:02) (14 - 20)  SpO2: 96% (31 Dec 2023 22:29) (95% - 99%)    O2 Parameters below as of 31 Dec 2023 22:02  Patient On (Oxygen Delivery Method): BiPAP/CPAP    O2 Concentration (%): 40    CONSTITUTIONAL: awake, alert, no acute distress, speaking complete sentences comfortably on RA  HEENT: Atraumatic normocephalic. Moist mucous membranes.  No conjunctival injection.  RESP: No respiratory distress; CTA b/l, no WRR  CV: RRR, +S1S2, no MRG  GI: Soft, nontender, +distended, no rebound or guarding  MSK: + bilat LE paraplegic. Bilateral LE nonpitting edema. No calf tenderness.  SKIN: Warm and dry. No rashes noted.  NEURO: AOx3, answering questions and following commands appropriately  PSYCH: Mood and affect appropriate, recent memory intact Vital Signs Last 24 Hrs  T(C): 39.3 (31 Dec 2023 20:20), Max: 39.3 (31 Dec 2023 20:20)  T(F): 102.7 (31 Dec 2023 20:20), Max: 102.7 (31 Dec 2023 20:20)  HR: 126 (31 Dec 2023 22:29) (126 - 153)  BP: 131/67 (31 Dec 2023 22:02) (122/84 - 131/67)  RR: 14 (31 Dec 2023 22:02) (14 - 20)  SpO2: 96% (31 Dec 2023 22:29) (95% - 99%)    O2 Parameters below as of 31 Dec 2023 22:02  Patient On (Oxygen Delivery Method): BiPAP/CPAP    O2 Concentration (%): 40    CONSTITUTIONAL: awake, alert, no acute distress, speaking complete sentences comfortably on RA  HEENT: Atraumatic normocephalic. Moist mucous membranes.  No conjunctival injection.  RESP: No respiratory distress; CTA b/l, no WRR  CV: RRR, +S1S2, no MRG  GI: Soft, nontender, +distended, no rebound or guarding. + dark red urine in iraheta  MSK: + bilat LE paraplegic. Bilateral LE nonpitting edema. No calf tenderness.  SKIN: Warm and dry. No rashes noted.  NEURO: AOx3, answering questions and following commands appropriately  PSYCH: Mood and affect appropriate, recent memory intact Vital Signs Last 24 Hrs  T(C): 39.3 (31 Dec 2023 20:20), Max: 39.3 (31 Dec 2023 20:20)  T(F): 102.7 (31 Dec 2023 20:20), Max: 102.7 (31 Dec 2023 20:20)  HR: 126 (31 Dec 2023 22:29) (126 - 153)  BP: 131/67 (31 Dec 2023 22:02) (122/84 - 131/67)  RR: 14 (31 Dec 2023 22:02) (14 - 20)  SpO2: 96% (31 Dec 2023 22:29) (95% - 99%)    O2 Parameters below as of 31 Dec 2023 22:02  Patient On (Oxygen Delivery Method): BiPAP/CPAP    O2 Concentration (%): 40  CONSTITUTIONAL: awake, alert, no acute distress, speaking complete sentences comfortably on RA  HEENT: Atraumatic normocephalic. Moist mucous membranes.  No conjunctival injection.  RESP: No respiratory distress; CTA b/l, no WRR  CV: RRR, +S1S2, no MRG  GI: Soft, nontender, +distended, no rebound or guarding. + dark red urine in iraheta  MSK: + bilat LE paraplegic. Bilateral LE nonpitting edema. No calf tenderness.  SKIN: Warm and dry. No rashes noted.  NEURO: AOx3, answering questions and following commands appropriately  PSYCH: Mood and affect appropriate, recent memory intact

## 2024-01-01 NOTE — PATIENT PROFILE ADULT - TRANSPORTATION
Notify your primary care provider if you experience chest pain, difficulty breathing, fevers/chills, nausea or vomiting, bleeding in stool, excessive diarrhea, numbness or weakness or tingling in any part of your body.     no

## 2024-01-01 NOTE — PATIENT PROFILE ADULT - FUNCTIONAL ASSESSMENT - BASIC MOBILITY 6.
1-calculated by average/Not able to assess (calculate score using [facility] averaging method)  1-calculated by average/Not able to assess (calculate score using [facility] averaging method)  1-calculated by average/Not able to assess (calculate score using [facility] averaging method)  1-calculated by average/Not able to assess (calculate score using [facility] averaging method)

## 2024-01-01 NOTE — PROGRESS NOTE ADULT - PROBLEM SELECTOR PLAN 3
- likely prerenal, 2/2 dehydration from UTI  - BUN/Cr 29/1.7  - baseline Cr 1.16 in 9/2023, pt did have Cr 7 in early 9/2023  - s/p 2 L NS bolus- 1 bolus 3pm today   - start maintenance fluids 100 cc/hr x 12 hours  -  bmp in am

## 2024-01-01 NOTE — H&P ADULT - PROBLEM SELECTOR PLAN 5
- chronic problem  - paraplegic  - chronic myrtle - CT chest: Small left pleural effusion with left lower lobe segmental atelectasis with associated left hemidiaphragm elevation.  - Tachypnea improved s/p bipap  - continue to monitor vitals

## 2024-01-01 NOTE — PATIENT PROFILE ADULT - FALL HARM RISK - HARM RISK INTERVENTIONS
Assistance with ambulation/Assistance OOB with selected safe patient handling equipment/Communicate Risk of Fall with Harm to all staff/Discuss with provider need for PT consult/Monitor gait and stability/Provide patient with walking aids - walker, cane, crutches/Reinforce activity limits and safety measures with patient and family/Tailored Fall Risk Interventions/Visual Cue: Yellow wristband and red socks/Bed in lowest position, wheels locked, appropriate side rails in place/Call bell, personal items and telephone in reach/Instruct patient to call for assistance before getting out of bed or chair/Non-slip footwear when patient is out of bed/[place] to call system/Physically safe environment - no spills, clutter or unnecessary equipment/Purposeful Proactive Rounding/Room/bathroom lighting operational, light cord in reach Assistance with ambulation/Assistance OOB with selected safe patient handling equipment/Communicate Risk of Fall with Harm to all staff/Discuss with provider need for PT consult/Monitor gait and stability/Provide patient with walking aids - walker, cane, crutches/Reinforce activity limits and safety measures with patient and family/Tailored Fall Risk Interventions/Visual Cue: Yellow wristband and red socks/Bed in lowest position, wheels locked, appropriate side rails in place/Call bell, personal items and telephone in reach/Instruct patient to call for assistance before getting out of bed or chair/Non-slip footwear when patient is out of bed/[place] to call system/Physically safe environment - no spills, clutter or unnecessary equipment/Purposeful Proactive Rounding/Room/bathroom lighting operational, light cord in reach Assistance with ambulation/Assistance OOB with selected safe patient handling equipment/Communicate Risk of Fall with Harm to all staff/Discuss with provider need for PT consult/Monitor gait and stability/Provide patient with walking aids - walker, cane, crutches/Reinforce activity limits and safety measures with patient and family/Tailored Fall Risk Interventions/Visual Cue: Yellow wristband and red socks/Bed in lowest position, wheels locked, appropriate side rails in place/Call bell, personal items and telephone in reach/Instruct patient to call for assistance before getting out of bed or chair/Non-slip footwear when patient is out of bed/[place] to call system/Physically safe environment - no spills, clutter or unnecessary equipment/Purposeful Proactive Rounding/Room/bathroom lighting operational, light cord in reach Assistance with ambulation/Assistance OOB with selected safe patient handling equipment/Communicate Risk of Fall with Harm to all staff/Discuss with provider need for PT consult/Monitor gait and stability/Provide patient with walking aids - walker, cane, crutches/Reinforce activity limits and safety measures with patient and family/Tailored Fall Risk Interventions/Visual Cue: Yellow wristband and red socks/Bed in lowest position, wheels locked, appropriate side rails in place/Call bell, personal items and telephone in reach/Instruct patient to call for assistance before getting out of bed or chair/Non-slip footwear when patient is out of bed/[place] to call system/Physically safe environment - no spills, clutter or unnecessary equipment/Purposeful Proactive Rounding/Room/bathroom lighting operational, light cord in reach

## 2024-01-01 NOTE — PROGRESS NOTE ADULT - PROBLEM SELECTOR PLAN 6
- chronic problem  - paraplegic  - chronic myrtle  - not currently on medication - chronic problem  - paraplegic  - chronic iraheta- changed in ed   - not currently on medication

## 2024-01-01 NOTE — H&P ADULT - CONVERSATION DETAILS
Discussed components of MOLST. Pt states that he wishes to be DNR/DNI. He states that he has lived his life, and with MS every day is difficult. He states if he dies he wants to go. Pt will be DNR/DNI. MOLST completed and placed in chart. DNR/DNI order placed. Discussed components of MOLST. Pt states that he wishes to be DNR/DNI. He states that he has lived his life, and with MS every day is difficult. He states if he dies he wants to go. Pt will be DNR/DNI. MOLST completed and placed in chart. DNR/DNI order placed.        time spent 20 minutes

## 2024-01-01 NOTE — PROGRESS NOTE ADULT - PROBLEM SELECTOR PLAN 1
Meets sepsis criteria WBC 28.6, T 102.7, , RR 20 - Source: UTI and possible pyelonephritis possible cauti   Lactate - 2.9 -> 2.8   UA positive for UTI, with large blood  EKG sinus tachycardia, likely 2/2 fever  Sepsis protocol  -s/p vanco and zosyn in ED  s/p 2 L NS bolus and ofirmev in ED  ICU consulted for tachypnea: Suspect tachypnea to be secondary to febrile episode. Recommends ATC tylenol. For hypovolemia recommends 30cc/kg bolus. Will likely need additional fluid aside from the 30cc/kg bolus.   start maintenance fluids 100 cc/hr x 12 hours  FU BCx, UCx pending   prn tylenol for fever  start ceftriaxone 2g QD for complicated UTI with CAUTI  Trend wbc, fever, lactate 2.4 still elevated give one bolus - repeat tonight after iv hydration   Consult ID, Dr. Fonseca, FU recs Meets sepsis criteria WBC 28.6, T 102.7, , RR 20 - Source: UTI and possible pyelonephritis possible cauti   Lactate - 2.9 -> 2.8  , UA positive for UTI, with large blood  EKG sinus tachycardia, likely 2/2 fever  Sepsis protocol  -s/p vanco and zosyn in ED  s/p 2 L NS bolus and ofirmev in ED  ICU consulted for tachypnea: Suspect tachypnea to be secondary to febrile episode. Recommends ATC tylenol. For hypovolemia recommends 30cc/kg bolus. Will likely need additional fluid aside from the 30cc/kg bolus.   start maintenance fluids 100 cc/hr x 12 hours  FU BCx, UCx pending   prn tylenol for fever  start ceftriaxone 2g QD for complicated UTI with CAUTI  Trend wbc, fever, lactate 2.4 still mild  elevated give one bolus - repeat tonight after iv hydration   Consult ID, Dr. Fonseca,

## 2024-01-01 NOTE — H&P ADULT - PROBLEM SELECTOR PLAN 6
- CT A/P: Hepatomegaly and hepatic steatosis.  - pt reports social alcohol use on weekends  - LFTs wnl  - FU AM CMP - chronic problem  - paraplegic  - chronic myrtle  - not currently on medication

## 2024-01-01 NOTE — CONSULT NOTE ADULT - ASSESSMENT
48y Male with hx MS and paraplegia, with chronic iraheta, who presented with shortness of breath. Found to have sepsis likely 2/2 UTI/pyelonephritis. Multiple prior urine cultures have grown E coli and providencia, sensitive to ceftriaxone. Had 1 urine culture which grew E. faecium. Fever resolved and WBC slightly improved. COVID, flu/RSV negative.    #UTI/pyelonephritis  #Fever  #Leukocytosis    -continue ceftriaxone 2g IV q24h, if any more fever would give a dose of daptomycin  -follow blood and urine cultures  -monitor WBC    Thank you for courtesy of this consult.     Will follow.  Discussed with the primary team.     Elaine Mendez MD  Division of Infectious Diseases   Cell [phone] between 8am and 6pm   After 6pm and weekends please call ID service at [phone].     55 minutes spent on total encounter assessing patient, examination, chart review, counseling and coordinating care by the attending physician/nurse/care manager.  48y Male with hx MS and paraplegia, with chronic iraheta, who presented with shortness of breath. Found to have sepsis likely 2/2 UTI/pyelonephritis. Multiple prior urine cultures have grown E coli and providencia, sensitive to ceftriaxone. Had 1 urine culture which grew E. faecium. Fever resolved and WBC slightly improved. COVID, flu/RSV negative.    #UTI/pyelonephritis  #Fever  #Leukocytosis    -continue ceftriaxone 2g IV q24h, if any more fever would give a dose of daptomycin  -follow blood and urine cultures  -monitor WBC    Thank you for courtesy of this consult.     Will follow.  Discussed with the primary team.     Elaine Mendez MD  Division of Infectious Diseases   Cell [phone] between 8am and 6pm   After 6pm and weekends please call ID service at [phone].     55 minutes spent on total encounter assessing patient, examination, chart review, counseling and coordinating care by the attending physician/nurse/care manager.  48y Male with hx MS and paraplegia, with chronic iraheta, who presented with shortness of breath. Found to have sepsis likely 2/2 UTI/pyelonephritis. Multiple prior urine cultures have grown E coli and providencia, sensitive to ceftriaxone. Had 1 urine culture which grew E. faecium. Fever resolved and WBC slightly improved. COVID, flu/RSV negative.    #UTI/pyelonephritis  #Fever  #Leukocytosis    -continue ceftriaxone 2g IV q24h, if any more fever would give a dose of daptomycin  -follow blood and urine cultures  -monitor WBC    Thank you for courtesy of this consult.     Will follow.  Discussed with the primary team.     Elaine Mendez MD  Division of Infectious Diseases   Cell [phone] between 8am and 6pm   After 6pm and weekends please call ID service at [phone].     55 minutes spent on total encounter assessing patient, examination, chart review, counseling and coordinating care by the attending physician/nurse/care manager.  48y Male with hx MS and paraplegia, with chronic iraheta, who presented with shortness of breath. Found to have sepsis likely 2/2 UTI/pyelonephritis. Multiple prior urine cultures have grown E coli and providencia, sensitive to ceftriaxone. Had 1 urine culture which grew E. faecium. Fever resolved and WBC slightly improved. COVID, flu/RSV negative.    #UTI/pyelonephritis  #Fever  #Leukocytosis    -continue ceftriaxone 2g IV q24h, if any more fever would give a dose of daptomycin  -follow blood and urine cultures  -monitor WBC    Thank you for courtesy of this consult.     Will follow.  Discussed with the primary team.     Elaine Mendez MD  Division of Infectious Diseases   Cell [phone] between 8am and 6pm   After 6pm and weekends please call ID service at [phone].     55 minutes spent on total encounter assessing patient, examination, chart review, counseling and coordinating care by the attending physician/nurse/care manager.

## 2024-01-01 NOTE — H&P ADULT - ATTENDING COMMENTS
48y Male with hx MS and paraplegia, with chronic iraheta, complaining of shortness of breath. Admitted for sepsis 2/2 UTI and possible pyelonephritis, NAY, and dehydration.    Agree with above. Edited where appropriate

## 2024-01-01 NOTE — H&P ADULT - ASSESSMENT
The patient is a 48y Male with hx MS and paraplegia, with chronic iraheta, complaining of shortness of breath. Admitted for sepsis 2/2 UTI and possible pyelonephritis, NAY, and dehydration.

## 2024-01-01 NOTE — PROGRESS NOTE ADULT - PROBLEM SELECTOR PLAN 4
- pt tachypneic in ED, placed on BIPAP  - CTA chest neg for PE  - Pt DNR/DNI  - ICU consult: Suspect tachypnea to be secondary to febrile episode   - pt currently tolerating RA  - Plan as above

## 2024-01-01 NOTE — H&P ADULT - PROBLEM SELECTOR PLAN 2
- pt with chronic iraheta 2/2 paraplegia, changed in ED  - U/A positive for UTI  - CT A/P: Iraheta catheter within the markedly thickened urinary bladder with perivesicular stranding. Bilateral hydroureteronephrosis to the level of the ureterovesicular junction without obstructing stone identified.  Findings are most compatible with ascending urinary tract infection, including possible pyelonephritis.  - FU UCx  - plan as above

## 2024-01-01 NOTE — H&P ADULT - PROBLEM SELECTOR PLAN 4
- pt tachypneic in ED, placed on BIPAP  - Pt DNR/DNI  - ICU consult: Suspect tachypnea to be secondary to febrile episode   - pt currently tolerating RA  - Plan as above - pt tachypneic in ED, placed on BIPAP  - CTA chest neg for PE  - Pt DNR/DNI  - ICU consult: Suspect tachypnea to be secondary to febrile episode   - pt currently tolerating RA  - Plan as above

## 2024-01-01 NOTE — H&P ADULT - NSHPPOADEEPVENOUSTHROMB_GEN_A_CORE
Consultation - Internal Medicine  Wendy Organ 80 y o  male MRN: [id number]  Unit/Bed#: E2 -01 Encounter: [id number]      Impression :-     80 y o  male patient, who has undergone elective left total knee replacement earlier today by Dr Benito Santoro  Advanced end-stage degenerative joint disease, left knee  Obstructive sleep apnea/use CPAP regularly  Previous history of left lower extremity DVT  Hypertension  Hyperlipidemia  History of chronic degenerative disc disease with right-sided sciatica  Ambulatory dysfunction  Postoperative indwelling Hernandez catheter  Postoperative left knee accordion suction catheter  Warfarin anticoagulation, INR 0 99     Recommendation: -    Patient is having mild to moderate pain already, he request pain medications and was being given Toradol when I came to see the patient  We discussed numeric pain scale with the patient and requested him to notify severity of his pain so he can get appropriate medications  I have reviewed patients medications as initiated on post operative orders by the primary team  Recommend  monitoring closely for any hypoxemia / respiratory insufficieny related to narcotics and to reduce doses and frequency of narcotics if necessary  Resume patients home medications and I have reviewed them  Maintain Intravenous Fluids for next 24 -48 hours, till patient able to reliably keep meals and meds in  Suggest  Zofran ODT 4 mg sublingually PRN for control of post operative nausea  Patient encouraged to  use Incentive spirometry q 15 minutes while awake to minimize risk of postoperative atelectasis and pneumonia  Patient verbalized to understand and fully comprehend  Recommend DVT prophylaxis with use of Venodyne compression boots  If any factor X A inhibitor,  low molecule weight heparin or Coumadin is planned by the primary team, we will be happy to dose that  drug based on patient's hemostasis    Patient now as noted on his medical orders seems that he will be started on Coumadin 5 mg his INR is 0 99, target INR should be around 2 0 for therapeutic benefit for DVT and pulmonary embolism prophylaxis    Discontinue patient's Hernandez catheter within next 24-48 hours in order  to minimize risk of catheter associated UTI  Please check patient's hemoglobin and hematocrit within next 24 hours to ensure that there is no acute blood loss anemia which would need any blood transfusion  Patient can resume his Mirapex, lisinopril, Neurontin and Protonix  I will defer from using melatonin tonight this can be resumed from tomorrow  We will follow this pleasant patient with your service closely and recommend necessary changes based on  further hospital course and diagnostics  Thank you, Dr Sirisha Nunez , for your kind consultation  HISTORY OF PRESENT ILLNESS:    Reason for Consult:  Post operative management for patient was undergone elective left total knee replacement and has multiple other comorbidities  HPI: Hemant Tellez is a 80 y o  male who has suffered with chronic pain in his left knee for a long period of time  Initially the pain was infrequent for localized in the anterior aspect of his knee and was exacerbated with prolonged sitting prolonged walking and weight-bearing  As the time progressed and over last 7-8 months his pain was more progressive and at times 10/10 on numeric pain scale  He was taking various pain medications initially over-the-counter and also thereafter was being prescribed narcotic medications  He was taking multiple Percocets a day but this was not helping his situation  Patient thereafter was referred to Orthopedic team where he had various imaging studies done and was confirmed to have advanced osteoarthritis involving his left knee  Dr Sirisha Nunez recommended knee replacement versus ongoing conservative treatments    Patient indicated his conservative treatments did not improve his situation and he was very keen on getting the surgery done sooner than later  He was cleared by his outpatient primary care team and underwent the surgery earlier today  Patient present is sitting comfortably in bed he reports that his nerve block is wearing off and is starting to have pain  Patient was worried about his pain and had a long discussion about various pain medicines he can utilize  Considering that he has been narcotic dependent in the past he may have a lower threshold and may require large doses to control his symptoms  Patient also has underlying sleep apnea and he did not bring his CPAP, he stated that his pressure settings are 15 and he has given me consent that he would like to get a loaner the from the hospital to use a CPAP  I have also encouraged him to bring his own CPAP device which might have better settings/better fittings  Review of Systems   Constitutional:  No appetite change, no chills, diaphoresis, fatigue or fever  HEENT:  Negative for congestion, sore throat and tinnitus  No visual complaints  Respiratory:  Negative cough, chest tightness, shortness of breath and wheezing  Cardiovascular:  Negative for chest pain and palpitations  Gastrointestinal: Negative for abdominal distention or pain, constipation, diarrhea and nausea  Endocrine: Negative for cold intolerance, heat intolerance, polydipsia and polyuria  Genitourinary:                Negative for  dysuria, flank pain  Tolerating Hernandez without difficulty  Skin:   Negative for color change, pallor and rash  Neurological:   Negative for dizziness, weakness, light-headedness, numbness and headaches  Hematological:  Musculoskeletal:  Psychiatric:  No h/o spontaneous bruising/bleeding  Joint pains as above  Negative for agitation, dysphoric mood and sleep disturbance  A complete system-based review of systems as discussed with patient is otherwise negative      PAST MEDICAL HISTORY:  Past Medical History:   Diagnosis Date    Anemia     Anesthesia complication     woke up during surgery-orthopedic surgery LLE    Anxiety     Arthritis     b/l knees, hips, neck    Bee sting allergy     CPAP (continuous positive airway pressure) dependence     Degenerative disc disease, cervical     Depression     GERD (gastroesophageal reflux disease)     Headache     history of silent migraines    History of deep vein thrombosis (DVT) of lower extremity     LLE    Hyperlipidemia     Hypertension     Lumbar herniated disc     sciatica on right    Myoclonic jerking while sleeping     Pneumonia     x3     Right knee pain     and left    Sleep apnea     Use of cane as ambulatory aid     prn    Wears dentures     full upper    Wears glasses      Past Surgical History:   Procedure Laterality Date    BACK SURGERY      lumbar    COLONOSCOPY      FRACTURE SURGERY Left     L tib/fib fx    HERNIA REPAIR      INGUINAL HERNIA REPAIR      KNEE ARTHROSCOPY Left     LUMBAR LAMINECTOMY      L4-L5    VT CAUTER TURBINATE MUCOSA,INTRAMURAL N/A 7/25/2017    Procedure: PARTIAL INFERIOR TURBINATE REDUCTION WITH OUTFRACTURE;  Surgeon: Sivakumar Odonnell DO;  Location: [facility];  Service: ENT    VT REPAIR OF NASAL SEPTUM N/A 7/25/2017    Procedure: SEPTOPLASTY WITH POSSIBLE RECONSTRUCTION;  Surgeon: Sivakumar Odonnell DO;  Location: [facility];  Service: ENT    UMBILICAL HERNIA REPAIR         FAMILY HISTORY:  Non-contributory    SOCIAL HISTORY:  History   Alcohol Use    Yes     Comment: 1x month     History   Drug Use No     History   Smoking Status    Former Smoker    Packs/day: 0 50    Years: 50 00    Types: Cigarettes    Quit date: 9/23/2017   Smokeless Tobacco    Never Used       ALLERGIES:  No Known Allergies    MEDICATIONS:  All current active medications have been reviewed    Current Facility-Administered Medications   Medication Dose Route Frequency Provider Last Rate Last Dose    acetaminophen (TYLENOL) tablet 650 mg  650 mg Oral Q6H PRN Shruthi Sun PA-C        bisacodyl (DULCOLAX) rectal suppository 10 mg  10 mg Rectal Daily PRN Shruthi Sun PA-C        calcium carbonate (TUMS) chewable tablet 1,000 mg  1,000 mg Oral Daily PRN Shruthi Sun PA-C        ceFAZolin (ANCEF) IVPB (premix) 1,000 mg  1,000 mg Intravenous Q8H Shruthi Sun PA-C        dexamethasone (DECADRON) injection 8 mg  8 mg Intravenous Once PRN Greg Contreras MD        diphenhydrAMINE (BENADRYL) injection 25 mg  25 mg Intravenous Q6H PRN Greg Contreras MD        [START ON 2/10/2018] diphenhydrAMINE (BENADRYL) injection 50 mg  50 mg Intravenous Q6H PRN Shruthi Sun PA-C        docusate sodium (COLACE) capsule 100 mg  100 mg Oral BID Shruthi Sun PA-C        HYDROmorphone (DILAUDID) injection 0 5 mg  0 5 mg Intravenous Q1H PRN Greg Contreras MD        ketorolac (TORADOL) injection 15 mg  15 mg Intravenous Q6H PRN Shruthi Sun PA-C        ketorolac (TORADOL) injection 15 mg  15 mg Intravenous Q6H Greg Contreras MD        lactated ringers infusion  100 mL/hr Intravenous Continuous Shruthi Sun PA-C 100 mL/hr at 02/09/18 1305 100 mL/hr at 02/09/18 1305    methocarbamol (ROBAXIN) tablet 500 mg  500 mg Oral Q6H PRN Shruthi Sun PA-C        metoclopramide (REGLAN) injection 5 mg  5 mg Intravenous Q6H PRN Greg Contreras MD        [START ON 2/10/2018] morphine injection 2 mg  2 mg Intravenous Q2H PRN Shruthi Sun PA-C        [START ON 2/10/2018] multivitamin-minerals (CENTRUM) tablet 1 tablet  1 tablet Oral Daily Shruthi Sun PA-C        nalbuphine (NUBAIN) injection 5 mg  5 mg Intravenous Q3H PRN Greg Contreras MD        naloxone [facility]) injection 0 04 mg  0 04 mg Intravenous [id number] PRN Artem Kahn MD        [START ON 2/10/2018] ondansetron (ZOFRAN) injection 4 mg  4 mg Intravenous Q8H PRN Shruthi Sun PA-C        ondansetron [facility]) injection 4 mg  4 mg Intravenous Q4H PRN MD Alexis Chicas ON 2/10/2018] oxyCODONE (ROXICODONE) immediate release tablet 10 mg  10 mg Oral Q4H PRN Benjie Cooper PA-C        [START ON 2/10/2018] oxyCODONE (ROXICODONE) IR tablet 5 mg  5 mg Oral Q4H PRN Benjie Cooper PA-C        sodium chloride 0 9 % infusion  125 mL/hr Intravenous Continuous Dayne MD William   Stopped at 18 1304    [START ON 2/10/2018] traMADol (ULTRAM) tablet 50 mg  50 mg Oral Q6H PRN Benjie Cooper PA-C        warfarin (COUMADIN) tablet 5 mg  5 mg Oral Once (warfarin) Benjie Cooper PA-C        [START ON 2/10/2018] zolpidem (AMBIEN) tablet 5 mg  5 mg Oral HS PRN Benjie Cooper PA-C           Prescriptions Prior to Admission   Medication    acetaminophen (TYLENOL) 500 mg tablet    FLUoxetine (PROzac) 40 MG capsule    gabapentin (NEURONTIN) 300 mg capsule    lisinopril (ZESTRIL) 20 mg tablet    Melatonin 10 MG TABS    pantoprazole (PROTONIX) 40 mg tablet    pramipexole (MIRAPEX) 0 5 mg tablet    rosuvastatin (CRESTOR) 20 MG tablet    traMADol (ULTRAM) 50 mg tablet           PHYSICAL EXAM:    Vitals:  HR:  [74-98] 98  Resp:  [13-20] 16  BP: (103-144)/(55-83) 126/74  SpO2:  [92 %-100 %] 97 %  Temp (24hrs), Av 6 °F (36 4 °C), Min:97 2 °F (36 2 °C), Max:98 4 °F (36 9 °C)  Current: Temperature: (!) 97 4 °F (36 3 °C)  Body mass index is 28 81 kg/m²  General Appearance:    Awake, alert, cooperative,     Mild distress +     + san / surgical drain and IV line noted  Head:    Normocephalic, without obvious abnormality, atraumatic   Eyes:    Pupils equal in size,conjunctiva/corneas clear, EOM's intact  Nose:   No evidence of epistaxis/ discharge from nares  Throat:   Lips, mucosa, and tongue is dry  Neck:   Supple, symmetrical, trachea midline, no JVP  Back:     Symmetric, no curvature, no CVA tenderness   Lungs:     Bilateral air entry is broncho-alveolar and equal        No crepitation or rales  No pleural rub  Heart:    Regular rate and rhythm, S1S2 normal, no murmur, rub  Abdomen:     Soft, non-tender, no masses, no organomegaly   Genitalia:    Indwelling Hernandez catheter  Clear urine +, No hematuria  Musculoskeletal:   Extremities appear normal, atraumatic, no cyanosis or       edema  Surgical on left knee site has clear dressing / no     bleeding or   hemorrhage apparent  Vascular:   2+ in Posterior tibialis / dorsalis pedis  Skin:   Skin color, texture, turgor normal, no rashes or lesions   Neurologic:   Oriented/ Awake/ facial symmetry maintained  Speech is      intact  Muscle bulk and strength is equivocal in B/L Upper and lower extremities except on operated left lower limb  Light sensation is intact B/l LE  B/L [place] flexion is WNL  LABS, IMAGING, & OTHER STUDIES:  Lab Results:  I have personally reviewed pertinent labs  Lab Results   Component Value Date     01/23/2018     01/23/2018    CO2 30 01/23/2018    ANIONGAP 9 01/23/2018    BUN 10 01/23/2018    CREATININE 1 00 01/23/2018    EGFR 78 01/23/2018    GLUCOSE 112 01/23/2018    CALCIUM 9 3 01/23/2018    AST 24 01/23/2018    ALT 38 01/23/2018    ALKPHOS 71 01/23/2018    PROT 7 6 01/23/2018    BILITOT 0 34 01/23/2018     Lab Results   Component Value Date    WBC 8 13 01/23/2018    HGB 14 9 01/23/2018     01/23/2018       Lab Results   Component Value Date    INR 0 99 02/09/2018    INR 0 90 01/23/2018         Imaging Studies:   I have personally reviewed pertinent imaging study reports  Imaging:     Xr Chest Pa & Lateral    Result Date: 1/24/2018  Narrative:     COMPARISON:  None   VIEWS:  Frontal and lateral projections   IMAGES:  3   FINDINGS: Cardiomediastinal silhouette appears unremarkable  The lungs are clear  No pneumothorax or pleural effusion  Visualized osseous structures appear within normal limits for the patient's age  Impression: No active pulmonary disease  EKG, Pathology, and Other Studies:   I have personally reviewed pertinent reports      Preoperative electrocardiogram done on January 23, 2018 reviewed, it shows normal sinus rhythm with 79 beats per minute on the ventricular rate, CO interval 148 milliseconds, QTC interval 456 milliseconds, nonspecific T-wave abnormalities  No previous ECGs available for comparison  Portions of the record may have been created with voice recognition software  Occasional wrong word or "sound a like" substitutions may have occurred due to the inherent limitations of voice recognition software  Read the chart carefully and recognize, using context, where substitutions have occurred  no

## 2024-01-01 NOTE — H&P ADULT - PROBLEM SELECTOR PLAN 7
- subcu heparin    # GOC  - DNR/DNI, MOLST placed in chart - CT A/P: Hepatomegaly and hepatic steatosis.  - pt reports social alcohol use on weekends  - LFTs wnl  - FU AM CMP

## 2024-01-02 ENCOUNTER — NON-APPOINTMENT (OUTPATIENT)
Age: 49
End: 2024-01-02

## 2024-01-02 LAB
ANION GAP SERPL CALC-SCNC: 4 MMOL/L — LOW (ref 5–17)
BASOPHILS # BLD AUTO: 0.05 K/UL — SIGNIFICANT CHANGE UP (ref 0–0.2)
BASOPHILS NFR BLD AUTO: 0.3 % — SIGNIFICANT CHANGE UP (ref 0–2)
BUN SERPL-MCNC: 18 MG/DL — SIGNIFICANT CHANGE UP (ref 7–23)
CALCIUM SERPL-MCNC: 8.5 MG/DL — SIGNIFICANT CHANGE UP (ref 8.5–10.1)
CHLORIDE SERPL-SCNC: 114 MMOL/L — HIGH (ref 96–108)
CO2 SERPL-SCNC: 25 MMOL/L — SIGNIFICANT CHANGE UP (ref 22–31)
CREAT SERPL-MCNC: 1.1 MG/DL — SIGNIFICANT CHANGE UP (ref 0.5–1.3)
EGFR: 83 ML/MIN/1.73M2 — SIGNIFICANT CHANGE UP
EOSINOPHIL # BLD AUTO: 0.26 K/UL — SIGNIFICANT CHANGE UP (ref 0–0.5)
EOSINOPHIL NFR BLD AUTO: 1.7 % — SIGNIFICANT CHANGE UP (ref 0–6)
GLUCOSE SERPL-MCNC: 87 MG/DL — SIGNIFICANT CHANGE UP (ref 70–99)
HCT VFR BLD CALC: 33.6 % — LOW (ref 39–50)
HGB BLD-MCNC: 10.8 G/DL — LOW (ref 13–17)
IMM GRANULOCYTES NFR BLD AUTO: 0.5 % — SIGNIFICANT CHANGE UP (ref 0–0.9)
LYMPHOCYTES # BLD AUTO: 15.1 % — SIGNIFICANT CHANGE UP (ref 13–44)
LYMPHOCYTES # BLD AUTO: 2.31 K/UL — SIGNIFICANT CHANGE UP (ref 1–3.3)
MCHC RBC-ENTMCNC: 28.2 PG — SIGNIFICANT CHANGE UP (ref 27–34)
MCHC RBC-ENTMCNC: 32.1 GM/DL — SIGNIFICANT CHANGE UP (ref 32–36)
MCV RBC AUTO: 87.7 FL — SIGNIFICANT CHANGE UP (ref 80–100)
MONOCYTES # BLD AUTO: 1.05 K/UL — HIGH (ref 0–0.9)
MONOCYTES NFR BLD AUTO: 6.9 % — SIGNIFICANT CHANGE UP (ref 2–14)
NEUTROPHILS # BLD AUTO: 11.53 K/UL — HIGH (ref 1.8–7.4)
NEUTROPHILS NFR BLD AUTO: 75.5 % — SIGNIFICANT CHANGE UP (ref 43–77)
NRBC # BLD: 0 /100 WBCS — SIGNIFICANT CHANGE UP (ref 0–0)
PLATELET # BLD AUTO: 260 K/UL — SIGNIFICANT CHANGE UP (ref 150–400)
POTASSIUM SERPL-MCNC: 3.7 MMOL/L — SIGNIFICANT CHANGE UP (ref 3.5–5.3)
POTASSIUM SERPL-SCNC: 3.7 MMOL/L — SIGNIFICANT CHANGE UP (ref 3.5–5.3)
RBC # BLD: 3.83 M/UL — LOW (ref 4.2–5.8)
RBC # FLD: 16.2 % — HIGH (ref 10.3–14.5)
SODIUM SERPL-SCNC: 143 MMOL/L — SIGNIFICANT CHANGE UP (ref 135–145)
WBC # BLD: 15.27 K/UL — HIGH (ref 3.8–10.5)
WBC # FLD AUTO: 15.27 K/UL — HIGH (ref 3.8–10.5)

## 2024-01-02 PROCEDURE — 99233 SBSQ HOSP IP/OBS HIGH 50: CPT | Mod: GC

## 2024-01-02 PROCEDURE — 99232 SBSQ HOSP IP/OBS MODERATE 35: CPT | Mod: GC

## 2024-01-02 RX ORDER — BACLOFEN 100 %
10 POWDER (GRAM) MISCELLANEOUS EVERY 12 HOURS
Refills: 0 | Status: DISCONTINUED | OUTPATIENT
Start: 2024-01-02 | End: 2024-01-02

## 2024-01-02 RX ORDER — BACLOFEN 100 %
10 POWDER (GRAM) MISCELLANEOUS ONCE
Refills: 0 | Status: COMPLETED | OUTPATIENT
Start: 2024-01-02 | End: 2024-01-02

## 2024-01-02 RX ORDER — BACLOFEN 100 %
10 POWDER (GRAM) MISCELLANEOUS EVERY 8 HOURS
Refills: 0 | Status: DISCONTINUED | OUTPATIENT
Start: 2024-01-03 | End: 2024-01-05

## 2024-01-02 RX ORDER — BACLOFEN 100 %
10 POWDER (GRAM) MISCELLANEOUS EVERY 8 HOURS
Refills: 0 | Status: DISCONTINUED | OUTPATIENT
Start: 2024-01-02 | End: 2024-01-02

## 2024-01-02 RX ADMIN — CEFTRIAXONE 100 MILLIGRAM(S): 500 INJECTION, POWDER, FOR SOLUTION INTRAMUSCULAR; INTRAVENOUS at 19:17

## 2024-01-02 RX ADMIN — Medication 10 MILLIGRAM(S): at 17:42

## 2024-01-02 RX ADMIN — HEPARIN SODIUM 5000 UNIT(S): 5000 INJECTION INTRAVENOUS; SUBCUTANEOUS at 15:03

## 2024-01-02 RX ADMIN — HEPARIN SODIUM 5000 UNIT(S): 5000 INJECTION INTRAVENOUS; SUBCUTANEOUS at 05:18

## 2024-01-02 RX ADMIN — Medication 10 MILLIGRAM(S): at 11:59

## 2024-01-02 RX ADMIN — HEPARIN SODIUM 5000 UNIT(S): 5000 INJECTION INTRAVENOUS; SUBCUTANEOUS at 21:09

## 2024-01-02 NOTE — PROGRESS NOTE ADULT - SUBJECTIVE AND OBJECTIVE BOX
Patient is a 48y old  Male who presents with a chief complaint of UTI, pyelonephritis (01 Jan 2024 15:01)      Subjective:  INTERVAL HPI/OVERNIGHT EVENTS: Patient seen and examined at bedside. No overnight events occurred. Patient has no complaints at this time. Denies fevers, chills, headache, lightheadedness, chest pain, dyspnea, abdominal pain, n/v/d/c.    MEDICATIONS  (STANDING):  cefTRIAXone   IVPB 2000 milliGRAM(s) IV Intermittent every 24 hours  heparin   Injectable 5000 Unit(s) SubCutaneous every 8 hours  influenza   Vaccine 0.5 milliLiter(s) IntraMuscular once  sodium chloride 0.9%. 1000 milliLiter(s) (100 mL/Hr) IV Continuous <Continuous>    MEDICATIONS  (PRN):  acetaminophen     Tablet .. 650 milliGRAM(s) Oral every 6 hours PRN Temp greater or equal to 38C (100.4F), Mild Pain (1 - 3)      Allergies    Cipro (Rash)    Intolerances        REVIEW OF SYSTEMS:  CONSTITUTIONAL: No fever or chills  HEENT:  No headache, no sore throat  RESPIRATORY: No cough, wheezing, or shortness of breath  CARDIOVASCULAR: No chest pain, palpitations  GASTROINTESTINAL: No abd pain, nausea, vomiting, or diarrhea  GENITOURINARY: No dysuria, frequency, or hematuria  NEUROLOGICAL: no focal weakness or dizziness  MUSCULOSKELETAL: no myalgias     Objective:  Vital Signs Last 24 Hrs  T(C): 36.7 (02 Jan 2024 06:18), Max: 37.2 (01 Jan 2024 10:19)  T(F): 98 (02 Jan 2024 06:18), Max: 98.9 (01 Jan 2024 10:19)  HR: 113 (02 Jan 2024 06:18) (113 - 118)  BP: 120/83 (02 Jan 2024 06:18) (108/76 - 127/78)  BP(mean): --  RR: 18 (02 Jan 2024 06:18) (18 - 18)  SpO2: 94% (02 Jan 2024 06:18) (94% - 98%)    Parameters below as of 02 Jan 2024 06:18  Patient On (Oxygen Delivery Method): room air        GENERAL: NAD, lying in bed comfortably  HEAD:  Atraumatic, Normocephalic  EYES: EOMI, PERRLA, conjunctiva and sclera clear  ENT: Moist mucous membranes  NECK: Supple, No JVD  CHEST/LUNG: Clear to auscultation bilaterally; No rales, rhonchi, wheezing, or rubs. Unlabored respirations  HEART: Regular rate and rhythm; No murmurs, rubs, or gallops  ABDOMEN: Bowel sounds present; Soft, Nontender, Nondistended. No hepatomegaly  EXTREMITIES:  2+ Peripheral Pulses, brisk capillary refill. No clubbing, cyanosis, or edema  NERVOUS SYSTEM:  Alert & Oriented X3, speech clear. No deficits   MSK: FROM all 4 extremities, full and equal strength  SKIN: No rashes or lesions    LABS:    CBC Full  -  ( 01 Jan 2024 08:30 )  WBC Count : 26.25 K/uL  Hemoglobin : 12.5 g/dL  Hematocrit : 38.6 %  Platelet Count - Automated : 317 K/uL  Mean Cell Volume : 86.7 fl  Mean Cell Hemoglobin : 28.1 pg  Mean Cell Hemoglobin Concentration : 32.4 gm/dL  Auto Neutrophil # : 22.50 K/uL  Auto Lymphocyte # : 1.77 K/uL  Auto Monocyte # : 1.64 K/uL  Auto Eosinophil # : 0.05 K/uL  Auto Basophil # : 0.08 K/uL  Auto Neutrophil % : 85.8 %  Auto Lymphocyte % : 6.7 %  Auto Monocyte % : 6.2 %  Auto Eosinophil % : 0.2 %  Auto Basophil % : 0.3 %      Ca    8.7        01 Jan 2024 08:30      PT/INR - ( 31 Dec 2023 20:55 )   PT: 11.5 sec;   INR: 0.98 ratio         PTT - ( 31 Dec 2023 20:55 )  PTT:31.2 sec  Urinalysis Basic - ( 01 Jan 2024 08:30 )    Color: x / Appearance: x / SG: x / pH: x  Gluc: 103 mg/dL / Ketone: x  / Bili: x / Urobili: x   Blood: x / Protein: x / Nitrite: x   Leuk Esterase: x / RBC: x / WBC x   Sq Epi: x / Non Sq Epi: x / Bacteria: x      CAPILLARY BLOOD GLUCOSE            Culture - Blood (collected 12-31-23 @ 20:55)  Source: .Blood Blood-Peripheral  Preliminary Report (01-02-24 @ 07:02):    No growth at 24 hours    Culture - Blood (collected 12-31-23 @ 20:50)  Source: .Blood Blood-Peripheral  Preliminary Report (01-02-24 @ 07:02):    No growth at 24 hours        RADIOLOGY & ADDITIONAL TESTS:    Personally reviewed.     Consultant(s) Notes Reviewed:  [x] YES  [ ] NO     Patient is a 48y old  Male who presents with a chief complaint of UTI, pyelonephritis (01 Jan 2024 15:01)      Subjective:  INTERVAL HPI/OVERNIGHT EVENTS: Patient seen and examined at bedside. No overnight events occurred.   Patients only complaint currently is spasms occuring in his legs that started one week ago and last 5-10 mins long.  Patient reports the pain rates as a 20/10.  Denies fevers, chills, headache, lightheadedness, chest pain, dyspnea, abdominal pain, n/v/d/c.    MEDICATIONS  (STANDING):  cefTRIAXone   IVPB 2000 milliGRAM(s) IV Intermittent every 24 hours  heparin   Injectable 5000 Unit(s) SubCutaneous every 8 hours  influenza   Vaccine 0.5 milliLiter(s) IntraMuscular once  sodium chloride 0.9%. 1000 milliLiter(s) (100 mL/Hr) IV Continuous <Continuous>    MEDICATIONS  (PRN):  acetaminophen     Tablet .. 650 milliGRAM(s) Oral every 6 hours PRN Temp greater or equal to 38C (100.4F), Mild Pain (1 - 3)      Allergies    Cipro (Rash)    Intolerances        REVIEW OF SYSTEMS:  CONSTITUTIONAL: No fever or chills  HEENT:  No headache, no sore throat  RESPIRATORY: No cough, wheezing, or shortness of breath  CARDIOVASCULAR: No chest pain, palpitations  GASTROINTESTINAL: No abd pain, nausea, vomiting, or diarrhea  GENITOURINARY: No dysuria, frequency, or hematuria  NEUROLOGICAL: no focal weakness or dizziness  MUSCULOSKELETAL: endorses muscle spasms    Objective:  Vital Signs Last 24 Hrs  T(C): 36.7 (02 Jan 2024 06:18), Max: 37.2 (01 Jan 2024 10:19)  T(F): 98 (02 Jan 2024 06:18), Max: 98.9 (01 Jan 2024 10:19)  HR: 113 (02 Jan 2024 06:18) (113 - 118)  BP: 120/83 (02 Jan 2024 06:18) (108/76 - 127/78)  BP(mean): --  RR: 18 (02 Jan 2024 06:18) (18 - 18)  SpO2: 94% (02 Jan 2024 06:18) (94% - 98%)    Parameters below as of 02 Jan 2024 06:18  Patient On (Oxygen Delivery Method): room air        GENERAL: NAD, lying in bed comfortably  HEAD:  Atraumatic, Normocephalic  EYES: EOMI, PERRLA, conjunctiva and sclera clear  ENT: Moist mucous membranes  NECK: Supple, No JVD  CHEST/LUNG: Clear to auscultation bilaterally; No rales, rhonchi, wheezing, or rubs. Unlabored respirations  HEART: Regular rate and rhythm; No murmurs, rubs, or gallops  ABDOMEN: Bowel sounds present; Soft, Nontender, Nondistended. No hepatomegaly, iraheta in place  EXTREMITIES:  2+ Peripheral Pulses, brisk capillary refill. No clubbing, cyanosis, or edema  NERVOUS SYSTEM:  Alert & Oriented X3, speech clear. No deficits   MSK: FROM all 4 extremities, full and equal strength  SKIN: seborrheic dermatitis diffusely    LABS:    CBC Full  -  ( 01 Jan 2024 08:30 )  WBC Count : 26.25 K/uL  Hemoglobin : 12.5 g/dL  Hematocrit : 38.6 %  Platelet Count - Automated : 317 K/uL  Mean Cell Volume : 86.7 fl  Mean Cell Hemoglobin : 28.1 pg  Mean Cell Hemoglobin Concentration : 32.4 gm/dL  Auto Neutrophil # : 22.50 K/uL  Auto Lymphocyte # : 1.77 K/uL  Auto Monocyte # : 1.64 K/uL  Auto Eosinophil # : 0.05 K/uL  Auto Basophil # : 0.08 K/uL  Auto Neutrophil % : 85.8 %  Auto Lymphocyte % : 6.7 %  Auto Monocyte % : 6.2 %  Auto Eosinophil % : 0.2 %  Auto Basophil % : 0.3 %      Ca    8.7        01 Jan 2024 08:30      PT/INR - ( 31 Dec 2023 20:55 )   PT: 11.5 sec;   INR: 0.98 ratio         PTT - ( 31 Dec 2023 20:55 )  PTT:31.2 sec  Urinalysis Basic - ( 01 Jan 2024 08:30 )    Color: x / Appearance: x / SG: x / pH: x  Gluc: 103 mg/dL / Ketone: x  / Bili: x / Urobili: x   Blood: x / Protein: x / Nitrite: x   Leuk Esterase: x / RBC: x / WBC x   Sq Epi: x / Non Sq Epi: x / Bacteria: x      CAPILLARY BLOOD GLUCOSE            Culture - Blood (collected 12-31-23 @ 20:55)  Source: .Blood Blood-Peripheral  Preliminary Report (01-02-24 @ 07:02):    No growth at 24 hours    Culture - Blood (collected 12-31-23 @ 20:50)  Source: .Blood Blood-Peripheral  Preliminary Report (01-02-24 @ 07:02):    No growth at 24 hours        RADIOLOGY & ADDITIONAL TESTS:    Personally reviewed.     Consultant(s) Notes Reviewed:  [x] YES  [ ] NO     Patient is a 48y old  Male who presents with a chief complaint of UTI, pyelonephritis (01 Jan 2024 15:01)      Subjective:  INTERVAL HPI/OVERNIGHT EVENTS: Patient seen and examined at bedside. No overnight events occurred.   Patients only complaint currently is spasms occuring in his legs that started one week ago and last 5-10 mins long.  Patient reports the pain rates as a 20/10.  Denies fevers, chills, headache, lightheadedness, chest pain, dyspnea, abdominal pain, n/v/d/c.          room air 96     REVIEW OF SYSTEMS:  CONSTITUTIONAL: No fever or chills  HEENT:  No headache, no sore throat  RESPIRATORY: No cough, wheezing, or shortness of breath  CARDIOVASCULAR: No chest pain, palpitations  GASTROINTESTINAL: No abd pain, nausea, vomiting, or diarrhea  GENITOURINARY: No dysuria, frequency, or hematuria  NEUROLOGICAL: no focal weakness or dizziness  MUSCULOSKELETAL: endorses muscle spasms    Objective:  Vital Signs Last 24 Hrs  T(C): 36.7 (02 Jan 2024 06:18), Max: 37.2 (01 Jan 2024 10:19)  T(F): 98 (02 Jan 2024 06:18), Max: 98.9 (01 Jan 2024 10:19)  HR: 113 (02 Jan 2024 06:18) (113 - 118)  BP: 120/83 (02 Jan 2024 06:18) (108/76 - 127/78)  BP(mean): --  RR: 18 (02 Jan 2024 06:18) (18 - 18)  SpO2: 94% (02 Jan 2024 06:18) (94% - 98%)    Parameters below as of 02 Jan 2024 06:18  Patient On (Oxygen Delivery Method): room air        GENERAL: NAD,   HEAD:  Atraumatic, Normocephalic  EYES: EOMI, PERRLA, conjunctiva and sclera clear  ENT: Moist mucous membranes  NECK: Supple, No JVD  CHEST/LUNG:  auscultation bilaterally; No rales, rhonchi, wheezing,   HEART: Regular rate and rhythm; No murmurs, no tachy   ABDOMEN: Bowel sounds present; Soft, Nontender, Nondistended.  EXTREMITIES:  2+ Peripheral Pulses, brisk capillary refill. No clubbing, cyanosis, or edema  NERVOUS SYSTEM:  Alert & Oriented X3, speech clear. No deficits   MSK: FROM all 4 extremities, full and equal strength  SKIN: seborrheic dermatitis diffusely  gu  iraheta in place  MEDICATIONS  (STANDING):  cefTRIAXone   IVPB 2000 milliGRAM(s) IV Intermittent every 24 hours  heparin   Injectable 5000 Unit(s) SubCutaneous every 8 hours  influenza   Vaccine 0.5 milliLiter(s) IntraMuscular once  sodium chloride 0.9%. 1000 milliLiter(s) (100 mL/Hr) IV Continuous <Continuous>    MEDICATIONS  (PRN):  acetaminophen     Tablet .. 650 milliGRAM(s) Oral every 6 hours PRN Temp greater or equal to 38C (100.4F), Mild Pain (1 - 3)      LABS:    CBC Full  -  ( 01 Jan 2024 08:30 )  WBC Count : 26.25 K/uL  Hemoglobin : 12.5 g/dL  Hematocrit : 38.6 %  Platelet Count - Automated : 317 K/uL  Mean Cell Volume : 86.7 fl  Mean Cell Hemoglobin : 28.1 pg  Mean Cell Hemoglobin Concentration : 32.4 gm/dL  Auto Neutrophil # : 22.50 K/uL  Auto Lymphocyte # : 1.77 K/uL  Auto Monocyte # : 1.64 K/uL  Auto Eosinophil # : 0.05 K/uL  Auto Basophil # : 0.08 K/uL  Auto Neutrophil % : 85.8 %  Auto Lymphocyte % : 6.7 %  Auto Monocyte % : 6.2 %  Auto Eosinophil % : 0.2 %  Auto Basophil % : 0.3 %      Ca    8.7        01 Jan 2024 08:30      PT/INR - ( 31 Dec 2023 20:55 )   PT: 11.5 sec;   INR: 0.98 ratio         PTT - ( 31 Dec 2023 20:55 )  PTT:31.2 sec  Urinalysis Basic - ( 01 Jan 2024 08:30 )    Color: x / Appearance: x / SG: x / pH: x  Gluc: 103 mg/dL / Ketone: x  / Bili: x / Urobili: x   Blood: x / Protein: x / Nitrite: x   Leuk Esterase: x / RBC: x / WBC x   Sq Epi: x / Non Sq Epi: x / Bacteria: x      CAPILLARY BLOOD GLUCOSE            Culture - Blood (collected 12-31-23 @ 20:55)  Source: .Blood Blood-Peripheral  Preliminary Report (01-02-24 @ 07:02):    No growth at 24 hours    Culture - Blood (collected 12-31-23 @ 20:50)  Source: .Blood Blood-Peripheral  Preliminary Report (01-02-24 @ 07:02):    No growth at 24 hours        RADIOLOGY & ADDITIONAL TESTS:    Personally reviewed.     Consultant(s) Notes Reviewed:  [x] YES  [ ] NO

## 2024-01-02 NOTE — CASE MANAGEMENT PROGRESS NOTE - NSCMPROGRESSNOTE_GEN_ALL_CORE
Received a call from  follow up from [facility] as pt is receiving services from them. Pt has [facility] for iraheta change monthly and is followed by Dr. Shaw.  She confirms [facility] aide.  She was made aware pt is requesting a new RW for home use, states pt has been referred to [facility] clinic for be fitted for a WC,  she states they will follow up with WC post transition back to home for fitting.  A CM will continue to follow for transition to home when medically stable. Received a call from  follow up from [facility] as pt is receiving services from them. Pt has [facility] for iraheta change monthly and is followed by Dr. Shaw.  She confirms [facility] aide.  She was made aware pt is requesting a new RW for home use, states pt has been referred to [facility] clinic for be fitted for a WC,  she states they will follow up with WC post transition back to home for fitting.  A CM will continue to follow for transition to home when medically stable. Received a call from  follow up from [facility] as pt is receiving services from them. Pt has [facility] for iraheta change monthly and is followed by Dr. Shaw.  She confirms [facility] aide.  She was made aware pt is requesting a new RW for home use, states pt has been referred to [facility] clinic for be fitted for a WC,  she states they will follow up with WC post transition back to home for fitting.  A CM will continue to follow for transition to home when medically stable. Received a call from  follow up from [facility] as pt is receiving services from them. Pt has [facility] for iraheta change monthly and is followed by Dr. Shaw.  She confirms [facility] aide.  She was made aware pt is requesting a new RW for home use, states pt has been referred to [facility] clinic for be fitted for a WC,  she states they will follow up with WC post transition back to home for fitting.  A CM will continue to follow for transition to home when medically stable.

## 2024-01-02 NOTE — PROGRESS NOTE ADULT - PROBLEM SELECTOR PLAN 7
- CT A/P: Hepatomegaly and hepatic steatosis.  - pt reports social alcohol use on weekends  - LFTs wnl  ,  FU AM CMP - CT A/P: Hepatomegaly and hepatic steatosis.  - pt reports social alcohol use on weekends  - LFTs wnl  ,

## 2024-01-02 NOTE — CHART NOTE - NSCHARTNOTEFT_GEN_A_CORE
Called by RN, pt c/o continued muscle spasms secondary to MS. Patient currently on baclofen 10mg q12h.      Assessment/Plan: 48y Male with hx MS and paraplegia, with chronic iraheta, complaining of shortness of breath. Admitted for sepsis 2/2 UTI and possible pyelonephritis, NAY, and dehydration.    1.) Multiple sclerosis  - Discussed with attending, will increase baclofen 10mg q8h  - RN to call if any changes

## 2024-01-02 NOTE — PROGRESS NOTE ADULT - PROBLEM SELECTOR PLAN 1
Meets sepsis criteria WBC 28.6, T 102.7, , RR 20 - Source: UTI and possible pyelonephritis possible cauti   Lactate - 2.9 -> 2.8  , UA positive for UTI, with large blood  EKG sinus tachycardia, likely 2/2 fever  Sepsis protocol  -s/p vanco and zosyn in ED  s/p 2 L NS bolus and ofirmev in ED  ICU consulted for tachypnea: Suspect tachypnea to be secondary to febrile episode. Recommends ATC tylenol. For hypovolemia recommends 30cc/kg bolus. Will likely need additional fluid aside from the 30cc/kg bolus.   start maintenance fluids 100 cc/hr x 12 hours  FU BCx, UCx pending   prn tylenol for fever  start ceftriaxone 2g QD for complicated UTI with CAUTI  Trend wbc, fever, lactate 2.4 still mild  elevated give one bolus - repeat tonight after iv hydration   Consult ID, Dr. Fonseca, Meets sepsis criteria WBC 28.6, T 102.7, , RR 20 - Source: UTI and possible pyelonephritis possible cauti   Lactate - 2.9 -> 2.8  , UA positive for UTI, with large blood  EKG sinus tachycardia, likely 2/2 fever  Sepsis protocol  -s/p vanco and zosyn in ED  s/p 2 L NS bolus and ofirmev in ED  ICU consulted for tachypnea: Suspect tachypnea to be secondary to febrile episode. Recommends ATC tylenol. For hypovolemia recommends 30cc/kg bolus. Will likely need additional fluid aside from the 30cc/kg bolus.   start maintenance fluids 100 cc/hr x 12 hours  FU BCx, UCx pending   prn tylenol for fever  start ceftriaxone 2g QD for complicated UTI with CAUTI  Trend wbc, fever, downtrending leukocytosis currently  Lactate trended to normal   Consult ID, Dr. Fonseca, Meets sepsis criteria WBC 28.6, T 102.7, , RR 20 - Source: UTI and possible pyelonephritis  secto possible cauti   Lactate -was   high  ,   UA positive for UTI, with large blood  EKG sinus tachycardia, likely 2/2 fever  Sepsis protocol  -s/p vanco and zosyn in ED  s/p 2 L NS bolus and ofirmev in ED  ICU consulted for tachypnea: Suspect tachypnea to be secondary to febrile episode. Recommends ATC tylenol. For hypovolemia recommends 30cc/kg bolus. Will likely need additional fluid aside from the 30cc/kg bolus.   start maintenance fluids 100 cc/hr x 12 hours  prn tylenol for fever  start ceftriaxone 2g QD for complicated UTI with CAUTI  Trend wbc, fever, downtrending leukocytosis-FU BCx neg todate , UCx ecoli  Lactate trended to normal   Consult ID, Dr. Fonseca,

## 2024-01-02 NOTE — PROGRESS NOTE ADULT - SUBJECTIVE AND OBJECTIVE BOX
[facility]  INFECTIOUS DISEASES - Yarelis Fonseca, [address], [place] [address]  Tel: [phone]     Fax: [phone]  =======================================================    KENNETH SAUL [id number]    Follow up: No fevers. Reports feeling better. He says Baclofen is working for his spasms. Currently denies any pain. Had leaking from Steele but now is fixed.    Allergies:  Cipro (Rash)      Antibiotics:  acetaminophen     Tablet .. 650 milliGRAM(s) Oral every 6 hours PRN  baclofen 10 milliGRAM(s) Oral every 12 hours  cefTRIAXone   IVPB 2000 milliGRAM(s) IV Intermittent every 24 hours  heparin   Injectable 5000 Unit(s) SubCutaneous every 8 hours  influenza   Vaccine 0.5 milliLiter(s) IntraMuscular once  sodium chloride 0.9%. 1000 milliLiter(s) IV Continuous <Continuous>       REVIEW OF SYSTEMS:  CONSTITUTIONAL: (+) fever  HEENT:  No sore throat or runny nose.  CARDIOVASCULAR:  No chest pain or SOB.  RESPIRATORY:  No cough, shortness of breath  GASTROINTESTINAL:  No nausea, vomiting or diarrhea.  GENITOURINARY:  see history  MUSCULOSKELETAL:  no joint aches  NEUROLOGIC:  No headache or dizziness  PSYCHIATRIC:  No disorder of thought or mood.       Physical Exam:  ICU Vital Signs Last 24 Hrs  T(C): 37 (02 Jan 2024 12:57), Max: 37 (02 Jan 2024 12:57)  T(F): 98.6 (02 Jan 2024 12:57), Max: 98.6 (02 Jan 2024 12:57)  HR: 103 (02 Jan 2024 12:57) (103 - 116)  BP: 100/60 (02 Jan 2024 12:57) (100/60 - 120/83)  BP(mean): --  ABP: --  ABP(mean): --  RR: 18 (02 Jan 2024 12:57) (18 - 18)  SpO2: 96% (02 Jan 2024 12:57) (94% - 98%)    O2 Parameters below as of 02 Jan 2024 12:57  Patient On (Oxygen Delivery Method): room air         GEN: NAD  HEENT: normocephalic and atraumatic.   NECK: Supple.   LUNGS: Normal respiratory effort  HEART: Tachycardic  ABDOMEN: Soft, nontender, and nondistended.    : (+) Steele with light yellow urine  EXTREMITIES: bipedal edema  NEUROLOGIC: AO x 3  PSYCHIATRIC: Appropriate affect .    Labs:  01-02    143  |  114<H>  |  18  ----------------------------<  87  3.7   |  25  |  1.10    Ca    8.5      02 Jan 2024 07:31  Mg     2.1     12-31    TPro  7.3  /  Alb  2.8<L>  /  TBili  0.9  /  DBili  x   /  AST  14<L>  /  ALT  40  /  AlkPhos  58  01-01                          10.8   15.27 )-----------( 260      ( 02 Jan 2024 07:31 )             33.6     PT/INR - ( 31 Dec 2023 20:55 )   PT: 11.5 sec;   INR: 0.98 ratio         PTT - ( 31 Dec 2023 20:55 )  PTT:31.2 sec  Urinalysis Basic - ( 02 Jan 2024 07:31 )    Color: x / Appearance: x / SG: x / pH: x  Gluc: 87 mg/dL / Ketone: x  / Bili: x / Urobili: x   Blood: x / Protein: x / Nitrite: x   Leuk Esterase: x / RBC: x / WBC x   Sq Epi: x / Non Sq Epi: x / Bacteria: x      LIVER FUNCTIONS - ( 01 Jan 2024 08:30 )  Alb: 2.8 g/dL / Pro: 7.3 g/dL / ALK PHOS: 58 U/L / ALT: 40 U/L / AST: 14 U/L / GGT: x             RECENT CULTURES:  12-31 @ 20:55 .Blood Blood-Peripheral     No growth at 24 hours        12-31 @ 20:50 .Blood Blood-Peripheral     No growth at 24 hours              All imaging and data are reviewed.     Assessment and Plan:       Elaine Mendez MD  Division of infectious Diseases  Cell [phone] between 8am and 6pm  After 6pm and over the weekends please call ID service line at [phone].     55 minutes spent on total encounter assessing patient, examination, chart review, counseling and coordinating care by the attending physician/nurse/care manager.  [facility]  INFECTIOUS DISEASES - Yarelis Fonseca, [address], [place] [address]  Tel: [phone]     Fax: [phone]  =======================================================    KENNETH SAUL [id number]    Follow up: No fevers. Reports feeling better. He says Baclofen is working for his spasms. Currently denies any pain. Had leaking from Steele but now is fixed.    Allergies:  Cipro (Rash)      Antibiotics:  acetaminophen     Tablet .. 650 milliGRAM(s) Oral every 6 hours PRN  baclofen 10 milliGRAM(s) Oral every 12 hours  cefTRIAXone   IVPB 2000 milliGRAM(s) IV Intermittent every 24 hours  heparin   Injectable 5000 Unit(s) SubCutaneous every 8 hours  influenza   Vaccine 0.5 milliLiter(s) IntraMuscular once  sodium chloride 0.9%. 1000 milliLiter(s) IV Continuous <Continuous>       REVIEW OF SYSTEMS:  CONSTITUTIONAL: (+) fever  HEENT:  No sore throat or runny nose.  CARDIOVASCULAR:  No chest pain or SOB.  RESPIRATORY:  No cough, shortness of breath  GASTROINTESTINAL:  No nausea, vomiting or diarrhea.  GENITOURINARY:  see history  MUSCULOSKELETAL:  no joint aches  NEUROLOGIC:  No headache or dizziness  PSYCHIATRIC:  No disorder of thought or mood.       Physical Exam:  ICU Vital Signs Last 24 Hrs  T(C): 37 (02 Jan 2024 12:57), Max: 37 (02 Jan 2024 12:57)  T(F): 98.6 (02 Jan 2024 12:57), Max: 98.6 (02 Jan 2024 12:57)  HR: 103 (02 Jan 2024 12:57) (103 - 116)  BP: 100/60 (02 Jan 2024 12:57) (100/60 - 120/83)  BP(mean): --  ABP: --  ABP(mean): --  RR: 18 (02 Jan 2024 12:57) (18 - 18)  SpO2: 96% (02 Jan 2024 12:57) (94% - 98%)    O2 Parameters below as of 02 Jan 2024 12:57  Patient On (Oxygen Delivery Method): room air         GEN: NAD  HEENT: normocephalic and atraumatic.   NECK: Supple.   LUNGS: Normal respiratory effort  HEART: Tachycardic  ABDOMEN: Soft, nontender, and nondistended.    : (+) Steele with light yellow urine  EXTREMITIES: bipedal edema  NEUROLOGIC: AO x 3  PSYCHIATRIC: Appropriate affect .    Labs:  01-02    143  |  114<H>  |  18  ----------------------------<  87  3.7   |  25  |  1.10    Ca    8.5      02 Jan 2024 07:31  Mg     2.1     12-31    TPro  7.3  /  Alb  2.8<L>  /  TBili  0.9  /  DBili  x   /  AST  14<L>  /  ALT  40  /  AlkPhos  58  01-01                          10.8   15.27 )-----------( 260      ( 02 Jan 2024 07:31 )             33.6     PT/INR - ( 31 Dec 2023 20:55 )   PT: 11.5 sec;   INR: 0.98 ratio         PTT - ( 31 Dec 2023 20:55 )  PTT:31.2 sec  Urinalysis Basic - ( 02 Jan 2024 07:31 )    Color: x / Appearance: x / SG: x / pH: x  Gluc: 87 mg/dL / Ketone: x  / Bili: x / Urobili: x   Blood: x / Protein: x / Nitrite: x   Leuk Esterase: x / RBC: x / WBC x   Sq Epi: x / Non Sq Epi: x / Bacteria: x      LIVER FUNCTIONS - ( 01 Jan 2024 08:30 )  Alb: 2.8 g/dL / Pro: 7.3 g/dL / ALK PHOS: 58 U/L / ALT: 40 U/L / AST: 14 U/L / GGT: x             RECENT CULTURES:  12-31 @ 20:55 .Blood Blood-Peripheral     No growth at 24 hours        12-31 @ 20:50 .Blood Blood-Peripheral     No growth at 24 hours              All imaging and data are reviewed.     Assessment and Plan:       Elaine Mendez MD  Division of infectious Diseases  Cell [phone] between 8am and 6pm  After 6pm and over the weekends please call ID service line at [phone].     55 minutes spent on total encounter assessing patient, examination, chart review, counseling and coordinating care by the attending physician/nurse/care manager.  [facility]  INFECTIOUS DISEASES - Yarelis Fonseca, [address], [place] [address]  Tel: [phone]     Fax: [phone]  =======================================================    KENNETH SAUL [id number]    Follow up: No fevers. Reports feeling better. He says Baclofen is working for his spasms. Currently denies any pain. Had leaking from Steele but now is fixed.    Allergies:  Cipro (Rash)      Antibiotics:  acetaminophen     Tablet .. 650 milliGRAM(s) Oral every 6 hours PRN  baclofen 10 milliGRAM(s) Oral every 12 hours  cefTRIAXone   IVPB 2000 milliGRAM(s) IV Intermittent every 24 hours  heparin   Injectable 5000 Unit(s) SubCutaneous every 8 hours  influenza   Vaccine 0.5 milliLiter(s) IntraMuscular once  sodium chloride 0.9%. 1000 milliLiter(s) IV Continuous <Continuous>       REVIEW OF SYSTEMS:  CONSTITUTIONAL: (+) fever  HEENT:  No sore throat or runny nose.  CARDIOVASCULAR:  No chest pain or SOB.  RESPIRATORY:  No cough, shortness of breath  GASTROINTESTINAL:  No nausea, vomiting or diarrhea.  GENITOURINARY:  see history  MUSCULOSKELETAL:  no joint aches  NEUROLOGIC:  No headache or dizziness  PSYCHIATRIC:  No disorder of thought or mood.       Physical Exam:  ICU Vital Signs Last 24 Hrs  T(C): 37 (02 Jan 2024 12:57), Max: 37 (02 Jan 2024 12:57)  T(F): 98.6 (02 Jan 2024 12:57), Max: 98.6 (02 Jan 2024 12:57)  HR: 103 (02 Jan 2024 12:57) (103 - 116)  BP: 100/60 (02 Jan 2024 12:57) (100/60 - 120/83)  BP(mean): --  ABP: --  ABP(mean): --  RR: 18 (02 Jan 2024 12:57) (18 - 18)  SpO2: 96% (02 Jan 2024 12:57) (94% - 98%)    O2 Parameters below as of 02 Jan 2024 12:57  Patient On (Oxygen Delivery Method): room air         GEN: NAD  HEENT: normocephalic and atraumatic.   NECK: Supple.   LUNGS: Normal respiratory effort  HEART: Tachycardic  ABDOMEN: Soft, nontender, and nondistended.    : (+) Steele with light yellow urine  EXTREMITIES: bipedal edema  NEUROLOGIC: AO x 3  PSYCHIATRIC: Appropriate affect .    Labs:  01-02    143  |  114<H>  |  18  ----------------------------<  87  3.7   |  25  |  1.10    Ca    8.5      02 Jan 2024 07:31  Mg     2.1     12-31    TPro  7.3  /  Alb  2.8<L>  /  TBili  0.9  /  DBili  x   /  AST  14<L>  /  ALT  40  /  AlkPhos  58  01-01                          10.8   15.27 )-----------( 260      ( 02 Jan 2024 07:31 )             33.6     PT/INR - ( 31 Dec 2023 20:55 )   PT: 11.5 sec;   INR: 0.98 ratio         PTT - ( 31 Dec 2023 20:55 )  PTT:31.2 sec  Urinalysis Basic - ( 02 Jan 2024 07:31 )    Color: x / Appearance: x / SG: x / pH: x  Gluc: 87 mg/dL / Ketone: x  / Bili: x / Urobili: x   Blood: x / Protein: x / Nitrite: x   Leuk Esterase: x / RBC: x / WBC x   Sq Epi: x / Non Sq Epi: x / Bacteria: x      LIVER FUNCTIONS - ( 01 Jan 2024 08:30 )  Alb: 2.8 g/dL / Pro: 7.3 g/dL / ALK PHOS: 58 U/L / ALT: 40 U/L / AST: 14 U/L / GGT: x             RECENT CULTURES:  12-31 @ 20:55 .Blood Blood-Peripheral     No growth at 24 hours        12-31 @ 20:50 .Blood Blood-Peripheral     No growth at 24 hours              All imaging and data are reviewed.     Assessment and Plan:       Elaine Mendez MD  Division of infectious Diseases  Cell [phone] between 8am and 6pm  After 6pm and over the weekends please call ID service line at [phone].     55 minutes spent on total encounter assessing patient, examination, chart review, counseling and coordinating care by the attending physician/nurse/care manager.  [facility]  INFECTIOUS DISEASES - Yarelis Fonseca, [address], [place] [address]  Tel: [phone]     Fax: [phone]  =======================================================    KENNETH SAUL [id number]    Follow up: No fevers. Reports feeling better. He says Baclofen is working for his spasms. Currently denies any pain. Had leaking from Steele but now is fixed.    Allergies:  Cipro (Rash)      Antibiotics:  acetaminophen     Tablet .. 650 milliGRAM(s) Oral every 6 hours PRN  baclofen 10 milliGRAM(s) Oral every 12 hours  cefTRIAXone   IVPB 2000 milliGRAM(s) IV Intermittent every 24 hours  heparin   Injectable 5000 Unit(s) SubCutaneous every 8 hours  influenza   Vaccine 0.5 milliLiter(s) IntraMuscular once  sodium chloride 0.9%. 1000 milliLiter(s) IV Continuous <Continuous>       REVIEW OF SYSTEMS:  CONSTITUTIONAL: (+) fever  HEENT:  No sore throat or runny nose.  CARDIOVASCULAR:  No chest pain or SOB.  RESPIRATORY:  No cough, shortness of breath  GASTROINTESTINAL:  No nausea, vomiting or diarrhea.  GENITOURINARY:  see history  MUSCULOSKELETAL:  no joint aches  NEUROLOGIC:  No headache or dizziness  PSYCHIATRIC:  No disorder of thought or mood.       Physical Exam:  ICU Vital Signs Last 24 Hrs  T(C): 37 (02 Jan 2024 12:57), Max: 37 (02 Jan 2024 12:57)  T(F): 98.6 (02 Jan 2024 12:57), Max: 98.6 (02 Jan 2024 12:57)  HR: 103 (02 Jan 2024 12:57) (103 - 116)  BP: 100/60 (02 Jan 2024 12:57) (100/60 - 120/83)  BP(mean): --  ABP: --  ABP(mean): --  RR: 18 (02 Jan 2024 12:57) (18 - 18)  SpO2: 96% (02 Jan 2024 12:57) (94% - 98%)    O2 Parameters below as of 02 Jan 2024 12:57  Patient On (Oxygen Delivery Method): room air         GEN: NAD  HEENT: normocephalic and atraumatic.   NECK: Supple.   LUNGS: Normal respiratory effort  HEART: Tachycardic  ABDOMEN: Soft, nontender, and nondistended.    : (+) Steele with light yellow urine  EXTREMITIES: bipedal edema  NEUROLOGIC: AO x 3  PSYCHIATRIC: Appropriate affect .    Labs:  01-02    143  |  114<H>  |  18  ----------------------------<  87  3.7   |  25  |  1.10    Ca    8.5      02 Jan 2024 07:31  Mg     2.1     12-31    TPro  7.3  /  Alb  2.8<L>  /  TBili  0.9  /  DBili  x   /  AST  14<L>  /  ALT  40  /  AlkPhos  58  01-01                          10.8   15.27 )-----------( 260      ( 02 Jan 2024 07:31 )             33.6     PT/INR - ( 31 Dec 2023 20:55 )   PT: 11.5 sec;   INR: 0.98 ratio         PTT - ( 31 Dec 2023 20:55 )  PTT:31.2 sec  Urinalysis Basic - ( 02 Jan 2024 07:31 )    Color: x / Appearance: x / SG: x / pH: x  Gluc: 87 mg/dL / Ketone: x  / Bili: x / Urobili: x   Blood: x / Protein: x / Nitrite: x   Leuk Esterase: x / RBC: x / WBC x   Sq Epi: x / Non Sq Epi: x / Bacteria: x      LIVER FUNCTIONS - ( 01 Jan 2024 08:30 )  Alb: 2.8 g/dL / Pro: 7.3 g/dL / ALK PHOS: 58 U/L / ALT: 40 U/L / AST: 14 U/L / GGT: x             RECENT CULTURES:  12-31 @ 20:55 .Blood Blood-Peripheral     No growth at 24 hours        12-31 @ 20:50 .Blood Blood-Peripheral     No growth at 24 hours              All imaging and data are reviewed.     Assessment and Plan:       Elaine Mendez MD  Division of infectious Diseases  Cell [phone] between 8am and 6pm  After 6pm and over the weekends please call ID service line at [phone].     55 minutes spent on total encounter assessing patient, examination, chart review, counseling and coordinating care by the attending physician/nurse/care manager.

## 2024-01-02 NOTE — CARE COORDINATION ASSESSMENT. - OTHER PERTINENT REFERRAL INFORMATION
Pt lives at home alone, has 24 hr [facility] in place via [facility], states he has no equipment in home, family support as needed. Pt is alert and oriented and able to make his needs known. Plan is home upon Dc with resumption of services via [facility].  Pt lives at home alone, has 24 hr ML[facility] in place via [facility], states he has no equipment in home, family support as needed. Pt is alert and oriented and able to make his needs known. Plan is home upon Dc with resumption of services via [facility].  Pt lives at home alone, has 24 hr ML[facility] in place via [facility], states he has no equipment in home, family support as needed. Pt is alert and oriented and able to make his needs known. Plan is home upon Dc with resumption of services via [facility].  Pt lives at home alone, has 24 hr [facility] in place via [facility], states he has no equipment in home, family support as needed. Pt is alert and oriented and able to make his needs known. Plan is home upon Dc with resumption of services via [facility].

## 2024-01-02 NOTE — CARE COORDINATION ASSESSMENT. - NSCAREPROVIDERS_GEN_ALL_CORE_FT
CARE PROVIDERS:  Accepting Physician: Yuliana Romero  Administration: Andreas Santiago  Admitting: Yuliana Romero  Attending: Carly George  Case Management: Vinnie Mccartney  Consultant: Juan Jose Fonseca  Consultant: Elaine Mendez  Consultant: Guanakito Welch  ED Attending: Ace Guajardo  ED Nurse: Nany Farias  Nurse: Kandy Liao  Nurse: Christine Nogueira  Ordered: ADM, User  Override: Miroslava Resendiz  Override: Nicole Bashir  Override: Je Gross  Override: Kandy Liao  PCA/Nursing Assistant: Je Gross  Primary Team: Yuliana Romero  Primary Team: Star Estrada  Primary Team: Carly George  Primary Team: Mili Rios  Primary Team: Rodolfo Dubose  Registered Dietitian: Danya Simon  Respiratory Therapy: Brianna Mahmood  : Carito Koenig  : Sonya Peters  : Vanessa Dahl  Team: [facility]  Hospitalists, Team   CARE PROVIDERS:  Accepting Physician: Yuliana Romero  Administration: Andreas Santiago  Admitting: Yuliana Romero  Attending: Carly George  Case Management: Vinnie Mccartney  Consultant: Juan Jose Fonseca  Consultant: Elaine Mendez  Consultant: Guanakito Welch  ED Attending: Ace Guajardo  ED Nurse: Nayn Farias  Nurse: Kandy Liao  Nurse: Christine Nogueira  Ordered: ADM, User  Override: Miroslava Resendiz  Override: Nicole Bashir  Override: Je Gross  Override: Kandy Liao  PCA/Nursing Assistant: Je Gross  Primary Team: Yuliana Romero  Primary Team: Star Estrada  Primary Team: Carly George  Primary Team: Mili Rios  Primary Team: Rodolfo Dubose  Registered Dietitian: Danya Simon  Respiratory Therapy: Brianna Mahmood  : Carito Koenig  : Sonya Peters  : Vanessa Dahl  Team: [facility]  Hospitalists, Team

## 2024-01-02 NOTE — CARE COORDINATION ASSESSMENT. - NSPASTMEDSURGHISTORY_GEN_ALL_CORE_FT
PAST MEDICAL & SURGICAL HISTORY:  Pulmonary embolism      DVT, lower extremity      Lower paraplegia      MS (multiple sclerosis)      No significant past surgical history
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
## 2024-01-02 NOTE — PROGRESS NOTE ADULT - PROBLEM SELECTOR PLAN 6
- chronic problem  - paraplegic  - chronic iraheta- changed in ed   - not currently on medication - chronic problem  - paraplegic  - chronic iraheta- changed in ed   - not currently on medication  - start baclofen for muscle spasms, monitor for improvement

## 2024-01-02 NOTE — PROGRESS NOTE ADULT - ASSESSMENT
48y Male with hx MS and paraplegia, with chronic iraheta, who presented with shortness of breath. Found to have sepsis likely 2/2 UTI/pyelonephritis. Iraheta changed in the ED. Multiple prior urine cultures have grown E coli and providencia, sensitive to ceftriaxone.     Patient reports feeling better. Fever resolved and leukocytosis improving. Urine culture growing E coli. Blood cultures currently no growth. COVID, flu/RSV negative.    #UTI/pyelonephritis  #Fever  #Leukocytosis    -continue ceftriaxone 2g IV q24h pending E. coli sensitivities  -follow cultures to completion  -monitor WBC    Elaine Mendez MD  Division of Infectious Diseases   Cell [phone] between 8am and 6pm   After 6pm and weekends please call ID service at [phone].     35 minutes spent on total encounter assessing patient, examination, chart review, counseling and coordinating care by the attending physician/nurse/care manager.        48y Male with hx MS and paraplegia, with chronic iraheta, who presented with shortness of breath. Found to have sepsis likely 2/2 UTI/pyelonephritis. Iraheta changed in the ED. Multiple prior urine cultures have grown E coli and providencia, sensitive to ceftriaxone.     Patient reports feeling better. Fever resolved and leukocytosis improving. Urine culture growing E coli. Blood cultures currently no growth. COVID, flu/RSV negative.    #UTI/pyelonephritis  #Fever  #Leukocytosis    -continue ceftriaxone 2g IV q24h pending E. coli sensitivities  -follow cultures to completion  -monitor WBC    Elaine Mendez MD  Division of Infectious Diseases   Cell [phone] between 8am and 6pm   After 6pm and weekends please call ID service at [phone].     35 minutes spent on total encounter assessing patient, examination, chart review, counseling and coordinating care by the attending physician/nurse/care manager.        48y Male with hx MS and paraplegia, with chronic iraheta, who presented with shortness of breath. Found to have sepsis likely 2/2 UTI/pyelonephritis. Iraheta changed in the ED. Multiple prior urine cultures have grown E coli and providencia, sensitive to ceftriaxone.     Patient reports feeling better. Fever resolved and leukocytosis improving. Urine culture growing E coli. Blood cultures currently no growth. COVID, flu/RSV negative.    #UTI/pyelonephritis  #Fever  #Leukocytosis    -continue ceftriaxone 2g IV q24h pending E. coli sensitivities  -follow cultures to completion  -monitor WBC    Elaine Mendez MD  Division of Infectious Diseases   Cell [phone] between 8am and 6pm   After 6pm and weekends please call ID service at [phone].     35 minutes spent on total encounter assessing patient, examination, chart review, counseling and coordinating care by the attending physician/nurse/care manager.        48y Male with hx MS and paraplegia, with chronic iraheta, who presented with shortness of breath. Found to have sepsis likely 2/2 UTI/pyelonephritis. Iraheta changed in the ED. Multiple prior urine cultures have grown E coli and providencia, sensitive to ceftriaxone.     Patient reports feeling better. Fever resolved and leukocytosis improving. Urine culture growing E coli. Blood cultures currently no growth. COVID, flu/RSV negative.    #UTI/pyelonephritis  #Fever  #Leukocytosis    -continue ceftriaxone 2g IV q24h pending E. coli sensitivities  -follow cultures to completion  -monitor WBC    Elaine Mendez MD  Division of Infectious Diseases   Cell [phone] between 8am and 6pm   After 6pm and weekends please call ID service at [phone].     35 minutes spent on total encounter assessing patient, examination, chart review, counseling and coordinating care by the attending physician/nurse/care manager.

## 2024-01-02 NOTE — PROGRESS NOTE ADULT - PROBLEM SELECTOR PLAN 2
- pt with chronic iraheta 2/2 paraplegia, changed in ED  - U/A positive for UTI  - CT A/P: Iraheta catheter within the markedly thickened urinary bladder with perivesicular stranding. Bilateral hydroureteronephrosis to the level of the ureterovesicular junction without obstructing stone identified.  Findings are most compatible with ascending urinary tract infection, including possible pyelonephritis.  - FU UCx  - plan as above - pt with chronic iraheta 2/2 paraplegia, changed in ED  - U/A positive for UTI  - CT A/P: Iraheta catheter within the markedly thickened urinary bladder with perivesicular stranding. Bilateral hydroureteronephrosis to the level of the ureterovesicular junction without obstructing stone identified.  Findings are most compatible with ascending urinary tract infection, including possible pyelonephritis.  - Urine cultures shows E. Coli growth, follow up sensitivities  - plan as above - pt with chronic iraheat 2/2 paraplegia, changed in ED  - U/A positive for UTI  - CT A/P: Iraheta catheter within the markedly thickened urinary bladder with perivesicular stranding. Bilateral hydroureteronephrosis to the level of the ureterovesicular junction without obstructing stone identified.  Findings are most compatible with ascending urinary tract infection, including possible pyelonephritis.  - Urine cultures shows E. Coli growth, follow up sensitivities  - plan as above

## 2024-01-02 NOTE — PROGRESS NOTE ADULT - PROBLEM SELECTOR PLAN 3
- likely prerenal, 2/2 dehydration from UTI  - BUN/Cr 29/1.7  - baseline Cr 1.16 in 9/2023, pt did have Cr 7 in early 9/2023  - s/p 2 L NS bolus- 1 bolus 3pm today   - start maintenance fluids 100 cc/hr x 12 hours  -  bmp in am - likely prerenal, 2/2 dehydration from UTI  - BUN/Cr 29/1.7  - baseline Cr 1.16 in 9/2023, pt did have Cr 7 in early 9/2023  - s/p 2 L NS bolus- 1 bolus 3pm today   - start maintenance fluids 100 cc/hr x 12 hours  -  bmp in am  - currently resolved

## 2024-01-03 LAB
-  AMOXICILLIN/CLAVULANIC ACID: SIGNIFICANT CHANGE UP
-  AMPICILLIN/SULBACTAM: SIGNIFICANT CHANGE UP
-  AMPICILLIN: SIGNIFICANT CHANGE UP
-  AZTREONAM: SIGNIFICANT CHANGE UP
-  CEFAZOLIN: SIGNIFICANT CHANGE UP
-  CEFEPIME: SIGNIFICANT CHANGE UP
-  CEFOXITIN: SIGNIFICANT CHANGE UP
-  CEFTRIAXONE: SIGNIFICANT CHANGE UP
-  CEFUROXIME: SIGNIFICANT CHANGE UP
-  CIPROFLOXACIN: SIGNIFICANT CHANGE UP
-  ERTAPENEM: SIGNIFICANT CHANGE UP
-  GENTAMICIN: SIGNIFICANT CHANGE UP
-  IMIPENEM: SIGNIFICANT CHANGE UP
-  LEVOFLOXACIN: SIGNIFICANT CHANGE UP
-  MEROPENEM: SIGNIFICANT CHANGE UP
-  NITROFURANTOIN: SIGNIFICANT CHANGE UP
-  PIPERACILLIN/TAZOBACTAM: SIGNIFICANT CHANGE UP
-  TOBRAMYCIN: SIGNIFICANT CHANGE UP
-  TRIMETHOPRIM/SULFAMETHOXAZOLE: SIGNIFICANT CHANGE UP
ALBUMIN SERPL ELPH-MCNC: 2.7 G/DL — LOW (ref 3.3–5)
ALP SERPL-CCNC: 57 U/L — SIGNIFICANT CHANGE UP (ref 40–120)
ALT FLD-CCNC: 30 U/L — SIGNIFICANT CHANGE UP (ref 12–78)
ANION GAP SERPL CALC-SCNC: 5 MMOL/L — SIGNIFICANT CHANGE UP (ref 5–17)
AST SERPL-CCNC: 13 U/L — LOW (ref 15–37)
BASOPHILS # BLD AUTO: 0.04 K/UL — SIGNIFICANT CHANGE UP (ref 0–0.2)
BASOPHILS NFR BLD AUTO: 0.4 % — SIGNIFICANT CHANGE UP (ref 0–2)
BILIRUB SERPL-MCNC: 0.3 MG/DL — SIGNIFICANT CHANGE UP (ref 0.2–1.2)
BUN SERPL-MCNC: 18 MG/DL — SIGNIFICANT CHANGE UP (ref 7–23)
CALCIUM SERPL-MCNC: 9 MG/DL — SIGNIFICANT CHANGE UP (ref 8.5–10.1)
CHLORIDE SERPL-SCNC: 110 MMOL/L — HIGH (ref 96–108)
CO2 SERPL-SCNC: 26 MMOL/L — SIGNIFICANT CHANGE UP (ref 22–31)
CREAT SERPL-MCNC: 1.1 MG/DL — SIGNIFICANT CHANGE UP (ref 0.5–1.3)
CULTURE RESULTS: ABNORMAL
EGFR: 83 ML/MIN/1.73M2 — SIGNIFICANT CHANGE UP
EOSINOPHIL # BLD AUTO: 0.29 K/UL — SIGNIFICANT CHANGE UP (ref 0–0.5)
EOSINOPHIL NFR BLD AUTO: 3.2 % — SIGNIFICANT CHANGE UP (ref 0–6)
GLUCOSE SERPL-MCNC: 115 MG/DL — HIGH (ref 70–99)
HCT VFR BLD CALC: 34.1 % — LOW (ref 39–50)
HGB BLD-MCNC: 11.2 G/DL — LOW (ref 13–17)
IMM GRANULOCYTES NFR BLD AUTO: 0.4 % — SIGNIFICANT CHANGE UP (ref 0–0.9)
LYMPHOCYTES # BLD AUTO: 2.05 K/UL — SIGNIFICANT CHANGE UP (ref 1–3.3)
LYMPHOCYTES # BLD AUTO: 22.7 % — SIGNIFICANT CHANGE UP (ref 13–44)
MCHC RBC-ENTMCNC: 28.6 PG — SIGNIFICANT CHANGE UP (ref 27–34)
MCHC RBC-ENTMCNC: 32.8 GM/DL — SIGNIFICANT CHANGE UP (ref 32–36)
MCV RBC AUTO: 87 FL — SIGNIFICANT CHANGE UP (ref 80–100)
METHOD TYPE: SIGNIFICANT CHANGE UP
MONOCYTES # BLD AUTO: 0.55 K/UL — SIGNIFICANT CHANGE UP (ref 0–0.9)
MONOCYTES NFR BLD AUTO: 6.1 % — SIGNIFICANT CHANGE UP (ref 2–14)
NEUTROPHILS # BLD AUTO: 6.07 K/UL — SIGNIFICANT CHANGE UP (ref 1.8–7.4)
NEUTROPHILS NFR BLD AUTO: 67.2 % — SIGNIFICANT CHANGE UP (ref 43–77)
NRBC # BLD: 0 /100 WBCS — SIGNIFICANT CHANGE UP (ref 0–0)
ORGANISM # SPEC MICROSCOPIC CNT: ABNORMAL
ORGANISM # SPEC MICROSCOPIC CNT: SIGNIFICANT CHANGE UP
PLATELET # BLD AUTO: 274 K/UL — SIGNIFICANT CHANGE UP (ref 150–400)
POTASSIUM SERPL-MCNC: 3.7 MMOL/L — SIGNIFICANT CHANGE UP (ref 3.5–5.3)
POTASSIUM SERPL-SCNC: 3.7 MMOL/L — SIGNIFICANT CHANGE UP (ref 3.5–5.3)
PROT SERPL-MCNC: 7.3 G/DL — SIGNIFICANT CHANGE UP (ref 6–8.3)
RBC # BLD: 3.92 M/UL — LOW (ref 4.2–5.8)
RBC # FLD: 15.8 % — HIGH (ref 10.3–14.5)
SODIUM SERPL-SCNC: 141 MMOL/L — SIGNIFICANT CHANGE UP (ref 135–145)
SPECIMEN SOURCE: SIGNIFICANT CHANGE UP
WBC # BLD: 9.04 K/UL — SIGNIFICANT CHANGE UP (ref 3.8–10.5)
WBC # FLD AUTO: 9.04 K/UL — SIGNIFICANT CHANGE UP (ref 3.8–10.5)

## 2024-01-03 PROCEDURE — 99233 SBSQ HOSP IP/OBS HIGH 50: CPT | Mod: GC

## 2024-01-03 PROCEDURE — 99232 SBSQ HOSP IP/OBS MODERATE 35: CPT

## 2024-01-03 RX ADMIN — Medication 10 MILLIGRAM(S): at 00:47

## 2024-01-03 RX ADMIN — Medication 10 MILLIGRAM(S): at 09:55

## 2024-01-03 RX ADMIN — HEPARIN SODIUM 5000 UNIT(S): 5000 INJECTION INTRAVENOUS; SUBCUTANEOUS at 14:29

## 2024-01-03 RX ADMIN — Medication 1 TABLET(S): at 18:07

## 2024-01-03 RX ADMIN — Medication 10 MILLIGRAM(S): at 17:30

## 2024-01-03 RX ADMIN — HEPARIN SODIUM 5000 UNIT(S): 5000 INJECTION INTRAVENOUS; SUBCUTANEOUS at 21:16

## 2024-01-03 RX ADMIN — HEPARIN SODIUM 5000 UNIT(S): 5000 INJECTION INTRAVENOUS; SUBCUTANEOUS at 05:33

## 2024-01-03 NOTE — PROGRESS NOTE ADULT - PROBLEM SELECTOR PLAN 7
- CT A/P: Hepatomegaly and hepatic steatosis.  - pt reports social alcohol use on weekends  - LFTs wnl  , - CT A/P: Hepatomegaly and hepatic steatosis.  - pt reports social alcohol use on weekends  - LFTs wnl

## 2024-01-03 NOTE — PROGRESS NOTE ADULT - PROBLEM SELECTOR PLAN 8
- SCDs as pt has hematuria    # GOC  - DNR/DNI, MOLST placed in chart - DVT PPX: Heparin Subq    # GOC  - DNR/DNI, MOLST placed in chart

## 2024-01-03 NOTE — PROGRESS NOTE ADULT - SUBJECTIVE AND OBJECTIVE BOX
Patient is a 48y old  Male who presents with a chief complaint of UTI, pyelonephritis (02 Jan 2024 14:23)      Subjective:  INTERVAL HPI/OVERNIGHT EVENTS: Patient seen and examined at bedside. No overnight events occurred. Patient has no complaints at this time. Denies fevers, chills, headache, lightheadedness, chest pain, dyspnea, abdominal pain, n/v/d/c.    MEDICATIONS  (STANDING):  baclofen 10 milliGRAM(s) Oral every 8 hours  cefTRIAXone   IVPB 2000 milliGRAM(s) IV Intermittent every 24 hours  heparin   Injectable 5000 Unit(s) SubCutaneous every 8 hours  influenza   Vaccine 0.5 milliLiter(s) IntraMuscular once  sodium chloride 0.9%. 1000 milliLiter(s) (100 mL/Hr) IV Continuous <Continuous>    MEDICATIONS  (PRN):  acetaminophen     Tablet .. 650 milliGRAM(s) Oral every 6 hours PRN Temp greater or equal to 38C (100.4F), Mild Pain (1 - 3)      Allergies    Cipro (Rash)    Intolerances        REVIEW OF SYSTEMS:  CONSTITUTIONAL: No fever or chills  HEENT:  No headache, no sore throat  RESPIRATORY: No cough, wheezing, or shortness of breath  CARDIOVASCULAR: No chest pain, palpitations  GASTROINTESTINAL: No abd pain, nausea, vomiting, or diarrhea  GENITOURINARY: No dysuria, frequency, or hematuria  NEUROLOGICAL: no focal weakness or dizziness  MUSCULOSKELETAL: no myalgias     Objective:  Vital Signs Last 24 Hrs  T(C): 37.1 (03 Jan 2024 04:47), Max: 37.1 (03 Jan 2024 04:47)  T(F): 98.7 (03 Jan 2024 04:47), Max: 98.7 (03 Jan 2024 04:47)  HR: 97 (03 Jan 2024 04:47) (85 - 103)  BP: 114/74 (03 Jan 2024 04:47) (100/60 - 129/70)  BP(mean): --  RR: 19 (03 Jan 2024 04:47) (18 - 19)  SpO2: 93% (03 Jan 2024 04:47) (91% - 98%)    Parameters below as of 03 Jan 2024 04:47  Patient On (Oxygen Delivery Method): room air        GENERAL: NAD, lying in bed comfortably  HEAD:  Atraumatic, Normocephalic  EYES: EOMI, PERRLA, conjunctiva and sclera clear  ENT: Moist mucous membranes  NECK: Supple, No JVD  CHEST/LUNG: Clear to auscultation bilaterally; No rales, rhonchi, wheezing, or rubs. Unlabored respirations  HEART: Regular rate and rhythm; No murmurs, rubs, or gallops  ABDOMEN: Bowel sounds present; Soft, Nontender, Nondistended. No hepatomegaly  EXTREMITIES:  2+ Peripheral Pulses, brisk capillary refill. No clubbing, cyanosis, or edema  NERVOUS SYSTEM:  Alert & Oriented X3, speech clear. No deficits   MSK: FROM all 4 extremities, full and equal strength  SKIN: No rashes or lesions    LABS:    CBC Full  -  ( 02 Jan 2024 07:31 )  WBC Count : 15.27 K/uL  Hemoglobin : 10.8 g/dL  Hematocrit : 33.6 %  Platelet Count - Automated : 260 K/uL  Mean Cell Volume : 87.7 fl  Mean Cell Hemoglobin : 28.2 pg  Mean Cell Hemoglobin Concentration : 32.1 gm/dL  Auto Neutrophil # : 11.53 K/uL  Auto Lymphocyte # : 2.31 K/uL  Auto Monocyte # : 1.05 K/uL  Auto Eosinophil # : 0.26 K/uL  Auto Basophil # : 0.05 K/uL  Auto Neutrophil % : 75.5 %  Auto Lymphocyte % : 15.1 %  Auto Monocyte % : 6.9 %  Auto Eosinophil % : 1.7 %  Auto Basophil % : 0.3 %      Ca    8.5        02 Jan 2024 07:31        Urinalysis Basic - ( 02 Jan 2024 07:31 )    Color: x / Appearance: x / SG: x / pH: x  Gluc: 87 mg/dL / Ketone: x  / Bili: x / Urobili: x   Blood: x / Protein: x / Nitrite: x   Leuk Esterase: x / RBC: x / WBC x   Sq Epi: x / Non Sq Epi: x / Bacteria: x      CAPILLARY BLOOD GLUCOSE            Culture - Urine (collected 12-31-23 @ 20:55)  Source: Clean Catch Clean Catch (Midstream)  Preliminary Report (01-02-24 @ 10:10):    >100,000 CFU/ml Escherichia coli    Culture - Blood (collected 12-31-23 @ 20:55)  Source: .Blood Blood-Peripheral  Preliminary Report (01-03-24 @ 07:02):    No growth at 48 Hours    Culture - Blood (collected 12-31-23 @ 20:50)  Source: .Blood Blood-Peripheral  Preliminary Report (01-03-24 @ 07:02):    No growth at 48 Hours        RADIOLOGY & ADDITIONAL TESTS:    Personally reviewed.     Consultant(s) Notes Reviewed:  [x] YES  [ ] NO     Patient is a 48y old  Male who presents with a chief complaint of UTI, pyelonephritis (03 Jan 2024 09:46)      INTERVAL HPI/OVERNIGHT EVENTS:  No acute events overnight  Patient is comfortable, no pain with urination  Patient reports the baclofen is helping with his episodes of spasms.  Patient would like some adjustments regarding the frequency as he feels it only helps for two hours.  Patient denies any further episodes of leaking from his iraheta.      MEDICATIONS  (STANDING):  baclofen 10 milliGRAM(s) Oral every 8 hours  cefTRIAXone   IVPB 2000 milliGRAM(s) IV Intermittent every 24 hours  heparin   Injectable 5000 Unit(s) SubCutaneous every 8 hours  influenza   Vaccine 0.5 milliLiter(s) IntraMuscular once  sodium chloride 0.9%. 1000 milliLiter(s) (100 mL/Hr) IV Continuous <Continuous>    MEDICATIONS  (PRN):  acetaminophen     Tablet .. 650 milliGRAM(s) Oral every 6 hours PRN Temp greater or equal to 38C (100.4F), Mild Pain (1 - 3)      Allergies    Cipro (Rash)    Intolerances        REVIEW OF SYSTEMS:  CONSTITUTIONAL: No fever or chills  HEENT:  No headache, no sore throat  RESPIRATORY: No cough, wheezing, or shortness of breath  CARDIOVASCULAR: No chest pain, palpitations  GASTROINTESTINAL: No abd pain, nausea, vomiting, or diarrhea  GENITOURINARY: No dysuria, frequency, or hematuria  NEUROLOGICAL: no focal weakness or dizziness  MUSCULOSKELETAL: leg spasms  INTEGUMENTARY: rash for past week    Vital Signs Last 24 Hrs  T(C): 36.7 (03 Jan 2024 11:25), Max: 37.1 (03 Jan 2024 04:47)  T(F): 98.1 (03 Jan 2024 11:25), Max: 98.7 (03 Jan 2024 04:47)  HR: 90 (03 Jan 2024 11:25) (85 - 97)  BP: 120/76 (03 Jan 2024 11:25) (114/74 - 129/70)  BP(mean): --  RR: 19 (03 Jan 2024 11:25) (18 - 19)  SpO2: 90% (03 Jan 2024 11:25) (90% - 98%)    Parameters below as of 03 Jan 2024 11:25  Patient On (Oxygen Delivery Method): room air        PHYSICAL EXAM:  GENERAL: NAD  HEENT:  anicteric, moist mucous membranes  CHEST/LUNG:  CTA b/l, no rales, wheezes, or rhonchi  HEART:  RRR, S1, S2  ABDOMEN:  soft, nontender, nondistended, iraheta in place  MUSCULOSKELETAL: no edema, cyanosis, or calf tenderness  NEUROLOGIC: answers questions and follows commands appropriately  SKIN: diffuse rash and dnadruff present      LABS:                        11.2   9.04  )-----------( 274      ( 03 Jan 2024 10:35 )             34.1     CBC Full  -  ( 03 Jan 2024 10:35 )  WBC Count : 9.04 K/uL  Hemoglobin : 11.2 g/dL  Hematocrit : 34.1 %  Platelet Count - Automated : 274 K/uL  Mean Cell Volume : 87.0 fl  Mean Cell Hemoglobin : 28.6 pg  Mean Cell Hemoglobin Concentration : 32.8 gm/dL  Auto Neutrophil # : 6.07 K/uL  Auto Lymphocyte # : 2.05 K/uL  Auto Monocyte # : 0.55 K/uL  Auto Eosinophil # : 0.29 K/uL  Auto Basophil # : 0.04 K/uL  Auto Neutrophil % : 67.2 %  Auto Lymphocyte % : 22.7 %  Auto Monocyte % : 6.1 %  Auto Eosinophil % : 3.2 %  Auto Basophil % : 0.4 %    03 Jan 2024 10:35    141    |  110    |  18     ----------------------------<  115    3.7     |  26     |  1.10     Ca    9.0        03 Jan 2024 10:35    TPro  7.3    /  Alb  2.7    /  TBili  0.3    /  DBili  x      /  AST  13     /  ALT  30     /  AlkPhos  57     03 Jan 2024 10:35      Urinalysis Basic - ( 03 Jan 2024 10:35 )    Color: x / Appearance: x / SG: x / pH: x  Gluc: 115 mg/dL / Ketone: x  / Bili: x / Urobili: x   Blood: x / Protein: x / Nitrite: x   Leuk Esterase: x / RBC: x / WBC x   Sq Epi: x / Non Sq Epi: x / Bacteria: x      CAPILLARY BLOOD GLUCOSE            Culture - Urine (collected 12-31-23 @ 20:55)  Source: Clean Catch Clean Catch (Midstream)  Preliminary Report (01-02-24 @ 10:10):    >100,000 CFU/ml Escherichia coli    Culture - Blood (collected 12-31-23 @ 20:55)  Source: .Blood Blood-Peripheral  Preliminary Report (01-03-24 @ 07:02):    No growth at 48 Hours    Culture - Blood (collected 12-31-23 @ 20:50)  Source: .Blood Blood-Peripheral  Preliminary Report (01-03-24 @ 07:02):    No growth at 48 Hours        RADIOLOGY & ADDITIONAL TESTS:    Personally reviewed.     Consultant(s) Notes Reviewed:  [x] YES  [ ] NO     Patient is a 48y old  Male who presents with a chief complaint of SOB, fever, back pain.      INTERVAL HPI/OVERNIGHT EVENTS:  No acute events overnight. Pt states fever, chills have resolved.  Patient reports the baclofen is helping with his episodes of back spasms.  Patient denies any further episodes of leaking from his iraheta.      MEDICATIONS  (STANDING):  baclofen 10 milliGRAM(s) Oral every 8 hours  cefTRIAXone   IVPB 2000 milliGRAM(s) IV Intermittent every 24 hours  heparin   Injectable 5000 Unit(s) SubCutaneous every 8 hours  influenza   Vaccine 0.5 milliLiter(s) IntraMuscular once  sodium chloride 0.9%. 1000 milliLiter(s) (100 mL/Hr) IV Continuous <Continuous>    MEDICATIONS  (PRN):  acetaminophen     Tablet .. 650 milliGRAM(s) Oral every 6 hours PRN Temp greater or equal to 38C (100.4F), Mild Pain (1 - 3)      Allergies    Cipro (Rash)    Intolerances        REVIEW OF SYSTEMS:  CONSTITUTIONAL: No fever or chills (resolved)  HEENT:  No headache, no sore throat  RESPIRATORY: No cough, wheezing, or shortness of breath  CARDIOVASCULAR: No chest pain, palpitations  GASTROINTESTINAL: No abd pain, nausea, vomiting, or diarrhea  GENITOURINARY: No dysuria, frequency, or hematuria  NEUROLOGICAL: no acute focal weakness (has chronic weakness due to advanced MS); denies dizziness  MUSCULOSKELETAL: leg spasms  INTEGUMENTARY: chronic flaky rash     Vital Signs Last 24 Hrs  T(C): 36.7 (03 Jan 2024 11:25), Max: 37.1 (03 Jan 2024 04:47)  T(F): 98.1 (03 Jan 2024 11:25), Max: 98.7 (03 Jan 2024 04:47)  HR: 90 (03 Jan 2024 11:25) (85 - 97)  BP: 120/76 (03 Jan 2024 11:25) (114/74 - 129/70)  BP(mean): --  RR: 19 (03 Jan 2024 11:25) (18 - 19)  SpO2: 90% (03 Jan 2024 11:25) (90% - 98%)    Parameters below as of 03 Jan 2024 11:25  Patient On (Oxygen Delivery Method): room air        PHYSICAL EXAM:  GENERAL: NAD  HEENT:  anicteric, moist mucous membranes  CHEST/LUNG:  CTA b/l, no rales, wheezes, or rhonchi  HEART:  RRR, S1, S2  ABDOMEN:  soft, nontender, nondistended, iraheta in place  MUSCULOSKELETAL: no edema, cyanosis, or calf tenderness  NEUROLOGIC: answers questions and follows commands appropriately, paraplegia   SKIN: diffuse flaky rash / dandruff present  PSYCH: depressed mood, no active SI, but states given progressive nature of his MS and the effect that has had on his body, he has no qualms about dying when "God takes him"      LABS:                        11.2   9.04  )-----------( 274      ( 03 Jan 2024 10:35 )             34.1     CBC Full  -  ( 03 Jan 2024 10:35 )  WBC Count : 9.04 K/uL  Hemoglobin : 11.2 g/dL  Hematocrit : 34.1 %  Platelet Count - Automated : 274 K/uL  Mean Cell Volume : 87.0 fl  Mean Cell Hemoglobin : 28.6 pg  Mean Cell Hemoglobin Concentration : 32.8 gm/dL  Auto Neutrophil # : 6.07 K/uL  Auto Lymphocyte # : 2.05 K/uL  Auto Monocyte # : 0.55 K/uL  Auto Eosinophil # : 0.29 K/uL  Auto Basophil # : 0.04 K/uL  Auto Neutrophil % : 67.2 %  Auto Lymphocyte % : 22.7 %  Auto Monocyte % : 6.1 %  Auto Eosinophil % : 3.2 %  Auto Basophil % : 0.4 %    03 Jan 2024 10:35    141    |  110    |  18     ----------------------------<  115    3.7     |  26     |  1.10     Ca    9.0        03 Jan 2024 10:35    TPro  7.3    /  Alb  2.7    /  TBili  0.3    /  DBili  x      /  AST  13     /  ALT  30     /  AlkPhos  57     03 Jan 2024 10:35      Urinalysis Basic - ( 03 Jan 2024 10:35 )    Color: x / Appearance: x / SG: x / pH: x  Gluc: 115 mg/dL / Ketone: x  / Bili: x / Urobili: x   Blood: x / Protein: x / Nitrite: x   Leuk Esterase: x / RBC: x / WBC x   Sq Epi: x / Non Sq Epi: x / Bacteria: x      CAPILLARY BLOOD GLUCOSE            Culture - Urine (collected 12-31-23 @ 20:55)  Source: Clean Catch Clean Catch (Midstream)  Preliminary Report (01-02-24 @ 10:10):    >100,000 CFU/ml Escherichia coli    Culture - Blood (collected 12-31-23 @ 20:55)  Source: .Blood Blood-Peripheral  Preliminary Report (01-03-24 @ 07:02):    No growth at 48 Hours    Culture - Blood (collected 12-31-23 @ 20:50)  Source: .Blood Blood-Peripheral  Preliminary Report (01-03-24 @ 07:02):    No growth at 48 Hours        RADIOLOGY & ADDITIONAL TESTS:    Personally reviewed.     Consultant(s) Notes Reviewed:  [x] YES  [ ] NO

## 2024-01-03 NOTE — PROGRESS NOTE ADULT - SUBJECTIVE AND OBJECTIVE BOX
[facility]  INFECTIOUS DISEASES - Yarelis Fonseca, [address], [place] [address]  Tel: [phone]     Fax: [phone]  =======================================================    KENNETH SAUL [id number]    Follow up: No fevers. Complained of painful spasms of both LE. Denies any abdominal pain, chest pain, nausea or diarrhea. Denies any SOB or itching. Thinks rash on his body has been ongoing for at least a week or maybe longer.    Allergies:  Cipro (Rash)      Antibiotics:  acetaminophen     Tablet .. 650 milliGRAM(s) Oral every 6 hours PRN  baclofen 10 milliGRAM(s) Oral every 8 hours  heparin   Injectable 5000 Unit(s) SubCutaneous every 8 hours  influenza   Vaccine 0.5 milliLiter(s) IntraMuscular once  sodium chloride 0.9%. 1000 milliLiter(s) IV Continuous <Continuous>  trimethoprim  160 mG/sulfamethoxazole 800 mG 1 Tablet(s) Oral two times a day       REVIEW OF SYSTEMS:  CONSTITUTIONAL:  No Fever or chills  HEENT:  No sore throat or runny nose.  CARDIOVASCULAR:  No chest pain or SOB.  RESPIRATORY:  No cough, shortness of breath  GASTROINTESTINAL:  No nausea, vomiting or diarrhea.  GENITOURINARY:  see history  MUSCULOSKELETAL:  (+) bilateral leg spasms  NEUROLOGIC:  No headache or dizziness  PSYCHIATRIC:  No disorder of thought or mood.     Physical Exam:  ICU Vital Signs Last 24 Hrs  T(C): 36.7 (03 Jan 2024 11:25), Max: 37.1 (03 Jan 2024 04:47)  T(F): 98.1 (03 Jan 2024 11:25), Max: 98.7 (03 Jan 2024 04:47)  HR: 90 (03 Jan 2024 11:25) (85 - 97)  BP: 120/76 (03 Jan 2024 11:25) (114/74 - 129/70)  BP(mean): --  ABP: --  ABP(mean): --  RR: 19 (03 Jan 2024 11:25) (18 - 19)  SpO2: 94% (03 Jan 2024 11:25) (91% - 98%)    O2 Parameters below as of 03 Jan 2024 11:25  Patient On (Oxygen Delivery Method): room air    GEN: NAD  HEENT: normocephalic and atraumatic. no oral ulcera  NECK: Supple.   LUNGS: Normal respiratory effort  HEART: Regular rate and rhythm  ABDOMEN: Soft, nontender, and nondistended.    : (+) Steele with light yellow urine  EXTREMITIES: bipedal edema  NEUROLOGIC: AO x 3  PSYCHIATRIC: Appropriate affect .  SKIN: (+) greasy scaling on scalp, arms, lower legs, abdomen and back    Labs:  01-03    141  |  110<H>  |  18  ----------------------------<  115<H>  3.7   |  26  |  1.10    Ca    9.0      03 Jan 2024 10:35    TPro  7.3  /  Alb  2.7<L>  /  TBili  0.3  /  DBili  x   /  AST  13<L>  /  ALT  30  /  AlkPhos  57  01-03                          11.2   9.04  )-----------( 274      ( 03 Jan 2024 10:35 )             34.1       Urinalysis Basic - ( 03 Jan 2024 10:35 )    Color: x / Appearance: x / SG: x / pH: x  Gluc: 115 mg/dL / Ketone: x  / Bili: x / Urobili: x   Blood: x / Protein: x / Nitrite: x   Leuk Esterase: x / RBC: x / WBC x   Sq Epi: x / Non Sq Epi: x / Bacteria: x      LIVER FUNCTIONS - ( 03 Jan 2024 10:35 )  Alb: 2.7 g/dL / Pro: 7.3 g/dL / ALK PHOS: 57 U/L / ALT: 30 U/L / AST: 13 U/L / GGT: x             RECENT CULTURES:  12-31 @ 20:55 .Blood Blood-Peripheral Escherichia coli    No growth at 48 Hours        12-31 @ 20:50 .Blood Blood-Peripheral     No growth at 48 Hours              All imaging and data are reviewed.      [facility]  INFECTIOUS DISEASES - Yarelis Fonseca, [address], [place] [address]  Tel: [phone]     Fax: [phone]  =======================================================    KENNETH SAUL [id number]    Follow up: No fevers. Complained of painful spasms of both LE. Denies any abdominal pain, chest pain, nausea or diarrhea. Denies any SOB or itching. Thinks rash on his body has been ongoing for at least a week or maybe longer.    Allergies:  Cipro (Rash)      Antibiotics:  acetaminophen     Tablet .. 650 milliGRAM(s) Oral every 6 hours PRN  baclofen 10 milliGRAM(s) Oral every 8 hours  heparin   Injectable 5000 Unit(s) SubCutaneous every 8 hours  influenza   Vaccine 0.5 milliLiter(s) IntraMuscular once  sodium chloride 0.9%. 1000 milliLiter(s) IV Continuous <Continuous>  trimethoprim  160 mG/sulfamethoxazole 800 mG 1 Tablet(s) Oral two times a day       REVIEW OF SYSTEMS:  CONSTITUTIONAL:  No Fever or chills  HEENT:  No sore throat or runny nose.  CARDIOVASCULAR:  No chest pain or SOB.  RESPIRATORY:  No cough, shortness of breath  GASTROINTESTINAL:  No nausea, vomiting or diarrhea.  GENITOURINARY:  see history  MUSCULOSKELETAL:  (+) bilateral leg spasms  NEUROLOGIC:  No headache or dizziness  PSYCHIATRIC:  No disorder of thought or mood.     Physical Exam:  ICU Vital Signs Last 24 Hrs  T(C): 36.7 (03 Jan 2024 11:25), Max: 37.1 (03 Jan 2024 04:47)  T(F): 98.1 (03 Jan 2024 11:25), Max: 98.7 (03 Jan 2024 04:47)  HR: 90 (03 Jan 2024 11:25) (85 - 97)  BP: 120/76 (03 Jan 2024 11:25) (114/74 - 129/70)  BP(mean): --  ABP: --  ABP(mean): --  RR: 19 (03 Jan 2024 11:25) (18 - 19)  SpO2: 94% (03 Jan 2024 11:25) (91% - 98%)    O2 Parameters below as of 03 Jan 2024 11:25  Patient On (Oxygen Delivery Method): room air    GEN: NAD  HEENT: normocephalic and atraumatic. no oral ulcera  NECK: Supple.   LUNGS: Normal respiratory effort  HEART: Regular rate and rhythm  ABDOMEN: Soft, nontender, and nondistended.    : (+) Steele with light yellow urine  EXTREMITIES: bipedal edema  NEUROLOGIC: AO x 3  PSYCHIATRIC: Appropriate affect .  SKIN: (+) greasy scaling on scalp, arms, lower legs, abdomen and back    Labs:  01-03    141  |  110<H>  |  18  ----------------------------<  115<H>  3.7   |  26  |  1.10    Ca    9.0      03 Jan 2024 10:35    TPro  7.3  /  Alb  2.7<L>  /  TBili  0.3  /  DBili  x   /  AST  13<L>  /  ALT  30  /  AlkPhos  57  01-03                          11.2   9.04  )-----------( 274      ( 03 Jan 2024 10:35 )             34.1       Urinalysis Basic - ( 03 Jan 2024 10:35 )    Color: x / Appearance: x / SG: x / pH: x  Gluc: 115 mg/dL / Ketone: x  / Bili: x / Urobili: x   Blood: x / Protein: x / Nitrite: x   Leuk Esterase: x / RBC: x / WBC x   Sq Epi: x / Non Sq Epi: x / Bacteria: x      LIVER FUNCTIONS - ( 03 Jan 2024 10:35 )  Alb: 2.7 g/dL / Pro: 7.3 g/dL / ALK PHOS: 57 U/L / ALT: 30 U/L / AST: 13 U/L / GGT: x             RECENT CULTURES:  12-31 @ 20:55 .Blood Blood-Peripheral Escherichia coli    No growth at 48 Hours        12-31 @ 20:50 .Blood Blood-Peripheral     No growth at 48 Hours              All imaging and data are reviewed.      [facility]  INFECTIOUS DISEASES - Yarelis Fonseca, [address], [place] [address]  Tel: [phone]     Fax: [phone]  =======================================================    KENNETH SAUL [id number]    Follow up: No fevers. Complained of painful spasms of both LE. Denies any abdominal pain, chest pain, nausea or diarrhea. Denies any SOB or itching. Thinks rash on his body has been ongoing for at least a week or maybe longer.    Allergies:  Cipro (Rash)      Antibiotics:  acetaminophen     Tablet .. 650 milliGRAM(s) Oral every 6 hours PRN  baclofen 10 milliGRAM(s) Oral every 8 hours  heparin   Injectable 5000 Unit(s) SubCutaneous every 8 hours  influenza   Vaccine 0.5 milliLiter(s) IntraMuscular once  sodium chloride 0.9%. 1000 milliLiter(s) IV Continuous <Continuous>  trimethoprim  160 mG/sulfamethoxazole 800 mG 1 Tablet(s) Oral two times a day       REVIEW OF SYSTEMS:  CONSTITUTIONAL:  No Fever or chills  HEENT:  No sore throat or runny nose.  CARDIOVASCULAR:  No chest pain or SOB.  RESPIRATORY:  No cough, shortness of breath  GASTROINTESTINAL:  No nausea, vomiting or diarrhea.  GENITOURINARY:  see history  MUSCULOSKELETAL:  (+) bilateral leg spasms  NEUROLOGIC:  No headache or dizziness  PSYCHIATRIC:  No disorder of thought or mood.     Physical Exam:  ICU Vital Signs Last 24 Hrs  T(C): 36.7 (03 Jan 2024 11:25), Max: 37.1 (03 Jan 2024 04:47)  T(F): 98.1 (03 Jan 2024 11:25), Max: 98.7 (03 Jan 2024 04:47)  HR: 90 (03 Jan 2024 11:25) (85 - 97)  BP: 120/76 (03 Jan 2024 11:25) (114/74 - 129/70)  BP(mean): --  ABP: --  ABP(mean): --  RR: 19 (03 Jan 2024 11:25) (18 - 19)  SpO2: 94% (03 Jan 2024 11:25) (91% - 98%)    O2 Parameters below as of 03 Jan 2024 11:25  Patient On (Oxygen Delivery Method): room air    GEN: NAD  HEENT: normocephalic and atraumatic. no oral ulcera  NECK: Supple.   LUNGS: Normal respiratory effort  HEART: Regular rate and rhythm  ABDOMEN: Soft, nontender, and nondistended.    : (+) Steele with light yellow urine  EXTREMITIES: bipedal edema  NEUROLOGIC: AO x 3  PSYCHIATRIC: Appropriate affect .  SKIN: (+) greasy scaling on scalp, arms, lower legs, abdomen and back    Labs:  01-03    141  |  110<H>  |  18  ----------------------------<  115<H>  3.7   |  26  |  1.10    Ca    9.0      03 Jan 2024 10:35    TPro  7.3  /  Alb  2.7<L>  /  TBili  0.3  /  DBili  x   /  AST  13<L>  /  ALT  30  /  AlkPhos  57  01-03                          11.2   9.04  )-----------( 274      ( 03 Jan 2024 10:35 )             34.1       Urinalysis Basic - ( 03 Jan 2024 10:35 )    Color: x / Appearance: x / SG: x / pH: x  Gluc: 115 mg/dL / Ketone: x  / Bili: x / Urobili: x   Blood: x / Protein: x / Nitrite: x   Leuk Esterase: x / RBC: x / WBC x   Sq Epi: x / Non Sq Epi: x / Bacteria: x      LIVER FUNCTIONS - ( 03 Jan 2024 10:35 )  Alb: 2.7 g/dL / Pro: 7.3 g/dL / ALK PHOS: 57 U/L / ALT: 30 U/L / AST: 13 U/L / GGT: x             RECENT CULTURES:  12-31 @ 20:55 .Blood Blood-Peripheral Escherichia coli    No growth at 48 Hours        12-31 @ 20:50 .Blood Blood-Peripheral     No growth at 48 Hours              All imaging and data are reviewed.      [facility]  INFECTIOUS DISEASES - Yarelis Fonseca, [address], [place] [address]  Tel: [phone]     Fax: [phone]  =======================================================    KENNETH SAUL [id number]    Follow up: No fevers. Complained of painful spasms of both LE. Denies any abdominal pain, chest pain, nausea or diarrhea. Denies any SOB or itching. Thinks rash on his body has been ongoing for at least a week or maybe longer.    Allergies:  Cipro (Rash)      Antibiotics:  acetaminophen     Tablet .. 650 milliGRAM(s) Oral every 6 hours PRN  baclofen 10 milliGRAM(s) Oral every 8 hours  heparin   Injectable 5000 Unit(s) SubCutaneous every 8 hours  influenza   Vaccine 0.5 milliLiter(s) IntraMuscular once  sodium chloride 0.9%. 1000 milliLiter(s) IV Continuous <Continuous>  trimethoprim  160 mG/sulfamethoxazole 800 mG 1 Tablet(s) Oral two times a day       REVIEW OF SYSTEMS:  CONSTITUTIONAL:  No Fever or chills  HEENT:  No sore throat or runny nose.  CARDIOVASCULAR:  No chest pain or SOB.  RESPIRATORY:  No cough, shortness of breath  GASTROINTESTINAL:  No nausea, vomiting or diarrhea.  GENITOURINARY:  see history  MUSCULOSKELETAL:  (+) bilateral leg spasms  NEUROLOGIC:  No headache or dizziness  PSYCHIATRIC:  No disorder of thought or mood.     Physical Exam:  ICU Vital Signs Last 24 Hrs  T(C): 36.7 (03 Jan 2024 11:25), Max: 37.1 (03 Jan 2024 04:47)  T(F): 98.1 (03 Jan 2024 11:25), Max: 98.7 (03 Jan 2024 04:47)  HR: 90 (03 Jan 2024 11:25) (85 - 97)  BP: 120/76 (03 Jan 2024 11:25) (114/74 - 129/70)  BP(mean): --  ABP: --  ABP(mean): --  RR: 19 (03 Jan 2024 11:25) (18 - 19)  SpO2: 94% (03 Jan 2024 11:25) (91% - 98%)    O2 Parameters below as of 03 Jan 2024 11:25  Patient On (Oxygen Delivery Method): room air    GEN: NAD  HEENT: normocephalic and atraumatic. no oral ulcera  NECK: Supple.   LUNGS: Normal respiratory effort  HEART: Regular rate and rhythm  ABDOMEN: Soft, nontender, and nondistended.    : (+) Steele with light yellow urine  EXTREMITIES: bipedal edema  NEUROLOGIC: AO x 3  PSYCHIATRIC: Appropriate affect .  SKIN: (+) greasy scaling on scalp, arms, lower legs, abdomen and back    Labs:  01-03    141  |  110<H>  |  18  ----------------------------<  115<H>  3.7   |  26  |  1.10    Ca    9.0      03 Jan 2024 10:35    TPro  7.3  /  Alb  2.7<L>  /  TBili  0.3  /  DBili  x   /  AST  13<L>  /  ALT  30  /  AlkPhos  57  01-03                          11.2   9.04  )-----------( 274      ( 03 Jan 2024 10:35 )             34.1       Urinalysis Basic - ( 03 Jan 2024 10:35 )    Color: x / Appearance: x / SG: x / pH: x  Gluc: 115 mg/dL / Ketone: x  / Bili: x / Urobili: x   Blood: x / Protein: x / Nitrite: x   Leuk Esterase: x / RBC: x / WBC x   Sq Epi: x / Non Sq Epi: x / Bacteria: x      LIVER FUNCTIONS - ( 03 Jan 2024 10:35 )  Alb: 2.7 g/dL / Pro: 7.3 g/dL / ALK PHOS: 57 U/L / ALT: 30 U/L / AST: 13 U/L / GGT: x             RECENT CULTURES:  12-31 @ 20:55 .Blood Blood-Peripheral Escherichia coli    No growth at 48 Hours        12-31 @ 20:50 .Blood Blood-Peripheral     No growth at 48 Hours              All imaging and data are reviewed.

## 2024-01-03 NOTE — PROGRESS NOTE ADULT - PROBLEM SELECTOR PLAN 2
- pt with chronic iraheta 2/2 paraplegia, changed in ED  - U/A positive for UTI  - CT A/P: Iraheta catheter within the markedly thickened urinary bladder with perivesicular stranding. Bilateral hydroureteronephrosis to the level of the ureterovesicular junction without obstructing stone identified.  Findings are most compatible with ascending urinary tract infection, including possible pyelonephritis.  - Urine cultures shows E. Coli growth, follow up sensitivities  - plan as above - pt with chronic iraheta 2/2 retention in setting of advanced MS, paraplegia, iraheta changed in ED  - U/A positive for UTI  - CT A/P: Iraheta catheter within the markedly thickened urinary bladder with perivesicular stranding. Bilateral hydroureteronephrosis to the level of the ureterovesicular junction without obstructing stone identified.  Findings are most compatible with ascending urinary tract infection, including possible pyelonephritis.  - Urine cultures shows E. Coli growth, follow up sensitivities  - c/w rocephin  - plan as above

## 2024-01-03 NOTE — PROGRESS NOTE ADULT - ASSESSMENT
48y Male with hx MS and paraplegia, with chronic iraheta, who presented with shortness of breath. Found to have sepsis likely 2/2 UTI/pyelonephritis. Iraheta changed in the ED.     Fever and leukocytosis resolved. Urine culture growing E coli, sensitvities reviewed. Blood cultures remain no growth. Has rash but seems less likely antibiotic related as it started prior to admission per patient--perhaps seborrheic dermatitis (?).    #UTI/pyelonephritis  #Fever  #Leukocytosis    -suggest TMP/SMX 1 DS tab BID x 12 days  -discontinue ceftriaxone  -follow cultures to completion  -discussed with Dr. Blair  -will follow PRN, please call ID if any questions or acute issues arise. Thank you.    Elaine Mendez MD  Division of Infectious Diseases   Cell [phone] between 8am and 6pm   After 6pm and weekends please call ID service at [phone].     35 minutes spent on total encounter assessing patient, examination, chart review, counseling and coordinating care by the attending physician/nurse/care manager.      48y Male with hx MS and paraplegia, with chronic iraheta, who presented with shortness of breath. Found to have sepsis likely 2/2 UTI/pyelonephritis. Iraheta changed in the ED.     Fever and leukocytosis resolved. Urine culture growing E coli, sensitvities reviewed. Blood cultures remain no growth. Has rash but seems less likely antibiotic related as it started prior to admission per patient--perhaps seborrheic dermatitis (?).    #UTI/pyelonephritis  #Fever  #Leukocytosis    -suggest TMP/SMX 1 DS tab BID x 12 days  -discontinue ceftriaxone  -follow cultures to completion  -discussed with Dr. Blair  -will follow PRN, please call ID if any questions or acute issues arise. Thank you.    Elaine Mendez MD  Division of Infectious Diseases   Cell [phone] between 8am and 6pm   After 6pm and weekends please call ID service at [phone].     35 minutes spent on total encounter assessing patient, examination, chart review, counseling and coordinating care by the attending physician/nurse/care manager.      48y Male with hx MS and paraplegia, with chronic iraheta, who presented with shortness of breath. Found to have sepsis likely 2/2 UTI/pyelonephritis. Iraheta changed in the ED.     Fever and leukocytosis resolved. Urine culture growing E coli, sensitvities reviewed. Blood cultures remain no growth. Has rash but seems less likely antibiotic related as it started prior to admission per patient--perhaps seborrheic dermatitis (?).    #UTI/pyelonephritis  #Fever  #Leukocytosis    -suggest TMP/SMX 1 DS tab BID x 12 days  -discontinue ceftriaxone  -follow cultures to completion  -discussed with Dr. Blair  -will follow PRN, please call ID if any questions or acute issues arise. Thank you.    Elaine Mendez MD  Division of Infectious Diseases   Cell [phone] between 8am and 6pm   After 6pm and weekends please call ID service at [phone].     35 minutes spent on total encounter assessing patient, examination, chart review, counseling and coordinating care by the attending physician/nurse/care manager.      48y Male with hx MS and paraplegia, with chronic iraheta, who presented with shortness of breath. Found to have sepsis likely 2/2 UTI/pyelonephritis. Iraheta changed in the ED.     Fever and leukocytosis resolved. Urine culture growing E coli, sensitvities reviewed. Blood cultures remain no growth. Has rash but seems less likely antibiotic related as it started prior to admission per patient--perhaps seborrheic dermatitis (?).    #UTI/pyelonephritis  #Fever  #Leukocytosis    -suggest TMP/SMX 1 DS tab BID x 12 days  -discontinue ceftriaxone  -follow cultures to completion  -discussed with Dr. Blair  -will follow PRN, please call ID if any questions or acute issues arise. Thank you.    Elaine Mendez MD  Division of Infectious Diseases   Cell [phone] between 8am and 6pm   After 6pm and weekends please call ID service at [phone].     35 minutes spent on total encounter assessing patient, examination, chart review, counseling and coordinating care by the attending physician/nurse/care manager.

## 2024-01-03 NOTE — PROGRESS NOTE ADULT - PROBLEM SELECTOR PLAN 3
- likely prerenal, 2/2 dehydration from UTI  - BUN/Cr 29/1.7  - baseline Cr 1.16 in 9/2023, pt did have Cr 7 in early 9/2023  - s/p 2 L NS bolus- 1 bolus 3pm today   - start maintenance fluids 100 cc/hr x 12 hours  -  bmp in am  - currently resolved - likely prerenal, 2/2 dehydration from UTI  - BUN/Cr 29/1.7  - baseline Cr 1.16 in 9/2023, pt did have Cr 7 in early 9/2023  - s/p fluid boluses  -  bmp in am  - currently resolved - likely prerenal, 2/2 dehydration from UTI / sepsis  - BUN/Cr 29/1.7  - baseline Cr 1.16 in 9/2023, pt did have Cr 7 in early 9/2023  - s/p fluid boluses  - monitor BMP in am  - resolved with hydration

## 2024-01-03 NOTE — PROGRESS NOTE ADULT - PROBLEM SELECTOR PLAN 1
Meets sepsis criteria WBC 28.6, T 102.7, , RR 20 - Source: UTI and possible pyelonephritis  secto possible cauti   Lactate -was   high  ,   UA positive for UTI, with large blood  EKG sinus tachycardia, likely 2/2 fever  Sepsis protocol  -s/p vanco and zosyn in ED  s/p 2 L NS bolus and ofirmev in ED  ICU consulted for tachypnea: Suspect tachypnea to be secondary to febrile episode. Recommends ATC tylenol. For hypovolemia recommends 30cc/kg bolus. Will likely need additional fluid aside from the 30cc/kg bolus.   start maintenance fluids 100 cc/hr x 12 hours  prn tylenol for fever  start ceftriaxone 2g QD for complicated UTI with CAUTI  Trend wbc, fever, downtrending leukocytosis-FU BCx neg todate , UCx ecoli  Lactate trended to normal   Consult ID, Dr. Fonseca, Meets sepsis criteria WBC 28.6, T 102.7, , RR 20 - Source: UTI and possible pyelonephritis  secto possible cauti   Lactate -was   high  ,   UA positive for UTI, with large blood  EKG sinus tachycardia, likely 2/2 fever  Sepsis protocol  -s/p vanco and zosyn in ED  s/p 2 L NS bolus and ofirmev in ED  ICU consulted for tachypnea: Suspect tachypnea to be secondary to febrile episode. Recommends ATC tylenol. For hypovolemia recommends 30cc/kg bolus. Will likely need additional fluid aside from the 30cc/kg bolus.   prn tylenol for fever  start ceftriaxone 2g QD for complicated UTI with CAUTI, 5 day course  Trend wbc, fever, downtrending leukocytosis-  - BCx neg todate , UCx ecoli, sensitivites pending  Lactate trended to normal   Consult ID, Dr. Fonseca, - Met sepsis criteria (POA) WBC 28.6, T 102.7, , RR 20 - Source: CAUTI and likely pyelonephritis   - Lactate -was elevated and normalized with IVF, UA positive  - Sepsis protocol -s/p vanco and zosyn in ED  - fluid resuscitated and given ofirmev in ED  - Suspect tachypnea on admission was secondary to fever, lactic acidosis, sepsis.   - CTA Chest ruled out PE and had no evidence of PNA. Pt did have small pleural effusion and atelectasis.   - c/w tylenol for fever  - c/w ceftriaxone 2g QD for complicated CAUTI with pyelonephritis   - Trend CBC, VS, resolving leukocytosis/fever   - BCx NGTD, UCx +e coli, sensitivities pending  - ID, Dr. Mendez, recs appreciated

## 2024-01-03 NOTE — PROGRESS NOTE ADULT - PROBLEM SELECTOR PLAN 6
- chronic problem  - paraplegic  - chronic iraheta- changed in ed   - not currently on medication  - start baclofen for muscle spasms, monitor for improvement - chronic problem  - paraplegic  - chronic iraheta- changed in ed   - not on medication prior  - baclofen started for muscle spasms, monitor for improvement - chronic   - paraplegic  - chronic iraheta- changed in ed   - not on medication prior  - baclofen started for muscle spasms, monitor for improvement  - MS has led to depressed thoughts, pt refuses anti-depressant / psych eval as he had negative experience in the past with medication

## 2024-01-03 NOTE — PROGRESS NOTE ADULT - ASSESSMENT
The patient is a 48y Male with hx MS and paraplegia, with chronic iraheta, complaining of shortness of breath. Admitted for sepsis 2/2 UTI and possible pyelonephritis, NAY, and dehydration. 49yo M with PMH of progressive MS, paraplegia, with chronic iraheta, complaining of shortness of breath, fever, back pain, admitted for sepsis 2/2 CAUTI with likely acute pyelonephritis, NAY, and dehydration.

## 2024-01-03 NOTE — PROGRESS NOTE ADULT - TIME BILLING
Pt seen + examined on 1/3. Case discussed with resident. Agree with assessment and plan above (edited by me in detail):  Time spent: 50min. Time spent discussing/coordinating care with consultants, CM, and counseling the patient on medical condition -- sepsis 2/2 CAUTI with likely acute pyelonephritis, NAY, and dehydration, depressed mood.    47yo M with PMH of progressive MS, paraplegia, with chronic iraheta, complaining of shortness of breath, fever, back pain, admitted for sepsis 2/2 CAUTI with likely acute pyelonephritis, NAY, and dehydration.    - Met sepsis criteria (POA) WBC 28.6, T 102.7, , RR 20 - Source: CAUTI and likely pyelonephritis   - Lactate -was elevated and normalized with IVF, UA positive  - Sepsis protocol -s/p vanco and zosyn in ED  - fluid resuscitated and given ofirmev in ED  - Suspect tachypnea on admission was secondary to fever, lactic acidosis, sepsis.   - CTA Chest ruled out PE and had no evidence of PNA. Pt did have small pleural effusion and atelectasis.   - c/w tylenol for fever  - c/w ceftriaxone 2g QD for complicated CAUTI with pyelonephritis   - Trend CBC, VS, resolving leukocytosis/fever   - BCx NGTD, UCx +e coli, sensitivities pending  - ID, Dr. Mendez, recs appreciated -- update: switching to Bactrim this evening when sensitivities came back    - NAY likely prerenal and resolved with hydration    - of note, pt has chronic flaky rash diffusely that appears to be seborrheic dermatitis; emollients placed by nursing, rec outpt derm f/up    Disp: possible d/c to home tomorrow if stable Pt seen + examined on 1/3. Case discussed with resident. Agree with assessment and plan above (edited by me in detail):  Time spent: 50min. Time spent discussing/coordinating care with consultants, CM, and counseling the patient on medical condition -- sepsis 2/2 CAUTI with likely acute pyelonephritis, NAY, and dehydration, depressed mood.    49yo M with PMH of progressive MS, paraplegia, with chronic iraheta, complaining of shortness of breath, fever, back pain, admitted for sepsis 2/2 CAUTI with likely acute pyelonephritis, NAY, and dehydration.    - Met sepsis criteria (POA) WBC 28.6, T 102.7, , RR 20 - Source: CAUTI and likely pyelonephritis   - Lactate -was elevated and normalized with IVF, UA positive  - Sepsis protocol -s/p vanco and zosyn in ED  - fluid resuscitated and given ofirmev in ED  - Suspect tachypnea on admission was secondary to fever, lactic acidosis, sepsis.   - CTA Chest ruled out PE and had no evidence of PNA. Pt did have small pleural effusion and atelectasis.   - c/w tylenol for fever  - c/w ceftriaxone 2g QD for complicated CAUTI with pyelonephritis   - Trend CBC, VS, resolving leukocytosis/fever   - BCx NGTD, UCx +e coli, sensitivities pending  - ID, Dr. Mendez, recs appreciated -- update: switching to Bactrim this evening when sensitivities came back    - NAY likely prerenal and resolved with hydration    - of note, pt has chronic flaky rash diffusely that appears to be seborrheic dermatitis; emollients placed by nursing, rec outpt derm f/up    Disp: possible d/c to home tomorrow if stable Pt seen + examined on 1/3. Case discussed with resident. Agree with assessment and plan above (edited by me in detail):  Time spent: 50min. Time spent discussing/coordinating care with consultants, CM, and counseling the patient on medical condition -- sepsis 2/2 CAUTI with likely acute pyelonephritis, NAY, and dehydration, depressed mood.    47yo M with PMH of progressive MS, paraplegia, with chronic iraheta, complaining of shortness of breath, fever, back pain, admitted for sepsis 2/2 CAUTI with likely acute pyelonephritis, NAY, and dehydration.    - Met sepsis criteria (POA) WBC 28.6, T 102.7, , RR 20 - Source: CAUTI and likely pyelonephritis   - Lactate -was elevated and normalized with IVF, UA positive  - Sepsis protocol -s/p vanco and zosyn in ED  - fluid resuscitated and given ofirmev in ED  - Suspect tachypnea on admission was secondary to fever, lactic acidosis, sepsis.   - CTA Chest ruled out PE and had no evidence of PNA. Pt did have small pleural effusion and atelectasis.   - c/w tylenol for fever  - c/w ceftriaxone 2g QD for complicated CAUTI with pyelonephritis   - Trend CBC, VS, resolving leukocytosis/fever   - BCx NGTD, UCx +e coli, sensitivities pending  - ID, Dr. Mendez, recs appreciated -- update: switching to Bactrim this evening when sensitivities came back    - NAY likely prerenal and resolved with hydration    - of note, pt has chronic flaky rash diffusely that appears to be seborrheic dermatitis; emollients placed by nursing, rec outpt derm f/up    Disp: possible d/c to home tomorrow if stable and 24hr HHA in place Pt seen + examined on 1/3. Case discussed with resident. Agree with assessment and plan above (edited by me in detail):  Time spent: 50min. Time spent discussing/coordinating care with consultants, CM, and counseling the patient on medical condition -- sepsis 2/2 CAUTI with likely acute pyelonephritis, NAY, and dehydration, depressed mood.    49yo M with PMH of progressive MS, paraplegia, with chronic iraheta, complaining of shortness of breath, fever, back pain, admitted for sepsis 2/2 CAUTI with likely acute pyelonephritis, NAY, and dehydration.    - Met sepsis criteria (POA) WBC 28.6, T 102.7, , RR 20 - Source: CAUTI and likely pyelonephritis   - Lactate -was elevated and normalized with IVF, UA positive  - Sepsis protocol -s/p vanco and zosyn in ED  - fluid resuscitated and given ofirmev in ED  - Suspect tachypnea on admission was secondary to fever, lactic acidosis, sepsis.   - CTA Chest ruled out PE and had no evidence of PNA. Pt did have small pleural effusion and atelectasis.   - c/w tylenol for fever  - c/w ceftriaxone 2g QD for complicated CAUTI with pyelonephritis   - Trend CBC, VS, resolving leukocytosis/fever   - BCx NGTD, UCx +e coli, sensitivities pending  - ID, Dr. Mendez, recs appreciated -- update: switching to Bactrim this evening when sensitivities came back    - NAY likely prerenal and resolved with hydration    - of note, pt has chronic flaky rash diffusely that appears to be seborrheic dermatitis; emollients placed by nursing, rec outpt derm f/up    Disp: possible d/c to home tomorrow if stable and 24hr HHA in place

## 2024-01-04 ENCOUNTER — TRANSCRIPTION ENCOUNTER (OUTPATIENT)
Age: 49
End: 2024-01-04

## 2024-01-04 LAB
ANION GAP SERPL CALC-SCNC: 7 MMOL/L — SIGNIFICANT CHANGE UP (ref 5–17)
BASOPHILS # BLD AUTO: 0.05 K/UL — SIGNIFICANT CHANGE UP (ref 0–0.2)
BASOPHILS NFR BLD AUTO: 0.6 % — SIGNIFICANT CHANGE UP (ref 0–2)
BUN SERPL-MCNC: 21 MG/DL — SIGNIFICANT CHANGE UP (ref 7–23)
CALCIUM SERPL-MCNC: 9.1 MG/DL — SIGNIFICANT CHANGE UP (ref 8.5–10.1)
CHLORIDE SERPL-SCNC: 106 MMOL/L — SIGNIFICANT CHANGE UP (ref 96–108)
CO2 SERPL-SCNC: 26 MMOL/L — SIGNIFICANT CHANGE UP (ref 22–31)
CREAT SERPL-MCNC: 1.1 MG/DL — SIGNIFICANT CHANGE UP (ref 0.5–1.3)
EGFR: 83 ML/MIN/1.73M2 — SIGNIFICANT CHANGE UP
EOSINOPHIL # BLD AUTO: 0.4 K/UL — SIGNIFICANT CHANGE UP (ref 0–0.5)
EOSINOPHIL NFR BLD AUTO: 4.5 % — SIGNIFICANT CHANGE UP (ref 0–6)
GLUCOSE SERPL-MCNC: 90 MG/DL — SIGNIFICANT CHANGE UP (ref 70–99)
HCT VFR BLD CALC: 36.2 % — LOW (ref 39–50)
HGB BLD-MCNC: 11.8 G/DL — LOW (ref 13–17)
IMM GRANULOCYTES NFR BLD AUTO: 0.3 % — SIGNIFICANT CHANGE UP (ref 0–0.9)
LYMPHOCYTES # BLD AUTO: 2.36 K/UL — SIGNIFICANT CHANGE UP (ref 1–3.3)
LYMPHOCYTES # BLD AUTO: 26.8 % — SIGNIFICANT CHANGE UP (ref 13–44)
MCHC RBC-ENTMCNC: 28.1 PG — SIGNIFICANT CHANGE UP (ref 27–34)
MCHC RBC-ENTMCNC: 32.6 GM/DL — SIGNIFICANT CHANGE UP (ref 32–36)
MCV RBC AUTO: 86.2 FL — SIGNIFICANT CHANGE UP (ref 80–100)
MONOCYTES # BLD AUTO: 0.72 K/UL — SIGNIFICANT CHANGE UP (ref 0–0.9)
MONOCYTES NFR BLD AUTO: 8.2 % — SIGNIFICANT CHANGE UP (ref 2–14)
NEUTROPHILS # BLD AUTO: 5.26 K/UL — SIGNIFICANT CHANGE UP (ref 1.8–7.4)
NEUTROPHILS NFR BLD AUTO: 59.6 % — SIGNIFICANT CHANGE UP (ref 43–77)
NRBC # BLD: 0 /100 WBCS — SIGNIFICANT CHANGE UP (ref 0–0)
PLATELET # BLD AUTO: 328 K/UL — SIGNIFICANT CHANGE UP (ref 150–400)
POTASSIUM SERPL-MCNC: 3.7 MMOL/L — SIGNIFICANT CHANGE UP (ref 3.5–5.3)
POTASSIUM SERPL-SCNC: 3.7 MMOL/L — SIGNIFICANT CHANGE UP (ref 3.5–5.3)
RBC # BLD: 4.2 M/UL — SIGNIFICANT CHANGE UP (ref 4.2–5.8)
RBC # FLD: 15.3 % — HIGH (ref 10.3–14.5)
SODIUM SERPL-SCNC: 139 MMOL/L — SIGNIFICANT CHANGE UP (ref 135–145)
WBC # BLD: 8.82 K/UL — SIGNIFICANT CHANGE UP (ref 3.8–10.5)
WBC # FLD AUTO: 8.82 K/UL — SIGNIFICANT CHANGE UP (ref 3.8–10.5)

## 2024-01-04 PROCEDURE — 99232 SBSQ HOSP IP/OBS MODERATE 35: CPT | Mod: GC

## 2024-01-04 RX ADMIN — HEPARIN SODIUM 5000 UNIT(S): 5000 INJECTION INTRAVENOUS; SUBCUTANEOUS at 15:19

## 2024-01-04 RX ADMIN — Medication 1 TABLET(S): at 05:21

## 2024-01-04 RX ADMIN — HEPARIN SODIUM 5000 UNIT(S): 5000 INJECTION INTRAVENOUS; SUBCUTANEOUS at 05:22

## 2024-01-04 RX ADMIN — HEPARIN SODIUM 5000 UNIT(S): 5000 INJECTION INTRAVENOUS; SUBCUTANEOUS at 21:29

## 2024-01-04 RX ADMIN — Medication 10 MILLIGRAM(S): at 17:44

## 2024-01-04 RX ADMIN — Medication 10 MILLIGRAM(S): at 10:59

## 2024-01-04 RX ADMIN — Medication 10 MILLIGRAM(S): at 00:57

## 2024-01-04 RX ADMIN — Medication 1 TABLET(S): at 17:44

## 2024-01-04 NOTE — PROGRESS NOTE ADULT - ASSESSMENT
47yo M with PMH of progressive MS, paraplegia, with chronic iarheta, complaining of shortness of breath, fever, back pain, admitted for sepsis 2/2 CAUTI with likely acute pyelonephritis, NAY, and dehydration. 49yo M with PMH of progressive MS, paraplegia, with chronic iraheta, complaining of shortness of breath, fever, back pain, admitted for sepsis 2/2 CAUTI with likely acute pyelonephritis, NAY, and dehydration. 47yo M with PMH of progressive MS, paraplegia, with chronic iraheta, complaining of shortness of breath, fever, back pain, admitted for sepsis 2/2 CAUTI with likely acute pyelonephritis, NAY, and dehydration.

## 2024-01-04 NOTE — DISCHARGE NOTE PROVIDER - CARE PROVIDER_API CALL
Kobe Alejandra.  Internal Medicine  [address] [place] [address]  Phone: [phone]  Fax: [phone]  Follow Up Time:    Kobe Alejandra.  Internal Medicine  [address] [place] [address]  Phone: [phone]  Fax: [phone]  Follow Up Time:    Kobe Alejandra.  Internal Medicine  [address] [place] [address]  Phone: [phone]  Fax: [phone]  Follow Up Time:    Kobe Alejandra.  Internal Medicine  [address] [place] [address]  Phone: [phone]  Fax: [phone]  Follow Up Time:

## 2024-01-04 NOTE — PROGRESS NOTE ADULT - PROBLEM SELECTOR PLAN 2
- pt with chronic iraheta 2/2 retention in setting of advanced MS, paraplegia, iraheta changed in ED  - U/A positive for UTI  - CT A/P: Iraheta catheter within the markedly thickened urinary bladder with perivesicular stranding. Bilateral hydroureteronephrosis to the level of the ureterovesicular junction without obstructing stone identified.  Findings are most compatible with ascending urinary tract infection, including possible pyelonephritis.  - Urine cultures shows E. Coli growth, sensitivites resulted  - start TMP-SMX 10 day course, d/c ceftriaxone  - plan as above

## 2024-01-04 NOTE — PROGRESS NOTE ADULT - ATTENDING COMMENTS
48y Male with hx MS and paraplegia, with chronic iraheta, complaining of shortness of breath. Admitted for sepsis  poa  2/2 UTI and possible pyelonephritis, NAY, and dehydration  / high lactate .
see below
48y Male with hx MS and paraplegia, with chronic iraheta, complaining of shortness of breath. Admitted for sepsis  poa  2/2 UTI and possible pyelonephritis, NAY, and dehydration  - iv hydration , iv abx   / high lactate  resolved  .
see below

## 2024-01-04 NOTE — PROGRESS NOTE ADULT - PROBLEM SELECTOR PLAN 4
- pt tachypneic in ED, placed on BIPAP temporarily  - CTA chest neg for PE  - Pt DNR/DNI  - ICU consult: Suspect tachypnea to be secondary to febrile episode, likely due to lactic acidosis / sepsis  - pt currently tolerating RA  - Plan as above

## 2024-01-04 NOTE — PROGRESS NOTE ADULT - PROBLEM SELECTOR PLAN 1
- Met sepsis criteria (POA) WBC 28.6, T 102.7, , RR 20 - Source: CAUTI and likely pyelonephritis   - Lactate -was elevated and normalized with IVF, UA positive  - Sepsis protocol -s/p vanco and zosyn in ED  - fluid resuscitated and given ofirmev in ED  - Suspect tachypnea on admission was secondary to fever, lactic acidosis, sepsis.   - CTA Chest ruled out PE and had no evidence of PNA. Pt did have small pleural effusion and atelectasis.   - c/w tylenol for fever  - c/w TMP- SMX for complicated CAUTI with pyelonephritis   - Trend CBC, VS, resolving leukocytosis/fever   - BCx NGTD, UCx +e coli, sensitivities resulted  - ID, Dr. Mendez, recs appreciated - Met sepsis criteria (POA) WBC 28.6, T 102.7, , RR 20 - Source: CAUTI and likely pyelonephritis   - Lactate -was elevated and normalized with IVF, UA positive  - Sepsis protocol -s/p vanco and zosyn in ED  - fluid resuscitated and given ofirmev in ED  - Suspect tachypnea on admission was secondary to fever, lactic acidosis, sepsis.   - CTA Chest ruled out PE and had no evidence of PNA. Pt did have small pleural effusion and atelectasis.   - leukocytosis and fever resolved  - c/w TMP- SMX for complicated CAUTI with pyelonephritis   - monitor CBC, VS   - BCx NGTD, UCx +e coli, sensitivities resulted and sensitive to Bactrim and cephalosporins   - ID, Dr. Mendez, recs appreciated - Met sepsis criteria (POA) WBC 28.6, T 102.7, , RR 20 - Source: CAUTI and likely pyelonephritis   - Lactate -was elevated and normalized with IVF, UA positive  - Sepsis protocol -s/p vanco and zosyn in ED  - fluid resuscitated and given ofirmev in ED  - Suspect tachypnea on admission was secondary to fever, lactic acidosis, sepsis.   - CTA Chest ruled out PE and had no evidence of PNA. Pt did have small pleural effusion and atelectasis.   - leukocytosis and fever resolved  - c/w TMP- SMX for complicated CAUTI with pyelonephritis   - monitor CBC, VS   - BCx NGTD, UCx +e coli, sensitivities resulted and sensitive to Bactrim and cephalosporins   - ID, Dr. Mednez, recs appreciated

## 2024-01-04 NOTE — PROGRESS NOTE ADULT - PROBLEM SELECTOR PLAN 5
- CT chest: Small left pleural effusion with left lower lobe segmental atelectasis with associated left hemidiaphragm elevation.  - Tachypnea improved s/p bipap  - continue to monitor vitals

## 2024-01-04 NOTE — PROGRESS NOTE ADULT - PROBLEM SELECTOR PROBLEM 3
NAY (acute kidney injury)

## 2024-01-04 NOTE — PROGRESS NOTE ADULT - PROBLEM SELECTOR PLAN 3
- likely prerenal, 2/2 dehydration from UTI / sepsis  - BUN/Cr 29/1.7  - baseline Cr 1.16 in 9/2023, pt did have Cr 7 in early 9/2023  - s/p fluid boluses  - monitor BMP in am  - resolved with hydration

## 2024-01-04 NOTE — DISCHARGE NOTE PROVIDER - PROVIDER TOKENS
PROVIDER:[TOKEN:[[id number]]] PROVIDER:[TOKEN:[[id number]]] PROVIDER:[TOKEN:[[id number]]] PROVIDER:[TOKEN:[[id number]]]

## 2024-01-04 NOTE — PROGRESS NOTE ADULT - PROBLEM SELECTOR PLAN 7
- CT A/P: Hepatomegaly and hepatic steatosis.  - pt reports social alcohol use on weekends  - LFTs wnl

## 2024-01-04 NOTE — CASE MANAGEMENT PROGRESS NOTE - NSCMPROGRESSNOTE_GEN_ALL_CORE
Pt is cleared medically for discharge as per the medical team. The pt lives in his own apt with 24/7 aides through [facility]. There is a M-F aide through [employer]. I spoke with the CM Barbara and she will re-instate the aide for 12 noon tomorrow. The aide has the key to get into the pt's home. I also called [facility] @ [phone] and spoke with Solomon @ ext [phone], the CM who has confirmed that the Sat and Sun aide will be reinstated for 8AM on Sat morning. The pt has [facility] in the home and the referral will be send for a [facility] for VN for chronic iraheta. Ambulance to be set up for transition home tomorrow for 12 noon. The pt is aware and in agreement with the transition home plan.  Pt is cleared medically for discharge as per the medical team. The pt lives in his own apt with 24/7 aides through [facility]. There is a M-F aide through [facility]. I spoke with the CM Barbara and she will re-instate the aide for 12 noon tomorrow. The aide has the key to get into the pt's home. I also called [facility] @ [phone] and spoke with Solomon @ ext [phone], the CM who has confirmed that the Sat and Sun aide will be reinstated for 8AM on Sat morning. The pt has [facility] in the home and the referral will be send for a [facility] for VN for chronic iraheta. Ambulance to be set up for transition home tomorrow for 12 noon. The pt is aware and in agreement with the transition home plan.  Pt is cleared medically for discharge as per the medical team. The pt lives in his own apt with 24/7 aides through [facility]. There is a M-F aide through [employer] [facility]. I spoke with the CM Barbara and she will re-instate the aide for 12 noon tomorrow. The aide has the key to get into the pt's home. I also called [facility] @ [phone] and spoke with Solomon @ ext [phone], the CM who has confirmed that the Sat and Sun aide will be reinstated for 8AM on Sat morning. The pt has [facility] in the home and the referral will be send for a [facility] for VN for chronic iraheta. Ambulance to be set up for transition home tomorrow for 12 noon. The pt is aware and in agreement with the transition home plan.  Pt is cleared medically for discharge as per the medical team. The pt lives in his own apt with 24/7 aides through [facility]. There is a M-F aide through [facility]. I spoke with the CM Barbara and she will re-instate the aide for 12 noon tomorrow. The aide has the key to get into the pt's home. I also called [facility] @ [phone] and spoke with Solomon @ ext 325, the CM who has confirmed that the Sat and Sun aide will be reinstated for 8AM on Sat morning. The pt has [facility] in the home and the referral will be send for a [facility] for VN for chronic iraheta. Ambulance to be set up for transition home tomorrow for 12 noon. The pt is aware and in agreement with the transition home plan.

## 2024-01-04 NOTE — PROGRESS NOTE ADULT - SUBJECTIVE AND OBJECTIVE BOX
Patient is a 48y old  Male who presents with a chief complaint of UTI, pyelonephritis (04 Jan 2024 10:17)      INTERVAL HPI/OVERNIGHT EVENTS:  YOANNA  Transitioned to TMP-SMX from ceftriaxone  Seen at bedside, in better spirits  Has no acute complaints to discuss today, would like to know when he can go home      MEDICATIONS  (STANDING):  baclofen 10 milliGRAM(s) Oral every 8 hours  heparin   Injectable 5000 Unit(s) SubCutaneous every 8 hours  influenza   Vaccine 0.5 milliLiter(s) IntraMuscular once  sodium chloride 0.9%. 1000 milliLiter(s) (100 mL/Hr) IV Continuous <Continuous>  trimethoprim  160 mG/sulfamethoxazole 800 mG 1 Tablet(s) Oral two times a day    MEDICATIONS  (PRN):  acetaminophen     Tablet .. 650 milliGRAM(s) Oral every 6 hours PRN Temp greater or equal to 38C (100.4F), Mild Pain (1 - 3)      Allergies    Cipro (Rash)    Intolerances        REVIEW OF SYSTEMS:  CONSTITUTIONAL: No fever or chills  HEENT:  No headache, no sore throat  RESPIRATORY: No cough, wheezing, or shortness of breath  CARDIOVASCULAR: No chest pain, palpitations  GASTROINTESTINAL: No abd pain, nausea, vomiting, or diarrhea  GENITOURINARY: No dysuria, frequency, or hematuria  NEUROLOGICAL: no focal weakness or dizziness  MUSCULOSKELETAL: no myalgias   INTEGUMENTARY: rash present    Vital Signs Last 24 Hrs  T(C): 36.6 (04 Jan 2024 10:34), Max: 36.9 (03 Jan 2024 21:23)  T(F): 97.9 (04 Jan 2024 10:34), Max: 98.5 (03 Jan 2024 21:23)  HR: 93 (04 Jan 2024 10:34) (90 - 101)  BP: 117/83 (04 Jan 2024 10:34) (108/75 - 136/78)  BP(mean): --  RR: 19 (04 Jan 2024 10:34) (19 - 19)  SpO2: 95% (04 Jan 2024 10:34) (94% - 95%)    Parameters below as of 04 Jan 2024 10:34  Patient On (Oxygen Delivery Method): room air        PHYSICAL EXAM:  GENERAL: NAD  HEENT:  anicteric, moist mucous membranes  CHEST/LUNG:  CTA b/l, no rales, wheezes, or rhonchi  HEART:  RRR, S1, S2  ABDOMEN:  BS+, soft, nontender, nondistended, iraheta in place  MUSCULOSKELETAL: no edema, cyanosis, or calf tenderness, SCDs in place  NEUROLOGIC: answers questions and follows commands appropriately  INTEGUMENTARY: Diffuse rash present    LABS:    CBC Full  -  ( 03 Jan 2024 10:35 )  WBC Count : 9.04 K/uL  Hemoglobin : 11.2 g/dL  Hematocrit : 34.1 %  Platelet Count - Automated : 274 K/uL  Mean Cell Volume : 87.0 fl  Mean Cell Hemoglobin : 28.6 pg  Mean Cell Hemoglobin Concentration : 32.8 gm/dL  Auto Neutrophil # : 6.07 K/uL  Auto Lymphocyte # : 2.05 K/uL  Auto Monocyte # : 0.55 K/uL  Auto Eosinophil # : 0.29 K/uL  Auto Basophil # : 0.04 K/uL  Auto Neutrophil % : 67.2 %  Auto Lymphocyte % : 22.7 %  Auto Monocyte % : 6.1 %  Auto Eosinophil % : 3.2 %  Auto Basophil % : 0.4 %      Ca    9.0        03 Jan 2024 10:35        Urinalysis Basic - ( 03 Jan 2024 10:35 )    Color: x / Appearance: x / SG: x / pH: x  Gluc: 115 mg/dL / Ketone: x  / Bili: x / Urobili: x   Blood: x / Protein: x / Nitrite: x   Leuk Esterase: x / RBC: x / WBC x   Sq Epi: x / Non Sq Epi: x / Bacteria: x      CAPILLARY BLOOD GLUCOSE            Culture - Blood (collected 12-31-23 @ 20:55)  Source: .Blood Blood-Peripheral  Preliminary Report (01-04-24 @ 07:01):    No growth at 72 Hours    Culture - Urine (collected 12-31-23 @ 20:55)  Source: Clean Catch Clean Catch (Midstream)  Final Report (01-03-24 @ 14:26):    >100,000 CFU/ml Escherichia coli  Organism: Escherichia coli (01-03-24 @ 14:26)  Organism: Escherichia coli (01-03-24 @ 14:26)      -  Levofloxacin: R >4      -  Tobramycin: R >8      -  Nitrofurantoin: S <=32 Should not be used to treat pyelonephritis      -  Aztreonam: S <=4      -  Gentamicin: R >8      -  Cefazolin: S <=2 For uncomplicated UTI with K. pneumoniae, E. coli, or P. mirablis: CHADD <=16 is sensitive and CHADD >=32 is resistant. This also predicts results for oral agents cefaclor, cefdinir, cefpodoxime, cefprozil, cefuroxime axetil, cephalexin and locarbef for uncomplicated UTI. Note that some isolates may be susceptible to these agents while testing resistant to cefazolin.      -  Cefepime: S <=2      -  Piperacillin/Tazobactam: S <=8      -  Ciprofloxacin: R >2      -  Imipenem: S <=1      -  Ceftriaxone: S <=1      -  Ampicillin: R >16 These ampicillin results predict results for amoxicillin      Method Type: CHADD      -  Meropenem: S <=1      -  Ampicillin/Sulbactam: I 16/8      -  Cefoxitin: S <=8      -  Cefuroxime: S 8      -  Amoxicillin/Clavulanic Acid: I 16/8      -  Trimethoprim/Sulfamethoxazole: S <=0.5/9.5      -  Ertapenem: S <=0.5    Culture - Blood (collected 12-31-23 @ 20:50)  Source: .Blood Blood-Peripheral  Preliminary Report (01-04-24 @ 07:01):    No growth at 72 Hours        RADIOLOGY & ADDITIONAL TESTS:    Personally reviewed.     Consultant(s) Notes Reviewed:  [x] YES  [ ] NO     Patient is a 48y old  Male who presents with a chief complaint of SOB, fever, back pain.      INTERVAL HPI/OVERNIGHT EVENTS:  No acute overnight events.   Transitioned to TMP-SMX from ceftriaxone  Seen at bedside, in better spirits  Has no acute complaints to discuss today and back spasms are resolving.   Flaky rash improved with emollients.       MEDICATIONS  (STANDING):  baclofen 10 milliGRAM(s) Oral every 8 hours  heparin   Injectable 5000 Unit(s) SubCutaneous every 8 hours  influenza   Vaccine 0.5 milliLiter(s) IntraMuscular once  sodium chloride 0.9%. 1000 milliLiter(s) (100 mL/Hr) IV Continuous <Continuous>  trimethoprim  160 mG/sulfamethoxazole 800 mG 1 Tablet(s) Oral two times a day    MEDICATIONS  (PRN):  acetaminophen     Tablet .. 650 milliGRAM(s) Oral every 6 hours PRN Temp greater or equal to 38C (100.4F), Mild Pain (1 - 3)      Allergies    Cipro (Rash)    Intolerances        REVIEW OF SYSTEMS:  CONSTITUTIONAL: No fever or chills  HEENT:  No headache, no sore throat  RESPIRATORY: No cough, wheezing, or shortness of breath  CARDIOVASCULAR: No chest pain, palpitations  GASTROINTESTINAL: No abd pain, nausea, vomiting, or diarrhea  GENITOURINARY: No dysuria, frequency, or hematuria  NEUROLOGICAL: no new focal weakness or dizziness  MUSCULOSKELETAL: no myalgias ; back spasms improved   INTEGUMENTARY: flaky skin rash improved with emollients given by nursing    Vital Signs Last 24 Hrs  T(C): 36.6 (04 Jan 2024 10:34), Max: 36.9 (03 Jan 2024 21:23)  T(F): 97.9 (04 Jan 2024 10:34), Max: 98.5 (03 Jan 2024 21:23)  HR: 93 (04 Jan 2024 10:34) (90 - 101)  BP: 117/83 (04 Jan 2024 10:34) (108/75 - 136/78)  BP(mean): --  RR: 19 (04 Jan 2024 10:34) (19 - 19)  SpO2: 95% (04 Jan 2024 10:34) (94% - 95%)    Parameters below as of 04 Jan 2024 10:34  Patient On (Oxygen Delivery Method): room air        PHYSICAL EXAM:  GENERAL: NAD, obese  HEENT:  anicteric, moist mucous membranes  CHEST/LUNG:  CTA b/l, no rales, wheezes, or rhonchi  HEART:  RRR, S1, S2  ABDOMEN:  BS+, soft, nontender, nondistended, iraheta in place  MUSCULOSKELETAL: no edema, cyanosis, or calf tenderness, SCDs in place  NEUROLOGIC: answers questions and follows commands appropriately, paraplegia   INTEGUMENTARY: flaky rash improving     LABS:                          11.8   8.82  )-----------( 328      ( 04 Jan 2024 10:30 )             36.2     CBC Full  -  ( 04 Jan 2024 10:30 )  WBC Count : 8.82 K/uL  Hemoglobin : 11.8 g/dL  Hematocrit : 36.2 %  Platelet Count - Automated : 328 K/uL  Mean Cell Volume : 86.2 fl  Mean Cell Hemoglobin : 28.1 pg  Mean Cell Hemoglobin Concentration : 32.6 gm/dL  Auto Neutrophil # : 5.26 K/uL  Auto Lymphocyte # : 2.36 K/uL  Auto Monocyte # : 0.72 K/uL  Auto Eosinophil # : 0.40 K/uL  Auto Basophil # : 0.05 K/uL  Auto Neutrophil % : 59.6 %  Auto Lymphocyte % : 26.8 %  Auto Monocyte % : 8.2 %  Auto Eosinophil % : 4.5 %  Auto Basophil % : 0.6 %    01-04    139  |  106  |  21  ----------------------------<  90  3.7   |  26  |  1.10    Ca    9.1      04 Jan 2024 10:30    TPro  7.3  /  Alb  2.7<L>  /  TBili  0.3  /  DBili  x   /  AST  13<L>  /  ALT  30  /  AlkPhos  57  01-03          Urinalysis Basic - ( 03 Jan 2024 10:35 )    Color: x / Appearance: x / SG: x / pH: x  Gluc: 115 mg/dL / Ketone: x  / Bili: x / Urobili: x   Blood: x / Protein: x / Nitrite: x   Leuk Esterase: x / RBC: x / WBC x   Sq Epi: x / Non Sq Epi: x / Bacteria: x      CAPILLARY BLOOD GLUCOSE            Culture - Blood (collected 12-31-23 @ 20:55)  Source: .Blood Blood-Peripheral  Preliminary Report (01-04-24 @ 07:01):    No growth at 72 Hours    Culture - Urine (collected 12-31-23 @ 20:55)  Source: Clean Catch Clean Catch (Midstream)  Final Report (01-03-24 @ 14:26):    >100,000 CFU/ml Escherichia coli  Organism: Escherichia coli (01-03-24 @ 14:26)  Organism: Escherichia coli (01-03-24 @ 14:26)      -  Levofloxacin: R >4      -  Tobramycin: R >8      -  Nitrofurantoin: S <=32 Should not be used to treat pyelonephritis      -  Aztreonam: S <=4      -  Gentamicin: R >8      -  Cefazolin: S <=2 For uncomplicated UTI with K. pneumoniae, E. coli, or P. mirablis: CHADD <=16 is sensitive and CHADD >=32 is resistant. This also predicts results for oral agents cefaclor, cefdinir, cefpodoxime, cefprozil, cefuroxime axetil, cephalexin and locarbef for uncomplicated UTI. Note that some isolates may be susceptible to these agents while testing resistant to cefazolin.      -  Cefepime: S <=2      -  Piperacillin/Tazobactam: S <=8      -  Ciprofloxacin: R >2      -  Imipenem: S <=1      -  Ceftriaxone: S <=1      -  Ampicillin: R >16 These ampicillin results predict results for amoxicillin      Method Type: CHADD      -  Meropenem: S <=1      -  Ampicillin/Sulbactam: I 16/8      -  Cefoxitin: S <=8      -  Cefuroxime: S 8      -  Amoxicillin/Clavulanic Acid: I 16/8      -  Trimethoprim/Sulfamethoxazole: S <=0.5/9.5      -  Ertapenem: S <=0.5    Culture - Blood (collected 12-31-23 @ 20:50)  Source: .Blood Blood-Peripheral  Preliminary Report (01-04-24 @ 07:01):    No growth at 72 Hours        RADIOLOGY & ADDITIONAL TESTS:    Personally reviewed.     Consultant(s) Notes Reviewed:  [x] YES  [ ] NO

## 2024-01-04 NOTE — DISCHARGE NOTE PROVIDER - HOSPITAL COURSE
ADMISSION DATE:  12-31-23    ---  FROM ADMISSION H+P:   HPI:  The patient is a 48y Male with hx MS and paraplegia, with chronic iraheta, complaining of shortness of breath. He started having progressively worsening SOB starting earlier yesterday, and ultimately he came to the ED. While in the ED, he was tachypneic, tachycardic, and febrile, and was placed on BIPAP with improvement of his tachypnea.  At this time the pt is interviewed s/p BIPAP, now satting well on RA. Pt denies SOB at this time, and complains of fatigue. Denies HA, dizziness, CP, palpitations, abd pain, N/V.  He lives alone, with 24 hour aides.  ED COURSE:  Vitals: T 98.8 -> 102.7  F ,   , /84 , RR 20  , SpO2 95 % on RA  Labs significant for: WBC 28.6, BUN/Cr 29/1.7, lactate 2.9 -> 2.8. trop neg, BNP neg, TSH 1.14, lipase neg  Imaging: CTA chest, CT Abdomen and Pelvis w/ IV Cont : No pulmonary embolism to the proximal segmental level, although distal evaluation is limited due to suboptimal bolus timing.  Iraheta catheter within the markedly thickened urinary bladder with perivesicular stranding. Bilateral hydroureteronephrosis to the level of the ureterovesicular junction without obstructing stone identified.  Findings are most compatible with ascending urinary tract infection, including possible pyelonephritis.  Small left pleural effusion with left lower lobe segmental atelectasis with associated left hemidiaphragm elevation.  Hepatomegaly and hepatic steatosis.  Fluid adjacent to loops of small bowel in the left lower quadrant as well as near the tip of the appendix, likely secondary to above inflammatory changes. However recommend clinical correlation.  EKG: sinus tachycardia   Pt received:  Vanco, zosyn, 2 L NS bolus, ofirmev (01 Jan 2024 02:06)      ---  HOSPITAL COURSE/PERTINENT LABS/PROCEDURES PERFORMED/PENDING TESTS:  The patient presented with sepsis secondary to CAUTI/pyelonephritis. The patient was started on intravenous antibiotics. Blood cultures showed NG. The urine culture showed ecoli. Patient had an elevated Cr on admission, suspected to be prerenal azotemia in setting of sepsis. The patient was also tachypneic and required BiPAP intermittently. The patient was found to have a pleural effusion that may have contributed to symptoms. Patient was initiated on baclofen for multiple sclerosis related muscle spasms. Pt clinically improved throughout the hospital course. The patient was seen and examined on the day of discharge.     ---  PATIENT CONDITION:  - stable    ---  ROS: Patient denies dysuria, shortness of breath, chest pain at time of discharge.    PHYSICAL EXAM:  GENERAL: NAD  HEENT:  anicteric, moist mucous membranes  CHEST/LUNG:  CTA b/l, no rales, wheezes, or rhonchi  HEART:  RRR, S1, S2  ABDOMEN:  soft, nontender, nondistended, iraheta in place  MUSCULOSKELETAL: no edema, cyanosis, or calf tenderness  NEUROLOGIC: answers questions and follows commands appropriately, paraplegia   SKIN: diffuse flaky rash / dandruff present  PSYCH: depressed mood     ---  CONSULTANTS:   Infectious Disease - Dr Mendez      ---  ADVANCED CARE PLANNING:  - Code status:    DNR/DNI/Trial NIV  - MOLST completed:      [  ] NO     [  ] YES    ---  TIME SPENT:  I, the attending physician, was physically present for the key portions of the evaluation and management (E/M) service provided. The total amount of time spent reviewing the hospital notes, laboratory values, imaging findings, assessing/counseling the patient, discussing with consultant physicians, social work, nursing staff was -- minutes ADMISSION DATE:  12-31-23    ---  FROM ADMISSION H+P:   HPI:  The patient is a 48y Male with hx MS and paraplegia, with chronic iraheta, complaining of shortness of breath. He started having progressively worsening SOB starting earlier yesterday, and ultimately he came to the ED. While in the ED, he was tachypneic, tachycardic, and febrile, and was placed on BIPAP with improvement of his tachypnea.  At this time the pt is interviewed s/p BIPAP, now satting well on RA. Pt denies SOB at this time, and complains of fatigue. Denies HA, dizziness, CP, palpitations, abd pain, N/V.  He lives alone, with 24 hour aides.  ED COURSE:  Vitals: T 98.8 -> 102.7  F ,   , /84 , RR 20  , SpO2 95 % on RA  Labs significant for: WBC 28.6, BUN/Cr 29/1.7, lactate 2.9 -> 2.8. trop neg, BNP neg, TSH 1.14, lipase neg  Imaging: CTA chest, CT Abdomen and Pelvis w/ IV Cont : No pulmonary embolism to the proximal segmental level, although distal evaluation is limited due to suboptimal bolus timing.  Iraheta catheter within the markedly thickened urinary bladder with perivesicular stranding. Bilateral hydroureteronephrosis to the level of the ureterovesicular junction without obstructing stone identified.  Findings are most compatible with ascending urinary tract infection, including possible pyelonephritis.  Small left pleural effusion with left lower lobe segmental atelectasis with associated left hemidiaphragm elevation.  Hepatomegaly and hepatic steatosis.  Fluid adjacent to loops of small bowel in the left lower quadrant as well as near the tip of the appendix, likely secondary to above inflammatory changes. However recommend clinical correlation.  EKG: sinus tachycardia   Pt received:  Vanco, zosyn, 2 L NS bolus, ofirmev (01 Jan 2024 02:06)      ---  HOSPITAL COURSE:  The patient presented with sepsis secondary to CAUTI/pyelonephritis. The patient was started on intravenous antibiotics. Blood cultures showed NG. The urine culture showed ecoli. Patient had an elevated Cr on admission, suspected to be prerenal azotemia in setting of sepsis. The patient was also tachypneic and required BiPAP intermittently. The patient was found to have a pleural effusion that may have contributed to symptoms. Patient was initiated on baclofen for multiple sclerosis related muscle spasms. Pt clinically improved throughout the hospital course. The patient was seen and examined on the day of discharge.     ---  PATIENT CONDITION:  - stable    ---  ROS: Patient denies dysuria, shortness of breath, chest pain at time of discharge.    PHYSICAL EXAM:  Vital Signs Last 24 Hrs  T(C): 36.6 (05 Jan 2024 05:37), Max: 36.8 (04 Jan 2024 19:32)  T(F): 97.8 (05 Jan 2024 05:37), Max: 98.2 (04 Jan 2024 19:32)  HR: 97 (05 Jan 2024 05:37) (97 - 102)  BP: 129/82 (05 Jan 2024 05:37) (123/77 - 129/82)  BP(mean): --  RR: 19 (05 Jan 2024 05:37) (19 - 19)  SpO2: 96% (05 Jan 2024 05:37) (95% - 96%)    Parameters below as of 05 Jan 2024 05:37  Patient On (Oxygen Delivery Method): room air      GENERAL: NAD, obese  HEENT:  anicteric, moist mucous membranes  CHEST/LUNG:  CTA b/l, no rales, wheezes, or rhonchi  HEART:  RRR, S1, S2  ABDOMEN:  BS+, soft, nontender, nondistended, iraheta in place  MUSCULOSKELETAL: no edema, cyanosis, or calf tenderness, SCDs in place  NEUROLOGIC: answers questions and follows commands appropriately, paraplegia   INTEGUMENTARY: flaky rash improving     ---  CONSULTANTS:   Infectious Disease - Dr Mendez    ---  TIME SPENT: 50min

## 2024-01-04 NOTE — DISCHARGE NOTE PROVIDER - CARE PROVIDERS DIRECT ADDRESSES
,[email or web address]

## 2024-01-04 NOTE — PROGRESS NOTE ADULT - PROBLEM SELECTOR PLAN 6
- chronic   - paraplegic  - chronic iraheta- changed in ed   - not on medication prior  - baclofen started for muscle spasms, monitor for improvement  - MS has led to depressed thoughts, pt refuses anti-depressant / psych eval as he had negative experience in the past with medication

## 2024-01-04 NOTE — DISCHARGE NOTE PROVIDER - NSDCMRMEDTOKEN_GEN_ALL_CORE_FT
baclofen 10 mg oral tablet: 1 tab(s) orally every 8 hours as needed for back spasms  saccharomyces boulardii lyo 250 mg oral capsule: 1 cap(s) orally 2 times a day for 10 days  sulfamethoxazole-trimethoprim 800 mg-160 mg oral tablet: 1 tab(s) orally 2 times a day for 10 days

## 2024-01-04 NOTE — PROGRESS NOTE ADULT - TIME BILLING
Pt seen + examined on 1/4. Case discussed with resident. Agree with assessment and plan above (edited by me in detail):  Time spent: 40min. Time spent discussing/coordinating care with consultants, CM, and counseling the patient on medical condition -- sepsis 2/2 CAUTI with likely acute pyelonephritis, NAY, and dehydration, depressed mood.    49yo M with PMH of progressive MS, paraplegia, with chronic iraheta, complaining of shortness of breath, fever, back pain, admitted for sepsis 2/2 CAUTI with likely acute pyelonephritis, NAY, and dehydration.    - Met sepsis criteria (POA) WBC 28.6, T 102.7, , RR 20 - Source: CAUTI and likely pyelonephritis   - Lactate -was elevated and normalized with IVF, UA positive  - Sepsis protocol -s/p vanco and zosyn in ED  - fluid resuscitated and given ofirmev in ED  - Suspect tachypnea on admission was secondary to fever, lactic acidosis, sepsis.   - CTA Chest ruled out PE and had no evidence of PNA. Pt did have small pleural effusion and atelectasis.   - leukocytosis and fever resolved  - c/w TMP- SMX for complicated CAUTI with pyelonephritis   - monitor CBC, VS   - BCx NGTD, UCx +e coli, sensitivities resulted and sensitive to Bactrim and cephalosporins   - ID, Dr. Mendez, recs appreciated    - NAY likely prerenal and resolved with hydration    - of note, pt has chronic flaky rash diffusely that appears to be seborrheic dermatitis; emollients placed by nursing that improved for pt, rec outpt derm f/up    Disp: CM working on dispo; pt's 24hr HHA will be able to restart tomorrow so will d/c pt to home tomorrow on oral Abx Pt seen + examined on 1/4. Case discussed with resident. Agree with assessment and plan above (edited by me in detail):  Time spent: 40min. Time spent discussing/coordinating care with consultants, CM, and counseling the patient on medical condition -- sepsis 2/2 CAUTI with likely acute pyelonephritis, NAY, and dehydration, depressed mood.    47yo M with PMH of progressive MS, paraplegia, with chronic iraheta, complaining of shortness of breath, fever, back pain, admitted for sepsis 2/2 CAUTI with likely acute pyelonephritis, NAY, and dehydration.    - Met sepsis criteria (POA) WBC 28.6, T 102.7, , RR 20 - Source: CAUTI and likely pyelonephritis   - Lactate -was elevated and normalized with IVF, UA positive  - Sepsis protocol -s/p vanco and zosyn in ED  - fluid resuscitated and given ofirmev in ED  - Suspect tachypnea on admission was secondary to fever, lactic acidosis, sepsis.   - CTA Chest ruled out PE and had no evidence of PNA. Pt did have small pleural effusion and atelectasis.   - leukocytosis and fever resolved  - c/w TMP- SMX for complicated CAUTI with pyelonephritis   - monitor CBC, VS   - BCx NGTD, UCx +e coli, sensitivities resulted and sensitive to Bactrim and cephalosporins   - ID, Dr. Mendez, recs appreciated    - NAY likely prerenal and resolved with hydration    - of note, pt has chronic flaky rash diffusely that appears to be seborrheic dermatitis; emollients placed by nursing that improved for pt, rec outpt derm f/up    Disp: CM working on dispo; pt's 24hr HHA will be able to restart tomorrow so will d/c pt to home tomorrow on oral Abx

## 2024-01-04 NOTE — DISCHARGE NOTE PROVIDER - NSDCCPCAREPLAN_GEN_ALL_CORE_FT
PRINCIPAL DISCHARGE DIAGNOSIS  Diagnosis: Sepsis due to urinary tract infection  Assessment and Plan of Treatment: You were treated with intravenous antibiotics for a urinary tract infection.  CONTINUE BACTRIM 1 TAB BY MOUTH TWICE A DAY FOR 11 DAYS.  Follow up with your primary care physician within 2 weeks of discharge.      SECONDARY DISCHARGE DIAGNOSES  Diagnosis: NAY (acute kidney injury)  Assessment and Plan of Treatment: Your kidney levels were abnormal in the hospital.    Diagnosis: MS (multiple sclerosis)  Assessment and Plan of Treatment: START BACLOFEN *** FOR MUSCLE SPASMS    Diagnosis: Hepatomegaly  Assessment and Plan of Treatment: Your liver was enlarged on CT scan performed in the hospital.  Follow up with your primary care physician within 2 weeks of discharge.     PRINCIPAL DISCHARGE DIAGNOSIS  Diagnosis: Sepsis due to urinary tract infection  Assessment and Plan of Treatment: You were treated with intravenous antibiotics for a severe urinary tract infection and have been recovering well.  The urine culture showed an organism that is sensitive to oral antibiotics as well. Please complete your antibiotic course by taking the prescribed trimethoprim/sulfamethoxazole (Bactrim) 1 tab twice a day for 10 more days starting this evening.   Please take the probiotic, saccharomyces, as prescribed for the duration of your antibiotics to help decrease chance of gastrointestinal side effects of antibiotics. If your pharmacy does not have saccharomyces available, you may take another probiotic that they do have. These are typically over the counter medications.  Follow up with your primary care physician within 2 weeks of discharge.      SECONDARY DISCHARGE DIAGNOSES  Diagnosis: MS (multiple sclerosis)  Assessment and Plan of Treatment: You may take the prescribed baclofen if needed for back spasms. Those spasms may have been related to your infection as opposed to MS. So, if you do not have the spasms anymore, you do not need to take baclofen.    Diagnosis: Hepatomegaly  Assessment and Plan of Treatment: You have some liver enlargement incidentally noted which is likely a chronic finding.   Follow up with your primary care physician for monitoring / further testing.

## 2024-01-05 ENCOUNTER — TRANSCRIPTION ENCOUNTER (OUTPATIENT)
Age: 49
End: 2024-01-05

## 2024-01-05 ENCOUNTER — NON-APPOINTMENT (OUTPATIENT)
Age: 49
End: 2024-01-05

## 2024-01-05 VITALS
DIASTOLIC BLOOD PRESSURE: 76 MMHG | HEART RATE: 97 BPM | OXYGEN SATURATION: 93 % | SYSTOLIC BLOOD PRESSURE: 117 MMHG | RESPIRATION RATE: 19 BRPM | TEMPERATURE: 98 F

## 2024-01-05 LAB
ALBUMIN SERPL ELPH-MCNC: 3.1 G/DL — LOW (ref 3.3–5)
ALP SERPL-CCNC: 61 U/L — SIGNIFICANT CHANGE UP (ref 40–120)
ALT FLD-CCNC: 36 U/L — SIGNIFICANT CHANGE UP (ref 12–78)
ANION GAP SERPL CALC-SCNC: 3 MMOL/L — LOW (ref 5–17)
AST SERPL-CCNC: 21 U/L — SIGNIFICANT CHANGE UP (ref 15–37)
BASOPHILS # BLD AUTO: 0.05 K/UL — SIGNIFICANT CHANGE UP (ref 0–0.2)
BASOPHILS NFR BLD AUTO: 0.6 % — SIGNIFICANT CHANGE UP (ref 0–2)
BILIRUB SERPL-MCNC: 0.3 MG/DL — SIGNIFICANT CHANGE UP (ref 0.2–1.2)
BUN SERPL-MCNC: 26 MG/DL — HIGH (ref 7–23)
CALCIUM SERPL-MCNC: 9.1 MG/DL — SIGNIFICANT CHANGE UP (ref 8.5–10.1)
CHLORIDE SERPL-SCNC: 109 MMOL/L — HIGH (ref 96–108)
CO2 SERPL-SCNC: 25 MMOL/L — SIGNIFICANT CHANGE UP (ref 22–31)
CREAT SERPL-MCNC: 1.1 MG/DL — SIGNIFICANT CHANGE UP (ref 0.5–1.3)
EGFR: 83 ML/MIN/1.73M2 — SIGNIFICANT CHANGE UP
EOSINOPHIL # BLD AUTO: 0.45 K/UL — SIGNIFICANT CHANGE UP (ref 0–0.5)
EOSINOPHIL NFR BLD AUTO: 5.3 % — SIGNIFICANT CHANGE UP (ref 0–6)
GLUCOSE SERPL-MCNC: 95 MG/DL — SIGNIFICANT CHANGE UP (ref 70–99)
HCT VFR BLD CALC: 36.5 % — LOW (ref 39–50)
HGB BLD-MCNC: 12.2 G/DL — LOW (ref 13–17)
IMM GRANULOCYTES NFR BLD AUTO: 0.6 % — SIGNIFICANT CHANGE UP (ref 0–0.9)
LYMPHOCYTES # BLD AUTO: 2.64 K/UL — SIGNIFICANT CHANGE UP (ref 1–3.3)
LYMPHOCYTES # BLD AUTO: 31.2 % — SIGNIFICANT CHANGE UP (ref 13–44)
MCHC RBC-ENTMCNC: 28.4 PG — SIGNIFICANT CHANGE UP (ref 27–34)
MCHC RBC-ENTMCNC: 33.4 GM/DL — SIGNIFICANT CHANGE UP (ref 32–36)
MCV RBC AUTO: 84.9 FL — SIGNIFICANT CHANGE UP (ref 80–100)
MONOCYTES # BLD AUTO: 0.65 K/UL — SIGNIFICANT CHANGE UP (ref 0–0.9)
MONOCYTES NFR BLD AUTO: 7.7 % — SIGNIFICANT CHANGE UP (ref 2–14)
NEUTROPHILS # BLD AUTO: 4.62 K/UL — SIGNIFICANT CHANGE UP (ref 1.8–7.4)
NEUTROPHILS NFR BLD AUTO: 54.6 % — SIGNIFICANT CHANGE UP (ref 43–77)
NRBC # BLD: 0 /100 WBCS — SIGNIFICANT CHANGE UP (ref 0–0)
PLATELET # BLD AUTO: 343 K/UL — SIGNIFICANT CHANGE UP (ref 150–400)
POTASSIUM SERPL-MCNC: 4.1 MMOL/L — SIGNIFICANT CHANGE UP (ref 3.5–5.3)
POTASSIUM SERPL-SCNC: 4.1 MMOL/L — SIGNIFICANT CHANGE UP (ref 3.5–5.3)
PROT SERPL-MCNC: 8.2 G/DL — SIGNIFICANT CHANGE UP (ref 6–8.3)
RBC # BLD: 4.3 M/UL — SIGNIFICANT CHANGE UP (ref 4.2–5.8)
RBC # FLD: 15.3 % — HIGH (ref 10.3–14.5)
SODIUM SERPL-SCNC: 137 MMOL/L — SIGNIFICANT CHANGE UP (ref 135–145)
WBC # BLD: 8.46 K/UL — SIGNIFICANT CHANGE UP (ref 3.8–10.5)
WBC # FLD AUTO: 8.46 K/UL — SIGNIFICANT CHANGE UP (ref 3.8–10.5)

## 2024-01-05 PROCEDURE — 99239 HOSP IP/OBS DSCHRG MGMT >30: CPT | Mod: GC

## 2024-01-05 RX ORDER — BACLOFEN 100 %
1 POWDER (GRAM) MISCELLANEOUS
Qty: 90 | Refills: 0
Start: 2024-01-05 | End: 2024-02-03

## 2024-01-05 RX ORDER — SACCHAROMYCES BOULARDII 250 MG
1 POWDER IN PACKET (EA) ORAL
Qty: 20 | Refills: 0
Start: 2024-01-05 | End: 2024-01-14

## 2024-01-05 RX ADMIN — Medication 1 TABLET(S): at 05:33

## 2024-01-05 RX ADMIN — HEPARIN SODIUM 5000 UNIT(S): 5000 INJECTION INTRAVENOUS; SUBCUTANEOUS at 05:32

## 2024-01-05 RX ADMIN — Medication 10 MILLIGRAM(S): at 02:35

## 2024-01-05 RX ADMIN — Medication 10 MILLIGRAM(S): at 10:03

## 2024-01-05 NOTE — DISCHARGE NOTE NURSING/CASE MANAGEMENT/SOCIAL WORK - PATIENT PORTAL LINK FT
You can access the FollowMyHealth Patient Portal offered by [facility] by registering at the following website: [email or web address]. By joining [employer]’s FollowMyHealth portal, you will also be able to view your health information using other applications (apps) compatible with our system. You can access the FollowMyHealth Patient Portal offered by Health system by registering at the following website: [email or web address]. By joining [employer]’s FollowMyHealth portal, you will also be able to view your health information using other applications (apps) compatible with our system. You can access the FollowMyHealth Patient Portal offered by [facility] by registering at the following website: [email or web address]. By joining [employer]’s FollowMyHealth portal, you will also be able to view your health information using other applications (apps) compatible with our system. You can access the FollowMyHealth Patient Portal offered by [facility] by registering at the following website: [email or web address]. By joining [employer]’s FollowMyHealth portal, you will also be able to view your health information using other applications (apps) compatible with our system.

## 2024-01-05 NOTE — DISCHARGE NOTE NURSING/CASE MANAGEMENT/SOCIAL WORK - NSSCNAMETXT_GEN_ALL_CORE
[facility] at [place] - [phone] or [phone]  Nurse to visit the day after hospital discharge; physical therapist to follow. Please contact the home care agency if you have not heard from them by 12 noon on the day after your hospital discharge.   [facility] [place] - [phone] or [phone]  Nurse to visit the day after hospital discharge; physical therapist to follow. Please contact the home care agency if you have not heard from them by 12 noon on the day after your hospital discharge.   [facility] [place] - [phone] or [phone]  Nurse to visit the day after hospital discharge; physical therapist to follow. Please contact the home care agency if you have not heard from them by 12 noon on the day after your hospital discharge.   [facility] [place] - [phone] or [phone]  Nurse to visit the day after hospital discharge; physical therapist to follow. Please contact the home care agency if you have not heard from them by 12 noon on the day after your hospital discharge.

## 2024-01-05 NOTE — DISCHARGE NOTE NURSING/CASE MANAGEMENT/SOCIAL WORK - NSDCPEFALRISK_GEN_ALL_CORE
For information on Fall & Injury Prevention, visit: https://www.[facility]/news/fall-prevention-protects-and-maintains-health-and-mobility OR  https://www.[facility]/news/fall-prevention-tips-to-avoid-injury OR  https://www.cdc.gov/steadi/patient.html For information on Fall & Injury Prevention, visit: https://www.[facility]/news/fall-prevention-protects-and-maintains-health-and-mobility OR  https://www.[facility]/news/fall-prevention-tips-to-avoid-injury OR  https://www.cdc.gov/steadi/patient.html For information on Fall & Injury Prevention, visit: https://www.[facility]/news/fall-prevention-protects-and-maintains-health-and-mobility OR  https://www.[facility]/news/fall-prevention-tips-to-avoid-injury OR  https://www.cdc.gov/steadi/patient.html For information on Fall & Injury Prevention, visit: https://www.[facility]/news/fall-prevention-protects-and-maintains-health-and-mobility OR  https://www.[facility]/news/fall-prevention-tips-to-avoid-injury OR  https://www.cdc.gov/steadi/patient.html

## 2024-01-05 NOTE — CASE MANAGEMENT PROGRESS NOTE - NSCMPROGRESSNOTE_GEN_ALL_CORE
Pt was set-up for transition home today with [facility] with [facility] on 1/6/24 as well as an ambulance home at 12noon. The aides 24/7 were re-instated yesterday and the pt is in agreement with the DC plan for today. The bedside nurse was made aware of the above transition plan to home.

## 2024-01-06 ENCOUNTER — APPOINTMENT (OUTPATIENT)
Dept: HOME HEALTH SERVICES | Facility: HOME HEALTH | Age: 49
End: 2024-01-06

## 2024-01-06 LAB
CULTURE RESULTS: SIGNIFICANT CHANGE UP
SPECIMEN SOURCE: SIGNIFICANT CHANGE UP

## 2024-01-06 RX ORDER — CEFPODOXIME PROXETIL 200 MG/1
200 TABLET, FILM COATED ORAL
Qty: 14 | Refills: 0 | Status: COMPLETED | COMMUNITY
Start: 2023-09-15 | End: 2024-01-06

## 2024-01-12 ENCOUNTER — NON-APPOINTMENT (OUTPATIENT)
Age: 49
End: 2024-01-12

## 2024-01-19 ENCOUNTER — APPOINTMENT (OUTPATIENT)
Dept: HOME HEALTH SERVICES | Facility: HOME HEALTH | Age: 49
End: 2024-01-19
Payer: MEDICARE

## 2024-01-19 VITALS
DIASTOLIC BLOOD PRESSURE: 85 MMHG | RESPIRATION RATE: 18 BRPM | HEART RATE: 71 BPM | SYSTOLIC BLOOD PRESSURE: 117 MMHG | OXYGEN SATURATION: 99 % | TEMPERATURE: 98.2 F

## 2024-01-19 DIAGNOSIS — Z71.89 OTHER SPECIFIED COUNSELING: ICD-10-CM

## 2024-01-19 DIAGNOSIS — M62.838 OTHER MUSCLE SPASM: ICD-10-CM

## 2024-01-19 PROBLEM — I82.409 ACUTE EMBOLISM AND THROMBOSIS OF UNSPECIFIED DEEP VEINS OF UNSPECIFIED LOWER EXTREMITY: Chronic | Status: ACTIVE | Noted: 2024-01-01

## 2024-01-19 PROBLEM — I26.99 OTHER PULMONARY EMBOLISM WITHOUT ACUTE COR PULMONALE: Chronic | Status: ACTIVE | Noted: 2024-01-01

## 2024-01-19 PROBLEM — G82.20 PARAPLEGIA, UNSPECIFIED: Chronic | Status: ACTIVE | Noted: 2024-01-01

## 2024-01-19 PROBLEM — G35 MULTIPLE SCLEROSIS: Chronic | Status: ACTIVE | Noted: 2023-12-31

## 2024-01-19 PROCEDURE — 99495 TRANSJ CARE MGMT MOD F2F 14D: CPT | Mod: 25

## 2024-01-19 PROCEDURE — 99497 ADVNCD CARE PLAN 30 MIN: CPT

## 2024-01-19 RX ORDER — DIAZEPAM 2 MG/1
2 TABLET ORAL
Qty: 30 | Refills: 0 | Status: COMPLETED | COMMUNITY
Start: 2023-06-14 | End: 2024-01-19

## 2024-01-22 ENCOUNTER — APPOINTMENT (OUTPATIENT)
Dept: HOME HEALTH SERVICES | Facility: HOME HEALTH | Age: 49
End: 2024-01-22

## 2024-01-22 PROBLEM — Z71.89 ACP (ADVANCE CARE PLANNING): Status: ACTIVE | Noted: 2022-04-25

## 2024-01-22 PROBLEM — M62.838 MUSCLE SPASM: Status: ACTIVE | Noted: 2022-05-11

## 2024-01-22 NOTE — REVIEW OF SYSTEMS
[Vision Problems] : vision problems [Lower Ext Edema] : lower extremity edema [Constipation] : constipation [Incontinence] : incontinence [Joint Pain] : joint pain [Joint Stiffness] : joint stiffness [Muscle Weakness] : muscle weakness [Unsteady Walk] : ataxia [Anxiety] : anxiety [Depression] : depression [de-identified] : dry skin on right side  [Suicidal] : not suicidal [Negative] : Integumentary [FreeTextEntry3] : optic neuritis in left eye, cataracts in R eye [FreeTextEntry8] : catheter iraheta in place [de-identified] : as above

## 2024-01-22 NOTE — HEALTH RISK ASSESSMENT
[HRA Reviewed] : Health risk assessment reviewed [Some assistance needed] : managing finances [Full assistance needed] : using transportation [Patient not ambulatory (Wheelchair)] : Patient is not ambulatory (Wheelchair) [Yes] : The patient does have visual impairment [FreeTextEntry8] : na [de-identified] : optic neuritis and cataracts [TimeGetUpGo] : 0

## 2024-01-22 NOTE — PHYSICAL EXAM
[No Acute Distress] : no acute distress [Normal Voice/Communication] : normal voice communication [Normal Sclera/Conjunctiva] : normal sclera/conjunctiva [Normal Outer Ear/Nose] : the ears and nose were normal in appearance [Normal Oropharynx] : the oropharynx was normal [No JVD] : no jugular venous distention [Supple] : the neck was supple [No LAD] : no lymphadenopathy [No Respiratory Distress] : no respiratory distress [Clear to Auscultation] : lungs were clear to auscultation bilaterally [Normal Rate] : heart rate was normal  [Regular Rhythm] : with a regular rhythm [Normal S1, S2] : normal S1 and S2 [No Murmurs] : no murmurs heard [No LE Varicosities] : there were no varicosital changes in the lower extremities [Pedal Pulses Present] : the pedal pulses are present [Normal Bowel Sounds] : normal bowel sounds [Non Tender] : non-tender [Soft] : abdomen soft [No Masses] : no abdominal mass palpated [No Spinal Tenderness] : no spinal tenderness [No Rash] : no rash [No Skin Lesions] : no skin lesions [Cranial Nerves Intact] : cranial nerves 2-12 were intact [No Gross Sensory Deficits] : no gross sensory deficits [Oriented x3] : oriented to person, place, and time [Normal Affect] : the affect was normal [Normal Mood] : the mood was normal [Normal Insight/Judgement] : insight and judgment were intact [PERRL] : pupils equal, round and reactive to light [Not Distended] : not distended [Kyphosis] : no kyphosis present [Acne] : no acne [de-identified] : Edema [de-identified] : iraheta in place draining clear yellow urine [de-identified] : bed-bound, non-ambulatory. right side weaker than left  [de-identified] : bed-bound. generalized weakness. right side weaker than left  [de-identified] : denies SI

## 2024-01-22 NOTE — HISTORY OF PRESENT ILLNESS
[Patient] : patient [FreeTextEntry1] : MS [FreeTextEntry2] : 49 y/o male with history of MS, bed-bound, PE, depression w hx of SI attempt, neurogenic bladder, suprapubic Iraheta with conversion to regular catheter due to complications. Patient is being seen today for post hospital visit.    At today's visit, patient seen laying in bed - states cough has resolved. Still on abx.   Skin: intact Appetite: eats well. Gets "Moms meals" from insurance - will eat three meals/day. Also, snacks in between. HHA will feed patient, patient not able to hold spoon/fork. No coughing with liquids.  BM: constipation -- he uses MiraLax to help. Has xlax in case of persistent constipation.  : he is urinating fine, no issues - he has iraheta in place. Iraheta with yellow urine.  Pain: improved, when gets spasms, uses marijuana which significantly improves the pain and requires minimal other pain medication. Baclofen helping, taking every 8 hours.  Sleep: sleeps well. Does not get 8hrs/night. He will fall asleep & then a spasm will wake him up - he will smoke and then go back to sleep. He states the spams will wake him up every 20mins - the most sleep he will get will be an hour. He is used to his sleep cycle & believes he gets sufficient rest.   MS - first diagnosed at 20. Then, he was in remission for seven years, then he had another attack in 2013 - wheelchair bound since 2013. Bed-bound X three years (2020). Has not gotten OOB since 2020. Not able to bend his knees.   Depressoin: Always depressed - manages with marijuana. He also makes jokes and tries to have up-beat attitude. He has very supportive friends. His parents will also come by once a week or so, but he is not close with them. He feels "life is not worth living" and that he is waiting "for when gd will take me". Denies active SI. Had extensive discussion on mood and how he romy.   Has 24hr/aide. Weekend aide is here all weekend.

## 2024-01-22 NOTE — REASON FOR VISIT
[Post-hospitalization from ___ Hospital] : Post-hospitalization from [unfilled] Hospital [Admitted on: ___] : The patient was admitted on [unfilled] [Discharged on ___] : discharged on [unfilled] [Discharge Summary] : discharge summary [Pertinent Labs] : pertinent labs [Radiology Findings] : radiology findings [Discharge Med List] : discharge medication list [Med Reconciliation] : medication reconciliation has been completed [Other: ____] : [unfilled] [Patient Contacted By: ____] : and contacted by [unfilled] [Pre-Visit Preparation] : Pre-visit preparation was done [Formal Caregiver] : formal caregiver [Intercurrent Specialty/Sub-specialty Visits] : The patient has no intercurrent specialty/sub-specialty visits [FreeTextEntry2] : The patient presented with sepsis secondary to CAUTI/pyelonephritis. The  patient was started on intravenous antibiotics. Blood cultures showed NG. The  urine culture showed ecoli. Patient had an elevated Cr on admission, suspected  to be prerenal azotemia in setting of sepsis. The patient was also tachypneic  and required BiPAP intermittently. The patient was found to have a pleural  effusion that may have contributed to symptoms. Patient was initiated on  baclofen for multiple sclerosis related muscle spasms. Pt clinically improved  throughout the hospital course. The patient was seen and examined on the day of  discharge.   [FreeTextEntry4] : chart review

## 2024-01-22 NOTE — COUNSELING
[Overweight (BMI 25.1 - 29.9)] : overweight - BMI 25.1-29.9 [Smoker, not interested in quitting] : Smoker, not interested in quitting [DNR] : Code Status: DNR [Limited] : Treatment Guidelines: Limited [DNI] : Intubation: DNI [[facility]: _____[facility]last [facility]: [unfilled] [FreeTextEntry2] : n/a. non-ambulatory. bed-bound.  [Minimize unnecessary interventions] : minimize unnecessary interventions [Comfort Care] : comfort care [Discussed disease trajectory with patient/caregiver] : discussed disease trajectory with patient/caregiver [Likely to achieve goals/desired outcomes] : likely to achieve goals/desired outcomes [Patient/Caregiver has ___ understanding of disease process] : patient/caregiver has [unfilled] understanding of disease process [Advanced Directives discussed: ____] : Advanced directives discussed: [unfilled] [de-identified] : No HD, Yes IVF, abx if needed, hospitalize as needed. No feeding tube. Comfort is priority. "if i have a disease that will take my life, let me go"

## 2024-01-23 LAB
ANION GAP SERPL CALC-SCNC: 11 MMOL/L
BASOPHILS # BLD AUTO: 0.06 K/UL
BASOPHILS NFR BLD AUTO: 0.8 %
BUN SERPL-MCNC: 19 MG/DL
CALCIUM SERPL-MCNC: 9.4 MG/DL
CHLORIDE SERPL-SCNC: 105 MMOL/L
CO2 SERPL-SCNC: 26 MMOL/L
CREAT SERPL-MCNC: 0.93 MG/DL
EGFR: 102 ML/MIN/1.73M2
EOSINOPHIL # BLD AUTO: 0.26 K/UL
EOSINOPHIL NFR BLD AUTO: 3.6 %
GLUCOSE SERPL-MCNC: 95 MG/DL
HCT VFR BLD CALC: 41.4 %
HGB BLD-MCNC: 13 G/DL
IMM GRANULOCYTES NFR BLD AUTO: 0.3 %
LYMPHOCYTES # BLD AUTO: 2.26 K/UL
LYMPHOCYTES NFR BLD AUTO: 31 %
MAN DIFF?: NORMAL
MCHC RBC-ENTMCNC: 28.8 PG
MCHC RBC-ENTMCNC: 31.4 GM/DL
MCV RBC AUTO: 91.6 FL
MONOCYTES # BLD AUTO: 0.67 K/UL
MONOCYTES NFR BLD AUTO: 9.2 %
NEUTROPHILS # BLD AUTO: 4.03 K/UL
NEUTROPHILS NFR BLD AUTO: 55.1 %
PLATELET # BLD AUTO: 318 K/UL
POTASSIUM SERPL-SCNC: 4.3 MMOL/L
RBC # BLD: 4.52 M/UL
RBC # FLD: 16.6 %
SODIUM SERPL-SCNC: 141 MMOL/L
WBC # FLD AUTO: 7.3 K/UL

## 2024-01-29 ENCOUNTER — EMERGENCY (EMERGENCY)
Facility: HOSPITAL | Age: 49
LOS: 1 days | Discharge: HOME | End: 2024-01-29
Attending: EMERGENCY MEDICINE | Admitting: EMERGENCY MEDICINE
Payer: MEDICARE

## 2024-01-29 VITALS
HEART RATE: 83 BPM | DIASTOLIC BLOOD PRESSURE: 71 MMHG | SYSTOLIC BLOOD PRESSURE: 116 MMHG | RESPIRATION RATE: 19 BRPM | TEMPERATURE: 99 F | OXYGEN SATURATION: 99 %

## 2024-01-29 VITALS
DIASTOLIC BLOOD PRESSURE: 76 MMHG | OXYGEN SATURATION: 97 % | SYSTOLIC BLOOD PRESSURE: 123 MMHG | RESPIRATION RATE: 18 BRPM | HEART RATE: 82 BPM | HEIGHT: 70 IN | TEMPERATURE: 98 F | WEIGHT: 259.93 LBS

## 2024-01-29 DIAGNOSIS — Z93.6 OTHER ARTIFICIAL OPENINGS OF URINARY TRACT STATUS: Chronic | ICD-10-CM

## 2024-01-29 PROCEDURE — 99283 EMERGENCY DEPT VISIT LOW MDM: CPT

## 2024-01-29 PROCEDURE — 99282 EMERGENCY DEPT VISIT SF MDM: CPT

## 2024-01-29 NOTE — ED ADULT NURSE NOTE - NSFALLRISKINTERV_ED_ALL_ED

## 2024-01-29 NOTE — ED PROVIDER NOTE - NSICDXPASTMEDICALHX_GEN_ALL_CORE_FT
PAST MEDICAL HISTORY:  Cellulitis november 2014    DVT, lower extremity     Environmental allergies     Lower paraplegia     MS (multiple sclerosis) since 1995    MS (multiple sclerosis)     Nephrolithiasis     PE (pulmonary embolism) 2013    Pulmonary embolism

## 2024-01-29 NOTE — ED ADULT NURSE NOTE - CHIEF COMPLAINT QUOTE
patient BIBA unable to stay in apartment due to apartment being fumigated - bed bug problem resolved as per patient and EMS

## 2024-01-29 NOTE — ED PROVIDER NOTE - CLINICAL SUMMARY MEDICAL DECISION MAKING FREE TEXT BOX
48-year-old male with history of MS and paraplegia with questionable bedbug exposure displaced from home x 5 hours.  Upon further examination patient covered in bedbugs on his lower extremities.  [place], social work.

## 2024-01-29 NOTE — ED ADULT NURSE NOTE - OBJECTIVE STATEMENT
No C/o any kind from pt.  States he is only here as his apartment is being fumigated for the 2nd time for "bed Bugs".  Pt adamant that there is no bed bugs at home.  Noted bugs while turning pt.  Isolated in room.  Decontaminated in shower.  All belongings double bagged and disposed of for decontam.

## 2024-01-29 NOTE — ED PROVIDER NOTE - PATIENT PORTAL LINK FT
You can access the FollowMyHealth Patient Portal offered by [facility] by registering at the following website: [email or web address]. By joining [employer]’s FollowMyHealth portal, you will also be able to view your health information using other applications (apps) compatible with our system.

## 2024-01-29 NOTE — ED PROVIDER NOTE - OBJECTIVE STATEMENT
48-year-old male with significant past medical history for paraplegia, MS, PE, DVT presents to the ED with complaints of possible bedbugs in his apartment and requiring 5 hours of shelter.  Patient states no evidence of bedbugs, no complaints however had bedbugs 2 months ago where he had a few medication while he was hospitalized.  Patient states [facility] requiring second fumigation today and patient is unable to return to his apartment x 5 hours.

## 2024-01-29 NOTE — ED ADULT TRIAGE NOTE - CHIEF COMPLAINT QUOTE
patient BIBA unable to stay in apartment due to apartment being fumigated - bed bug problem resolved as per patient patient BIBA unable to stay in apartment due to apartment being fumigated - bed bug problem resolved as per patient and EMS

## 2024-01-30 ENCOUNTER — APPOINTMENT (OUTPATIENT)
Dept: HOME HEALTH SERVICES | Facility: HOME HEALTH | Age: 49
End: 2024-01-30

## 2024-02-06 ENCOUNTER — EMERGENCY (EMERGENCY)
Facility: HOSPITAL | Age: 49
LOS: 1 days | Discharge: HOME | End: 2024-02-06
Attending: STUDENT IN AN ORGANIZED HEALTH CARE EDUCATION/TRAINING PROGRAM | Admitting: STUDENT IN AN ORGANIZED HEALTH CARE EDUCATION/TRAINING PROGRAM
Payer: MEDICARE

## 2024-02-06 ENCOUNTER — APPOINTMENT (OUTPATIENT)
Dept: UROLOGY | Facility: CLINIC | Age: 49
End: 2024-02-06

## 2024-02-06 VITALS
OXYGEN SATURATION: 98 % | TEMPERATURE: 98 F | WEIGHT: 259.93 LBS | DIASTOLIC BLOOD PRESSURE: 75 MMHG | HEIGHT: 70 IN | HEART RATE: 82 BPM | RESPIRATION RATE: 16 BRPM | SYSTOLIC BLOOD PRESSURE: 112 MMHG

## 2024-02-06 DIAGNOSIS — Z93.6 OTHER ARTIFICIAL OPENINGS OF URINARY TRACT STATUS: Chronic | ICD-10-CM

## 2024-02-06 LAB
APPEARANCE UR: ABNORMAL
BACTERIA # UR AUTO: ABNORMAL /HPF
BILIRUB UR-MCNC: NEGATIVE — SIGNIFICANT CHANGE UP
COLOR SPEC: YELLOW — SIGNIFICANT CHANGE UP
DIFF PNL FLD: ABNORMAL
EPI CELLS # UR: PRESENT
GLUCOSE UR QL: NEGATIVE MG/DL — SIGNIFICANT CHANGE UP
KETONES UR-MCNC: NEGATIVE MG/DL — SIGNIFICANT CHANGE UP
LEUKOCYTE ESTERASE UR-ACNC: ABNORMAL
NITRITE UR-MCNC: POSITIVE
PH UR: 6.5 — SIGNIFICANT CHANGE UP (ref 5–8)
PROT UR-MCNC: 300 MG/DL
RBC CASTS # UR COMP ASSIST: ABNORMAL /HPF
SP GR SPEC: 1.02 — SIGNIFICANT CHANGE UP (ref 1–1.03)
UROBILINOGEN FLD QL: 0.2 MG/DL — SIGNIFICANT CHANGE UP (ref 0.2–1)
WBC UR QL: ABNORMAL /HPF (ref 0–5)

## 2024-02-06 PROCEDURE — 87077 CULTURE AEROBIC IDENTIFY: CPT

## 2024-02-06 PROCEDURE — 81001 URINALYSIS AUTO W/SCOPE: CPT

## 2024-02-06 PROCEDURE — 51702 INSERT TEMP BLADDER CATH: CPT

## 2024-02-06 PROCEDURE — 99284 EMERGENCY DEPT VISIT MOD MDM: CPT

## 2024-02-06 PROCEDURE — 87186 SC STD MICRODIL/AGAR DIL: CPT

## 2024-02-06 PROCEDURE — 99283 EMERGENCY DEPT VISIT LOW MDM: CPT | Mod: 25

## 2024-02-06 PROCEDURE — 87086 URINE CULTURE/COLONY COUNT: CPT

## 2024-02-06 NOTE — ED PROVIDER NOTE - CARE PROVIDER_API CALL
[facility] Spann [facility]  Urology  [address] [place] [id number]  Phone: [phone]  Fax: [phone]  Follow Up Time: 4-6 Days

## 2024-02-06 NOTE — ED PROVIDER NOTE - CLINICAL SUMMARY MEDICAL DECISION MAKING FREE TEXT BOX
here for routine iraheta change as home care nurse not allowed to enter house. patient reports house has been exterminated and he has not had any bites as far as he knows. Will sent UA and UCx and will treat based on culture.

## 2024-02-06 NOTE — ED ADULT NURSE NOTE - OBJECTIVE STATEMENT
Pt A&OX3, biba from home.  Pt is bed bound.  Pt has a chronic in place which needs to be changed.  Pt had iraheta inserted on Dec 31, 2023, was not able to have nurses come to his home to change catheter due to infestation of bed bugs in his home.  Iraheta catheter has copious amounts of sedimentation in drainage tube and bag.  Pt also has copious amounts of yellow malodorous cottage cheese-like crust around neck of penis.  Hygiene provided, educated pt on how to instruct home health aides to clean him.  Pt verbalized understanding.  Pt denies any urinary symptoms/fevers.

## 2024-02-06 NOTE — ED PROVIDER NOTE - PHYSICAL EXAMINATION
Vital signs as available reviewed.  General:  No acute distress.  Abdomen:  Soft, non-tender.  : Steele in place, purulent drainage.  Musculoskeletal:  No obvious deformity.  Neurologic: Alert and oriented, moving all extremities.  Skin:  Warm and dry.

## 2024-02-06 NOTE — ED ADULT NURSE NOTE - NSFALLLASTSIX_ED_ALL_ED
Spoke with patient. She is c/o vaginal itching/burning/discharge 'white' in color. States s/s began last Thursday. Worsened over the weekend. Denies changes in sexual partner/soaps.     Patient used monistat 1 day last PM. Asking for rx for PO meds.     Instructed she is due for annual. Scheduled patient for 4/27/23 with EDUARDO Garcia.     Will rx diflucan 1 tablet PO now and repeat 3 days. If no relief will need eval in office. She mariza.    No.

## 2024-02-06 NOTE — ED ADULT NURSE NOTE - NSFALLHARMRISKINTERV_ED_ALL_ED
Assistance OOB with selected safe patient handling equipment if applicable/Assistance with ambulation/Communicate risk of Fall with Harm to all staff, patient, and family/Monitor gait and stability/Provide visual cue: red socks, yellow wristband, yellow gown, etc/Reinforce activity limits and safety measures with patient and family/Bed in lowest position, wheels locked, appropriate side rails in place/Call bell, personal items and telephone in reach/Instruct patient to call for assistance before getting out of bed/chair/stretcher/Non-slip footwear applied when patient is off stretcher/[place] to call system/Physically safe environment - no spills, clutter or unnecessary equipment/Purposeful Proactive Rounding/Room/bathroom lighting operational, light cord in reach

## 2024-02-06 NOTE — ED PROVIDER NOTE - PATIENT PORTAL LINK FT
You can access the FollowMyHealth Patient Portal offered by [facility] by registering at the following website: [email or web address]. By joining [employer]’s FollowMyHealth portal, you will also be able to view your health information using other applications (apps) compatible with our system.

## 2024-02-06 NOTE — ED PROVIDER NOTE - OBJECTIVE STATEMENT
48-year-old male history of MS and paraplegia with chronic Steele here to have his Steele changed.  Patient unable to get home care to change Steele as he recently had bedbugs in his house.  Patient reports he had a cleaning done however the nursing team cannot enter his house until 2 weeks have gone by and his house was cleared by bedbugs sniffing dogs.  Patient denies fevers or chills, suprapubic pressure, other complaints or illness.  Patient does not currently have a urologist.

## 2024-02-06 NOTE — ED PROVIDER NOTE - NSFOLLOWUPINSTRUCTIONS_ED_ALL_ED_FT
Please follow up with your Primary Care Physician and any specialists as discussed.  Please take your medications as prescribed and or instructed.  If your symptoms persist or worsen, please seek care. Either return to the Emergency Department, go to urgent care or see your primary care doctor.  Please refer to general information and instructions attached or below:     Indwelling Urinary Catheter Insertion  Female anatomy showing the urethra, the vagina, and an indwelling urinary catheter in the bladder.  Male anatomy showing the urethra, the penis, and an indwelling urinary catheter in the bladder.  For people with certain conditions, urine is not able to move normally through the urethra. The urethra is the part of the body that drains urine from the bladder. An indwelling urinary catheter may be needed if you have urinary retention problems or bladder obstruction. It may also be needed during and after surgical procedures and for other medical conditions.    An indwelling urinary catheter is a thin, germ-free (sterile) tube that is placed into the bladder through the urethra to help drain urine out of the body. After the catheter is inserted, it is held in place by a small balloon on the catheter. The small balloon is filled with sterile water. Urine drains from the catheter into a drainage bag outside of the body.    Tell a health care provider about:  Any allergies you have.  Any surgeries you have had.  Any medical conditions you have.  Any bleeding problems you have.  What are the risks?  Generally, this is a safe procedure. However, problems may occur, including:  Infection.  Bleeding.  Damage to nearby structures or organs.  What happens before the procedure?  Your health care provider will inspect your urethra before inserting the catheter.  Ask your health care provider what steps will be taken to help prevent infection. These steps may include washing your skin with a germ-killing soap.  What happens during the procedure?  A lubricant will be placed on the catheter to make it easy to insert it into the urethra.  The catheter will be inserted into the urethra until you can see urine flowing into the drainage bag. After the urine starts to flow, the catheter may be inserted another couple of inches (about 5 cm).  Sterile water will be used to inflate the balloon to hold the catheter in place.  After the catheter balloon is inflated, it will be pulled back so it is against the narrow opening at the end of the bladder.  Your health care provider will check for urine flow into the drainage bag.  The procedure may vary among health care providers and hospitals.    What happens after the procedure?  Urine in the drainage bag will be emptied and measured by your health care provider while you are in the hospital.  Your health care provider will remove the catheter for you. This will be done when the catheter is no longer necessary, which is likely to be before you leave the hospital.  Summary  An indwelling catheter is a sterile tube that is placed into the bladder through the urethra to help drain urine out of the body.  The catheter will be removed when it is no longer needed.  This information is not intended to replace advice given to you by your health care provider. Make sure you discuss any questions you have with your health care provider.

## 2024-02-07 ENCOUNTER — APPOINTMENT (OUTPATIENT)
Dept: HOME HEALTH SERVICES | Facility: HOME HEALTH | Age: 49
End: 2024-02-07

## 2024-02-09 LAB
-  AMIKACIN: SIGNIFICANT CHANGE UP
-  AMOXICILLIN/CLAVULANIC ACID: SIGNIFICANT CHANGE UP
-  AMPICILLIN/SULBACTAM: SIGNIFICANT CHANGE UP
-  AMPICILLIN: SIGNIFICANT CHANGE UP
-  AZTREONAM: SIGNIFICANT CHANGE UP
-  AZTREONAM: SIGNIFICANT CHANGE UP
-  CEFAZOLIN: SIGNIFICANT CHANGE UP
-  CEFEPIME: SIGNIFICANT CHANGE UP
-  CEFEPIME: SIGNIFICANT CHANGE UP
-  CEFOXITIN: SIGNIFICANT CHANGE UP
-  CEFTAZIDIME: SIGNIFICANT CHANGE UP
-  CEFTRIAXONE: SIGNIFICANT CHANGE UP
-  CEFUROXIME: SIGNIFICANT CHANGE UP
-  CIPROFLOXACIN: SIGNIFICANT CHANGE UP
-  CIPROFLOXACIN: SIGNIFICANT CHANGE UP
-  ERTAPENEM: SIGNIFICANT CHANGE UP
-  GENTAMICIN: SIGNIFICANT CHANGE UP
-  IMIPENEM: SIGNIFICANT CHANGE UP
-  IMIPENEM: SIGNIFICANT CHANGE UP
-  LEVOFLOXACIN: SIGNIFICANT CHANGE UP
-  LEVOFLOXACIN: SIGNIFICANT CHANGE UP
-  MEROPENEM: SIGNIFICANT CHANGE UP
-  MEROPENEM: SIGNIFICANT CHANGE UP
-  NITROFURANTOIN: SIGNIFICANT CHANGE UP
-  PIPERACILLIN/TAZOBACTAM: SIGNIFICANT CHANGE UP
-  PIPERACILLIN/TAZOBACTAM: SIGNIFICANT CHANGE UP
-  TOBRAMYCIN: SIGNIFICANT CHANGE UP
-  TRIMETHOPRIM/SULFAMETHOXAZOLE: SIGNIFICANT CHANGE UP
CULTURE RESULTS: ABNORMAL
METHOD TYPE: SIGNIFICANT CHANGE UP
METHOD TYPE: SIGNIFICANT CHANGE UP
ORGANISM # SPEC MICROSCOPIC CNT: ABNORMAL
ORGANISM # SPEC MICROSCOPIC CNT: ABNORMAL
ORGANISM # SPEC MICROSCOPIC CNT: SIGNIFICANT CHANGE UP
SPECIMEN SOURCE: SIGNIFICANT CHANGE UP

## 2024-02-20 ENCOUNTER — NON-APPOINTMENT (OUTPATIENT)
Age: 49
End: 2024-02-20

## 2024-03-05 ENCOUNTER — EMERGENCY (EMERGENCY)
Facility: HOSPITAL | Age: 49
LOS: 1 days | Discharge: HOME | End: 2024-03-05
Attending: EMERGENCY MEDICINE | Admitting: EMERGENCY MEDICINE
Payer: MEDICARE

## 2024-03-05 ENCOUNTER — TRANSCRIPTION ENCOUNTER (OUTPATIENT)
Age: 49
End: 2024-03-05

## 2024-03-05 VITALS
SYSTOLIC BLOOD PRESSURE: 134 MMHG | HEIGHT: 70 IN | RESPIRATION RATE: 16 BRPM | OXYGEN SATURATION: 98 % | HEART RATE: 78 BPM | TEMPERATURE: 98 F | DIASTOLIC BLOOD PRESSURE: 74 MMHG

## 2024-03-05 VITALS
OXYGEN SATURATION: 97 % | HEART RATE: 70 BPM | TEMPERATURE: 98 F | DIASTOLIC BLOOD PRESSURE: 84 MMHG | SYSTOLIC BLOOD PRESSURE: 125 MMHG | RESPIRATION RATE: 17 BRPM

## 2024-03-05 DIAGNOSIS — Z93.6 OTHER ARTIFICIAL OPENINGS OF URINARY TRACT STATUS: Chronic | ICD-10-CM

## 2024-03-05 PROCEDURE — 99283 EMERGENCY DEPT VISIT LOW MDM: CPT

## 2024-03-05 NOTE — ED ADULT TRIAGE NOTE - HEIGHT IN INCHES
ASPIRIN and NSAID PRODUCTS    The following is a list of medications that either contain aspirin or nonsteroidal anti-inflammatory agents (NSAID's). Please do not take these medications.    OVER-THE-COUNTER:  Do not take five (5) days prior to procedure.  Do not take for two (2) weeks after procedure.  · Aspirin (generic):  Brand Names:  Anacin, Chon, Stanley, Aspergum, Aspercin, Aspermin, Aspertab, Back-quell, Duradyne, Empirin, Gemnisyn, Genprin, Gensan, Magnaprin, McNess Pain Tab, Momentum, P-A-C, Pain Reliever Tabs, Tri-Pain Caplets, Vanquish Caplet.  · Buffered Aspirin (generic):  Brand Names:  Ascriptin, Bufferin, Ecotrin, Buffaprin, Buffasal, Buffinol, COPE.  · BC Powders/Tablets  · Goody's Powders  · Stanback Powders or Tablets  · Excedrin, Excedrin Extra, Excedrin IB  · Ias Atwood or Bromo-Atwood  · Ibuprofen (generic):  Brand Names:  Advil, Nuprin, Motrin IB, Adapin, Genpril, Ibufen 200, Menadol, Midol IB, Dristan Sinus, Ursinus Inlay Tabs, Dimetapp Sinus, Valparin, Haltran Tabs, Tejinder's Pills, Carlos.  · Aspirin Suppositories (generic, any strength)  · Naproxen (generic)  Brand Name: Aleve  · Pepto Bismol    PRESCRIPTION:  Brand Name Generic Name    Fiorinal  butalbital, aspirin, caffeine    Naprosyn, Anaprox  naproxen    Voltaren, Cataflam  diclofenac    Feldene  piroxicam    Motrin (Rx), Rufen  ibuprofen    Ansaid  flurbiprofen    Orudis  ketoprofen    Dolobid  diflunisal    Clinoril  sulindac    Indocin  indomethacin    Tolectin  tolmetin    Azdone Tabs  aspirin plus hydrocodone    Percodan  aspirin plus oxycodone    Synalgos  aspirin plus dihydrocodeine    Daypro  oxaprozin    Lodine  etodolac    Meclomen  meclofenamate    Nalfon  fenoprofen    Ponstel (mefenamic acid)     Relafen  nabumetone    Toradol  ketorolac     If you have a headache, backache, arthritis or other painful condition, please take Tylenol, Datril or some other non aspirin-containing product.            Interactions with Herbs  10 and Dietary Supplements      Ginkgo biloba    Garlic    Saw palmetto    Alfalfa    American ginseng    Aurea    Anise    Arnica montana    Asafetida    Goessel bark    Bilberry    Birch    Black cohosh    Bladderwrack    Bogbean    Boldo    Borage seed oil    Bromelain    Capsicum    Cat's claw    Celery     Chamomile    [place]    Clove    Coleus    Cordyceps       Dandelion     Danshen    Devil's claw    Dong quai    EPA (eicosapentaenoic    acid, found in fish oils)    Evening primrose oil    Fenugreek    Feverfew    Fish oil    Flaxseed/flax powder (not a    concern with flaxseed oil)    Sherrell    Grapefruit juice    Grapeseed    Green tea    Guggul    Gymnestra    Horse chestnut    Horseradish    Licorie root    Lovage root    Male fern    Savanna    Melatonin    Nordihydroguairetic acid    (NDGA)   Omega-3 fatty acids    Onion    Papain    Panax ginseng    Parsley    Passionflower    Poplar    Prickly anselmo    Propolis    Quassia    Red clover    Reishi    Siberian ginseng    Sweet clover    Rue    Sweet birch    Turmeric    Vitamin E    White willow    Wild carrot    Wild lettuce    Herald    [place]    Beaumont

## 2024-03-05 NOTE — ED PROVIDER NOTE - PATIENT PORTAL LINK FT
You can access the FollowMyHealth Patient Portal offered by [facility] by registering at the following website: [email or web address]. By joining [employer]’s FollowMyHealth portal, you will also be able to view your health information using other applications (apps) compatible with our system.

## 2024-03-05 NOTE — ED ADULT NURSE NOTE - MODE OF DISCHARGE
[facility]  Cardiac Electrophysiology  Progress Note    Admission Date: 3/21/2023  Code Status: Full Code   Attending Physician: Earl Veronica MD   Expected Discharge Date: 3/24/2023  Principal Problem:New onset atrial fibrillation    Subjective:     Interval History:   Remains in rate controlled AF    Review of Systems   Constitutional: Positive for malaise/fatigue.   Objective:     Vital Signs (Most Recent):  Temp: 98 °F (36.7 °C) (03/23/23 1153)  Pulse: 93 (03/23/23 1153)  Resp: 14 (03/23/23 1153)  BP: (!) 170/82 (03/23/23 1153)  SpO2: 98 % (03/23/23 1153)   Vital Signs (24h Range):  Temp:  [97.4 °F (36.3 °C)-98 °F (36.7 °C)] 98 °F (36.7 °C)  Pulse:  [] 93  Resp:  [14-20] 14  SpO2:  [88 %-100 %] 98 %  BP: (161-196)/(74-96) 170/82     Weight: 49.8 kg (109 lb 12.6 oz)  Body mass index is 19.45 kg/m².     SpO2: 98 %       Physical Exam  Vitals reviewed.   Constitutional:       Appearance: Normal appearance.   HENT:      Head: Normocephalic and atraumatic.      Mouth/Throat:      Mouth: Mucous membranes are moist.   Eyes:      Conjunctiva/sclera: Conjunctivae normal.   Cardiovascular:      Rate and Rhythm: Normal rate. Rhythm irregular.      Pulses: Normal pulses.   Abdominal:      General: Abdomen is flat. There is no distension.   Musculoskeletal:      Cervical back: Normal range of motion.      Right lower leg: No edema.      Left lower leg: No edema.   Skin:     Capillary Refill: Capillary refill takes less than 2 seconds.      Findings: No rash.   Neurological:      Mental Status: She is alert and oriented to person, place, and time.       Significant Labs: All pertinent lab results from the last 24 hours have been reviewed.      Assessment and Plan:     * New onset atrial fibrillation  76 year old with PMH of HTN, HLD, CVA (ischemic 6/13/18, [facility]) admitted for new AF with RVR.     Recs  Plan for CHERRIE/CCV today, urology with no plans for intervention   Keep NPO  If needed, can start low  dose BB given CVP of 3 on echo and work up unrevealing for significant volume overload   Chadvasc of 6 (age, sex, htn, cva). Continue anticoagulation with heparin gtt, transition to OAC after CV        Suhas , MD  Cardiac Electrophysiology  [facility]   Stretcher

## 2024-03-05 NOTE — ED ADULT NURSE NOTE - NS ED NURSE LEVEL OF CONSCIOUSNESS SPEECH
Received from [facility] notification of an Abnormal  Screening Result for Sickle Cell Disease and Other Hemoglobinopathies. Called mother at [phone] and left voice mail to call back at [facility] for referral to [place] Sickle Cell Coordinator.  Fritz Robles MD  22  10:51 AM     Speaking Coherently

## 2024-03-05 NOTE — ED PROVIDER NOTE - OBJECTIVE STATEMENT
Patient is a 48-year-old white male with history of multiple sclerosis with paraplegia PE DVT and nephrolithiasis with a chronic indwelling Steele catheter sent to the emergency department here at [facility] today for replacement of Steele catheter that was removed by home health nurse this morning but was unable to replace. No fever no chills no nausea no vomiting.

## 2024-03-05 NOTE — ED PROVIDER NOTE - IV ALTEPLASE DOOR HIDDEN
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

## 2024-03-05 NOTE — ED ADULT NURSE NOTE - NSFALLHARMRISKINTERV_ED_ALL_ED
Assistance OOB with selected safe patient handling equipment if applicable/Assistance with ambulation/Communicate risk of Fall with Harm to all staff, patient, and family/Monitor gait and stability/Provide visual cue: red socks, yellow wristband, yellow gown, etc/Reinforce activity limits and safety measures with patient and family/Bed in lowest position, wheels locked, appropriate side rails in place/Call bell, personal items and telephone in reach/Instruct patient to call for assistance before getting out of bed/chair/stretcher/Non-slip footwear applied when patient is off stretcher/[place] to call system/Physically safe environment - no spills, clutter or unnecessary equipment/Purposeful Proactive Rounding/Room/bathroom lighting operational, light cord in reach

## 2024-03-05 NOTE — ED PROVIDER NOTE - CARE PROVIDER_API CALL
Andres Little  Urology  [address], [address]  [place] [address]  Phone: [phone]  Fax: [phone]  Follow Up Time:

## 2024-03-05 NOTE — ED PROVIDER NOTE - CLINICAL SUMMARY MEDICAL DECISION MAKING FREE TEXT BOX
Patient with paraplegia secondary to multiple sclerosis chronic indwelling Steele catheter here for replacement of catheter that was unable to be replaced by visiting nurse this morning.

## 2024-03-05 NOTE — ED ADULT NURSE NOTE - OBJECTIVE STATEMENT
Pt. received alert and oriented x3 with chief complaint of home nurse unable to insert indwelling iraheta after removing old one at home.

## 2024-03-05 NOTE — ED PROVIDER NOTE - PHYSICAL EXAMINATION
Examination reveals an obese white male paraplegic in no acute distress.  Lungs clear heart regular rate and rhythm without any murmurs rubs gallops abdomen was protuberant and soft with slight tenderness to palpation in the suprapubic region neurologically patient is awake alert oriented x 3 paraplegia is present.

## 2024-03-06 ENCOUNTER — APPOINTMENT (OUTPATIENT)
Dept: HOME HEALTH SERVICES | Facility: HOME HEALTH | Age: 49
End: 2024-03-06

## 2024-03-06 ENCOUNTER — NON-APPOINTMENT (OUTPATIENT)
Age: 49
End: 2024-03-06

## 2024-03-06 DIAGNOSIS — M54.50 LOW BACK PAIN, UNSPECIFIED: ICD-10-CM

## 2024-03-06 DIAGNOSIS — L30.9 DERMATITIS, UNSPECIFIED: ICD-10-CM

## 2024-03-06 RX ORDER — SULFAMETHOXAZOLE AND TRIMETHOPRIM 800; 160 MG/1; MG/1
800-160 TABLET ORAL
Qty: 14 | Refills: 0 | Status: COMPLETED | COMMUNITY
Start: 2024-01-19 | End: 2024-03-06

## 2024-03-12 ENCOUNTER — NON-APPOINTMENT (OUTPATIENT)
Age: 49
End: 2024-03-12

## 2024-03-13 ENCOUNTER — NON-APPOINTMENT (OUTPATIENT)
Age: 49
End: 2024-03-13

## 2024-03-15 ENCOUNTER — APPOINTMENT (OUTPATIENT)
Dept: HOME HEALTH SERVICES | Facility: HOME HEALTH | Age: 49
End: 2024-03-15

## 2024-03-20 ENCOUNTER — NON-APPOINTMENT (OUTPATIENT)
Age: 49
End: 2024-03-20

## 2024-03-20 ENCOUNTER — EMERGENCY (EMERGENCY)
Facility: HOSPITAL | Age: 49
LOS: 1 days | Discharge: HOME | End: 2024-03-20
Attending: STUDENT IN AN ORGANIZED HEALTH CARE EDUCATION/TRAINING PROGRAM | Admitting: EMERGENCY MEDICINE
Payer: MEDICARE

## 2024-03-20 ENCOUNTER — APPOINTMENT (OUTPATIENT)
Dept: HOME HEALTH SERVICES | Facility: HOME HEALTH | Age: 49
End: 2024-03-20
Payer: MEDICARE

## 2024-03-20 VITALS
HEIGHT: 70 IN | OXYGEN SATURATION: 98 % | TEMPERATURE: 98 F | HEART RATE: 74 BPM | SYSTOLIC BLOOD PRESSURE: 150 MMHG | RESPIRATION RATE: 16 BRPM | DIASTOLIC BLOOD PRESSURE: 90 MMHG

## 2024-03-20 VITALS — RESPIRATION RATE: 18 BRPM | HEART RATE: 68 BPM | TEMPERATURE: 98.3 F | OXYGEN SATURATION: 95 %

## 2024-03-20 VITALS
OXYGEN SATURATION: 95 % | TEMPERATURE: 98 F | DIASTOLIC BLOOD PRESSURE: 90 MMHG | RESPIRATION RATE: 18 BRPM | SYSTOLIC BLOOD PRESSURE: 136 MMHG | HEART RATE: 105 BPM

## 2024-03-20 DIAGNOSIS — L30.8 OTHER SPECIFIED DERMATITIS: ICD-10-CM

## 2024-03-20 DIAGNOSIS — F32.A DEPRESSION, UNSPECIFIED: ICD-10-CM

## 2024-03-20 DIAGNOSIS — Z93.6 OTHER ARTIFICIAL OPENINGS OF URINARY TRACT STATUS: Chronic | ICD-10-CM

## 2024-03-20 LAB
ALBUMIN SERPL ELPH-MCNC: 3.7 G/DL — SIGNIFICANT CHANGE UP (ref 3.3–5)
ALP SERPL-CCNC: 73 U/L — SIGNIFICANT CHANGE UP (ref 40–120)
ALT FLD-CCNC: 58 U/L — SIGNIFICANT CHANGE UP (ref 12–78)
ANION GAP SERPL CALC-SCNC: 8 MMOL/L — SIGNIFICANT CHANGE UP (ref 5–17)
APPEARANCE UR: ABNORMAL
AST SERPL-CCNC: 32 U/L — SIGNIFICANT CHANGE UP (ref 15–37)
BASOPHILS # BLD AUTO: 0.06 K/UL — SIGNIFICANT CHANGE UP (ref 0–0.2)
BASOPHILS NFR BLD AUTO: 0.6 % — SIGNIFICANT CHANGE UP (ref 0–2)
BILIRUB SERPL-MCNC: 0.2 MG/DL — SIGNIFICANT CHANGE UP (ref 0.2–1.2)
BILIRUB UR-MCNC: NEGATIVE — SIGNIFICANT CHANGE UP
BUN SERPL-MCNC: 19 MG/DL — SIGNIFICANT CHANGE UP (ref 7–23)
CALCIUM SERPL-MCNC: 9.6 MG/DL — SIGNIFICANT CHANGE UP (ref 8.5–10.1)
CHLORIDE SERPL-SCNC: 112 MMOL/L — HIGH (ref 96–108)
CO2 SERPL-SCNC: 26 MMOL/L — SIGNIFICANT CHANGE UP (ref 22–31)
COLOR SPEC: YELLOW — SIGNIFICANT CHANGE UP
CREAT SERPL-MCNC: 0.99 MG/DL — SIGNIFICANT CHANGE UP (ref 0.5–1.3)
DIFF PNL FLD: ABNORMAL
EGFR: 95 ML/MIN/1.73M2 — SIGNIFICANT CHANGE UP
EOSINOPHIL # BLD AUTO: 0.33 K/UL — SIGNIFICANT CHANGE UP (ref 0–0.5)
EOSINOPHIL NFR BLD AUTO: 3.3 % — SIGNIFICANT CHANGE UP (ref 0–6)
GLUCOSE SERPL-MCNC: 92 MG/DL — SIGNIFICANT CHANGE UP (ref 70–99)
GLUCOSE UR QL: NEGATIVE MG/DL — SIGNIFICANT CHANGE UP
HCT VFR BLD CALC: 44.8 % — SIGNIFICANT CHANGE UP (ref 39–50)
HGB BLD-MCNC: 14.6 G/DL — SIGNIFICANT CHANGE UP (ref 13–17)
IMM GRANULOCYTES NFR BLD AUTO: 0.3 % — SIGNIFICANT CHANGE UP (ref 0–0.9)
KETONES UR-MCNC: NEGATIVE MG/DL — SIGNIFICANT CHANGE UP
LEUKOCYTE ESTERASE UR-ACNC: ABNORMAL
LYMPHOCYTES # BLD AUTO: 2.12 K/UL — SIGNIFICANT CHANGE UP (ref 1–3.3)
LYMPHOCYTES # BLD AUTO: 21.5 % — SIGNIFICANT CHANGE UP (ref 13–44)
MCHC RBC-ENTMCNC: 29.1 PG — SIGNIFICANT CHANGE UP (ref 27–34)
MCHC RBC-ENTMCNC: 32.6 GM/DL — SIGNIFICANT CHANGE UP (ref 32–36)
MCV RBC AUTO: 89.2 FL — SIGNIFICANT CHANGE UP (ref 80–100)
MONOCYTES # BLD AUTO: 0.83 K/UL — SIGNIFICANT CHANGE UP (ref 0–0.9)
MONOCYTES NFR BLD AUTO: 8.4 % — SIGNIFICANT CHANGE UP (ref 2–14)
NEUTROPHILS # BLD AUTO: 6.49 K/UL — SIGNIFICANT CHANGE UP (ref 1.8–7.4)
NEUTROPHILS NFR BLD AUTO: 65.9 % — SIGNIFICANT CHANGE UP (ref 43–77)
NITRITE UR-MCNC: POSITIVE
NRBC # BLD: 0 /100 WBCS — SIGNIFICANT CHANGE UP (ref 0–0)
PH UR: 7 — SIGNIFICANT CHANGE UP (ref 5–8)
PLATELET # BLD AUTO: 307 K/UL — SIGNIFICANT CHANGE UP (ref 150–400)
POTASSIUM SERPL-MCNC: 3.9 MMOL/L — SIGNIFICANT CHANGE UP (ref 3.5–5.3)
POTASSIUM SERPL-SCNC: 3.9 MMOL/L — SIGNIFICANT CHANGE UP (ref 3.5–5.3)
PROT SERPL-MCNC: 8.2 G/DL — SIGNIFICANT CHANGE UP (ref 6–8.3)
PROT UR-MCNC: 100 MG/DL
RBC # BLD: 5.02 M/UL — SIGNIFICANT CHANGE UP (ref 4.2–5.8)
RBC # FLD: 14.2 % — SIGNIFICANT CHANGE UP (ref 10.3–14.5)
SODIUM SERPL-SCNC: 146 MMOL/L — HIGH (ref 135–145)
SP GR SPEC: 1.01 — SIGNIFICANT CHANGE UP (ref 1–1.03)
UROBILINOGEN FLD QL: 0.2 MG/DL — SIGNIFICANT CHANGE UP (ref 0.2–1)
WBC # BLD: 9.86 K/UL — SIGNIFICANT CHANGE UP (ref 3.8–10.5)
WBC # FLD AUTO: 9.86 K/UL — SIGNIFICANT CHANGE UP (ref 3.8–10.5)

## 2024-03-20 PROCEDURE — 99349 HOME/RES VST EST MOD MDM 40: CPT

## 2024-03-20 PROCEDURE — 96374 THER/PROPH/DIAG INJ IV PUSH: CPT

## 2024-03-20 PROCEDURE — 87086 URINE CULTURE/COLONY COUNT: CPT

## 2024-03-20 PROCEDURE — 86900 BLOOD TYPING SEROLOGIC ABO: CPT

## 2024-03-20 PROCEDURE — 81001 URINALYSIS AUTO W/SCOPE: CPT

## 2024-03-20 PROCEDURE — 85025 COMPLETE CBC W/AUTO DIFF WBC: CPT

## 2024-03-20 PROCEDURE — 86901 BLOOD TYPING SEROLOGIC RH(D): CPT

## 2024-03-20 PROCEDURE — 36415 COLL VENOUS BLD VENIPUNCTURE: CPT

## 2024-03-20 PROCEDURE — 99284 EMERGENCY DEPT VISIT MOD MDM: CPT | Mod: 25

## 2024-03-20 PROCEDURE — 80053 COMPREHEN METABOLIC PANEL: CPT

## 2024-03-20 PROCEDURE — 86850 RBC ANTIBODY SCREEN: CPT

## 2024-03-20 PROCEDURE — 99284 EMERGENCY DEPT VISIT MOD MDM: CPT

## 2024-03-20 PROCEDURE — 87186 SC STD MICRODIL/AGAR DIL: CPT

## 2024-03-20 RX ORDER — BACLOFEN 10 MG/1
10 TABLET ORAL EVERY 8 HOURS
Qty: 270 | Refills: 3 | Status: COMPLETED | COMMUNITY
Start: 2024-01-19 | End: 2024-03-20

## 2024-03-20 RX ORDER — CEFPODOXIME PROXETIL 100 MG
1 TABLET ORAL
Qty: 20 | Refills: 0
Start: 2024-03-20 | End: 2024-03-29

## 2024-03-20 RX ORDER — CEFTRIAXONE 500 MG/1
1000 INJECTION, POWDER, FOR SOLUTION INTRAMUSCULAR; INTRAVENOUS ONCE
Refills: 0 | Status: COMPLETED | OUTPATIENT
Start: 2024-03-20 | End: 2024-03-20

## 2024-03-20 RX ORDER — SODIUM CHLORIDE 9 MG/ML
1000 INJECTION INTRAMUSCULAR; INTRAVENOUS; SUBCUTANEOUS ONCE
Refills: 0 | Status: COMPLETED | OUTPATIENT
Start: 2024-03-20 | End: 2024-03-20

## 2024-03-20 RX ADMIN — CEFTRIAXONE 100 MILLIGRAM(S): 500 INJECTION, POWDER, FOR SOLUTION INTRAMUSCULAR; INTRAVENOUS at 15:54

## 2024-03-20 RX ADMIN — SODIUM CHLORIDE 1000 MILLILITER(S): 9 INJECTION INTRAMUSCULAR; INTRAVENOUS; SUBCUTANEOUS at 15:54

## 2024-03-20 NOTE — COUNSELING
[Minimize unnecessary interventions] : minimize unnecessary interventions [Comfort Care] : comfort care [Discussed disease trajectory with patient/caregiver] : discussed disease trajectory with patient/caregiver [Likely to achieve goals/desired outcomes] : likely to achieve goals/desired outcomes [Patient/Caregiver has ___ understanding of disease process] : patient/caregiver has [unfilled] understanding of disease process [Advanced Directives discussed: ____] : Advanced directives discussed: [unfilled] [DNR] : Code Status: DNR [Limited] : Treatment Guidelines: Limited [DNI] : Intubation: DNI [[facility]: _____[facility]last [facility]: [unfilled] [Obese -  ( BMI  >29.9 )] : obese - ( BMI  >29.9 ) [Continue diet as tolerated] : continue diet as tolerated based on goals of care [Smoke/CO Detectors] : smoke/CO detectors [Use grab bars] : use grab bars [Use assistive device to avoid falls] : use assistive device to avoid falls [Remove clutter and unsafe carpeting to avoid falls] : remove clutter and unsafe carpeting to avoid falls [FreeTextEntry4] : does not smoke cigarettes, smokes weed - manages spasms with it; not interested in quitting [] : abdominal aortic ultrasound [de-identified] : No HD, Yes IVF, abx if needed, hospitalize as needed. No feeding tube. Comfort is priority. "if i have a disease that will take my life, let me go"

## 2024-03-20 NOTE — ED ADULT TRIAGE NOTE - AS HEIGHT TYPE
stated Epidermal Autograft Text: The defect edges were debeveled with a #15 scalpel blade.  Given the location of the defect, shape of the defect and the proximity to free margins an epidermal autograft was deemed most appropriate.  Using a sterile surgical marker, the primary defect shape was transferred to the donor site. The epidermal graft was then harvested.  The skin graft was then placed in the primary defect and oriented appropriately.

## 2024-03-20 NOTE — HEALTH RISK ASSESSMENT
[HRA Reviewed] : Health risk assessment reviewed [Some assistance needed] : managing finances [Full assistance needed] : using transportation [Patient not ambulatory (Wheelchair)] : Patient is not ambulatory (Wheelchair) [Yes] : The patient does have visual impairment [FreeTextEntry8] : na [TimeGetUpGo] : 0 [de-identified] : optic neuritis and cataracts

## 2024-03-20 NOTE — ED PROVIDER NOTE - PHYSICAL EXAMINATION
Gen: alert, NAD  HEENT:  NC/AT, PERR  CV:  well perfused  Pulm:  normal RR, breathing comfortably  Abd: s/nt/nd  : iraheta in place with visible debris and pus  MSK: moving all extremities  Neuro:  non-focal  Skin:  chronic diffuse skin rash, no hives, no itching, no pain  Psych: AOx3 Gen: alert, NAD  HEENT:  NC/AT, PERR  CV:  well perfused  Pulm:  normal RR, breathing comfortably  Abd: s/nt/nd  : iraheta in place with visible debris and pus  Neuro:  non-focal  Skin:  chronic diffuse skin rash, no hives, no itching, no pain  Psych: AOx3

## 2024-03-20 NOTE — ED PROVIDER NOTE - NSFOLLOWUPINSTRUCTIONS_ED_ALL_ED_FT
-- You should update your primary care physician on your Emergency Department visit and follow up with them.  If you do not have a physician or have difficulty following up, please call: [phone] to obtain a [facility] doctor or specialist who can provide follow up.    -- Return to the ER for worsening or persistent symptoms, and/or ANY NEW OR CONCERNING SYMPTOMS. -- You should update your primary care physician on your Emergency Department visit and follow up with them.  If you do not have a physician or have difficulty following up, please call: [phone] to obtain a [facility] doctor or specialist who can provide follow up.    -- Return to the ER for worsening or persistent symptoms, and/or ANY NEW OR CONCERNING SYMPTOMS.    ====================================================================     Urinary Tract Infection, Adult  ImageA urinary tract infection (UTI) is an infection of any part of the urinary tract, which includes the kidneys, ureters, bladder, and urethra. These organs make, store, and get rid of urine in the body. UTI can be a bladder infection (cystitis) or kidney infection (pyelonephritis).    What are the causes?  This infection may be caused by fungi, viruses, or bacteria. Bacteria are the most common cause of UTIs. This condition can also be caused by repeated incomplete emptying of the bladder during urination.    What increases the risk?  This condition is more likely to develop if:    You ignore your need to urinate or hold urine for long periods of time.  You do not empty your bladder completely during urination.  You wipe back to front after urinating or having a bowel movement, if you are female.  You are uncircumcised, if you are male.  You are constipated.  You have a urinary catheter that stays in place (indwelling).  You have a weak defense (immune) system.  You have a medical condition that affects your bowels, kidneys, or bladder.  You have diabetes.  You take antibiotic medicines frequently or for long periods of time, and the antibiotics no longer work well against certain types of infections (antibiotic resistance).  You take medicines that irritate your urinary tract.  You are exposed to chemicals that irritate your urinary tract.  You are female.    What are the signs or symptoms?  Symptoms of this condition include:    Fever.  Frequent urination or passing small amounts of urine frequently.  Needing to urinate urgently.  Pain or burning with urination.  Urine that smells bad or unusual.  Cloudy urine.  Pain in the lower abdomen or back.  Trouble urinating.  Blood in the urine.  Vomiting or being less hungry than normal.  Diarrhea or abdominal pain.  Vaginal discharge, if you are female.    How is this diagnosed?  This condition is diagnosed with a medical history and physical exam. You will also need to provide a urine sample to test your urine. Other tests may be done, including:    Blood tests.  Sexually transmitted disease (STD) testing.    If you have had more than one UTI, a cystoscopy or imaging studies may be done to determine the cause of the infections.    How is this treated?  Treatment for this condition often includes a combination of two or more of the following:    Antibiotic medicine.  Other medicines to treat less common causes of UTI.  Over-the-counter medicines to treat pain.  Drinking enough water to stay hydrated.    Follow these instructions at home:  Take over-the-counter and prescription medicines only as told by your health care provider.  If you were prescribed an antibiotic, take it as told by your health care provider. Do not stop taking the antibiotic even if you start to feel better.  Avoid alcohol, caffeine, tea, and carbonated beverages. They can irritate your bladder.  Drink enough fluid to keep your urine clear or pale yellow.  Keep all follow-up visits as told by your health care provider. This is important.  ImageMake sure to:    Empty your bladder often and completely. Do not hold urine for long periods of time.  Empty your bladder before and after sex.  Wipe from front to back after a bowel movement if you are female. Use each tissue one time when you wipe.    Contact a health care provider if:  You have back pain.  You have a fever.  You feel nauseous or vomit.  Your symptoms do not get better after 3 days.  Your symptoms go away and then return.  Get help right away if:  You have severe back pain or lower abdominal pain.  You are vomiting and cannot keep down any medicines or water.  This information is not intended to replace advice given to you by your health care provider. Make sure you discuss any questions you have with your health care provider.

## 2024-03-20 NOTE — ED PROVIDER NOTE - OBJECTIVE STATEMENT
Patient states that he had a home visiting  Come to his house today for routine visit but sent him to the ER because she noticed blood in his chronic Steele.  Patient is bedbound from MS that has had since 1995 and has 24/7 aides at home.  Patient states that he has chronic pain all over from his MS.  Patient also reports a diffuse rash on his body that is not itchy and does not bother him.  But he is mentioning it because it was noticed by the visiting doctor.

## 2024-03-20 NOTE — REASON FOR VISIT
[Follow-Up] : a follow-up visit [Pre-Visit Preparation] : pre-visit preparation was done [Formal Caregiver] : formal caregiver [Intercurrent Specialty/Sub-specialty Visits] : the patient has intercurrent specialty/sub-specialty visits [FreeTextEntry1] : MS [FreeTextEntry2] : chart review [FreeTextEntry3] : urology

## 2024-03-20 NOTE — ED ADULT TRIAGE NOTE - GLASGOW COMA SCALE: BEST MOTOR RESPONSE, MLM
Patient  on 23 @ 0430 at [facility].  Patient was on [facility].     (M6) obeys commands

## 2024-03-20 NOTE — HISTORY OF PRESENT ILLNESS
[Patient] : patient [FreeTextEntry2] : 47 y/o male with history of MS, bed-bound, PE, depression w hx of SI attempt, neurogenic bladder, suprapubic Steele with conversion to regular catheter due to complications. Patient is being seen today for follow up visit.    At today's visit, patient seen laying in bed, in pain with multiple bladder spasms occurring during the visit.   Skin: intact Appetite: eats well. Gets "Moms meals" from insurance - will eat three meals/day. Also, snacks in between. HHA will feed patient, patient not able to hold spoon/fork. No coughing with liquids.  BM: constipation -- he uses MiraLax to help. Has xlax in case of persistent constipation.  : Steele leaking, feels is out of place.  Pain: improved, when gets spasms, uses marijuana which significantly improves the pain and requires minimal other pain medication. Baclofen helping, taking every 8 hours.  Sleep: sleeps well. Does not get 8hrs/night. He will fall asleep & then a spasm will wake him up - he will smoke and then go back to sleep. He states the spams will wake him up every 20mins - the most sleep he will get will be an hour. He is used to his sleep cycle & believes he gets sufficient rest.   MS - first diagnosed at 20. Then, he was in remission for seven years, then he had another attack in 2013 - wheelchair bound since 2013. Bed-bound X three years (2020). Has not gotten OOB since 2020. Not able to bend his knees.   Depression: Always depressed - manages with marijuana. He also makes jokes and tries to have up-beat attitude. He has very supportive friends. His parents will also come by once a week or so, but he is not close with them. He feels "life is not worth living" and that he is waiting "for when gd will take me". Denies active SI. Had extensive discussion on mood and how he romy.   Bladder spasms: uses marijuana to help with symptoms. Had side effect from baclofen, now taking banofen 25mg TID  Has 24hr/aide. Weekend aide is here all weekend.  [FreeTextEntry1] : MS

## 2024-03-20 NOTE — REVIEW OF SYSTEMS
[Vision Problems] : vision problems [Lower Ext Edema] : lower extremity edema [Constipation] : constipation [Incontinence] : incontinence [Joint Pain] : joint pain [Joint Stiffness] : joint stiffness [Muscle Weakness] : muscle weakness [Unsteady Walk] : ataxia [Negative] : Heme/Lymph [Suicidal] : not suicidal [FreeTextEntry3] : optic neuritis in left eye, cataracts in R eye [FreeTextEntry8] : as per HPI [de-identified] : as per HPI [de-identified] : as above

## 2024-03-20 NOTE — ED PROVIDER NOTE - CLINICAL SUMMARY MEDICAL DECISION MAKING FREE TEXT BOX
pt with hematuria today in his chronic iraheta pt with hematuria today in his chronic iraheta. Iraheta changed, will start course of abx. stable for dc.

## 2024-03-20 NOTE — ED ADULT NURSE NOTE - OBJECTIVE STATEMENT
Pt received in 17B 48 yr old male AXO 4 paraplegic from home c/o blood in his indwelling iraheta catheter and was sent to [place] ED by his doctor. PMH MS, Cellulitis, PE, nephrolithiasis, and DVT. Upon Assessment Pts iraheta was clogged with sediment and blood. pt also has a red rash with dry flakey skin that covers both arms, chest, abd, back, and pubis. Pt also has episodes of pain that "comes and goes" in his pelvis and lower back that exacerbates when he has to make a BM; pt has a loud expiratory wheeze when episodes occur. PERRLA, Speaking in coherent sentences with no slurring of speech, no facial droop. RR is even with equal chest rise and fall. Denies chest pain. Pt is obese; Abd is soft and round. Left hand 20 placed, labs drawn, iraheta catheter exchanged sterile procedure used.

## 2024-03-20 NOTE — ED PROVIDER NOTE - PATIENT PORTAL LINK FT
You can access the FollowMyHealth Patient Portal offered by [facility] by registering at the following website: [email or web address]. By joining [employer]’s FollowMyHealth portal, you will also be able to view your health information using other applications (apps) compatible with our system.

## 2024-03-20 NOTE — ED ADULT NURSE NOTE - NSFALLHARMRISKINTERV_ED_ALL_ED

## 2024-03-21 ENCOUNTER — APPOINTMENT (OUTPATIENT)
Dept: HOME HEALTH SERVICES | Facility: HOME HEALTH | Age: 49
End: 2024-03-21

## 2024-03-21 RX ORDER — SACCHAROMYCES BOULARDII 250 MG
250 CAPSULE ORAL TWICE DAILY
Refills: 0 | Status: ACTIVE | COMMUNITY
Start: 2024-03-21

## 2024-03-21 RX ORDER — NYSTATIN 100000 1/G
100000 POWDER TOPICAL 3 TIMES DAILY
Qty: 1 | Refills: 6 | Status: ACTIVE | COMMUNITY
Start: 2024-03-20

## 2024-03-28 ENCOUNTER — APPOINTMENT (OUTPATIENT)
Dept: UROLOGY | Facility: CLINIC | Age: 49
End: 2024-03-28

## 2024-03-29 ENCOUNTER — NON-APPOINTMENT (OUTPATIENT)
Age: 49
End: 2024-03-29

## 2024-03-30 ENCOUNTER — INPATIENT (INPATIENT)
Facility: HOSPITAL | Age: 49
LOS: 9 days | Discharge: HOME | DRG: 204 | End: 2024-04-09
Attending: HOSPITALIST | Admitting: INTERNAL MEDICINE
Payer: MEDICARE

## 2024-03-30 ENCOUNTER — TRANSCRIPTION ENCOUNTER (OUTPATIENT)
Age: 49
End: 2024-03-30

## 2024-03-30 VITALS
TEMPERATURE: 98 F | SYSTOLIC BLOOD PRESSURE: 166 MMHG | RESPIRATION RATE: 20 BRPM | HEART RATE: 82 BPM | HEIGHT: 70 IN | DIASTOLIC BLOOD PRESSURE: 101 MMHG | OXYGEN SATURATION: 97 %

## 2024-03-30 DIAGNOSIS — Z93.6 OTHER ARTIFICIAL OPENINGS OF URINARY TRACT STATUS: Chronic | ICD-10-CM

## 2024-03-30 PROCEDURE — 99285 EMERGENCY DEPT VISIT HI MDM: CPT

## 2024-03-30 NOTE — ED PROVIDER NOTE - CLINICAL SUMMARY MEDICAL DECISION MAKING FREE TEXT BOX
48-year-old history of DVT, paraplegia, MS since 1995, bed bound brought in by EMS complaining of episode of shortness of breath while at home tonight, states the head of the bed fell down at a flat to decline position and patient started having difficulty breathing from this.  Upon arrival to the ED patient now asymptomatic no shortness of breath because now his bed position is at incline.  Patient tried calling the company for his bed and they will be able to come in to repair it until Monday. Otherwise has no complaints.    shortness of breath resolved secondary to bed positioning, will involved SW consult for the morning

## 2024-03-30 NOTE — ED PROVIDER NOTE - OBJECTIVE STATEMENT
48-year-old history of DVT, paraplegia, MS since 1995, bed bound brought in by EMS complaining of episode of shortness of breath while at home tonight, states the head of the bed fell down at a flat to decline position and patient started having difficulty breathing from this.  Upon arrival to the ED patient now asymptomatic no shortness of breath because now his bed position is at incline.  Patient tried calling the company for his bed and they will be able to come in to repair it until Monday. Otherwise has no complaints.

## 2024-03-30 NOTE — ED PROVIDER NOTE - PHYSICAL EXAMINATION
Gen: Alert, NAD, bed bound  Head/eyes: NC/AT, PERRL  ENT: airway patent  Neck: supple  Pulm/lung: Bilateral clear BS  CV/heart: RRR  GI/Abd: soft, NT/ND, +BS, no guarding/rebound tenderness  Musculoskeletal: no edema/erythema/cyanosis  Skin: no rash  Neuro: AAOx3, paraplegia

## 2024-03-30 NOTE — ED ADULT NURSE NOTE - NSFALLRISKINTERV_ED_ALL_ED
Assistance OOB with selected safe patient handling equipment if applicable/Communicate fall risk and risk factors to all staff, patient, and family/Provide patient with walking aids/Provide visual cue: yellow wristband, yellow gown, etc/Reinforce activity limits and safety measures with patient and family/Call bell, personal items and telephone in reach/Instruct patient to call for assistance before getting out of bed/chair/stretcher/Non-slip footwear applied when patient is off stretcher/[place] to call system/Physically safe environment - no spills, clutter or unnecessary equipment/Purposeful Proactive Rounding/Room/bathroom lighting operational, light cord in reach

## 2024-03-30 NOTE — ED PROVIDER NOTE - CARE PLAN
Principal Discharge DX:	Shortness of breath  Secondary Diagnosis:	Encounter for social work intervention   1

## 2024-03-30 NOTE — ED PROVIDER NOTE - PROGRESS NOTE DETAILS
Discussed case with hospitalist who spoke with nursing supervisor and will escalate  to come in tomorrow morning to help remedy patient's social issue.  As per hospitalist recommends to not admit the patient patient states he does not have any medications that are due tonight. Patient contacted regarding recent discharge and COVID-19 risk   LPN Care Coordinator  contacted the patient by telephone to perform post discharge assessment. Verified name and  with patient as identifiers. Patient has following risk factors of: diabetes and chronic kidney disease. CC reviewed discharge instructions, medical action plan and red flags related to discharge diagnosis. Reviewed and educated them on any new and changed medications related to discharge diagnosis. Advised obtaining a 90-day supply of all daily and as-needed medications. Education provided regarding infection prevention, and signs and symptoms of COVID-19 and when to seek medical attention with patient who verbalized understanding. Discussed exposure protocols and quarantine from [address] you at higher risk for severe illness  and given an opportunity for questions and concerns. The patient agrees to contact the COVID-19 hotline [phone] or PCP office for questions related to their healthcare. CC provided contact information for future reference. From CDC: Are you at higher risk for severe illness?  Wash your hands often.  Avoid close contact (6 feet, which is about two arm lengths) with people who are sick.  Put distance between yourself and other people if COVID-19 is spreading in your community.  Clean and disinfect frequently touched surfaces.  Avoid all cruise travel and non-essential air travel.  Call your healthcare professional if you have concerns about COVID-19 and your underlying condition or if you are sick.     For more information on steps you can take to protect yourself, see CDC's How to Protect Yourself      Patient/family/caregiver given information for Tatiana Roy and agrees to enroll no  Patient's preferred e-mail:  N/A  Patient's preferred phone number: [phone]  Based on Loop alert triggers, patient will be contacted by nurse care manager for worsening symptoms. Plan for follow-up call in 7-14 days based on severity of symptoms and risk factors.

## 2024-03-30 NOTE — ED ADULT TRIAGE NOTE - GLASGOW COMA SCALE: EYE OPENING, MLM
Blood glucose log from 4/11/17 through 4/25/17 reviewed by Dr. Tung Coello   pt to take 4units NPH in AM and at [facility]    She verbalized understanding. Will continue to send weekly logs.   Record will be scanned into Epic (E4) spontaneous

## 2024-03-30 NOTE — ED ADULT NURSE NOTE - OBJECTIVE STATEMENT
pt has hx of MS, limited movement of extremities, verbal, alert, lives @ home w aide. Denies pain, denies chest pain. C/o sob when lying down, having issues with home hospital bed. BP elevated on admit. Pt states he would like a script for portable O2 to use at night at home. Hospital bed issue will be resolved Monday when the company will come out to repair mechanism that raises the HOB. SOB only evident when lying down

## 2024-03-30 NOTE — ED ADULT TRIAGE NOTE - CHIEF COMPLAINT QUOTE
per ems from home with shortness of breath when laying flat for about a week, has been having an issue with his bed. pt has hx of MS

## 2024-03-30 NOTE — ED CLERICAL - NS ED CARE COORDINATION INFORMATION
This patient is enrolled in the House Calls Program and receives comprehensive home-based primary care.  To obtain additional clinical information , or to discuss any questions or concerns, you can call the [facility] Calls team at [phone], 24 hours a day.  If discharged, this patient will be followed up by the House Calls team within 2 days.  If this patient requires admission, please use the hospitalist service.

## 2024-03-31 DIAGNOSIS — F32.9 MAJOR DEPRESSIVE DISORDER, SINGLE EPISODE, UNSPECIFIED: ICD-10-CM

## 2024-03-31 DIAGNOSIS — R21 RASH AND OTHER NONSPECIFIC SKIN ERUPTION: ICD-10-CM

## 2024-03-31 DIAGNOSIS — G35 MULTIPLE SCLEROSIS: ICD-10-CM

## 2024-03-31 DIAGNOSIS — R06.02 SHORTNESS OF BREATH: ICD-10-CM

## 2024-03-31 DIAGNOSIS — Z76.89 PERSONS ENCOUNTERING HEALTH SERVICES IN OTHER SPECIFIED CIRCUMSTANCES: ICD-10-CM

## 2024-03-31 DIAGNOSIS — Z79.899 OTHER LONG TERM (CURRENT) DRUG THERAPY: ICD-10-CM

## 2024-03-31 DIAGNOSIS — Z29.9 ENCOUNTER FOR PROPHYLACTIC MEASURES, UNSPECIFIED: ICD-10-CM

## 2024-03-31 LAB
ALBUMIN SERPL ELPH-MCNC: 3.6 G/DL — SIGNIFICANT CHANGE UP (ref 3.3–5)
ALP SERPL-CCNC: 68 U/L — SIGNIFICANT CHANGE UP (ref 40–120)
ALT FLD-CCNC: 73 U/L — SIGNIFICANT CHANGE UP (ref 12–78)
ANION GAP SERPL CALC-SCNC: 5 MMOL/L — SIGNIFICANT CHANGE UP (ref 5–17)
APAP SERPL-MCNC: <2 UG/ML — SIGNIFICANT CHANGE UP (ref 10–30)
AST SERPL-CCNC: 36 U/L — SIGNIFICANT CHANGE UP (ref 15–37)
BASOPHILS # BLD AUTO: 0.07 K/UL — SIGNIFICANT CHANGE UP (ref 0–0.2)
BASOPHILS NFR BLD AUTO: 0.8 % — SIGNIFICANT CHANGE UP (ref 0–2)
BILIRUB SERPL-MCNC: 0.3 MG/DL — SIGNIFICANT CHANGE UP (ref 0.2–1.2)
BUN SERPL-MCNC: 20 MG/DL — SIGNIFICANT CHANGE UP (ref 7–23)
CALCIUM SERPL-MCNC: 9.3 MG/DL — SIGNIFICANT CHANGE UP (ref 8.5–10.1)
CHLORIDE SERPL-SCNC: 111 MMOL/L — HIGH (ref 96–108)
CO2 SERPL-SCNC: 28 MMOL/L — SIGNIFICANT CHANGE UP (ref 22–31)
CREAT SERPL-MCNC: 0.99 MG/DL — SIGNIFICANT CHANGE UP (ref 0.5–1.3)
EGFR: 95 ML/MIN/1.73M2 — SIGNIFICANT CHANGE UP
EOSINOPHIL # BLD AUTO: 0.33 K/UL — SIGNIFICANT CHANGE UP (ref 0–0.5)
EOSINOPHIL NFR BLD AUTO: 3.8 % — SIGNIFICANT CHANGE UP (ref 0–6)
ETHANOL SERPL-MCNC: <10 MG/DL — SIGNIFICANT CHANGE UP (ref 0–10)
GLUCOSE SERPL-MCNC: 89 MG/DL — SIGNIFICANT CHANGE UP (ref 70–99)
HCT VFR BLD CALC: 44.4 % — SIGNIFICANT CHANGE UP (ref 39–50)
HGB BLD-MCNC: 14.8 G/DL — SIGNIFICANT CHANGE UP (ref 13–17)
IMM GRANULOCYTES NFR BLD AUTO: 0.2 % — SIGNIFICANT CHANGE UP (ref 0–0.9)
LIDOCAIN IGE QN: 71 U/L — SIGNIFICANT CHANGE UP (ref 13–75)
LYMPHOCYTES # BLD AUTO: 2.81 K/UL — SIGNIFICANT CHANGE UP (ref 1–3.3)
LYMPHOCYTES # BLD AUTO: 32.4 % — SIGNIFICANT CHANGE UP (ref 13–44)
MCHC RBC-ENTMCNC: 29 PG — SIGNIFICANT CHANGE UP (ref 27–34)
MCHC RBC-ENTMCNC: 33.3 GM/DL — SIGNIFICANT CHANGE UP (ref 32–36)
MCV RBC AUTO: 86.9 FL — SIGNIFICANT CHANGE UP (ref 80–100)
MONOCYTES # BLD AUTO: 0.8 K/UL — SIGNIFICANT CHANGE UP (ref 0–0.9)
MONOCYTES NFR BLD AUTO: 9.2 % — SIGNIFICANT CHANGE UP (ref 2–14)
NEUTROPHILS # BLD AUTO: 4.65 K/UL — SIGNIFICANT CHANGE UP (ref 1.8–7.4)
NEUTROPHILS NFR BLD AUTO: 53.6 % — SIGNIFICANT CHANGE UP (ref 43–77)
NRBC # BLD: 0 /100 WBCS — SIGNIFICANT CHANGE UP (ref 0–0)
PLATELET # BLD AUTO: 295 K/UL — SIGNIFICANT CHANGE UP (ref 150–400)
POTASSIUM SERPL-MCNC: 4 MMOL/L — SIGNIFICANT CHANGE UP (ref 3.5–5.3)
POTASSIUM SERPL-SCNC: 4 MMOL/L — SIGNIFICANT CHANGE UP (ref 3.5–5.3)
PROT SERPL-MCNC: 7.9 G/DL — SIGNIFICANT CHANGE UP (ref 6–8.3)
RBC # BLD: 5.11 M/UL — SIGNIFICANT CHANGE UP (ref 4.2–5.8)
RBC # FLD: 13.4 % — SIGNIFICANT CHANGE UP (ref 10.3–14.5)
SALICYLATES SERPL-MCNC: <1.7 MG/DL — LOW (ref 2.8–20)
SODIUM SERPL-SCNC: 144 MMOL/L — SIGNIFICANT CHANGE UP (ref 135–145)
WBC # BLD: 8.68 K/UL — SIGNIFICANT CHANGE UP (ref 3.8–10.5)
WBC # FLD AUTO: 8.68 K/UL — SIGNIFICANT CHANGE UP (ref 3.8–10.5)

## 2024-03-31 PROCEDURE — 99222 1ST HOSP IP/OBS MODERATE 55: CPT | Mod: GC

## 2024-03-31 PROCEDURE — 71045 X-RAY EXAM CHEST 1 VIEW: CPT | Mod: 26

## 2024-03-31 RX ORDER — LANOLIN ALCOHOL/MO/W.PET/CERES
3 CREAM (GRAM) TOPICAL AT BEDTIME
Refills: 0 | Status: DISCONTINUED | OUTPATIENT
Start: 2024-03-31 | End: 2024-04-09

## 2024-03-31 RX ORDER — ENOXAPARIN SODIUM 100 MG/ML
40 INJECTION SUBCUTANEOUS ONCE
Refills: 0 | Status: COMPLETED | OUTPATIENT
Start: 2024-03-31 | End: 2024-03-31

## 2024-03-31 RX ORDER — ENOXAPARIN SODIUM 100 MG/ML
40 INJECTION SUBCUTANEOUS EVERY 24 HOURS
Refills: 0 | Status: DISCONTINUED | OUTPATIENT
Start: 2024-04-01 | End: 2024-04-09

## 2024-03-31 RX ORDER — DIAZEPAM 5 MG
5 TABLET ORAL
Refills: 0 | Status: DISCONTINUED | OUTPATIENT
Start: 2024-03-31 | End: 2024-03-31

## 2024-03-31 RX ORDER — ACETAMINOPHEN 500 MG
650 TABLET ORAL EVERY 6 HOURS
Refills: 0 | Status: DISCONTINUED | OUTPATIENT
Start: 2024-03-31 | End: 2024-04-09

## 2024-03-31 RX ORDER — DIAZEPAM 5 MG
10 TABLET ORAL ONCE
Refills: 0 | Status: DISCONTINUED | OUTPATIENT
Start: 2024-03-31 | End: 2024-03-31

## 2024-03-31 RX ADMIN — Medication 10 MILLIGRAM(S): at 15:50

## 2024-03-31 RX ADMIN — ENOXAPARIN SODIUM 40 MILLIGRAM(S): 100 INJECTION SUBCUTANEOUS at 13:22

## 2024-03-31 NOTE — ED BEHAVIORAL HEALTH ASSESSMENT NOTE - OTHER
vendor aides (2 12hrs aides Monday-Friday, and 24x7 on Saturday and Sunday). [facility] Case Coordination expressed concern over statements made deferred at this time appropriate to context, full, reactive reports waves of painful muscle spasms "How do you think?" unable to ascertain via camera

## 2024-03-31 NOTE — H&P ADULT - PROBLEM SELECTOR PLAN 6
Contacted [facility] about patient's medications  -He states he only takes 2 medications, but is unable to recall which  -[facility] states he is on clotrimazole, cefpodoxime, fluconazole, and baclofen  -Pt states he now has an allergy to Baclofen Contacted [facility] about patient's medications  -He states he only takes 2 medications, but is unable to recall which  -[facility] states he is on clotrimazole, cefpodoxime, fluconazole, and baclofen  -Pt states he now has an allergy to Baclofen  -Attemped to reach pts mother Kady Clements at 2:33 pm for med rec but no answer. Contacted [facility] about patient's medications  -He states he only takes 2 medications, but is unable to recall which  -[facility] states he is on clotrimazole, cefpodoxime, fluconazole, and baclofen  -Pt states he now has an allergy to Baclofen  -Attemped to reach pts mother Kady Clements at 2:33 pm for med rec but no answer.-  -Pt afebrile& normal WBC, w/o any concern for active infection, will hold fluconazole and cefpodoxime Contacted [facility] about patient's medications  -He states he only takes 2 medications, but is unable to recall which  -[facility] states he is on clotrimazole, cefpodoxime, fluconazole, and baclofen; however, per discussion with his home aide, Abhijeet ([phone]), he is no longer taking any medications by mouth, but was taking clotrimazole cream for a rash, and had previously been taking baclofen, but no longer actively taking it  -Pt states he now has an allergy to Baclofen  -Attempted to reach pts mother Kady Clements at 2:33 pm for med rec but no answer.-  -Pt afebrile& normal WBC, w/o any concern for active infection, will hold fluconazole and cefpodoxime

## 2024-03-31 NOTE — H&P ADULT - PROBLEM SELECTOR PLAN 1
Patient with SOB likely 2/2 inability to sit up in bed  - f/u Chest X-ray  - Supplemental O2 maintain O2 sats >88% Patient with SOB likely 2/2 inability to sit up in bed  -SOB resolved  -Now saturaring well on 2L NC  - f/u Chest X-ray read  (clear lungs on wet read)   - Supplemental O2 maintain O2 sats >92%

## 2024-03-31 NOTE — CARE COORDINATION ASSESSMENT. - NSDCPLANSERVICES_GEN_ALL_CORE
Met with patient by bedside in the ER. He reports he lives alone with 24 hour  medicaid aids from [facility] and [facility]. He states his hospital bed from [employer]  has been broken for a while. Last night the electronics stopped working, He states he had trouble breathing as it was lying flat. He has no 02 at the house though he is wearing in currently. He states he was told he needs a new prescription for a new bed. Patient has services' [facility] at home. He states he has a  named Sonya. educated him on role of  and care coordination . Referred to CM for follow up. The patient does not need to be admitted per MD. Social Work to remain available. Met with patient by bedside in the ER. He reports he lives alone with 24 hour  medicaid aids from [facility] and [facility]. He states his hospital bed from [employer]  has been broken for a while. Last night the electronics stopped working, He states he had trouble breathing as it was lying flat. He has no 02 at the house though he is wearing in currently. He states he was told he needs a new prescription for a new bed. Patient has services' [facility] at home. He states he has a  named Sonya. educated him on role of  and care coordination . Referred to CM for follow up. The patient does not need to be admitted per MD. Social Work to remain available./Home Care

## 2024-03-31 NOTE — H&P ADULT - PROBLEM SELECTOR PLAN 3
- Patient with depressed affect on exam  - stated he has 2 past suicide attempts  - Psych consulted Patient making comments such as "I want God to take me, my life is hard"  -Denies any active suicidal or homicidal ideation or plan  -Pt did not tolerate any anti-depressants in the past  -Tele Psych consulted PT with skin rash over abdomen and extremities  -C/w Clotrimazole  -Hold fluconazole PT with skin rash over abdomen and extremities  -C/w Clotrimazole  -Per d/w Abhijeet [phone], patient's home aide, he is not taking fluconazole any more

## 2024-03-31 NOTE — H&P ADULT - PROBLEM SELECTOR PLAN 4
- chronic problem  - paraplegic  - chronic myrtle  - not currently on medication Patient making comments such as "I want God to take me, my life is hard"  -Denies any active suicidal or homicidal ideation or plan  -Pt did not tolerate any anti-depressants in the past  -Tele Psych consulted Patient making comments such as "I want God to take me, my life is hard"  -Denies any active suicidal or homicidal ideation or plan  -Pt did not tolerate any anti-depressants in the past  -Tele Psych consulted  -Valium PRN for anxiety Patient making comments such as "I want God to take me, my life is hard"  -Denies any active suicidal or homicidal ideation or plan  -Pt did not tolerate any anti-depressants in the past  -Tele Psych consulted  -Valium PRN for anxiety, but only whle inpt, needs to follow up with Neuro and Pain management on what regiment he should be on as outpt

## 2024-03-31 NOTE — H&P ADULT - ASSESSMENT
48-year-old history of DVT, paraplegia, MS since 1995, bed bound brought in by EMS complaining of episode of shortness of breath admitted for Social Work.

## 2024-03-31 NOTE — H&P ADULT - PROBLEM SELECTOR PLAN 5
Lovenox 40 mg for dvt ppx - chronic problem  - paraplegic  - chronic iraheta  - not currently on medication for MS

## 2024-03-31 NOTE — CARE COORDINATION ASSESSMENT. - LIVING ARRANGEMENTS, PROFILE
Was the patient seen in the last year in this department? Yes  lov 02/24/2020     Does patient have an active prescription for medications requested? Yes    Received Request Via: Pharmacy    Hospital Outpatient Visit on 06/01/2020   Component Date Value   • TSH 06/01/2020 0.797    Hospital Outpatient Visit on 04/13/2020   Component Date Value   • TSH 04/13/2020 7.050*   • 25-Hydroxy   Vitamin D 25 04/13/2020 40    • Sodium 04/13/2020 139    • Potassium 04/13/2020 5.0    • Chloride 04/13/2020 101    • Co2 04/13/2020 26    • Anion Gap 04/13/2020 12.0    • Glucose 04/13/2020 100*   • Bun 04/13/2020 17    • Creatinine 04/13/2020 0.70    • Calcium 04/13/2020 9.3    • AST(SGOT) 04/13/2020 15    • ALT(SGPT) 04/13/2020 22    • Alkaline Phosphatase 04/13/2020 64    • Total Bilirubin 04/13/2020 0.4    • Albumin 04/13/2020 4.5    • Total Protein 04/13/2020 7.0    • Globulin 04/13/2020 2.5    • A-G Ratio 04/13/2020 1.8    • WBC 04/13/2020 7.6    • RBC 04/13/2020 4.62    • Hemoglobin 04/13/2020 13.9    • Hematocrit 04/13/2020 41.5    • MCV 04/13/2020 89.8    • MCH 04/13/2020 30.1    • MCHC 04/13/2020 33.5*   • RDW 04/13/2020 42.5    • Platelet Count 04/13/2020 360    • MPV 04/13/2020 10.1    • Neutrophils-Polys 04/13/2020 41.90*   • Lymphocytes 04/13/2020 41.60*   • Monocytes 04/13/2020 10.10    • Eosinophils 04/13/2020 4.30    • Basophils 04/13/2020 1.80    • Immature Granulocytes 04/13/2020 0.30    • Nucleated RBC 04/13/2020 0.00    • Neutrophils (Absolute) 04/13/2020 3.18    • Lymphs (Absolute) 04/13/2020 3.16    • Monos (Absolute) 04/13/2020 0.77    • Eos (Absolute) 04/13/2020 0.33    • Baso (Absolute) 04/13/2020 0.14*   • Immature Granulocytes (a* 04/13/2020 0.02    • NRBC (Absolute) 04/13/2020 0.00    • Cholesterol,Tot 04/13/2020 233*   • Triglycerides 04/13/2020 104    • HDL 04/13/2020 74    • LDL 04/13/2020 138*   • Fasting Status 04/13/2020 Fasting    • GFR If  04/13/2020 >60    • GFR If Non   Bernt* 04/13/2020 >60    ]   apartment

## 2024-03-31 NOTE — ED BEHAVIORAL HEALTH NOTE - BEHAVIORAL HEALTH NOTE
===================  PRE-HOSPITAL COURSE  ==================  SOURCE: Ed documentation and randy Nicole ed RN (Pt's RN Jesus is on lunch)  DETAILS:  bibems activated by self reporting SOB while at home, the head of the bed fell down at a flat service which then pt started to have complications related to his breathing.  ============  ED COURSE  ============  SOURCE: Ed documentation and randy Nicole ed RN (Pt's RN Jesus is on lunch)  ARRIVAL: bibems activated by self-reporting SOB while at home, the head of the bed fell down at a flat service which then pt started to have complications related to his breathing.   BELONGINGS: No belongings of note. All belongings were provided to hospital security, and patient currently in a gown with a 1:1 staff member.  BEHAVIOR: Per covering RN, pt has been calm and cooperative, has not endorsed SI. No concerning behavior was witnessed, pt has not observed anything concerning. Per ed documentation pt has pmhx of paraplegia, MS, DVT and is bed bound. Pt became overwhelmed and distressed when the head of the bed fell down flat which caused SOB, pt at first called the bed company and was informed that he would have to wait until Monday for someone to go to his home and repair it. When pt met with CM he was informed that there may be delay in the company being able to fix his bed due to the holiday, pt then reported ""it would be better if I were dead";"my life is so difficult, and with all these little issues, it would just be simpler if I "." Pt has hx of 2 self-reported SAs. No signs of agitation witnessed in the ED and pt allowed for bloodwork and EKG to get done.   TREATMENT: Pt has not required any medication or security intervention while in the ED.   VISITORS: No visitors at bedside.

## 2024-03-31 NOTE — ED BEHAVIORAL HEALTH ASSESSMENT NOTE - PAST PSYCHOTROPIC MEDICATION
- has tried antidepressants which worsened his depression and pt feels Paxil may have triggered a suicide attempt as per Patient

## 2024-03-31 NOTE — ED BEHAVIORAL HEALTH ASSESSMENT NOTE - SUMMARY
Multiple Sclerosis x 28 years with paraplegia of lower extremities with muscle spasms/spacticity; lost equilibrium, hand/eye coordination, indwelling Steele due to neurogenic bladder, CAUTI/pyelonephritis in 01/24, pulmonary embolism in 2013, hx of cellulitis (11/14), Nephrolithiasis w/ hx of Nephrostomy, prerenal azotemia, hx of DVT poorly controlled chronic pain primary stressor/trigger; unable to follow up with outpatient Pain Management and would need EMS transport as Pt slides out of a wheelchair and could not wait to be seen in a clinic/outpatient setting. Reason why Patient has visiting clinician home services/visits. Pt's report of suicidality was made in the context of feeling frustrated and also when he was experiencing a pain flare up; denies this was made literally. Patient's poorly controlled chronic pain primary stressor/trigger; unable to follow up with outpatient Pain Management and would need EMS transport as Pt slides out of a wheelchair and could not wait to be seen in a clinic/outpatient setting. Reason why Patient has visiting clinician home services/visits. Gets relief from water bong inhaled Marijuana likely from the CBD component. Would benefit from trying high grade CBD tinctures/oral solution (usually sublingual) he can take TID/QID daily which has been shown to be effective in pain reduction and also well tolerated.

## 2024-03-31 NOTE — H&P ADULT - NSHPPHYSICALEXAM_GEN_ALL_CORE
CONSTITUTIONAL: awake, alert, no acute distress, speaking complete sentences comfortably on RA  HEENT: Atraumatic normocephalic. Moist mucous membranes.  No conjunctival injection.  RESP: No respiratory distress; CTA b/l, no WRR  CV: RRR, +S1S2, no MRG  GI: Soft, nontender, ND  MSK: + bilat LE paraplegic. Bilateral LE nonpitting edema. No calf tenderness.  SKIN: Warm and dry. No rashes noted.  NEURO: AOx3, answering questions and following commands appropriately  PSYCH: Mood and affect appropriate, recent memory intact CONSTITUTIONAL: awake, alert, no acute distress, speaking complete sentences comfortably on RA  HEENT: NC/AT. EOMI. Moist mucous membranes.  No conjunctival injection.  RESP: No respiratory distress; CTA b/l, no WRR  CV: RRR, +S1S2, no MRG  GI: Soft, nontender, ND  MSK: + bilat LE paraplegic. Bilateral LE nonpitting edema. No calf tenderness.  SKIN: +Dry, white flaky skin rash over abdomen and RUE  NEURO: AAOX3; answering questions and following commands appropriately  PSYCH: Appropriate mood and affect

## 2024-03-31 NOTE — H&P ADULT - HISTORY OF PRESENT ILLNESS
48-year-old history of DVT, paraplegia, MS since 1995, bed bound brought in by EMS complaining of episode of shortness of breath while at home tonight, states the head of the bed fell down at a flat to decline position and patient started having difficulty breathing from this.  Upon arrival to the ED patient now asymptomatic no shortness of breath because now his bed position is at incline.  Patient tried calling the company for his bed and they will be able to come in to repair it until Monday. Otherwise has no complaints.    PT lives alone, with 24 hour aides.    Denies fever, chills, chest pain, palpitations, cough, abdominal pain, nausea, vomiting, diarrhea, constipation, urinary frequency, urgency, or dysuria.    ED Course:   Vitals: BP: 166/101, HR: 82, Temp: 98.3, RR: 20, SpO2: 97% on RA  Labs:  CBC, CMP WNL  UA: Turbid, 100 protein, large nitrite, large leuk esterase, moderate blood, numerous WBC, many bacteria  - UCx from 3/20 Ecoli  CXR: pending    EKG: Pending    Received in the ED:  Lovenox   48-year-old history of DVT, paraplegia, MS since 1995, bed bound brought in by EMS complaining of episode of shortness of breath while at home tonight, states the head of the bed fell down at a flat to decline position and patient started having difficulty breathing from this.  Upon arrival to the ED patient now asymptomatic no shortness of breath because now his bed position is at incline.  Patient tried calling the company for his bed and they will be able to come in to repair it until Monday. Otherwise has no complaints.    PT lives alone, with 24 hour aides.    Denies fever, chills, chest pain, palpitations, cough, abdominal pain, nausea, vomiting, diarrhea, constipation, urinary frequency, urgency, or dysuria.    ED Course:   Vitals: BP: 166/101, HR: 82, Temp: 98.3, RR: 20, SpO2: 97% on RA  Labs:  CBC, CMP WNL  CXR: pending    EKG: Pending    Received in the ED:  Lovenox   49 y/o F PMHx of DVT, paraplegia, MS since 1995, bed bound brought in by EMS complaining of episode of shortness of breath. Patient states his electronic bed is broken and the bed fell to a decline position. It caused him shortness of breath that has resolved since being in the ED as he can lie on an incline now. Pt lives alone with 24 hour aids. Pt also making statements such as "I want God to take me." However, patient states that he has no true intention of suicidal / homicidal ideation.       ED Course:   Vitals: BP: 166/101, HR: 82, Temp: 98.3, RR: 20, SpO2: 97% on RA  Labs:  CBC, CMP WNL  CXR: clear lungs on wet read   EKG: Pending  Received in the ED: Lovenox

## 2024-03-31 NOTE — ED BEHAVIORAL HEALTH ASSESSMENT NOTE - DESCRIPTION
see HPI calm, cooperative Multiple Sclerosis x 28 years with paraplegia of lower extremities with muscle spasms/spacticity; lost equilibrium, hand/eye coordination, indwelling Steele due to neurogenic bladder, CAUTI/pyelonephritis in 01/24, pulmonary embolism in 2013, hx of cellulitis (11/14), Nephrolithiasis w/ hx of Nephrostomy, prerenal azotemia, hx of DVT

## 2024-03-31 NOTE — CARE COORDINATION ASSESSMENT. - NSPASTMEDSURGHISTORY_GEN_ALL_CORE_FT
PAST MEDICAL & SURGICAL HISTORY:  PE (pulmonary embolism)  2013      MS (multiple sclerosis)  since 1995      Cellulitis  november 2014      Environmental allergies      Nephrolithiasis      Nephrostomy status      Pulmonary embolism      DVT, lower extremity      Lower paraplegia      MS (multiple sclerosis)      No significant past surgical history

## 2024-03-31 NOTE — ED BEHAVIORAL HEALTH ASSESSMENT NOTE - HPI (INCLUDE ILLNESS QUALITY, SEVERITY, DURATION, TIMING, CONTEXT, MODIFYING FACTORS, ASSOCIATED SIGNS AND SYMPTOMS)
Met with patient by bedside in the ER. He reports he lives alone with 24 hour  medicaid aids from [facility] and [facility]. He states his hospital bed from [employer]  has been broken for a while. Last night the electronics stopped working, He states he had trouble breathing as it was lying flat. He has no 02 at the house though he is wearing in currently. He states he was told he needs a new prescription for a new bed. Patient has services' [facility] at home. He states he has a  named Sonya. educated him on role of  and care coordination . Referred to CM for follow up. The patient does not need to be admitted per MD. Social Work to remain available. 47yo male, single, noncaregiver, disabled due to medical condition(s), lives alone with 24 hour medicaid aids from [facility] and [facility], bedbound and needs a hospital bed, has a community SW ( Sherry Huff), with Multiple Sclerosis with paraplegia of lower extremities, has indwelling Steele (last changed 3/5/24), PMH of PE (pulmonary embolism) in , Multiple sclerosis diagnosed in , hx of cellulitis (), Nephrolithiasis w/ hx of Nephrostomy, hx of DVT, BIBE yesterday (3/30/24) from home c/o shortness of breath while at home after head of his hospital bed fell down at a flat to decline position, its electronics stopped working, and patient started having difficulty breathing from this. Patient tried calling the "[employer]" for his bed and they will be able to come in to repair it until Monday. Case Management from the ED saw Patient and told him that there may be a delay in vendor being able to assist due to the holiday. Patient verbalized understanding but became upset stating "it would be better if I were dead" and "my life is so difficult, and with all these little issues, it would just be simpler if I ". DANIELLE listened to patient's concerns and he also shared that he had 2 suicide attempts in the past. DANIELLE informed MD of these comments made by the patient, who stated would request tele-pysch. DANIELLE also informed MD that patient does not have home O2, patient will need to be evaluated for possible O2." 49yo male, single, noncaregiver, disabled due to medical condition(s), lives alone with 24 hour medicaid aids from [facility], bedbound and needs a hospital bed, has a community SW ( Refugio Sherry), with Multiple Sclerosis with paraplegia of lower extremities with muscle spasms/spacticity, impaired equilibrium and hand-eye coordination, DNR/DNI. MOLST completed 2024, has indwelling Steele due to neurogenic bladder (last changed 3/5/24 in ED as visiting nurse could not enter place as it had a bed bug infestation  and was being fumigated), CAUTI/pyelonephritis with admission to [facility] , PMH of PE (pulmonary embolism) in , Multiple sclerosis diagnosed in , hx of cellulitis (), Nephrolithiasis w/ hx of Nephrostomy, prerenal azotemia, hx of DVT, BIBE yesterday (3/30/24) from home c/o shortness of breath while at home after head of his hospital bed fell down at a flat to decline position, its electronics stopped working, and patient started having difficulty breathing from this. Patient tried calling the [employer] for his bed and they will be able to come in to repair it until Monday. Case Management from the ED saw Patient and told him that there may be a delay in vendor being able to assist due to the holiday. Patient verbalized understanding but became upset stating "it would be better if I were dead" and "my life is so difficult, and with all these little issues, it would just be simpler if I ". DANIELLE listened to patient's concerns and he also shared that he had 2 suicide attempts in the past. DANIELLE informed MD of these comments made by the patient, who stated would request tele-pysch. DANIELLE also informed MD that patient does not have home O2, patient will need to be evaluated for possible O2."    ISTOP Reference #: [id number]  2023	tramadol hcl 50 mg tablet	60	30	Ernestine Coleman	[id number]	[facility] #[id number]  2023	diazepam 2 mg tablet	28	7	Colin Hall	[id number]	Medicare	[facility] #[id number]    CVM: no record of Patient 47yo male, single, noncaregiver, disabled due to medical condition(s), lives alone with 24 hour medicaid aids from [facility], bedbound and needs a hospital bed, has a community SW ( Sherry Huff), with Multiple Sclerosis with paraplegia of lower extremities with muscle spasms/spacticity, impaired equilibrium and hand-eye coordination, DNR/DNI. MOLST completed 2024, has indwelling Steele due to neurogenic bladder (last changed 3/5/24 in ED as visiting nurse could not enter place as it had a bed bug infestation  and was being fumigated), UA chronically positive (chronic colonization), CAUTI/pyelonephritis with medical admission to [facility] , PMH of PE (pulmonary embolism) in , Multiple sclerosis diagnosed in , hx of cellulitis (), Nephrolithiasis w/ hx of Nephrostomy, prerenal azotemia, hx of DVT, BIBE yesterday (3/30/24) from home c/o shortness of breath while at home after head of his hospital bed fell down at a flat to decline position, its electronics stopped working, and patient started having difficulty breathing from this. Patient tried calling the [email or web address] for his bed and they will be able to come in to repair it until Monday. Case Management from the ED saw Patient and told him that there may be a delay in vendor being able to assist due to the holiday. Patient verbalized understanding but became upset stating "it would be better if I were dead" and "my life is so difficult, and with all these little issues, it would just be simpler if I ". DANIELLE listened to patient's concerns and he also shared that he had 2 suicide attempts in the past. DANIELLE informed MD of these comments made by the patient, who stated would request tele-pysch. DANIELLE also informed MD that patient does not have home O2, patient will need to be evaluated for possible O2."    ISTOP Reference #: [id number]  2023	tramadol hcl 50 mg tablet	60	30	Ernestine Coleman	[id number]	[facility] #[id number]  2023	diazepam 2 mg tablet	28	7	Colin Hall	[id number]	Medicare	[id number]    CVM: no record of Patient 47yo  male, single, noncaregiver, disabled due to medical condition(s), lives alone with 24 hour medicaid aids from [facility], bedbound and needs a hospital bed, has a community SW ( Sherry Huff), with Multiple Sclerosis with paraplegia of lower extremities with muscle spasms/spacticity leading to Chronic Pain (not seeing Pain managament due to trasnport issues; slides out of wheelchair; water bong THC helps with pain), impaired equilibrium and hand-eye coordination, DNR/DNI. MOLST completed 2024, has indwelling Steele due to neurogenic bladder (last changed 3/5/24 in ED as visiting nurse could not enter place as it had a bed bug infestation  and was being fumigated), UA chronically positive (chronic colonization), CAUTI/pyelonephritis with medical admission to [facility] , PMH of PE (pulmonary embolism) in , Multiple sclerosis diagnosed in , hx of cellulitis (), Nephrolithiasis w/ hx of Nephrostomy, prerenal azotemia, hx of DVT, BIBE yesterday (3/30/24) from home c/o shortness of breath while at home after head of his hospital bed fell down at a flat to decline position, its electronics stopped working, and patient started having difficulty breathing from this. Patient tried calling the [employer] for his bed and they will be able to come in to repair it until Monday. Case Management from the ED saw Patient and told him that there may be a delay in vendor being able to assist due to the holiday. Patient verbalized understanding but became upset stating "it would be better if I were dead" and "my life is so difficult, and with all these little issues, it would just be simpler if I ". DANIELLE listened to patient's concerns and he also shared that he had 2 suicide attempts in the past. DANIELLE informed MD of these comments made by the patient, who stated would request tele-pysch. DANIELLE also informed MD that patient does not have home O2, patient will need to be evaluated for possible O2."    EXAM: awake, alert, oriented x 4, calm, cooperative, fully engages, + episodes of reported muscle spasms with accompanied whole body stiffness and grimacing. Patient     ISTOP Reference #: [id number]  2023	tramadol hcl 50 mg tablet	60	30	Ernestine Coleman	[id number]	[facility] #[id number]  2023	diazepam 2 mg tablet	28	7	Colin Hall	[id number]	Medicare	[facility] #[id number]    CVM: no record of Patient 75 MINUTES DELAY IN CART SET UP: 47yo  male, single, noncaregiver, disabled due to medical condition(s), lives alone with 24 hour medicaid aids from [facility] and [facility], bedbound and needs a hospital bed, has a community SW ( Sherry Huff), parents involved and see him weekly, has friends he talked on phone with. with Multiple Sclerosis with paraplegia of lower extremities with muscle spasms/spacticity leading to Chronic Pain (not seeing Pain managament due to trasnport issues; slides out of wheelchair; water bong THC helps with pain), impaired equilibrium and hand-eye coordination, DNR/DNI. MOLST completed 2024, has indwelling Steele due to neurogenic bladder (last changed 3/5/24 in ED as visiting nurse could not enter place as it had a bed bug infestation  and was being fumigated), UA chronically positive (chronic colonization), CAUTI/pyelonephritis with medical admission to [facility] , PMH of PE (pulmonary embolism) in , Multiple sclerosis diagnosed in , hx of cellulitis (), Nephrolithiasis w/ hx of Nephrostomy, prerenal azotemia, hx of DVT, BIBE yesterday (3/30/24) from home c/o shortness of breath while at home after head of his hospital bed fell down at a flat to decline position, its electronics stopped working, and patient started having difficulty breathing from this. Patient tried calling the [employer] for his bed and they will be able to come in to repair it until Monday. Case Management from the ED saw Patient and told him that there may be a delay in vendor being able to assist due to the holiday. Patient verbalized understanding but became upset stating "it would be better if I were dead" and "my life is so difficult, and with all these little issues, it would just be simpler if I ". DANIELLE listened to patient's concerns and he also shared that he had 2 suicide attempts in the past. DANIELLE informed MD of these comments made by the patient, who stated would request tele-pysch. DANIELLE also informed MD that patient does not have home O2, patient will need to be evaluated for possible O2."    EXAM: awake, alert, oriented x 4, calm, cooperative, fully engages, + episodes of reported muscle spasms with accompanied whole body stiffness and grimacing. Patient denies active suicidal ideation, intent or plan and reports he made that statement in the context of acute pain, frustration over his broken bed, and overall demoralized feelings given his deteriorating medical condition. Help seeking, held accepting, says 'I want to get better" and his main issue remains physical condition preventing outpatient follow up in person as he is not able to sit up right in a wheelchair/chair and is transported via a stretcher. He has visiting health services but not certain specialists; he has never seen a Pain Management specialist before and he reports his pain has been worsening with episodes of muscle spasms which spread out, come in waves, have started to radiate down his thigh and cause him great physical distress. Smoking Marijuana via a water bong is helpful; regular joints dry out his mouth. Does not use any other substance. No known access to weapons.     ISTOP Reference #: [id number]  2023	tramadol hcl 50 mg tablet	60	30	Ernestine Coleman	[id number]	[facility] #[id number]  2023	diazepam 2 mg tablet	28	7	Colin Hall	[id number]	Medicare	[facility] #[id number]    CVM: no record of Patient

## 2024-03-31 NOTE — H&P ADULT - PROBLEM SELECTOR PLAN 2
Patient having difficulty with home medical equipment  - Social work consulted  - PT consulted  - 24 hour medicaid aids from [facility]  - Patient is bedbound and needs his home hospital bed to be repaired, will be repaired Monday 4/1  - Evaluation for home O2

## 2024-03-31 NOTE — PATIENT PROFILE ADULT - FALL HARM RISK - UNIVERSAL INTERVENTIONS
Bed in lowest position, wheels locked, appropriate side rails in place/Call bell, personal items and telephone in reach/Instruct patient to call for assistance before getting out of bed or chair/Non-slip footwear when patient is out of bed/[place] to call system/Physically safe environment - no spills, clutter or unnecessary equipment/Purposeful Proactive Rounding/Room/bathroom lighting operational, light cord in reach
[facility]ACHE

## 2024-03-31 NOTE — CARE COORDINATION ASSESSMENT. - OTHER PERTINENT REFERRAL INFORMATION
As per [facility] [phone] / bed delivered today. Ambulance arranged for 230pm . Referral sent to [facility] for iraheta management/ [place]. Patient aware and receptive. 24hr aids resumed-aid at home awaiting patient return. DC needs in place.

## 2024-03-31 NOTE — ED BEHAVIORAL HEALTH ASSESSMENT NOTE - OTHER PAST PSYCHIATRIC HISTORY (INCLUDE DETAILS REGARDING ONSET, COURSE OF ILLNESS, INPATIENT/OUTPATIENT TREATMENT)
- pt recalls seeking both psychotherapy and psychiatry in his twenties, reports 2 remote stays at [facility]  9/12/2023 [facility] Psychiatry note from: "....asked to see patient for recurrent depression and passive suicidal statements. Pt reports he does not think of suicide , unless someone comes to question him about it. He states he is "handcuffed to my own body", and recounts how he has lost equilibrium, hand/eye coordination, and bladder control. He states his life is "torture from breath to breath", but he has no intentions to be dead. He states he hates his life due to his physical state and that he has lost a lot of years due to Multiple sclerosis. He states he was diagnosed around the age of 20. He states he feels better when his friends visit, when he has good conversations, when he is able to make others laugh and smile, as well as when he feels "treated like a mel", which he states was the case the last time he was hospitalized as his needs were anticipated by his care givers and he felt he was getting better, his MS has worsened over the past 6 years. He states he has tried everything, - Copaxone, Ocrevus, Betaseron, Avonex for his MS to no avail, has tried antidepressants which worsened his depression and pt feels Paxil may have triggered a suicide attempt. Pt denied being hopeless or helpless, but endorses anger and frustration. No issues, with sleep, energy, or appetite, reports limited attention span, and does NOT want a trial of any psychiatric medications, although he appreciates consults and benefits most "from someone to talk to " , with the focus NOT being his illness or what he cannot do , but what he can enjoy. He did enjoy having a  come to bless him and he states no matter what happens God is there for him. No megha, no psychosis, no delusions or paranoid ideations, no homicidality either elicited or noted in the interview. Rest of psychiatric ROS as noted below."  - tried telehealth but does not have the attention span to do it, would prefer face to face encounters, but as he is bed bound transportation is problematic, would benefit from house calls from a mental health provider.

## 2024-03-31 NOTE — ED BEHAVIORAL HEALTH ASSESSMENT NOTE - PSYCHIATRIC ISSUES AND PLAN (INCLUDE STANDING AND PRN MEDICATION)
Ambien 5mg PO qhs PRN for sleep, NEEDS PAIN MANAGEMENT CONSULT + OUTPATIENT FOLLOW UP; Ambien 5mg PO qhs PRN for sleep, Valium 5mg PO q4hrs PRN for muscle spasms; no indication for standing psychiatric medications nor is Patient interested in it at this time

## 2024-03-31 NOTE — CONSULT NOTE ADULT - SUBJECTIVE AND OBJECTIVE BOX
Physical Medicine and Rehabilitation Initial Evaluation    Patients acute care records reviewed and are summarized as follows:     Patient is a 48y Male who is admitted to acute care for social admission due to MS exacerbation, weakness. Referred for pain mgmt.    Medical studies/laboratory studies reviewed, including renal function studies, within normal limits.    The patient was seen and examined at bedside. Complains of lower extremity muscle spasms and shooting pains occasionally radiating down the extremities.    ROS:  Constitutional: Denies fevers or chills  MSK/Neuro: Spasms in the Le's particularly, shooting pains in the extremities    PAST MEDICAL & SURGICAL HISTORY:  MS (multiple sclerosis)  since 1995      PE (pulmonary embolism)  2013      Cellulitis  november 2014      Nephrolithiasis      Environmental allergies      MS (multiple sclerosis)      Lower paraplegia      DVT, lower extremity      Pulmonary embolism      Nephrostomy status      No significant past surgical history    Medications:   acetaminophen     Tablet .. 650 milliGRAM(s) Oral every 6 hours PRN  clotrimazole 1% Cream 1 Application(s) Topical two times a day  diazepam    Tablet 5 milliGRAM(s) Oral two times a day PRN  enoxaparin Injectable 40 milliGRAM(s) SubCutaneous every 24 hours  gabapentin 300 milliGRAM(s) Oral three times a day  melatonin 3 milliGRAM(s) Oral at bedtime PRN      Physical Exam:   Vitals: T(C): 36.6 (04-01-24 @ 19:50), Max: 37.2 (04-01-24 @ 05:17)  HR: 83 (04-01-24 @ 19:50) (74 - 91)  BP: 141/89 (04-01-24 @ 19:50) (131/83 - 141/89)  RR: 18 (04-01-24 @ 19:50) (18 - 18)  SpO2: 96% (04-01-24 @ 19:50) (96% - 99%)    Constitutional: Gen: In no acute distress, cooperative with exam and questioning   Neuro/MSK: LE 0 to 1/5 throughout, sensation impaired in the LE's  Psychiatric: Awake alert fully oriented     Physical Medicine and Rehabilitation Initial Evaluation    Patients acute care records reviewed and are summarized as follows:     Patient is a 48y Male who is admitted to acute care for social admission due to MS exacerbation, weakness. Referred for pain mgmt.    Medical studies/laboratory studies reviewed, including renal function studies, within normal limits.    The patient was seen and examined at bedside. Complains of lower extremity muscle spasms and shooting pains occasionally radiating down the extremities.    ROS:  Constitutional: Denies fevers or chills  MSK/Neuro: Spasms in the Le's particularly, shooting pains in the extremities    PAST MEDICAL & SURGICAL HISTORY:  MS (multiple sclerosis)  since 1995      PE (pulmonary embolism)  2013      Cellulitis  november 2014      Nephrolithiasis      Environmental allergies      MS (multiple sclerosis)      Lower paraplegia      DVT, lower extremity      Pulmonary embolism      Nephrostomy status      No significant past surgical history    Medications:   acetaminophen     Tablet .. 650 milliGRAM(s) Oral every 6 hours PRN  clotrimazole 1% Cream 1 Application(s) Topical two times a day  diazepam    Tablet 5 milliGRAM(s) Oral two times a day PRN  enoxaparin Injectable 40 milliGRAM(s) SubCutaneous every 24 hours  gabapentin 300 milliGRAM(s) Oral three times a day  melatonin 3 milliGRAM(s) Oral at bedtime PRN      Physical Exam:     Constitutional: Gen: In no acute distress, cooperative with exam and questioning   Neuro/MSK: LE 0 to 1/5 throughout, sensation impaired in the LE's  Psychiatric: Awake alert fully oriented

## 2024-03-31 NOTE — PATIENT PROFILE ADULT - FUNCTIONAL ASSESSMENT - DAILY ACTIVITY SCORE.
bed mobility training/balance training/neuromuscular re-education/postural re-education/strengthening/transfer training/gait training
12

## 2024-03-31 NOTE — ED ADULT NURSE REASSESSMENT NOTE - NS ED NURSE REASSESS COMMENT FT1
Pt states sto Case management that he is suicidal.  States he has attempted in past and cannot live with his maladies any longer.  1:1 placed.  Belongings taken from pt.  Plan for psych eval

## 2024-03-31 NOTE — CASE MANAGEMENT PROGRESS NOTE - NSCMPROGRESSNOTE_GEN_ALL_CORE
CM informed by SW of this patient in the ED due to broken hospital bed. Patient is paraplegic, requires hospital bed at home and has 24x7 aide services. CM met with patient at bedside, introduced self and explained role of CM and transitional care planning. Patient verbalized understanding. Patient lives at home alone, vendor aides (2 12hrs aides Monday-Friday, and 24x7 on Saturday and ). He is currently on O2 while in the ED, however does not have home O2. Patient states his bed broke last night, no able to adjust the bed. Patient confirmed the vendor is [employer]. CM also inquired from patient regarding his iraheta catheter and patient stated it was last changed , in the ED; he has an appointment with urology on  for follow up.     CM explained to patient that CM would contact [facility] about getting the hospital bed fix. Per patient, he contacted them but it was unclear if they could fix the bed or he needed new scripts. CM informed patient that there may be a delay in vendor being able to assist due to the holiday. Patient verbalized understanding but became upset stating "it would be better if I were dead";"my life is so difficult, and with all these little issues, it would just be simpler if I ". CM listened to patient's concerns and he also shared that he had 2 suicide attempts in the past. CM informed MD of these comments made by the patient, who stated would request tele-pysch. CM also informed MD that patient does not have home O2, patient will need to be evaluated for possible O2.     CM contacted [facility] at , spoke with Cyn who stated patient would require new scripts to order a new bed because the bed is over 5 years old, however it would need to be arranged tomorrow, as the office is closed. Patient also confirmed that there is no one at home to assist with delivery or open the door for the vendor. He has his keys and could not identify anyone that could assist. CM will continue to follow.

## 2024-03-31 NOTE — ED BEHAVIORAL HEALTH ASSESSMENT NOTE - AXIS III
Multiple Sclerosis x 28 years with paraplegia of lower extremities with muscle spasms/spacticity; lost equilibrium, hand/eye coordination, indwelling Steele due to neurogenic bladder, CAUTI/pyelonephritis in 01/24, pulmonary embolism in 2013, hx of cellulitis (11/14), Nephrolithiasis w/ hx of Nephrostomy, prerenal azotemia, hx of DVT

## 2024-03-31 NOTE — ED BEHAVIORAL HEALTH ASSESSMENT NOTE - RISK ASSESSMENT
Chronic risk factors: single, male gender, early-onset untreatable neurodegenerative illness which is progressing leaving Patient bedbound, isolated and with low quality to his life at a young age. Hx of SI/SAs. Protective factors: young; medication and treatment compliant; no substance abuse, no hx of aggression/violence; no legal issues; stable domicile, parents are involved, no known access to guns, motivated for help; articulate; strong family support; access to health services. Acute risk factors identified: poorly controlled pain; broken medical bed

## 2024-03-31 NOTE — H&P ADULT - NSHPREVIEWOFSYSTEMS_GEN_ALL_CORE
REVIEW OF SYSTEMS:  CONSTITUTIONAL: No  fever,  fatigue.  HENMT: No HA, dizziness, rhinorrhea  RESPIRATORY: + SOB. No cough   CARDIOVASCULAR: No chest pain, palpitations.  GASTROINTESTINAL: + fecal incontinence. No abdominal pain. No nausea or vomiting. No blood per rectum.  GENITOURINARY: + chronic iraheta,  urinary incontinence. + hematuria .No dysuria, changes in frequency  NEUROLOGICAL: + paraplegia,  SKIN: No itching, rashes  MUSCULOSKELETAL: No joint pain or swelling; No muscle, back, or extremity pain REVIEW OF SYSTEMS:  CONSTITUTIONAL: No  fever,  fatigue.  HENMT: No HA, dizziness, rhinorrhea  RESPIRATORY: No wheezing, No SOB (resolved,)  No cough   CARDIOVASCULAR: No chest pain, palpitations.  GASTROINTESTINAL: ++ occasional constipation. No abdominal pain. No nausea or vomiting. No blood per rectum.  GENITOURINARY: ++ chronic iraheta. ++ hematuria .No dysuria, changes in frequency  NEUROLOGICAL: ++ paraplegia, ++tingling in all extremeties  SKIN: ++rash  MUSCULOSKELETAL: No joint pain or swelling; No muscle, back, or extremity pain

## 2024-03-31 NOTE — ED ADULT NURSE REASSESSMENT NOTE - NS ED NURSE REASSESS COMMENT FT1
Pt noted to have decreasing spo2 levels while sleeping, placed on 2L NC to maintain o2 sat, no sob or difficulty breathing reported

## 2024-03-31 NOTE — ED BEHAVIORAL HEALTH ASSESSMENT NOTE - NSSUICPROTFACT_PSY_ALL_CORE
Identifies reasons for living/Supportive social network of family or friends/Fear of death or the actual act of killing self/Cultural, spiritual and/or moral attitudes against suicide/Frustration tolerance

## 2024-03-31 NOTE — CONSULT NOTE ADULT - ASSESSMENT
A/P 48y year old Male with MS, neuropathic pain    Good candidate for subacute rehabilitation given level of functional deficits.  For pain mgmt, trial of gabapentin 300mg TID standing order.  Discussed risks, benefits, alternatives.  Will monitor.    Primary team notes are reviewed  Laboratory studies reviewed including those mentioned earlier/above  Discussed management/coordinated care with primary team/referring provider.    55 minutes spent during patient encounter:    ¦ preparing to see the patient   ¦ performing a medically appropriate examination and/or evaluation   ¦ counseling and educating the patient/family/caregiver re options for analgesia  ¦ ordering medications, tests, or procedures   ¦ referring and communicating with other health care professionals  ¦ documenting clinical information in the electronic or other health record

## 2024-03-31 NOTE — H&P ADULT - ATTENDING COMMENTS
48-year-old history of DVT, paraplegia, MS since 1995, bed bound brought in by EMS complaining of episode of shortness of breath admitted for Social Work.     SW/CM to arrange for bed tomorrow.  Pt having some anxiety/pain, tele-psych recommended starting valium prn, and they don't feel he has active SI.  Patient should seen pain management specialist as outpt.    Dilip Starks, Attending Physician

## 2024-03-31 NOTE — H&P ADULT - NSHPSOCIALHISTORY_GEN_ALL_CORE
Lives alone, with 24 hour aides  Ambulates: bedrest  ADLs: dependent Lives alone, with 24 hour aides  Bedbound  ADLs: fully dependent

## 2024-03-31 NOTE — CARE COORDINATION ASSESSMENT. - NSCAREPROVIDERS_GEN_ALL_CORE_FT
CARE PROVIDERS:  Administration: Gianna Costa  Admitting: Doctor, Unknown  Attending: Doctor, Unknown  Covering Team: Emmanuel Sher  ED Attending: Sonny Tyler  ED Nurse: Jesus Garcia  Nurse: Rusty Black  Nurse: Valeria Carrington  Nurse: Marga Scanlon  Outpatient Provider: Alf Barriga  Outpatient Provider: Gerber Moreno  Outpatient Provider: Mitul Rivas  PCA/Nursing Assistant: Nel Gama  : Sherry Huff   CARE PROVIDERS:  Accepting Physician: Dilip Starks  Administration: Andreas Santiago  Admitting: Dilip Starks  Attending: Kt Prakash  Consultant: Trent Rolbero  ED Attending: Sonny Tyler  ED Nurse: Chano Pollard  HIM/Billing & Coding: Sherry Weber  Nurse: Xochilt Howard  Nurse: Jaquelin Fry  Ordered: ServiceAccount, SCMMLM  Ordered: ADM, User  Ordered: Doctor, Unknown  Outpatient Provider: Mitul Rivas  Outpatient Provider: Gerber Moreno  Outpatient Provider: Alf Barriga  PCA/Nursing Assistant: David Solomon  Primary Team: Star Ramos  Primary Team: Mary Anne Conway  Primary Team: Kt Prakash  Respiratory Therapy: Andrew Brantley  Team: [facility], Team

## 2024-04-01 ENCOUNTER — NON-APPOINTMENT (OUTPATIENT)
Age: 49
End: 2024-04-01

## 2024-04-01 PROCEDURE — 99232 SBSQ HOSP IP/OBS MODERATE 35: CPT

## 2024-04-01 RX ORDER — GABAPENTIN 400 MG/1
300 CAPSULE ORAL THREE TIMES A DAY
Refills: 0 | Status: DISCONTINUED | OUTPATIENT
Start: 2024-04-01 | End: 2024-04-09

## 2024-04-01 RX ADMIN — Medication 1 APPLICATION(S): at 06:08

## 2024-04-01 RX ADMIN — ENOXAPARIN SODIUM 40 MILLIGRAM(S): 100 INJECTION SUBCUTANEOUS at 13:25

## 2024-04-01 RX ADMIN — GABAPENTIN 300 MILLIGRAM(S): 400 CAPSULE ORAL at 13:25

## 2024-04-01 RX ADMIN — GABAPENTIN 300 MILLIGRAM(S): 400 CAPSULE ORAL at 06:08

## 2024-04-01 RX ADMIN — Medication 1 APPLICATION(S): at 17:25

## 2024-04-01 RX ADMIN — GABAPENTIN 300 MILLIGRAM(S): 400 CAPSULE ORAL at 21:06

## 2024-04-01 NOTE — PHYSICAL THERAPY INITIAL EVALUATION ADULT - PERTINENT HX OF CURRENT PROBLEM, REHAB EVAL
Per H&P, Pt is a 49 y/o F PMHx of DVT, paraplegia, MS since 1995, bed bound brought in by EMS complaining of episode of shortness of breath. Patient states his electronic bed is broken and the bed fell to a decline position. It caused him shortness of breath that has resolved since being in the ED as he can lie on an incline now. Pt lives alone with 24 hour aids. Pt also making statements such as "I want God to take me." However, patient states that he has no true intention of suicidal / homicidal ideation.

## 2024-04-01 NOTE — PHYSICAL THERAPY INITIAL EVALUATION ADULT - ADDITIONAL COMMENTS
Pt lives alone in an apartment, [address]. Pt has 24/7 home health aides whom assist with all ADLs. Pt is bedbound x several years,  states he has not transferred out of bed "in a while since COVID".  Pt states he has a standard WC however has concerns that he "will slide out of it".

## 2024-04-01 NOTE — PHYSICAL THERAPY INITIAL EVALUATION ADULT - NSPTDISCHREC_GEN_A_CORE
Pt is bedbound x several years and appears to be at baseline functional level. Pt has 24/7 home health aides. Pt will not be placed on PT program. Pt acknowledged./No skilled PT needs

## 2024-04-01 NOTE — SOCIAL WORK PROGRESS NOTE - NSSWPROGRESSNOTE_GEN_ALL_CORE
SW consult noted for needing a new bed- Pt seen by case management in the ED, they are following the case for bed replacement/repair. SW to follow.

## 2024-04-01 NOTE — CASE MANAGEMENT PROGRESS NOTE - NSCMPROGRESSNOTE_GEN_ALL_CORE
The pt lives in his own apt with 24/7 aides through [employer] [facility]. There is a M-F aide through [employer]. The CM is Barbara and she will re-instate the aide with 24 hour notice. The pt states that the aide has the key to get into the pt's home. The pt also has [employer] @ [phone] with [employer] @ ext [phone], as the CM. This company supplies the Sat and Sun aide for the pt.  The pt has [facility] in the home for his chronic iraheta. Ambulance will need to be set up for transition home. The pt is here at the hospital as his hospital bed at home is now broken. I called [employer] @ [phone] and spoke to the [facility], who states they need a new script for a semi-electric hospital bed and clinical notes to support. The pt was inquiring about a new wheelchair, however nothing is wrong with his. The pt states the leg rests hurt his legs and this CM suggested putting pillows under his legs. Pt is now off of oxygen and sating well. The aide agency will need 24h notice to re-instate the aides. The pt states that the aide has his key to his apt, and they will be able to meet in the home for the delivery of the bed once auth is obtained.     The pt lives in his own apt with 24/7 aides through [employer] [facility]. There is a M-F aide through [employer]. The CM is Barbara and she will re-instate the aide with 24 hour notice. The pt states that the aide has the key to get into the pt's home. The pt also has [employer] @ [phone] with [employer] @ ext [phone], as the CM. This company supplies the Sat and Sun aide for the pt.  The pt has [facility] in the home for his chronic iraheta. Ambulance will need to be set up for transition home. The pt is here at the hospital as his hospital bed at home is now broken. I called [employer] @ [phone] and spoke to the [facility], who states they need a new script for a semi-electric hospital bed and clinical notes to support. The pt was inquiring about a new wheelchair, however nothing is wrong with his. The pt states the leg rests hurt his legs and this CM suggested putting pillows under his legs. Pt is now off of oxygen and sating well. The aide agency will need 24h notice to re-instate the aides. The pt states that the aide has his key to his apt, and they will be able to meet in the home for the delivery of the bed once auth is obtained. Script sent to [employer] with clinical notes.

## 2024-04-01 NOTE — CHART NOTE - NSCHARTNOTEFT_GEN_A_CORE
Patient requires a hospital bed for discharge due to Advanced MS.  Patient requires frequent immediate position changes that are not feasible in a regular bed. Pillows and wedges have been tried and failed.  Patient need head of bed elevated 30 degrees most of the time due to Advanced and paraplegia.

## 2024-04-01 NOTE — PHYSICAL THERAPY INITIAL EVALUATION ADULT - PATIENT PROFILE REVIEW, REHAB EVAL
PT orders received: BEDREST. Consult with SYDNEY Hernández, pt may participate in PT evaluation./yes

## 2024-04-02 LAB
ANION GAP SERPL CALC-SCNC: 7 MMOL/L — SIGNIFICANT CHANGE UP (ref 5–17)
BASOPHILS # BLD AUTO: 0.07 K/UL — SIGNIFICANT CHANGE UP (ref 0–0.2)
BASOPHILS NFR BLD AUTO: 0.7 % — SIGNIFICANT CHANGE UP (ref 0–2)
BUN SERPL-MCNC: 19 MG/DL — SIGNIFICANT CHANGE UP (ref 7–23)
CALCIUM SERPL-MCNC: 8.6 MG/DL — SIGNIFICANT CHANGE UP (ref 8.5–10.1)
CHLORIDE SERPL-SCNC: 108 MMOL/L — SIGNIFICANT CHANGE UP (ref 96–108)
CO2 SERPL-SCNC: 27 MMOL/L — SIGNIFICANT CHANGE UP (ref 22–31)
CREAT SERPL-MCNC: 0.88 MG/DL — SIGNIFICANT CHANGE UP (ref 0.5–1.3)
EGFR: 107 ML/MIN/1.73M2 — SIGNIFICANT CHANGE UP
EOSINOPHIL # BLD AUTO: 0.42 K/UL — SIGNIFICANT CHANGE UP (ref 0–0.5)
EOSINOPHIL NFR BLD AUTO: 4.4 % — SIGNIFICANT CHANGE UP (ref 0–6)
GLUCOSE SERPL-MCNC: 89 MG/DL — SIGNIFICANT CHANGE UP (ref 70–99)
HCT VFR BLD CALC: 45.1 % — SIGNIFICANT CHANGE UP (ref 39–50)
HGB BLD-MCNC: 15 G/DL — SIGNIFICANT CHANGE UP (ref 13–17)
IMM GRANULOCYTES NFR BLD AUTO: 0.3 % — SIGNIFICANT CHANGE UP (ref 0–0.9)
LYMPHOCYTES # BLD AUTO: 3.02 K/UL — SIGNIFICANT CHANGE UP (ref 1–3.3)
LYMPHOCYTES # BLD AUTO: 31.6 % — SIGNIFICANT CHANGE UP (ref 13–44)
MCHC RBC-ENTMCNC: 28.8 PG — SIGNIFICANT CHANGE UP (ref 27–34)
MCHC RBC-ENTMCNC: 33.3 GM/DL — SIGNIFICANT CHANGE UP (ref 32–36)
MCV RBC AUTO: 86.7 FL — SIGNIFICANT CHANGE UP (ref 80–100)
MONOCYTES # BLD AUTO: 0.82 K/UL — SIGNIFICANT CHANGE UP (ref 0–0.9)
MONOCYTES NFR BLD AUTO: 8.6 % — SIGNIFICANT CHANGE UP (ref 2–14)
NEUTROPHILS # BLD AUTO: 5.19 K/UL — SIGNIFICANT CHANGE UP (ref 1.8–7.4)
NEUTROPHILS NFR BLD AUTO: 54.4 % — SIGNIFICANT CHANGE UP (ref 43–77)
NRBC # BLD: 0 /100 WBCS — SIGNIFICANT CHANGE UP (ref 0–0)
PLATELET # BLD AUTO: 280 K/UL — SIGNIFICANT CHANGE UP (ref 150–400)
POTASSIUM SERPL-MCNC: 3.9 MMOL/L — SIGNIFICANT CHANGE UP (ref 3.5–5.3)
POTASSIUM SERPL-SCNC: 3.9 MMOL/L — SIGNIFICANT CHANGE UP (ref 3.5–5.3)
RBC # BLD: 5.2 M/UL — SIGNIFICANT CHANGE UP (ref 4.2–5.8)
RBC # FLD: 13.4 % — SIGNIFICANT CHANGE UP (ref 10.3–14.5)
SODIUM SERPL-SCNC: 142 MMOL/L — SIGNIFICANT CHANGE UP (ref 135–145)
WBC # BLD: 9.55 K/UL — SIGNIFICANT CHANGE UP (ref 3.8–10.5)
WBC # FLD AUTO: 9.55 K/UL — SIGNIFICANT CHANGE UP (ref 3.8–10.5)

## 2024-04-02 PROCEDURE — 99232 SBSQ HOSP IP/OBS MODERATE 35: CPT

## 2024-04-02 RX ORDER — LACTULOSE 10 G/15ML
20 SOLUTION ORAL ONCE
Refills: 0 | Status: COMPLETED | OUTPATIENT
Start: 2024-04-02 | End: 2024-04-02

## 2024-04-02 RX ADMIN — ENOXAPARIN SODIUM 40 MILLIGRAM(S): 100 INJECTION SUBCUTANEOUS at 13:16

## 2024-04-02 RX ADMIN — GABAPENTIN 300 MILLIGRAM(S): 400 CAPSULE ORAL at 13:15

## 2024-04-02 RX ADMIN — GABAPENTIN 300 MILLIGRAM(S): 400 CAPSULE ORAL at 23:24

## 2024-04-02 RX ADMIN — LACTULOSE 20 GRAM(S): 10 SOLUTION ORAL at 17:54

## 2024-04-02 RX ADMIN — Medication 1 APPLICATION(S): at 21:43

## 2024-04-02 RX ADMIN — Medication 1 APPLICATION(S): at 05:10

## 2024-04-02 RX ADMIN — GABAPENTIN 300 MILLIGRAM(S): 400 CAPSULE ORAL at 05:10

## 2024-04-02 NOTE — PROGRESS NOTE ADULT - PROBLEM SELECTOR PLAN 1
Patient with SOB likely 2/2 inability to sit up in bed  -SOB resolved  -Now saturaring well on 2L NC  - f/u Chest X-ray read  (clear lungs on wet read)   - Supplemental O2 maintain O2 sats >92%
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

## 2024-04-02 NOTE — PROGRESS NOTE ADULT - PROBLEM SELECTOR PLAN 6
Contacted [facility] about patient's medications  -He states he only takes 2 medications, but is unable to recall which  - Per discussion , Abhijeet ([phone]), he is no longer taking any medications by mouth, but was taking clotrimazole cream for a rash, and had previously been taking baclofen, but no longer actively taking it  -Pt states he  has an allergy to Baclofen  -Attempted to reach pts mother Kady Clements: unable
Contacted [facility] about patient's medications  -He states he only takes 2 medications, but is unable to recall which  -[facility] is on clotrimazole, cefpodoxime, fluconazole, and baclofen; however, per discussion with his home aide, Abhijeet ([phone]), he is no longer taking any medications by mouth, but was taking clotrimazole cream for a rash, and had previously been taking baclofen, but no longer actively taking it  -Pt states he now has an allergy to Baclofen  -Attempted to reach pts mother Kady Clements at 2:33 pm for med rec but no answer.-  -Pt afebrile& normal WBC, w/o any concern for active infection, will hold fluconazole and cefpodoxime

## 2024-04-02 NOTE — PROGRESS NOTE ADULT - PROBLEM SELECTOR PLAN 4
Patient making comments such as "I want God to take me, my life is hard"  -Denies any active suicidal or homicidal ideation or plan  -Pt did not tolerate any anti-depressants in the past  -Tele Psych consulted  -Valium PRN for anxiety, but only whle inpt, needs to follow up with Neuro and Pain management on what regiment he should be on as outpt
Patient making comments such as "I want God to take me, my life is hard"  -Denies any active suicidal or homicidal ideation or plan  -Pt did not tolerate any anti-depressants in the past  -Tele Psych consulted  -Valium PRN for anxiety, but only whle inpt, needs to follow up with Neuro and Pain management on what regiment he should be on as outpt

## 2024-04-02 NOTE — PROGRESS NOTE ADULT - PROBLEM SELECTOR PLAN 2
Patient having difficulty with home medical equipment  - Social work consulted  - PT consulted  - 24 hour medicaid aids from [employer] [facility]  - Patient is bedbound and needs his home hospital bed to be repaired, will be repaired Monday 4/1  - DME Signed: waiting for equipment

## 2024-04-02 NOTE — PROGRESS NOTE ADULT - PROBLEM SELECTOR PLAN 3
PT with skin rash over abdomen and extremities  -C/w Clotrimazole  -Per d/w Abhijeet [phone], patient's home aide, he is not taking fluconazole any more
PT with skin rash over abdomen and extremities  -C/w Clotrimazole  -Per d/w Abhijeet [phone], patient's home aide, he is not taking fluconazole any more

## 2024-04-02 NOTE — PROGRESS NOTE ADULT - PROBLEM SELECTOR PLAN 5
- chronic problem  - paraplegic  - chronic iraheta  - not currently on medication for MS
- chronic problem  - paraplegic  - chronic iraheta  - not currently on medication for MS  PLAN: CHANGE IRAHETA ON DISCHARGE

## 2024-04-03 LAB
ANION GAP SERPL CALC-SCNC: 6 MMOL/L — SIGNIFICANT CHANGE UP (ref 5–17)
BASOPHILS # BLD AUTO: 0.09 K/UL — SIGNIFICANT CHANGE UP (ref 0–0.2)
BASOPHILS NFR BLD AUTO: 0.9 % — SIGNIFICANT CHANGE UP (ref 0–2)
BUN SERPL-MCNC: 20 MG/DL — SIGNIFICANT CHANGE UP (ref 7–23)
CALCIUM SERPL-MCNC: 8.6 MG/DL — SIGNIFICANT CHANGE UP (ref 8.5–10.1)
CHLORIDE SERPL-SCNC: 111 MMOL/L — HIGH (ref 96–108)
CO2 SERPL-SCNC: 26 MMOL/L — SIGNIFICANT CHANGE UP (ref 22–31)
CREAT SERPL-MCNC: 0.99 MG/DL — SIGNIFICANT CHANGE UP (ref 0.5–1.3)
EGFR: 95 ML/MIN/1.73M2 — SIGNIFICANT CHANGE UP
EOSINOPHIL # BLD AUTO: 0.46 K/UL — SIGNIFICANT CHANGE UP (ref 0–0.5)
EOSINOPHIL NFR BLD AUTO: 4.5 % — SIGNIFICANT CHANGE UP (ref 0–6)
GLUCOSE SERPL-MCNC: 105 MG/DL — HIGH (ref 70–99)
HCT VFR BLD CALC: 46.4 % — SIGNIFICANT CHANGE UP (ref 39–50)
HGB BLD-MCNC: 15.4 G/DL — SIGNIFICANT CHANGE UP (ref 13–17)
IMM GRANULOCYTES NFR BLD AUTO: 0.3 % — SIGNIFICANT CHANGE UP (ref 0–0.9)
LYMPHOCYTES # BLD AUTO: 3.16 K/UL — SIGNIFICANT CHANGE UP (ref 1–3.3)
LYMPHOCYTES # BLD AUTO: 30.7 % — SIGNIFICANT CHANGE UP (ref 13–44)
MCHC RBC-ENTMCNC: 28.8 PG — SIGNIFICANT CHANGE UP (ref 27–34)
MCHC RBC-ENTMCNC: 33.2 GM/DL — SIGNIFICANT CHANGE UP (ref 32–36)
MCV RBC AUTO: 86.7 FL — SIGNIFICANT CHANGE UP (ref 80–100)
MONOCYTES # BLD AUTO: 0.83 K/UL — SIGNIFICANT CHANGE UP (ref 0–0.9)
MONOCYTES NFR BLD AUTO: 8.1 % — SIGNIFICANT CHANGE UP (ref 2–14)
NEUTROPHILS # BLD AUTO: 5.72 K/UL — SIGNIFICANT CHANGE UP (ref 1.8–7.4)
NEUTROPHILS NFR BLD AUTO: 55.5 % — SIGNIFICANT CHANGE UP (ref 43–77)
NRBC # BLD: 0 /100 WBCS — SIGNIFICANT CHANGE UP (ref 0–0)
PLATELET # BLD AUTO: 275 K/UL — SIGNIFICANT CHANGE UP (ref 150–400)
POTASSIUM SERPL-MCNC: 4 MMOL/L — SIGNIFICANT CHANGE UP (ref 3.5–5.3)
POTASSIUM SERPL-SCNC: 4 MMOL/L — SIGNIFICANT CHANGE UP (ref 3.5–5.3)
RBC # BLD: 5.35 M/UL — SIGNIFICANT CHANGE UP (ref 4.2–5.8)
RBC # FLD: 13.5 % — SIGNIFICANT CHANGE UP (ref 10.3–14.5)
SODIUM SERPL-SCNC: 143 MMOL/L — SIGNIFICANT CHANGE UP (ref 135–145)
WBC # BLD: 10.29 K/UL — SIGNIFICANT CHANGE UP (ref 3.8–10.5)
WBC # FLD AUTO: 10.29 K/UL — SIGNIFICANT CHANGE UP (ref 3.8–10.5)

## 2024-04-03 PROCEDURE — 99232 SBSQ HOSP IP/OBS MODERATE 35: CPT

## 2024-04-03 PROCEDURE — 93010 ELECTROCARDIOGRAM REPORT: CPT

## 2024-04-03 RX ORDER — DIPHENHYDRAMINE HCL 50 MG
1 CAPSULE ORAL
Refills: 0 | DISCHARGE

## 2024-04-03 RX ORDER — FLUCONAZOLE 150 MG/1
1 TABLET ORAL
Refills: 0 | DISCHARGE

## 2024-04-03 RX ORDER — CEFPODOXIME PROXETIL 100 MG
1 TABLET ORAL
Refills: 0 | DISCHARGE

## 2024-04-03 RX ADMIN — Medication 1 APPLICATION(S): at 17:40

## 2024-04-03 RX ADMIN — Medication 1 APPLICATION(S): at 05:47

## 2024-04-03 RX ADMIN — GABAPENTIN 300 MILLIGRAM(S): 400 CAPSULE ORAL at 13:09

## 2024-04-03 RX ADMIN — GABAPENTIN 300 MILLIGRAM(S): 400 CAPSULE ORAL at 05:47

## 2024-04-03 RX ADMIN — GABAPENTIN 300 MILLIGRAM(S): 400 CAPSULE ORAL at 21:55

## 2024-04-03 RX ADMIN — ENOXAPARIN SODIUM 40 MILLIGRAM(S): 100 INJECTION SUBCUTANEOUS at 13:10

## 2024-04-03 RX ADMIN — Medication 10 MILLIGRAM(S): at 09:40

## 2024-04-03 NOTE — PROVIDER CONTACT NOTE (OTHER) - SITUATION
NA reported pt's heart rate was 120. Manually heart rate was 100. Pt is asymptomatic and reported his heart rate increased since he was started on Gabapentin for spasms

## 2024-04-03 NOTE — PROVIDER CONTACT NOTE (OTHER) - BACKGROUND
Admitting dx: shortness of breath  PMH: MS, lower paraplegia, DVT, PE, cellulitis Erivedge Counseling- I discussed with the patient the risks of Erivedge including but not limited to nausea, vomiting, diarrhea, constipation, weight loss, changes in the sense of taste, decreased appetite, muscle spasms, and hair loss.  The patient verbalized understanding of the proper use and possible adverse effects of Erivedge.  All of the patient's questions and concerns were addressed.

## 2024-04-04 ENCOUNTER — TRANSCRIPTION ENCOUNTER (OUTPATIENT)
Age: 49
End: 2024-04-04

## 2024-04-04 LAB
ANION GAP SERPL CALC-SCNC: 7 MMOL/L — SIGNIFICANT CHANGE UP (ref 5–17)
BUN SERPL-MCNC: 18 MG/DL — SIGNIFICANT CHANGE UP (ref 7–23)
CALCIUM SERPL-MCNC: 8.7 MG/DL — SIGNIFICANT CHANGE UP (ref 8.5–10.1)
CHLORIDE SERPL-SCNC: 108 MMOL/L — SIGNIFICANT CHANGE UP (ref 96–108)
CO2 SERPL-SCNC: 27 MMOL/L — SIGNIFICANT CHANGE UP (ref 22–31)
CREAT SERPL-MCNC: 0.91 MG/DL — SIGNIFICANT CHANGE UP (ref 0.5–1.3)
EGFR: 105 ML/MIN/1.73M2 — SIGNIFICANT CHANGE UP
GLUCOSE SERPL-MCNC: 100 MG/DL — HIGH (ref 70–99)
HCT VFR BLD CALC: 47.6 % — SIGNIFICANT CHANGE UP (ref 39–50)
HGB BLD-MCNC: 15.8 G/DL — SIGNIFICANT CHANGE UP (ref 13–17)
MCHC RBC-ENTMCNC: 28.8 PG — SIGNIFICANT CHANGE UP (ref 27–34)
MCHC RBC-ENTMCNC: 33.2 GM/DL — SIGNIFICANT CHANGE UP (ref 32–36)
MCV RBC AUTO: 86.9 FL — SIGNIFICANT CHANGE UP (ref 80–100)
NRBC # BLD: 0 /100 WBCS — SIGNIFICANT CHANGE UP (ref 0–0)
PLATELET # BLD AUTO: 279 K/UL — SIGNIFICANT CHANGE UP (ref 150–400)
POTASSIUM SERPL-MCNC: 3.9 MMOL/L — SIGNIFICANT CHANGE UP (ref 3.5–5.3)
POTASSIUM SERPL-SCNC: 3.9 MMOL/L — SIGNIFICANT CHANGE UP (ref 3.5–5.3)
RBC # BLD: 5.48 M/UL — SIGNIFICANT CHANGE UP (ref 4.2–5.8)
RBC # FLD: 13.4 % — SIGNIFICANT CHANGE UP (ref 10.3–14.5)
SODIUM SERPL-SCNC: 142 MMOL/L — SIGNIFICANT CHANGE UP (ref 135–145)
WBC # BLD: 9.33 K/UL — SIGNIFICANT CHANGE UP (ref 3.8–10.5)
WBC # FLD AUTO: 9.33 K/UL — SIGNIFICANT CHANGE UP (ref 3.8–10.5)

## 2024-04-04 PROCEDURE — 99232 SBSQ HOSP IP/OBS MODERATE 35: CPT

## 2024-04-04 RX ADMIN — GABAPENTIN 300 MILLIGRAM(S): 400 CAPSULE ORAL at 13:42

## 2024-04-04 RX ADMIN — GABAPENTIN 300 MILLIGRAM(S): 400 CAPSULE ORAL at 21:36

## 2024-04-04 RX ADMIN — Medication 1 APPLICATION(S): at 06:25

## 2024-04-04 RX ADMIN — Medication 1 APPLICATION(S): at 18:14

## 2024-04-04 RX ADMIN — ENOXAPARIN SODIUM 40 MILLIGRAM(S): 100 INJECTION SUBCUTANEOUS at 13:43

## 2024-04-04 RX ADMIN — GABAPENTIN 300 MILLIGRAM(S): 400 CAPSULE ORAL at 06:25

## 2024-04-04 NOTE — DISCHARGE NOTE PROVIDER - PROVIDER TOKENS
PROVIDER:[TOKEN:[[id number]],FOLLOWUP:[1 week]],PROVIDER:[TOKEN:[[id number]],FOLLOWUP:[2 weeks]]

## 2024-04-04 NOTE — DISCHARGE NOTE PROVIDER - CARE PROVIDER_API CALL
Yessy Shaw Benita  Internal Medicine  [address] [place] [address], [address]  [place] [address]  Phone: [phone]  Fax: [phone]  Follow Up Time: 1 week    Trent Roblero  Physical/Rehab Medicine  [address] [place] [address]  Phone: [phone]  Fax: [phone]  Follow Up Time: 2 weeks

## 2024-04-04 NOTE — CASE MANAGEMENT PROGRESS NOTE - NSCMPROGRESSNOTE_GEN_ALL_CORE
Patient discussed in rounds and hospital bed has not approved by the insurance.  Patient has a chronic iraheta in place which [facility] care changes.  Patient has aides through [employer] (M-F) and  (S-S), need 24 hrs advance notice to resume service. Ambulance will need to be set up when patient is discharge. CM remains available throughout hospital stay.

## 2024-04-04 NOTE — DISCHARGE NOTE PROVIDER - HOSPITAL COURSE
49 y/o F PMHx of DVT, paraplegia, MS since 1995, bed bound brought in by EMS complaining of episode of shortness of breath. Patient states his electronic bed is broken and the bed fell to a decline position. It caused him shortness of breath that has resolved since being in the ED as he can lie on an incline now. Pt lives alone with 24 hour aids. Pt also making statements such as "I want God to take me." However, patient states that he has no true intention of suicidal / homicidal ideation.       ED Course:   Vitals: BP: 166/101, HR: 82, Temp: 98.3, RR: 20, SpO2: 97% on RA  Labs:  CBC, CMP WNL  CXR: No radiographic evidence of acute cardiopulmonary disease.  Received in the ED: Lovenox    Pt. was admitted with physiatry consultation.  Pt. was started on gabapentin with good effect for his neuropathic pain.  Respiratory status remained stable.  He remained on room air with SpO2 in 90th%.  Pt. was recommended to have outpatient sleep study.  Delivery of a new hospital bed to patient's home was arranged.     49 y/o F PMHx of DVT, paraplegia, MS since 1995, bed bound brought in by EMS complaining of episode of shortness of breath. Patient states his electronic bed is broken and the bed fell to a decline position. It caused him shortness of breath that has resolved since being in the ED as he can lie on an incline now. Pt lives alone with 24 hour aids. Pt also making statements such as "I want God to take me." However, patient states that he has no true intention of suicidal / homicidal ideation.       ED Course:   Vitals: BP: 166/101, HR: 82, Temp: 98.3, RR: 20, SpO2: 97% on RA  Labs:  CBC, CMP WNL  CXR: No radiographic evidence of acute cardiopulmonary disease.  Received in the ED: Lovenox    Pt. was admitted with physiatry consultation.  Pt. was started on gabapentin with good effect for his neuropathic pain.  Respiratory status remained stable.  He remained on room air with SpO2 in 90th%.  Pt. was recommended to have outpatient sleep study.  Delivery of a new hospital bed to patient's home was arranged.       49 y/o F PMHx of DVT, paraplegia, MS since 1995, bed bound brought in by EMS complaining of episode of shortness of breath. Patient states his electronic bed is broken and the bed fell to a decline position. It caused him shortness of breath that has resolved since being in the ED as he can lie on an incline now. Pt lives alone with 24 hour aids. Pt also making statements such as "I want God to take me." However, patient states that he has no true intention of suicidal / homicidal ideation.       ED Course:   Vitals: BP: 166/101, HR: 82, Temp: 98.3, RR: 20, SpO2: 97% on RA  Labs:  CBC, CMP WNL  CXR: No radiographic evidence of acute cardiopulmonary disease.  Received in the ED: Lovenox    Pt. was admitted with physiatry consultation.  Pt. was started on gabapentin with good effect for his neuropathic pain.  Respiratory status remained stable.  He remained on room air with SpO2 in 90th%.  Pt. was recommended to have outpatient sleep study.  Delivery of a new hospital bed to patient's home was arranged.      On day of discharge patient is in no distress.  Denies having SOB.  Afebrile and hemodynamically stable.

## 2024-04-04 NOTE — DISCHARGE NOTE PROVIDER - NSDCCPCAREPLAN_GEN_ALL_CORE_FT
PRINCIPAL DISCHARGE DIAGNOSIS  Diagnosis: Shortness of breath  Assessment and Plan of Treatment: Your oxygen levels were monitored on room air and remained stable.  Recommend outpatient sleep study.  Follow up with your PMD.      SECONDARY DISCHARGE DIAGNOSES  Diagnosis: Encounter for social work intervention  Assessment and Plan of Treatment: A new hospital bed was delivered to your home.    Diagnosis: Multiple sclerosis  Assessment and Plan of Treatment: Follow up with your Neurologist.

## 2024-04-04 NOTE — DISCHARGE NOTE PROVIDER - NSDCMRMEDTOKEN_GEN_ALL_CORE_FT
Multiple Vitamins oral tablet: 1 tab(s) orally once a day   acetaminophen 325 mg oral tablet: 2 tab(s) orally every 6 hours As needed Mild Pain (1 - 3)  cyclobenzaprine 5 mg oral tablet: 1 tab(s) orally once a day as needed for Muscle Spasm MDD: 1  gabapentin 300 mg oral capsule: 1 cap(s) orally 3 times a day  MiraLax oral powder for reconstitution: 17 gram(s) orally once a day as needed for  constipation  Multiple Vitamins oral tablet: 1 tab(s) orally once a day

## 2024-04-05 PROCEDURE — 99232 SBSQ HOSP IP/OBS MODERATE 35: CPT

## 2024-04-05 RX ORDER — CYCLOBENZAPRINE HYDROCHLORIDE 10 MG/1
5 TABLET, FILM COATED ORAL DAILY
Refills: 0 | Status: DISCONTINUED | OUTPATIENT
Start: 2024-04-05 | End: 2024-04-09

## 2024-04-05 RX ADMIN — ENOXAPARIN SODIUM 40 MILLIGRAM(S): 100 INJECTION SUBCUTANEOUS at 13:15

## 2024-04-05 RX ADMIN — GABAPENTIN 300 MILLIGRAM(S): 400 CAPSULE ORAL at 05:19

## 2024-04-05 RX ADMIN — Medication 1 APPLICATION(S): at 05:20

## 2024-04-05 RX ADMIN — GABAPENTIN 300 MILLIGRAM(S): 400 CAPSULE ORAL at 20:49

## 2024-04-05 RX ADMIN — GABAPENTIN 300 MILLIGRAM(S): 400 CAPSULE ORAL at 13:15

## 2024-04-05 NOTE — SBIRT NOTE ADULT - NSSBIRTDRGFEELBADGUILT_GEN_A_CORE
[address]. HE TOOK IT FOR 3 DAYS THEN STOPPED AS DIRECTED. HE SAID THAT HE LOST A LOT OF WATER WEIGHT AND WAS FEELING GOOD. NOW HE DOES NOT FEEL AS GOOD. PER DR HANSEN   HE CAN TAKE THE DEMADEX DAILY AND GET A BMP IN ONE WEEK. I GAVE THE ABOVE INFORMATION TO Vanda Cleveland.   RLL
No

## 2024-04-05 NOTE — CASE MANAGEMENT PROGRESS NOTE - NSCMPROGRESSNOTE_GEN_ALL_CORE
CM called [facility] [phone] and also messaged through [facility] to request update on DME referral.  Per last communication obtained on 4/4/24 at 1:32 pm from representative Meredith Esqueda stated auth was still under review. CM [facility] [facility] today who stated that it was still under review as of 9:28 am. CM to remain available

## 2024-04-06 LAB
ANION GAP SERPL CALC-SCNC: 9 MMOL/L — SIGNIFICANT CHANGE UP (ref 5–17)
BUN SERPL-MCNC: 21 MG/DL — SIGNIFICANT CHANGE UP (ref 7–23)
CALCIUM SERPL-MCNC: 9.6 MG/DL — SIGNIFICANT CHANGE UP (ref 8.5–10.1)
CHLORIDE SERPL-SCNC: 110 MMOL/L — HIGH (ref 96–108)
CO2 SERPL-SCNC: 26 MMOL/L — SIGNIFICANT CHANGE UP (ref 22–31)
CREAT SERPL-MCNC: 1.1 MG/DL — SIGNIFICANT CHANGE UP (ref 0.5–1.3)
EGFR: 83 ML/MIN/1.73M2 — SIGNIFICANT CHANGE UP
GLUCOSE SERPL-MCNC: 98 MG/DL — SIGNIFICANT CHANGE UP (ref 70–99)
HCT VFR BLD CALC: 44.8 % — SIGNIFICANT CHANGE UP (ref 39–50)
HGB BLD-MCNC: 14.9 G/DL — SIGNIFICANT CHANGE UP (ref 13–17)
MCHC RBC-ENTMCNC: 28.8 PG — SIGNIFICANT CHANGE UP (ref 27–34)
MCHC RBC-ENTMCNC: 33.3 GM/DL — SIGNIFICANT CHANGE UP (ref 32–36)
MCV RBC AUTO: 86.5 FL — SIGNIFICANT CHANGE UP (ref 80–100)
NRBC # BLD: 0 /100 WBCS — SIGNIFICANT CHANGE UP (ref 0–0)
PLATELET # BLD AUTO: 294 K/UL — SIGNIFICANT CHANGE UP (ref 150–400)
POTASSIUM SERPL-MCNC: 3.8 MMOL/L — SIGNIFICANT CHANGE UP (ref 3.5–5.3)
POTASSIUM SERPL-SCNC: 3.8 MMOL/L — SIGNIFICANT CHANGE UP (ref 3.5–5.3)
RBC # BLD: 5.18 M/UL — SIGNIFICANT CHANGE UP (ref 4.2–5.8)
RBC # FLD: 13.3 % — SIGNIFICANT CHANGE UP (ref 10.3–14.5)
SODIUM SERPL-SCNC: 145 MMOL/L — SIGNIFICANT CHANGE UP (ref 135–145)
WBC # BLD: 8.65 K/UL — SIGNIFICANT CHANGE UP (ref 3.8–10.5)
WBC # FLD AUTO: 8.65 K/UL — SIGNIFICANT CHANGE UP (ref 3.8–10.5)

## 2024-04-06 PROCEDURE — 99232 SBSQ HOSP IP/OBS MODERATE 35: CPT

## 2024-04-06 RX ADMIN — Medication 1 APPLICATION(S): at 06:42

## 2024-04-06 RX ADMIN — Medication 1 APPLICATION(S): at 18:20

## 2024-04-06 RX ADMIN — GABAPENTIN 300 MILLIGRAM(S): 400 CAPSULE ORAL at 21:14

## 2024-04-06 RX ADMIN — GABAPENTIN 300 MILLIGRAM(S): 400 CAPSULE ORAL at 13:26

## 2024-04-06 RX ADMIN — GABAPENTIN 300 MILLIGRAM(S): 400 CAPSULE ORAL at 05:09

## 2024-04-06 RX ADMIN — CYCLOBENZAPRINE HYDROCHLORIDE 5 MILLIGRAM(S): 10 TABLET, FILM COATED ORAL at 13:25

## 2024-04-06 RX ADMIN — ENOXAPARIN SODIUM 40 MILLIGRAM(S): 100 INJECTION SUBCUTANEOUS at 12:39

## 2024-04-07 PROCEDURE — 99232 SBSQ HOSP IP/OBS MODERATE 35: CPT

## 2024-04-07 RX ADMIN — Medication 1 APPLICATION(S): at 05:47

## 2024-04-07 RX ADMIN — GABAPENTIN 300 MILLIGRAM(S): 400 CAPSULE ORAL at 13:26

## 2024-04-07 RX ADMIN — GABAPENTIN 300 MILLIGRAM(S): 400 CAPSULE ORAL at 05:47

## 2024-04-07 RX ADMIN — GABAPENTIN 300 MILLIGRAM(S): 400 CAPSULE ORAL at 21:39

## 2024-04-07 RX ADMIN — ENOXAPARIN SODIUM 40 MILLIGRAM(S): 100 INJECTION SUBCUTANEOUS at 11:45

## 2024-04-07 RX ADMIN — Medication 1 APPLICATION(S): at 18:42

## 2024-04-07 NOTE — DIETITIAN INITIAL EVALUATION ADULT - OTHER INFO
Pt seen at bedside in good spirits reports po intake excellent. No c/o N/V/D, reports constipation r/t medication. Had BM this am. On bowel protocol, constipation is usual for him. Fluids encouraged. No c/o issues chewing or swallowing. No pui, skin noted with rash. Labs reviewed, WNL. Pt reports some weight gain over the past month, no change in appetite, edu provided on healthy eating choices. UBW-250-260 and is now 270 lbs. Verbal education provided. Tolerating diet well w/o issues. Continue same. RD to monitor and f/u. RD remains available as needed.

## 2024-04-07 NOTE — DIETITIAN INITIAL EVALUATION ADULT - PERTINENT MEDS FT
MEDICATIONS  (STANDING):  clotrimazole 1% Cream 1 Application(s) Topical two times a day  enoxaparin Injectable 40 milliGRAM(s) SubCutaneous every 24 hours  gabapentin 300 milliGRAM(s) Oral three times a day    MEDICATIONS  (PRN):  acetaminophen     Tablet .. 650 milliGRAM(s) Oral every 6 hours PRN Mild Pain (1 - 3)  cyclobenzaprine 5 milliGRAM(s) Oral daily PRN Muscle Spasm  diazepam    Tablet 5 milliGRAM(s) Oral two times a day PRN anxiety/agitation  melatonin 3 milliGRAM(s) Oral at bedtime PRN Insomnia

## 2024-04-07 NOTE — DIETITIAN INITIAL EVALUATION ADULT - PERTINENT LABORATORY DATA
04-06    145  |  110<H>  |  21  ----------------------------<  98  3.8   |  26  |  1.10    Ca    9.6      06 Apr 2024 07:36    A1C with Estimated Average Glucose Result: 5.5 % (05-18-23 @ 08:38)

## 2024-04-07 NOTE — DIETITIAN INITIAL EVALUATION ADULT - PROBLEM SELECTOR PLAN 1
Patient with SOB likely 2/2 inability to sit up in bed  -SOB resolved  -Now saturaring well on 2L NC  - f/u Chest X-ray read  (clear lungs on wet read)   - Supplemental O2 maintain O2 sats >92%

## 2024-04-07 NOTE — DIETITIAN INITIAL EVALUATION ADULT - ADD RECOMMEND
1. Continue diet as ordered. 2.Encourage healthy options and physical activity within pt abilities.

## 2024-04-07 NOTE — DIETITIAN INITIAL EVALUATION ADULT - PROBLEM SELECTOR PLAN 6
Contacted [facility] about patient's medications  -He states he only takes 2 medications, but is unable to recall which  -[facility] states he is on clotrimazole, cefpodoxime, fluconazole, and baclofen; however, per discussion with his home aide, Abhijeet ([phone]), he is no longer taking any medications by mouth, but was taking clotrimazole cream for a rash, and had previously been taking baclofen, but no longer actively taking it  -Pt states he now has an allergy to Baclofen  -Attempted to reach pts mother Kady Clements at 2:33 pm for med rec but no answer.-  -Pt afebrile& normal WBC, w/o any concern for active infection, will hold fluconazole and cefpodoxime

## 2024-04-07 NOTE — DIETITIAN INITIAL EVALUATION ADULT - ORAL INTAKE PTA/DIET HISTORY
Pt seen at bedside, po intake excellent. Mother prepares food at home for pt because he is bed bound

## 2024-04-07 NOTE — DIETITIAN INITIAL EVALUATION ADULT - PROBLEM SELECTOR PLAN 3
PT with skin rash over abdomen and extremities  -C/w Clotrimazole  -Per d/w Abhijeet [phone], patient's home aide, he is not taking fluconazole any more

## 2024-04-07 NOTE — DIETITIAN INITIAL EVALUATION ADULT - PROBLEM SELECTOR PLAN 4
Patient making comments such as "I want God to take me, my life is hard"  -Denies any active suicidal or homicidal ideation or plan  -Pt did not tolerate any anti-depressants in the past  -Tele Psych consulted  -Valium PRN for anxiety, but only whle inpt, needs to follow up with Neuro and Pain management on what regiment he should be on as outpt

## 2024-04-08 LAB
ANION GAP SERPL CALC-SCNC: 6 MMOL/L — SIGNIFICANT CHANGE UP (ref 5–17)
BUN SERPL-MCNC: 23 MG/DL — SIGNIFICANT CHANGE UP (ref 7–23)
CALCIUM SERPL-MCNC: 8.5 MG/DL — SIGNIFICANT CHANGE UP (ref 8.5–10.1)
CHLORIDE SERPL-SCNC: 111 MMOL/L — HIGH (ref 96–108)
CO2 SERPL-SCNC: 26 MMOL/L — SIGNIFICANT CHANGE UP (ref 22–31)
CREAT SERPL-MCNC: 1 MG/DL — SIGNIFICANT CHANGE UP (ref 0.5–1.3)
EGFR: 93 ML/MIN/1.73M2 — SIGNIFICANT CHANGE UP
GLUCOSE SERPL-MCNC: 107 MG/DL — HIGH (ref 70–99)
HCT VFR BLD CALC: 44.3 % — SIGNIFICANT CHANGE UP (ref 39–50)
HGB BLD-MCNC: 14.8 G/DL — SIGNIFICANT CHANGE UP (ref 13–17)
MCHC RBC-ENTMCNC: 28.9 PG — SIGNIFICANT CHANGE UP (ref 27–34)
MCHC RBC-ENTMCNC: 33.4 GM/DL — SIGNIFICANT CHANGE UP (ref 32–36)
MCV RBC AUTO: 86.5 FL — SIGNIFICANT CHANGE UP (ref 80–100)
NRBC # BLD: 0 /100 WBCS — SIGNIFICANT CHANGE UP (ref 0–0)
PLATELET # BLD AUTO: 306 K/UL — SIGNIFICANT CHANGE UP (ref 150–400)
POTASSIUM SERPL-MCNC: 3.8 MMOL/L — SIGNIFICANT CHANGE UP (ref 3.5–5.3)
POTASSIUM SERPL-SCNC: 3.8 MMOL/L — SIGNIFICANT CHANGE UP (ref 3.5–5.3)
RBC # BLD: 5.12 M/UL — SIGNIFICANT CHANGE UP (ref 4.2–5.8)
RBC # FLD: 13.3 % — SIGNIFICANT CHANGE UP (ref 10.3–14.5)
SODIUM SERPL-SCNC: 143 MMOL/L — SIGNIFICANT CHANGE UP (ref 135–145)
WBC # BLD: 8.9 K/UL — SIGNIFICANT CHANGE UP (ref 3.8–10.5)
WBC # FLD AUTO: 8.9 K/UL — SIGNIFICANT CHANGE UP (ref 3.8–10.5)

## 2024-04-08 PROCEDURE — 99232 SBSQ HOSP IP/OBS MODERATE 35: CPT

## 2024-04-08 RX ADMIN — GABAPENTIN 300 MILLIGRAM(S): 400 CAPSULE ORAL at 21:24

## 2024-04-08 RX ADMIN — ENOXAPARIN SODIUM 40 MILLIGRAM(S): 100 INJECTION SUBCUTANEOUS at 13:53

## 2024-04-08 RX ADMIN — CYCLOBENZAPRINE HYDROCHLORIDE 5 MILLIGRAM(S): 10 TABLET, FILM COATED ORAL at 10:51

## 2024-04-08 RX ADMIN — GABAPENTIN 300 MILLIGRAM(S): 400 CAPSULE ORAL at 13:53

## 2024-04-08 RX ADMIN — GABAPENTIN 300 MILLIGRAM(S): 400 CAPSULE ORAL at 05:45

## 2024-04-08 NOTE — CASE MANAGEMENT PROGRESS NOTE - NSCMPROGRESSNOTE_GEN_ALL_CORE
Still no auth for the bed that was ordered last Monday. This CM reached out to [employer] today. They responded that their team calls the insurance company every other day to follow up. MD aware to place an order to have the iraheta changed while in the hospital as it is now due to have the monthly change. CM team to add to home care referral, so Dario knows when to go into the home in May. Patient has aides through [employer] (M-F) and  (S-S), need 24 hrs advance notice to resume service, however, may be able to do same day as the aides are the same. CM team to call to find out.  Ambulance will need to be set up when patient is discharge.

## 2024-04-09 ENCOUNTER — TRANSCRIPTION ENCOUNTER (OUTPATIENT)
Age: 49
End: 2024-04-09

## 2024-04-09 VITALS
DIASTOLIC BLOOD PRESSURE: 85 MMHG | RESPIRATION RATE: 18 BRPM | SYSTOLIC BLOOD PRESSURE: 133 MMHG | OXYGEN SATURATION: 95 % | TEMPERATURE: 98 F | HEART RATE: 92 BPM

## 2024-04-09 PROCEDURE — 97162 PT EVAL MOD COMPLEX 30 MIN: CPT

## 2024-04-09 PROCEDURE — 96372 THER/PROPH/DIAG INJ SC/IM: CPT

## 2024-04-09 PROCEDURE — 80048 BASIC METABOLIC PNL TOTAL CA: CPT

## 2024-04-09 PROCEDURE — 82962 GLUCOSE BLOOD TEST: CPT

## 2024-04-09 PROCEDURE — 99239 HOSP IP/OBS DSCHRG MGMT >30: CPT

## 2024-04-09 PROCEDURE — 96374 THER/PROPH/DIAG INJ IV PUSH: CPT

## 2024-04-09 PROCEDURE — 85025 COMPLETE CBC W/AUTO DIFF WBC: CPT

## 2024-04-09 PROCEDURE — 80053 COMPREHEN METABOLIC PANEL: CPT

## 2024-04-09 PROCEDURE — 71045 X-RAY EXAM CHEST 1 VIEW: CPT

## 2024-04-09 PROCEDURE — 85027 COMPLETE CBC AUTOMATED: CPT

## 2024-04-09 PROCEDURE — 99285 EMERGENCY DEPT VISIT HI MDM: CPT | Mod: 25

## 2024-04-09 PROCEDURE — 36415 COLL VENOUS BLD VENIPUNCTURE: CPT

## 2024-04-09 PROCEDURE — 83690 ASSAY OF LIPASE: CPT

## 2024-04-09 PROCEDURE — 80307 DRUG TEST PRSMV CHEM ANLYZR: CPT

## 2024-04-09 PROCEDURE — 93005 ELECTROCARDIOGRAM TRACING: CPT

## 2024-04-09 RX ORDER — GABAPENTIN 400 MG/1
1 CAPSULE ORAL
Qty: 90 | Refills: 0
Start: 2024-04-09

## 2024-04-09 RX ORDER — ACETAMINOPHEN 500 MG
2 TABLET ORAL
Qty: 0 | Refills: 0 | DISCHARGE
Start: 2024-04-09

## 2024-04-09 RX ORDER — POLYETHYLENE GLYCOL 3350 17 G/17G
17 POWDER, FOR SOLUTION ORAL
Qty: 0 | Refills: 0 | DISCHARGE

## 2024-04-09 RX ORDER — CYCLOBENZAPRINE HYDROCHLORIDE 10 MG/1
1 TABLET, FILM COATED ORAL
Qty: 5 | Refills: 0
Start: 2024-04-09 | End: 2024-04-13

## 2024-04-09 RX ADMIN — GABAPENTIN 300 MILLIGRAM(S): 400 CAPSULE ORAL at 06:22

## 2024-04-09 RX ADMIN — GABAPENTIN 300 MILLIGRAM(S): 400 CAPSULE ORAL at 13:14

## 2024-04-09 NOTE — PROGRESS NOTE ADULT - REASON FOR ADMISSION
SOB/Social Work

## 2024-04-09 NOTE — DISCHARGE NOTE NURSING/CASE MANAGEMENT/SOCIAL WORK - NSSCNAMETXT_GEN_ALL_CORE
[facility] [phone] or  [phone]  Nurse to visit the day after hospital discharge; . Please contact the home care agency at the above phone number if you have not heard from them by 12 noon on the day after your hospital discharge.

## 2024-04-09 NOTE — PROGRESS NOTE ADULT - ASSESSMENT
48-year-old history of DVT, paraplegia, MS since 1995, bed bound brought in by EMS complaining of episode of shortness of breath admitted for Social Work.        Problem/Plan - 1:  ·  Problem: Shortness of breath.   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  No accessory muscle use  discussed with patient for outpatient sleep study.     Problem/Plan - 2:  ·  Problem: Encounter for social work intervention.   ·  Plan: Patient having difficulty with home medical equipment  - Social work consulted  - PT consulted  - 24 hour medicaid aids [facility]  - Patient is bedbound and needs a new hospital bed  - DME Signed: waiting for equipment.     Problem/Plan - 3:  ·  Problem: Skin rash.   ·  Plan: PT with skin rash over abdomen and extremities  -C/w Clotrimazole  -Per d/w Abhijeet [phone], patient's home aide, he is not taking fluconazole any more.     Problem/Plan - 4:  ·  Problem: Major depression.   ·  Plan: Patient making comments such as "I want God to take me, my life is hard"  -Denies any active suicidal or homicidal ideation or plan  -Pt did not tolerate any anti-depressants in the past  -Tele Psych consulted  -Valium PRN for anxiety, but only whle inpt, needs to follow up with Neuro and Pain management on what regiment he should be on as outpt.     Problem/Plan - 5:  ·  Problem: Multiple sclerosis.   ·  Plan: - chronic problem  - paraplegic  - chronic iraheta  - not currently on medication for MS  - Flexeril 5mg PO daily PRN  PLAN: CHANGE IRAHETA ON DISCHARGE.     Problem/Plan - 6:  ·  Problem: Medication management.   ·  Plan: Contacted [facility] about patient's medications  -He states he only takes 2 medications, but is unable to recall which  - Per discussion , Abhijeet ([phone]), he is no longer taking any medications by mouth, but was taking clotrimazole cream for a rash, and had previously been taking baclofen, but no longer actively taking it  -Pt states he  has an allergy to Baclofen  -Attempted to reach pts mother Kady Clements: unable.     Problem/Plan - 7:  ·  Problem: Encounter for deep vein thrombosis (DVT) prophylaxis.   ·  Plan: Lovenox 40 mg for dvt ppx.    #Constipation:  Received tap water enema with good response.      
48-year-old history of DVT, paraplegia, MS since 1995, bed bound brought in by EMS complaining of episode of shortness of breath admitted for Social Work.        Problem/Plan - 1:  ·  Problem: Shortness of breath.   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  No accessory muscle use  discussed with patient for outpatient sleep study.     Problem/Plan - 2:  ·  Problem: Encounter for social work intervention.   ·  Plan: Patient having difficulty with home medical equipment  - Social work consulted  - PT consulted  - 24 hour medicaid aids [facility]  - Patient is bedbound and needs a new hospital bed  - DME Signed: waiting for equipment.     Problem/Plan - 3:  ·  Problem: Skin rash.   ·  Plan: PT with skin rash over abdomen and extremities  -C/w Clotrimazole  -Per d/w Abhijeet [phone], patient's home aide, he is not taking fluconazole any more.     Problem/Plan - 4:  ·  Problem: Major depression.   ·  Plan: Patient making comments such as "I want God to take me, my life is hard"  -Denies any active suicidal or homicidal ideation or plan  -Pt did not tolerate any anti-depressants in the past  -Tele Psych consulted  -Valium PRN for anxiety, but only whle inpt, needs to follow up with Neuro and Pain management on what regiment he should be on as outpt.     Problem/Plan - 5:  ·  Problem: Multiple sclerosis.   ·  Plan: - chronic problem  - paraplegic  - chronic iraheta  - not currently on medication for MS  - Flexeril 5mg PO daily PRN  PLAN: CHANGE IRAHETA ON DISCHARGE.     Problem/Plan - 6:  ·  Problem: Medication management.   ·  Plan: Contacted [facility] about patient's medications  -He states he only takes 2 medications, but is unable to recall which  - Per discussion , Abhijeet ([phone]), he is no longer taking any medications by mouth, but was taking clotrimazole cream for a rash, and had previously been taking baclofen, but no longer actively taking it  -Pt states he  has an allergy to Baclofen  -Attempted to reach pts mother Kady Clements: unable.     Problem/Plan - 7:  ·  Problem: Encounter for deep vein thrombosis (DVT) prophylaxis.   ·  Plan: Lovenox 40 mg for dvt ppx.    #Constipation:  Received tap water enema with good response.        
A/P 48y year old Male with MS, neuropathic pain    Good candidate for subacute rehabilitation given level of functional deficits.  Continue with gabapentin 300mg TID standing order. Tolerating well no adverse effects noted.  Can continue with this dosing at [facility] and thereafter following discharge to home or other destination.    Primary team notes are reviewed  Discussed management/coordinated care with primary team/referring provider.    25 minutes spent during patient encounter:    ¦ preparing to see the patient   ¦ performing a medically appropriate examination and/or evaluation   ¦ referring and communicating with other health care professionals  ¦ documenting clinical information in the electronic or other health record   
48-year-old history of DVT, paraplegia, MS since 1995, bed bound brought in by EMS complaining of episode of shortness of breath admitted for Social Work.        Problem/Plan - 1:  ·  Problem: Shortness of breath.   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  No accessory muscle use  discussed with patient for outpatient sleep study.     Problem/Plan - 2:  ·  Problem: Encounter for social work intervention.   ·  Plan: Patient having difficulty with home medical equipment  - Social work consulted  - PT consulted  - 24 hour medicaid aids [facility]  - Patient is bedbound and needs a new hospital bed  - DME Signed: waiting for equipment.     Problem/Plan - 3:  ·  Problem: Skin rash.   ·  Plan: PT with skin rash over abdomen and extremities  -C/w Clotrimazole  -Per d/w Abhijeet [phone], patient's home aide, he is not taking fluconazole any more.     Problem/Plan - 4:  ·  Problem: Major depression.   ·  Plan: Patient making comments such as "I want God to take me, my life is hard"  -Denies any active suicidal or homicidal ideation or plan  -Pt did not tolerate any anti-depressants in the past  -Tele Psych consulted  -Valium PRN for anxiety, but only whle inpt, needs to follow up with Neuro and Pain management on what regiment he should be on as outpt.     Problem/Plan - 5:  ·  Problem: Multiple sclerosis.   ·  Plan: - chronic problem  - paraplegic  - chronic iraheta  - not currently on medication for MS  - Flexeril 5mg PO daily PRN  PLAN: CHANGE IRAHETA ON DISCHARGE.     Problem/Plan - 6:  ·  Problem: Medication management.   ·  Plan: Contacted [facility] about patient's medications  -He states he only takes 2 medications, but is unable to recall which  - Per discussion , Abhijeet ([phone]), he is no longer taking any medications by mouth, but was taking clotrimazole cream for a rash, and had previously been taking baclofen, but no longer actively taking it  -Pt states he  has an allergy to Baclofen  -Attempted to reach pts mother Kady Clements: unable.     Problem/Plan - 7:  ·  Problem: Encounter for deep vein thrombosis (DVT) prophylaxis.   ·  Plan: Lovenox 40 mg for dvt ppx.    #Constipation:  Received tap water enema with good response.              
48-year-old history of DVT, paraplegia, MS since 1995, bed bound brought in by EMS complaining of episode of shortness of breath admitted for Social Work.        Problem/Plan - 1:  ·  Problem: Shortness of breath.   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  No accessory muscle use  discussed with patient for outpatient sleep study.     Problem/Plan - 2:  ·  Problem: Encounter for social work intervention.   ·  Plan: Patient having difficulty with home medical equipment  - Social work consulted  - PT consulted  - 24 hour medicaid aids [facility]  - Patient is bedbound and needs a new hospital bed  - DME Signed: waiting for equipment.     Problem/Plan - 3:  ·  Problem: Skin rash.   ·  Plan: PT with skin rash over abdomen and extremities  -C/w Clotrimazole  -Per d/w Abhijeet [phone], patient's home aide, he is not taking fluconazole any more.     Problem/Plan - 4:  ·  Problem: Major depression.   ·  Plan: Patient making comments such as "I want God to take me, my life is hard"  -Denies any active suicidal or homicidal ideation or plan  -Pt did not tolerate any anti-depressants in the past  -Tele Psych consulted  -Valium PRN for anxiety, but only whle inpt, needs to follow up with Neuro and Pain management on what regiment he should be on as outpt.     Problem/Plan - 5:  ·  Problem: Multiple sclerosis.   ·  Plan: - chronic problem  - paraplegic  - chronic iraheta  - not currently on medication for MS  - Flexeril 5mg PO daily PRN  PLAN: CHANGE IRAHETA ON DISCHARGE.     Problem/Plan - 6:  ·  Problem: Medication management.   ·  Plan: Contacted [facility] about patient's medications  -He states he only takes 2 medications, but is unable to recall which  - Per discussion , Abhijeet ([phone]), he is no longer taking any medications by mouth, but was taking clotrimazole cream for a rash, and had previously been taking baclofen, but no longer actively taking it  -Pt states he  has an allergy to Baclofen  -Attempted to reach pts mother Kady Clements: unable.     Problem/Plan - 7:  ·  Problem: Encounter for deep vein thrombosis (DVT) prophylaxis.   ·  Plan: Lovenox 40 mg for dvt ppx.    #Constipation:  Received tap water enema with good response.          
48-year-old history of DVT, paraplegia, MS since 1995, bed bound brought in by EMS complaining of episode of shortness of breath admitted for Social Work.        Problem/Plan - 1:  ·  Problem: Shortness of breath.   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  No accessory muscle use  discussed with patient for outpatient sleep study.     Problem/Plan - 2:  ·  Problem: Encounter for social work intervention.   ·  Plan: Patient having difficulty with home medical equipment  - Social work consulted  - PT consulted  - 24 hour medicaid aids from [facility] and [facility]  - Patient is bedbound and needs his home hospital bed to be repaired, will be repaired Monday 4/1  - DME Signed: waiting for equipment.     Problem/Plan - 3:  ·  Problem: Skin rash.   ·  Plan: PT with skin rash over abdomen and extremities  -C/w Clotrimazole  -Per d/w Abhijeet [phone], patient's home aide, he is not taking fluconazole any more.     Problem/Plan - 4:  ·  Problem: Major depression.   ·  Plan: Patient making comments such as "I want God to take me, my life is hard"  -Denies any active suicidal or homicidal ideation or plan  -Pt did not tolerate any anti-depressants in the past  -Tele Psych consulted  -Valium PRN for anxiety, but only whle inpt, needs to follow up with Neuro and Pain management on what regiment he should be on as outpt.     Problem/Plan - 5:  ·  Problem: Multiple sclerosis.   ·  Plan: - chronic problem  - paraplegic  - chronic iraheta  - not currently on medication for MS  PLAN: CHANGE IRAHETA ON DISCHARGE.     Problem/Plan - 6:  ·  Problem: Medication management.   ·  Plan: Contacted [facility] about patient's medications  -He states he only takes 2 medications, but is unable to recall which  - Per discussion , Abhijeet ([phone]), he is no longer taking any medications by mouth, but was taking clotrimazole cream for a rash, and had previously been taking baclofen, but no longer actively taking it  -Pt states he  has an allergy to Baclofen  -Attempted to reach pts mother Kady Clements: unable.     Problem/Plan - 7:  ·  Problem: Encounter for deep vein thrombosis (DVT) prophylaxis.   ·  Plan: Lovenox 40 mg for dvt ppx.    #Constipation:  Pt. to receive tap water enema today.  
48-year-old history of DVT, paraplegia, MS since 1995, bed bound brought in by EMS complaining of episode of shortness of breath admitted for Social Work.        Problem/Plan - 1:  ·  Problem: Shortness of breath.   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  No accessory muscle use  discussed with patient for outpatient sleep study.     Problem/Plan - 2:  ·  Problem: Encounter for social work intervention.   ·  Plan: Patient having difficulty with home medical equipment  - Social work consulted  - PT consulted  - 24 hour medicaid aids [facility]  - Patient is bedbound and needs a new hospital bed  - DME Signed: waiting for equipment.     Problem/Plan - 3:  ·  Problem: Skin rash.   ·  Plan: PT with skin rash over abdomen and extremities  -C/w Clotrimazole  -Per d/w Abhijeet [phone], patient's home aide, he is not taking fluconazole any more.     Problem/Plan - 4:  ·  Problem: Major depression.   ·  Plan: Patient making comments such as "I want God to take me, my life is hard"  -Denies any active suicidal or homicidal ideation or plan  -Pt did not tolerate any anti-depressants in the past  -Tele Psych consulted  -Valium PRN for anxiety, but only whle inpt, needs to follow up with Neuro and Pain management on what regiment he should be on as outpt.     Problem/Plan - 5:  ·  Problem: Multiple sclerosis.   ·  Plan: - chronic problem  - paraplegic  - chronic iraheta  - not currently on medication for MS  - Flexeril 5mg PO daily PRN  PLAN: CHANGE IRAHETA ON DISCHARGE.     Problem/Plan - 6:  ·  Problem: Medication management.   ·  Plan: Contacted [facility] about patient's medications  -He states he only takes 2 medications, but is unable to recall which  - Per discussion , Abhijeet ([phone]), he is no longer taking any medications by mouth, but was taking clotrimazole cream for a rash, and had previously been taking baclofen, but no longer actively taking it  -Pt states he  has an allergy to Baclofen  -Attempted to reach pts mother Kady Clements: unable.     Problem/Plan - 7:  ·  Problem: Encounter for deep vein thrombosis (DVT) prophylaxis.   ·  Plan: Lovenox 40 mg for dvt ppx.    #Constipation:  Received tap water enema with good response.          
48-year-old history of DVT, paraplegia, MS since 1995, bed bound brought in by EMS complaining of episode of shortness of breath admitted for Social Work.        Problem/Plan - 1:  ·  Problem: Shortness of breath.   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  No accessory muscle use  discussed with patient for outpatient sleep study.     Problem/Plan - 2:  ·  Problem: Encounter for social work intervention.   ·  Plan: Patient having difficulty with home medical equipment  - Social work consulted  - PT consulted  - 24 hour medicaid aids from [facility] and [facility]  - Patient is bedbound and needs a new hospital bed  - DME Signed: waiting for equipment.     Problem/Plan - 3:  ·  Problem: Skin rash.   ·  Plan: PT with skin rash over abdomen and extremities  -C/w Clotrimazole  -Per d/w Abhijeet [phone], patient's home aide, he is not taking fluconazole any more.     Problem/Plan - 4:  ·  Problem: Major depression.   ·  Plan: Patient making comments such as "I want God to take me, my life is hard"  -Denies any active suicidal or homicidal ideation or plan  -Pt did not tolerate any anti-depressants in the past  -Tele Psych consulted  -Valium PRN for anxiety, but only whle inpt, needs to follow up with Neuro and Pain management on what regiment he should be on as outpt.     Problem/Plan - 5:  ·  Problem: Multiple sclerosis.   ·  Plan: - chronic problem  - paraplegic  - chronic iraheta  - not currently on medication for MS  PLAN: CHANGE IRAHETA ON DISCHARGE.     Problem/Plan - 6:  ·  Problem: Medication management.   ·  Plan: Contacted [facility] about patient's medications  -He states he only takes 2 medications, but is unable to recall which  - Per discussion , Abhijeet ([phone]), he is no longer taking any medications by mouth, but was taking clotrimazole cream for a rash, and had previously been taking baclofen, but no longer actively taking it  -Pt states he  has an allergy to Baclofen  -Attempted to reach pts mother Kady Clements: unable.     Problem/Plan - 7:  ·  Problem: Encounter for deep vein thrombosis (DVT) prophylaxis.   ·  Plan: Lovenox 40 mg for dvt ppx.    #Constipation:  Received tap water enema yesterday with good response.      
A/P 48y year old Male with MS, neuropathic pain    Good candidate for subacute rehabilitation given level of functional deficits.  Continue with gabapentin 300mg TID standing order. Tolerating well no adverse effects noted.    Primary team notes are reviewed  Laboratory studies reviewed including those mentioned earlier/above  Discussed management/coordinated care with primary team/referring provider.    25 minutes spent during patient encounter:    ¦ preparing to see the patient   ¦ performing a medically appropriate examination and/or evaluation   ¦ referring and communicating with other health care professionals  ¦ documenting clinical information in the electronic or other health record   
A/P 48y year old Male with MS, neuropathic pain, muscle spasms in the LE's    Continue with gabapentin 300mg TID standing order. Tolerating well no adverse effects noted.  Can continue with this dosing following discharge    Primary team notes are reviewed  Discussed management/coordinated care with primary team/referring provider.    25 minutes spent during patient encounter:    ¦ preparing to see the patient   ¦ performing a medically appropriate examination and/or evaluation   ¦ referring and communicating with other health care professionals  ¦ documenting clinical information in the electronic or other health record   
48-year-old history of DVT, paraplegia, MS since 1995, bed bound brought in by EMS complaining of episode of shortness of breath admitted for Social Work. 
48-year-old history of DVT, paraplegia, MS since 1995, bed bound brought in by EMS complaining of episode of shortness of breath admitted for Social Work.

## 2024-04-09 NOTE — PROGRESS NOTE ADULT - PROVIDER SPECIALTY LIST ADULT
Hospitalist
Physiatry
Physiatry
Hospitalist
Hospitalist
Physiatry
Hospitalist
Internal Medicine
Internal Medicine

## 2024-04-09 NOTE — PROGRESS NOTE ADULT - SUBJECTIVE AND OBJECTIVE BOX
CHIEF COMPLAINT/INTERVAL HISTORY:  Pt. seen and evaluated for SOB.  Pt. is in no distress.  Denies having SOB or CP.  On room air with SPO2 in 90th%.  Reports having constipation and requesting tap water enema.      REVIEW OF SYSTEMS:  No fever, CP, SOB, or abdominal pain    Vital Signs Last 24 Hrs  T(C): 36.9 (03 Apr 2024 04:40), Max: 37 (02 Apr 2024 19:40)  T(F): 98.5 (03 Apr 2024 04:40), Max: 98.6 (02 Apr 2024 19:40)  HR: 106 (03 Apr 2024 04:40) (80 - 106)  BP: 149/95 (03 Apr 2024 04:40) (131/85 - 149/95)  BP(mean): --  RR: 18 (03 Apr 2024 04:40) (18 - 18)  SpO2: 95% (03 Apr 2024 04:40) (95% - 96%)    Parameters below as of 03 Apr 2024 04:40  Patient On (Oxygen Delivery Method): room air        PHYSICAL EXAM:  GENERAL: NAD  HEENT: EOMI, hearing normal, conjunctiva and sclera clear  Chest: CTA bilaterally, no wheezing  CV: S1S2, RRR,   GI: soft, +BS, NT/ND  Musculoskeletal: no LE edema  Psychiatric: affect nL, mood nL  Skin: warm and dry    LABS:                        15.4   10.29 )-----------( 275      ( 03 Apr 2024 08:23 )             46.4     04-03    143  |  111<H>  |  20  ----------------------------<  105<H>  4.0   |  26  |  0.99    Ca    8.6      03 Apr 2024 08:23        Urinalysis Basic - ( 03 Apr 2024 08:23 )    Color: x / Appearance: x / SG: x / pH: x  Gluc: 105 mg/dL / Ketone: x  / Bili: x / Urobili: x   Blood: x / Protein: x / Nitrite: x   Leuk Esterase: x / RBC: x / WBC x   Sq Epi: x / Non Sq Epi: x / Bacteria: x        
CHIEF COMPLAINT/INTERVAL HISTORY:  Pt. seen and evaluated for SOB.  Pt. is in no distress.  Denies having SOB.  Remains on room air with SpO2 in 90th%.  Reports having intermittent muscle spasms and requesting muscle relaxant as needed.      REVIEW OF SYSTEMS:  No fever, CP, SOB, or abdominal pain.      Vital Signs Last 24 Hrs  T(C): 36.6 (05 Apr 2024 04:35), Max: 36.6 (04 Apr 2024 11:29)  T(F): 97.9 (05 Apr 2024 04:35), Max: 97.9 (05 Apr 2024 04:35)  HR: 107 (05 Apr 2024 04:35) (105 - 117)  BP: 131/78 (05 Apr 2024 04:35) (121/78 - 131/78)  BP(mean): --  RR: 18 (05 Apr 2024 04:35) (18 - 18)  SpO2: 96% (05 Apr 2024 04:35) (94% - 96%)    Parameters below as of 05 Apr 2024 04:35  Patient On (Oxygen Delivery Method): room air        PHYSICAL EXAM:  GENERAL: NAD  HEENT: EOMI, hearing normal, conjunctiva and sclera clear  Chest: CTA bilaterally, no wheezing  CV: S1S2, RRR,   GI: soft, +BS, NT/ND  Musculoskeletal: no edema  Psychiatric: affect nL, mood nL  Skin: warm and dry    LABS:                        15.8   9.33  )-----------( 279      ( 04 Apr 2024 07:20 )             47.6     04-04    142  |  108  |  18  ----------------------------<  100<H>  3.9   |  27  |  0.91    Ca    8.7      04 Apr 2024 07:20        Urinalysis Basic - ( 04 Apr 2024 07:20 )    Color: x / Appearance: x / SG: x / pH: x  Gluc: 100 mg/dL / Ketone: x  / Bili: x / Urobili: x   Blood: x / Protein: x / Nitrite: x   Leuk Esterase: x / RBC: x / WBC x   Sq Epi: x / Non Sq Epi: x / Bacteria: x        
CHIEF COMPLAINT/INTERVAL HISTORY:  Pt. seen and evaluated for SOB.  Pt. is in no distress.  No events over the night.  Denies having SOB.  On room air with SpO2 in 90th%.      REVIEW OF SYSTEMS:  No fever, CP, SOB, or abdominal pain      Vital Signs Last 24 Hrs  T(C): 36.7 (09 Apr 2024 05:11), Max: 37 (08 Apr 2024 20:27)  T(F): 98.1 (09 Apr 2024 05:11), Max: 98.6 (08 Apr 2024 20:27)  HR: 95 (09 Apr 2024 05:11) (88 - 99)  BP: 136/87 (09 Apr 2024 05:11) (130/80 - 136/87)  BP(mean): --  RR: 18 (09 Apr 2024 05:11) (17 - 18)  SpO2: 95% (09 Apr 2024 05:11) (95% - 95%)    Parameters below as of 09 Apr 2024 05:11  Patient On (Oxygen Delivery Method): room air        PHYSICAL EXAM:  GENERAL: NAD  HEENT: EOMI, hearing normal, conjunctiva and sclera clear  Chest: CTA bilaterally, no wheezing  CV: S1S2, RRR,   GI: soft, +BS, NT/ND  Musculoskeletal: no edema  Psychiatric: affect nL, mood nL  Skin: warm and dry    LABS:                        14.8   8.90  )-----------( 306      ( 08 Apr 2024 06:46 )             44.3     04-08    143  |  111<H>  |  23  ----------------------------<  107<H>  3.8   |  26  |  1.00    Ca    8.5      08 Apr 2024 06:46        Urinalysis Basic - ( 08 Apr 2024 06:46 )    Color: x / Appearance: x / SG: x / pH: x  Gluc: 107 mg/dL / Ketone: x  / Bili: x / Urobili: x   Blood: x / Protein: x / Nitrite: x   Leuk Esterase: x / RBC: x / WBC x   Sq Epi: x / Non Sq Epi: x / Bacteria: x        
CHIEF COMPLAINT/INTERVAL HISTORY:  Pt. seen and evaluated for SOB.  Pt. is in no distress.  Denies having SOB.  Remains on room air with SpO2 in 90th%.     REVIEW OF SYSTEMS:  No fever, CP, SOB, or abdominal pain    Vital Signs Last 24 Hrs  T(C): 36.5 (06 Apr 2024 04:46), Max: 36.6 (05 Apr 2024 20:09)  T(F): 97.7 (06 Apr 2024 04:46), Max: 97.9 (05 Apr 2024 20:09)  HR: 64 (06 Apr 2024 04:46) (64 - 105)  BP: 116/82 (06 Apr 2024 04:46) (116/82 - 128/89)  BP(mean): --  RR: 17 (06 Apr 2024 04:46) (17 - 18)  SpO2: 98% (06 Apr 2024 04:46) (95% - 98%)    Parameters below as of 06 Apr 2024 04:46  Patient On (Oxygen Delivery Method): room air        PHYSICAL EXAM:  GENERAL: NAD  HEENT: EOMI, hearing normal, conjunctiva and sclera clear  Chest: CTA bilaterally, no wheezing  CV: S1S2, RRR,   GI: soft, +BS, NT/ND  Musculoskeletal: no edema  Psychiatric: affect nL, mood nL  Skin: warm and dry    LABS:                        14.9   8.65  )-----------( 294      ( 06 Apr 2024 07:36 )             44.8     04-06    145  |  110<H>  |  21  ----------------------------<  98  3.8   |  26  |  1.10    Ca    9.6      06 Apr 2024 07:36        Urinalysis Basic - ( 06 Apr 2024 07:36 )    Color: x / Appearance: x / SG: x / pH: x  Gluc: 98 mg/dL / Ketone: x  / Bili: x / Urobili: x   Blood: x / Protein: x / Nitrite: x   Leuk Esterase: x / RBC: x / WBC x   Sq Epi: x / Non Sq Epi: x / Bacteria: x        
PMR Subsequent Eval    Patient seen in followup for functional deficits    Patient notes good relief with his pain regimen, notes spasms and shooting pains are less severe and less frequently occurring. Tolerating the medication well, denies adverse effects.    ROS:   Shooting pain improved, spasms improved    Exam:   Constitutional: Gen: In no acute distress, cooperative with exam and questioning   Neuro/MSK: LE 0 to 1/5 throughout, sensation impaired in the LE's  Psychiatric: Awake alert fully oriented  
CHIEF COMPLAINT/INTERVAL HISTORY:  Pt. seen and evaluated for SOB.  Pt. is in no distress.  Denies having SOB.  Remains on room air with SpO2 in 90th%.      REVIEW OF SYSTEMS:  No fever, CP, SOB, or abdominal pain    Vital Signs Last 24 Hrs  T(C): 36.7 (08 Apr 2024 04:30), Max: 36.9 (07 Apr 2024 20:17)  T(F): 98.1 (08 Apr 2024 04:30), Max: 98.4 (07 Apr 2024 20:17)  HR: 88 (08 Apr 2024 04:30) (88 - 105)  BP: 142/95 (08 Apr 2024 04:30) (121/85 - 142/95)  BP(mean): --  RR: 17 (08 Apr 2024 04:30) (17 - 18)  SpO2: 98% (08 Apr 2024 04:30) (96% - 98%)    Parameters below as of 08 Apr 2024 04:30  Patient On (Oxygen Delivery Method): room air        PHYSICAL EXAM:  GENERAL: NAD  HEENT: EOMI, hearing normal, conjunctiva and sclera clear  Chest: CTA bilaterally, no wheezing  CV: S1S2, RRR,   GI: soft, +BS, NT/ND  Musculoskeletal: no edema  Psychiatric: affect nL, mood nL  Skin: warm and dry    LABS:                        14.8   8.90  )-----------( 306      ( 08 Apr 2024 06:46 )             44.3     04-08    143  |  111<H>  |  23  ----------------------------<  107<H>  3.8   |  26  |  1.00    Ca    8.5      08 Apr 2024 06:46        Urinalysis Basic - ( 08 Apr 2024 06:46 )    Color: x / Appearance: x / SG: x / pH: x  Gluc: 107 mg/dL / Ketone: x  / Bili: x / Urobili: x   Blood: x / Protein: x / Nitrite: x   Leuk Esterase: x / RBC: x / WBC x   Sq Epi: x / Non Sq Epi: x / Bacteria: x        
CHIEF COMPLAINT/INTERVAL HISTORY:  Pt. seen and evaluated for SOB.  Pt. is in no distress.  Denies having SOB.  SpO2 90th% on room air.  Denies having CP or abdominal pain.     REVIEW OF SYSTEMS:  No fever, CP, SOB, or abdominal pain.      Vital Signs Last 24 Hrs  T(C): 36.5 (04 Apr 2024 05:16), Max: 36.9 (03 Apr 2024 20:12)  T(F): 97.7 (04 Apr 2024 05:16), Max: 98.5 (03 Apr 2024 20:12)  HR: 104 (04 Apr 2024 05:16) (88 - 123)  BP: 122/85 (04 Apr 2024 05:16) (122/85 - 144/90)  BP(mean): --  RR: 19 (04 Apr 2024 05:16) (18 - 20)  SpO2: 96% (04 Apr 2024 05:16) (95% - 98%)    Parameters below as of 04 Apr 2024 05:16  Patient On (Oxygen Delivery Method): room air        PHYSICAL EXAM:  GENERAL: NAD  HEENT: EOMI, hearing normal, conjunctiva and sclera clear  Chest: CTA bilaterally, no wheezing  CV: S1S2, RRR,   GI: soft, +BS, NT/ND  Musculoskeletal: no edema  Psychiatric: affect nL, mood nL  Skin: warm and dry    LABS:                        15.8   9.33  )-----------( 279      ( 04 Apr 2024 07:20 )             47.6     04-03    143  |  111<H>  |  20  ----------------------------<  105<H>  4.0   |  26  |  0.99    Ca    8.6      03 Apr 2024 08:23        Urinalysis Basic - ( 03 Apr 2024 08:23 )    Color: x / Appearance: x / SG: x / pH: x  Gluc: 105 mg/dL / Ketone: x  / Bili: x / Urobili: x   Blood: x / Protein: x / Nitrite: x   Leuk Esterase: x / RBC: x / WBC x   Sq Epi: x / Non Sq Epi: x / Bacteria: x    
PMR Subsequent Eval    Patient seen in followup for pain management    No complaints of any new pain -- pain controlled.     ROS:   no new myalgia or arthralgia    Exam:   Constitutional: Gen: In no acute distress, cooperative with exam and questioning   Neuro/MSK: LE 0 to 1/5 throughout, sensation impaired in the LE's, upper extremities full strength  Psychiatric: Awake alert fully oriented
CHIEF COMPLAINT/INTERVAL HISTORY:  Pt. seen and evaluated for SOB.  Pt. is in no distress.  No events over the night.  Denies having SOB.  On room air with SpO2 in 90th%.      REVIEW OF SYSTEMS:  No fever, CP, SOB, or abdominal pain    Vital Signs Last 24 Hrs  T(C): 36.5 (07 Apr 2024 04:57), Max: 36.8 (06 Apr 2024 20:14)  T(F): 97.7 (07 Apr 2024 04:57), Max: 98.3 (06 Apr 2024 20:14)  HR: 97 (07 Apr 2024 04:57) (97 - 114)  BP: 131/89 (07 Apr 2024 04:57) (131/89 - 141/88)  BP(mean): --  RR: 17 (07 Apr 2024 04:57) (17 - 17)  SpO2: 95% (07 Apr 2024 04:57) (93% - 97%)    Parameters below as of 07 Apr 2024 04:57  Patient On (Oxygen Delivery Method): room air        PHYSICAL EXAM:  GENERAL: NAD  HEENT: EOMI, hearing normal, conjunctiva and sclera clear  Chest: CTA bilaterally, no wheezing  CV: S1S2, RRR,   GI: soft, +BS, NT/ND  Musculoskeletal: no edema  Psychiatric: affect nL, mood nL  Skin: warm and dry    LABS:                        14.9   8.65  )-----------( 294      ( 06 Apr 2024 07:36 )             44.8     04-06    145  |  110<H>  |  21  ----------------------------<  98  3.8   |  26  |  1.10    Ca    9.6      06 Apr 2024 07:36        Urinalysis Basic - ( 06 Apr 2024 07:36 )    Color: x / Appearance: x / SG: x / pH: x  Gluc: 98 mg/dL / Ketone: x  / Bili: x / Urobili: x   Blood: x / Protein: x / Nitrite: x   Leuk Esterase: x / RBC: x / WBC x   Sq Epi: x / Non Sq Epi: x / Bacteria: x        
PMR Subsequent Eval    Patient seen in followup for functional deficits and pain management    Patient notes good relief with gabapentin, slept well last night.   No adverse effects or excessive sedation noted.  Remains weak, deconditioned and off of his functional baseline. no new therapy evaluation to assess patient's progression.    ROS:   Shooting pain improved, spasms improved    Exam:   Constitutional: Gen: In no acute distress, cooperative with exam and questioning   Neuro/MSK: LE 0 to 1/5 throughout, sensation impaired in the LE's, upper extremities full strength  Psychiatric: Awake alert fully oriented
Patient seen and examined  reports slept really well last night  ON ROOM AIR    Review of Systems:  General:denies fever chills, headache, weakness  HEENT: denies blurry vision, diffculty swallowing, difficulty hearing, tinnitus  Cardiovascular: denies chest pain  ,palpitations  Pulmonary:denies shortness of breath, cough, wheezing, hemoptysis  Gastrointestinal: denies abdominal pain, constipation, diarrhea,nausea , vomiting, hematochezia  : denies hematuria, dysuria, or incontinence  Neurological: denies weakness, numbness , tingling, dizziness, tremors  MSK: denies muscle pain, difficulty ambulating, swelling, back pain  skin: denies skin rash, itching, burning, or  skin lesions  Psychiatrical: denies mood disturbances, anxierty, feeling depressed, depression , or difficulty sleeping    Objective:  Vitals  T(C): 36.3 (04-02-24 @ 11:24), Max: 37.1 (04-02-24 @ 04:46)  HR: 80 (04-02-24 @ 11:24) (80 - 94)  BP: 131/85 (04-02-24 @ 11:24) (130/79 - 141/89)  RR: 18 (04-02-24 @ 11:24) (18 - 18)  SpO2: 96% (04-02-24 @ 11:24) (96% - 98%)    Physical Exam:  General: comfortable, no acute distress  HEENT: Atraumatic, no LAD, trachea midline, PERRLA  Cardiovascular: normal s1s2, no murmurs, gallops or fricition rubs  Pulmonary: clear to ausculation Bilaterally, no wheezing , rhonchi  Gastrointestinal: soft non tender non distended, no masses felt, no organomegally  Muscloskeletal: no lower extremity edema, intact bilateral lower extremity pulses ; paraplegia  Neurological: CN II-12 intact. No focal weakness  Psychiatrical: normal mood, cooperative  SKIN: no rash, lesions or ulcers    Labs:                          15.0   9.55  )-----------( 280      ( 02 Apr 2024 07:50 )             45.1     04-02    142  |  108  |  19  ----------------------------<  89  3.9   |  27  |  0.88    Ca    8.6      02 Apr 2024 07:50              Active Medications  MEDICATIONS  (STANDING):  clotrimazole 1% Cream 1 Application(s) Topical two times a day  enoxaparin Injectable 40 milliGRAM(s) SubCutaneous every 24 hours  gabapentin 300 milliGRAM(s) Oral three times a day    MEDICATIONS  (PRN):  acetaminophen     Tablet .. 650 milliGRAM(s) Oral every 6 hours PRN Mild Pain (1 - 3)  diazepam    Tablet 5 milliGRAM(s) Oral two times a day PRN anxiety/agitation  melatonin 3 milliGRAM(s) Oral at bedtime PRN Insomnia    
patient seen and examined  on room air  Review of Systems:  General:denies fever chills, headache, weakness  HEENT: denies blurry vision,diffculty swallowing, difficulty hearing, tinnitus  Cardiovascular: denies chest pain  ,palpitations  Pulmonary:denies shortness of breath, cough, wheezing, hemoptysis  Gastrointestinal: denies abdominal pain, constipation, diarrhea,nausea , vomiting, hematochezia  : denies hematuria, dysuria, or incontinence  Neurological: denies weakness, numbness , tingling, dizziness, tremors  MSK: denies muscle pain, difficulty ambulating, swelling, back pain  skin: denies skin rash, itching, burning, or  skin lesions  Psychiatrical: denies mood disturbances, anxierty, feeling depressed, depression , or difficulty sleeping    Objective:  Vitals  T(C): 36.6 (04-01-24 @ 19:50), Max: 37.2 (04-01-24 @ 05:17)  HR: 83 (04-01-24 @ 19:50) (74 - 91)  BP: 141/89 (04-01-24 @ 19:50) (131/83 - 141/89)  RR: 18 (04-01-24 @ 19:50) (18 - 18)  SpO2: 96% (04-01-24 @ 19:50) (96% - 99%)    Physical Exam:  General: comfortable, no acute distress  HEENT: Atraumatic, no LAD, trachea midline, PERRLA  Cardiovascular: normal s1s2, no murmurs, gallops or fricition rubs  Pulmonary: clear to ausculation Bilaterally, no wheezing , rhonchi  Gastrointestinal: soft non tender non distended, no masses felt, no organomegally  Muscloskeletal: no lower extremity edema, intact bilateral lower extremity pulses  Neurological: CN II-12 intact. No focal weakness  Psychiatrical: normal mood, cooperative  SKIN: no rash, lesions or ulcers    Labs:                          14.8   8.68  )-----------( 295      ( 31 Mar 2024 12:30 )             44.4     03-31    144  |  111<H>  |  20  ----------------------------<  89  4.0   |  28  |  0.99    Ca    9.3      31 Mar 2024 12:30    TPro  7.9  /  Alb  3.6  /  TBili  0.3  /  DBili  x   /  AST  36  /  ALT  73  /  AlkPhos  68  03-31    LIVER FUNCTIONS - ( 31 Mar 2024 12:30 )  Alb: 3.6 g/dL / Pro: 7.9 g/dL / ALK PHOS: 68 U/L / ALT: 73 U/L / AST: 36 U/L / GGT: x                 Active Medications  MEDICATIONS  (STANDING):  clotrimazole 1% Cream 1 Application(s) Topical two times a day  enoxaparin Injectable 40 milliGRAM(s) SubCutaneous every 24 hours  gabapentin 300 milliGRAM(s) Oral three times a day    MEDICATIONS  (PRN):  acetaminophen     Tablet .. 650 milliGRAM(s) Oral every 6 hours PRN Mild Pain (1 - 3)  diazepam    Tablet 5 milliGRAM(s) Oral two times a day PRN anxiety/agitation  melatonin 3 milliGRAM(s) Oral at bedtime PRN Insomnia

## 2024-04-09 NOTE — DISCHARGE NOTE NURSING/CASE MANAGEMENT/SOCIAL WORK - PATIENT PORTAL LINK FT
You can access the FollowMyHealth Patient Portal offered by [facility] by registering at the following website: [email or web address]. By joining [employer]’s FollowMyHealth portal, you will also be able to view your health information using other applications (apps) compatible with our system.

## 2024-04-09 NOTE — CASE MANAGEMENT PROGRESS NOTE - NSCMPROGRESSNOTE_GEN_ALL_CORE
As per [facility] [phone] / bed delivered today. Ambulance arranged for 230pm . Referral sent to [facility] for iraheta management/ [place]. Patient aware and receptive. 24hr aids resumed-aid at home awaiting patient return. DC needs in place.

## 2024-04-09 NOTE — DISCHARGE NOTE NURSING/CASE MANAGEMENT/SOCIAL WORK - NSDCPEFALRISK_GEN_ALL_CORE
family status information on file. family history is not on file. SOCIAL HISTORY      reports that she has been smoking. She has been smoking about 0.50 packs per day. She has never used smokeless tobacco. She reports that she drinks alcohol. She reports that she does not use drugs. PHYSICAL EXAM     INITIAL VITALS:  oral temperature is 98.1 °F (36.7 °C). Her blood pressure is 110/78 and her pulse is 62. Her respiration is 16 and oxygen saturation is 97%. Physical Exam   Constitutional: She appears well-developed and well-nourished. She appears distressed. HENT:   Head: Atraumatic. Neck: Normal range of motion. Neck supple. Cardiovascular: Normal rate, regular rhythm, normal heart sounds and intact distal pulses. Pulmonary/Chest: Effort normal and breath sounds normal.   Abdominal: Soft. Bowel sounds are normal. She exhibits no mass. There is no tenderness. There is no guarding. Musculoskeletal:   There is tenderness to palpation of the  tissue area of lower back, no spinal tenderness. Neurological: She is alert. She has normal strength and normal reflexes. Nursing note and vitals reviewed. DIAGNOSTIC RESULTS     LABS:   Labs Reviewed - No data to display    EMERGENCY DEPARTMENT COURSE:   Vitals:    Vitals:    07/16/18 0206 07/16/18 0321   BP: 114/71 110/78   Pulse: 65 62   Resp: 18 16   Temp: 98.1 °F (36.7 °C)    TempSrc: Oral    SpO2: 98% 97%     She  received morphine 4 mg IM ×2. At 5 AM she looks comfortable. She said that her pain became mild    FINAL IMPRESSION      1. Strain of lumbar region, initial encounter    2. Acute bilateral low back pain with sciatica, sciatica laterality unspecified          DISPOSITION/PLAN   She was discharged home in stable condition, she was given discharge instructions, advised to return if worse or new symptoms.     PATIENT REFERRED TO:  Glenda Choi [address] [place] [address]  [phone]    In 2
For information on Fall & Injury Prevention, visit: [email or web address] OR  [email or web address] OR  https://www.cdc.gov/steadi/patient.html

## 2024-04-10 ENCOUNTER — APPOINTMENT (OUTPATIENT)
Dept: HOME HEALTH SERVICES | Facility: HOME HEALTH | Age: 49
End: 2024-04-10

## 2024-04-10 VITALS
HEART RATE: 96 BPM | TEMPERATURE: 97.5 F | SYSTOLIC BLOOD PRESSURE: 120 MMHG | OXYGEN SATURATION: 98 % | DIASTOLIC BLOOD PRESSURE: 80 MMHG | RESPIRATION RATE: 18 BRPM

## 2024-04-10 RX ORDER — SULFAMETHOXAZOLE AND TRIMETHOPRIM 800; 160 MG/1; MG/1
800-160 TABLET ORAL TWICE DAILY
Refills: 0 | Status: COMPLETED | COMMUNITY
Start: 2024-03-21 | End: 2024-04-10

## 2024-04-10 RX ORDER — FEXOFENADINE HYDROCHLORIDE 180 MG/1
180 TABLET ORAL DAILY
Qty: 90 | Refills: 3 | Status: ACTIVE | COMMUNITY
Start: 2023-11-30

## 2024-04-10 RX ORDER — CLOTRIMAZOLE 10 MG/ML
1 SOLUTION TOPICAL TWICE DAILY
Qty: 2 | Refills: 1 | Status: ACTIVE | COMMUNITY
Start: 2024-03-13

## 2024-04-10 RX ORDER — TRAMADOL HYDROCHLORIDE 50 MG/1
50 TABLET, COATED ORAL TWICE DAILY
Qty: 60 | Refills: 0 | Status: ACTIVE | COMMUNITY
Start: 2023-03-09

## 2024-04-10 RX ORDER — IBUPROFEN 600 MG/1
600 TABLET, FILM COATED ORAL 3 TIMES DAILY
Qty: 90 | Refills: 0 | Status: ACTIVE | COMMUNITY
Start: 2024-03-06

## 2024-04-10 RX ORDER — DIPHENHYDRAMINE HCL 25 MG/1
25 CAPSULE ORAL EVERY 8 HOURS
Qty: 21 | Refills: 0 | Status: ACTIVE | COMMUNITY
Start: 2024-03-13

## 2024-04-10 RX ORDER — DIPHENHYDRAMINE HCL 25 MG/1
25 TABLET ORAL EVERY 6 HOURS
Qty: 360 | Refills: 3 | Status: ACTIVE | COMMUNITY
Start: 2024-03-20

## 2024-04-10 RX ORDER — CEFPODOXIME PROXETIL 200 MG/1
200 TABLET, FILM COATED ORAL
Refills: 0 | Status: COMPLETED | COMMUNITY
Start: 2024-03-21 | End: 2024-04-10

## 2024-04-11 ENCOUNTER — APPOINTMENT (OUTPATIENT)
Dept: HOME HEALTH SERVICES | Facility: HOME HEALTH | Age: 49
End: 2024-04-11
Payer: MEDICARE

## 2024-04-11 VITALS
OXYGEN SATURATION: 98 % | RESPIRATION RATE: 18 BRPM | HEART RATE: 97 BPM | DIASTOLIC BLOOD PRESSURE: 76 MMHG | SYSTOLIC BLOOD PRESSURE: 109 MMHG | TEMPERATURE: 98.1 F

## 2024-04-11 DIAGNOSIS — N32.89 OTHER SPECIFIED DISORDERS OF BLADDER: ICD-10-CM

## 2024-04-11 DIAGNOSIS — K59.09 OTHER CONSTIPATION: ICD-10-CM

## 2024-04-11 DIAGNOSIS — Z97.8 PRESENCE OF OTHER SPECIFIED DEVICES: ICD-10-CM

## 2024-04-11 PROCEDURE — 99496 TRANSJ CARE MGMT HIGH F2F 7D: CPT

## 2024-04-11 NOTE — PHYSICAL EXAM
[No Acute Distress] : no acute distress [Normal Voice/Communication] : normal voice communication [Normal Sclera/Conjunctiva] : normal sclera/conjunctiva [PERRL] : pupils equal, round and reactive to light [Normal Outer Ear/Nose] : the ears and nose were normal in appearance [Supple] : the neck was supple [No Respiratory Distress] : no respiratory distress [Clear to Auscultation] : lungs were clear to auscultation bilaterally [Normal Rate] : heart rate was normal  [Regular Rhythm] : with a regular rhythm [Normal S1, S2] : normal S1 and S2 [No Murmurs] : no murmurs heard [Pedal Pulses Present] : the pedal pulses are present [Normal Bowel Sounds] : normal bowel sounds [Non Tender] : non-tender [Soft] : abdomen soft [Not Distended] : not distended [No Spinal Tenderness] : no spinal tenderness [No Rash] : no rash [No Skin Lesions] : no skin lesions [Cranial Nerves Intact] : cranial nerves 2-12 were intact [No Gross Sensory Deficits] : no gross sensory deficits [Oriented x3] : oriented to person, place, and time [Normal Affect] : the affect was normal [Normal Mood] : the mood was normal [Normal Insight/Judgement] : insight and judgment were intact [Acne] : no acne [de-identified] : different rashes over arms and legs, MAD under pannus.  [Kyphosis] : no kyphosis present [de-identified] : pupils dilated [de-identified] : Edema [de-identified] : iraheta draining yellow urine [de-identified] : bed-bound, non-ambulatory. right side weaker than left  [de-identified] : bed-bound. generalized weakness. right side weaker than left  [de-identified] : denies SI

## 2024-04-11 NOTE — REVIEW OF SYSTEMS
[Vision Problems] : vision problems [Lower Ext Edema] : lower extremity edema [Constipation] : constipation [Incontinence] : incontinence [Joint Pain] : joint pain [Joint Stiffness] : joint stiffness [Muscle Weakness] : muscle weakness [Unsteady Walk] : ataxia [Negative] : Heme/Lymph [Suicidal] : not suicidal [FreeTextEntry3] : optic neuritis in left eye, cataracts in R eye [FreeTextEntry8] : as per HPI [de-identified] : as per HPI [de-identified] : as above

## 2024-04-11 NOTE — HEALTH RISK ASSESSMENT
[HRA Reviewed] : Health risk assessment reviewed [Some assistance needed] : managing finances [Patient not ambulatory (Wheelchair)] : Patient is not ambulatory (Wheelchair) [Yes] : The patient does have visual impairment [FreeTextEntry8] : na [Full assistance needed] : shopping [TimeGetUpGo] : 0 [de-identified] : optic neuritis and cataracts

## 2024-04-11 NOTE — COUNSELING
[Obese -  ( BMI  >29.9 )] : obese - ( BMI  >29.9 ) [Continue diet as tolerated] : continue diet as tolerated based on goals of care [Smoke/CO Detectors] : smoke/CO detectors [Use grab bars] : use grab bars [Use assistive device to avoid falls] : use assistive device to avoid falls [Remove clutter and unsafe carpeting to avoid falls] : remove clutter and unsafe carpeting to avoid falls [] : abdominal aortic ultrasound [Minimize unnecessary interventions] : minimize unnecessary interventions [Comfort Care] : comfort care [Discussed disease trajectory with patient/caregiver] : discussed disease trajectory with patient/caregiver [Likely to achieve goals/desired outcomes] : likely to achieve goals/desired outcomes [Patient/Caregiver has ___ understanding of disease process] : patient/caregiver has [unfilled] understanding of disease process [Advanced Directives discussed: ____] : Advanced directives discussed: [unfilled] [DNR] : Code Status: DNR [Limited] : Treatment Guidelines: Limited [DNI] : Intubation: DNI [[facility]: _____] [facility]/last verification date: [unfilled] [FreeTextEntry4] : does not smoke cigarettes, smokes weed - manages spasms with it; not interested in quitting [de-identified] : No HD, Yes IVF, abx if needed, hospitalize as needed. No feeding tube. Comfort is priority. "if i have a disease that will take my life, let me go"

## 2024-04-11 NOTE — ASSESSMENT
[FreeTextEntry1] : labs next visit  A reclining wheelchair is needed to prevent pressure ulcer development and to assist with patient transport as  the patient is unable to make weight shift due to poor trunk control.

## 2024-04-11 NOTE — REASON FOR VISIT
[Post-hospitalization from ___ Hospital] : Post-hospitalization from [unfilled] Hospital [Admitted on: ___] : The patient was admitted on [unfilled] [Discharged on ___] : discharged on [unfilled] [Discharge Summary] : discharge summary [Pertinent Labs] : pertinent labs [Radiology Findings] : radiology findings [Discharge Med List] : discharge medication list [Med Reconciliation] : medication reconciliation has been completed [Patient Contacted By: ____] : and contacted by [unfilled] [Pre-Visit Preparation] : Pre-visit preparation was done [Intercurrent Specialty/Sub-specialty Visits] : The patient has intercurrent specialty/sub-specialty visits [Formal Caregiver] : formal caregiver [FreeTextEntry2] : 49 y/o F PMHx of DVT, paraplegia, MS since 1995, bed bound brought in by EMS  complaining of episode of shortness of breath. Patient states his electronic  bed is broken and the bed fell to a decline position. It caused him shortness  of breath that has resolved since being in the ED as he can lie on an incline  now. Pt lives alone with 24 hour aids. Pt also making statements such as "I  want God to take me." However, patient states that he has no true intention of  suicidal / homicidal ideation.      ED Course:  Vitals: BP: 166/101, HR: 82, Temp: 98.3, RR: 20, SpO2: 97% on RA  Labs:  CBC, CMP WNL  CXR: No radiographic evidence of acute cardiopulmonary disease.  Received in the ED: Lovenox    Pt. was admitted with physiatry consultation.  Pt. was started on gabapentin  with good effect for his neuropathic pain.  Respiratory status remained stable.   He remained on room air with SpO2 in 90th%.  Pt. was recommended to have  outpatient sleep study.  Delivery of a new hospital bed to patient's home was  arranged. [FreeTextEntry5] : urology [FreeTextEntry4] : chart review

## 2024-04-11 NOTE — HISTORY OF PRESENT ILLNESS
[Patient] : patient [FreeTextEntry1] : MS [FreeTextEntry2] : 49 y/o male with history of MS, bed-bound, PE, depression w hx of SI attempt, neurogenic bladder, suprapubic Steele with conversion to regular catheter due to complications. Patient is being seen today for post dc visit.    At today's visit, patient seen laying in bed, relaxed as he recently used marijuanna.   Skin: intact Appetite: eats well. Gets "Moms meals" from insurance - will eat three meals/day. Also, snacks in between. HHA will feed patient, patient not able to hold spoon/fork. No coughing with liquids.  BM: constipation -- he uses MiraLax to help. Has xlax in case of persistent constipation.  : Steele changed during recent hospitalizaiton. In a good place draining yellow urine Pain: improved, started on gabapentin in hospital which improved the pain. Also cyclobenzaprine prn  Sleep: sleeps well. Does not get 8hrs/night. He will fall asleep & then a spasm will wake him up - he will smoke and then go back to sleep. He states the spams will wake him up every 20mins - the most sleep he will get will be an hour. He is used to his sleep cycle & believes he gets sufficient rest.   MS - first diagnosed at 20. Then, he was in remission for seven years, then he had another attack in 2013 - wheelchair bound since 2013. Bed-bound X three years (2020). Has not gotten OOB since 2020. Not able to bend his knees.   Depression: Always depressed - manages with marijuana. He also makes jokes and tries to have up-beat attitude. He has very supportive friends. His parents will also come by once a week or so, but he is not close with them. He feels "life is not worth living" and that he is waiting "for when gd will take me". Denies active SI. Had extensive discussion on mood and how he romy.   Bladder spasms: uses marijuana to help with symptoms. Had side effect from baclofen, was on banofen. Now on Gabapentin and cyclobenzaprine  Has 24hr/aide. Weekend aide is here all weekend.

## 2024-05-09 ENCOUNTER — NON-APPOINTMENT (OUTPATIENT)
Age: 49
End: 2024-05-09

## 2024-06-03 ENCOUNTER — APPOINTMENT (OUTPATIENT)
Dept: HOME HEALTH SERVICES | Facility: HOME HEALTH | Age: 49
End: 2024-06-03
Payer: MEDICARE

## 2024-06-03 VITALS
OXYGEN SATURATION: 99 % | SYSTOLIC BLOOD PRESSURE: 144 MMHG | TEMPERATURE: 98.2 F | HEART RATE: 62 BPM | RESPIRATION RATE: 18 BRPM | DIASTOLIC BLOOD PRESSURE: 91 MMHG

## 2024-06-03 DIAGNOSIS — B36.9 SUPERFICIAL MYCOSIS, UNSPECIFIED: ICD-10-CM

## 2024-06-03 DIAGNOSIS — R53.2 FUNCTIONAL QUADRIPLEGIA: ICD-10-CM

## 2024-06-03 DIAGNOSIS — T83.511A INFECTION AND INFLAMMATORY REACTION DUE TO INDWELLING URETHRAL CATHETER, INITIAL ENCOUNTER: ICD-10-CM

## 2024-06-03 DIAGNOSIS — G35 MULTIPLE SCLEROSIS: ICD-10-CM

## 2024-06-03 DIAGNOSIS — N31.8 OTHER NEUROMUSCULAR DYSFUNCTION OF BLADDER: ICD-10-CM

## 2024-06-03 DIAGNOSIS — I26.99 OTHER PULMONARY EMBOLISM W/OUT ACUTE COR PULMONALE: ICD-10-CM

## 2024-06-03 DIAGNOSIS — N39.0 INFECTION AND INFLAMMATORY REACTION DUE TO INDWELLING URETHRAL CATHETER, INITIAL ENCOUNTER: ICD-10-CM

## 2024-06-03 PROCEDURE — 99349 HOME/RES VST EST MOD MDM 40: CPT

## 2024-06-03 RX ORDER — SULFAMETHOXAZOLE AND TRIMETHOPRIM 800; 160 MG/1; MG/1
800-160 TABLET ORAL TWICE DAILY
Qty: 20 | Refills: 0 | Status: ACTIVE | COMMUNITY
Start: 2024-06-03 | End: 1900-01-01

## 2024-06-03 RX ORDER — GABAPENTIN 300 MG/1
300 CAPSULE ORAL
Qty: 270 | Refills: 3 | Status: ACTIVE | COMMUNITY
Start: 2024-04-10 | End: 1900-01-01

## 2024-06-03 RX ORDER — CYCLOBENZAPRINE HYDROCHLORIDE 5 MG/1
5 TABLET, FILM COATED ORAL
Qty: 180 | Refills: 3 | Status: ACTIVE | COMMUNITY
Start: 2024-04-10 | End: 1900-01-01

## 2024-06-03 RX ORDER — CLOTRIMAZOLE AND BETAMETHASONE DIPROPIONATE 10; .5 MG/G; MG/G
1-0.05 CREAM TOPICAL TWICE DAILY
Qty: 1 | Refills: 3 | Status: ACTIVE | COMMUNITY
Start: 2024-06-03 | End: 1900-01-01

## 2024-06-03 RX ORDER — FLUCONAZOLE 150 MG/1
150 TABLET ORAL DAILY
Qty: 3 | Refills: 0 | Status: COMPLETED | COMMUNITY
Start: 2024-03-13 | End: 2024-06-03

## 2024-06-03 RX ORDER — HYDROCORTISONE 25 MG/G
2.5 CREAM TOPICAL DAILY
Qty: 1 | Refills: 0 | Status: COMPLETED | COMMUNITY
Start: 2024-03-06 | End: 2024-06-03

## 2024-06-03 NOTE — HEALTH RISK ASSESSMENT
[HRA Reviewed] : Health risk assessment reviewed [Some assistance needed] : managing finances [Full assistance needed] : using transportation [Patient not ambulatory (Wheelchair)] : Patient is not ambulatory (Wheelchair) [Yes] : The patient does have visual impairment [FreeTextEntry8] : na [TimeGetUpGo] : 0 [de-identified] : optic neuritis and cataracts

## 2024-06-03 NOTE — HISTORY OF PRESENT ILLNESS
[Patient] : patient [FreeTextEntry1] : MS [FreeTextEntry2] : PMH of MS, bed-bound, PE, depression w hx of SI attempt, neurogenic bladder, suprapubic Steele with conversion to regular catheter due to complications. Patient is being seen today for post dc visit.    At today's visit, patient seen laying in bed, in discomfort due to spasms.  Arm Rash (L) been using hydrocortisone cream but ran out.   Skin: L arm rash now starting on R arm- no other wounds Appetite: eats well. Gets "Moms meals" from insurance - will eat three meals/day. Also, snacks in between. HHA will feed patient, patient not able to hold spoon/fork. No coughing with liquids.  BM: constipation -- he uses MiraLax to help. Has xlax in case of persistent constipation.  : Steele changed at urology (dr steel). In a good location draining yellow urine. Now foul smelling and muscle spasms.  Pain: Gabapentin 300 TID. Also cyclobenzaprine prn  Sleep: sleeps well. Does not get 8hrs/night. He will fall asleep & then a spasm will wake him up - he will smoke and then go back to sleep. He states the spams will wake him up every 20mins - the most sleep he will get will be an hour. He is used to his sleep cycle & believes he gets sufficient rest.   MS - first diagnosed at 20. Then, he was in remission for seven years, then he had another attack in 2013 - wheelchair bound since 2013. Bed-bound X three years (2020). Has not gotten OOB since 2020. Not able to bend his knees.   Depression: Always depressed - manages with marijuana. He also makes jokes and tries to have up-beat attitude. He has very supportive friends. His parents will also come by once a week or so, but he is not close with them. He feels "life is not worth living" and that he is waiting "for when gd will take me". Denies active SI. Had extensive discussion on mood and how he romy.   Bladder spasms: uses marijuana to help with symptoms. Had side effect from baclofen, was on banofen. Now on Gabapentin and cyclobenzaprine  Has 24hr/aide. Weekend aide is here all weekend.

## 2024-06-03 NOTE — COUNSELING
[Obese -  ( BMI  >29.9 )] : obese - ( BMI  >29.9 ) [Continue diet as tolerated] : continue diet as tolerated based on goals of care [Smoke/CO Detectors] : smoke/CO detectors [Use grab bars] : use grab bars [Use assistive device to avoid falls] : use assistive device to avoid falls [Remove clutter and unsafe carpeting to avoid falls] : remove clutter and unsafe carpeting to avoid falls [] : abdominal aortic ultrasound [Minimize unnecessary interventions] : minimize unnecessary interventions [Comfort Care] : comfort care [Discussed disease trajectory with patient/caregiver] : discussed disease trajectory with patient/caregiver [Likely to achieve goals/desired outcomes] : likely to achieve goals/desired outcomes [Patient/Caregiver has ___ understanding of disease process] : patient/caregiver has [unfilled] understanding of disease process [Advanced Directives discussed: ____] : Advanced directives discussed: [unfilled] [DNR] : Code Status: DNR [Limited] : Treatment Guidelines: Limited [DNI] : Intubation: DNI [[facility] Date: _____] [facility]pletion/last verification date: [unfilled] [FreeTextEntry4] : does not smoke cigarettes, smokes weed - manages spasms with it; not interested in quitting [de-identified] : No HD, Yes IVF, abx if needed, hospitalize as needed. No feeding tube. Comfort is priority. "if i have a disease that will take my life, let me go"

## 2024-06-03 NOTE — REVIEW OF SYSTEMS
[Vision Problems] : vision problems [Lower Ext Edema] : lower extremity edema [Constipation] : constipation [Incontinence] : incontinence [Joint Pain] : joint pain [Joint Stiffness] : joint stiffness [Muscle Weakness] : muscle weakness [Unsteady Walk] : ataxia [Negative] : Heme/Lymph [Suicidal] : not suicidal [FreeTextEntry3] : optic neuritis in left eye, cataracts in R eye [FreeTextEntry8] : as per HPI [de-identified] : as per HPI [de-identified] : as above

## 2024-06-03 NOTE — REASON FOR VISIT
[Follow-Up] : a follow-up visit [Formal Caregiver] : formal caregiver [Pre-Visit Preparation] : pre-visit preparation was done [Intercurrent Specialty/Sub-specialty Visits] : the patient has intercurrent specialty/sub-specialty visits [FreeTextEntry1] : MS [FreeTextEntry2] : chart review [FreeTextEntry3] : urology

## 2024-06-05 ENCOUNTER — APPOINTMENT (OUTPATIENT)
Dept: HOME HEALTH SERVICES | Facility: HOME HEALTH | Age: 49
End: 2024-06-05

## 2024-06-05 LAB
APPEARANCE: ABNORMAL
BACTERIA: ABNORMAL /HPF
BILIRUBIN URINE: NEGATIVE
BLOOD URINE: ABNORMAL
CAST: 11 /LPF
COARSE GRANULAR CASTS: PRESENT
COLOR: ABNORMAL
EPITHELIAL CELLS: 2 /HPF
GLUCOSE QUALITATIVE U: 100 MG/DL
KETONES URINE: NEGATIVE MG/DL
LEUKOCYTE ESTERASE URINE: ABNORMAL
MICROSCOPIC-UA: NORMAL
NITRITE URINE: NEGATIVE
PH URINE: >=9
PROTEIN URINE: 300 MG/DL
RED BLOOD CELLS URINE: 49 /HPF
REVIEW: NORMAL
SPECIFIC GRAVITY URINE: 1.02
TRIPLE PHOSPHATE CRYSTALS: PRESENT
UROBILINOGEN URINE: 1 MG/DL
WHITE BLOOD CELLS URINE: 7 /HPF

## 2024-06-06 LAB — BACTERIA UR CULT: NORMAL

## 2024-06-13 NOTE — ED ADULT NURSE NOTE - CHIEF COMPLAINT QUOTE
Pt with hx of MS - HHA wasn't able to move pt off toilet Clindamycin Counseling: I counseled the patient regarding use of clindamycin as an antibiotic for prophylactic and/or therapeutic purposes. Clindamycin is active against numerous classes of bacteria, including skin bacteria. Side effects may include nausea, diarrhea, gastrointestinal upset, rash, hives, yeast infections, and in rare cases, colitis. Cosentyx Counseling:  I discussed with the patient the risks of Cosentyx including but not limited to worsening of Crohn's disease, immunosuppression, allergic reactions and infections.  The patient understands that monitoring is required including a PPD at baseline and must alert us or the primary physician if symptoms of infection or other concerning signs are noted. Spironolactone Counseling: Patient advised regarding risks of diarrhea, abdominal pain, hyperkalemia, birth defects (for female patients), liver toxicity and renal toxicity. The patient may need blood work to monitor liver and kidney function and potassium levels while on therapy. The patient verbalized understanding of the proper use and possible adverse effects of spironolactone.  All of the patient's questions and concerns were addressed. Nsaids Pregnancy And Lactation Text: These medications are considered safe up to 30 weeks gestation. It is excreted in breast milk. Eucrisa Pregnancy And Lactation Text: This medication has not been assigned a Pregnancy Risk Category but animal studies failed to show danger with the topical medication. It is unknown if the medication is excreted in breast milk. Minocycline Pregnancy And Lactation Text: This medication is Pregnancy Category D and not consider safe during pregnancy. It is also excreted in breast milk. Erivedge Counseling- I discussed with the patient the risks of Erivedge including but not limited to nausea, vomiting, diarrhea, constipation, weight loss, changes in the sense of taste, decreased appetite, muscle spasms, and hair loss.  The patient verbalized understanding of the proper use and possible adverse effects of Erivedge.  All of the patient's questions and concerns were addressed. Carac Counseling:  I discussed with the patient the risks of Carac including but not limited to erythema, scaling, itching, weeping, crusting, and pain. Topical Retinoid Pregnancy And Lactation Text: This medication is Pregnancy Category C. It is unknown if this medication is excreted in breast milk. Xolair Counseling:  Patient informed of potential adverse effects including but not limited to fever, muscle aches, rash and allergic reactions.  The patient verbalized understanding of the proper use and possible adverse effects of Xolair.  All of the patient's questions and concerns were addressed. Bexarotene Counseling:  I discussed with the patient the risks of bexarotene including but not limited to hair loss, dry lips/skin/eyes, liver abnormalities, hyperlipidemia, pancreatitis, depression/suicidal ideation, photosensitivity, drug rash/allergic reactions, hypothyroidism, anemia, leukopenia, infection, cataracts, and teratogenicity.  Patient understands that they will need regular blood tests to check lipid profile, liver function tests, white blood cell count, thyroid function tests and pregnancy test if applicable. Terbinafine Counseling: Patient counseling regarding adverse effects of terbinafine including but not limited to headache, diarrhea, rash, upset stomach, liver function test abnormalities, itching, taste/smell disturbance, nausea, abdominal pain, and flatulence.  There is a rare possibility of liver failure that can occur when taking terbinafine.  The patient understands that a baseline LFT and kidney function test may be required. The patient verbalized understanding of the proper use and possible adverse effects of terbinafine.  All of the patient's questions and concerns were addressed. Xelbrez Pregnancy And Lactation Text: This medication is Pregnancy Category D and is not considered safe during pregnancy.  The risk during breast feeding is also uncertain. Terbinafine Pregnancy And Lactation Text: This medication is Pregnancy Category B and is considered safe during pregnancy. It is also excreted in breast milk and breast feeding isn't recommended. Include Pregnancy/Lactation Warning?: No Skyrizi Counseling: I discussed with the patient the risks of risankizumab-rzaa including but not limited to immunosuppression, and serious infections.  The patient understands that monitoring is required including a PPD at baseline and must alert us or the primary physician if symptoms of infection or other concerning signs are noted. Albendazole Counseling:  I discussed with the patient the risks of albendazole including but not limited to cytopenia, kidney damage, nausea/vomiting and severe allergy.  The patient understands that this medication is being used in an off-label manner. Ilumya Pregnancy And Lactation Text: The risk during pregnancy and breastfeeding is uncertain with this medication. Odomzo Counseling- I discussed with the patient the risks of Odomzo including but not limited to nausea, vomiting, diarrhea, constipation, weight loss, changes in the sense of taste, decreased appetite, muscle spasms, and hair loss.  The patient verbalized understanding of the proper use and possible adverse effects of Odomzo.  All of the patient's questions and concerns were addressed. Erivedge Pregnancy And Lactation Text: This medication is Pregnancy Category X and is absolutely contraindicated during pregnancy. It is unknown if it is excreted in breast milk. Clindamycin Pregnancy And Lactation Text: This medication can be used in pregnancy if certain situations. Clindamycin is also present in breast milk. Carac Pregnancy And Lactation Text: This medication is Pregnancy Category X and contraindicated in pregnancy and in women who may become pregnant. It is unknown if this medication is excreted in breast milk. Bexarotene Pregnancy And Lactation Text: This medication is Pregnancy Category X and should not be given to women who are pregnant or may become pregnant. This medication should not be used if you are breast feeding. Arava Counseling:  Patient counseled regarding adverse effects of Arava including but not limited to nausea, vomiting, abnormalities in liver function tests. Patients may develop mouth sores, rash, diarrhea, and abnormalities in blood counts. The patient understands that monitoring is required including LFTs and blood counts.  There is a rare possibility of scarring of the liver and lung problems that can occur when taking methotrexate. Persistent nausea, loss of appetite, pale stools, dark urine, cough, and shortness of breath should be reported immediately. Patient advised to discontinue Arava treatment and consult with a physician prior to attempting conception. The patient will have to undergo a treatment to eliminate Arava from the body prior to conception. Quinolones Counseling:  I discussed with the patient the risks of fluoroquinolones including but not limited to GI upset, allergic reaction, drug rash, diarrhea, dizziness, photosensitivity, yeast infections, liver function test abnormalities, tendonitis/tendon rupture. Cyclosporine Pregnancy And Lactation Text: This medication is Pregnancy Category C and it isn't know if it is safe during pregnancy. This medication is excreted in breast milk. Fluconazole Counseling:  Patient counseled regarding adverse effects of fluconazole including but not limited to headache, diarrhea, nausea, upset stomach, liver function test abnormalities, taste disturbance, and stomach pain.  There is a rare possibility of liver failure that can occur when taking fluconazole.  The patient understands that monitoring of LFTs and kidney function test may be required, especially at baseline. The patient verbalized understanding of the proper use and possible adverse effects of fluconazole.  All of the patient's questions and concerns were addressed. Quinolones Pregnancy And Lactation Text: This medication is Pregnancy Category C and it isn't know if it is safe during pregnancy. It is also excreted in breast milk. Cyclosporine Counseling:  I discussed with the patient the risks of cyclosporine including but not limited to hypertension, gingival hyperplasia,myelosuppression, immunosuppression, liver damage, kidney damage, neurotoxicity, lymphoma, and serious infections. The patient understands that monitoring is required including baseline blood pressure, CBC, CMP, lipid panel and uric acid, and then 1-2 times monthly CMP and blood pressure. Infliximab Counseling:  I discussed with the patient the risks of infliximab including but not limited to myelosuppression, immunosuppression, autoimmune hepatitis, demyelinating diseases, lymphoma, and serious infections.  The patient understands that monitoring is required including a PPD at baseline and must alert us or the primary physician if symptoms of infection or other concerning signs are noted. Cosentyx Pregnancy And Lactation Text: This medication is Pregnancy Category B and is considered safe during pregnancy. It is unknown if this medication is excreted in breast milk. Hydroquinone Counseling:  Patient advised that medication may result in skin irritation, lightening (hypopigmentation), dryness, and burning.  In the event of skin irritation, the patient was advised to reduce the amount of the drug applied or use it less frequently.  Rarely, spots that are treated with hydroquinone can become darker (pseudoochronosis).  Should this occur, patient instructed to stop medication and call the office. The patient verbalized understanding of the proper use and possible adverse effects of hydroquinone.  All of the patient's questions and concerns were addressed. Spironolactone Pregnancy And Lactation Text: This medication can cause feminization of the male fetus and should be avoided during pregnancy. The active metabolite is also found in breast milk. Albendazole Pregnancy And Lactation Text: This medication is Pregnancy Category C and it isn't known if it is safe during pregnancy. It is also excreted in breast milk. Gabapentin Counseling: I discussed with the patient the risks of gabapentin including but not limited to dizziness, somnolence, fatigue and ataxia. Ivermectin Counseling:  Patient instructed to take medication on an empty stomach with a full glass of water.  Patient informed of potential adverse effects including but not limited to nausea, diarrhea, dizziness, itching, and swelling of the extremities or lymph nodes.  The patient verbalized understanding of the proper use and possible adverse effects of ivermectin.  All of the patient's questions and concerns were addressed. Isotretinoin Counseling: Patient should get monthly blood tests, not donate blood, not drive at night if vision affected, not share medication, and not undergo elective surgery for 6 months after tx completed. Side effects reviewed, pt to contact office should one occur. Tazorac Pregnancy And Lactation Text: This medication is not safe during pregnancy. It is unknown if this medication is excreted in breast milk. Methotrexate Counseling:  Patient counseled regarding adverse effects of methotrexate including but not limited to nausea, vomiting, abnormalities in liver function tests. Patients may develop mouth sores, rash, diarrhea, and abnormalities in blood counts. The patient understands that monitoring is required including LFT's and blood counts.  There is a rare possibility of scarring of the liver and lung problems that can occur when taking methotrexate. Persistent nausea, loss of appetite, pale stools, dark urine, cough, and shortness of breath should be reported immediately. Patient advised to discontinue methotrexate treatment at least three months before attempting to become pregnant.  I discussed the need for folate supplements while taking methotrexate.  These supplements can decrease side effects during methotrexate treatment. The patient verbalized understanding of the proper use and possible adverse effects of methotrexate.  All of the patient's questions and concerns were addressed. Doxycycline Counseling:  Patient counseled regarding possible photosensitivity and increased risk for sunburn.  Patient instructed to avoid sunlight, if possible.  When exposed to sunlight, patients should wear protective clothing, sunglasses, and sunscreen.  The patient was instructed to call the office immediately if the following severe adverse effects occur:  hearing changes, easy bruising/bleeding, severe headache, or vision changes.  The patient verbalized understanding of the proper use and possible adverse effects of doxycycline.  All of the patient's questions and concerns were addressed. Doxycycline Pregnancy And Lactation Text: This medication is Pregnancy Category D and not consider safe during pregnancy. It is also excreted in breast milk but is considered safe for shorter treatment courses. Tazorac Counseling:  Patient advised that medication is irritating and drying.  Patient may need to apply sparingly and wash off after an hour before eventually leaving it on overnight.  The patient verbalized understanding of the proper use and possible adverse effects of tazorac.  All of the patient's questions and concerns were addressed. Xolair Pregnancy And Lactation Text: This medication is Pregnancy Category B and is considered safe during pregnancy. This medication is excreted in breast milk. SSKI Counseling:  I discussed with the patient the risks of SSKI including but not limited to thyroid abnormalities, metallic taste, GI upset, fever, headache, acne, arthralgias, paraesthesias, lymphadenopathy, easy bleeding, arrhythmias, and allergic reaction. Rifampin Counseling: I discussed with the patient the risks of rifampin including but not limited to liver damage, kidney damage, red-orange body fluids, nausea/vomiting and severe allergy. Dupixent Counseling: I discussed with the patient the risks of dupilumab including but not limited to eye infection and irritation, cold sores, injection site reactions, worsening of asthma, allergic reactions and increased risk of parasitic infection.  Live vaccines should be avoided while taking dupilumab. Dupilumab will also interact with certain medications such as warfarin and cyclosporine. The patient understands that monitoring is required and they must alert us or the primary physician if symptoms of infection or other concerning signs are noted. Stelara Counseling:  I discussed with the patient the risks of ustekinumab including but not limited to immunosuppression, malignancy, posterior leukoencephalopathy syndrome, and serious infections.  The patient understands that monitoring is required including a PPD at baseline and must alert us or the primary physician if symptoms of infection or other concerning signs are noted. Griseofulvin Counseling:  I discussed with the patient the risks of griseofulvin including but not limited to photosensitivity, cytopenia, liver damage, nausea/vomiting and severe allergy.  The patient understands that this medication is best absorbed when taken with a fatty meal (e.g., ice cream or french fries). 5-Fu Counseling: 5-Fluorouracil Counseling:  I discussed with the patient the risks of 5-fluorouracil including but not limited to erythema, scaling, itching, weeping, crusting, and pain. Cimetidine Counseling:  I discussed with the patient the risks of Cimetidine including but not limited to gynecomastia, headache, diarrhea, nausea, drowsiness, arrhythmias, pancreatitis, skin rashes, psychosis, bone marrow suppression and kidney toxicity. Griseofulvin Pregnancy And Lactation Text: This medication is Pregnancy Category X and is known to cause serious birth defects. It is unknown if this medication is excreted in breast milk but breast feeding should be avoided. Clofazimine Counseling:  I discussed with the patient the risks of clofazimine including but not limited to skin and eye pigmentation, liver damage, nausea/vomiting, gastrointestinal bleeding and allergy. Methotrexate Pregnancy And Lactation Text: This medication is Pregnancy Category X and is known to cause fetal harm. This medication is excreted in breast milk. Topical Clindamycin Counseling: Patient counseled that this medication may cause skin irritation or allergic reactions.  In the event of skin irritation, the patient was advised to reduce the amount of the drug applied or use it less frequently.   The patient verbalized understanding of the proper use and possible adverse effects of clindamycin.  All of the patient's questions and concerns were addressed. Azathioprine Counseling:  I discussed with the patient the risks of azathioprine including but not limited to myelosuppression, immunosuppression, hepatotoxicity, lymphoma, and infections.  The patient understands that monitoring is required including baseline LFTs, Creatinine, possible TPMP genotyping and weekly CBCs for the first month and then every 2 weeks thereafter.  The patient verbalized understanding of the proper use and possible adverse effects of azathioprine.  All of the patient's questions and concerns were addressed. Thalidomide Counseling: I discussed with the patient the risks of thalidomide including but not limited to birth defects, anxiety, weakness, chest pain, dizziness, cough and severe allergy. Azithromycin Counseling:  I discussed with the patient the risks of azithromycin including but not limited to GI upset, allergic reaction, drug rash, diarrhea, and yeast infections. Taltz Counseling: I discussed with the patient the risks of ixekizumab including but not limited to immunosuppression, serious infections, worsening of inflammatory bowel disease and drug reactions.  The patient understands that monitoring is required including a PPD at baseline and must alert us or the primary physician if symptoms of infection or other concerning signs are noted. Azithromycin Pregnancy And Lactation Text: This medication is considered safe during pregnancy and is also secreted in breast milk. Sski Pregnancy And Lactation Text: This medication is Pregnancy Category D and isn't considered safe during pregnancy. It is excreted in breast milk. Otezla Counseling: The side effects of Otezla were discussed with the patient, including but not limited to worsening or new depression, weight loss, diarrhea, nausea, upper respiratory tract infection, and headache. Patient instructed to call the office should any adverse effect occur.  The patient verbalized understanding of the proper use and possible adverse effects of Otezla.  All the patient's questions and concerns were addressed. Imiquimod Counseling:  I discussed with the patient the risks of imiquimod including but not limited to erythema, scaling, itching, weeping, crusting, and pain.  Patient understands that the inflammatory response to imiquimod is variable from person to person and was educated regarded proper titration schedule.  If flu-like symptoms develop, patient knows to discontinue the medication and contact us. Erythromycin Counseling:  I discussed with the patient the risks of erythromycin including but not limited to GI upset, allergic reaction, drug rash, diarrhea, increase in liver enzymes, and yeast infections. Rituxan Counseling:  I discussed with the patient the risks of Rituxan infusions. Side effects can include infusion reactions, severe drug rashes including mucocutaneous reactions, reactivation of latent hepatitis and other infections and rarely progressive multifocal leukoencephalopathy.  All of the patient's questions and concerns were addressed. Gabapentin Pregnancy And Lactation Text: This medication is Pregnancy Category C and isn't considered safe during pregnancy. It is excreted in breast milk. Rifampin Pregnancy And Lactation Text: This medication is Pregnancy Category C and it isn't know if it is safe during pregnancy. It is also excreted in breast milk and should not be used if you are breast feeding. Dupixent Pregnancy And Lactation Text: This medication likely crosses the placenta but the risk for the fetus is uncertain. This medication is excreted in breast milk. Isotretinoin Pregnancy And Lactation Text: This medication is Pregnancy Category X and is considered extremely dangerous during pregnancy. It is unknown if it is excreted in breast milk. Sarecycline Counseling: Patient advised regarding possible photosensitivity and discoloration of the teeth, skin, lips, tongue and gums.  Patient instructed to avoid sunlight, if possible.  When exposed to sunlight, patients should wear protective clothing, sunglasses, and sunscreen.  The patient was instructed to call the office immediately if the following severe adverse effects occur:  hearing changes, easy bruising/bleeding, severe headache, or vision changes.  The patient verbalized understanding of the proper use and possible adverse effects of sarecycline.  All of the patient's questions and concerns were addressed. Azathioprine Pregnancy And Lactation Text: This medication is Pregnancy Category D and isn't considered safe during pregnancy. It is unknown if this medication is excreted in breast milk. Protopic Counseling: Patient may experience a mild burning sensation during topical application. Protopic is not approved in children less than 2 years of age. There have been case reports of hematologic and skin malignancies in patients using topical calcineurin inhibitors although causality is questionable. Minoxidil Counseling: Minoxidil is a topical medication which can increase blood flow where it is applied. It is uncertain how this medication increases hair growth. Side effects are uncommon and include stinging and allergic reactions. Oxybutynin Counseling:  I discussed with the patient the risks of oxybutynin including but not limited to skin rash, drowsiness, dry mouth, difficulty urinating, and blurred vision. Detail Level: Generalized Siliq Counseling:  I discussed with the patient the risks of Siliq including but not limited to new or worsening depression, suicidal thoughts and behavior, immunosuppression, malignancy, posterior leukoencephalopathy syndrome, and serious infections.  The patient understands that monitoring is required including a PPD at baseline and must alert us or the primary physician if symptoms of infection or other concerning signs are noted. There is also a special program designed to monitor depression which is required with Siliq. Drysol Pregnancy And Lactation Text: This medication is considered safe during pregnancy and breast feeding. Otezla Pregnancy And Lactation Text: This medication is Pregnancy Category C and it isn't known if it is safe during pregnancy. It is unknown if it is excreted in breast milk. Rituxan Pregnancy And Lactation Text: This medication is Pregnancy Category C and it isn't know if it is safe during pregnancy. It is unknown if this medication is excreted in breast milk but similar antibodies are known to be excreted. Drysol Counseling:  I discussed with the patient the risks of drysol/aluminum chloride including but not limited to skin rash, itching, irritation, burning. Glycopyrrolate Counseling:  I discussed with the patient the risks of glycopyrrolate including but not limited to skin rash, drowsiness, dry mouth, difficulty urinating, and blurred vision. Bactrim Counseling:  I discussed with the patient the risks of sulfa antibiotics including but not limited to GI upset, allergic reaction, drug rash, diarrhea, dizziness, photosensitivity, and yeast infections.  Rarely, more serious reactions can occur including but not limited to aplastic anemia, agranulocytosis, methemoglobinemia, blood dyscrasias, liver or kidney failure, lung infiltrates or desquamative/blistering drug rashes. Enbrel Counseling:  I discussed with the patient the risks of etanercept including but not limited to myelosuppression, immunosuppression, autoimmune hepatitis, demyelinating diseases, lymphoma, and infections.  The patient understands that monitoring is required including a PPD at baseline and must alert us or the primary physician if symptoms of infection or other concerning signs are noted. High Dose Vitamin A Counseling: Side effects reviewed, pt to contact office should one occur. Itraconazole Counseling:  I discussed with the patient the risks of itraconazole including but not limited to liver damage, nausea/vomiting, neuropathy, and severe allergy.  The patient understands that this medication is best absorbed when taken with acidic beverages such as non-diet cola or ginger ale.  The patient understands that monitoring is required including baseline LFTs and repeat LFTs at intervals.  The patient understands that they are to contact us or the primary physician if concerning signs are noted. Erythromycin Pregnancy And Lactation Text: This medication is Pregnancy Category B and is considered safe during pregnancy. It is also excreted in breast milk. Topical Clindamycin Pregnancy And Lactation Text: This medication is Pregnancy Category B and is considered safe during pregnancy. It is unknown if it is excreted in breast milk. Doxepin Counseling:  Patient advised that the medication is sedating and not to drive a car after taking this medication. Patient informed of potential adverse effects including but not limited to dry mouth, urinary retention, and blurry vision.  The patient verbalized understanding of the proper use and possible adverse effects of doxepin.  All of the patient's questions and concerns were addressed. Prednisone Counseling:  I discussed with the patient the risks of prolonged use of prednisone including but not limited to weight gain, insomnia, osteoporosis, mood changes, diabetes, susceptibility to infection, glaucoma and high blood pressure.  In cases where prednisone use is prolonged, patients should be monitored with blood pressure checks, serum glucose levels and an eye exam.  Additionally, the patient may need to be placed on GI prophylaxis, PCP prophylaxis, and calcium and vitamin D supplementation and/or a bisphosphonate.  The patient verbalized understanding of the proper use and the possible adverse effects of prednisone.  All of the patient's questions and concerns were addressed. Metronidazole Counseling:  I discussed with the patient the risks of metronidazole including but not limited to seizures, nausea/vomiting, a metallic taste in the mouth, nausea/vomiting and severe allergy. Tremfya Counseling: I discussed with the patient the risks of guselkumab including but not limited to immunosuppression, serious infections, and drug reactions.  The patient understands that monitoring is required including a PPD at baseline and must alert us or the primary physician if symptoms of infection or other concerning signs are noted. Doxepin Pregnancy And Lactation Text: This medication is Pregnancy Category C and it isn't known if it is safe during pregnancy. It is also excreted in breast milk and breast feeding isn't recommended. Valtrex Counseling: I discussed with the patient the risks of valacyclovir including but not limited to kidney damage, nausea, vomiting and severe allergy.  The patient understands that if the infection seems to be worsening or is not improving, they are to call. Elidel Counseling: Patient may experience a mild burning sensation during topical application. Elidel is not approved in children less than 2 years of age. There have been case reports of hematologic and skin malignancies in patients using topical calcineurin inhibitors although causality is questionable. Hydroxychloroquine Counseling:  I discussed with the patient that a baseline ophthalmologic exam is needed at the start of therapy and every year thereafter while on therapy. A CBC may also be warranted for monitoring.  The side effects of this medication were discussed with the patient, including but not limited to agranulocytosis, aplastic anemia, seizures, rashes, retinopathy, and liver toxicity. Patient instructed to call the office should any adverse effect occur.  The patient verbalized understanding of the proper use and possible adverse effects of Plaquenil.  All the patient's questions and concerns were addressed. Humira Counseling:  I discussed with the patient the risks of adalimumab including but not limited to myelosuppression, immunosuppression, autoimmune hepatitis, demyelinating diseases, lymphoma, and serious infections.  The patient understands that monitoring is required including a PPD at baseline and must alert us or the primary physician if symptoms of infection or other concerning signs are noted. High Dose Vitamin A Pregnancy And Lactation Text: High dose vitamin A therapy is contraindicated during pregnancy and breast feeding. Glycopyrrolate Pregnancy And Lactation Text: This medication is Pregnancy Category B and is considered safe during pregnancy. It is unknown if it is excreted breast milk. Colchicine Counseling:  Patient counseled regarding adverse effects including but not limited to stomach upset (nausea, vomiting, stomach pain, or diarrhea).  Patient instructed to limit alcohol consumption while taking this medication.  Colchicine may reduce blood counts especially with prolonged use.  The patient understands that monitoring of kidney function and blood counts may be required, especially at baseline. The patient verbalized understanding of the proper use and possible adverse effects of colchicine.  All of the patient's questions and concerns were addressed. Topical Sulfur Applications Counseling: Topical Sulfur Counseling: Patient counseled that this medication may cause skin irritation or allergic reactions.  In the event of skin irritation, the patient was advised to reduce the amount of the drug applied or use it less frequently.   The patient verbalized understanding of the proper use and possible adverse effects of topical sulfur application.  All of the patient's questions and concerns were addressed. Bactrim Pregnancy And Lactation Text: This medication is Pregnancy Category D and is known to cause fetal risk.  It is also excreted in breast milk. Cellcept Counseling:  I discussed with the patient the risks of mycophenolate mofetil including but not limited to infection/immunosuppression, GI upset, hypokalemia, hypercholesterolemia, bone marrow suppression, lymphoproliferative disorders, malignancy, GI ulceration/bleed/perforation, colitis, interstitial lung disease, kidney failure, progressive multifocal leukoencephalopathy, and birth defects.  The patient understands that monitoring is required including a baseline creatinine and regular CBC testing. In addition, patient must alert us immediately if symptoms of infection or other concerning signs are noted. Protopic Pregnancy And Lactation Text: This medication is Pregnancy Category C. It is unknown if this medication is excreted in breast milk when applied topically. Simponi Counseling:  I discussed with the patient the risks of golimumab including but not limited to myelosuppression, immunosuppression, autoimmune hepatitis, demyelinating diseases, lymphoma, and serious infections.  The patient understands that monitoring is required including a PPD at baseline and must alert us or the primary physician if symptoms of infection or other concerning signs are noted. Birth Control Pills Counseling: Birth Control Pill Counseling: I discussed with the patient the potential side effects of OCPs including but not limited to increased risk of stroke, heart attack, thrombophlebitis, deep venous thrombosis, hepatic adenomas, breast changes, GI upset, headaches, and depression.  The patient verbalized understanding of the proper use and possible adverse effects of OCPs. All of the patient's questions and concerns were addressed. Ketoconazole Counseling:   Patient counseled regarding improving absorption with orange juice.  Adverse effects include but are not limited to breast enlargement, headache, diarrhea, nausea, upset stomach, liver function test abnormalities, taste disturbance, and stomach pain.  There is a rare possibility of liver failure that can occur when taking ketoconazole. The patient understands that monitoring of LFTs may be required, especially at baseline. The patient verbalized understanding of the proper use and possible adverse effects of ketoconazole.  All of the patient's questions and concerns were addressed. Hydroxychloroquine Pregnancy And Lactation Text: This medication has been shown to cause fetal harm but it isn't assigned a Pregnancy Risk Category. There are small amounts excreted in breast milk. Dapsone Counseling: I discussed with the patient the risks of dapsone including but not limited to hemolytic anemia, agranulocytosis, rashes, methemoglobinemia, kidney failure, peripheral neuropathy, headaches, GI upset, and liver toxicity.  Patients who start dapsone require monitoring including baseline LFTs and weekly CBCs for the first month, then every month thereafter.  The patient verbalized understanding of the proper use and possible adverse effects of dapsone.  All of the patient's questions and concerns were addressed. Hydroxyzine Counseling: Patient advised that the medication is sedating and not to drive a car after taking this medication.  Patient informed of potential adverse effects including but not limited to dry mouth, urinary retention, and blurry vision.  The patient verbalized understanding of the proper use and possible adverse effects of hydroxyzine.  All of the patient's questions and concerns were addressed. Benzoyl Peroxide Counseling: Patient counseled that medicine may cause skin irritation and bleach clothing.  In the event of skin irritation, the patient was advised to reduce the amount of the drug applied or use it less frequently.   The patient verbalized understanding of the proper use and possible adverse effects of benzoyl peroxide.  All of the patient's questions and concerns were addressed. Picato Counseling:  I discussed with the patient the risks of Picato including but not limited to erythema, scaling, itching, weeping, crusting, and pain. Acitretin Counseling:  I discussed with the patient the risks of acitretin including but not limited to hair loss, dry lips/skin/eyes, liver damage, hyperlipidemia, depression/suicidal ideation, photosensitivity.  Serious rare side effects can include but are not limited to pancreatitis, pseudotumor cerebri, bony changes, clot formation/stroke/heart attack.  Patient understands that alcohol is contraindicated since it can result in liver toxicity and significantly prolong the elimination of the drug by many years. Zyclara Counseling:  I discussed with the patient the risks of imiquimod including but not limited to erythema, scaling, itching, weeping, crusting, and pain.  Patient understands that the inflammatory response to imiquimod is variable from person to person and was educated regarded proper titration schedule.  If flu-like symptoms develop, patient knows to discontinue the medication and contact us. Cimzia Counseling:  I discussed with the patient the risks of Cimzia including but not limited to immunosuppression, allergic reactions and infections.  The patient understands that monitoring is required including a PPD at baseline and must alert us or the primary physician if symptoms of infection or other concerning signs are noted. Cephalexin Counseling: I counseled the patient regarding use of cephalexin as an antibiotic for prophylactic and/or therapeutic purposes. Cephalexin (commonly prescribed under brand name Keflex) is a cephalosporin antibiotic which is active against numerous classes of bacteria, including most skin bacteria. Side effects may include nausea, diarrhea, gastrointestinal upset, rash, hives, yeast infections, and in rare cases, hepatitis, kidney disease, seizures, fever, confusion, neurologic symptoms, and others. Patients with severe allergies to penicillin medications are cautioned that there is about a 10% incidence of cross-reactivity with cephalosporins. When possible, patients with penicillin allergies should use alternatives to cephalosporins for antibiotic therapy. Topical Sulfur Applications Pregnancy And Lactation Text: This medication is Pregnancy Category C and has an unknown safety profile during pregnancy. It is unknown if this topical medication is excreted in breast milk. Solaraze Counseling:  I discussed with the patient the risks of Solaraze including but not limited to erythema, scaling, itching, weeping, crusting, and pain. Valtrex Pregnancy And Lactation Text: this medication is Pregnancy Category B and is considered safe during pregnancy. This medication is not directly found in breast milk but it's metabolite acyclovir is present. Metronidazole Pregnancy And Lactation Text: This medication is Pregnancy Category B and considered safe during pregnancy.  It is also excreted in breast milk. Tetracycline Counseling: Patient counseled regarding possible photosensitivity and increased risk for sunburn.  Patient instructed to avoid sunlight, if possible.  When exposed to sunlight, patients should wear protective clothing, sunglasses, and sunscreen.  The patient was instructed to call the office immediately if the following severe adverse effects occur:  hearing changes, easy bruising/bleeding, severe headache, or vision changes.  The patient verbalized understanding of the proper use and possible adverse effects of tetracycline.  All of the patient's questions and concerns were addressed. Patient understands to avoid pregnancy while on therapy due to potential birth defects. Ilumya Counseling: I discussed with the patient the risks of tildrakizumab including but not limited to immunosuppression, malignancy, posterior leukoencephalopathy syndrome, and serious infections.  The patient understands that monitoring is required including a PPD at baseline and must alert us or the primary physician if symptoms of infection or other concerning signs are noted. Benzoyl Peroxide Pregnancy And Lactation Text: This medication is Pregnancy Category C. It is unknown if benzoyl peroxide is excreted in breast milk. Dapsone Pregnancy And Lactation Text: This medication is Pregnancy Category C and is not considered safe during pregnancy or breast feeding. Acitretin Pregnancy And Lactation Text: This medication is Pregnancy Category X and should not be given to women who are pregnant or may become pregnant in the future. This medication is excreted in breast milk. Ketoconazole Pregnancy And Lactation Text: This medication is Pregnancy Category C and it isn't know if it is safe during pregnancy. It is also excreted in breast milk and breast feeding isn't recommended. Eucrisa Counseling: Patient may experience a mild burning sensation during topical application. Eucrisa is not approved in children less than 2 years of age. Cyclophosphamide Pregnancy And Lactation Text: This medication is Pregnancy Category D and it isn't considered safe during pregnancy. This medication is excreted in breast milk. Hydroxyzine Pregnancy And Lactation Text: This medication is not safe during pregnancy and should not be taken. It is also excreted in breast milk and breast feeding isn't recommended. Topical Retinoid counseling:  Patient advised to apply a pea-sized amount only at bedtime and wait 30 minutes after washing their face before applying.  If too drying, patient may add a non-comedogenic moisturizer. The patient verbalized understanding of the proper use and possible adverse effects of retinoids.  All of the patient's questions and concerns were addressed. Cyclophosphamide Counseling:  I discussed with the patient the risks of cyclophosphamide including but not limited to hair loss, hormonal abnormalities, decreased fertility, abdominal pain, diarrhea, nausea and vomiting, bone marrow suppression and infection. The patient understands that monitoring is required while taking this medication. Solaraze Pregnancy And Lactation Text: This medication is Pregnancy Category B and is considered safe. There is some data to suggest avoiding during the third trimester. It is unknown if this medication is excreted in breast milk. Xeljanz Counseling: I discussed with the patient the risks of Xeljanz therapy including increased risk of infection, liver issues, headache, diarrhea, or cold symptoms. Live vaccines should be avoided. They were instructed to call if they have any problems. Birth Control Pills Pregnancy And Lactation Text: This medication should be avoided if pregnant and for the first 30 days post-partum. Cimzia Pregnancy And Lactation Text: This medication crosses the placenta but can be considered safe in certain situations. Cimzia may be excreted in breast milk. Cephalexin Pregnancy And Lactation Text: This medication is Pregnancy Category B and considered safe during pregnancy.  It is also excreted in breast milk but can be used safely for shorter doses. Nsaids Counseling: NSAID Counseling: I discussed with the patient that NSAIDs should be taken with food. Prolonged use of NSAIDs can result in the development of stomach ulcers.  Patient advised to stop taking NSAIDs if abdominal pain occurs.  The patient verbalized understanding of the proper use and possible adverse effects of NSAIDs.  All of the patient's questions and concerns were addressed. Minocycline Counseling: Patient advised regarding possible photosensitivity and discoloration of the teeth, skin, lips, tongue and gums.  Patient instructed to avoid sunlight, if possible.  When exposed to sunlight, patients should wear protective clothing, sunglasses, and sunscreen.  The patient was instructed to call the office immediately if the following severe adverse effects occur:  hearing changes, easy bruising/bleeding, severe headache, or vision changes.  The patient verbalized understanding of the proper use and possible adverse effects of minocycline.  All of the patient's questions and concerns were addressed. Mirvaso Counseling: Mirvaso is a topical medication which can decrease superficial blood flow where applied. Side effects are uncommon and include stinging, redness and allergic reactions. Olumiant Counseling: I discussed with the patient the risks of Olumiant therapy including but not limited to upper respiratory tract infections, shingles, cold sores, and nausea. Live vaccines should be avoided.  This medication has been linked to serious infections; higher rate of mortality; malignancy and lymphoproliferative disorders; major adverse cardiovascular events; thrombosis; gastrointestinal perforations; neutropenia; lymphopenia; anemia; liver enzyme elevations; and lipid elevations. Propranolol Pregnancy And Lactation Text: This medication is Pregnancy Category C and it isn't known if it is safe during pregnancy. It is excreted in breast milk. Olumiant Pregnancy And Lactation Text: Based on animal studies, Olumiant may cause embryo-fetal harm when administered to pregnant women.  The medication should not be used in pregnancy.  Breastfeeding is not recommended during treatment. Winlevi Counseling:  I discussed with the patient the risks of topical clascoterone including but not limited to erythema, scaling, itching, and stinging. Patient voiced their understanding. Olanzapine Counseling- I discussed with the patient the common side effects of olanzapine including but are not limited to: lack of energy, dry mouth, increased appetite, sleepiness, tremor, constipation, dizziness, changes in behavior, or restlessness.  Explained that teenagers are more likely to experience headaches, abdominal pain, pain in the arms or legs, tiredness, and sleepiness.  Serious side effects include but are not limited: increased risk of death in elderly patients who are confused, have memory loss, or dementia-related psychosis; hyperglycemia; increased cholesterol and triglycerides; and weight gain. Winlevi Pregnancy And Lactation Text: This medication is considered safe during pregnancy and breastfeeding. Olanzapine Pregnancy And Lactation Text: This medication is pregnancy category C.   There are no adequate and well controlled trials with olanzapine in pregnant females.  Olanzapine should be used during pregnancy only if the potential benefit justifies the potential risk to the fetus.   In a study in lactating healthy women, olanzapine was excreted in breast milk.  It is recommended that women taking olanzapine should not breast feed. Mirvaso Pregnancy And Lactation Text: This medication has not been assigned a Pregnancy Risk Category. It is unknown if the medication is excreted in breast milk. Libtayo Counseling- I discussed with the patient the risks of Libtayo including but not limited to nausea, vomiting, diarrhea, and bone or muscle pain.  The patient verbalized understanding of the proper use and possible adverse effects of Libtayo.  All of the patient's questions and concerns were addressed. Rinvoq Counseling: I discussed with the patient the risks of Rinvoq therapy including but not limited to upper respiratory tract infections, shingles, cold sores, bronchitis, nausea, cough, fever, acne, and headache. Live vaccines should be avoided.  This medication has been linked to serious infections; higher rate of mortality; malignancy and lymphoproliferative disorders; major adverse cardiovascular events; thrombosis; thrombocytopenia, anemia, and neutropenia; lipid elevations; liver enzyme elevations; and gastrointestinal perforations. Opzelura Counseling:  I discussed with the patient the risks of Opzelura including but not limited to nasopharngitis, bronchitis, ear infection, eosinophila, hives, diarrhea, folliculitis, tonsillitis, and rhinorrhea.  Taken orally, this medication has been linked to serious infections; higher rate of mortality; malignancy and lymphoproliferative disorders; major adverse cardiovascular events; thrombosis; thrombocytopenia, anemia, and neutropenia; and lipid elevations. Finasteride Male Counseling: Finasteride Counseling:  I discussed with the patient the risks of use of finasteride including but not limited to decreased libido, decreased ejaculate volume, gynecomastia, and depression. Women should not handle medication.  All of the patient's questions and concerns were addressed. Opzelura Pregnancy And Lactation Text: There is insufficient data to evaluate drug-associated risk for major birth defects, miscarriage, or other adverse maternal or fetal outcomes.  There is a pregnancy registry that monitors pregnancy outcomes in pregnant persons exposed to the medication during pregnancy.  It is unknown if this medication is excreted in breast milk.  Do not breastfeed during treatment and for about 4 weeks after the last dose. Azelaic Acid Counseling: Patient counseled that medicine may cause skin irritation and to avoid applying near the eyes.  In the event of skin irritation, the patient was advised to reduce the amount of the drug applied or use it less frequently.   The patient verbalized understanding of the proper use and possible adverse effects of azelaic acid.  All of the patient's questions and concerns were addressed. Finasteride Pregnancy And Lactation Text: This medication is absolutely contraindicated during pregnancy. It is unknown if it is excreted in breast milk. Oral Minoxidil Counseling- I discussed with the patient the risks of oral minoxidil including but not limited to shortness of breath, swelling of the feet or ankles, dizziness, lightheadedness, unwanted hair growth and allergic reaction.  The patient verbalized understanding of the proper use and possible adverse effects of oral minoxidil.  All of the patient's questions and concerns were addressed. Libtayo Pregnancy And Lactation Text: This medication is contraindicated in pregnancy and when breast feeding. Rinvoq Pregnancy And Lactation Text: Based on animal studies, Rinvoq may cause embryo-fetal harm when administered to pregnant women.  The medication should not be used in pregnancy.  Breastfeeding is not recommended during treatment and for 6 days after the last dose. Oral Minoxidil Pregnancy And Lactation Text: This medication should only be used when clearly needed if you are pregnant, attempting to become pregnant or breast feeding. Tranexamic Acid Counseling:  Patient advised of the small risk of bleeding problems with tranexamic acid. They were also instructed to call if they developed any nausea, vomiting or diarrhea. All of the patient's questions and concerns were addressed. Tranexamic Acid Pregnancy And Lactation Text: It is unknown if this medication is safe during pregnancy or breast feeding. Sotyktu Counseling:  I discussed the most common side effects of Sotyktu including: common cold, sore throat, sinus infections, cold sores, canker sores, folliculitis, and acne.  I also discussed more serious side effects of Sotyktu including but not limited to: serious allergic reactions; increased risk for infections such as TB; cancers such as lymphomas; rhabdomyolysis and elevated CPK; and elevated triglycerides and liver enzymes.  Sotyktu Pregnancy And Lactation Text: There is insufficient data to evaluate whether or not Sotyktu is safe to use during pregnancy.   It is not known if Sotyktu passes into breast milk and whether or not it is safe to use when breastfeeding.   Opioid Counseling: I discussed with the patient the potential side effects of opioids including but not limited to addiction, altered mental status, and depression. I stressed avoiding alcohol, benzodiazepines, muscle relaxants and sleep aids unless specifically okayed by a physician. The patient verbalized understanding of the proper use and possible adverse effects of opioids. All of the patient's questions and concerns were addressed. They were instructed to flush the remaining pills down the toilet if they did not need them for pain. Topical Ketoconazole Counseling: Patient counseled that this medication may cause skin irritation or allergic reactions.  In the event of skin irritation, the patient was advised to reduce the amount of the drug applied or use it less frequently.   The patient verbalized understanding of the proper use and possible adverse effects of ketoconazole.  All of the patient's questions and concerns were addressed. Low Dose Naltrexone Counseling- I discussed with the patient the potential risks and side effects of low dose naltrexone including but not limited to: more vivid dreams, headaches, nausea, vomiting, abdominal pain, fatigue, dizziness, and anxiety. Low Dose Naltrexone Pregnancy And Lactation Text: Naltrexone is pregnancy category C.  There have been no adequate and well-controlled studies in pregnant women.  It should be used in pregnancy only if the potential benefit justifies the potential risk to the fetus.   Limited data indicates that naltrexone is minimally excreted into breastmilk. Opioid Pregnancy And Lactation Text: These medications can lead to premature delivery and should be avoided during pregnancy. These medications are also present in breast milk in small amounts. Niacinamide Counseling: I recommended taking niacin or niacinamide, also know as vitamin B3, twice daily. Recent evidence suggests that taking vitamin B3 (500 mg twice daily) can reduce the risk of actinic keratoses and non-melanoma skin cancers. Side effects of vitamin B3 include flushing and headache. Rhofade Counseling: Rhofade is a topical medication which can decrease superficial blood flow where applied. Side effects are uncommon and include stinging, redness and allergic reactions. Adbry Counseling: I discussed with the patient the risks of tralokinumab including but not limited to eye infection and irritation, cold sores, injection site reactions, worsening of asthma, allergic reactions and increased risk of parasitic infection.  Live vaccines should be avoided while taking tralokinumab. The patient understands that monitoring is required and they must alert us or the primary physician if symptoms of infection or other concerning signs are noted. Cibinqo Counseling: I discussed with the patient the risks of Cibinqo therapy including but not limited to common cold, nausea, headache, cold sores, increased blood CPK levels, dizziness, UTIs, fatigue, acne, and vomitting. Live vaccines should be avoided.  This medication has been linked to serious infections; higher rate of mortality; malignancy and lymphoproliferative disorders; major adverse cardiovascular events; thrombosis; thrombocytopenia and lymphopenia; lipid elevations; and retinal detachment. Dutasteride Male Counseling: Dustasteride Counseling:  I discussed with the patient the risks of use of dutasteride including but not limited to decreased libido, decreased ejaculate volume, and gynecomastia. Women who can become pregnant should not handle medication.  All of the patient's questions and concerns were addressed. Propranolol Counseling:  I discussed with the patient the risks of propranolol including but not limited to low heart rate, low blood pressure, low blood sugar, restlessness and increased cold sensitivity. They should call the office if they experience any of these side effects. Niacinamide Pregnancy And Lactation Text: These medications are considered safe during pregnancy. Wartpeel Counseling:  I discussed with the patient the risks of Wartpeel including but not limited to erythema, scaling, itching, weeping, crusting, and pain. Calcipotriene Counseling:  I discussed with the patient the risks of calcipotriene including but not limited to erythema, scaling, itching, and irritation. Adbry Pregnancy And Lactation Text: It is unknown if this medication will adversely affect pregnancy or breast feeding. Dutasteride Pregnancy And Lactation Text: This medication is absolutely contraindicated in women, especially during pregnancy and breast feeding. Feminization of male fetuses is possible if taking while pregnant. Cibinqo Pregnancy And Lactation Text: It is unknown if this medication will adversely affect pregnancy or breast feeding.  You should not take this medication if you are currently pregnant or planning a pregnancy or while breastfeeding. Calcipotriene Pregnancy And Lactation Text: This medication has not been proven safe during pregnancy. It is unknown if this medication is excreted in breast milk.

## 2024-06-25 ENCOUNTER — TRANSCRIPTION ENCOUNTER (OUTPATIENT)
Age: 49
End: 2024-06-25

## 2024-06-25 ENCOUNTER — INPATIENT (INPATIENT)
Facility: HOSPITAL | Age: 49
LOS: 4 days | Discharge: HOME | DRG: 690 | End: 2024-06-30
Attending: HOSPITALIST | Admitting: INTERNAL MEDICINE
Payer: MEDICARE

## 2024-06-25 VITALS
SYSTOLIC BLOOD PRESSURE: 138 MMHG | TEMPERATURE: 97 F | HEIGHT: 70 IN | DIASTOLIC BLOOD PRESSURE: 96 MMHG | OXYGEN SATURATION: 96 % | HEART RATE: 74 BPM | WEIGHT: 270.07 LBS | RESPIRATION RATE: 18 BRPM

## 2024-06-25 DIAGNOSIS — Z93.6 OTHER ARTIFICIAL OPENINGS OF URINARY TRACT STATUS: Chronic | ICD-10-CM

## 2024-06-25 DIAGNOSIS — N39.0 URINARY TRACT INFECTION, SITE NOT SPECIFIED: ICD-10-CM

## 2024-06-25 LAB
ALBUMIN SERPL ELPH-MCNC: 3.2 G/DL — LOW (ref 3.3–5)
ALP SERPL-CCNC: 74 U/L — SIGNIFICANT CHANGE UP (ref 40–120)
ALT FLD-CCNC: 78 U/L — SIGNIFICANT CHANGE UP (ref 12–78)
ANION GAP SERPL CALC-SCNC: 6 MMOL/L — SIGNIFICANT CHANGE UP (ref 5–17)
APPEARANCE UR: ABNORMAL
AST SERPL-CCNC: 37 U/L — SIGNIFICANT CHANGE UP (ref 15–37)
BACTERIA # UR AUTO: ABNORMAL /HPF
BILIRUB SERPL-MCNC: 0.8 MG/DL — SIGNIFICANT CHANGE UP (ref 0.2–1.2)
BILIRUB UR-MCNC: NEGATIVE — SIGNIFICANT CHANGE UP
BUN SERPL-MCNC: 16 MG/DL — SIGNIFICANT CHANGE UP (ref 7–23)
CALCIUM SERPL-MCNC: 10.2 MG/DL — HIGH (ref 8.5–10.1)
CHLORIDE SERPL-SCNC: 109 MMOL/L — HIGH (ref 96–108)
CO2 SERPL-SCNC: 27 MMOL/L — SIGNIFICANT CHANGE UP (ref 22–31)
COLOR SPEC: YELLOW — SIGNIFICANT CHANGE UP
CREAT SERPL-MCNC: 1.2 MG/DL — SIGNIFICANT CHANGE UP (ref 0.5–1.3)
DIFF PNL FLD: ABNORMAL
EGFR: 75 ML/MIN/1.73M2 — SIGNIFICANT CHANGE UP
GLUCOSE SERPL-MCNC: 128 MG/DL — HIGH (ref 70–99)
GLUCOSE UR QL: NEGATIVE MG/DL — SIGNIFICANT CHANGE UP
HCT VFR BLD CALC: 46.3 % — SIGNIFICANT CHANGE UP (ref 39–50)
HGB BLD-MCNC: 15.6 G/DL — SIGNIFICANT CHANGE UP (ref 13–17)
KETONES UR-MCNC: NEGATIVE MG/DL — SIGNIFICANT CHANGE UP
LACTATE SERPL-SCNC: 1.6 MMOL/L — SIGNIFICANT CHANGE UP (ref 0.7–2)
LEUKOCYTE ESTERASE UR-ACNC: ABNORMAL
MCHC RBC-ENTMCNC: 29.6 PG — SIGNIFICANT CHANGE UP (ref 27–34)
MCHC RBC-ENTMCNC: 33.7 GM/DL — SIGNIFICANT CHANGE UP (ref 32–36)
MCV RBC AUTO: 87.9 FL — SIGNIFICANT CHANGE UP (ref 80–100)
NITRITE UR-MCNC: POSITIVE
NRBC # BLD: 0 /100 WBCS — SIGNIFICANT CHANGE UP (ref 0–0)
PH UR: 7.5 — SIGNIFICANT CHANGE UP (ref 5–8)
PLATELET # BLD AUTO: 245 K/UL — SIGNIFICANT CHANGE UP (ref 150–400)
POTASSIUM SERPL-MCNC: 3.7 MMOL/L — SIGNIFICANT CHANGE UP (ref 3.5–5.3)
POTASSIUM SERPL-SCNC: 3.7 MMOL/L — SIGNIFICANT CHANGE UP (ref 3.5–5.3)
PROT SERPL-MCNC: 7.8 G/DL — SIGNIFICANT CHANGE UP (ref 6–8.3)
PROT UR-MCNC: 100 MG/DL
RBC # BLD: 5.27 M/UL — SIGNIFICANT CHANGE UP (ref 4.2–5.8)
RBC # FLD: 15.5 % — HIGH (ref 10.3–14.5)
RBC CASTS # UR COMP ASSIST: 15 /HPF — HIGH (ref 0–4)
SODIUM SERPL-SCNC: 142 MMOL/L — SIGNIFICANT CHANGE UP (ref 135–145)
SP GR SPEC: 1.01 — SIGNIFICANT CHANGE UP (ref 1–1.03)
TROPONIN I, HIGH SENSITIVITY RESULT: 8 NG/L — SIGNIFICANT CHANGE UP
UROBILINOGEN FLD QL: 0.2 MG/DL — SIGNIFICANT CHANGE UP (ref 0.2–1)
WBC # BLD: 13.9 K/UL — HIGH (ref 3.8–10.5)
WBC # FLD AUTO: 13.9 K/UL — HIGH (ref 3.8–10.5)
WBC UR QL: ABNORMAL /HPF (ref 0–5)

## 2024-06-25 PROCEDURE — 93010 ELECTROCARDIOGRAM REPORT: CPT

## 2024-06-25 PROCEDURE — 99285 EMERGENCY DEPT VISIT HI MDM: CPT

## 2024-06-25 PROCEDURE — 71045 X-RAY EXAM CHEST 1 VIEW: CPT | Mod: 26

## 2024-06-25 RX ORDER — CEFEPIME 1 G/1
2000 INJECTION, POWDER, FOR SOLUTION INTRAMUSCULAR; INTRAVENOUS ONCE
Refills: 0 | Status: COMPLETED | OUTPATIENT
Start: 2024-06-25 | End: 2024-06-25

## 2024-06-25 RX ORDER — ACETAMINOPHEN 325 MG
1000 TABLET ORAL ONCE
Refills: 0 | Status: COMPLETED | OUTPATIENT
Start: 2024-06-25 | End: 2024-06-25

## 2024-06-25 RX ORDER — ACETAMINOPHEN 325 MG
975 TABLET ORAL ONCE
Refills: 0 | Status: DISCONTINUED | OUTPATIENT
Start: 2024-06-25 | End: 2024-06-25

## 2024-06-25 RX ORDER — SODIUM CHLORIDE 0.9 % (FLUSH) 0.9 %
3800 SYRINGE (ML) INJECTION ONCE
Refills: 0 | Status: COMPLETED | OUTPATIENT
Start: 2024-06-25 | End: 2024-06-25

## 2024-06-25 RX ADMIN — Medication 400 MILLIGRAM(S): at 20:29

## 2024-06-25 RX ADMIN — CEFEPIME 100 MILLIGRAM(S): 1 INJECTION, POWDER, FOR SOLUTION INTRAMUSCULAR; INTRAVENOUS at 20:29

## 2024-06-25 RX ADMIN — Medication 3800 MILLILITER(S): at 21:16

## 2024-06-26 ENCOUNTER — NON-APPOINTMENT (OUTPATIENT)
Age: 49
End: 2024-06-26

## 2024-06-26 DIAGNOSIS — R09.89 OTHER SPECIFIED SYMPTOMS AND SIGNS INVOLVING THE CIRCULATORY AND RESPIRATORY SYSTEMS: ICD-10-CM

## 2024-06-26 LAB
ALBUMIN SERPL ELPH-MCNC: 2.9 G/DL — LOW (ref 3.3–5)
ALP SERPL-CCNC: 69 U/L — SIGNIFICANT CHANGE UP (ref 40–120)
ALT FLD-CCNC: 67 U/L — SIGNIFICANT CHANGE UP (ref 12–78)
ANION GAP SERPL CALC-SCNC: 5 MMOL/L — SIGNIFICANT CHANGE UP (ref 5–17)
AST SERPL-CCNC: 29 U/L — SIGNIFICANT CHANGE UP (ref 15–37)
BILIRUB SERPL-MCNC: 0.7 MG/DL — SIGNIFICANT CHANGE UP (ref 0.2–1.2)
BUN SERPL-MCNC: 15 MG/DL — SIGNIFICANT CHANGE UP (ref 7–23)
CALCIUM SERPL-MCNC: 9.9 MG/DL — SIGNIFICANT CHANGE UP (ref 8.5–10.1)
CHLORIDE SERPL-SCNC: 115 MMOL/L — HIGH (ref 96–108)
CO2 SERPL-SCNC: 26 MMOL/L — SIGNIFICANT CHANGE UP (ref 22–31)
CREAT SERPL-MCNC: 1.1 MG/DL — SIGNIFICANT CHANGE UP (ref 0.5–1.3)
EGFR: 83 ML/MIN/1.73M2 — SIGNIFICANT CHANGE UP
GLUCOSE SERPL-MCNC: 134 MG/DL — HIGH (ref 70–99)
HCT VFR BLD CALC: 44 % — SIGNIFICANT CHANGE UP (ref 39–50)
HGB BLD-MCNC: 14.6 G/DL — SIGNIFICANT CHANGE UP (ref 13–17)
MCHC RBC-ENTMCNC: 29.4 PG — SIGNIFICANT CHANGE UP (ref 27–34)
MCHC RBC-ENTMCNC: 33.2 GM/DL — SIGNIFICANT CHANGE UP (ref 32–36)
MCV RBC AUTO: 88.5 FL — SIGNIFICANT CHANGE UP (ref 80–100)
NRBC # BLD: 0 /100 WBCS — SIGNIFICANT CHANGE UP (ref 0–0)
PLATELET # BLD AUTO: 201 K/UL — SIGNIFICANT CHANGE UP (ref 150–400)
POTASSIUM SERPL-MCNC: 3.4 MMOL/L — LOW (ref 3.5–5.3)
POTASSIUM SERPL-SCNC: 3.4 MMOL/L — LOW (ref 3.5–5.3)
PROT SERPL-MCNC: 7.1 G/DL — SIGNIFICANT CHANGE UP (ref 6–8.3)
RBC # BLD: 4.97 M/UL — SIGNIFICANT CHANGE UP (ref 4.2–5.8)
RBC # FLD: 15.5 % — HIGH (ref 10.3–14.5)
SODIUM SERPL-SCNC: 146 MMOL/L — HIGH (ref 135–145)
WBC # BLD: 12.46 K/UL — HIGH (ref 3.8–10.5)
WBC # FLD AUTO: 12.46 K/UL — HIGH (ref 3.8–10.5)

## 2024-06-26 PROCEDURE — 93970 EXTREMITY STUDY: CPT | Mod: 26

## 2024-06-26 PROCEDURE — 99222 1ST HOSP IP/OBS MODERATE 55: CPT

## 2024-06-26 PROCEDURE — 99232 SBSQ HOSP IP/OBS MODERATE 35: CPT

## 2024-06-26 RX ORDER — CEFEPIME 1 G/1
2000 INJECTION, POWDER, FOR SOLUTION INTRAMUSCULAR; INTRAVENOUS EVERY 12 HOURS
Refills: 0 | Status: DISCONTINUED | OUTPATIENT
Start: 2024-06-26 | End: 2024-06-30

## 2024-06-26 RX ORDER — DEXTROSE MONOHYDRATE AND SODIUM CHLORIDE 5; .3 G/100ML; G/100ML
1000 INJECTION, SOLUTION INTRAVENOUS
Refills: 0 | Status: DISCONTINUED | OUTPATIENT
Start: 2024-06-26 | End: 2024-06-26

## 2024-06-26 RX ORDER — POTASSIUM CHLORIDE 600 MG/1
40 TABLET, FILM COATED, EXTENDED RELEASE ORAL ONCE
Refills: 0 | Status: COMPLETED | OUTPATIENT
Start: 2024-06-26 | End: 2024-06-26

## 2024-06-26 RX ORDER — POLYETHYLENE GLYCOL 3350 1 G/G
17 POWDER ORAL DAILY
Refills: 0 | Status: DISCONTINUED | OUTPATIENT
Start: 2024-06-26 | End: 2024-06-30

## 2024-06-26 RX ORDER — ENOXAPARIN SODIUM 100 MG/ML
40 INJECTION SUBCUTANEOUS EVERY 24 HOURS
Refills: 0 | Status: DISCONTINUED | OUTPATIENT
Start: 2024-06-27 | End: 2024-06-30

## 2024-06-26 RX ORDER — GABAPENTIN
300 POWDER (GRAM) MISCELLANEOUS THREE TIMES A DAY
Refills: 0 | Status: DISCONTINUED | OUTPATIENT
Start: 2024-06-26 | End: 2024-06-30

## 2024-06-26 RX ORDER — DEXTROSE MONOHYDRATE AND SODIUM CHLORIDE 5; .3 G/100ML; G/100ML
1000 INJECTION, SOLUTION INTRAVENOUS
Refills: 0 | Status: DISCONTINUED | OUTPATIENT
Start: 2024-06-26 | End: 2024-06-30

## 2024-06-26 RX ORDER — ENOXAPARIN SODIUM 100 MG/ML
120 INJECTION SUBCUTANEOUS ONCE
Refills: 0 | Status: COMPLETED | OUTPATIENT
Start: 2024-06-26 | End: 2024-06-26

## 2024-06-26 RX ADMIN — CEFEPIME 100 MILLIGRAM(S): 1 INJECTION, POWDER, FOR SOLUTION INTRAMUSCULAR; INTRAVENOUS at 20:55

## 2024-06-26 RX ADMIN — ENOXAPARIN SODIUM 120 MILLIGRAM(S): 100 INJECTION SUBCUTANEOUS at 06:39

## 2024-06-26 RX ADMIN — CEFEPIME 100 MILLIGRAM(S): 1 INJECTION, POWDER, FOR SOLUTION INTRAMUSCULAR; INTRAVENOUS at 08:06

## 2024-06-26 RX ADMIN — Medication 300 MILLIGRAM(S): at 06:39

## 2024-06-26 RX ADMIN — Medication 300 MILLIGRAM(S): at 15:08

## 2024-06-26 RX ADMIN — POTASSIUM CHLORIDE 40 MILLIEQUIVALENT(S): 600 TABLET, FILM COATED, EXTENDED RELEASE ORAL at 15:08

## 2024-06-26 RX ADMIN — Medication 300 MILLIGRAM(S): at 21:00

## 2024-06-27 LAB
ANION GAP SERPL CALC-SCNC: 6 MMOL/L — SIGNIFICANT CHANGE UP (ref 5–17)
BASOPHILS # BLD AUTO: 0.07 K/UL — SIGNIFICANT CHANGE UP (ref 0–0.2)
BASOPHILS NFR BLD AUTO: 0.7 % — SIGNIFICANT CHANGE UP (ref 0–2)
BUN SERPL-MCNC: 15 MG/DL — SIGNIFICANT CHANGE UP (ref 7–23)
CALCIUM SERPL-MCNC: 9.6 MG/DL — SIGNIFICANT CHANGE UP (ref 8.5–10.1)
CHLORIDE SERPL-SCNC: 110 MMOL/L — HIGH (ref 96–108)
CO2 SERPL-SCNC: 27 MMOL/L — SIGNIFICANT CHANGE UP (ref 22–31)
CREAT SERPL-MCNC: 1.1 MG/DL — SIGNIFICANT CHANGE UP (ref 0.5–1.3)
EGFR: 83 ML/MIN/1.73M2 — SIGNIFICANT CHANGE UP
EOSINOPHIL # BLD AUTO: 0.58 K/UL — HIGH (ref 0–0.5)
EOSINOPHIL NFR BLD AUTO: 5.6 % — SIGNIFICANT CHANGE UP (ref 0–6)
GLUCOSE SERPL-MCNC: 102 MG/DL — HIGH (ref 70–99)
HCT VFR BLD CALC: 42.5 % — SIGNIFICANT CHANGE UP (ref 39–50)
HGB BLD-MCNC: 14.2 G/DL — SIGNIFICANT CHANGE UP (ref 13–17)
IMM GRANULOCYTES NFR BLD AUTO: 0.3 % — SIGNIFICANT CHANGE UP (ref 0–0.9)
LYMPHOCYTES # BLD AUTO: 2.46 K/UL — SIGNIFICANT CHANGE UP (ref 1–3.3)
LYMPHOCYTES # BLD AUTO: 23.8 % — SIGNIFICANT CHANGE UP (ref 13–44)
MCHC RBC-ENTMCNC: 29.8 PG — SIGNIFICANT CHANGE UP (ref 27–34)
MCHC RBC-ENTMCNC: 33.4 GM/DL — SIGNIFICANT CHANGE UP (ref 32–36)
MCV RBC AUTO: 89.1 FL — SIGNIFICANT CHANGE UP (ref 80–100)
MONOCYTES # BLD AUTO: 1.11 K/UL — HIGH (ref 0–0.9)
MONOCYTES NFR BLD AUTO: 10.7 % — SIGNIFICANT CHANGE UP (ref 2–14)
NEUTROPHILS # BLD AUTO: 6.09 K/UL — SIGNIFICANT CHANGE UP (ref 1.8–7.4)
NEUTROPHILS NFR BLD AUTO: 58.9 % — SIGNIFICANT CHANGE UP (ref 43–77)
NRBC # BLD: 0 /100 WBCS — SIGNIFICANT CHANGE UP (ref 0–0)
PLATELET # BLD AUTO: 217 K/UL — SIGNIFICANT CHANGE UP (ref 150–400)
POTASSIUM SERPL-MCNC: 3.8 MMOL/L — SIGNIFICANT CHANGE UP (ref 3.5–5.3)
POTASSIUM SERPL-SCNC: 3.8 MMOL/L — SIGNIFICANT CHANGE UP (ref 3.5–5.3)
RBC # BLD: 4.77 M/UL — SIGNIFICANT CHANGE UP (ref 4.2–5.8)
RBC # FLD: 15.1 % — HIGH (ref 10.3–14.5)
SODIUM SERPL-SCNC: 143 MMOL/L — SIGNIFICANT CHANGE UP (ref 135–145)
WBC # BLD: 10.34 K/UL — SIGNIFICANT CHANGE UP (ref 3.8–10.5)
WBC # FLD AUTO: 10.34 K/UL — SIGNIFICANT CHANGE UP (ref 3.8–10.5)

## 2024-06-27 PROCEDURE — 99232 SBSQ HOSP IP/OBS MODERATE 35: CPT

## 2024-06-27 PROCEDURE — 71275 CT ANGIOGRAPHY CHEST: CPT | Mod: 26

## 2024-06-27 PROCEDURE — 74177 CT ABD & PELVIS W/CONTRAST: CPT | Mod: 26

## 2024-06-27 RX ORDER — BIOTIN/FOLIC AC/VIT BCOMP,C/ZN 3MG-0.8MG
1 TABLET ORAL DAILY
Refills: 0 | Status: CANCELLED | OUTPATIENT
Start: 2024-06-27 | End: 2024-06-30

## 2024-06-27 RX ORDER — ACETAMINOPHEN 325 MG
1000 TABLET ORAL ONCE
Refills: 0 | Status: DISCONTINUED | OUTPATIENT
Start: 2024-06-27 | End: 2024-06-30

## 2024-06-27 RX ADMIN — CEFEPIME 100 MILLIGRAM(S): 1 INJECTION, POWDER, FOR SOLUTION INTRAMUSCULAR; INTRAVENOUS at 09:48

## 2024-06-27 RX ADMIN — Medication 300 MILLIGRAM(S): at 20:33

## 2024-06-27 RX ADMIN — ENOXAPARIN SODIUM 40 MILLIGRAM(S): 100 INJECTION SUBCUTANEOUS at 05:29

## 2024-06-27 RX ADMIN — Medication 5 MILLIGRAM(S): at 06:34

## 2024-06-27 RX ADMIN — CEFEPIME 100 MILLIGRAM(S): 1 INJECTION, POWDER, FOR SOLUTION INTRAMUSCULAR; INTRAVENOUS at 20:33

## 2024-06-27 RX ADMIN — Medication 300 MILLIGRAM(S): at 05:28

## 2024-06-27 RX ADMIN — Medication 300 MILLIGRAM(S): at 13:23

## 2024-06-28 ENCOUNTER — TRANSCRIPTION ENCOUNTER (OUTPATIENT)
Age: 49
End: 2024-06-28

## 2024-06-28 LAB
ANION GAP SERPL CALC-SCNC: 6 MMOL/L — SIGNIFICANT CHANGE UP (ref 5–17)
BASOPHILS # BLD AUTO: 0.07 K/UL — SIGNIFICANT CHANGE UP (ref 0–0.2)
BASOPHILS NFR BLD AUTO: 0.8 % — SIGNIFICANT CHANGE UP (ref 0–2)
BUN SERPL-MCNC: 21 MG/DL — SIGNIFICANT CHANGE UP (ref 7–23)
CALCIUM SERPL-MCNC: 9.3 MG/DL — SIGNIFICANT CHANGE UP (ref 8.5–10.1)
CHLORIDE SERPL-SCNC: 108 MMOL/L — SIGNIFICANT CHANGE UP (ref 96–108)
CO2 SERPL-SCNC: 27 MMOL/L — SIGNIFICANT CHANGE UP (ref 22–31)
CREAT SERPL-MCNC: 1.1 MG/DL — SIGNIFICANT CHANGE UP (ref 0.5–1.3)
EGFR: 83 ML/MIN/1.73M2 — SIGNIFICANT CHANGE UP
EOSINOPHIL # BLD AUTO: 0.47 K/UL — SIGNIFICANT CHANGE UP (ref 0–0.5)
EOSINOPHIL NFR BLD AUTO: 5.3 % — SIGNIFICANT CHANGE UP (ref 0–6)
GLUCOSE SERPL-MCNC: 92 MG/DL — SIGNIFICANT CHANGE UP (ref 70–99)
HCT VFR BLD CALC: 41.9 % — SIGNIFICANT CHANGE UP (ref 39–50)
HGB BLD-MCNC: 13.8 G/DL — SIGNIFICANT CHANGE UP (ref 13–17)
IMM GRANULOCYTES NFR BLD AUTO: 0.4 % — SIGNIFICANT CHANGE UP (ref 0–0.9)
LYMPHOCYTES # BLD AUTO: 1.97 K/UL — SIGNIFICANT CHANGE UP (ref 1–3.3)
LYMPHOCYTES # BLD AUTO: 22.2 % — SIGNIFICANT CHANGE UP (ref 13–44)
MCHC RBC-ENTMCNC: 28.8 PG — SIGNIFICANT CHANGE UP (ref 27–34)
MCHC RBC-ENTMCNC: 32.9 GM/DL — SIGNIFICANT CHANGE UP (ref 32–36)
MCV RBC AUTO: 87.5 FL — SIGNIFICANT CHANGE UP (ref 80–100)
MONOCYTES # BLD AUTO: 0.74 K/UL — SIGNIFICANT CHANGE UP (ref 0–0.9)
MONOCYTES NFR BLD AUTO: 8.3 % — SIGNIFICANT CHANGE UP (ref 2–14)
NEUTROPHILS # BLD AUTO: 5.6 K/UL — SIGNIFICANT CHANGE UP (ref 1.8–7.4)
NEUTROPHILS NFR BLD AUTO: 63 % — SIGNIFICANT CHANGE UP (ref 43–77)
NRBC # BLD: 0 /100 WBCS — SIGNIFICANT CHANGE UP (ref 0–0)
PLATELET # BLD AUTO: 250 K/UL — SIGNIFICANT CHANGE UP (ref 150–400)
POTASSIUM SERPL-MCNC: 3.6 MMOL/L — SIGNIFICANT CHANGE UP (ref 3.5–5.3)
POTASSIUM SERPL-SCNC: 3.6 MMOL/L — SIGNIFICANT CHANGE UP (ref 3.5–5.3)
RBC # BLD: 4.79 M/UL — SIGNIFICANT CHANGE UP (ref 4.2–5.8)
RBC # FLD: 14.9 % — HIGH (ref 10.3–14.5)
SODIUM SERPL-SCNC: 141 MMOL/L — SIGNIFICANT CHANGE UP (ref 135–145)
WBC # BLD: 8.89 K/UL — SIGNIFICANT CHANGE UP (ref 3.8–10.5)
WBC # FLD AUTO: 8.89 K/UL — SIGNIFICANT CHANGE UP (ref 3.8–10.5)

## 2024-06-28 PROCEDURE — 99232 SBSQ HOSP IP/OBS MODERATE 35: CPT

## 2024-06-28 RX ADMIN — ENOXAPARIN SODIUM 40 MILLIGRAM(S): 100 INJECTION SUBCUTANEOUS at 05:35

## 2024-06-28 RX ADMIN — Medication 300 MILLIGRAM(S): at 13:27

## 2024-06-28 RX ADMIN — Medication 300 MILLIGRAM(S): at 21:45

## 2024-06-28 RX ADMIN — CEFEPIME 100 MILLIGRAM(S): 1 INJECTION, POWDER, FOR SOLUTION INTRAMUSCULAR; INTRAVENOUS at 20:45

## 2024-06-28 RX ADMIN — CEFEPIME 100 MILLIGRAM(S): 1 INJECTION, POWDER, FOR SOLUTION INTRAMUSCULAR; INTRAVENOUS at 08:58

## 2024-06-28 RX ADMIN — Medication 300 MILLIGRAM(S): at 05:35

## 2024-06-29 LAB
ANION GAP SERPL CALC-SCNC: 9 MMOL/L — SIGNIFICANT CHANGE UP (ref 5–17)
BASOPHILS # BLD AUTO: 0.07 K/UL — SIGNIFICANT CHANGE UP (ref 0–0.2)
BASOPHILS NFR BLD AUTO: 0.8 % — SIGNIFICANT CHANGE UP (ref 0–2)
BUN SERPL-MCNC: 21 MG/DL — SIGNIFICANT CHANGE UP (ref 7–23)
CALCIUM SERPL-MCNC: 9.2 MG/DL — SIGNIFICANT CHANGE UP (ref 8.5–10.1)
CHLORIDE SERPL-SCNC: 108 MMOL/L — SIGNIFICANT CHANGE UP (ref 96–108)
CO2 SERPL-SCNC: 25 MMOL/L — SIGNIFICANT CHANGE UP (ref 22–31)
CREAT SERPL-MCNC: 0.96 MG/DL — SIGNIFICANT CHANGE UP (ref 0.5–1.3)
EGFR: 98 ML/MIN/1.73M2 — SIGNIFICANT CHANGE UP
EOSINOPHIL # BLD AUTO: 0.47 K/UL — SIGNIFICANT CHANGE UP (ref 0–0.5)
EOSINOPHIL NFR BLD AUTO: 5.4 % — SIGNIFICANT CHANGE UP (ref 0–6)
GLUCOSE SERPL-MCNC: 107 MG/DL — HIGH (ref 70–99)
HCT VFR BLD CALC: 43.2 % — SIGNIFICANT CHANGE UP (ref 39–50)
HGB BLD-MCNC: 14.5 G/DL — SIGNIFICANT CHANGE UP (ref 13–17)
IMM GRANULOCYTES NFR BLD AUTO: 0.6 % — SIGNIFICANT CHANGE UP (ref 0–0.9)
LYMPHOCYTES # BLD AUTO: 2.27 K/UL — SIGNIFICANT CHANGE UP (ref 1–3.3)
LYMPHOCYTES # BLD AUTO: 26.1 % — SIGNIFICANT CHANGE UP (ref 13–44)
MAGNESIUM SERPL-MCNC: 1.8 MG/DL — SIGNIFICANT CHANGE UP (ref 1.6–2.6)
MCHC RBC-ENTMCNC: 29.1 PG — SIGNIFICANT CHANGE UP (ref 27–34)
MCHC RBC-ENTMCNC: 33.6 GM/DL — SIGNIFICANT CHANGE UP (ref 32–36)
MCV RBC AUTO: 86.7 FL — SIGNIFICANT CHANGE UP (ref 80–100)
MONOCYTES # BLD AUTO: 0.82 K/UL — SIGNIFICANT CHANGE UP (ref 0–0.9)
MONOCYTES NFR BLD AUTO: 9.4 % — SIGNIFICANT CHANGE UP (ref 2–14)
NEUTROPHILS # BLD AUTO: 5.03 K/UL — SIGNIFICANT CHANGE UP (ref 1.8–7.4)
NEUTROPHILS NFR BLD AUTO: 57.7 % — SIGNIFICANT CHANGE UP (ref 43–77)
NRBC # BLD: 0 /100 WBCS — SIGNIFICANT CHANGE UP (ref 0–0)
PHOSPHATE SERPL-MCNC: 2.9 MG/DL — SIGNIFICANT CHANGE UP (ref 2.5–4.5)
PLATELET # BLD AUTO: 265 K/UL — SIGNIFICANT CHANGE UP (ref 150–400)
POTASSIUM SERPL-MCNC: 3.3 MMOL/L — LOW (ref 3.5–5.3)
POTASSIUM SERPL-SCNC: 3.3 MMOL/L — LOW (ref 3.5–5.3)
RBC # BLD: 4.98 M/UL — SIGNIFICANT CHANGE UP (ref 4.2–5.8)
RBC # FLD: 14.6 % — HIGH (ref 10.3–14.5)
SODIUM SERPL-SCNC: 142 MMOL/L — SIGNIFICANT CHANGE UP (ref 135–145)
WBC # BLD: 8.71 K/UL — SIGNIFICANT CHANGE UP (ref 3.8–10.5)
WBC # FLD AUTO: 8.71 K/UL — SIGNIFICANT CHANGE UP (ref 3.8–10.5)

## 2024-06-29 PROCEDURE — 99232 SBSQ HOSP IP/OBS MODERATE 35: CPT

## 2024-06-29 RX ORDER — POTASSIUM CHLORIDE 600 MG/1
40 TABLET, FILM COATED, EXTENDED RELEASE ORAL ONCE
Refills: 0 | Status: COMPLETED | OUTPATIENT
Start: 2024-06-29 | End: 2024-06-29

## 2024-06-29 RX ORDER — SENNOSIDES 8.6 MG
2 TABLET ORAL AT BEDTIME
Refills: 0 | Status: DISCONTINUED | OUTPATIENT
Start: 2024-06-29 | End: 2024-06-30

## 2024-06-29 RX ORDER — LACTULOSE 10 G/15ML
20 SOLUTION ORAL ONCE
Refills: 0 | Status: COMPLETED | OUTPATIENT
Start: 2024-06-29 | End: 2024-06-29

## 2024-06-29 RX ADMIN — Medication 300 MILLIGRAM(S): at 21:02

## 2024-06-29 RX ADMIN — ENOXAPARIN SODIUM 40 MILLIGRAM(S): 100 INJECTION SUBCUTANEOUS at 05:30

## 2024-06-29 RX ADMIN — POTASSIUM CHLORIDE 40 MILLIEQUIVALENT(S): 600 TABLET, FILM COATED, EXTENDED RELEASE ORAL at 19:19

## 2024-06-29 RX ADMIN — CEFEPIME 100 MILLIGRAM(S): 1 INJECTION, POWDER, FOR SOLUTION INTRAMUSCULAR; INTRAVENOUS at 20:57

## 2024-06-29 RX ADMIN — Medication 5 MILLIGRAM(S): at 05:30

## 2024-06-29 RX ADMIN — LACTULOSE 20 GRAM(S): 10 SOLUTION ORAL at 19:19

## 2024-06-29 RX ADMIN — Medication 300 MILLIGRAM(S): at 17:45

## 2024-06-29 RX ADMIN — CEFEPIME 100 MILLIGRAM(S): 1 INJECTION, POWDER, FOR SOLUTION INTRAMUSCULAR; INTRAVENOUS at 08:17

## 2024-06-29 RX ADMIN — Medication 2 TABLET(S): at 21:02

## 2024-06-29 RX ADMIN — Medication 300 MILLIGRAM(S): at 05:30

## 2024-06-30 VITALS
OXYGEN SATURATION: 92 % | RESPIRATION RATE: 18 BRPM | HEART RATE: 100 BPM | WEIGHT: 234.57 LBS | SYSTOLIC BLOOD PRESSURE: 137 MMHG | DIASTOLIC BLOOD PRESSURE: 83 MMHG | TEMPERATURE: 98 F

## 2024-06-30 LAB
ANION GAP SERPL CALC-SCNC: 7 MMOL/L — SIGNIFICANT CHANGE UP (ref 5–17)
BASOPHILS # BLD AUTO: 0.08 K/UL — SIGNIFICANT CHANGE UP (ref 0–0.2)
BASOPHILS NFR BLD AUTO: 0.8 % — SIGNIFICANT CHANGE UP (ref 0–2)
BUN SERPL-MCNC: 21 MG/DL — SIGNIFICANT CHANGE UP (ref 7–23)
CALCIUM SERPL-MCNC: 9.6 MG/DL — SIGNIFICANT CHANGE UP (ref 8.5–10.1)
CHLORIDE SERPL-SCNC: 109 MMOL/L — HIGH (ref 96–108)
CO2 SERPL-SCNC: 25 MMOL/L — SIGNIFICANT CHANGE UP (ref 22–31)
CREAT SERPL-MCNC: 0.92 MG/DL — SIGNIFICANT CHANGE UP (ref 0.5–1.3)
EGFR: 103 ML/MIN/1.73M2 — SIGNIFICANT CHANGE UP
EOSINOPHIL # BLD AUTO: 0.52 K/UL — HIGH (ref 0–0.5)
EOSINOPHIL NFR BLD AUTO: 5.5 % — SIGNIFICANT CHANGE UP (ref 0–6)
GLUCOSE SERPL-MCNC: 113 MG/DL — HIGH (ref 70–99)
HCT VFR BLD CALC: 45.8 % — SIGNIFICANT CHANGE UP (ref 39–50)
HGB BLD-MCNC: 15.1 G/DL — SIGNIFICANT CHANGE UP (ref 13–17)
IMM GRANULOCYTES NFR BLD AUTO: 0.7 % — SIGNIFICANT CHANGE UP (ref 0–0.9)
LYMPHOCYTES # BLD AUTO: 2.73 K/UL — SIGNIFICANT CHANGE UP (ref 1–3.3)
LYMPHOCYTES # BLD AUTO: 28.9 % — SIGNIFICANT CHANGE UP (ref 13–44)
MCHC RBC-ENTMCNC: 28.8 PG — SIGNIFICANT CHANGE UP (ref 27–34)
MCHC RBC-ENTMCNC: 33 GM/DL — SIGNIFICANT CHANGE UP (ref 32–36)
MCV RBC AUTO: 87.2 FL — SIGNIFICANT CHANGE UP (ref 80–100)
MONOCYTES # BLD AUTO: 0.92 K/UL — HIGH (ref 0–0.9)
MONOCYTES NFR BLD AUTO: 9.7 % — SIGNIFICANT CHANGE UP (ref 2–14)
NEUTROPHILS # BLD AUTO: 5.13 K/UL — SIGNIFICANT CHANGE UP (ref 1.8–7.4)
NEUTROPHILS NFR BLD AUTO: 54.4 % — SIGNIFICANT CHANGE UP (ref 43–77)
NRBC # BLD: 0 /100 WBCS — SIGNIFICANT CHANGE UP (ref 0–0)
PLATELET # BLD AUTO: 277 K/UL — SIGNIFICANT CHANGE UP (ref 150–400)
POTASSIUM SERPL-MCNC: 3.8 MMOL/L — SIGNIFICANT CHANGE UP (ref 3.5–5.3)
POTASSIUM SERPL-SCNC: 3.8 MMOL/L — SIGNIFICANT CHANGE UP (ref 3.5–5.3)
RBC # BLD: 5.25 M/UL — SIGNIFICANT CHANGE UP (ref 4.2–5.8)
RBC # FLD: 14.5 % — SIGNIFICANT CHANGE UP (ref 10.3–14.5)
SODIUM SERPL-SCNC: 141 MMOL/L — SIGNIFICANT CHANGE UP (ref 135–145)
WBC # BLD: 9.45 K/UL — SIGNIFICANT CHANGE UP (ref 3.8–10.5)
WBC # FLD AUTO: 9.45 K/UL — SIGNIFICANT CHANGE UP (ref 3.8–10.5)

## 2024-06-30 PROCEDURE — 71275 CT ANGIOGRAPHY CHEST: CPT | Mod: MC

## 2024-06-30 PROCEDURE — 99232 SBSQ HOSP IP/OBS MODERATE 35: CPT

## 2024-06-30 PROCEDURE — 85025 COMPLETE CBC W/AUTO DIFF WBC: CPT

## 2024-06-30 PROCEDURE — 82962 GLUCOSE BLOOD TEST: CPT

## 2024-06-30 PROCEDURE — 71045 X-RAY EXAM CHEST 1 VIEW: CPT

## 2024-06-30 PROCEDURE — 96375 TX/PRO/DX INJ NEW DRUG ADDON: CPT

## 2024-06-30 PROCEDURE — 99239 HOSP IP/OBS DSCHRG MGMT >30: CPT

## 2024-06-30 PROCEDURE — 80053 COMPREHEN METABOLIC PANEL: CPT

## 2024-06-30 PROCEDURE — 96374 THER/PROPH/DIAG INJ IV PUSH: CPT

## 2024-06-30 PROCEDURE — 80048 BASIC METABOLIC PNL TOTAL CA: CPT

## 2024-06-30 PROCEDURE — 36415 COLL VENOUS BLD VENIPUNCTURE: CPT

## 2024-06-30 PROCEDURE — 81001 URINALYSIS AUTO W/SCOPE: CPT

## 2024-06-30 PROCEDURE — 83605 ASSAY OF LACTIC ACID: CPT

## 2024-06-30 PROCEDURE — 85027 COMPLETE CBC AUTOMATED: CPT

## 2024-06-30 PROCEDURE — 87040 BLOOD CULTURE FOR BACTERIA: CPT

## 2024-06-30 PROCEDURE — 84100 ASSAY OF PHOSPHORUS: CPT

## 2024-06-30 PROCEDURE — 99285 EMERGENCY DEPT VISIT HI MDM: CPT

## 2024-06-30 PROCEDURE — 83735 ASSAY OF MAGNESIUM: CPT

## 2024-06-30 PROCEDURE — 84484 ASSAY OF TROPONIN QUANT: CPT

## 2024-06-30 PROCEDURE — 93970 EXTREMITY STUDY: CPT

## 2024-06-30 PROCEDURE — 93005 ELECTROCARDIOGRAM TRACING: CPT

## 2024-06-30 PROCEDURE — 74177 CT ABD & PELVIS W/CONTRAST: CPT | Mod: MC

## 2024-06-30 RX ORDER — SENNOSIDES 8.6 MG
2 TABLET ORAL
Qty: 0 | Refills: 0 | DISCHARGE
Start: 2024-06-30

## 2024-06-30 RX ADMIN — CEFEPIME 100 MILLIGRAM(S): 1 INJECTION, POWDER, FOR SOLUTION INTRAMUSCULAR; INTRAVENOUS at 08:09

## 2024-06-30 RX ADMIN — ENOXAPARIN SODIUM 40 MILLIGRAM(S): 100 INJECTION SUBCUTANEOUS at 05:40

## 2024-06-30 RX ADMIN — POLYETHYLENE GLYCOL 3350 17 GRAM(S): 1 POWDER ORAL at 06:57

## 2024-06-30 RX ADMIN — Medication 300 MILLIGRAM(S): at 05:39

## 2024-06-30 RX ADMIN — Medication 5 MILLIGRAM(S): at 05:40

## 2024-06-30 RX ADMIN — Medication 300 MILLIGRAM(S): at 14:46

## 2024-07-01 ENCOUNTER — APPOINTMENT (OUTPATIENT)
Dept: HOME HEALTH SERVICES | Facility: HOME HEALTH | Age: 49
End: 2024-07-01

## 2024-07-05 ENCOUNTER — APPOINTMENT (OUTPATIENT)
Dept: HOME HEALTH SERVICES | Facility: HOME HEALTH | Age: 49
End: 2024-07-05
Payer: MEDICAID

## 2024-07-05 VITALS
TEMPERATURE: 97.2 F | OXYGEN SATURATION: 98 % | HEART RATE: 93 BPM | SYSTOLIC BLOOD PRESSURE: 123 MMHG | RESPIRATION RATE: 18 BRPM | DIASTOLIC BLOOD PRESSURE: 81 MMHG

## 2024-07-05 DIAGNOSIS — E55.9 VITAMIN D DEFICIENCY, UNSPECIFIED: ICD-10-CM

## 2024-07-05 DIAGNOSIS — K59.09 OTHER CONSTIPATION: ICD-10-CM

## 2024-07-05 DIAGNOSIS — G35 MULTIPLE SCLEROSIS: ICD-10-CM

## 2024-07-05 DIAGNOSIS — R53.2 FUNCTIONAL QUADRIPLEGIA: ICD-10-CM

## 2024-07-05 DIAGNOSIS — Z97.8 PRESENCE OF OTHER SPECIFIED DEVICES: ICD-10-CM

## 2024-07-05 DIAGNOSIS — F32.A DEPRESSION, UNSPECIFIED: ICD-10-CM

## 2024-07-05 DIAGNOSIS — Z87.2 PERSONAL HISTORY OF DISEASES OF THE SKIN AND SUBCUTANEOUS TISSUE: ICD-10-CM

## 2024-07-05 DIAGNOSIS — N32.89 OTHER SPECIFIED DISORDERS OF BLADDER: ICD-10-CM

## 2024-07-05 DIAGNOSIS — M62.838 OTHER MUSCLE SPASM: ICD-10-CM

## 2024-07-05 DIAGNOSIS — B35.4 TINEA CORPORIS: ICD-10-CM

## 2024-07-05 PROCEDURE — 99349 HOME/RES VST EST MOD MDM 40: CPT

## 2024-07-05 RX ORDER — FLUCONAZOLE 200 MG/1
200 TABLET ORAL
Qty: 4 | Refills: 0 | Status: ACTIVE | COMMUNITY
Start: 2024-07-05 | End: 1900-01-01

## 2024-07-11 ENCOUNTER — LABORATORY RESULT (OUTPATIENT)
Age: 49
End: 2024-07-11

## 2024-07-11 ENCOUNTER — TRANSCRIPTION ENCOUNTER (OUTPATIENT)
Age: 49
End: 2024-07-11

## 2024-07-11 DIAGNOSIS — R82.90 UNSPECIFIED ABNORMAL FINDINGS IN URINE: ICD-10-CM

## 2024-07-11 RX ORDER — NITROFURANTOIN MACROCRYSTALS 100 MG/1
100 CAPSULE ORAL
Qty: 10 | Refills: 0 | Status: ACTIVE | COMMUNITY
Start: 2024-07-11 | End: 1900-01-01

## 2024-07-12 ENCOUNTER — LABORATORY RESULT (OUTPATIENT)
Age: 49
End: 2024-07-12

## 2024-08-17 ENCOUNTER — EMERGENCY (EMERGENCY)
Facility: HOSPITAL | Age: 49
LOS: 1 days | Discharge: HOME | End: 2024-08-17
Attending: EMERGENCY MEDICINE | Admitting: EMERGENCY MEDICINE
Payer: MEDICARE

## 2024-08-17 VITALS
SYSTOLIC BLOOD PRESSURE: 136 MMHG | TEMPERATURE: 99 F | OXYGEN SATURATION: 96 % | DIASTOLIC BLOOD PRESSURE: 88 MMHG | HEART RATE: 102 BPM | RESPIRATION RATE: 18 BRPM

## 2024-08-17 VITALS
OXYGEN SATURATION: 94 % | SYSTOLIC BLOOD PRESSURE: 149 MMHG | DIASTOLIC BLOOD PRESSURE: 110 MMHG | RESPIRATION RATE: 18 BRPM | TEMPERATURE: 99 F | WEIGHT: 270.07 LBS | HEART RATE: 116 BPM | HEIGHT: 70 IN

## 2024-08-17 DIAGNOSIS — Z93.6 OTHER ARTIFICIAL OPENINGS OF URINARY TRACT STATUS: Chronic | ICD-10-CM

## 2024-08-17 PROCEDURE — 51702 INSERT TEMP BLADDER CATH: CPT

## 2024-08-17 PROCEDURE — 99283 EMERGENCY DEPT VISIT LOW MDM: CPT

## 2024-08-17 NOTE — ED PROVIDER NOTE - PATIENT PORTAL LINK FT
You can access the FollowMyHealth Patient Portal offered by [facility] by registering at the following website: [email or web address]. By joining [facility]’s FollowMyHealth portal, you will also be able to view your health information using other applications (apps) compatible with our system.

## 2024-08-17 NOTE — ED PROVIDER NOTE - PHYSICAL EXAMINATION
Examination reveals a obese white male paraplegic in no acute distress with clear lungs normal heart sounds obese nontender abdomen external genitalia uncircumcised.  Neurologically patient is paraplegic.

## 2024-08-17 NOTE — ED CLERICAL - NS ED CLERK NOTE PRE-ARRIVAL INFORMATION; ADDITIONAL PRE-ARRIVAL INFORMATION

## 2024-08-17 NOTE — ED ADULT NURSE NOTE - NSFALLRISKINTERV_ED_ALL_ED
Assistance OOB with selected safe patient handling equipment if applicable/Assistance with ambulation/Communicate fall risk and risk factors to all staff, patient, and family/Provide visual cue: yellow wristband, yellow gown, etc/Reinforce activity limits and safety measures with patient and family/Toileting schedule using arm’s reach rule for commode and bathroom/Call bell, personal items and telephone in reach/Instruct patient to call for assistance before getting out of bed/chair/stretcher/Non-slip footwear applied when patient is off stretcher/[place] to call system/Physically safe environment - no spills, clutter or unnecessary equipment/Purposeful Proactive Rounding/Room/bathroom lighting operational, light cord in reach

## 2024-08-17 NOTE — ED PROVIDER NOTE - CARE PROVIDER_API CALL
Andres Little  Urology  [address], [address]  [place] [address]  Phone: [phone]  Fax: [phone]  Follow Up Time:

## 2024-08-17 NOTE — ED PROVIDER NOTE - OBJECTIVE STATEMENT
Patient is a 49-year-old white male with history of multiple sclerosis previous pulmonary embolism and 2013 kidney stones as well as DVT also with chronic indwelling Steele catheter here complaining of obstructed/clogged catheter.  No fever no chills no nausea no vomiting or diarrhea.

## 2024-08-17 NOTE — ED PROVIDER NOTE - CLINICAL SUMMARY MEDICAL DECISION MAKING FREE TEXT BOX
Patient with chronic Steele catheter with history of multiple sclerosis patient with paraplegia as well requiring replacement of Steele catheter.

## 2024-08-19 ENCOUNTER — APPOINTMENT (OUTPATIENT)
Dept: HOME HEALTH SERVICES | Facility: HOME HEALTH | Age: 49
End: 2024-08-19

## 2024-08-19 NOTE — ED PROVIDER NOTE - DATE/TIME 2
14-Jun-2022 19:46

## 2024-08-20 NOTE — ED PROVIDER NOTE - CARDIOVASCULAR [-], MLM
Detail Level: Detailed General Sunscreen Counseling: I recommended a broad-spectrum sunscreen with a SPF of 30 or higher, with reapplication at least 15 minutes prior to expected sun exposure and then every 2 hours after that as long as sun exposure continues, sooner if sweating/swimming/toweling off. Other sun protective measures such as UPF clothing, hats, etc. are also helpful. no chest pain

## 2024-08-22 ENCOUNTER — APPOINTMENT (OUTPATIENT)
Dept: HOME HEALTH SERVICES | Facility: HOME HEALTH | Age: 49
End: 2024-08-22
Payer: MEDICAID

## 2024-08-22 VITALS
SYSTOLIC BLOOD PRESSURE: 123 MMHG | TEMPERATURE: 98.2 F | DIASTOLIC BLOOD PRESSURE: 98 MMHG | OXYGEN SATURATION: 95 % | RESPIRATION RATE: 18 BRPM | HEART RATE: 90 BPM

## 2024-08-22 DIAGNOSIS — I26.99 OTHER PULMONARY EMBOLISM W/OUT ACUTE COR PULMONALE: ICD-10-CM

## 2024-08-22 DIAGNOSIS — B35.4 TINEA CORPORIS: ICD-10-CM

## 2024-08-22 DIAGNOSIS — R53.2 FUNCTIONAL QUADRIPLEGIA: ICD-10-CM

## 2024-08-22 DIAGNOSIS — N32.89 OTHER SPECIFIED DISORDERS OF BLADDER: ICD-10-CM

## 2024-08-22 DIAGNOSIS — F32.A DEPRESSION, UNSPECIFIED: ICD-10-CM

## 2024-08-22 DIAGNOSIS — G35 MULTIPLE SCLEROSIS: ICD-10-CM

## 2024-08-22 DIAGNOSIS — Z97.8 PRESENCE OF OTHER SPECIFIED DEVICES: ICD-10-CM

## 2024-08-22 PROCEDURE — 99349 HOME/RES VST EST MOD MDM 40: CPT

## 2024-08-24 RX ORDER — TERBINAFINE HYDROCHLORIDE 250 MG/1
250 TABLET ORAL
Qty: 14 | Refills: 0 | Status: ACTIVE | COMMUNITY
Start: 2024-08-24 | End: 1900-01-01

## 2024-08-24 NOTE — PHYSICAL EXAM
[No Acute Distress] : no acute distress [Normal Voice/Communication] : normal voice communication [Normal Sclera/Conjunctiva] : normal sclera/conjunctiva [PERRL] : pupils equal, round and reactive to light [Normal Outer Ear/Nose] : the ears and nose were normal in appearance [Supple] : the neck was supple [No Respiratory Distress] : no respiratory distress [Clear to Auscultation] : lungs were clear to auscultation bilaterally [Normal Rate] : heart rate was normal  [Regular Rhythm] : with a regular rhythm [Normal S1, S2] : normal S1 and S2 [No Murmurs] : no murmurs heard [Pedal Pulses Present] : the pedal pulses are present [Normal Bowel Sounds] : normal bowel sounds [Non Tender] : non-tender [Soft] : abdomen soft [Not Distended] : not distended [No Spinal Tenderness] : no spinal tenderness [No Skin Lesions] : no skin lesions [Cranial Nerves Intact] : cranial nerves 2-12 were intact [No Gross Sensory Deficits] : no gross sensory deficits [Oriented x3] : oriented to person, place, and time [Normal Affect] : the affect was normal [Normal Mood] : the mood was normal [Normal Insight/Judgement] : insight and judgment were intact [Kyphosis] : no kyphosis present [de-identified] : pupils dilated [de-identified] : Edema [de-identified] : iraheta draining yellow urine [de-identified] : bed-bound, non-ambulatory. right side weaker than left  [de-identified] : L and R arm with progressed red/scaly rash [de-identified] : bed-bound. generalized weakness. right side weaker than left  [de-identified] : denies SI

## 2024-08-24 NOTE — REVIEW OF SYSTEMS
[Vision Problems] : vision problems [Lower Ext Edema] : lower extremity edema [Constipation] : constipation [Incontinence] : incontinence [Joint Pain] : joint pain [Joint Stiffness] : joint stiffness [Muscle Weakness] : muscle weakness [Unsteady Walk] : ataxia [Negative] : Heme/Lymph [Suicidal] : not suicidal [FreeTextEntry3] : optic neuritis in left eye, cataracts in R eye [FreeTextEntry8] : as per HPI [de-identified] : as per HPI [de-identified] : as above

## 2024-08-24 NOTE — HISTORY OF PRESENT ILLNESS
[Patient] : patient [FreeTextEntry1] : MS [FreeTextEntry2] : PMH of MS, bed-bound, PE, depression w hx of SI attempt, neurogenic bladder, suprapubic Iraheta with conversion to regular catheter due to complications. Patient is being seen today for follow up visit.    Interval events: went to ED for iraheta exchange  At today's visit, patient seen laying in bed, better than last visit Arm Rash (L) been improving with clotrimazole-betamethasone, however ran out and rash is back and more extensive  Skin: rash progressed on b/l arms Appetite: eats well. Gets "Moms meals" from insurance - will eat three meals/day. Also, snacks in between. HHA will feed patient, patient not able to hold spoon/fork. No coughing with liquids.  BM: constipation -- he uses MiraLax to help. Has xlax in case of persistent constipation.  : Iraheta changed at urology (dr steel). In a good location draining yellow urine.  Pain: Gabapentin 600 TID. Also cyclobenzaprine prn. trial of magnesium Sleep: sleeps well. Does not get 8hrs/night. He will fall asleep & then a spasm will wake him up - he will smoke and then go back to sleep. He states the spams will wake him up every 20mins - the most sleep he will get will be an hour. He is used to his sleep cycle & believes he gets sufficient rest.   MS - first diagnosed at 20. Then, he was in remission for seven years, then he had another attack in 2013 - wheelchair bound since 2013. Bed-bound X three years (2020). Has not gotten OOB since 2020. Not able to bend his knees.   Depression: Always depressed - manages with marijuana. He also makes jokes and tries to have up-beat attitude. He has very supportive friends. His parents will also come by once a week or so, but he is not close with them. He feels "life is not worth living" and that he is waiting "for when gd will take me". Denies active SI. Wishes he could get  back as that helped give his life meaning.   Bladder spasms: uses marijuana to help with symptoms. Had side effect from baclofen, was on banofen. Now on Gabapentin and cyclobenzaprine  Has 24hr/aide. Weekend aide is here all weekend.

## 2024-08-24 NOTE — PHYSICAL EXAM
[No Acute Distress] : no acute distress [Normal Voice/Communication] : normal voice communication [Normal Sclera/Conjunctiva] : normal sclera/conjunctiva [PERRL] : pupils equal, round and reactive to light [Normal Outer Ear/Nose] : the ears and nose were normal in appearance [Supple] : the neck was supple [No Respiratory Distress] : no respiratory distress [Clear to Auscultation] : lungs were clear to auscultation bilaterally [Normal Rate] : heart rate was normal  [Regular Rhythm] : with a regular rhythm [Normal S1, S2] : normal S1 and S2 [No Murmurs] : no murmurs heard [Pedal Pulses Present] : the pedal pulses are present [Normal Bowel Sounds] : normal bowel sounds [Non Tender] : non-tender [Soft] : abdomen soft [Not Distended] : not distended [No Spinal Tenderness] : no spinal tenderness [No Skin Lesions] : no skin lesions [Cranial Nerves Intact] : cranial nerves 2-12 were intact [No Gross Sensory Deficits] : no gross sensory deficits [Oriented x3] : oriented to person, place, and time [Normal Affect] : the affect was normal [Normal Mood] : the mood was normal [Normal Insight/Judgement] : insight and judgment were intact [Kyphosis] : no kyphosis present [de-identified] : pupils dilated [de-identified] : Edema [de-identified] : iraheta draining yellow urine [de-identified] : bed-bound, non-ambulatory. right side weaker than left  [de-identified] : L and R arm with progressed red/scaly rash [de-identified] : bed-bound. generalized weakness. right side weaker than left  [de-identified] : denies SI

## 2024-08-24 NOTE — REVIEW OF SYSTEMS
[Vision Problems] : vision problems [Lower Ext Edema] : lower extremity edema [Constipation] : constipation [Incontinence] : incontinence [Joint Stiffness] : joint stiffness [Joint Pain] : joint pain [Muscle Weakness] : muscle weakness [Unsteady Walk] : ataxia [Negative] : Heme/Lymph [Suicidal] : not suicidal [FreeTextEntry3] : optic neuritis in left eye, cataracts in R eye [FreeTextEntry8] : as per HPI [de-identified] : as per HPI [de-identified] : as above

## 2024-08-24 NOTE — COUNSELING
[Obese -  ( BMI  >29.9 )] : obese - ( BMI  >29.9 ) [Continue diet as tolerated] : continue diet as tolerated based on goals of care [Smoke/CO Detectors] : smoke/CO detectors [Use grab bars] : use grab bars [Use assistive device to avoid falls] : use assistive device to avoid falls [Remove clutter and unsafe carpeting to avoid falls] : remove clutter and unsafe carpeting to avoid falls [] : abdominal aortic ultrasound [Minimize unnecessary interventions] : minimize unnecessary interventions [Comfort Care] : comfort care [Discussed disease trajectory with patient/caregiver] : discussed disease trajectory with patient/caregiver [Likely to achieve goals/desired outcomes] : likely to achieve goals/desired outcomes [Patient/Caregiver has ___ understanding of disease process] : patient/caregiver has [unfilled] understanding of disease process [Advanced Directives discussed: ____] : Advanced directives discussed: [unfilled] [DNR] : Code Status: DNR [Limited] : Treatment Guidelines: Limited [DNI] : Intubation: DNI [[facility]: _____[facility]/last verification date: [unfilled] [FreeTextEntry4] : does not smoke cigarettes, smokes weed - manages spasms with it; not interested in quitting [de-identified] : No HD, Yes IVF, abx if needed, hospitalize as needed. No feeding tube. Comfort is priority. "if i have a disease that will take my life, let me go"

## 2024-08-24 NOTE — COUNSELING
[Obese -  ( BMI  >29.9 )] : obese - ( BMI  >29.9 ) [Continue diet as tolerated] : continue diet as tolerated based on goals of care [Smoke/CO Detectors] : smoke/CO detectors [Use grab bars] : use grab bars [Use assistive device to avoid falls] : use assistive device to avoid falls [Remove clutter and unsafe carpeting to avoid falls] : remove clutter and unsafe carpeting to avoid falls [] : abdominal aortic ultrasound [Minimize unnecessary interventions] : minimize unnecessary interventions [Comfort Care] : comfort care [Discussed disease trajectory with patient/caregiver] : discussed disease trajectory with patient/caregiver [Likely to achieve goals/desired outcomes] : likely to achieve goals/desired outcomes [Patient/Caregiver has ___ understanding of disease process] : patient/caregiver has [unfilled] understanding of disease process [Advanced Directives discussed: ____] : Advanced directives discussed: [unfilled] [DNR] : Code Status: DNR [Limited] : Treatment Guidelines: Limited [DNI] : Intubation: DNI [[facility]: _____[facility]/last verification date: [unfilled] [FreeTextEntry4] : does not smoke cigarettes, smokes weed - manages spasms with it; not interested in quitting [de-identified] : No HD, Yes IVF, abx if needed, hospitalize as needed. No feeding tube. Comfort is priority. "if i have a disease that will take my life, let me go"

## 2024-08-24 NOTE — HEALTH RISK ASSESSMENT
[HRA Reviewed] : Health risk assessment reviewed [Some assistance needed] : managing finances [Full assistance needed] : using transportation [Patient not ambulatory (Wheelchair)] : Patient is not ambulatory (Wheelchair) [Yes] : The patient does have visual impairment [FreeTextEntry8] : na [TimeGetUpGo] : 0 [de-identified] : optic neuritis and cataracts

## 2024-08-24 NOTE — ASSESSMENT
[FreeTextEntry1] : labs done last visit, reviewed PO antifungal Gabapentin to 600mg TID - will f/u next visit Magnesium trial  A reclining wheelchair is needed to prevent pressure ulcer development and to assist with patient transport as  the patient is unable to make weight shift due to poor trunk control.

## 2024-08-24 NOTE — HEALTH RISK ASSESSMENT
[HRA Reviewed] : Health risk assessment reviewed [Some assistance needed] : managing finances [Full assistance needed] : using transportation [Patient not ambulatory (Wheelchair)] : Patient is not ambulatory (Wheelchair) [Yes] : The patient does have visual impairment [FreeTextEntry8] : na [TimeGetUpGo] : 0 [de-identified] : optic neuritis and cataracts

## 2024-09-03 ENCOUNTER — NON-APPOINTMENT (OUTPATIENT)
Age: 49
End: 2024-09-03

## 2024-09-28 ENCOUNTER — EMERGENCY (EMERGENCY)
Facility: HOSPITAL | Age: 49
LOS: 1 days | Discharge: HOME | End: 2024-09-28
Attending: EMERGENCY MEDICINE | Admitting: EMERGENCY MEDICINE
Payer: MEDICARE

## 2024-09-28 ENCOUNTER — TRANSCRIPTION ENCOUNTER (OUTPATIENT)
Age: 49
End: 2024-09-28

## 2024-09-28 VITALS
RESPIRATION RATE: 19 BRPM | HEART RATE: 90 BPM | OXYGEN SATURATION: 97 % | DIASTOLIC BLOOD PRESSURE: 75 MMHG | TEMPERATURE: 98 F | SYSTOLIC BLOOD PRESSURE: 131 MMHG

## 2024-09-28 VITALS
WEIGHT: 309.97 LBS | TEMPERATURE: 98 F | HEART RATE: 97 BPM | RESPIRATION RATE: 20 BRPM | DIASTOLIC BLOOD PRESSURE: 77 MMHG | HEIGHT: 70 IN | SYSTOLIC BLOOD PRESSURE: 113 MMHG | OXYGEN SATURATION: 96 %

## 2024-09-28 DIAGNOSIS — Z93.6 OTHER ARTIFICIAL OPENINGS OF URINARY TRACT STATUS: Chronic | ICD-10-CM

## 2024-09-28 LAB
APPEARANCE UR: ABNORMAL
BACTERIA # UR AUTO: ABNORMAL /HPF
BILIRUB UR-MCNC: NEGATIVE — SIGNIFICANT CHANGE UP
COLOR SPEC: YELLOW — SIGNIFICANT CHANGE UP
DIFF PNL FLD: ABNORMAL
EPI CELLS # UR: PRESENT
GLUCOSE UR QL: NEGATIVE MG/DL — SIGNIFICANT CHANGE UP
KETONES UR-MCNC: NEGATIVE MG/DL — SIGNIFICANT CHANGE UP
LEUKOCYTE ESTERASE UR-ACNC: ABNORMAL
NITRITE UR-MCNC: POSITIVE
PH UR: 7 — SIGNIFICANT CHANGE UP (ref 5–8)
PROT UR-MCNC: 100 MG/DL
RBC CASTS # UR COMP ASSIST: 8 /HPF — HIGH (ref 0–4)
SP GR SPEC: 1.01 — SIGNIFICANT CHANGE UP (ref 1–1.03)
UROBILINOGEN FLD QL: 1 MG/DL — SIGNIFICANT CHANGE UP (ref 0.2–1)
WBC UR QL: ABNORMAL /HPF (ref 0–5)

## 2024-09-28 PROCEDURE — 87077 CULTURE AEROBIC IDENTIFY: CPT

## 2024-09-28 PROCEDURE — 87186 SC STD MICRODIL/AGAR DIL: CPT

## 2024-09-28 PROCEDURE — 87086 URINE CULTURE/COLONY COUNT: CPT

## 2024-09-28 PROCEDURE — 81001 URINALYSIS AUTO W/SCOPE: CPT

## 2024-09-28 PROCEDURE — 99283 EMERGENCY DEPT VISIT LOW MDM: CPT

## 2024-09-28 PROCEDURE — 51702 INSERT TEMP BLADDER CATH: CPT

## 2024-09-28 PROCEDURE — 99284 EMERGENCY DEPT VISIT MOD MDM: CPT | Mod: 25

## 2024-09-28 NOTE — ED PROVIDER NOTE - GENITOURINARY, MLM
No discharge, lesions. Iraheta in place, leaking around cath. Chronic iraheta related penile changes

## 2024-09-28 NOTE — ED ADULT NURSE NOTE - OBJECTIVE STATEMENT
Patient states he has a chronic iraheta secondary to MS.  Patient is obese, immobile; iraheta in place, urine leaking around iraheta, pus noted at urethra.  Skin care provided, iraheta removed and #18 fr iraheta inserted under sterile procedure.  Patient tolerated procedure as well as to be expected.  V\Cloudy urine draining.  Stat lock applied left thigh. Patient states he has a chronic iraheta secondary to MS.  Patient is obese, immobile; iraheta in place, urine leaking around iraheta, pus noted at urethra.  Skin care provided, iraheta removed and #18 fr iraheta inserted under sterile procedure.  Patient tolerated procedure as well as to be expected.  Yellow slightly cloudy urine draining.  Stat lock applied left thigh.

## 2024-09-28 NOTE — ED ADULT NURSE NOTE - DOES PATIENT HAVE ADVANCE DIRECTIVE
COVID-19 Screening:    • Does the patient OR patient’s household members have any of the following symptoms?  o Temperature: Fever ?100.0°F or ?37.8°C?  No  o Respiratory symptoms: New or worsening cough, shortness of breath, difficulty breathing, or sore throat? No  o GI symptoms: New onset of nausea, vomiting or diarrhea?  No  o Miscellaneous: New onset of loss of taste or smell, chills, repeated shaking with chills, muscle pain, headache, congestion or runny nose?  No  • Has the patient or a household member tested positive for COVID-19 in the last 14 days?  No  • Has the patient or a household member been tested for COVID-19 and are waiting for the results?  No    Reason for Disposition  • [1] Pain radiates into the thigh or further down the leg AND [2] both legs    Protocols used: BACK PAIN-A-AH    Having worsening low back pain for over 2 weeks  If turns or changes position gets a sharp pain  Unable to sleep  Pain radiates down both legs  Can walk but increases the pain    Has been using muscle relaxers and antiinflammatories and not helping    Feels he should be seen and get referral to back specialist    Dr Garcia at 3:00    No

## 2024-09-28 NOTE — ED PROVIDER NOTE - PROGRESS NOTE DETAILS
I had an at length discussion with the patient, he is feeling well at this time.  I discussed positive urinalysis and the patient with a long-term chronic Steele, likelihood of chronic colonization.  Discussed that the patient has had no fevers, unlikely acute UTI.  Will hold off on antibiotics for now, the patient will follow-up with his physician through [facility].  The patient put about 200 cc out of the catheter once replaced.

## 2024-09-28 NOTE — ED CLERICAL - NS ED CLERK NOTE PRE-ARRIVAL INFORMATION; ADDITIONAL PRE-ARRIVAL INFORMATION

## 2024-09-28 NOTE — ED ADULT NURSE NOTE - DRUG PRE-SCREENING (DAST -1)
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  Patient was noted to be hypoxic, placed on 2 L nasal cannula.  Chest x-ray demonstrated cardiomegaly, increased pulmonary vascular markings.  CT chest on admission negative for pulmonary embolism.  Patient spiked fever overnight and patient was started on broad-spectrum antibiotics.    Interval history:    - patient denies chest pain, shortness of breath, video swallow with trace aspiration  - patient remains on 2 L nasal cannula    Past Medical History:   Diagnosis Date   • Arthritis    • Astrocytoma brain tumor (CMD) 00/00/1971   • Body mass index (BMI) 40.0-44.9, adult 5/31/2019    Ok to add to problem list per signed protocol  5-31-19   • Chronic Venous stasis 4/22/2016   • Does not initiate walking due to quadriplegia 6/14/2023   • Eczema    • Elevated d-dimer 6/14/2023   • Essential hypertension 4/24/2018   • History of astrocytoma 2/1/2021    Cerebellar astrocytoma grade III, partial resection and Radiation   • Hypothyroid 2/19/2013   • Hypothyroidism    • Invasive ductal carcinoma of breast (CMD) 00/00/2004; 01/27/2012    Right; Left   • Left Breast cancer (CMD)S/P bilateral mastectomies 2/19/2013    L  Sided s/p B prophylactic mastectomies   • Mild cognitive impairment 2/1/2021   • Neurogenic bladder, NOS    • Neurogenic bladder, NOS 2/19/2013    Increase residual urines with some incontinence   • Obesity (BMI 30-39.9) 11/7/2019   • Osteopenia 12/9/2014   • Quadriplegia (CMD)    • S/P  shunt at 9 years old 6/19/2023   • Seizure disorder (CMD) 4/24/2018   • Seizures (CMD)    • Spastic quadriplegia S/P craniotomy and resection of astrocytoma 2/19/2013    Astrocytoma resection age 10. Has   shunt.   • Spasticity     Severe, UE   • Stasis dermatitis of both legs     R>L   • Tear of meniscus of left knee    • Urinary incontinence    • Urinary tract infection        Past Surgical History:   Procedure Laterality Date   • Brain surgery     • Breast surgery     • Meniscectomy  02/08/2011    Left   • Modified radical mastectomy w/ axillary lymph node dissection Bilateral 02/07/2012    Bilateral   • Ventriculoperitoneal shunt  00/00/1971    Several surgeries       Family History   Problem Relation Age of Onset   • Cancer Mother         breast   • Cancer Father         leukemia   • Cancer Paternal Grandmother         breast   • Cancer Maternal Grandmother         breast       Social History     Socioeconomic History   • Marital status: Single     Spouse name: Not on file   • Number of children: 0   • Years of education: Not on file   • Highest education level: Not on file   Occupational History   • Not on file   Tobacco Use   • Smoking status: Never   • Smokeless tobacco: Never   Vaping Use   • Vaping Use: never used   Substance and Sexual Activity   • Alcohol use: No   • Drug use: No   • Sexual activity: Not Currently   Other Topics Concern   • Not on file   Social History Narrative    Social History     Single.  No children.  She is quadriplegic since after her craniotomy currently supported at [facility] assisted living since sometime 2003.    Tobacco Use    · Smoking status: Never    · Smokeless tobacco: Never    Vaping Use    · Vaping Use: never used    Substance Use Topics    · Alcohol use: No    · Drug use: No    WINSTON AMBROCIO Mother   [phone]    CELIO AMBROCIO Father   [phone]             Social Determinants of Health     Financial Resource Strain: Low Risk  (6/15/2023)    Financial Resource Strain    • Social Determinants: Financial Resource Strain: None   Food Insecurity: No Food Insecurity (6/15/2023)    Food Insecurity    • Social Determinants: Food Insecurity: Never    Transportation Needs: No Transportation Needs (6/15/2023)    PRAPARE - Transportation    • Lack of Transportation (Medical): No    • Lack of Transportation (Non-Medical): No   Physical Activity: Not on file   Stress: Not on file   Social Connections: Socially Integrated (6/15/2023)    Social Connections    • Social Determinants: Social Connections: 5 or more times a week   Intimate Partner Violence: Not At Risk (6/14/2023)    Intimate Partner Violence    • Social Determinants: Intimate Partner Violence Past Fear: No    • Social Determinants: Intimate Partner Violence Current Fear: No       Eye Problem(s):negative  ENT Problem(s):negative  Cardiovascular problem(s):as above  Respiratory problem(s):as above  Gastro-intestinal problem(s):negative GI  Genito-urinary problem(s):negative  Musculoskeletal problem(s):negative  Integumentary problem(s):negative  Neurological problem(s):negative  Psychiatric problem(s):negative  Endocrine problem(s):negative  Hematologic and/or Lymphatic problem(s):negative    Current Facility-Administered Medications   Medication   • vancomycin (VANCOCIN) 1,000 mg in sodium chloride 0.9 % 250 mL IVPB   • ipratropium-albuterol (DUONEB) 0.5-2.5 (3) MG/3ML nebulizer solution 3 mL   • furosemide (LASIX) tablet 20 mg   • lisinopril (ZESTRIL) tablet 10 mg   • amLODIPine (NORVASC) tablet 10 mg   • VANCOMYCIN - PHARMACIST MONITORED Misc   • cefTRIAXone (ROCEPHIN) syringe 2,000 mg   • hydrALAZINE (APRESOLINE) injection 10 mg   • guaiFENesin-codeine (GUAIFENESIN AC) 100-10 MG/5ML liquid 10 mL   • guaiFENesin (MUCINEX) ER tablet 600 mg   • labetalol (NORMODYNE) injection 10 mg   • Potassium Standard Replacement Protocol (Levels 3.5 and lower)   • Potassium Replacement (Levels 3.6 - 4)   • Magnesium Standard Replacement Protocol   • Phosphorus Standard Replacement Protocol   • ondansetron (ZOFRAN) injection 4 mg   • acetaminophen (TYLENOL) tablet 650 mg   • polyethylene glycol (MIRALAX) packet 17 g    • docusate sodium-sennosides (SENOKOT S) 50-8.6 MG 2 tablet   • bisacodyl (DULCOLAX) suppository 10 mg   • magnesium hydroxide (MILK OF MAGNESIA) 400 MG/5ML suspension 30 mL   • aluminum-magnesium hydroxide-simethicone (MAALOX) 200-200-20 MG/5ML suspension 30 mL   • sodium chloride 0.9 % flush bag 500 mL   • sodium chloride 0.9 % injector flush 100 mL   • ascorbic acid (VITAMIN C) tablet 500 mg   • aspirin (ECOTRIN) enteric coated tablet 81 mg   • atorvastatin (LIPITOR) tablet 10 mg   • calcium carbonate (TUMS) chewable tablet 500 mg   • calcium carbonate-vitamin D (CALTRATE+D) 600-10 MG-MCG tablet 1 tablet   • carBAMazepine (TEGretol XR) 12 hr tablet 400 mg   • cholecalciferol (VITAMIN D) tablet 50 mcg   • guaiFENesin-DM (MUCINEX DM) 600-30 mg ER tablet 1 tablet   • docusate sodium (COLACE) capsule 100 mg   • folic acid (FOLATE) tablet 1 mg   • levETIRAcetam (KepPRA) tablet 1,000 mg   • levothyroxine (SYNTHROID, LEVOTHROID) tablet 25 mcg   • potassium CHLORIDE ER tablet 10 mEq   • tamsulosin (FLOMAX) capsule 0.4 mg   • sodium chloride (NORMAL SALINE) 0.9 % bolus 500 mL   • sodium chloride 0.9 % flush bag 25 mL   • sodium chloride (PF) 0.9 % injection 2 mL   • sodium chloride 0.9 % flush bag 25 mL   • enoxaparin (LOVENOX) injection 40 mg       O/E:  Visit Vitals  /66 (BP Location: LFA - Left forearm, Patient Position: Sitting)   Pulse 93   Temp 99.3 °F (37.4 °C) (Oral)   Resp 18   Ht 5' 6\" (1.676 m)   Wt 101.7 kg (224 lb 3.3 oz)   SpO2 93%   BMI 36.19 kg/m²       Examination of the patient reveals:     GENERAL: alert, is in no apparent distress and is well developed and well nourished  LYMPH NODES: no cervical adenopathy, no supraclavicular adenopathy, no axillary adenopathy and no inguinal adenopathy  SKIN normal color, normal texture, normal turgor, no skin rashes, no atypical appearing skin lesions and no bruises  HEAD: normocephalic  EYES: pupils are equal and reactive to light and accomodation  extraocular movements are full sclera and conjunctiva are normal lids and lashes are normal  EARS: pinna and external ear is normal bilaterally, external auditory canals are normal and auditory acuity is grossly normal  NOSE: external nose is normal to inspection and no septal deviation  MOUTH/THROAT: tongue is midline and appears normal, oropharynx appears normal, soft palate and uvula are normal and oral mucosa is normal  NECK: neck is supple, no thyromegaly, no anterior cervical adenopathy, no posterior cervical adenopathy and no supraclavicular adenopathy  CHEST: contour is normal with normal AP diameter, normal respiratory excursion and respiratory effort is not labored  LUNGS: lungs are clear to auscultation with normal inspiratory/expiratory sounds, no rales, no rhonchi and no wheezes  HEART: normal PMI, normal rate and rhythm, no palpable heaves or thrills, S1 and S2 normal, no S3 or S4, no murmurs and no extra heart sounds  ABDOMEN: abdomen is soft, normal active bowel sounds, nontender, without masses, without hepatomegaly, without splenomegaly and no pulsatile masses  BACK: inspection shows no curvature, no discomfort to palpation of the midline and no costovertebral angle discomfort to palpation  NEUROLOGIC: cranial nerves 2 through 12 normal, motor strength normal, gait and station normal, coordination normal, DTR's normal and symmetric and no tremor noted  EXTREMITIES: no clubbing, no cyanosis and no edema    WBC (K/mcL)   Date Value   06/21/2023 8.2     RBC (mil/mcL)   Date Value   06/21/2023 3.41 (L)     HCT (%)   Date Value   06/21/2023 31.3 (L)     HGB (g/dL)   Date Value   06/21/2023 10.6 (L)     PLT (K/mcL)   Date Value   06/21/2023 300       Sodium (mmol/L)   Date Value   06/21/2023 131 (L)     Potassium (mmol/L)   Date Value   06/21/2023 4.2     Chloride (mmol/L)   Date Value   06/21/2023 93 (L)     Glucose (mg/dL)   Date Value   06/21/2023 132 (H)     Calcium (mg/dL)   Date Value   06/21/2023  8.8     Carbon Dioxide (mmol/L)   Date Value   06/21/2023 35 (H)     BUN (mg/dL)   Date Value   06/21/2023 22 (H)     Creatinine (mg/dL)   Date Value   06/21/2023 0.96 (H)     CTA CHEST PULMONARY EMBOLISM     CLINICAL INDICATION: Hypoxia     TECHNIQUE: CT angiogram of the chest performed with 3D reconstruction  including axial MIP images using 100 ml of Omnipaque 350 intravenous  contrast as per the pulmonary embolism protocol. Coronal and sagittal  reformations were performed.     COMPARISON: None.     FINDINGS:     Streaking artifact limits evaluation.     VASCULAR      Pulmonary arteries: There is suboptimal opacification of the pulmonary  arteries; bolus is late.  There are no filling defects to suggest pulmonary  embolism to the level of the proximal segmental pulmonary arteries. Main  pulmonary artery is slightly enlarged measuring up to 2.8 cm. Central right  and left pulmonary arteries are borderline enlarged.      Thoracic aorta: Normal caliber. There are atherosclerotic  calcifications of the thoracic aorta.     MEDIASTINUM      Lymph Nodes:  There is no mediastinal or hilar lymphadenopathy. Right  hilar lymph node of 10 mm in short axis dimension, considered normal with  CT size criteria      Trachea: Normal.      Esophagus: Normal.        Heart: The heart is normal in size.  No evidence of right heart strain.  No coronary artery calcifications.      Pericardium: There is no pericardial effusion.              LUNGS AND PLEURA      Lungs:  Bibasilar opacities and groundglass opacities favoring  atelectasis.        Pleura: There are no pleural effusions.      AXILLA     No axillary lymphadenopathy.     UPPER ABDOMEN     Tiny hypoattenuating lesion within the right lobe of liver may represent  a tiny cyst. Additional hypoattenuating lesion within the left lobe of  liver of 2 cm, inadequately characterized but may represent a hemangioma     Left-sided shunt catheters.     BONES      Partially visualized  osseous lesion of the inferior L1        IMPRESSION:     1. Suboptimal bolus and streaking artifact with no evidence of pulmonary  emboli to level of the proximal segmental pulmonary arteries.  2. Indeterminate left-sided liver lesion may represent hemangioma. Further  evaluation could be performed with a multiphasic CT abdomen and pelvis on a  nonemergent basis.  3. Indeterminate partially visualized L1 inferior vertebral body lesion.     Electronically Signed by: Lisbeth Bowden MD   Signed on: 6/14/2023 7:29 PM   Workstation ID: [id number]    Labs and imaging have been reviewed by me personally.      Assessment/Plan:  1. Acute hypoxic respiratory  2. Fever  3.  (ventriculoperitoneal) shunt status  4. Epilepsy  5. Spastic quadriparesis    - patient currently weaned to room air, CT chest with no acute airspace opacities.  Encourage incentive spirometry, flutter therapy.  Suspect hypoxia due to atelectasis, and fluid overload  - continue diuresis as tolerated.  - patient was also seen by Neurology and LP has been ordered.  Patient underwent LP , elevated cell count, concerning for possible  shunt infection and patient was started on antibiotics, CSF cultures negative so far.  Awaiting transferred to [facility]    Sravanthi Caba MD  Pulmonary and Critical Care Medicine  Pager [phone]     Statement Selected

## 2024-09-28 NOTE — ED ADULT NURSE REASSESSMENT NOTE - NS ED NURSE REASSESS COMMENT FT1
After several calls to [facility] to ascertain a realistic ETA, patient is quite frustrated that he is still waiting.  ALBERTO Leiva attempted to get a team that is already here diverted to take him home, and she thought it was so arranged, then the team got a call from their dispatch that they had to take a hospice patient from the floors first, and that an ambulance for Mr. Clements should be here in half an hour.

## 2024-09-28 NOTE — ED ADULT NURSE REASSESSMENT NOTE - NS ED NURSE REASSESS COMMENT FT1
Patient appears comfortable, dozing off at times.  He states he feels relief with new iraheta catheter in place.  Offered patient lunch tray, he refused.  Cranberry juice provided.  Patient is awaiting transport by ambulance home.

## 2024-09-28 NOTE — ED ADULT TRIAGE NOTE - CHIEF COMPLAINT QUOTE
pt was brought into ED by EMS from private home C/O dislodge Iraheta. states this AM he woke up with urine coming out of iraheta site.

## 2024-09-28 NOTE — ED PROVIDER NOTE - NSFOLLOWUPINSTRUCTIONS_ED_ALL_ED_FT
1. Follow-up with your Primary Medical doctor through the [facility]. Call today / next business day for prompt follow-up.  2. Return to Emergency room for any worsening or persistent pain, weakness, if you develop any fever, if you have any further difficulty passing urine in the catheter, any color change or discharge in the catheter bag, or any other concerning symptoms.  3. See attached instruction sheets for additional information, including information regarding signs and symptoms to look out for, reasons to seek immediate care and other important instructions.  4. Follow-up with urology as well as discussed

## 2024-09-28 NOTE — ED PROVIDER NOTE - OBJECTIVE STATEMENT
49-year-old male with a history of MS, DVT/PE presents with states that his chronic Steele started leaking around the catheter.  The patient is not having any acute abdominal pain.  No vomiting or diarrhea.  No fever or chills.  No weakness or dizziness.  No chest pain or shortness of breath.  No vomiting or diarrhea, no change from his usual baseline.  No aggravating or alleviating factors otherwise noted.  No other acute injury or complaints.

## 2024-09-28 NOTE — ED PROVIDER NOTE - PATIENT PORTAL LINK FT
You can access the FollowMyHealth Patient Portal offered by [facility] by registering at the following website: [email or web address]. By joining [facility]’s FollowMyHealth portal, you will also be able to view your health information using other applications (apps) compatible with our system.

## 2024-10-01 ENCOUNTER — APPOINTMENT (OUTPATIENT)
Dept: HOME HEALTH SERVICES | Facility: HOME HEALTH | Age: 49
End: 2024-10-01

## 2024-10-02 LAB
-  AMOXICILLIN/CLAVULANIC ACID: SIGNIFICANT CHANGE UP
-  AMPICILLIN/SULBACTAM: SIGNIFICANT CHANGE UP
-  AMPICILLIN: SIGNIFICANT CHANGE UP
-  AZTREONAM: SIGNIFICANT CHANGE UP
-  CEFAZOLIN: SIGNIFICANT CHANGE UP
-  CEFEPIME: SIGNIFICANT CHANGE UP
-  CEFOXITIN: SIGNIFICANT CHANGE UP
-  CEFTRIAXONE: SIGNIFICANT CHANGE UP
-  CEFUROXIME: SIGNIFICANT CHANGE UP
-  CIPROFLOXACIN: SIGNIFICANT CHANGE UP
-  ERTAPENEM: SIGNIFICANT CHANGE UP
-  GENTAMICIN: SIGNIFICANT CHANGE UP
-  IMIPENEM: SIGNIFICANT CHANGE UP
-  LEVOFLOXACIN: SIGNIFICANT CHANGE UP
-  MEROPENEM: SIGNIFICANT CHANGE UP
-  NITROFURANTOIN: SIGNIFICANT CHANGE UP
-  PIPERACILLIN/TAZOBACTAM: SIGNIFICANT CHANGE UP
-  TOBRAMYCIN: SIGNIFICANT CHANGE UP
-  TRIMETHOPRIM/SULFAMETHOXAZOLE: SIGNIFICANT CHANGE UP
METHOD TYPE: SIGNIFICANT CHANGE UP

## 2024-10-03 LAB
-  AMOXICILLIN/CLAVULANIC ACID: SIGNIFICANT CHANGE UP
-  AMPICILLIN/SULBACTAM: SIGNIFICANT CHANGE UP
-  AMPICILLIN: SIGNIFICANT CHANGE UP
-  AZTREONAM: SIGNIFICANT CHANGE UP
-  CEFAZOLIN: SIGNIFICANT CHANGE UP
-  CEFEPIME: SIGNIFICANT CHANGE UP
-  CEFOXITIN: SIGNIFICANT CHANGE UP
-  CEFTRIAXONE: SIGNIFICANT CHANGE UP
-  CIPROFLOXACIN: SIGNIFICANT CHANGE UP
-  ERTAPENEM: SIGNIFICANT CHANGE UP
-  GENTAMICIN: SIGNIFICANT CHANGE UP
-  IMIPENEM: SIGNIFICANT CHANGE UP
-  LEVOFLOXACIN: SIGNIFICANT CHANGE UP
-  MEROPENEM: SIGNIFICANT CHANGE UP
-  NITROFURANTOIN: SIGNIFICANT CHANGE UP
-  PIPERACILLIN/TAZOBACTAM: SIGNIFICANT CHANGE UP
-  TOBRAMYCIN: SIGNIFICANT CHANGE UP
-  TRIMETHOPRIM/SULFAMETHOXAZOLE: SIGNIFICANT CHANGE UP
CULTURE RESULTS: ABNORMAL
METHOD TYPE: SIGNIFICANT CHANGE UP
ORGANISM # SPEC MICROSCOPIC CNT: ABNORMAL
ORGANISM # SPEC MICROSCOPIC CNT: ABNORMAL
ORGANISM # SPEC MICROSCOPIC CNT: SIGNIFICANT CHANGE UP
SPECIMEN SOURCE: SIGNIFICANT CHANGE UP

## 2024-10-03 RX ORDER — CEFUROXIME SODIUM 1.5 G
1 VIAL (EA) INJECTION
Qty: 14 | Refills: 0
Start: 2024-10-03 | End: 2024-10-09

## 2024-10-04 ENCOUNTER — NON-APPOINTMENT (OUTPATIENT)
Age: 49
End: 2024-10-04

## 2024-10-07 ENCOUNTER — NON-APPOINTMENT (OUTPATIENT)
Age: 49
End: 2024-10-07

## 2024-10-09 ENCOUNTER — APPOINTMENT (OUTPATIENT)
Dept: HOME HEALTH SERVICES | Facility: HOME HEALTH | Age: 49
End: 2024-10-09

## 2024-10-18 ENCOUNTER — NON-APPOINTMENT (OUTPATIENT)
Age: 49
End: 2024-10-18

## 2024-10-18 ENCOUNTER — TRANSCRIPTION ENCOUNTER (OUTPATIENT)
Age: 49
End: 2024-10-18

## 2024-10-19 ENCOUNTER — EMERGENCY (EMERGENCY)
Facility: HOSPITAL | Age: 49
LOS: 1 days | Discharge: HOME | End: 2024-10-19
Attending: EMERGENCY MEDICINE | Admitting: EMERGENCY MEDICINE
Payer: MEDICARE

## 2024-10-19 VITALS
TEMPERATURE: 99 F | HEIGHT: 70 IN | OXYGEN SATURATION: 95 % | HEART RATE: 121 BPM | DIASTOLIC BLOOD PRESSURE: 82 MMHG | RESPIRATION RATE: 18 BRPM | WEIGHT: 270.07 LBS | SYSTOLIC BLOOD PRESSURE: 116 MMHG

## 2024-10-19 VITALS
SYSTOLIC BLOOD PRESSURE: 114 MMHG | DIASTOLIC BLOOD PRESSURE: 76 MMHG | TEMPERATURE: 98 F | RESPIRATION RATE: 18 BRPM | HEART RATE: 109 BPM | OXYGEN SATURATION: 99 %

## 2024-10-19 DIAGNOSIS — Z93.6 OTHER ARTIFICIAL OPENINGS OF URINARY TRACT STATUS: Chronic | ICD-10-CM

## 2024-10-19 PROCEDURE — 51702 INSERT TEMP BLADDER CATH: CPT

## 2024-10-19 PROCEDURE — 99283 EMERGENCY DEPT VISIT LOW MDM: CPT

## 2024-10-19 PROCEDURE — 99284 EMERGENCY DEPT VISIT MOD MDM: CPT | Mod: 25

## 2024-10-19 NOTE — ED ADULT NURSE NOTE - OBJECTIVE STATEMENT
49y M BIBEMS from home to ED for clogged/malfunctioning iraheta catheter.. iraheta noted leaking around urethra, 100mL urine noted in iraheta bag... pt c/o pain at urethral site and scrotum, perineal/scrotal/suprpubic redness noted.. site cleaned/cleansed, moisture barrier cream applied, iraheta catheter inserted using sterile techniqu, pt tolerated well. 150 mL urine output.

## 2024-10-19 NOTE — ED PROVIDER NOTE - PATIENT PORTAL LINK FT
You can access the FollowMyHealth Patient Portal offered by [facility] by registering at the following website: [email or web address]. By joining "[employer]"’s FollowMyHealth portal, you will also be able to view your health information using other applications (apps) compatible with our system.

## 2024-10-19 NOTE — ED PROVIDER NOTE - NSFOLLOWUPINSTRUCTIONS_ED_ALL_ED_FT
-- You should update your primary care physician on your Emergency Department visit and follow up with them.  If you do not have a physician or have difficulty following up, please call: [phone] to obtain a [facility] doctor or specialist who can provide follow up.    -- Return to the ER for worsening or persistent symptoms, and/or ANY NEW OR CONCERNING SYMPTOMS.

## 2024-10-19 NOTE — ED ADULT NURSE NOTE - NSFALLHARMRISKINTERV_ED_ALL_ED
Communicate risk of Fall with Harm to all staff, patient, and family/Provide patient with walking aids/Provide visual cue: red socks, yellow wristband, yellow gown, etc/Reinforce activity limits and safety measures with patient and family/Bed in lowest position, wheels locked, appropriate side rails in place/Call bell, personal items and telephone in reach/Instruct patient to call for assistance before getting out of bed/chair/stretcher/Non-slip footwear applied when patient is off stretcher/[place] to call system/Physically safe environment - no spills, clutter or unnecessary equipment/Purposeful Proactive Rounding/Room/bathroom lighting operational, light cord in reach

## 2024-10-19 NOTE — ED PROVIDER NOTE - OBJECTIVE STATEMENT
pt states that something is wrong with his iraheta catheter, he thinks its broken bc all of a sudden last night it started heavily leaking. pt denies feeling sick. pt has MS and is quadriplegic. pt is in usual state of health.

## 2024-10-19 NOTE — ED PROVIDER NOTE - INTERNATIONAL TRAVEL
Peripheral Nerve Block Procedure Note  Date/Time: 3/29/2019 11:45 AM    Staff:     Anesthesiologist:  Alberto Frederick MD    Resident/CRNA:  Vance Edward MD    Block performed by resident/CRNA in the presence of a teaching physician    Location: Pre-op  Procedure Start/Stop TImes:      3/29/2019 11:35 AM     3/29/2019 11:45 AM    patient identified, IV checked, site marked, risks and benefits discussed, informed consent, monitors and equipment checked, pre-op evaluation, at physician/surgeon's request and post-op pain management      Correct Patient: Yes      Correct Position: Yes      Correct Site: Yes      Correct Procedure: Yes      Correct Laterality:  Yes    Site Marked:  Yes  Procedure details:     Procedure:  Pectoralis    ASA:  2    Diagnosis:  Gender dysphoria    Laterality:  Bilateral    Position:  Sitting    Sterile Prep: chloraprep, mask and sterile gloves      Needle:  Short bevel    Needle gauge:  21    Needle length (inches):  4    Ultrasound: Yes      Ultrasound used to identify targeted nerve, plexus, or vascular structure and placed a needle adjacent to it      Permanent Image entered into patiient's record      Abnormal pain on injection: No      Blood Aspirated: No      Paresthesias:  No    Bleeding at site: No      Test dose negative for signs of intravascular injection: Yes      Bolus via:  Needle    Infusion Method:  Single Shot    Complications:  None  Assessment/Narrative:     Injection made incrementally with aspirations every (mL):  5           No

## 2024-10-21 DIAGNOSIS — R21 RASH AND OTHER NONSPECIFIC SKIN ERUPTION: ICD-10-CM

## 2024-10-29 ENCOUNTER — EMERGENCY (EMERGENCY)
Facility: HOSPITAL | Age: 49
LOS: 1 days | Discharge: HOME | End: 2024-10-29
Attending: STUDENT IN AN ORGANIZED HEALTH CARE EDUCATION/TRAINING PROGRAM | Admitting: STUDENT IN AN ORGANIZED HEALTH CARE EDUCATION/TRAINING PROGRAM
Payer: MEDICARE

## 2024-10-29 VITALS
SYSTOLIC BLOOD PRESSURE: 133 MMHG | TEMPERATURE: 97 F | HEART RATE: 129 BPM | OXYGEN SATURATION: 95 % | DIASTOLIC BLOOD PRESSURE: 82 MMHG | RESPIRATION RATE: 18 BRPM | HEIGHT: 70 IN

## 2024-10-29 VITALS
OXYGEN SATURATION: 97 % | HEART RATE: 110 BPM | TEMPERATURE: 97 F | DIASTOLIC BLOOD PRESSURE: 93 MMHG | RESPIRATION RATE: 18 BRPM | SYSTOLIC BLOOD PRESSURE: 139 MMHG

## 2024-10-29 DIAGNOSIS — Z93.6 OTHER ARTIFICIAL OPENINGS OF URINARY TRACT STATUS: Chronic | ICD-10-CM

## 2024-10-29 LAB
APPEARANCE UR: ABNORMAL
BILIRUB UR-MCNC: NEGATIVE — SIGNIFICANT CHANGE UP
COLOR SPEC: YELLOW — SIGNIFICANT CHANGE UP
DIFF PNL FLD: ABNORMAL
GLUCOSE UR QL: NEGATIVE MG/DL — SIGNIFICANT CHANGE UP
KETONES UR-MCNC: NEGATIVE MG/DL — SIGNIFICANT CHANGE UP
LEUKOCYTE ESTERASE UR-ACNC: ABNORMAL
NITRITE UR-MCNC: POSITIVE
PH UR: 8 — SIGNIFICANT CHANGE UP (ref 5–8)
PROT UR-MCNC: 30 MG/DL
SP GR SPEC: 1.01 — SIGNIFICANT CHANGE UP (ref 1–1.03)
UROBILINOGEN FLD QL: 0.2 MG/DL — SIGNIFICANT CHANGE UP (ref 0.2–1)

## 2024-10-29 PROCEDURE — 81001 URINALYSIS AUTO W/SCOPE: CPT

## 2024-10-29 PROCEDURE — 87086 URINE CULTURE/COLONY COUNT: CPT

## 2024-10-29 PROCEDURE — 87186 SC STD MICRODIL/AGAR DIL: CPT

## 2024-10-29 PROCEDURE — 99284 EMERGENCY DEPT VISIT MOD MDM: CPT

## 2024-10-29 PROCEDURE — 99284 EMERGENCY DEPT VISIT MOD MDM: CPT | Mod: 25

## 2024-10-29 PROCEDURE — 51702 INSERT TEMP BLADDER CATH: CPT

## 2024-10-29 NOTE — ED PROVIDER NOTE - ATTENDING APP SHARED VISIT CONTRIBUTION OF CARE
This was a shared visit with ALISON. I reviewed and verified the documentation and independently performed the documented MDM.

## 2024-10-29 NOTE — ED PROVIDER NOTE - NSFOLLOWUPINSTRUCTIONS_ED_ALL_ED_FT
Steele Catheter Care, Adult    A Steele catheter is a soft, flexible tube that is placed into the bladder to drain urine. A Steele catheter may be inserted if:    You leak urine or are not able to control when you urinate (urinary incontinence).  You are not able to urinate when you need to (urinary retention).   You had prostate surgery or surgery on the genitals.  You have certain medical conditions, such as multiple sclerosis, dementia, or a spinal cord injury.    If you are going home with a Steele catheter in place, follow the instructions below.    TAKING CARE OF THE CATHETER   Wash your hands with soap and water.   Using mild soap and warm water on a clean washcloth:    Clean the area on your body closest to the catheter insertion site using a circular motion, moving away from the catheter. Never wipe toward the catheter because this could sweep bacteria up into the urethra and cause infection.   Remove all traces of soap. Pat the area dry with a clean towel. For males, reposition the foreskin.    Attach the catheter to your leg so there is no tension on the catheter. Use adhesive tape or a leg strap. If you are using adhesive tape, remove any sticky residue left behind by the previous tape you used.   Keep the drainage bag below the level of the bladder, but keep it off the floor.   Check throughout the day to be sure the catheter is working and urine is draining freely. Make sure the tubing does not become kinked.  Do not pull on the catheter or try to remove it. Pulling could damage internal tissues.    TAKING CARE OF THE DRAINAGE BAGS  You will be given two drainage bags to take home. One is a large overnight drainage bag, and the other is a smaller leg bag that fits underneath clothing. You may wear the overnight bag at any time, but you should never wear the smaller leg bag at night. Follow the instructions below for how to empty, change, and clean your drainage bags.    Emptying the Drainage Bag    You must empty your drainage bag when it is ?–½ full or at least 2–3 times a day.     Wash your hands with soap and water.   Keep the drainage bag below your hips, below the level of your bladder. This stops urine from going back into the tubing and into your bladder.    Hold the dirty bag over the toilet or a clean container.   Open the pour spout at the bottom of the bag and empty the urine into the toilet or container. Do not let the pour spout touch the toilet, container, or any other surface. Doing so can place bacteria on the bag, which can cause an infection.   Clean the pour spout with a gauze pad or cotton ball that has rubbing alcohol on it.   Close the pour spout.   Attach the bag to your leg with adhesive tape or a leg strap.   Wash your hands well.    Changing the Drainage Bag    Change your drainage bag once a month or sooner if it starts to smell bad or look dirty. Below are steps to follow when changing the drainage bag.     Wash your hands with soap and water.   Pinch off the rubber catheter so that urine does not spill out.   Disconnect the catheter tube from the drainage tube at the connection valve. Do not let the tubes touch any surface.    Clean the end of the catheter tube with an alcohol wipe. Use a different alcohol wipe to clean the end of the drainage tube.   Connect the catheter tube to the drainage tube of the clean drainage bag.   Attach the new bag to the leg with adhesive tape or a leg strap. Avoid attaching the new bag too tightly.    Wash your hands well.    Cleaning the Drainage Bag     Wash your hands with soap and water.   Wash the bag in warm, soapy water.   Rinse the bag thoroughly with warm water.   Fill the bag with a solution of white vinegar and water (1 cup vinegar to 1 qt warm water [.2 L vinegar to 1 L warm water]). Close the bag and soak it for 30 minutes in the solution.    Rinse the bag with warm water.    Hang the bag to dry with the pour spout open and hanging downward.   Store the clean bag (once it is dry) in a clean plastic bag.   Wash your hands well.     PREVENTING INFECTION  Wash your hands before and after handling your catheter.  Take showers daily and wash the area where the catheter enters your body. Do not take baths. Replace wet leg straps with dry ones, if this applies.  Do not use powders, sprays, or lotions on the genital area. Only use creams, lotions, or ointments as directed by your caregiver.  For females, wipe from front to back after each bowel movement.   Drink enough fluids to keep your urine clear or pale yellow unless you have a fluid restriction.   Do not let the drainage bag or tubing touch or lie on the floor.   Wear cotton underwear to absorb moisture and to keep your .    SEEK MEDICAL CARE IF:  Your urine is cloudy or smells unusually bad.  Your catheter becomes clogged.  You are not draining urine into the bag or your bladder feels full.  Your catheter starts to leak.    SEEK IMMEDIATE MEDICAL CARE IF:  You have pain, swelling, redness, or pus where the catheter enters the body.  You have pain in the abdomen, legs, lower back, or bladder.  You have a fever.  You see blood fill the catheter, or your urine is pink or red.  You have nausea, vomiting, or chills.  Your catheter gets pulled out.    MAKE SURE YOU:  Understand these instructions.  Will watch your condition.  Will get help right away if you are not doing well or get worse.    ADDITIONAL NOTES AND INSTRUCTIONS    Please follow up with your Primary MD in 24-48 hr.  Seek immediate medical care for any new/worsening signs or symptoms.

## 2024-10-29 NOTE — ED PROVIDER NOTE - CLINICAL SUMMARY MEDICAL DECISION MAKING FREE TEXT BOX
Patient did not functioning Steele, with of urine leaking around penis.  On exam minimal lower abdominal tenderness.  No fevers.  Will replace Steele, send urine treat if needed.

## 2024-10-29 NOTE — ED PROVIDER NOTE - OBJECTIVE STATEMENT
48 yo male with h/o MS, paraplegia, dvt/pe BIBA from home for dislodged iraheta. Patient explains he believed he accidently dislodged it last night while in his sleep. Patient otherwise feeling well, no additional complaints. Denies fever, chills, chest pain, sob, abd pain, N/V, headache, dizziness, weakness.

## 2024-10-29 NOTE — ED PROVIDER NOTE - CONDITION AT DISCHARGE:
Sedation/Analgesia History & Physical      Pt Name: Radha Vizcaino  MRN: [id number]  YOB: 1958  Provider Performing Procedure: Margret Leonard MD, [facility] - [place], [place]  Primary Care Physician: Carmen Hoyos MD      PRE-PROCEDURE   DNR-CCA/DNR-CC []Yes [x]No      PLANNED PROCEDURE     [x]Cath  [x]PCI                []Pacemaker/AICD  []JESSIE             []Cardioversion []Peripheral angiography/PTA  []Other:        Consent:   The indication, risks and benefits of the procedure and possible therapeutic consequences and alternatives were discussed with the patient. The patient was given the opportunity to ask questions and to have them answered to his/her satisfaction. Risks of the procedure include but are not limited to the following: Bleeding, hematoma including retroperitoneal hematoma, infection, pain and discomfort, injury to the aorta and other blood vessels, rhythm disturbance, low blood pressure, myocardial infarction, stroke, kidney damage/failure, myocardial perforation, allergic reactions to sedatives/contrast material, loss of pulse/vascular compromise requiring surgery, aneurysm/pseudoaneurysm formation, possible loss of a limb/hand/leg, death. Alternatives to and omission of the suggested procedure were discussed. The patient had no further questions and wished to proceed; the consent form was signed.       Indications for the Procedure:     CAD Presentation:  Angina iii     Anginal Classification within 2 weeks:  CCS III - Symptoms with everyday living activities, i.e., moderate limitation    NYHA Heart Failure Class within 2 weeks: No symptoms      Anti- Anginal Meds within 2 weeks:   ANTI-ANGINAL MEDS: Yes: Beta Blockers, Aspirin and Statin (Any)    Stress or Imaging Studies Performed:  Stress Test with SPECT Result: Positive:  inferior Risk/Extent of Ischemia:  Intermediate    CABG:no            MEDICAL HISTORY        Past Medical History:   Diagnosis Date    Anxiety     Hyperlipidemia     Hypertension     Neuropathy          Past Surgical History:   Procedure Laterality Date    BACK SURGERY      CHOLECYSTECTOMY      COLONOSCOPY      CORONARY ANGIOPLASTY  2011    no stents; dr Albino Fernandez             No Known Allergies      MEDICATIONS   Coumadin Use Last 5 Days [x]No []Yes  Antiplatelet drug therapy use last 5 days  []No [x]Yes  Other anticoagulant use last 5 days  [x]No []Yes      No current facility-administered medications on file prior to encounter. Current Outpatient Medications on File Prior to Encounter   Medication Sig Dispense Refill    simvastatin (ZOCOR) 20 MG tablet Take 20 mg by mouth nightly      escitalopram (LEXAPRO) 20 MG tablet Take 20 mg by mouth daily      gabapentin (NEURONTIN) 300 MG capsule Take 300 mg by mouth 3 times daily.       lisinopril (PRINIVIL;ZESTRIL) 20 MG tablet Take 10 mg by mouth daily       isosorbide mononitrate (IMDUR) 60 MG extended release tablet Take 60 mg by mouth daily      aspirin 81 MG tablet Take 81 mg by mouth daily      pioglitazone (ACTOS) 45 MG tablet Take 45 mg by mouth daily      GLUCOSAMINE-CHONDROITIN PO Take by mouth      Semaglutide (OZEMPIC, 0.25 OR 0.5 MG/DOSE, SC) Inject into the skin Pharmacy to clarify      naproxen (NAPROSYN) 500 MG tablet Take 1 tablet by mouth 2 times daily (with meals) for 30 doses 30 tablet 0       Current Facility-Administered Medications   Medication Dose Route Frequency Provider Last Rate Last Dose    0.9 % sodium chloride infusion   Intravenous Continuous Felipeir MARILU No MD 75 mL/hr at 03/03/20 0446      diphenhydrAMINE (BENADRYL) injection 25 mg  25 mg Intravenous Once Jcarlos Monroy MD        hydrocortisone sodium succinate PF (SOLU-CORTEF) injection 100 mg  100 mg Intravenous Once Jcarlos Monroy MD        aspirin chewable tablet 324 mg  324 mg Oral Once Jcarlos Monroy MD        metoprolol tartrate (LOPRESSOR) tablet 25 mg  25 mg Oral BID Anitha Pearl, APRN - CNP   25 mg at 03/03/20 1331    hydrocortisone 1 % cream   Topical BID Mp Milian MD        ipratropium-albuterol (DUONEB) nebulizer solution 1 ampule  1 ampule Inhalation Q4H PRN Mp Milian MD        hydrALAZINE (APRESOLINE) injection 5 mg  5 mg Intravenous Q6H PRN Mp Milian MD        [Held by provider] lisinopril (PRINIVIL;ZESTRIL) tablet 10 mg  10 mg Oral Daily Mp Milian MD   10 mg at 03/01/20 0850    regadenoson (LEXISCAN) injection 0.4 mg  0.4 mg Intravenous ONCE PRN Mp Milian MD        atorvastatin (LIPITOR) tablet 40 mg  40 mg Oral Nightly Mp Milian MD   40 mg at 03/02/20 2034    nitroGLYCERIN (NITROSTAT) SL tablet 0.4 mg  0.4 mg Sublingual Q5 Min PRN Kayden Church MD   0.4 mg at 02/29/20 1741    sodium chloride flush 0.9 % injection 10 mL  10 mL Intravenous 2 times per day Sherren Nice, DO   10 mL at 03/02/20 2034    sodium chloride flush 0.9 % injection 10 mL  10 mL Intravenous PRN Sherren Nice, DO        acetaminophen (TYLENOL) tablet 650 mg  650 mg Oral Q6H PRN Sherren Nice, DO        Or    acetaminophen (TYLENOL) suppository 650 mg  650 mg Rectal Q6H PRN Sherren Nice, DO        promethazine (PHENERGAN) tablet 12.5 mg  12.5 mg Oral Q6H PRN Sherren Nice, DO        Or    ondansetron (ZOFRAN) injection 4 mg  4 mg Intravenous Q6H PRN Mitch Billy, DO        glucose (GLUTOSE) 40 % oral gel 15 g  15 g Oral PRN Sherren Nice, DO        dextrose 50 % IV solution  12.5 g Intravenous PRN Sherren Nice, DO        glucagon (rDNA) injection 1 mg  1 mg Intramuscular PRN Sherren Nice, DO        dextrose 5 % solution  100 mL/hr Intravenous PRN Sherren Nice, DO        polyethylene glycol (GLYCOLAX) packet 17 g  17 g Oral Daily Mitch Billy,         famotidine (PEPCID) tablet 20 mg  20 mg Oral BID Sherren Nice, DO   20 mg at 03/03/20 0931    aspirin chewable tablet 81 mg  81 mg Oral Daily Mitch Billy DO   81 mg at 03/03/20 0620    escitalopram (LEXAPRO) tablet 20 mg  20 mg Oral Daily [facility], DO   20 mg at 03/03/20 2536    gabapentin (NEURONTIN) capsule 300 mg  300 mg Oral TID [facility], DO   300 mg at 03/03/20 1325    isosorbide mononitrate (IMDUR) extended release tablet 60 mg  60 mg Oral Daily [facility], DO   60 mg at 03/03/20 0931    insulin lispro (HUMALOG) injection vial 0-6 Units  0-6 Units Subcutaneous TID WC [facility], DO   1 Units at 03/02/20 1822    insulin lispro (HUMALOG) injection vial 0-3 Units  0-3 Units Subcutaneous Nightly [facility], DO   1 Units at 03/02/20 2035    enoxaparin (LOVENOX) injection 40 mg  40 mg Subcutaneous Daily [facility], DO   40 mg at 03/01/20 2019             PHYSICAL:     /70   Pulse 68   Temp 98.4 °F (36.9 °C) (Oral)   Resp 16   Ht 5' 9\" (1.753 m)   Wt 207 lb 6.4 oz (94.1 kg)   SpO2 96%   BMI 30.63 kg/m²     Heart:  [x]Regular rate and rhythm  []Other:    Lungs:  [x]Clear    []Other:    Abdomen: [x]Soft    []Other:    Mental Status: [x]Alert & Oriented  []Other:   Ext:                [x]No edema       []Other:         No results for input(s): CKTOTAL, CKMB, CKMBINDEX, TROPONINI in the last 72 hours.     Lab Results   Component Value Date    WBC 3.6 03/03/2020    RBC 3.86 03/03/2020    HGB 12.4 03/03/2020    HCT 38.5 03/03/2020    MCV 99.7 03/03/2020    MCH 32.1 03/03/2020    MCHC 32.2 03/03/2020     03/03/2020    MPV 9.0 03/03/2020       Lab Results   Component Value Date     03/03/2020    K 4.2 03/03/2020     03/03/2020    CO2 24 03/03/2020    BUN 10 03/03/2020    LABALBU 3.7 02/29/2020    CREATININE 0.9 03/03/2020    CALCIUM 9.3 03/03/2020    LABGLOM 86 03/03/2020    GLUCOSE 132 03/03/2020       Lab Results   Component Value Date    ALKPHOS 34 02/29/2020    ALT 18 02/29/2020    AST 12 02/29/2020    PROT 6.0 02/29/2020    BILITOT 0.3 02/29/2020    LABALBU 3.7 02/29/2020       No results found for: MG    No Improved

## 2024-10-29 NOTE — ED PROVIDER NOTE - PATIENT PORTAL LINK FT
You can access the FollowMyHealth Patient Portal offered by [facility] by registering at the following website: [email or web address]. By joining [employer]’s FollowMyHealth portal, you will also be able to view your health information using other applications (apps) compatible with our system.

## 2024-10-29 NOTE — ED ADULT NURSE NOTE - NSFALLHARMRISKINTERV_ED_ALL_ED
Assistance OOB with selected safe patient handling equipment if applicable/Assistance with ambulation/Communicate risk of Fall with Harm to all staff, patient, and family/Monitor gait and stability/Provide patient with walking aids/Provide visual cue: red socks, yellow wristband, yellow gown, etc/Reinforce activity limits and safety measures with patient and family/Toileting schedule using arm’s reach rule for commode and bathroom/Bed in lowest position, wheels locked, appropriate side rails in place/Call bell, personal items and telephone in reach/Instruct patient to call for assistance before getting out of bed/chair/stretcher/Non-slip footwear applied when patient is off stretcher/[place] to call system/Physically safe environment - no spills, clutter or unnecessary equipment/Purposeful Proactive Rounding/Room/bathroom lighting operational, light cord in reach

## 2024-10-30 ENCOUNTER — APPOINTMENT (OUTPATIENT)
Dept: HOME HEALTH SERVICES | Facility: HOME HEALTH | Age: 49
End: 2024-10-30

## 2024-10-30 RX ORDER — CLOBETASOL PROPIONATE 0.5 MG/G
0.05 OINTMENT TOPICAL
Qty: 1 | Refills: 0 | Status: ACTIVE | COMMUNITY
Start: 2024-10-21

## 2024-10-30 RX ORDER — TRIAMCINOLONE ACETONIDE 1 MG/G
0.1 CREAM TOPICAL
Qty: 80 | Refills: 11 | Status: ACTIVE | COMMUNITY
Start: 2024-10-21

## 2024-11-07 NOTE — PROGRESS NOTE ADULT - PROBLEM SELECTOR PLAN 4
Photo Preface (Leave Blank If You Do Not Want): Photographs were obtained today Detail Level: Zone - pt tachypneic in ED, placed on BIPAP  - CTA chest neg for PE  - Pt DNR/DNI  - ICU consult: Suspect tachypnea to be secondary to febrile episode   - pt currently tolerating RA  - Plan as above - pt tachypneic in ED, placed on BIPAP temporarily  - CTA chest neg for PE  - Pt DNR/DNI  - ICU consult: Suspect tachypnea to be secondary to febrile episode   - pt currently tolerating RA  - Plan as above - pt tachypneic in ED, placed on BIPAP temporarily  - CTA chest neg for PE  - Pt DNR/DNI  - ICU consult: Suspect tachypnea to be secondary to febrile episode, likely due to lactic acidosis / sepsis  - pt currently tolerating RA  - Plan as above

## 2024-11-10 ENCOUNTER — EMERGENCY (EMERGENCY)
Facility: HOSPITAL | Age: 49
LOS: 1 days | Discharge: HOME | End: 2024-11-10
Attending: EMERGENCY MEDICINE | Admitting: EMERGENCY MEDICINE
Payer: MEDICARE

## 2024-11-10 VITALS
RESPIRATION RATE: 18 BRPM | TEMPERATURE: 98 F | HEART RATE: 84 BPM | SYSTOLIC BLOOD PRESSURE: 138 MMHG | WEIGHT: 270.07 LBS | HEIGHT: 70 IN | DIASTOLIC BLOOD PRESSURE: 88 MMHG | OXYGEN SATURATION: 97 %

## 2024-11-10 VITALS
HEART RATE: 97 BPM | TEMPERATURE: 99 F | DIASTOLIC BLOOD PRESSURE: 92 MMHG | OXYGEN SATURATION: 97 % | RESPIRATION RATE: 18 BRPM | SYSTOLIC BLOOD PRESSURE: 144 MMHG

## 2024-11-10 DIAGNOSIS — Z93.6 OTHER ARTIFICIAL OPENINGS OF URINARY TRACT STATUS: Chronic | ICD-10-CM

## 2024-11-10 PROCEDURE — 99283 EMERGENCY DEPT VISIT LOW MDM: CPT

## 2024-11-10 PROCEDURE — 51702 INSERT TEMP BLADDER CATH: CPT

## 2024-11-10 PROCEDURE — 99284 EMERGENCY DEPT VISIT MOD MDM: CPT | Mod: 25

## 2024-11-10 NOTE — ED ADULT NURSE REASSESSMENT NOTE - NS ED NURSE REASSESS COMMENT FT1
pt is AOX4, iraheta draining clear yellow urine. no blood in urine noted. Pt denies pain. no signs of distress noted. care ongoing.

## 2024-11-10 NOTE — ED PROVIDER NOTE - PATIENT PORTAL LINK FT
You can access the FollowMyHealth Patient Portal offered by  by registering at the following website: [email or web address]. By joining [employer]’s FollowMyHealth portal, you will also be able to view your health information using other applications (apps) compatible with our system.

## 2024-11-10 NOTE — ED PROVIDER NOTE - OBJECTIVE STATEMENT
Patient brought in by EMS for Steele not draining.  Per EMS report patient's Steele was tugged on and it stopped draining.  Do not do it patient relates he has a hospital bed he shifted positions because he had to move his bowels his Steele catheter got caught on the side rail and was tugged and stopped draining.  Patient denies any other complaints.

## 2024-11-10 NOTE — ED ADULT NURSE REASSESSMENT NOTE - NS ED NURSE REASSESS COMMENT FT1
pt is AOX4, iraheta draining clear yellow urine. 600 output in iraheta bag. No distress noted. care ongoing.

## 2024-11-10 NOTE — ED ADULT NURSE NOTE - NSFALLHARMRISKINTERV_ED_ALL_ED
Assistance OOB with selected safe patient handling equipment if applicable/Communicate risk of Fall with Harm to all staff, patient, and family/Provide visual cue: red socks, yellow wristband, yellow gown, etc/Reinforce activity limits and safety measures with patient and family/Bed in lowest position, wheels locked, appropriate side rails in place/Call bell, personal items and telephone in reach/Instruct patient to call for assistance before getting out of bed/chair/stretcher/Non-slip footwear applied when patient is off stretcher/[place] to call system/Physically safe environment - no spills, clutter or unnecessary equipment/Purposeful Proactive Rounding/Room/bathroom lighting operational, light cord in reach

## 2024-11-10 NOTE — ED PROVIDER NOTE - CLINICAL SUMMARY MEDICAL DECISION MAKING FREE TEXT BOX
Patient brought in by EMS for Steele not draining.  Per EMS report patient's Steele was tugged on and it stopped draining.  Do not do it patient relates he has a hospital bed he shifted positions because he had to move his bowels his Steele catheter got caught on the side rail and was tugged and stopped draining.  Patient denies any other complaints.    Plan replace Steele    After Steele replaced by RN immediately drained approximately 300 cc urine

## 2024-11-10 NOTE — ED CLERICAL - NS ED CLERK NOTE PRE-ARRIVAL INFORMATION; ADDITIONAL PRE-ARRIVAL INFORMATION

## 2024-11-10 NOTE — ED PROVIDER NOTE - NSFOLLOWUPINSTRUCTIONS_ED_ALL_ED_FT
WEN CATHETER PLACEMENT AND CARE - AfterCare(R) Instructions(ER/ED)    Wen Catheter Placement and Care    WHAT YOU NEED TO KNOW:    A Wen catheter is a sterile tube that is inserted into your bladder to drain urine. It is also called an indwelling urinary catheter.        DISCHARGE INSTRUCTIONS:    Return to the emergency department if:    Your catheter comes out.    You suddenly have material that looks like sand in the tubing or drainage bag.    You see blood in the tubing or drainage bag.    Little or no urine is draining into the bag, and you have checked the system.    You have pain in your hip, back, pelvis, or lower abdomen.    You are confused or cannot think clearly.    You see swelling, redness, pus, or burning where the catheter goes into your body.  Call your doctor or urologist if:    You have a fever or shaking chills.    You have bladder spasms for more than 1 day after the catheter is placed.    Your urine has a strong smell.    You have a rash or itching where the catheter tube is secured to your skin.    Urine leaks from or around the catheter, tubing, or drainage bag.    The closed drainage system has accidently come open or apart.    You see a layer of crystals inside the tubing.    You have questions or concerns about your condition or care.  Care for your catheter and drainage bag: You can reduce your risk for infection and injury by caring for your catheter and drainage bag properly.    Wash your hands often. Wash before and after you touch your catheter, tubing, or drainage bag. Use soap and water. Wear clean disposable gloves when you care for your catheter or disconnect the drainage bag. Wash your hands before you prepare or eat food.  Handwashing      Clean your genital area 2 times every day. Clean your catheter area and anal opening after every bowel movement. Use a soapy cloth to clean the area:  If you are male, clean the tip of your penis. Start where the catheter enters. Wipe backward, making sure to pull back the foreskin. Then use a cloth with clear water in the same direction to clean away the soap.    If you are female, clean the area where the catheter enters your body. Separate your labia (the smaller folds of your skin around your vaginal opening) and wipe toward the anus. Then use a cloth with clear water and wipe in the same direction.    Secure the catheter tube so you do not pull or move the catheter. This helps prevent pain and bladder spasms. Healthcare providers will show you how to use medical tape or a strap to secure the catheter tube to your body.    Keep a closed drainage system. Your catheter should always be attached to the drainage bag to form a closed system. Do not disconnect any part of the closed system unless you need to change the bag.    Keep the drainage bag below the level of your waist. This helps stop urine from moving back up the tubing and into your bladder. Do not loop or kink the tubing. This can cause urine to back up and collect in your bladder. Do not let the drainage bag touch or lie on the floor.    Empty the drainage bag when needed. The weight of a full drainage bag can be painful. Empty the drainage bag every 3 to 6 hours or when it is ? full.    Clean and change the drainage bag as directed. Ask your healthcare provider how often you should change the drainage bag and which cleaning solution to use. Wear disposable gloves when you change the bag. Do not allow the end of the catheter or tubing to touch anything. Clean the ends with an alcohol pad before you reconnect them.    Drink liquids as directed. This will help keep your urine clear and prevent catheter blockage and infection. Good choices for most people include water, juice, and milk. Ask how much liquid to drink each day and which liquids are best for you.  What to do if problems develop:    No urine is draining into the bag:  Check for kinks in the tubing and straighten them out.    Check the tape or strap used to secure the catheter tube to your skin. Make sure it is not blocking the tube.    Make sure you are not sitting or lying on the tubing.    Make sure the urine bag is hanging below the level of your waist.    Urine leaks from or around the catheter, tubing, or drainage bag: Check if the closed drainage system has accidently come open or apart. Clean the catheter and tubing ends with a new alcohol pad and reconnect them.  Follow up with your doctor or urologist as directed: Write down your questions so you remember to ask them during your visits.

## 2024-11-10 NOTE — ED ADULT NURSE NOTE - OBJECTIVE STATEMENT
pt is AOX4, bedbound, pt came to the ED for c/o urine not draining in irahtea. Pt noted with chronic iraheta, no urine draining in bag. Pt states she sees outpt urologist recently where iraheta is placed by provider. Pt denies fevers, chills, sweats. Denies ABD pain. Denies blood in urine. Denies nausea. Pt noted with a 20FR Iraheta. Pending MD lutz and tx. Care ongoing. safety precautions in place. call bell placed within reach.

## 2024-11-10 NOTE — ED ADULT NURSE NOTE - NSFALLRISKFACTORS_ED_ALL_ED
used
MS/Bone Condition: Including osteoporosis, prolonged steroid use or metastatic bone disease/cancer/Other

## 2024-11-10 NOTE — ED PROVIDER NOTE - CARE PROVIDER_API CALL
Benjamín Akbar  Urology  [address] [place] [id number]  Phone: [phone]  Fax: [phone]  Follow Up Time: 1-3 Days

## 2024-11-11 ENCOUNTER — APPOINTMENT (OUTPATIENT)
Dept: HOME HEALTH SERVICES | Facility: HOME HEALTH | Age: 49
End: 2024-11-11

## 2024-11-19 ENCOUNTER — EMERGENCY (EMERGENCY)
Facility: HOSPITAL | Age: 49
LOS: 1 days | Discharge: HOME | End: 2024-11-19
Attending: EMERGENCY MEDICINE | Admitting: EMERGENCY MEDICINE
Payer: MEDICARE

## 2024-11-19 VITALS
DIASTOLIC BLOOD PRESSURE: 71 MMHG | WEIGHT: 270.07 LBS | OXYGEN SATURATION: 96 % | HEART RATE: 108 BPM | RESPIRATION RATE: 18 BRPM | SYSTOLIC BLOOD PRESSURE: 106 MMHG | HEIGHT: 70 IN | TEMPERATURE: 100 F

## 2024-11-19 DIAGNOSIS — Z93.6 OTHER ARTIFICIAL OPENINGS OF URINARY TRACT STATUS: Chronic | ICD-10-CM

## 2024-11-19 PROCEDURE — 51702 INSERT TEMP BLADDER CATH: CPT

## 2024-11-19 PROCEDURE — 99283 EMERGENCY DEPT VISIT LOW MDM: CPT

## 2024-11-19 PROCEDURE — 99283 EMERGENCY DEPT VISIT LOW MDM: CPT | Mod: 25

## 2024-11-19 NOTE — ED PROVIDER NOTE - NSFOLLOWUPINSTRUCTIONS_ED_ALL_ED_FT
Your urinary Steele catheter was replaced  -Follow-up with your urologist as needed  -Return for fever, abdominal pains, Lack of urine output, feeling faint short of breath or other concerns    Follow Up as needed with your own doctor [place] [address]  Phone: [phone]      Steele Catheter Placement and Care    WHAT YOU NEED TO KNOW:    A Steele catheter is a sterile tube that is inserted into your bladder to drain urine. It is also called an indwelling urinary catheter.     DISCHARGE INSTRUCTIONS:    Return to the emergency department if:   •Your catheter comes out.       •You suddenly have material that looks like sand in the tubing or drainage bag.      •No urine is draining into the bag and you have checked the system.      •You have pain in your hip, back, pelvis, or lower abdomen.      •You are confused or cannot think clearly.      Call your doctor or urologist if:   •You have a fever.      •You have bladder spasms for more than 1 day after the catheter is placed.      •You see blood in the tubing or drainage bag.      •You have a rash or itching where the catheter tube is secured to your skin.      •Urine leaks from or around the catheter, tubing, or drainage bag.      •The closed drainage system has accidently come open or apart.       •You see a layer of crystals inside the tubing.      •You have questions or concerns about your condition or care.      Care for your catheter and drainage bag: You can reduce your risk for infection and injury by caring for your catheter and drainage bag properly.  •Wash your hands often. Wash before and after you touch your catheter, tubing, or drainage bag. Use soap and water. Wear clean disposable gloves when you care for your catheter or disconnect the drainage bag. Wash your hands before you prepare or eat food.   Handwashing           •Clean your genital area 2 times every day. Clean your catheter area and anal opening after every bowel movement. ?For men: Use a soapy cloth to clean the tip of your penis. Start where the catheter enters. Wipe backward making sure to pull back the foreskin. Then use a cloth with clear water in the same direction to clean away the soap.       ?For women: Use a soapy cloth to clean the area that the catheter enters your body. Make sure to separate your labia and wipe toward the anus. Then use a cloth with clear water and wipe in the same direction.       •Secure the catheter tube so you do not pull or move the catheter. This helps prevent pain and bladder spasms. Healthcare providers will show you how to use medical tape or a strap to secure the catheter tube to your body.       •Keep a closed drainage system. Your catheter should always be attached to the drainage bag to form a closed system. Do not disconnect any part of the closed system unless you need to change the bag.      •Keep the drainage bag below the level of your waist. This helps stop urine from moving back up the tubing and into your bladder. Do not loop or kink the tubing. This can cause urine to back up and collect in your bladder. Do not let the drainage bag touch or lie on the floor.      •Empty the drainage bag when needed. The weight of a full drainage bag can be painful. Empty the drainage bag every 3 to 6 hours or when it is ? full.       •Clean and change the drainage bag as directed. Ask your healthcare provider how often you should change the drainage bag and what cleaning solution to use. Wear disposable gloves when you change the bag. Do not allow the end of the catheter or tubing to touch anything. Clean the ends with an alcohol pad before you reconnect them.      What to do if problems develop:   •No urine is draining into the bag: ?Check for kinks in the tubing and straighten them out.       ?Check the tape or strap used to secure the catheter tube to your skin. Make sure it is not blocking the tube.       ?Make sure you are not sitting or lying on the tubing.      ?Make sure the urine bag is hanging below the level of your waist.      •Urine leaks from or around the catheter, tubing, or drainage bag: Check if the closed drainage system has accidently come open or apart. Clean the catheter and tubing ends with a new alcohol pad and reconnect them.       Follow up with your doctor or urologist as directed: Write down your questions so you remember to ask them during your visits.

## 2024-11-19 NOTE — ED PROVIDER NOTE - PATIENT PORTAL LINK FT
You can access the FollowMyHealth Patient Portal offered by [facility] by registering at the following website: [email or web address]. By joining [email or web address]’s FollowMyHealth portal, you will also be able to view your health information using other applications (apps) compatible with our system.

## 2024-11-19 NOTE — ED ADULT NURSE REASSESSMENT NOTE - NS ED NURSE REASSESS COMMENT FT1
20fr iraheta removed & replaced with 20fr iraheta. 2 RN at the bedside to maintain sterile field. Patient tolerated. Urine output noted

## 2024-11-19 NOTE — ED ADULT NURSE NOTE - NSFALLRISKINTERV_ED_ALL_ED
Assistance OOB with selected safe patient handling equipment if applicable/Assistance with ambulation/Communicate fall risk and risk factors to all staff, patient, and family/Monitor gait and stability/Provide visual cue: yellow wristband, yellow gown, etc/Reinforce activity limits and safety measures with patient and family/Call bell, personal items and telephone in reach/Instruct patient to call for assistance before getting out of bed/chair/stretcher/Non-slip footwear applied when patient is off stretcher/[place] to call system/Physically safe environment - no spills, clutter or unnecessary equipment/Purposeful Proactive Rounding/Room/bathroom lighting operational, light cord in reach

## 2024-11-19 NOTE — ED ADULT NURSE NOTE - OBJECTIVE STATEMENT
Patient BIBA with c/o of dislodged iraheta cathter. Patient states his iraheta has been leaking x1 day. Hx of MS. Home aide at bedside. Denies chest pain, SOB, fever, dizziness. Side rails up, call light in reach, safety maintained, comfort measures provided and MD evaluation in progress.

## 2024-11-19 NOTE — ED PROVIDER NOTE - OBJECTIVE STATEMENT
49-year-old male with history of PE, DVT, paraplegia, MS, chronic Steele, complaining of leaking from around the Steele catheter this evening.  He states he thinks it got snagged when he turned.  It was otherwise draining normally earlier today.  No blood in urine.  No fever or chills.  No abdominal pain or distention.  No nausea vomiting.  He states he was otherwise in his usual state of health

## 2024-11-19 NOTE — ED PROVIDER NOTE - PHYSICAL EXAMINATION
exam:   General: well appearing, NAD.   HEENT: eyes perrl,   cor: RRR, s1s2, 2+rad pulses.   lungs: ctabl, no resp distress.   abd: soft, ntnd. No suprapubic discomfort or distention.  Steele catheter in place..  Penis appears normal, no blood surrounding catheter.  No penile swelling or discoloration  neuro: a&ox3, cn2-12 intact, paraplegic.   MSK: no extremity swelling.  Skin: normal, no rash

## 2024-11-19 NOTE — ED PROVIDER NOTE - CLINICAL SUMMARY MEDICAL DECISION MAKING FREE TEXT BOX
49-year-old male with history of PE, DVT, paraplegia, MS, chronic Steele, complaining of leaking from around the Steele catheter this evening.  He states he thinks it got snagged when he turned.  It was otherwise draining normally earlier today.  No blood in urine.  No fever or chills.  No abdominal pain or distention.  No nausea vomiting.  He states he was otherwise in his usual state of health    Patient well-appearing.  Slightly tachycardic, 108 bpm.  May be due to mild anxiety that patient self-reported.  Otherwise well-appearing.  Steele in place.  Nontender abdomen.  No suprapubic distention.  Diaper wet with urine.  Likely Steele malfunction/displacement.  Will replace Steele.  No clinical signs or symptoms of UTI. Reassess 49-year-old male with history of PE, DVT, paraplegia, MS, chronic Steele, complaining of leaking from around the Steele catheter this evening.  He states he thinks it got snagged when he turned.  It was otherwise draining normally earlier today.  No blood in urine.  No fever or chills.  No abdominal pain or distention.  No nausea vomiting.  He states he was otherwise in his usual state of health    Patient well-appearing.  Slightly tachycardic, 108 bpm.  May be due to mild anxiety that patient self-reported.  Otherwise well-appearing.  Steele in place.  Nontender abdomen.  No suprapubic distention.  Diaper wet with urine.  Likely Steele malfunction/displacement.  Will replace Steele.  No clinical signs or symptoms of UTI. Reassess    Steele replaced with new catheter, positive urine output.  Patient remains asymptomatic.  Heart rate improved.  Will discharge home by EMS

## 2024-11-20 ENCOUNTER — NON-APPOINTMENT (OUTPATIENT)
Age: 49
End: 2024-11-20

## 2024-11-20 ENCOUNTER — APPOINTMENT (OUTPATIENT)
Dept: HOME HEALTH SERVICES | Facility: HOME HEALTH | Age: 49
End: 2024-11-20

## 2024-11-20 VITALS
DIASTOLIC BLOOD PRESSURE: 94 MMHG | OXYGEN SATURATION: 96 % | RESPIRATION RATE: 17 BRPM | HEART RATE: 99 BPM | TEMPERATURE: 98 F | SYSTOLIC BLOOD PRESSURE: 152 MMHG

## 2024-11-25 ENCOUNTER — APPOINTMENT (OUTPATIENT)
Dept: HOME HEALTH SERVICES | Facility: HOME HEALTH | Age: 49
End: 2024-11-25

## 2024-11-26 ENCOUNTER — TRANSCRIPTION ENCOUNTER (OUTPATIENT)
Age: 49
End: 2024-11-26

## 2024-11-26 ENCOUNTER — APPOINTMENT (OUTPATIENT)
Dept: HOME HEALTH SERVICES | Facility: HOME HEALTH | Age: 49
End: 2024-11-26

## 2024-11-26 ENCOUNTER — INPATIENT (INPATIENT)
Facility: HOSPITAL | Age: 49
LOS: 3 days | Discharge: HOME | DRG: 698 | End: 2024-11-30
Attending: GENERAL PRACTICE | Admitting: INTERNAL MEDICINE
Payer: MEDICARE

## 2024-11-26 VITALS
RESPIRATION RATE: 18 BRPM | SYSTOLIC BLOOD PRESSURE: 134 MMHG | HEART RATE: 131 BPM | HEIGHT: 70 IN | OXYGEN SATURATION: 95 % | DIASTOLIC BLOOD PRESSURE: 92 MMHG | TEMPERATURE: 98 F | WEIGHT: 270.07 LBS

## 2024-11-26 DIAGNOSIS — Z93.6 OTHER ARTIFICIAL OPENINGS OF URINARY TRACT STATUS: Chronic | ICD-10-CM

## 2024-11-26 LAB
ALBUMIN SERPL ELPH-MCNC: 2.7 G/DL — LOW (ref 3.3–5)
ALP SERPL-CCNC: 98 U/L — SIGNIFICANT CHANGE UP (ref 40–120)
ALT FLD-CCNC: 101 U/L — HIGH (ref 12–78)
ANION GAP SERPL CALC-SCNC: 9 MMOL/L — SIGNIFICANT CHANGE UP (ref 5–17)
APPEARANCE UR: ABNORMAL
APTT BLD: 24.4 SEC — LOW (ref 24.5–35.6)
AST SERPL-CCNC: 64 U/L — HIGH (ref 15–37)
BASOPHILS # BLD AUTO: 0 K/UL — SIGNIFICANT CHANGE UP (ref 0–0.2)
BASOPHILS NFR BLD AUTO: 0 % — SIGNIFICANT CHANGE UP (ref 0–2)
BILIRUB SERPL-MCNC: 0.9 MG/DL — SIGNIFICANT CHANGE UP (ref 0.2–1.2)
BILIRUB UR-MCNC: NEGATIVE — SIGNIFICANT CHANGE UP
BUN SERPL-MCNC: 28 MG/DL — HIGH (ref 7–23)
CALCIUM SERPL-MCNC: 9.5 MG/DL — SIGNIFICANT CHANGE UP (ref 8.5–10.1)
CHLORIDE SERPL-SCNC: 107 MMOL/L — SIGNIFICANT CHANGE UP (ref 96–108)
CO2 SERPL-SCNC: 18 MMOL/L — LOW (ref 22–31)
COLOR SPEC: YELLOW — SIGNIFICANT CHANGE UP
CREAT SERPL-MCNC: 3 MG/DL — HIGH (ref 0.5–1.3)
DIFF PNL FLD: ABNORMAL
EGFR: 25 ML/MIN/1.73M2 — LOW
EGFR: 25 ML/MIN/1.73M2 — LOW
EOSINOPHIL # BLD AUTO: 0.19 K/UL — SIGNIFICANT CHANGE UP (ref 0–0.5)
EOSINOPHIL NFR BLD AUTO: 1 % — SIGNIFICANT CHANGE UP (ref 0–6)
GLUCOSE SERPL-MCNC: 99 MG/DL — SIGNIFICANT CHANGE UP (ref 70–99)
GLUCOSE UR QL: NEGATIVE MG/DL — SIGNIFICANT CHANGE UP
HCT VFR BLD CALC: 49.6 % — SIGNIFICANT CHANGE UP (ref 39–50)
HGB BLD-MCNC: 16.9 G/DL — SIGNIFICANT CHANGE UP (ref 13–17)
INR BLD: 1.16 RATIO — SIGNIFICANT CHANGE UP (ref 0.85–1.16)
KETONES UR-MCNC: NEGATIVE MG/DL — SIGNIFICANT CHANGE UP
LACTATE SERPL-SCNC: 2.1 MMOL/L — HIGH (ref 0.7–2)
LEUKOCYTE ESTERASE UR-ACNC: ABNORMAL
LYMPHOCYTES # BLD AUTO: 1.91 K/UL — SIGNIFICANT CHANGE UP (ref 1–3.3)
LYMPHOCYTES # BLD AUTO: 10 % — LOW (ref 13–44)
MCHC RBC-ENTMCNC: 31.2 PG — SIGNIFICANT CHANGE UP (ref 27–34)
MCHC RBC-ENTMCNC: 34.1 G/DL — SIGNIFICANT CHANGE UP (ref 32–36)
MCV RBC AUTO: 91.7 FL — SIGNIFICANT CHANGE UP (ref 80–100)
MONOCYTES # BLD AUTO: 1.72 K/UL — HIGH (ref 0–0.9)
MONOCYTES NFR BLD AUTO: 9 % — SIGNIFICANT CHANGE UP (ref 2–14)
NEUTROPHILS # BLD AUTO: 15.1 K/UL — HIGH (ref 1.8–7.4)
NEUTROPHILS NFR BLD AUTO: 73 % — SIGNIFICANT CHANGE UP (ref 43–77)
NITRITE UR-MCNC: NEGATIVE — SIGNIFICANT CHANGE UP
NRBC # BLD: SIGNIFICANT CHANGE UP /100 WBCS (ref 0–0)
NRBC BLD-RTO: SIGNIFICANT CHANGE UP /100 WBCS (ref 0–0)
PH UR: 7 — SIGNIFICANT CHANGE UP (ref 5–8)
PLATELET # BLD AUTO: 166 K/UL — SIGNIFICANT CHANGE UP (ref 150–400)
POTASSIUM SERPL-MCNC: 5.1 MMOL/L — SIGNIFICANT CHANGE UP (ref 3.5–5.3)
POTASSIUM SERPL-SCNC: 5.1 MMOL/L — SIGNIFICANT CHANGE UP (ref 3.5–5.3)
PROT SERPL-MCNC: 7.7 G/DL — SIGNIFICANT CHANGE UP (ref 6–8.3)
PROT UR-MCNC: 300 MG/DL
PROTHROM AB SERPL-ACNC: 13.7 SEC — HIGH (ref 9.9–13.4)
RBC # BLD: 5.41 M/UL — SIGNIFICANT CHANGE UP (ref 4.2–5.8)
RBC # FLD: 15.2 % — HIGH (ref 10.3–14.5)
SODIUM SERPL-SCNC: 134 MMOL/L — LOW (ref 135–145)
SP GR SPEC: 1.01 — SIGNIFICANT CHANGE UP (ref 1–1.03)
UROBILINOGEN FLD QL: 0.2 MG/DL — SIGNIFICANT CHANGE UP (ref 0.2–1)
WBC # BLD: 19.12 K/UL — HIGH (ref 3.8–10.5)
WBC # FLD AUTO: 19.12 K/UL — HIGH (ref 3.8–10.5)

## 2024-11-26 PROCEDURE — 74176 CT ABD & PELVIS W/O CONTRAST: CPT | Mod: 26,MC

## 2024-11-26 PROCEDURE — 71045 X-RAY EXAM CHEST 1 VIEW: CPT | Mod: 26

## 2024-11-26 PROCEDURE — 99285 EMERGENCY DEPT VISIT HI MDM: CPT

## 2024-11-26 PROCEDURE — 71250 CT THORAX DX C-: CPT | Mod: 26,MC

## 2024-11-26 RX ORDER — ONDANSETRON HCL/PF 4 MG/2 ML
4 VIAL (ML) INJECTION ONCE
Refills: 0 | Status: COMPLETED | OUTPATIENT
Start: 2024-11-26 | End: 2024-11-26

## 2024-11-26 RX ORDER — ACETAMINOPHEN 500 MG/5ML
1000 LIQUID (ML) ORAL ONCE
Refills: 0 | Status: COMPLETED | OUTPATIENT
Start: 2024-11-26 | End: 2024-11-26

## 2024-11-26 RX ORDER — CEFTRIAXONE 500 MG/1
1000 INJECTION, POWDER, FOR SOLUTION INTRAMUSCULAR; INTRAVENOUS ONCE
Refills: 0 | Status: COMPLETED | OUTPATIENT
Start: 2024-11-26 | End: 2024-11-26

## 2024-11-26 RX ADMIN — Medication 400 MILLIGRAM(S): at 22:47

## 2024-11-26 RX ADMIN — Medication 4 MILLIGRAM(S): at 22:47

## 2024-11-26 RX ADMIN — CEFTRIAXONE 100 MILLIGRAM(S): 500 INJECTION, POWDER, FOR SOLUTION INTRAMUSCULAR; INTRAVENOUS at 22:47

## 2024-11-26 RX ADMIN — Medication 2300 MILLILITER(S): at 22:46

## 2024-11-27 ENCOUNTER — NON-APPOINTMENT (OUTPATIENT)
Age: 49
End: 2024-11-27

## 2024-11-27 DIAGNOSIS — G82.20 PARAPLEGIA, UNSPECIFIED: ICD-10-CM

## 2024-11-27 DIAGNOSIS — A41.9 SEPSIS, UNSPECIFIED ORGANISM: ICD-10-CM

## 2024-11-27 DIAGNOSIS — G35 MULTIPLE SCLEROSIS: ICD-10-CM

## 2024-11-27 DIAGNOSIS — Z29.9 ENCOUNTER FOR PROPHYLACTIC MEASURES, UNSPECIFIED: ICD-10-CM

## 2024-11-27 DIAGNOSIS — N39.0 URINARY TRACT INFECTION, SITE NOT SPECIFIED: ICD-10-CM

## 2024-11-27 DIAGNOSIS — N17.9 ACUTE KIDNEY FAILURE, UNSPECIFIED: ICD-10-CM

## 2024-11-27 LAB
A1C WITH ESTIMATED AVERAGE GLUCOSE RESULT: 5.6 % — SIGNIFICANT CHANGE UP (ref 4–5.6)
ALBUMIN SERPL ELPH-MCNC: 2.5 G/DL — LOW (ref 3.3–5)
ALP SERPL-CCNC: 84 U/L — SIGNIFICANT CHANGE UP (ref 40–120)
ALT FLD-CCNC: 79 U/L — HIGH (ref 12–78)
ANION GAP SERPL CALC-SCNC: 5 MMOL/L — SIGNIFICANT CHANGE UP (ref 5–17)
AST SERPL-CCNC: 38 U/L — HIGH (ref 15–37)
BASOPHILS # BLD AUTO: 0.08 K/UL — SIGNIFICANT CHANGE UP (ref 0–0.2)
BASOPHILS NFR BLD AUTO: 0.4 % — SIGNIFICANT CHANGE UP (ref 0–2)
BILIRUB SERPL-MCNC: 0.8 MG/DL — SIGNIFICANT CHANGE UP (ref 0.2–1.2)
BUN SERPL-MCNC: 31 MG/DL — HIGH (ref 7–23)
CALCIUM SERPL-MCNC: 8.4 MG/DL — LOW (ref 8.5–10.1)
CHLORIDE SERPL-SCNC: 112 MMOL/L — HIGH (ref 96–108)
CHOLEST SERPL-MCNC: 153 MG/DL — SIGNIFICANT CHANGE UP
CO2 SERPL-SCNC: 24 MMOL/L — SIGNIFICANT CHANGE UP (ref 22–31)
CREAT SERPL-MCNC: 2.8 MG/DL — HIGH (ref 0.5–1.3)
EGFR: 27 ML/MIN/1.73M2 — LOW
EGFR: 27 ML/MIN/1.73M2 — LOW
EOSINOPHIL # BLD AUTO: 0.26 K/UL — SIGNIFICANT CHANGE UP (ref 0–0.5)
EOSINOPHIL NFR BLD AUTO: 1.3 % — SIGNIFICANT CHANGE UP (ref 0–6)
ESTIMATED AVERAGE GLUCOSE: 114 MG/DL — SIGNIFICANT CHANGE UP (ref 68–114)
FLUAV AG NPH QL: SIGNIFICANT CHANGE UP
FLUBV AG NPH QL: SIGNIFICANT CHANGE UP
GLUCOSE SERPL-MCNC: 96 MG/DL — SIGNIFICANT CHANGE UP (ref 70–99)
HCT VFR BLD CALC: 43.2 % — SIGNIFICANT CHANGE UP (ref 39–50)
HDLC SERPL-MCNC: 26 MG/DL — LOW
HGB BLD-MCNC: 14.4 G/DL — SIGNIFICANT CHANGE UP (ref 13–17)
IMM GRANULOCYTES NFR BLD AUTO: 0.5 % — SIGNIFICANT CHANGE UP (ref 0–0.9)
LACTATE SERPL-SCNC: 1.5 MMOL/L — SIGNIFICANT CHANGE UP (ref 0.7–2)
LDLC SERPL-MCNC: 105 MG/DL — HIGH
LIPID PNL WITH DIRECT LDL SERPL: 105 MG/DL — HIGH
LYMPHOCYTES # BLD AUTO: 16.3 % — SIGNIFICANT CHANGE UP (ref 13–44)
LYMPHOCYTES # BLD AUTO: 3.3 K/UL — SIGNIFICANT CHANGE UP (ref 1–3.3)
MCHC RBC-ENTMCNC: 31.2 PG — SIGNIFICANT CHANGE UP (ref 27–34)
MCHC RBC-ENTMCNC: 33.3 G/DL — SIGNIFICANT CHANGE UP (ref 32–36)
MCV RBC AUTO: 93.5 FL — SIGNIFICANT CHANGE UP (ref 80–100)
MONOCYTES # BLD AUTO: 1.97 K/UL — HIGH (ref 0–0.9)
MONOCYTES NFR BLD AUTO: 9.7 % — SIGNIFICANT CHANGE UP (ref 2–14)
NEUTROPHILS # BLD AUTO: 14.49 K/UL — HIGH (ref 1.8–7.4)
NEUTROPHILS NFR BLD AUTO: 71.8 % — SIGNIFICANT CHANGE UP (ref 43–77)
NONHDLC SERPL-MCNC: 127 MG/DL — SIGNIFICANT CHANGE UP
NRBC # BLD: 0 /100 WBCS — SIGNIFICANT CHANGE UP (ref 0–0)
NRBC BLD-RTO: 0 /100 WBCS — SIGNIFICANT CHANGE UP (ref 0–0)
PLATELET # BLD AUTO: 217 K/UL — SIGNIFICANT CHANGE UP (ref 150–400)
POTASSIUM SERPL-MCNC: 4.4 MMOL/L — SIGNIFICANT CHANGE UP (ref 3.5–5.3)
POTASSIUM SERPL-SCNC: 4.4 MMOL/L — SIGNIFICANT CHANGE UP (ref 3.5–5.3)
PROT SERPL-MCNC: 6.6 G/DL — SIGNIFICANT CHANGE UP (ref 6–8.3)
RBC # BLD: 4.62 M/UL — SIGNIFICANT CHANGE UP (ref 4.2–5.8)
RBC # FLD: 15.3 % — HIGH (ref 10.3–14.5)
RSV RNA NPH QL NAA+NON-PROBE: SIGNIFICANT CHANGE UP
SARS-COV-2 RNA SPEC QL NAA+PROBE: SIGNIFICANT CHANGE UP
SODIUM SERPL-SCNC: 141 MMOL/L — SIGNIFICANT CHANGE UP (ref 135–145)
TRIGL SERPL-MCNC: 121 MG/DL — SIGNIFICANT CHANGE UP
WBC # BLD: 20.21 K/UL — HIGH (ref 3.8–10.5)
WBC # FLD AUTO: 20.21 K/UL — HIGH (ref 3.8–10.5)

## 2024-11-27 PROCEDURE — 93010 ELECTROCARDIOGRAM REPORT: CPT | Mod: 76

## 2024-11-27 PROCEDURE — 99222 1ST HOSP IP/OBS MODERATE 55: CPT | Mod: GC

## 2024-11-27 PROCEDURE — 93970 EXTREMITY STUDY: CPT | Mod: 26

## 2024-11-27 PROCEDURE — 99233 SBSQ HOSP IP/OBS HIGH 50: CPT

## 2024-11-27 PROCEDURE — 99222 1ST HOSP IP/OBS MODERATE 55: CPT

## 2024-11-27 RX ORDER — B1/B2/B3/B5/B6/B12/VIT C/FOLIC 500-0.5 MG
1 TABLET ORAL DAILY
Refills: 0 | Status: DISCONTINUED | OUTPATIENT
Start: 2024-11-27 | End: 2024-11-30

## 2024-11-27 RX ORDER — MELATONIN 5 MG
3 TABLET ORAL AT BEDTIME
Refills: 0 | Status: DISCONTINUED | OUTPATIENT
Start: 2024-11-27 | End: 2024-11-30

## 2024-11-27 RX ORDER — MAGNESIUM, ALUMINUM HYDROXIDE 200-200 MG
30 TABLET,CHEWABLE ORAL EVERY 4 HOURS
Refills: 0 | Status: DISCONTINUED | OUTPATIENT
Start: 2024-11-27 | End: 2024-11-30

## 2024-11-27 RX ORDER — SODIUM CHLORIDE 9 G/1000ML
1000 INJECTION, SOLUTION INTRAVENOUS
Refills: 0 | Status: DISCONTINUED | OUTPATIENT
Start: 2024-11-27 | End: 2024-11-29

## 2024-11-27 RX ORDER — CEFEPIME 2 G/20ML
2000 INJECTION, POWDER, FOR SOLUTION INTRAVENOUS EVERY 12 HOURS
Refills: 0 | Status: DISCONTINUED | OUTPATIENT
Start: 2024-11-27 | End: 2024-11-28

## 2024-11-27 RX ORDER — HEPARIN SODIUM 1000 [USP'U]/ML
5000 INJECTION INTRAVENOUS; SUBCUTANEOUS EVERY 8 HOURS
Refills: 0 | Status: DISCONTINUED | OUTPATIENT
Start: 2024-11-27 | End: 2024-11-30

## 2024-11-27 RX ORDER — ONDANSETRON HCL/PF 4 MG/2 ML
4 VIAL (ML) INJECTION EVERY 8 HOURS
Refills: 0 | Status: DISCONTINUED | OUTPATIENT
Start: 2024-11-27 | End: 2024-11-30

## 2024-11-27 RX ORDER — GABAPENTIN 400 MG/1
300 CAPSULE ORAL THREE TIMES A DAY
Refills: 0 | Status: DISCONTINUED | OUTPATIENT
Start: 2024-11-27 | End: 2024-11-30

## 2024-11-27 RX ORDER — ACETAMINOPHEN 500 MG/5ML
650 LIQUID (ML) ORAL EVERY 6 HOURS
Refills: 0 | Status: DISCONTINUED | OUTPATIENT
Start: 2024-11-27 | End: 2024-11-30

## 2024-11-27 RX ORDER — MEROPENEM 1 G/30ML
1000 INJECTION INTRAVENOUS EVERY 8 HOURS
Refills: 0 | Status: DISCONTINUED | OUTPATIENT
Start: 2024-11-27 | End: 2024-11-27

## 2024-11-27 RX ADMIN — Medication 1 TABLET(S): at 11:34

## 2024-11-27 RX ADMIN — GABAPENTIN 300 MILLIGRAM(S): 400 CAPSULE ORAL at 07:33

## 2024-11-27 RX ADMIN — CEFEPIME 100 MILLIGRAM(S): 2 INJECTION, POWDER, FOR SOLUTION INTRAVENOUS at 22:52

## 2024-11-27 RX ADMIN — Medication 100 MILLILITER(S): at 07:33

## 2024-11-27 RX ADMIN — HEPARIN SODIUM 5000 UNIT(S): 1000 INJECTION INTRAVENOUS; SUBCUTANEOUS at 14:36

## 2024-11-27 RX ADMIN — HEPARIN SODIUM 5000 UNIT(S): 1000 INJECTION INTRAVENOUS; SUBCUTANEOUS at 22:53

## 2024-11-27 RX ADMIN — SODIUM CHLORIDE 75 MILLILITER(S): 9 INJECTION, SOLUTION INTRAVENOUS at 14:39

## 2024-11-27 RX ADMIN — HEPARIN SODIUM 5000 UNIT(S): 1000 INJECTION INTRAVENOUS; SUBCUTANEOUS at 07:33

## 2024-11-27 RX ADMIN — MEROPENEM 100 MILLIGRAM(S): 1 INJECTION INTRAVENOUS at 00:32

## 2024-11-27 RX ADMIN — MEROPENEM 100 MILLIGRAM(S): 1 INJECTION INTRAVENOUS at 11:36

## 2024-11-27 RX ADMIN — GABAPENTIN 300 MILLIGRAM(S): 400 CAPSULE ORAL at 22:53

## 2024-11-27 RX ADMIN — GABAPENTIN 300 MILLIGRAM(S): 400 CAPSULE ORAL at 14:36

## 2024-11-27 RX ADMIN — CEFEPIME 100 MILLIGRAM(S): 2 INJECTION, POWDER, FOR SOLUTION INTRAVENOUS at 10:03

## 2024-11-28 LAB
ALBUMIN SERPL ELPH-MCNC: 2.6 G/DL — LOW (ref 3.3–5)
ALP SERPL-CCNC: 84 U/L — SIGNIFICANT CHANGE UP (ref 40–120)
ALT FLD-CCNC: 68 U/L — SIGNIFICANT CHANGE UP (ref 12–78)
ANION GAP SERPL CALC-SCNC: 7 MMOL/L — SIGNIFICANT CHANGE UP (ref 5–17)
AST SERPL-CCNC: 34 U/L — SIGNIFICANT CHANGE UP (ref 15–37)
BASOPHILS # BLD AUTO: 0.06 K/UL — SIGNIFICANT CHANGE UP (ref 0–0.2)
BASOPHILS NFR BLD AUTO: 0.6 % — SIGNIFICANT CHANGE UP (ref 0–2)
BILIRUB SERPL-MCNC: 0.3 MG/DL — SIGNIFICANT CHANGE UP (ref 0.2–1.2)
BUN SERPL-MCNC: 23 MG/DL — SIGNIFICANT CHANGE UP (ref 7–23)
CALCIUM SERPL-MCNC: 8.9 MG/DL — SIGNIFICANT CHANGE UP (ref 8.5–10.1)
CHLORIDE SERPL-SCNC: 111 MMOL/L — HIGH (ref 96–108)
CO2 SERPL-SCNC: 25 MMOL/L — SIGNIFICANT CHANGE UP (ref 22–31)
CREAT SERPL-MCNC: 1.3 MG/DL — SIGNIFICANT CHANGE UP (ref 0.5–1.3)
EGFR: 68 ML/MIN/1.73M2 — SIGNIFICANT CHANGE UP
EGFR: 68 ML/MIN/1.73M2 — SIGNIFICANT CHANGE UP
EOSINOPHIL # BLD AUTO: 0.42 K/UL — SIGNIFICANT CHANGE UP (ref 0–0.5)
EOSINOPHIL NFR BLD AUTO: 4.2 % — SIGNIFICANT CHANGE UP (ref 0–6)
GLUCOSE SERPL-MCNC: 100 MG/DL — HIGH (ref 70–99)
HCT VFR BLD CALC: 42.4 % — SIGNIFICANT CHANGE UP (ref 39–50)
HGB BLD-MCNC: 14.1 G/DL — SIGNIFICANT CHANGE UP (ref 13–17)
IMM GRANULOCYTES NFR BLD AUTO: 0.5 % — SIGNIFICANT CHANGE UP (ref 0–0.9)
LYMPHOCYTES # BLD AUTO: 2.14 K/UL — SIGNIFICANT CHANGE UP (ref 1–3.3)
LYMPHOCYTES # BLD AUTO: 21.3 % — SIGNIFICANT CHANGE UP (ref 13–44)
MAGNESIUM SERPL-MCNC: 2.1 MG/DL — SIGNIFICANT CHANGE UP (ref 1.6–2.6)
MCHC RBC-ENTMCNC: 31.1 PG — SIGNIFICANT CHANGE UP (ref 27–34)
MCHC RBC-ENTMCNC: 33.3 G/DL — SIGNIFICANT CHANGE UP (ref 32–36)
MCV RBC AUTO: 93.4 FL — SIGNIFICANT CHANGE UP (ref 80–100)
MONOCYTES # BLD AUTO: 0.81 K/UL — SIGNIFICANT CHANGE UP (ref 0–0.9)
MONOCYTES NFR BLD AUTO: 8.1 % — SIGNIFICANT CHANGE UP (ref 2–14)
NEUTROPHILS # BLD AUTO: 6.58 K/UL — SIGNIFICANT CHANGE UP (ref 1.8–7.4)
NEUTROPHILS NFR BLD AUTO: 65.3 % — SIGNIFICANT CHANGE UP (ref 43–77)
NRBC # BLD: 0 /100 WBCS — SIGNIFICANT CHANGE UP (ref 0–0)
NRBC BLD-RTO: 0 /100 WBCS — SIGNIFICANT CHANGE UP (ref 0–0)
PHOSPHATE SERPL-MCNC: 3.5 MG/DL — SIGNIFICANT CHANGE UP (ref 2.5–4.5)
PLATELET # BLD AUTO: 218 K/UL — SIGNIFICANT CHANGE UP (ref 150–400)
POTASSIUM SERPL-MCNC: 3.6 MMOL/L — SIGNIFICANT CHANGE UP (ref 3.5–5.3)
POTASSIUM SERPL-SCNC: 3.6 MMOL/L — SIGNIFICANT CHANGE UP (ref 3.5–5.3)
PROT SERPL-MCNC: 7 G/DL — SIGNIFICANT CHANGE UP (ref 6–8.3)
RBC # BLD: 4.54 M/UL — SIGNIFICANT CHANGE UP (ref 4.2–5.8)
RBC # FLD: 14.9 % — HIGH (ref 10.3–14.5)
SODIUM SERPL-SCNC: 143 MMOL/L — SIGNIFICANT CHANGE UP (ref 135–145)
WBC # BLD: 10.06 K/UL — SIGNIFICANT CHANGE UP (ref 3.8–10.5)
WBC # FLD AUTO: 10.06 K/UL — SIGNIFICANT CHANGE UP (ref 3.8–10.5)

## 2024-11-28 PROCEDURE — 99233 SBSQ HOSP IP/OBS HIGH 50: CPT

## 2024-11-28 RX ORDER — CEFEPIME 2 G/20ML
2000 INJECTION, POWDER, FOR SOLUTION INTRAVENOUS EVERY 8 HOURS
Refills: 0 | Status: DISCONTINUED | OUTPATIENT
Start: 2024-11-28 | End: 2024-11-29

## 2024-11-28 RX ORDER — CYCLOBENZAPRINE HYDROCHLORIDE 15 MG/1
5 CAPSULE, EXTENDED RELEASE ORAL THREE TIMES A DAY
Refills: 0 | Status: DISCONTINUED | OUTPATIENT
Start: 2024-11-28 | End: 2024-11-30

## 2024-11-28 RX ADMIN — Medication 1 TABLET(S): at 14:56

## 2024-11-28 RX ADMIN — GABAPENTIN 300 MILLIGRAM(S): 400 CAPSULE ORAL at 05:02

## 2024-11-28 RX ADMIN — CYCLOBENZAPRINE HYDROCHLORIDE 5 MILLIGRAM(S): 15 CAPSULE, EXTENDED RELEASE ORAL at 16:07

## 2024-11-28 RX ADMIN — HEPARIN SODIUM 5000 UNIT(S): 1000 INJECTION INTRAVENOUS; SUBCUTANEOUS at 05:02

## 2024-11-28 RX ADMIN — GABAPENTIN 300 MILLIGRAM(S): 400 CAPSULE ORAL at 14:56

## 2024-11-28 RX ADMIN — CEFEPIME 100 MILLIGRAM(S): 2 INJECTION, POWDER, FOR SOLUTION INTRAVENOUS at 22:35

## 2024-11-28 RX ADMIN — HEPARIN SODIUM 5000 UNIT(S): 1000 INJECTION INTRAVENOUS; SUBCUTANEOUS at 22:35

## 2024-11-28 RX ADMIN — SODIUM CHLORIDE 50 MILLILITER(S): 9 INJECTION, SOLUTION INTRAVENOUS at 14:56

## 2024-11-28 RX ADMIN — GABAPENTIN 300 MILLIGRAM(S): 400 CAPSULE ORAL at 22:35

## 2024-11-28 RX ADMIN — CEFEPIME 100 MILLIGRAM(S): 2 INJECTION, POWDER, FOR SOLUTION INTRAVENOUS at 14:56

## 2024-11-28 RX ADMIN — HEPARIN SODIUM 5000 UNIT(S): 1000 INJECTION INTRAVENOUS; SUBCUTANEOUS at 14:56

## 2024-11-29 ENCOUNTER — TRANSCRIPTION ENCOUNTER (OUTPATIENT)
Age: 49
End: 2024-11-29

## 2024-11-29 LAB
ALBUMIN SERPL ELPH-MCNC: 2.7 G/DL — LOW (ref 3.3–5)
ALP SERPL-CCNC: 82 U/L — SIGNIFICANT CHANGE UP (ref 40–120)
ALT FLD-CCNC: 62 U/L — SIGNIFICANT CHANGE UP (ref 12–78)
ANION GAP SERPL CALC-SCNC: 3 MMOL/L — LOW (ref 5–17)
AST SERPL-CCNC: 44 U/L — HIGH (ref 15–37)
BILIRUB SERPL-MCNC: 0.4 MG/DL — SIGNIFICANT CHANGE UP (ref 0.2–1.2)
BUN SERPL-MCNC: 21 MG/DL — SIGNIFICANT CHANGE UP (ref 7–23)
CALCIUM SERPL-MCNC: 9.3 MG/DL — SIGNIFICANT CHANGE UP (ref 8.5–10.1)
CHLORIDE SERPL-SCNC: 110 MMOL/L — HIGH (ref 96–108)
CO2 SERPL-SCNC: 29 MMOL/L — SIGNIFICANT CHANGE UP (ref 22–31)
CREAT SERPL-MCNC: 1.1 MG/DL — SIGNIFICANT CHANGE UP (ref 0.5–1.3)
EGFR: 83 ML/MIN/1.73M2 — SIGNIFICANT CHANGE UP
EGFR: 83 ML/MIN/1.73M2 — SIGNIFICANT CHANGE UP
GLUCOSE SERPL-MCNC: 98 MG/DL — SIGNIFICANT CHANGE UP (ref 70–99)
HCT VFR BLD CALC: 40.7 % — SIGNIFICANT CHANGE UP (ref 39–50)
HGB BLD-MCNC: 13.9 G/DL — SIGNIFICANT CHANGE UP (ref 13–17)
MAGNESIUM SERPL-MCNC: 1.8 MG/DL — SIGNIFICANT CHANGE UP (ref 1.6–2.6)
MCHC RBC-ENTMCNC: 31.4 PG — SIGNIFICANT CHANGE UP (ref 27–34)
MCHC RBC-ENTMCNC: 34.2 G/DL — SIGNIFICANT CHANGE UP (ref 32–36)
MCV RBC AUTO: 91.9 FL — SIGNIFICANT CHANGE UP (ref 80–100)
NRBC # BLD: 0 /100 WBCS — SIGNIFICANT CHANGE UP (ref 0–0)
NRBC BLD-RTO: 0 /100 WBCS — SIGNIFICANT CHANGE UP (ref 0–0)
PLATELET # BLD AUTO: 235 K/UL — SIGNIFICANT CHANGE UP (ref 150–400)
POTASSIUM SERPL-MCNC: 3.5 MMOL/L — SIGNIFICANT CHANGE UP (ref 3.5–5.3)
POTASSIUM SERPL-SCNC: 3.5 MMOL/L — SIGNIFICANT CHANGE UP (ref 3.5–5.3)
PROT SERPL-MCNC: 7 G/DL — SIGNIFICANT CHANGE UP (ref 6–8.3)
RBC # BLD: 4.43 M/UL — SIGNIFICANT CHANGE UP (ref 4.2–5.8)
RBC # FLD: 14.2 % — SIGNIFICANT CHANGE UP (ref 10.3–14.5)
SODIUM SERPL-SCNC: 142 MMOL/L — SIGNIFICANT CHANGE UP (ref 135–145)
WBC # BLD: 7.83 K/UL — SIGNIFICANT CHANGE UP (ref 3.8–10.5)
WBC # FLD AUTO: 7.83 K/UL — SIGNIFICANT CHANGE UP (ref 3.8–10.5)

## 2024-11-29 PROCEDURE — 99233 SBSQ HOSP IP/OBS HIGH 50: CPT

## 2024-11-29 PROCEDURE — 99232 SBSQ HOSP IP/OBS MODERATE 35: CPT

## 2024-11-29 RX ORDER — GABAPENTIN 400 MG/1
1 CAPSULE ORAL
Qty: 0 | Refills: 0 | DISCHARGE
Start: 2024-11-29

## 2024-11-29 RX ORDER — INFLUENZA A VIRUS A/IDAHO/07/2018 (H1N1) ANTIGEN (MDCK CELL DERIVED, PROPIOLACTONE INACTIVATED, INFLUENZA A VIRUS A/INDIANA/08/2018 (H3N2) ANTIGEN (MDCK CELL DERIVED, PROPIOLACTONE INACTIVATED), INFLUENZA B VIRUS B/SINGAPORE/INFTT-16-0610/2016 ANTIGEN (MDCK CELL DERIVED, PROPIOLACTONE INACTIVATED), INFLUENZA B VIRUS B/IOWA/06/2017 ANTIGEN (MDCK CELL DERIVED, PROPIOLACTONE INACTIVATED) 15; 15; 15; 15 UG/.5ML; UG/.5ML; UG/.5ML; UG/.5ML
0.5 INJECTION, SUSPENSION INTRAMUSCULAR ONCE
Refills: 0 | Status: DISCONTINUED | OUTPATIENT
Start: 2024-11-29 | End: 2024-11-30

## 2024-11-29 RX ORDER — CEFTRIAXONE 500 MG/1
1000 INJECTION, POWDER, FOR SOLUTION INTRAMUSCULAR; INTRAVENOUS EVERY 24 HOURS
Refills: 0 | Status: DISCONTINUED | OUTPATIENT
Start: 2024-11-29 | End: 2024-11-30

## 2024-11-29 RX ORDER — TRIAMCINOLONE ACETONIDE 1 MG/G
1 CREAM TOPICAL
Refills: 0 | Status: DISCONTINUED | OUTPATIENT
Start: 2024-11-29 | End: 2024-11-30

## 2024-11-29 RX ADMIN — SODIUM CHLORIDE 50 MILLILITER(S): 9 INJECTION, SOLUTION INTRAVENOUS at 11:46

## 2024-11-29 RX ADMIN — GABAPENTIN 300 MILLIGRAM(S): 400 CAPSULE ORAL at 15:07

## 2024-11-29 RX ADMIN — GABAPENTIN 300 MILLIGRAM(S): 400 CAPSULE ORAL at 22:12

## 2024-11-29 RX ADMIN — HEPARIN SODIUM 5000 UNIT(S): 1000 INJECTION INTRAVENOUS; SUBCUTANEOUS at 15:07

## 2024-11-29 RX ADMIN — CEFEPIME 100 MILLIGRAM(S): 2 INJECTION, POWDER, FOR SOLUTION INTRAVENOUS at 15:06

## 2024-11-29 RX ADMIN — HEPARIN SODIUM 5000 UNIT(S): 1000 INJECTION INTRAVENOUS; SUBCUTANEOUS at 05:35

## 2024-11-29 RX ADMIN — Medication 40 MILLIEQUIVALENT(S): at 11:50

## 2024-11-29 RX ADMIN — Medication 1 TABLET(S): at 11:45

## 2024-11-29 RX ADMIN — CYCLOBENZAPRINE HYDROCHLORIDE 5 MILLIGRAM(S): 15 CAPSULE, EXTENDED RELEASE ORAL at 15:18

## 2024-11-29 RX ADMIN — CEFTRIAXONE 100 MILLIGRAM(S): 500 INJECTION, POWDER, FOR SOLUTION INTRAMUSCULAR; INTRAVENOUS at 22:13

## 2024-11-29 RX ADMIN — GABAPENTIN 300 MILLIGRAM(S): 400 CAPSULE ORAL at 05:35

## 2024-11-29 RX ADMIN — TRIAMCINOLONE ACETONIDE 1 APPLICATION(S): 1 CREAM TOPICAL at 18:19

## 2024-11-29 RX ADMIN — CYCLOBENZAPRINE HYDROCHLORIDE 5 MILLIGRAM(S): 15 CAPSULE, EXTENDED RELEASE ORAL at 06:43

## 2024-11-29 RX ADMIN — CEFEPIME 100 MILLIGRAM(S): 2 INJECTION, POWDER, FOR SOLUTION INTRAVENOUS at 05:35

## 2024-11-29 RX ADMIN — HEPARIN SODIUM 5000 UNIT(S): 1000 INJECTION INTRAVENOUS; SUBCUTANEOUS at 22:12

## 2024-11-30 VITALS
TEMPERATURE: 98 F | DIASTOLIC BLOOD PRESSURE: 84 MMHG | OXYGEN SATURATION: 94 % | HEART RATE: 94 BPM | RESPIRATION RATE: 18 BRPM | SYSTOLIC BLOOD PRESSURE: 124 MMHG

## 2024-11-30 LAB
ALBUMIN SERPL ELPH-MCNC: 2.8 G/DL — LOW (ref 3.3–5)
ALP SERPL-CCNC: 83 U/L — SIGNIFICANT CHANGE UP (ref 40–120)
ALT FLD-CCNC: 67 U/L — SIGNIFICANT CHANGE UP (ref 12–78)
ANION GAP SERPL CALC-SCNC: 9 MMOL/L — SIGNIFICANT CHANGE UP (ref 5–17)
AST SERPL-CCNC: 60 U/L — HIGH (ref 15–37)
BILIRUB SERPL-MCNC: 0.4 MG/DL — SIGNIFICANT CHANGE UP (ref 0.2–1.2)
BUN SERPL-MCNC: 20 MG/DL — SIGNIFICANT CHANGE UP (ref 7–23)
CALCIUM SERPL-MCNC: 9.3 MG/DL — SIGNIFICANT CHANGE UP (ref 8.5–10.1)
CHLORIDE SERPL-SCNC: 108 MMOL/L — SIGNIFICANT CHANGE UP (ref 96–108)
CO2 SERPL-SCNC: 22 MMOL/L — SIGNIFICANT CHANGE UP (ref 22–31)
CREAT SERPL-MCNC: 0.86 MG/DL — SIGNIFICANT CHANGE UP (ref 0.5–1.3)
EGFR: 107 ML/MIN/1.73M2 — SIGNIFICANT CHANGE UP
EGFR: 107 ML/MIN/1.73M2 — SIGNIFICANT CHANGE UP
GLUCOSE SERPL-MCNC: 72 MG/DL — SIGNIFICANT CHANGE UP (ref 70–99)
MAGNESIUM SERPL-MCNC: 1.7 MG/DL — SIGNIFICANT CHANGE UP (ref 1.6–2.6)
PHOSPHATE SERPL-MCNC: 2.9 MG/DL — SIGNIFICANT CHANGE UP (ref 2.5–4.5)
POTASSIUM SERPL-MCNC: 4.4 MMOL/L — SIGNIFICANT CHANGE UP (ref 3.5–5.3)
POTASSIUM SERPL-SCNC: 4.4 MMOL/L — SIGNIFICANT CHANGE UP (ref 3.5–5.3)
PROT SERPL-MCNC: 7.3 G/DL — SIGNIFICANT CHANGE UP (ref 6–8.3)
SODIUM SERPL-SCNC: 139 MMOL/L — SIGNIFICANT CHANGE UP (ref 135–145)

## 2024-11-30 PROCEDURE — 80061 LIPID PANEL: CPT

## 2024-11-30 PROCEDURE — 85025 COMPLETE CBC W/AUTO DIFF WBC: CPT

## 2024-11-30 PROCEDURE — 85730 THROMBOPLASTIN TIME PARTIAL: CPT

## 2024-11-30 PROCEDURE — 71250 CT THORAX DX C-: CPT | Mod: MC

## 2024-11-30 PROCEDURE — 87040 BLOOD CULTURE FOR BACTERIA: CPT

## 2024-11-30 PROCEDURE — 71045 X-RAY EXAM CHEST 1 VIEW: CPT

## 2024-11-30 PROCEDURE — 96374 THER/PROPH/DIAG INJ IV PUSH: CPT

## 2024-11-30 PROCEDURE — 80053 COMPREHEN METABOLIC PANEL: CPT

## 2024-11-30 PROCEDURE — 93970 EXTREMITY STUDY: CPT

## 2024-11-30 PROCEDURE — 99239 HOSP IP/OBS DSCHRG MGMT >30: CPT

## 2024-11-30 PROCEDURE — 83605 ASSAY OF LACTIC ACID: CPT

## 2024-11-30 PROCEDURE — 87086 URINE CULTURE/COLONY COUNT: CPT

## 2024-11-30 PROCEDURE — 83036 HEMOGLOBIN GLYCOSYLATED A1C: CPT

## 2024-11-30 PROCEDURE — 85027 COMPLETE CBC AUTOMATED: CPT

## 2024-11-30 PROCEDURE — 85610 PROTHROMBIN TIME: CPT

## 2024-11-30 PROCEDURE — 36415 COLL VENOUS BLD VENIPUNCTURE: CPT

## 2024-11-30 PROCEDURE — 96375 TX/PRO/DX INJ NEW DRUG ADDON: CPT

## 2024-11-30 PROCEDURE — 97163 PT EVAL HIGH COMPLEX 45 MIN: CPT

## 2024-11-30 PROCEDURE — 99285 EMERGENCY DEPT VISIT HI MDM: CPT

## 2024-11-30 PROCEDURE — 87186 SC STD MICRODIL/AGAR DIL: CPT

## 2024-11-30 PROCEDURE — 84100 ASSAY OF PHOSPHORUS: CPT

## 2024-11-30 PROCEDURE — 81001 URINALYSIS AUTO W/SCOPE: CPT

## 2024-11-30 PROCEDURE — 93005 ELECTROCARDIOGRAM TRACING: CPT

## 2024-11-30 PROCEDURE — 74176 CT ABD & PELVIS W/O CONTRAST: CPT | Mod: MC

## 2024-11-30 PROCEDURE — 83735 ASSAY OF MAGNESIUM: CPT

## 2024-11-30 PROCEDURE — 87637 SARSCOV2&INF A&B&RSV AMP PRB: CPT

## 2024-11-30 RX ORDER — TRIAMCINOLONE ACETONIDE 1 MG/G
1 CREAM TOPICAL
Qty: 1 | Refills: 3
Start: 2024-11-30 | End: 2025-03-29

## 2024-11-30 RX ORDER — CYCLOBENZAPRINE HYDROCHLORIDE 15 MG/1
1 CAPSULE, EXTENDED RELEASE ORAL
Qty: 21 | Refills: 0
Start: 2024-11-30 | End: 2024-12-06

## 2024-11-30 RX ORDER — CEFPODOXIME PROXETIL 200 MG/1
1 TABLET, FILM COATED ORAL
Qty: 6 | Refills: 0
Start: 2024-11-30 | End: 2024-12-02

## 2024-11-30 RX ADMIN — GABAPENTIN 300 MILLIGRAM(S): 400 CAPSULE ORAL at 05:21

## 2024-11-30 RX ADMIN — Medication 1 TABLET(S): at 12:20

## 2024-11-30 RX ADMIN — HEPARIN SODIUM 5000 UNIT(S): 1000 INJECTION INTRAVENOUS; SUBCUTANEOUS at 05:21

## 2024-11-30 RX ADMIN — TRIAMCINOLONE ACETONIDE 1 APPLICATION(S): 1 CREAM TOPICAL at 05:21

## 2024-12-02 ENCOUNTER — APPOINTMENT (OUTPATIENT)
Dept: HOME HEALTH SERVICES | Facility: HOME HEALTH | Age: 49
End: 2024-12-02
Payer: MEDICAID

## 2024-12-02 VITALS
SYSTOLIC BLOOD PRESSURE: 140 MMHG | HEART RATE: 96 BPM | OXYGEN SATURATION: 98 % | RESPIRATION RATE: 18 BRPM | DIASTOLIC BLOOD PRESSURE: 98 MMHG

## 2024-12-02 DIAGNOSIS — Z97.8 PRESENCE OF OTHER SPECIFIED DEVICES: ICD-10-CM

## 2024-12-02 DIAGNOSIS — N39.0 INFECTION AND INFLAMMATORY REACTION DUE TO INDWELLING URETHRAL CATHETER, INITIAL ENCOUNTER: ICD-10-CM

## 2024-12-02 DIAGNOSIS — T83.511A INFECTION AND INFLAMMATORY REACTION DUE TO INDWELLING URETHRAL CATHETER, INITIAL ENCOUNTER: ICD-10-CM

## 2024-12-02 PROCEDURE — 99495 TRANSJ CARE MGMT MOD F2F 14D: CPT

## 2024-12-02 RX ORDER — CEFPODOXIME PROXETIL 200 MG/1
200 TABLET, FILM COATED ORAL
Qty: 6 | Refills: 1 | Status: ACTIVE | COMMUNITY
Start: 2024-12-02

## 2024-12-12 ENCOUNTER — NON-APPOINTMENT (OUTPATIENT)
Age: 49
End: 2024-12-12

## 2024-12-12 ENCOUNTER — APPOINTMENT (OUTPATIENT)
Dept: HOME HEALTH SERVICES | Facility: HOME HEALTH | Age: 49
End: 2024-12-12
Payer: MEDICAID

## 2024-12-12 DIAGNOSIS — G35 MULTIPLE SCLEROSIS: ICD-10-CM

## 2024-12-12 DIAGNOSIS — R21 RASH AND OTHER NONSPECIFIC SKIN ERUPTION: ICD-10-CM

## 2024-12-12 DIAGNOSIS — R53.2 FUNCTIONAL QUADRIPLEGIA: ICD-10-CM

## 2024-12-12 DIAGNOSIS — F32.A DEPRESSION, UNSPECIFIED: ICD-10-CM

## 2024-12-12 PROCEDURE — 99349 HOME/RES VST EST MOD MDM 40: CPT

## 2024-12-12 RX ORDER — TRIAMCINOLONE ACETONIDE 1 MG/G
0.1 OINTMENT TOPICAL TWICE DAILY
Qty: 1 | Refills: 3 | Status: ACTIVE | COMMUNITY
Start: 2024-12-02 | End: 1900-01-01

## 2024-12-12 RX ORDER — GABAPENTIN 400 MG/1
400 CAPSULE ORAL 3 TIMES DAILY
Qty: 540 | Refills: 3 | Status: ACTIVE | COMMUNITY
Start: 2024-12-02 | End: 1900-01-01

## 2024-12-17 ENCOUNTER — NON-APPOINTMENT (OUTPATIENT)
Age: 49
End: 2024-12-17

## 2025-01-10 ENCOUNTER — EMERGENCY (EMERGENCY)
Facility: HOSPITAL | Age: 50
LOS: 1 days | Discharge: HOME | End: 2025-01-10
Attending: STUDENT IN AN ORGANIZED HEALTH CARE EDUCATION/TRAINING PROGRAM | Admitting: STUDENT IN AN ORGANIZED HEALTH CARE EDUCATION/TRAINING PROGRAM
Payer: MEDICARE

## 2025-01-10 VITALS
HEART RATE: 118 BPM | TEMPERATURE: 98 F | DIASTOLIC BLOOD PRESSURE: 77 MMHG | OXYGEN SATURATION: 94 % | SYSTOLIC BLOOD PRESSURE: 107 MMHG | RESPIRATION RATE: 19 BRPM

## 2025-01-10 VITALS
RESPIRATION RATE: 19 BRPM | DIASTOLIC BLOOD PRESSURE: 72 MMHG | SYSTOLIC BLOOD PRESSURE: 108 MMHG | HEART RATE: 100 BPM | OXYGEN SATURATION: 99 % | TEMPERATURE: 98 F | WEIGHT: 300.05 LBS | HEIGHT: 70 IN

## 2025-01-10 DIAGNOSIS — Z93.6 OTHER ARTIFICIAL OPENINGS OF URINARY TRACT STATUS: Chronic | ICD-10-CM

## 2025-01-10 PROCEDURE — 99283 EMERGENCY DEPT VISIT LOW MDM: CPT

## 2025-01-10 PROCEDURE — 99283 EMERGENCY DEPT VISIT LOW MDM: CPT | Mod: 25

## 2025-01-10 PROCEDURE — 51702 INSERT TEMP BLADDER CATH: CPT

## 2025-01-10 NOTE — ED ADULT TRIAGE NOTE - CHIEF COMPLAINT QUOTE
h/o advanced MS.  has chronic iraheta, typically a coude, but three (3) days ago, a 16Fr placed.  leaking started last night.  asymptomatic.

## 2025-01-10 NOTE — ED PROVIDER NOTE - OBJECTIVE STATEMENT
49-year-old male with past medical history of MS presents today due to Steele catheter complication.  Patient reports sudden [facility] home visitations came to his house and change his Steele 2 days ago.  Patient reports since then the urine has been leaking all around the Steele.  Patient is requesting that the Steele be changed and requesting a coudé.  Patient denies abdominal pain, flank pain, hematuria, or any other complaints.

## 2025-01-10 NOTE — ED PROVIDER NOTE - PHYSICAL EXAMINATION
Constitutional: Awake, Alert, non-toxic. NAD. Well appearing, well nourished.   HEAD: Normocephalic, atraumatic.   EYES: EOM intact, conjunctiva and sclera are clear bilaterally.   ENT: No rhinorrhea, patent, mucous membranes pink/moist, no drooling or stridor.   NECK: Supple, non-tender  RESPIRATORY: Normal respiratory effort  : Steele catheter in place with soiled urine on exterior  EXTREMITIES: Full passive and active ROM in all extremities; non-tender to palpation; distal pulses palpable and symmetric  SKIN: Warm, dry; good skin turgor, no apparent lesions or rashes, no ecchymosis, brisk capillary refill.  NEURO: A&O x3. Sensory and motor functions are grossly intact. Speech is normal. Appearance and judgement seem appropriate for gender and age.

## 2025-01-10 NOTE — ED PROVIDER NOTE - CARE PROVIDERS DIRECT ADDRESSES
[email or web address]

## 2025-01-10 NOTE — ED PROVIDER NOTE - NSICDXFAMILYHX_GEN_ALL_CORE_FT
FAMILY HISTORY:  Mother  Still living? Unknown  FH: breast cancer, Age at diagnosis: Age Unknown    Grandparent  Still living? Unknown  Family history of breast cancer, Age at diagnosis: Age Unknown  Family history of diabetes mellitus (DM), Age at diagnosis: Age Unknown    Aunt  Still living? Unknown  Family history of breast cancer, Age at diagnosis: Age Unknown

## 2025-01-10 NOTE — ED PROVIDER NOTE - CARE PROVIDER_API CALL
Fausto Munoz Erik  Urology  [address], [address]  [place] [address]  Phone: [phone]  Fax: [phone]  Follow Up Time: 1-3 Days

## 2025-01-10 NOTE — ED ADULT NURSE REASSESSMENT NOTE - NSFALLUNIVINTERV_ED_ALL_ED
Bed/Stretcher in lowest position, wheels locked, appropriate side rails in place/Call bell, personal items and telephone in reach/Instruct patient to call for assistance before getting out of bed/chair/stretcher/Non-slip footwear applied when patient is off stretcher/[place] to call system/Physically safe environment - no spills, clutter or unnecessary equipment/Purposeful proactive rounding/Room/bathroom lighting operational, light cord in reach

## 2025-01-10 NOTE — ED PROVIDER NOTE - CLINICAL SUMMARY MEDICAL DECISION MAKING FREE TEXT BOX
50yo M with chronic iraheta, pw leaking around iraheta, requesitng new iraheta, no fevres, chills, abd pain or back pain, will replace, needs uro fu

## 2025-01-10 NOTE — ED ADULT NURSE REASSESSMENT NOTE - NS ED NURSE REASSESS COMMENT FT1
Pt discharged, received from SYDNEY Chaparro in [facility]. Pt pending ambulance transport back to home. EMS at bedside. Pt denies any needs, complaints or concerns. DC instructions reviewed at bedside with pt, pt able to verbalize understanding of information. All questions answered as asked. Pt discharged, received from SYDNEY Chaparro in [facility]. Pt pending ambulance transport back to home. Pt denies any needs, complaints or concerns. DC instructions reviewed at bedside with pt, pt able to verbalize understanding of information. All questions answered as asked.

## 2025-01-10 NOTE — ED ADULT NURSE REASSESSMENT NOTE - AS PAIN REST
Pt walked out of the department with no increased distress.   
0 (no pain/absence of nonverbal indicators of pain)

## 2025-01-10 NOTE — ED PROVIDER NOTE - PATIENT PORTAL LINK FT
You can access the FollowMyHealth Patient Portal offered by [facility] by registering at the following website: [email or web address]. By joining [employer]’s FollowMyHealth portal, you will also be able to view your health information using other applications (apps) compatible with our system.

## 2025-01-10 NOTE — ED PROVIDER NOTE - NSFOLLOWUPINSTRUCTIONS_ED_ALL_ED_FT
Follow up with your PCP and Urology. Return for fever, abdominal pain, flank pain, iraheta catheter malfunction.     Iraheta Catheter Placement and Care    WHAT YOU NEED TO KNOW:    A Iraheta catheter is a sterile tube that is inserted into your bladder to drain urine. It is also called an indwelling urinary catheter.        DISCHARGE INSTRUCTIONS:    Seek care immediately if:    Your catheter comes out.    You suddenly have material that looks like sand in the tubing or drainage bag.    You see blood in the tubing or drainage bag.    Little or no urine is draining into the bag, and you have checked the system.    You have pain in your hip, back, pelvis, or lower abdomen.    You are confused or cannot think clearly.    You see swelling, redness, pus, or burning where the catheter goes into your body.  Call your doctor or urologist if:    You have a fever or shaking chills.    You have bladder spasms for more than 1 day after the catheter is placed.    Your urine has a strong smell.    You have a rash or itching where the catheter tube is secured to your skin.    Urine leaks from or around the catheter, tubing, or drainage bag.    The closed drainage system has accidently come open or apart.    You see a layer of crystals inside the tubing.    You have questions or concerns about your condition or care.  Care for your catheter and drainage bag: You can reduce your risk for infection and injury by caring for your catheter and drainage bag properly.    Wash your hands often. Wash before and after you touch your catheter, tubing, or drainage bag. Use soap and water. Wear clean disposable gloves when you care for your catheter or disconnect the drainage bag. Wash your hands before you prepare or eat food.  Handwashing      Clean your genital area 2 times every day. Clean your catheter area and anal opening after every bowel movement. Use a soapy cloth to clean the area:  If you are male, clean the tip of your penis. Start where the catheter enters. Wipe backward, making sure to pull back the foreskin. Then use a cloth with clear water in the same direction to clean away the soap.    If you are female, clean the area where the catheter enters your body. Separate your labia (the smaller folds of your skin around your vaginal opening) and wipe toward the anus. Then use a cloth with clear water and wipe in the same direction.    Secure the catheter tube so you do not pull or move the catheter. This helps prevent pain and bladder spasms. Healthcare providers will show you how to use medical tape or a strap to secure the catheter tube to your body.    Keep a closed drainage system. Your catheter should always be attached to the drainage bag to form a closed system. Do not disconnect any part of the closed system unless you need to change the bag.    Keep the drainage bag below the level of your waist. This helps stop urine from moving back up the tubing and into your bladder. Do not loop or kink the tubing. This can cause urine to back up and collect in your bladder. Do not let the drainage bag touch or lie on the floor.    Empty the drainage bag when needed. The weight of a full drainage bag can be painful. Empty the drainage bag every 3 to 6 hours or when it is ? full.    Clean and change the drainage bag as directed. Ask your healthcare provider how often you should change the drainage bag and which cleaning solution to use. Wear disposable gloves when you change the bag. Do not allow the end of the catheter or tubing to touch anything. Clean the ends with an alcohol pad before you reconnect them.    Drink liquids as directed. This will help keep your urine clear and prevent catheter blockage and infection. Good choices for most people include water, juice, and milk. Ask how much liquid to drink each day and which liquids are best for you.  What to do if problems develop:    No urine is draining into the bag:  Check for kinks in the tubing and straighten them out.    Check the tape or strap used to secure the catheter tube to your skin. Make sure it is not blocking the tube.    Make sure you are not sitting or lying on the tubing.    Make sure the urine bag is hanging below the level of your waist.    Urine leaks from or around the catheter, tubing, or drainage bag: Check if the closed drainage system has accidently come open or apart. Clean the catheter and tubing ends with a new alcohol pad and reconnect them.  Follow up with your doctor or urologist as directed: Write down your questions so you remember to ask them during your visits.

## 2025-01-10 NOTE — ED ADULT NURSE NOTE - OBJECTIVE STATEMENT
NOted 20fr iraheta insitu.  Noted urine leaking around iraheta.  Cloudy urine in collection bag as well.

## 2025-01-14 ENCOUNTER — NON-APPOINTMENT (OUTPATIENT)
Age: 50
End: 2025-01-14

## 2025-01-15 ENCOUNTER — APPOINTMENT (OUTPATIENT)
Dept: HOME HEALTH SERVICES | Facility: HOME HEALTH | Age: 50
End: 2025-01-15

## 2025-01-21 ENCOUNTER — NON-APPOINTMENT (OUTPATIENT)
Age: 50
End: 2025-01-21

## 2025-01-22 ENCOUNTER — EMERGENCY (EMERGENCY)
Facility: HOSPITAL | Age: 50
LOS: 1 days | Discharge: HOME | End: 2025-01-22
Attending: EMERGENCY MEDICINE | Admitting: EMERGENCY MEDICINE
Payer: MEDICARE

## 2025-01-22 ENCOUNTER — NON-APPOINTMENT (OUTPATIENT)
Age: 50
End: 2025-01-22

## 2025-01-22 VITALS
HEART RATE: 86 BPM | OXYGEN SATURATION: 96 % | SYSTOLIC BLOOD PRESSURE: 118 MMHG | TEMPERATURE: 98 F | DIASTOLIC BLOOD PRESSURE: 80 MMHG | RESPIRATION RATE: 17 BRPM

## 2025-01-22 VITALS
TEMPERATURE: 98 F | OXYGEN SATURATION: 96 % | HEART RATE: 84 BPM | SYSTOLIC BLOOD PRESSURE: 135 MMHG | WEIGHT: 240.08 LBS | HEIGHT: 70 IN | DIASTOLIC BLOOD PRESSURE: 86 MMHG | RESPIRATION RATE: 16 BRPM

## 2025-01-22 DIAGNOSIS — Z93.6 OTHER ARTIFICIAL OPENINGS OF URINARY TRACT STATUS: Chronic | ICD-10-CM

## 2025-01-22 PROCEDURE — 87186 SC STD MICRODIL/AGAR DIL: CPT

## 2025-01-22 PROCEDURE — 87086 URINE CULTURE/COLONY COUNT: CPT

## 2025-01-22 PROCEDURE — 87077 CULTURE AEROBIC IDENTIFY: CPT

## 2025-01-22 PROCEDURE — 51702 INSERT TEMP BLADDER CATH: CPT

## 2025-01-22 PROCEDURE — 99283 EMERGENCY DEPT VISIT LOW MDM: CPT | Mod: 25

## 2025-01-22 PROCEDURE — 99284 EMERGENCY DEPT VISIT MOD MDM: CPT

## 2025-01-22 RX ORDER — CEFUROXIME SODIUM 1.5 G
1 VIAL (EA) INJECTION
Qty: 20 | Refills: 0
Start: 2025-01-22 | End: 2025-01-31

## 2025-01-22 RX ORDER — CLOTRIMAZOLE AND BETAMETHASONE DIPROPIONATE 10; .64 MG/G; MG/G
1 CREAM TOPICAL
Qty: 1 | Refills: 0
Start: 2025-01-22 | End: 2025-01-28

## 2025-01-22 RX ORDER — CLOTRIMAZOLE AND BETAMETHASONE DIPROPIONATE 10; .64 MG/G; MG/G
1 CREAM TOPICAL ONCE
Refills: 0 | Status: COMPLETED | OUTPATIENT
Start: 2025-01-22 | End: 2025-01-22

## 2025-01-22 RX ORDER — CEFUROXIME SODIUM 1.5 G
500 VIAL (EA) INJECTION ONCE
Refills: 0 | Status: COMPLETED | OUTPATIENT
Start: 2025-01-22 | End: 2025-01-22

## 2025-01-22 RX ADMIN — CLOTRIMAZOLE AND BETAMETHASONE DIPROPIONATE 1 APPLICATION(S): 10; .64 CREAM TOPICAL at 16:07

## 2025-01-22 RX ADMIN — Medication 500 MILLIGRAM(S): at 16:02

## 2025-01-23 ENCOUNTER — APPOINTMENT (OUTPATIENT)
Dept: HOME HEALTH SERVICES | Facility: HOME HEALTH | Age: 50
End: 2025-01-23

## 2025-01-23 ENCOUNTER — NON-APPOINTMENT (OUTPATIENT)
Age: 50
End: 2025-01-23

## 2025-01-23 RX ORDER — LORATADINE 5 MG
17 TABLET,CHEWABLE ORAL
Qty: 1 | Refills: 0 | Status: ACTIVE | COMMUNITY
Start: 2025-01-15

## 2025-01-24 ENCOUNTER — APPOINTMENT (OUTPATIENT)
Dept: HOME HEALTH SERVICES | Facility: HOME HEALTH | Age: 50
End: 2025-01-24
Payer: MEDICAID

## 2025-01-24 ENCOUNTER — NON-APPOINTMENT (OUTPATIENT)
Age: 50
End: 2025-01-24

## 2025-01-24 VITALS
OXYGEN SATURATION: 98 % | HEART RATE: 88 BPM | RESPIRATION RATE: 18 BRPM | SYSTOLIC BLOOD PRESSURE: 148 MMHG | DIASTOLIC BLOOD PRESSURE: 100 MMHG

## 2025-01-24 DIAGNOSIS — B36.9 SUPERFICIAL MYCOSIS, UNSPECIFIED: ICD-10-CM

## 2025-01-24 DIAGNOSIS — N39.0 INFECTION AND INFLAMMATORY REACTION DUE TO INDWELLING URETHRAL CATHETER, INITIAL ENCOUNTER: ICD-10-CM

## 2025-01-24 DIAGNOSIS — I10 ESSENTIAL (PRIMARY) HYPERTENSION: ICD-10-CM

## 2025-01-24 DIAGNOSIS — G35 MULTIPLE SCLEROSIS: ICD-10-CM

## 2025-01-24 DIAGNOSIS — J40 BRONCHITIS, NOT SPECIFIED AS ACUTE OR CHRONIC: ICD-10-CM

## 2025-01-24 DIAGNOSIS — T83.511A INFECTION AND INFLAMMATORY REACTION DUE TO INDWELLING URETHRAL CATHETER, INITIAL ENCOUNTER: ICD-10-CM

## 2025-01-24 DIAGNOSIS — R53.2 FUNCTIONAL QUADRIPLEGIA: ICD-10-CM

## 2025-01-24 DIAGNOSIS — I26.99 OTHER PULMONARY EMBOLISM W/OUT ACUTE COR PULMONALE: ICD-10-CM

## 2025-01-24 LAB
-  AMOXICILLIN/CLAVULANIC ACID: SIGNIFICANT CHANGE UP
-  AMOXICILLIN/CLAVULANIC ACID: SIGNIFICANT CHANGE UP
-  AMPICILLIN/SULBACTAM: SIGNIFICANT CHANGE UP
-  AMPICILLIN/SULBACTAM: SIGNIFICANT CHANGE UP
-  AMPICILLIN: SIGNIFICANT CHANGE UP
-  AMPICILLIN: SIGNIFICANT CHANGE UP
-  AZTREONAM: SIGNIFICANT CHANGE UP
-  AZTREONAM: SIGNIFICANT CHANGE UP
-  CEFAZOLIN: SIGNIFICANT CHANGE UP
-  CEFAZOLIN: SIGNIFICANT CHANGE UP
-  CEFEPIME: SIGNIFICANT CHANGE UP
-  CEFEPIME: SIGNIFICANT CHANGE UP
-  CEFOXITIN: SIGNIFICANT CHANGE UP
-  CEFOXITIN: SIGNIFICANT CHANGE UP
-  CEFTRIAXONE: SIGNIFICANT CHANGE UP
-  CEFTRIAXONE: SIGNIFICANT CHANGE UP
-  CEFUROXIME: SIGNIFICANT CHANGE UP
-  CIPROFLOXACIN: SIGNIFICANT CHANGE UP
-  CIPROFLOXACIN: SIGNIFICANT CHANGE UP
-  ERTAPENEM: SIGNIFICANT CHANGE UP
-  ERTAPENEM: SIGNIFICANT CHANGE UP
-  GENTAMICIN: SIGNIFICANT CHANGE UP
-  GENTAMICIN: SIGNIFICANT CHANGE UP
-  IMIPENEM: SIGNIFICANT CHANGE UP
-  IMIPENEM: SIGNIFICANT CHANGE UP
-  LEVOFLOXACIN: SIGNIFICANT CHANGE UP
-  LEVOFLOXACIN: SIGNIFICANT CHANGE UP
-  MEROPENEM: SIGNIFICANT CHANGE UP
-  MEROPENEM: SIGNIFICANT CHANGE UP
-  NITROFURANTOIN: SIGNIFICANT CHANGE UP
-  NITROFURANTOIN: SIGNIFICANT CHANGE UP
-  PIPERACILLIN/TAZOBACTAM: SIGNIFICANT CHANGE UP
-  PIPERACILLIN/TAZOBACTAM: SIGNIFICANT CHANGE UP
-  TOBRAMYCIN: SIGNIFICANT CHANGE UP
-  TOBRAMYCIN: SIGNIFICANT CHANGE UP
-  TRIMETHOPRIM/SULFAMETHOXAZOLE: SIGNIFICANT CHANGE UP
-  TRIMETHOPRIM/SULFAMETHOXAZOLE: SIGNIFICANT CHANGE UP
CULTURE RESULTS: ABNORMAL
METHOD TYPE: SIGNIFICANT CHANGE UP
METHOD TYPE: SIGNIFICANT CHANGE UP
ORGANISM # SPEC MICROSCOPIC CNT: ABNORMAL
ORGANISM # SPEC MICROSCOPIC CNT: ABNORMAL
ORGANISM # SPEC MICROSCOPIC CNT: SIGNIFICANT CHANGE UP
SPECIMEN SOURCE: SIGNIFICANT CHANGE UP

## 2025-01-24 PROCEDURE — 99349 HOME/RES VST EST MOD MDM 40: CPT

## 2025-01-24 RX ORDER — CEFUROXIME AXETIL 500 MG/1
500 TABLET, FILM COATED ORAL
Qty: 20 | Refills: 0 | Status: ACTIVE | COMMUNITY
Start: 2025-01-24

## 2025-01-24 RX ORDER — METHENAMINE HIPPURATE 1 G/1
1 TABLET ORAL
Qty: 90 | Refills: 3 | Status: ACTIVE | COMMUNITY
Start: 2025-01-24

## 2025-01-28 ENCOUNTER — NON-APPOINTMENT (OUTPATIENT)
Age: 50
End: 2025-01-28

## 2025-01-31 NOTE — ED ADULT TRIAGE NOTE - HEIGHT IN CM
01/31/25       Sonali Blue  [address] [facility] [address]      As you know, the purpose of Care Management is to give you tips that will help you and your doctor with your health.     I have included information for your review. If you'd like to go over it together, please call me at     [phone]   8:00 AM to 5:00 PM CST (Monday-Friday)     If I’m not able to take your call, you can leave a private voicemail message and I will call you back within one business day.    Sincerely,     Gema Gilliam, RN  Care Manager  [facility]     ATTCHMENTS:  Taking your blood pressure/English  Hypertension stop light instructions  
177.8

## 2025-02-10 ENCOUNTER — NON-APPOINTMENT (OUTPATIENT)
Age: 50
End: 2025-02-10

## 2025-02-12 ENCOUNTER — APPOINTMENT (OUTPATIENT)
Dept: HOME HEALTH SERVICES | Facility: HOME HEALTH | Age: 50
End: 2025-02-12
Payer: MEDICARE

## 2025-02-12 VITALS
RESPIRATION RATE: 18 BRPM | OXYGEN SATURATION: 97 % | SYSTOLIC BLOOD PRESSURE: 158 MMHG | HEART RATE: 88 BPM | DIASTOLIC BLOOD PRESSURE: 88 MMHG

## 2025-02-12 DIAGNOSIS — I10 ESSENTIAL (PRIMARY) HYPERTENSION: ICD-10-CM

## 2025-02-12 DIAGNOSIS — N31.9 NEUROMUSCULAR DYSFUNCTION OF BLADDER, UNSPECIFIED: ICD-10-CM

## 2025-02-12 DIAGNOSIS — G35 MULTIPLE SCLEROSIS: ICD-10-CM

## 2025-02-12 DIAGNOSIS — R53.2 FUNCTIONAL QUADRIPLEGIA: ICD-10-CM

## 2025-02-12 DIAGNOSIS — F32.A DEPRESSION, UNSPECIFIED: ICD-10-CM

## 2025-02-12 PROCEDURE — 99349 HOME/RES VST EST MOD MDM 40: CPT

## 2025-02-12 RX ORDER — CLOTRIMAZOLE AND BETAMETHASONE DIPROPIONATE 10; .5 MG/G; MG/G
1-0.05 CREAM TOPICAL
Qty: 1 | Refills: 2 | Status: ACTIVE | COMMUNITY
Start: 2025-02-12 | End: 1900-01-01

## 2025-02-13 ENCOUNTER — EMERGENCY (EMERGENCY)
Facility: HOSPITAL | Age: 50
LOS: 1 days | Discharge: HOME | End: 2025-02-13
Attending: EMERGENCY MEDICINE | Admitting: STUDENT IN AN ORGANIZED HEALTH CARE EDUCATION/TRAINING PROGRAM
Payer: MEDICARE

## 2025-02-13 VITALS
SYSTOLIC BLOOD PRESSURE: 132 MMHG | HEART RATE: 138 BPM | DIASTOLIC BLOOD PRESSURE: 81 MMHG | HEIGHT: 70 IN | TEMPERATURE: 98 F | OXYGEN SATURATION: 96 % | RESPIRATION RATE: 16 BRPM | WEIGHT: 270.07 LBS

## 2025-02-13 VITALS
TEMPERATURE: 99 F | SYSTOLIC BLOOD PRESSURE: 120 MMHG | RESPIRATION RATE: 18 BRPM | OXYGEN SATURATION: 99 % | HEART RATE: 98 BPM | DIASTOLIC BLOOD PRESSURE: 80 MMHG

## 2025-02-13 DIAGNOSIS — Z93.6 OTHER ARTIFICIAL OPENINGS OF URINARY TRACT STATUS: Chronic | ICD-10-CM

## 2025-02-13 LAB
ALBUMIN SERPL ELPH-MCNC: 3.3 G/DL — SIGNIFICANT CHANGE UP (ref 3.3–5)
ALP SERPL-CCNC: 104 U/L — SIGNIFICANT CHANGE UP (ref 40–120)
ALT FLD-CCNC: 83 U/L — HIGH (ref 12–78)
ANION GAP SERPL CALC-SCNC: 3 MMOL/L — LOW (ref 5–17)
APPEARANCE UR: ABNORMAL
APTT BLD: 38.7 SEC — HIGH (ref 24.5–35.6)
AST SERPL-CCNC: 40 U/L — HIGH (ref 15–37)
BASE EXCESS BLDV CALC-SCNC: 3.1 MMOL/L — HIGH (ref -2–3)
BASOPHILS # BLD AUTO: 0.07 K/UL — SIGNIFICANT CHANGE UP (ref 0–0.2)
BASOPHILS NFR BLD AUTO: 0.4 % — SIGNIFICANT CHANGE UP (ref 0–2)
BILIRUB SERPL-MCNC: 0.3 MG/DL — SIGNIFICANT CHANGE UP (ref 0.2–1.2)
BILIRUB UR-MCNC: NEGATIVE — SIGNIFICANT CHANGE UP
BUN SERPL-MCNC: 22 MG/DL — SIGNIFICANT CHANGE UP (ref 7–23)
CALCIUM SERPL-MCNC: 10 MG/DL — SIGNIFICANT CHANGE UP (ref 8.5–10.1)
CHLORIDE SERPL-SCNC: 109 MMOL/L — HIGH (ref 96–108)
CO2 SERPL-SCNC: 29 MMOL/L — SIGNIFICANT CHANGE UP (ref 22–31)
COLOR SPEC: YELLOW — SIGNIFICANT CHANGE UP
CREAT SERPL-MCNC: 0.86 MG/DL — SIGNIFICANT CHANGE UP (ref 0.5–1.3)
DIFF PNL FLD: ABNORMAL
EGFR: 107 ML/MIN/1.73M2 — SIGNIFICANT CHANGE UP
EOSINOPHIL # BLD AUTO: 0.15 K/UL — SIGNIFICANT CHANGE UP (ref 0–0.5)
EOSINOPHIL NFR BLD AUTO: 0.9 % — SIGNIFICANT CHANGE UP (ref 0–6)
FLUAV AG NPH QL: SIGNIFICANT CHANGE UP
FLUBV AG NPH QL: SIGNIFICANT CHANGE UP
GAS PNL BLDV: SIGNIFICANT CHANGE UP
GLUCOSE SERPL-MCNC: 151 MG/DL — HIGH (ref 70–99)
GLUCOSE UR QL: NEGATIVE MG/DL — SIGNIFICANT CHANGE UP
HCO3 BLDV-SCNC: 29 MMOL/L — SIGNIFICANT CHANGE UP (ref 22–29)
HCT VFR BLD CALC: 52.1 % — HIGH (ref 39–50)
HGB BLD-MCNC: 17.7 G/DL — HIGH (ref 13–17)
IMM GRANULOCYTES NFR BLD AUTO: 0.5 % — SIGNIFICANT CHANGE UP (ref 0–0.9)
INR BLD: 1.09 RATIO — SIGNIFICANT CHANGE UP (ref 0.85–1.16)
KETONES UR-MCNC: NEGATIVE MG/DL — SIGNIFICANT CHANGE UP
LACTATE SERPL-SCNC: 1.8 MMOL/L — SIGNIFICANT CHANGE UP (ref 0.7–2)
LACTATE SERPL-SCNC: 2.1 MMOL/L — HIGH (ref 0.7–2)
LEUKOCYTE ESTERASE UR-ACNC: ABNORMAL
LYMPHOCYTES # BLD AUTO: 14.2 % — SIGNIFICANT CHANGE UP (ref 13–44)
LYMPHOCYTES # BLD AUTO: 2.43 K/UL — SIGNIFICANT CHANGE UP (ref 1–3.3)
MCHC RBC-ENTMCNC: 30.8 PG — SIGNIFICANT CHANGE UP (ref 27–34)
MCHC RBC-ENTMCNC: 34 G/DL — SIGNIFICANT CHANGE UP (ref 32–36)
MCV RBC AUTO: 90.6 FL — SIGNIFICANT CHANGE UP (ref 80–100)
MONOCYTES # BLD AUTO: 1.3 K/UL — HIGH (ref 0–0.9)
MONOCYTES NFR BLD AUTO: 7.6 % — SIGNIFICANT CHANGE UP (ref 2–14)
NEUTROPHILS # BLD AUTO: 13.1 K/UL — HIGH (ref 1.8–7.4)
NEUTROPHILS NFR BLD AUTO: 76.4 % — SIGNIFICANT CHANGE UP (ref 43–77)
NITRITE UR-MCNC: NEGATIVE — SIGNIFICANT CHANGE UP
NRBC BLD AUTO-RTO: 0 /100 WBCS — SIGNIFICANT CHANGE UP (ref 0–0)
PCO2 BLDV: 52 MMHG — SIGNIFICANT CHANGE UP (ref 42–55)
PH BLDV: 7.35 — SIGNIFICANT CHANGE UP (ref 7.32–7.43)
PH UR: 6.5 — SIGNIFICANT CHANGE UP (ref 5–8)
PLATELET # BLD AUTO: 265 K/UL — SIGNIFICANT CHANGE UP (ref 150–400)
PO2 BLDV: 65 MMHG — HIGH (ref 25–45)
POTASSIUM SERPL-MCNC: 3.5 MMOL/L — SIGNIFICANT CHANGE UP (ref 3.5–5.3)
POTASSIUM SERPL-SCNC: 3.5 MMOL/L — SIGNIFICANT CHANGE UP (ref 3.5–5.3)
PROT SERPL-MCNC: 8.3 G/DL — SIGNIFICANT CHANGE UP (ref 6–8.3)
PROT UR-MCNC: 30 MG/DL
PROTHROM AB SERPL-ACNC: 12.8 SEC — SIGNIFICANT CHANGE UP (ref 9.9–13.4)
RBC # BLD: 5.75 M/UL — SIGNIFICANT CHANGE UP (ref 4.2–5.8)
RBC # FLD: 14.1 % — SIGNIFICANT CHANGE UP (ref 10.3–14.5)
RSV RNA NPH QL NAA+NON-PROBE: SIGNIFICANT CHANGE UP
SAO2 % BLDV: 93.6 % — HIGH (ref 67–88)
SARS-COV-2 RNA SPEC QL NAA+PROBE: SIGNIFICANT CHANGE UP
SODIUM SERPL-SCNC: 141 MMOL/L — SIGNIFICANT CHANGE UP (ref 135–145)
SP GR SPEC: 1.01 — SIGNIFICANT CHANGE UP (ref 1–1.03)
UROBILINOGEN FLD QL: 0.2 MG/DL — SIGNIFICANT CHANGE UP (ref 0.2–1)
WBC # BLD: 17.14 K/UL — HIGH (ref 3.8–10.5)
WBC # FLD AUTO: 17.14 K/UL — HIGH (ref 3.8–10.5)

## 2025-02-13 PROCEDURE — 87637 SARSCOV2&INF A&B&RSV AMP PRB: CPT

## 2025-02-13 PROCEDURE — 87086 URINE CULTURE/COLONY COUNT: CPT

## 2025-02-13 PROCEDURE — 85610 PROTHROMBIN TIME: CPT

## 2025-02-13 PROCEDURE — 83605 ASSAY OF LACTIC ACID: CPT

## 2025-02-13 PROCEDURE — 80053 COMPREHEN METABOLIC PANEL: CPT

## 2025-02-13 PROCEDURE — 85730 THROMBOPLASTIN TIME PARTIAL: CPT

## 2025-02-13 PROCEDURE — 99285 EMERGENCY DEPT VISIT HI MDM: CPT

## 2025-02-13 PROCEDURE — 71045 X-RAY EXAM CHEST 1 VIEW: CPT | Mod: 26

## 2025-02-13 PROCEDURE — 51702 INSERT TEMP BLADDER CATH: CPT

## 2025-02-13 PROCEDURE — 99285 EMERGENCY DEPT VISIT HI MDM: CPT | Mod: 25

## 2025-02-13 PROCEDURE — 36415 COLL VENOUS BLD VENIPUNCTURE: CPT

## 2025-02-13 PROCEDURE — 82803 BLOOD GASES ANY COMBINATION: CPT

## 2025-02-13 PROCEDURE — 87040 BLOOD CULTURE FOR BACTERIA: CPT

## 2025-02-13 PROCEDURE — 93005 ELECTROCARDIOGRAM TRACING: CPT

## 2025-02-13 PROCEDURE — 81001 URINALYSIS AUTO W/SCOPE: CPT

## 2025-02-13 PROCEDURE — 71045 X-RAY EXAM CHEST 1 VIEW: CPT

## 2025-02-13 PROCEDURE — 87186 SC STD MICRODIL/AGAR DIL: CPT

## 2025-02-13 PROCEDURE — 85025 COMPLETE CBC W/AUTO DIFF WBC: CPT

## 2025-02-13 PROCEDURE — 93010 ELECTROCARDIOGRAM REPORT: CPT

## 2025-02-13 RX ORDER — CEFUROXIME AXETIL 500 MG
1 TABLET ORAL
Qty: 14 | Refills: 0
Start: 2025-02-13

## 2025-02-13 RX ORDER — VANCOMYCIN HYDROCHLORIDE 50 MG/ML
1000 KIT ORAL ONCE
Refills: 0 | Status: DISCONTINUED | OUTPATIENT
Start: 2025-02-13 | End: 2025-02-13

## 2025-02-13 RX ORDER — PIPERACILLIN SODIUM AND TAZOBACTAM SODIUM 2; 250 G/50ML; MG/50ML
3.38 INJECTION, POWDER, FOR SOLUTION INTRAVENOUS ONCE
Refills: 0 | Status: COMPLETED | OUTPATIENT
Start: 2025-02-13 | End: 2025-02-13

## 2025-02-13 RX ORDER — VANCOMYCIN HYDROCHLORIDE 50 MG/ML
1500 KIT ORAL ONCE
Refills: 0 | Status: COMPLETED | OUTPATIENT
Start: 2025-02-13 | End: 2025-02-13

## 2025-02-13 RX ORDER — BACTERIOSTATIC SODIUM CHLORIDE 0.9 %
2000 VIAL (ML) INJECTION ONCE
Refills: 0 | Status: COMPLETED | OUTPATIENT
Start: 2025-02-13 | End: 2025-02-13

## 2025-02-13 RX ADMIN — Medication 2000 MILLILITER(S): at 14:32

## 2025-02-13 NOTE — ED ADULT NURSE NOTE - OBJECTIVE STATEMENT
the patient presents to the er from home for a iraheta that is not working properly.  he believes that it was placed around 3 weeks ago.  He is alert / oriented x4, paralyzed from waist down.  he is tachy at baseline.  rectal temp performed, 99.7.  denies complaints of pain.  new iraheta placed under sterile technique (20 fr 30ml balloon) with positive return.  this urine was collected and sent.  swab collected and sent.  difficulty in obtaining line and so labs were delayed.  MD at bedside and he placed a 20g iv under ultrasound to the right forearm, also tolerated well.  all labs collected and sent.

## 2025-02-13 NOTE — ED ADULT NURSE REASSESSMENT NOTE - NS ED NURSE REASSESS COMMENT FT1
1130:  patient has chronic iraheta and he states (and he was sent here) because it stopped flowing.  states he believes the last time it was changed was 3 weeks ago.  new 20 [place] iraheta placed with 30ml inflation.  he tolerated the procedure well, which was done under sterile technique.  urine was collected and sent.  sotero tyson.

## 2025-02-13 NOTE — ED PROVIDER NOTE - PROVIDER TOKENS
FREE:[LAST:[Your],FIRST:[PCP],PHONE:[(   )    -],FAX:[(   )    -]],PROVIDER:[TOKEN:[[id number]]],PROVIDER:[TOKEN:[[id number]]]

## 2025-02-13 NOTE — ED ADULT NURSE NOTE - NSFALLRISKINTERV_ED_ALL_ED
Assistance OOB with selected safe patient handling equipment if applicable/Communicate fall risk and risk factors to all staff, patient, and family/Monitor gait and stability/Provide patient with walking aids/Provide visual cue: yellow wristband, yellow gown, etc/Reinforce activity limits and safety measures with patient and family/Call bell, personal items and telephone in reach/Instruct patient to call for assistance before getting out of bed/chair/stretcher/Non-slip footwear applied when patient is off stretcher/[place] to call system/Physically safe environment - no spills, clutter or unnecessary equipment/Purposeful Proactive Rounding/Room/bathroom lighting operational, light cord in reach Assistance OOB with selected safe patient handling equipment if applicable/Assistance with ambulation/Communicate fall risk and risk factors to all staff, patient, and family/Monitor gait and stability/Provide patient with walking aids/Provide visual cue: yellow wristband, yellow gown, etc/Reinforce activity limits and safety measures with patient and family/Toileting schedule using arm’s reach rule for commode and bathroom/Call bell, personal items and telephone in reach/Instruct patient to call for assistance before getting out of bed/chair/stretcher/Non-slip footwear applied when patient is off stretcher/[place] to call system/Physically safe environment - no spills, clutter or unnecessary equipment/Purposeful Proactive Rounding/Room/bathroom lighting operational, light cord in reach

## 2025-02-13 NOTE — ED PROVIDER NOTE - PATIENT PORTAL LINK FT
You can access the FollowMyHealth Patient Portal offered by [facility] by registering at the following website: [email or web address]. By joining [email or web address]’s FollowMyHealth portal, you will also be able to view your health information using other applications (apps) compatible with our system.

## 2025-02-13 NOTE — ED PROVIDER NOTE - CARE PLAN
1 Principal Discharge DX:	Steele catheter problem   Principal Discharge DX:	Steele catheter problem  Secondary Diagnosis:	Acute UTI

## 2025-02-13 NOTE — ED PROVIDER NOTE - NSFOLLOWUPINSTRUCTIONS_ED_ALL_ED_FT
Please return to the Emergency Department immediately for fevers, worsening pain, vomiting and if you have any other problems or concerns. Please follow up with your Primary Care Physician within the next 2 days.    Please take any prescription medications prescribed as instructed    Please follow up with urology within the next 2 days as well        WEN CATHETER PLACEMENT AND CARE - Ambulatory Care    Wen Catheter Placement and Care    AMBULATORY CARE:    A Wen catheter is a sterile tube that is inserted into your bladder to drain urine. It is also called an indwelling urinary catheter. The tip of the catheter has a small balloon filled with solution that holds the catheter in your bladder.        How a Wen catheter is placed: Your genital area will be cleaned to prevent infection. The catheter will be inserted into your urethra. When urine begins to flow into the tubing, the balloon is filled to keep the catheter in place. Then, the open end will be attached to a drainage bag.    Seek care immediately if:    Your catheter comes out.    You suddenly have material that looks like sand in the tubing or drainage bag.    You see blood in the tubing or drainage bag.    Little or no urine is draining into the bag, and you have checked the system.    No urine is draining into the bag, and you have checked the system.    You have pain in your hip, back, pelvis, or lower abdomen.    You are confused or cannot think clearly.    You see swelling, redness, pus, or burning where the catheter goes into your body.  Call your doctor or urologist if:    You have a fever or shaking chills.    You have bladder spasms for more than 1 day after the catheter is placed.    Your urine has a strong smell.    You have a rash or itching where the catheter tube is secured to your skin.    Urine leaks from or around the catheter, tubing, or drainage bag.    The closed drainage system has accidently come open or apart.    You see a layer of crystals inside the tubing.    You have questions or concerns about your condition or care.  Care for your catheter and drainage bag: You can reduce your risk for infection and injury by caring for your catheter and drainage bag properly.    Wash your hands often. Wash before and after you touch your catheter, tubing, or drainage bag. Use soap and water. Wear clean disposable gloves when you care for your catheter or disconnect the drainage bag. Wash your hands before you prepare or eat food.  Handwashing      Clean your genital area 2 times every day. Clean your catheter area and anal opening after every bowel movement. Use a soapy cloth to clean the area:  If you are male, clean the tip of your penis. Start where the catheter enters. Wipe backward, making sure to pull back the foreskin. Then use a cloth with clear water in the same direction to clean away the soap.    If you are female, clean the area where the catheter enters your body. Separate your labia (the smaller folds of your skin around your vaginal opening) and wipe toward the anus. Then use a cloth with clear water and wipe in the same direction.    Secure the catheter tube so you do not pull or move the catheter. This helps prevent pain and bladder spasms. Healthcare providers will show you how to use medical tape or a strap to secure the catheter tube to your body.    Keep a closed drainage system. Your catheter should always be attached to the drainage bag to form a closed system. Do not disconnect any part of the closed system unless you need to change the bag.    Keep the drainage bag below the level of your waist. This helps stop urine from moving back up the tubing and into your bladder. Do not loop or kink the tubing. This can cause urine to back up and collect in your bladder. Do not let the drainage bag touch or lie on the floor.    Empty the drainage bag when needed. The weight of a full drainage bag can be painful. Empty the drainage bag every 3 to 6 hours or when it is ? full.    Clean and change the drainage bag as directed. Ask your healthcare provider how often you should change the drainage bag and which cleaning solution to use. Wear disposable gloves when you change the bag. Do not allow the end of the catheter or tubing to touch anything. Clean the ends with an alcohol pad before you reconnect them.    Drink liquids as directed. This will help keep your urine clear and prevent catheter blockage and infection. Good choices for most people include water, juice, and milk. Ask how much liquid to drink each day and which liquids are best for you.  What to do if problems develop:    No urine is draining into the bag:  Check for kinks in the tubing and straighten them out.    Check the tape or strap used to secure the catheter tube to your skin. Make sure it is not blocking the tube.    Make sure you are not sitting or lying on the tubing.    Make sure the urine bag is hanging below the level of your waist.    Urine leaks from or around the catheter, tubing, or drainage bag: Check if the closed drainage system has accidently come open or apart. Clean the catheter and tubing ends with a new alcohol pad and reconnect them.  Follow up with your doctor or urologist as directed: Write down your questions so you remember to ask them during your visits.

## 2025-02-13 NOTE — ED PROVIDER NOTE - OBJECTIVE STATEMENT
pt sent in for non-draining iraheta catheter, found to be tachy to 138 on arrival so CODE SEPSIS was called presuming urinary source. pt bed bound since young age because of very advanced MS.

## 2025-02-13 NOTE — ED PROVIDER NOTE - CARE PROVIDERS DIRECT ADDRESSES
,DirectAddress_Unknown,[email or web address]

## 2025-02-13 NOTE — ED ADULT NURSE REASSESSMENT NOTE - NS ED NURSE REASSESS COMMENT FT1
1815:  repeat lactate sent.  Unit clerk to call for transport as he is going to be discharted.  he is complaining of discomfort from stretcher and asking to be repositioned.  will look for assistance in doing this.  vitals recorded.  denies any chest pain.  sotero tyson.

## 2025-02-13 NOTE — ED PROVIDER NOTE - CARE PROVIDER_API CALL
Your, PCP  Phone: (   )    -  Fax: (   )    -  Follow Up Time:     Duane Triplett  Urology  [address], [address]  [place] [address]  Phone: [phone]  Fax: [phone]  Follow Up Time:     Star Gerard  Urology  [address] [place] [address]  Phone: [phone]  Fax: [phone]  Follow Up Time:

## 2025-02-13 NOTE — ED ADULT NURSE REASSESSMENT NOTE - NS ED NURSE REASSESS COMMENT FT1
1330:  unable to gain access on this patient, as he states he can never get an iv, only has veins in his knuckles.  MD at bedside, and under ultrasound technique, a 20g was placed into the right forearm, tolerated well.  all labs collected and sent.  sotero tyson.

## 2025-02-13 NOTE — ED PROVIDER NOTE - PHYSICAL EXAMINATION
Gen: alert, NAD  HEENT:  NC/AT, PERR  CV:  well perfused  Pulm:  normal RR, breathing comfortably  Abd: s/nt/nd  MSK: limited motion of all extremities   Neuro:  non-focal  Skin:  visualized areas intact  Psych: AOx3

## 2025-02-13 NOTE — ED PROVIDER NOTE - PROGRESS NOTE DETAILS
Patient was re-evaluated at this time and he remains well appearing without complaints. his iraheta was changed, his UA is positive (however chronic iraheta for years). due to elevated wBC and tachycardia, will treat with abx and send home with course. he has had elevated WBC in the past, currently appears well and he wishes to go home. patient likely simply dehydrated as he appears clinically dehydrated and is bedbound at baseline. The Patient will be discharged home at this time. Their lab and/or imaging results were explained with the patient and they were instructed to go over them with their PCP. All questions were answered at this time. recent urine culture was pan sensitive. patient does not appear clinically to be septic at this time, no fever here or reports of fevers at home.    Patient was told to follow up with their PCP within the next 2 days. they were told to return to the ED if they have fevers, abdominal pain, vomiting    patient will be discharged home at this time, they are in agreement with the plan

## 2025-02-13 NOTE — ED ADULT TRIAGE NOTE - ESI TRIAGE ACUITY LEVEL, MLM
2 Bi-Rhombic Flap Text: The defect edges were debeveled with a #15 scalpel blade.  Given the location of the defect and the proximity to free margins a bi-rhombic flap was deemed most appropriate.  Using a sterile surgical marker, an appropriate rhombic flap was drawn incorporating the defect. The area thus outlined was incised deep to adipose tissue with a #15 scalpel blade.  The skin margins were undermined to an appropriate distance in all directions utilizing iris scissors.

## 2025-02-14 ENCOUNTER — APPOINTMENT (OUTPATIENT)
Dept: HOME HEALTH SERVICES | Facility: HOME HEALTH | Age: 50
End: 2025-02-14

## 2025-02-17 RX ORDER — SULFAMETHOXAZOLE AND TRIMETHOPRIM 400; 80 MG/1; MG/1
1 TABLET ORAL
Qty: 20 | Refills: 0
Start: 2025-02-17 | End: 2025-02-26

## 2025-02-21 ENCOUNTER — NON-APPOINTMENT (OUTPATIENT)
Age: 50
End: 2025-02-21

## 2025-02-21 ENCOUNTER — APPOINTMENT (OUTPATIENT)
Dept: HOME HEALTH SERVICES | Facility: HOME HEALTH | Age: 50
End: 2025-02-21

## 2025-03-05 ENCOUNTER — EMERGENCY (EMERGENCY)
Facility: HOSPITAL | Age: 50
LOS: 1 days | Discharge: HOME | End: 2025-03-05
Attending: STUDENT IN AN ORGANIZED HEALTH CARE EDUCATION/TRAINING PROGRAM | Admitting: STUDENT IN AN ORGANIZED HEALTH CARE EDUCATION/TRAINING PROGRAM
Payer: MEDICARE

## 2025-03-05 ENCOUNTER — TRANSCRIPTION ENCOUNTER (OUTPATIENT)
Age: 50
End: 2025-03-05

## 2025-03-05 VITALS
OXYGEN SATURATION: 96 % | SYSTOLIC BLOOD PRESSURE: 132 MMHG | TEMPERATURE: 98 F | HEART RATE: 98 BPM | DIASTOLIC BLOOD PRESSURE: 86 MMHG | RESPIRATION RATE: 17 BRPM

## 2025-03-05 VITALS
TEMPERATURE: 98 F | HEIGHT: 70 IN | HEART RATE: 80 BPM | RESPIRATION RATE: 14 BRPM | WEIGHT: 229.94 LBS | OXYGEN SATURATION: 97 % | DIASTOLIC BLOOD PRESSURE: 84 MMHG | SYSTOLIC BLOOD PRESSURE: 135 MMHG

## 2025-03-05 DIAGNOSIS — Z93.6 OTHER ARTIFICIAL OPENINGS OF URINARY TRACT STATUS: Chronic | ICD-10-CM

## 2025-03-05 LAB
APPEARANCE UR: ABNORMAL
BACTERIA # UR AUTO: ABNORMAL /HPF
BILIRUB UR-MCNC: NEGATIVE — SIGNIFICANT CHANGE UP
COLOR SPEC: YELLOW — SIGNIFICANT CHANGE UP
DIFF PNL FLD: ABNORMAL
EPI CELLS # UR: PRESENT
GLUCOSE UR QL: NEGATIVE MG/DL — SIGNIFICANT CHANGE UP
KETONES UR-MCNC: NEGATIVE MG/DL — SIGNIFICANT CHANGE UP
LEUKOCYTE ESTERASE UR-ACNC: ABNORMAL
NITRITE UR-MCNC: POSITIVE
PH UR: 6.5 — SIGNIFICANT CHANGE UP (ref 5–8)
PROT UR-MCNC: 100 MG/DL
RBC CASTS # UR COMP ASSIST: ABNORMAL /HPF
SP GR SPEC: 1.02 — SIGNIFICANT CHANGE UP (ref 1–1.03)
UROBILINOGEN FLD QL: 0.2 MG/DL — SIGNIFICANT CHANGE UP (ref 0.2–1)
WBC UR QL: ABNORMAL /HPF (ref 0–5)

## 2025-03-05 PROCEDURE — 99283 EMERGENCY DEPT VISIT LOW MDM: CPT | Mod: 25

## 2025-03-05 PROCEDURE — 51702 INSERT TEMP BLADDER CATH: CPT

## 2025-03-05 PROCEDURE — 87186 SC STD MICRODIL/AGAR DIL: CPT

## 2025-03-05 PROCEDURE — 81001 URINALYSIS AUTO W/SCOPE: CPT

## 2025-03-05 PROCEDURE — 99284 EMERGENCY DEPT VISIT MOD MDM: CPT | Mod: 25

## 2025-03-05 PROCEDURE — 87086 URINE CULTURE/COLONY COUNT: CPT

## 2025-03-05 PROCEDURE — 51705 CHANGE OF BLADDER TUBE: CPT

## 2025-03-05 PROCEDURE — 87077 CULTURE AEROBIC IDENTIFY: CPT

## 2025-03-05 RX ORDER — SULFAMETHOXAZOLE/TRIMETHOPRIM 800-160 MG
1 TABLET ORAL ONCE
Refills: 0 | Status: COMPLETED | OUTPATIENT
Start: 2025-03-05 | End: 2025-03-05

## 2025-03-05 RX ORDER — SULFAMETHOXAZOLE/TRIMETHOPRIM 800-160 MG
1 TABLET ORAL
Qty: 20 | Refills: 0
Start: 2025-03-05 | End: 2025-03-14

## 2025-03-05 RX ADMIN — Medication 1 TABLET(S): at 20:33

## 2025-03-06 ENCOUNTER — RX RENEWAL (OUTPATIENT)
Age: 50
End: 2025-03-06

## 2025-03-06 ENCOUNTER — APPOINTMENT (OUTPATIENT)
Dept: HOME HEALTH SERVICES | Facility: HOME HEALTH | Age: 50
End: 2025-03-06

## 2025-03-09 LAB
-  AMOXICILLIN/CLAVULANIC ACID: SIGNIFICANT CHANGE UP
-  AMPICILLIN/SULBACTAM: SIGNIFICANT CHANGE UP
-  AMPICILLIN: SIGNIFICANT CHANGE UP
-  AMPICILLIN: SIGNIFICANT CHANGE UP
-  AZTREONAM: SIGNIFICANT CHANGE UP
-  CEFAZOLIN: SIGNIFICANT CHANGE UP
-  CEFEPIME: SIGNIFICANT CHANGE UP
-  CEFOXITIN: SIGNIFICANT CHANGE UP
-  CEFTRIAXONE: SIGNIFICANT CHANGE UP
-  CEFUROXIME: SIGNIFICANT CHANGE UP
-  CIPROFLOXACIN: SIGNIFICANT CHANGE UP
-  CIPROFLOXACIN: SIGNIFICANT CHANGE UP
-  ERTAPENEM: SIGNIFICANT CHANGE UP
-  GENTAMICIN: SIGNIFICANT CHANGE UP
-  IMIPENEM: SIGNIFICANT CHANGE UP
-  LEVOFLOXACIN: SIGNIFICANT CHANGE UP
-  LEVOFLOXACIN: SIGNIFICANT CHANGE UP
-  MEROPENEM: SIGNIFICANT CHANGE UP
-  NITROFURANTOIN: SIGNIFICANT CHANGE UP
-  NITROFURANTOIN: SIGNIFICANT CHANGE UP
-  PIPERACILLIN/TAZOBACTAM: SIGNIFICANT CHANGE UP
-  TETRACYCLINE: SIGNIFICANT CHANGE UP
-  TOBRAMYCIN: SIGNIFICANT CHANGE UP
-  TRIMETHOPRIM/SULFAMETHOXAZOLE: SIGNIFICANT CHANGE UP
-  VANCOMYCIN: SIGNIFICANT CHANGE UP
CULTURE RESULTS: ABNORMAL
METHOD TYPE: SIGNIFICANT CHANGE UP
METHOD TYPE: SIGNIFICANT CHANGE UP
ORGANISM # SPEC MICROSCOPIC CNT: ABNORMAL
ORGANISM # SPEC MICROSCOPIC CNT: ABNORMAL
ORGANISM # SPEC MICROSCOPIC CNT: SIGNIFICANT CHANGE UP
SPECIMEN SOURCE: SIGNIFICANT CHANGE UP

## 2025-03-14 ENCOUNTER — NON-APPOINTMENT (OUTPATIENT)
Age: 50
End: 2025-03-14

## 2025-03-17 ENCOUNTER — NON-APPOINTMENT (OUTPATIENT)
Age: 50
End: 2025-03-17

## 2025-03-17 ENCOUNTER — TRANSCRIPTION ENCOUNTER (OUTPATIENT)
Age: 50
End: 2025-03-17

## 2025-03-21 ENCOUNTER — NON-APPOINTMENT (OUTPATIENT)
Age: 50
End: 2025-03-21

## 2025-03-21 DIAGNOSIS — R39.89 OTHER SYMPTOMS AND SIGNS INVOLVING THE GENITOURINARY SYSTEM: ICD-10-CM

## 2025-03-22 ENCOUNTER — LABORATORY RESULT (OUTPATIENT)
Age: 50
End: 2025-03-22

## 2025-03-26 ENCOUNTER — EMERGENCY (EMERGENCY)
Facility: HOSPITAL | Age: 50
LOS: 1 days | Discharge: HOME | End: 2025-03-26
Attending: EMERGENCY MEDICINE | Admitting: EMERGENCY MEDICINE
Payer: MEDICARE

## 2025-03-26 VITALS
SYSTOLIC BLOOD PRESSURE: 153 MMHG | HEART RATE: 125 BPM | DIASTOLIC BLOOD PRESSURE: 93 MMHG | WEIGHT: 270.07 LBS | OXYGEN SATURATION: 95 % | TEMPERATURE: 99 F | RESPIRATION RATE: 16 BRPM | HEIGHT: 70 IN

## 2025-03-26 VITALS
OXYGEN SATURATION: 97 % | HEART RATE: 100 BPM | SYSTOLIC BLOOD PRESSURE: 127 MMHG | TEMPERATURE: 99 F | DIASTOLIC BLOOD PRESSURE: 86 MMHG | RESPIRATION RATE: 18 BRPM

## 2025-03-26 DIAGNOSIS — Z93.6 OTHER ARTIFICIAL OPENINGS OF URINARY TRACT STATUS: Chronic | ICD-10-CM

## 2025-03-26 PROCEDURE — 51702 INSERT TEMP BLADDER CATH: CPT

## 2025-03-26 PROCEDURE — 99284 EMERGENCY DEPT VISIT MOD MDM: CPT | Mod: 25

## 2025-03-26 PROCEDURE — 99283 EMERGENCY DEPT VISIT LOW MDM: CPT

## 2025-03-26 NOTE — ED ADULT NURSE NOTE - NSFALLHARMRISKINTERV_ED_ALL_ED

## 2025-03-26 NOTE — ED PROVIDER NOTE - OBJECTIVE STATEMENT
Patient brought in by EMS for Steele replacement.  Per EMS report patient has chronic indwelling Steele due to EMS it fell out sometime overnight EMS was called to bring him to ER for replacement.  Patient relates his catheter became dislodged overnight but he denies any pain or discomfort.  No fevers nausea vomiting.  PMD [facility]    Plan replace Steele negative for any fractures dislocations.  On reevaluation, patient reports improvement in pain.  Discussed results with patient and I recommend he follows up with orthopedics for reevaluation and further management of right shoulder pain.  Patient will call to schedule follow-up appointment with orthopedics in the next few days.  He will also call his PCP to schedule follow-up appointment to evaluate possible axillary cyst.  Plan to discharge patient with prescription for Naprosyn as needed for pain.  He will also use Tylenol and muscle relaxers that he was prescribed 4 days ago as needed for pain.  Patient will return to the emergency department for any new or worsening symptoms.  Patient is afebrile, nontoxic, neurovascularly intact, and hemodynamically stable at time of discharge.  Patient expresses understanding and agreement with this plan.    Medical Decision Making  Amount and/or Complexity of Data Reviewed  Radiology: ordered.    Risk  Prescription drug management.       Coding     PROCEDURES:    Procedures      FINAL IMPRESSION      1. Acute pain of right shoulder          DISPOSITION/PLAN   DISPOSITION Decision To Discharge 05/27/2024 10:40:33 PM      PATIENT REFERRED TO:  Jena Ochoa, DORIAN - [facility]  [address] [id number] [phone]    Call in 2 days  For follow up appointment.    Deandre Taylor MD  [address] [address] [phone]    Call in 1 day  For follow up appointment.      DISCHARGE MEDICATIONS:  New Prescriptions    NAPROXEN (NAPROSYN) 500 MG TABLET    Take 1 tablet by mouth 2 times daily       (Please note that portions of this note were completed with a voice recognition program.  Efforts were made to edit the dictations but occasionally words are mis-transcribed.)    Roxy Gibbs PA-C    Supervising Physician Roxy Henderson PA-C  05/30/24 0614

## 2025-03-26 NOTE — ED ADULT NURSE NOTE - OBJECTIVE STATEMENT
Pt received in rm 17A 49y male AXO 4 Bedbound from home c/o catheter complications. Pt states iraheta catheter needed to be replaced. upon assessment pts iraheta catheter is not draining. Pt denies fever, chills, nausea, vomiting, diarrhea and constipation. Iraheta catheter replaced, pt discharged. Pending ambulance.

## 2025-03-26 NOTE — ED PROVIDER NOTE - CLINICAL SUMMARY MEDICAL DECISION MAKING FREE TEXT BOX
Patient brought in by EMS for Steele replacement.  Per EMS report patient has chronic indwelling Steele due to EMS it fell out sometime overnight EMS was called to bring him to ER for replacement.  Patient relates his catheter became dislodged overnight but he denies any pain or discomfort.  No fevers nausea vomiting.  PMD [facility]    Plan replace Steele

## 2025-03-26 NOTE — ED PROVIDER NOTE - NSFOLLOWUPINSTRUCTIONS_ED_ALL_ED_FT
Indwelling Urinary Catheter Insertion, Care After  This sheet gives you information about how to care for yourself after your procedure. Your health care provider may also give you more specific instructions. If you have problems or questions, contact your health care provider.    What can I expect after the procedure?  After the procedure, it is common to have a slight discomfort around your urethra where the catheter enters your body.    Follow these instructions at home:  Caring for the catheter tubing and drainage bag    Secure the catheter tubing and drainage bag to your leg or thigh to keep it from moving.  Do not pull on your catheter.  Check the catheter tubing regularly to make sure there are no kinks or blockages.  Keep the drainage bag at or below the level of your bladder. This will enable your urine to only drain out instead of going back into your body.  Do not let the drainage bag or catheter tubing touch the floor.  Empty the drainage bag every 2–4 hours, or more often if needed. Do not let the bag get completely full.  Disconnect the tubing and drainage bag as little as possible.  General instructions    Take showers daily to keep the catheter clean. Do not take a bath.  Wash your hands with soap and water before and after touching the catheter, tubing, or drainage bag.  Drink enough fluids to keep your urine pale yellow, or as told by your health care provider.  How to remove the catheter    A person washing hands with soap and water.  Remove the catheter only if told by your health care provider. Follow instructions from your health care provider about when and how to remove the catheter. For most catheters, you will need to take the following steps:  Prepare your supplies. You will need:  A syringe. This would be given to you by your health care provider.  A towel.  A wastebasket.  Empty the drainage bag if needed.  Wash your hands with soap and warm water.  Remove the tape that secures the catheter to your leg or thigh.  Get into a comfortable position, such as:  Lying down with your head raised on pillows and your knees pointing to the ceiling.  Sitting on a chair or the edge of a bed.  Place the towel under you to catch any spilled urine.  Put the syringe into the balloon port of the catheter. Use a firm push and twist motion to fit the syringe into the balloon port.  The water from the balloon will empty into the syringe.  Gently pull out the catheter once the balloon is empty.  If the catheter doesn't slide easily, do not use force. Let your health care provider know that you are not able to remove the catheter.  Throw the used catheter and the syringe in the wastebasket.  Wipe any spilled urine or water with the towel.  Wash your hands with soap and warm water.  Safety    Let your health care provider know if:  Your bladder is full, but you are not able to urinate.  You have removed the catheter, but you are not able to urinate after 8 hours.  Contact a health care provider if:  Your urine looks cloudy, has a bad smell, or stops flowing into the drainage bag.  Your catheter gets clogged or starts to leak.  You feel pain or pressure in the bladder area.  You have back pain.  Your drainage bag or tubing looks dirty.  Get help right away if:  You have a fever or chills.  You have severe pain in your back or your lower abdomen.  You have warmth, redness, swelling, or pain in the urethra area.  You notice blood in your urine.  Your catheter gets pulled out.  Summary  Wash your hands with soap and water before and after touching the catheter, tubing, or drainage bag.  Do not pull on your catheter or try to remove it.  Keep the drainage bag at or below the level of your bladder, but do not let the drainage bag or catheter tubing touch the floor.  Get help right away if you have a fever, chills, or any other signs of infection.  This information is not intended to replace advice given to you by your health care provider. Make sure you discuss any questions you have with your health care provider.

## 2025-03-26 NOTE — ED ADULT NURSE NOTE - CAS ELECT INFOMATION PROVIDED
reviewed dc paperwork reviewed. No furthwer/DC instructions reviewed dc paperwork reviewed. No further questions from pt/DC instructions

## 2025-03-26 NOTE — ED PROVIDER NOTE - PATIENT PORTAL LINK FT
You can access the FollowMyHealth Patient Portal offered by [facility] by registering at the following website: [email or web address]. By joining [employer]’s FollowMyHealth portal, you will also be able to view your health information using other applications (apps) compatible with our system.

## 2025-03-26 NOTE — ED ADULT NURSE NOTE - NSFALLRISKFACTORS_ED_ALL_ED
parapalegic/Bone Condition: Including osteoporosis, prolonged steroid use or metastatic bone disease/cancer

## 2025-03-27 ENCOUNTER — NON-APPOINTMENT (OUTPATIENT)
Age: 50
End: 2025-03-27

## 2025-03-27 ENCOUNTER — APPOINTMENT (OUTPATIENT)
Dept: HOME HEALTH SERVICES | Facility: HOME HEALTH | Age: 50
End: 2025-03-27

## 2025-03-27 RX ORDER — AMOXICILLIN 500 MG/1
500 TABLET, FILM COATED ORAL
Refills: 0 | Status: ACTIVE | COMMUNITY
Start: 2025-03-27

## 2025-03-27 RX ORDER — NITROFURANTOIN (MONOHYDRATE/MACROCRYSTALS) 25; 75 MG/1; MG/1
100 CAPSULE ORAL
Qty: 10 | Refills: 0 | Status: ACTIVE | COMMUNITY
Start: 2025-03-21

## 2025-04-04 ENCOUNTER — NON-APPOINTMENT (OUTPATIENT)
Age: 50
End: 2025-04-04

## 2025-04-12 ENCOUNTER — EMERGENCY (EMERGENCY)
Facility: HOSPITAL | Age: 50
LOS: 1 days | End: 2025-04-12
Attending: STUDENT IN AN ORGANIZED HEALTH CARE EDUCATION/TRAINING PROGRAM | Admitting: FAMILY MEDICINE
Payer: MEDICARE

## 2025-04-12 ENCOUNTER — TRANSCRIPTION ENCOUNTER (OUTPATIENT)
Age: 50
End: 2025-04-12

## 2025-04-12 VITALS
HEART RATE: 111 BPM | SYSTOLIC BLOOD PRESSURE: 151 MMHG | OXYGEN SATURATION: 96 % | TEMPERATURE: 98 F | RESPIRATION RATE: 18 BRPM | DIASTOLIC BLOOD PRESSURE: 91 MMHG | HEIGHT: 70 IN | WEIGHT: 250 LBS

## 2025-04-12 DIAGNOSIS — N36.8 OTHER SPECIFIED DISORDERS OF URETHRA: ICD-10-CM

## 2025-04-12 DIAGNOSIS — G35 MULTIPLE SCLEROSIS: ICD-10-CM

## 2025-04-12 DIAGNOSIS — Z29.9 ENCOUNTER FOR PROPHYLACTIC MEASURES, UNSPECIFIED: ICD-10-CM

## 2025-04-12 DIAGNOSIS — Z93.6 OTHER ARTIFICIAL OPENINGS OF URINARY TRACT STATUS: Chronic | ICD-10-CM

## 2025-04-12 LAB
ALBUMIN SERPL ELPH-MCNC: 3.1 G/DL — LOW (ref 3.3–5)
ALP SERPL-CCNC: 101 U/L — SIGNIFICANT CHANGE UP (ref 40–120)
ALT FLD-CCNC: 114 U/L — HIGH (ref 12–78)
ANION GAP SERPL CALC-SCNC: 7 MMOL/L — SIGNIFICANT CHANGE UP (ref 5–17)
APTT BLD: 35.7 SEC — HIGH (ref 24.5–35.6)
AST SERPL-CCNC: 82 U/L — HIGH (ref 15–37)
BILIRUB SERPL-MCNC: 0.6 MG/DL — SIGNIFICANT CHANGE UP (ref 0.2–1.2)
BUN SERPL-MCNC: 16 MG/DL — SIGNIFICANT CHANGE UP (ref 7–23)
CALCIUM SERPL-MCNC: 9.2 MG/DL — SIGNIFICANT CHANGE UP (ref 8.5–10.1)
CHLORIDE SERPL-SCNC: 108 MMOL/L — SIGNIFICANT CHANGE UP (ref 96–108)
CO2 SERPL-SCNC: 27 MMOL/L — SIGNIFICANT CHANGE UP (ref 22–31)
CREAT SERPL-MCNC: 0.89 MG/DL — SIGNIFICANT CHANGE UP (ref 0.5–1.3)
EGFR: 106 ML/MIN/1.73M2 — SIGNIFICANT CHANGE UP
EGFR: 106 ML/MIN/1.73M2 — SIGNIFICANT CHANGE UP
GLUCOSE SERPL-MCNC: 102 MG/DL — HIGH (ref 70–99)
HCT VFR BLD CALC: 46.9 % — SIGNIFICANT CHANGE UP (ref 39–50)
HGB BLD-MCNC: 16.1 G/DL — SIGNIFICANT CHANGE UP (ref 13–17)
INR BLD: 1.1 RATIO — SIGNIFICANT CHANGE UP (ref 0.85–1.16)
MCHC RBC-ENTMCNC: 31 PG — SIGNIFICANT CHANGE UP (ref 27–34)
MCHC RBC-ENTMCNC: 34.3 G/DL — SIGNIFICANT CHANGE UP (ref 32–36)
MCV RBC AUTO: 90.4 FL — SIGNIFICANT CHANGE UP (ref 80–100)
NRBC BLD AUTO-RTO: 0 /100 WBCS — SIGNIFICANT CHANGE UP (ref 0–0)
PLATELET # BLD AUTO: 279 K/UL — SIGNIFICANT CHANGE UP (ref 150–400)
POTASSIUM SERPL-MCNC: 4.1 MMOL/L — SIGNIFICANT CHANGE UP (ref 3.5–5.3)
POTASSIUM SERPL-SCNC: 4.1 MMOL/L — SIGNIFICANT CHANGE UP (ref 3.5–5.3)
PROT SERPL-MCNC: 7.9 G/DL — SIGNIFICANT CHANGE UP (ref 6–8.3)
PROTHROM AB SERPL-ACNC: 12.9 SEC — SIGNIFICANT CHANGE UP (ref 9.9–13.4)
RBC # BLD: 5.19 M/UL — SIGNIFICANT CHANGE UP (ref 4.2–5.8)
RBC # FLD: 14.2 % — SIGNIFICANT CHANGE UP (ref 10.3–14.5)
SODIUM SERPL-SCNC: 142 MMOL/L — SIGNIFICANT CHANGE UP (ref 135–145)
WBC # BLD: 12.77 K/UL — HIGH (ref 3.8–10.5)
WBC # FLD AUTO: 12.77 K/UL — HIGH (ref 3.8–10.5)

## 2025-04-12 PROCEDURE — 99285 EMERGENCY DEPT VISIT HI MDM: CPT

## 2025-04-12 PROCEDURE — 99223 1ST HOSP IP/OBS HIGH 75: CPT | Mod: GC

## 2025-04-12 RX ORDER — INFLUENZA A VIRUS A/IDAHO/07/2018 (H1N1) ANTIGEN (MDCK CELL DERIVED, PROPIOLACTONE INACTIVATED, INFLUENZA A VIRUS A/INDIANA/08/2018 (H3N2) ANTIGEN (MDCK CELL DERIVED, PROPIOLACTONE INACTIVATED), INFLUENZA B VIRUS B/SINGAPORE/INFTT-16-0610/2016 ANTIGEN (MDCK CELL DERIVED, PROPIOLACTONE INACTIVATED), INFLUENZA B VIRUS B/IOWA/06/2017 ANTIGEN (MDCK CELL DERIVED, PROPIOLACTONE INACTIVATED) 15; 15; 15; 15 UG/.5ML; UG/.5ML; UG/.5ML; UG/.5ML
0.5 INJECTION, SUSPENSION INTRAMUSCULAR ONCE
Refills: 0 | Status: DISCONTINUED | OUTPATIENT
Start: 2025-04-12 | End: 2025-04-15

## 2025-04-12 RX ORDER — ACETAMINOPHEN 500 MG/5ML
650 LIQUID (ML) ORAL EVERY 6 HOURS
Refills: 0 | Status: DISCONTINUED | OUTPATIENT
Start: 2025-04-12 | End: 2025-04-15

## 2025-04-12 RX ORDER — SODIUM CHLORIDE 9 G/1000ML
1000 INJECTION, SOLUTION INTRAVENOUS
Refills: 0 | Status: DISCONTINUED | OUTPATIENT
Start: 2025-04-12 | End: 2025-04-15

## 2025-04-12 RX ORDER — ENOXAPARIN SODIUM 100 MG/ML
40 INJECTION SUBCUTANEOUS EVERY 24 HOURS
Refills: 0 | Status: DISCONTINUED | OUTPATIENT
Start: 2025-04-12 | End: 2025-04-13

## 2025-04-12 RX ADMIN — SODIUM CHLORIDE 75 MILLILITER(S): 9 INJECTION, SOLUTION INTRAVENOUS at 23:36

## 2025-04-12 NOTE — CONSULT NOTE ADULT - ASSESSMENT
ASSESSMENT & PLAN:  49yM with PMHx of paraplegia, PE, DVT, MS, chronic Steele placement coming from home for bleeding from catheter exchange.    - Patient to be monitored in the ED for continued urethral bleeding, if bleeding stops, can be d/c'ed from ED to home  - Discussed with Dr. Bray and ED attending Dr. Sanchez

## 2025-04-12 NOTE — H&P ADULT - HISTORY OF PRESENT ILLNESS
48 yo M with PMHx of MS, paraplegia, DVT/PE and chronic Iraheta is brought in s/p chronic iraheta change at home with home health nurse when blood found at meatus. Upon replacement, when they inserted the Iraheta catheter they noted some blood at the meatus and were unable to insert the catheter. Denies other symptoms or complaints at this time.  No fevers, no recent trouble with catheter otherwise. Iraheta changed in the ED with light red urine. No acute pain at this time. Urology PA saw pt in ED, would like to observe until bleeding stops.    ED Course:  Vitals: /91, -->101, Temp 98.2F, RR 18, O2 sat 97% RA  Labs: WBC 12.77, aPTT 35.7, AST 81,    Meds: n/a  EKG: n/a  Imaging: n/a   50 yo M with PMHx of MS, paraplegia, DVT/PE and chronic Iraheta is brought in s/p chronic iraheta change at home with home health nurse when blood found at meatus. Upon replacement, when they inserted the Iraheta catheter they noted some blood at the meatus and were unable to insert the catheter. He had some burning pain with the previous iraheta. Denies other symptoms or complaints at this time.  No fevers, no recent trouble with catheter otherwise. Iraheta changed in the ED with light red urine. No acute pain at this time. Urology PA saw pt in ED, would like to observe until bleeding stops.    ED Course:  Vitals: /91, -->101, Temp 98.2F, RR 18, O2 sat 97% RA  Labs: WBC 12.77, aPTT 35.7, AST 81,    Meds: n/a  EKG: n/a  Imaging: n/a

## 2025-04-12 NOTE — ED ADULT TRIAGE NOTE - CHIEF COMPLAINT QUOTE
Chronic long term catheter.  Visiting nurse noted blood surrounding penile head.  Attempted re-ajustment but blood continued.  Steele removed PTA.  16FR

## 2025-04-12 NOTE — H&P ADULT - NSHPREVIEWOFSYSTEMS_GEN_ALL_CORE
REVIEW OF SYSTEMS:    CONSTITUTIONAL:  No weakness, fevers or chills  EYES/ENT:  No visual changes;  No vertigo or throat pain   NECK:  No pain or stiffness  RESPIRATORY:  No cough, wheezing, hemoptysis; No shortness of breath  CARDIOVASCULAR:  No chest pain or palpitations  GASTROINTESTINAL:  No abdominal or epigastric pain. No nausea, vomiting, or hematemesis; No diarrhea or constipation. No melena or hematochezia.  GENITOURINARY: + blood at urethral meatus  MUSCULOSKELETAL:  FROM all extremities, normal strength, No calf tenderness  NEUROLOGICAL:  No numbness or weakness  SKIN:  No itching, rashes

## 2025-04-12 NOTE — ED ADULT NURSE NOTE - ED STAT RN HANDOFF DETAILS
Report given to SYDNEY Zayas, pt. in hospital bed, comfortable at current time pending room on admission floor.

## 2025-04-12 NOTE — ED PROVIDER NOTE - PHYSICAL EXAMINATION
Constitutional: Awake, Alert, non-toxic. No acute distress.  HEAD: Normocephalic, atraumatic.   EYES: EOM intact, conjunctiva and sclera are clear bilaterally.  ENT: External ears normal. No rhinorrhea, no tracheal deviation   CARDIOVASCULAR: regular rate  RESPIRATORY: Normal respiratory effort; Speaking in full sentences. No accessory muscle use.   MSK:  no lower extremity edema, no deformities  : ED RN Andreas Sr as chaperone, blood present at urethral meatus around catheter, no TTP, iraheta draining dark yellow urine  SKIN: Warm, dry  NEURO: A&O x3. at baseline, moving upper extremities  PSYCH: Appearance and judgement seem appropriate for gender and age.

## 2025-04-12 NOTE — H&P ADULT - PROBLEM SELECTOR PLAN 2
Patient with hx of MS. paraplegic  - on home cyclobenzaprine, gabapentin  - cont home meds Patient with hx of MS. paraplegic  - not on home meds  - bedbound Elevated LFTs on admission, hx of elevated LFTs in the past  - hx of hepatic steatosis on previous CT scan   - no acute abdominal pain at this time  - cont to monitor CMP

## 2025-04-12 NOTE — PATIENT PROFILE ADULT - FUNCTIONAL ASSESSMENT - BASIC MOBILITY 6.
1-calculated by average/Not able to assess (calculate score using [facility] averaging method)  1-calculated by average/Not able to assess (calculate score using [facility] averaging method)

## 2025-04-12 NOTE — ED ADULT NURSE NOTE - NSFALLHARMRISKINTERV_ED_ALL_ED
Assistance OOB with selected safe patient handling equipment if applicable/Assistance with ambulation/Communicate risk of Fall with Harm to all staff, patient, and family/Monitor gait and stability/Provide visual cue: red socks, yellow wristband, yellow gown, etc/Reinforce activity limits and safety measures with patient and family/Bed in lowest position, wheels locked, appropriate side rails in place/Call bell, personal items and telephone in reach/Instruct patient to call for assistance before getting out of bed/chair/stretcher/Non-slip footwear applied when patient is off stretcher/[place] to call system/Physically safe environment - no spills, clutter or unnecessary equipment/Purposeful Proactive Rounding/Room/bathroom lighting operational, light cord in reach

## 2025-04-12 NOTE — ED ADULT NURSE NOTE - NS ED NURSE PRESS ULCER STAGE 12
Vancomycin IV Pharmacy-to-Dose Consultation    Bradley Ramirez is a 81 y o  male who was receiving Vancomycin IV with management by the Pharmacy Consult service for treatment of urinary tract infection  The patient's Vancomycin therapy has been discontinued  Thank you for allowing us to take part in this patient's care  Pharmacy will sign-off now; please call or re-consult if there are any questions          Mark Charles PharmD  Pharmacist stage I

## 2025-04-12 NOTE — H&P ADULT - NSHPSOCIALHISTORY_GEN_ALL_CORE
Tobacco:   EtOH:   Recreational drug use:  Lives with:  Ambulates:  ADLs: Tobacco: Denies  EtOH: social  Recreational drug use: daily marijuana use  Lives on own with 24 hr home health aid and [facility] at home nursing  Bedbound, paraplegic   Dependent for ADLs

## 2025-04-12 NOTE — H&P ADULT - ATTENDING COMMENTS
48 yo M with PMHx of MS, paraplegia, DVT/PE and chronic Iraheta is brought in s/p chronic iraheta change at home with home health nurse when blood found at meatus. Admitted to observation for blood at urethral meatus.     agree with resident note  pt on Obs to monitor urethral bleeding from traumatic placement at home  bleeding has improved  continue IVF  pt was started on methenamine, continue  f/u Hgb in AM   f/u Urology recs  no chemoprophylaxis due to bleeding    DC planning likely tomorrow if bleeding stops and Hgb stable    time spent 75 min excluding resident teaching and other services

## 2025-04-12 NOTE — H&P ADULT - NSHPPHYSICALEXAM_GEN_ALL_CORE
VITALS:   T(C): 36.8 (04-12-25 @ 19:22), Max: 36.8 (04-12-25 @ 19:22)  HR: 101 (04-12-25 @ 19:22) (99 - 111)  BP: 155/91 (04-12-25 @ 19:22) (149/99 - 155/91)  RR: 18 (04-12-25 @ 19:22) (18 - 19)  SpO2: 97% (04-12-25 @ 19:22) (96% - 97%)    GENERAL: NAD, lying in bed comfortably  HEAD:  Atraumatic, Normocephalic  EYES: EOMI, PERRLA, conjunctiva and sclera clear  ENT: Moist mucous membranes  NECK: Supple, No JVD  CHEST/LUNG: Clear to auscultation bilaterally; No rales, rhonchi, wheezing, or rubs. Unlabored respirations  HEART: Regular rate and rhythm; No murmurs, rubs, or gallops  ABDOMEN: BSx4; Soft, nontender, nondistended  EXTREMITIES:  2+ Peripheral Pulses, brisk capillary refill. No clubbing, cyanosis, or edema  NERVOUS SYSTEM:  A&Ox3, no focal deficits   SKIN: No rashes or lesions VITALS:   T(C): 36.8 (04-12-25 @ 19:22), Max: 36.8 (04-12-25 @ 19:22)  HR: 101 (04-12-25 @ 19:22) (99 - 111)  BP: 155/91 (04-12-25 @ 19:22) (149/99 - 155/91)  RR: 18 (04-12-25 @ 19:22) (18 - 19)  SpO2: 97% (04-12-25 @ 19:22) (96% - 97%)    GENERAL: NAD, lying in bed comfortably  HEAD:  Atraumatic, Normocephalic  EYES: conjunctiva and sclera clear  ENT: Moist mucous membranes  CHEST/LUNG: Clear to auscultation bilaterally; No rales, rhonchi, wheezing, or rubs. Unlabored respirations  HEART: Regular rate and rhythm; No murmurs, rubs, or gallops  ABDOMEN: Soft, nontender, nondistended  : light red blood in iraheta bag  EXTREMITIES:  No clubbing, cyanosis, or edema  NERVOUS SYSTEM:  A&Ox3, paraplegic, 4/5 hand , 3/5 finger abduction/adduction. 0/5 LE muscle strength  SKIN: No rashes or lesions

## 2025-04-12 NOTE — ED ADULT NURSE NOTE - OBJECTIVE STATEMENT
Pt. received alert and oriented x4 with chief complaint of during routine indwelling iraheta change at home by home nurse, during reinsertion of iraheta ,blood started to come out of external orifice of penile area and home nurse refused to complete iraheta reinsertion. Pt. presents to emergency department w/ bleeding from affected area w/ blood clots. MD Sanchez notified.

## 2025-04-12 NOTE — ED PROVIDER NOTE - PATIENT PORTAL LINK FT
You can access the FollowMyHealth Patient Portal offered by [facility] by registering at the following website: [email or web address]. By joining [employer]’s FollowMyHealth portal, you will also be able to view your health information using other applications (apps) compatible with our system.

## 2025-04-12 NOTE — ED PROVIDER NOTE - CARE PROVIDER_API CALL
Star Gerard  Urology  [address] [place] [id number]  Phone: [phone]  Fax: [phone]  Follow Up Time: 4-6 Days

## 2025-04-12 NOTE — ED PROVIDER NOTE - NS_EDPROVIDERDISPOUSERTYPE_ED_A_ED
Attending Attestation (For Attendings USE Only)...

## 2025-04-12 NOTE — H&P ADULT - PROBLEM SELECTOR PLAN 1
Patient with chronic iraheta, s/p exchange at home with home nurse found blood at urethral meatus  - iraheta changed in ED with light pink urine   - pt without symptoms at this time  - WBC 12, no other signs/sympt of infection  - monitor for symptoms/WBC/fever curve  - Urology consulted, no acute intervention at this time, observe until bleeding stops  - pt admitted to observation to monitor for bleeding to stop Patient with chronic iraheta, s/p exchange at home with home nurse found blood at urethral meatus  - iraheta changed in ED with light pink urine   - pt without symptoms at this time  - WBC 12, pt with burning on exchange of iraheta at home  - monitor for symptoms/WBC/fever curve  - per pt, was supposed to start a medication that home nurse was supposed to  from pharmacy but did not start, from surescripts, seems was amoxicillin - pt unsure if the medication was for a UTI  - last UCx E. Faecalis /e.coli resistant to amoxicillin, sens to rocephin   - give rocephin x1 now - - f/u UA and UCx - if +, continue rocephin  - Urology consulted, no acute intervention at this time, observe until bleeding stops  - pt admitted to observation to monitor for bleeding to stop Patient with chronic iraheta, s/p exchange at home with home nurse found blood at urethral meatus  - iraheta changed in ED with light pink urine   - pt without symptoms at this time  - WBC 12, pt with burning on exchange of iraheta at home  - monitor for symptoms/WBC/fever curve  - per pt, was supposed to start a medication that home nurse was supposed to  from pharmacy but did not start, from surescripts, seems was amoxicillin - pt unsure if the medication was for a UTI  - last UCx E. Faecalis sens to levofloxacin and ampicillin/e.coli resistant to amoxicillin, sens to rocephin   - give rocephin x1 now - - f/u UA and UCx - if +, continue rocephin  - Urology consulted, no acute intervention at this time, observe until bleeding stops  - pt admitted to observation to monitor for bleeding to stop Patient with chronic iraheta, s/p exchange at home with home nurse found blood at urethral meatus  - iraheta changed in ED with light pink urine   - pt without symptoms at this time  - WBC 12, pt with burning on exchange of iraheta at home  - monitor for symptoms/WBC/fever curve  - per pt, was supposed to start a medication that home nurse was supposed to  from pharmacy but did not start, from surescripts, seems was methenamine - pt unsure if the medication was for a UTI  - last UCx E. Faecalis sens to levofloxacin and ampicillin/e.coli resistant to amoxicillin, sens to rocephin   - give rocephin x1 now - - f/u UA and UCx - if +, continue rocephin  - Urology consulted, no acute intervention at this time, observe until bleeding stops  - pt admitted to observation to monitor for bleeding to stop Patient with chronic iraheta, s/p exchange at home with home nurse found blood at urethral meatus  - iraheta changed in ED with light pink urine   - pt without symptoms at this time  - WBC 12   - monitor for symptoms/WBC/fever curve  - per pt, was supposed to start a medication that home nurse was supposed to  from pharmacy but did not start, from surescripts, seems was methenamine - pt unsure if the medication was for a UTI  - will start methenamine   - Urology consulted, no acute intervention at this time, observe until bleeding stops  - pt admitted to observation to monitor for bleeding to stop Patient with chronic iraheta, s/p exchange at home with home nurse found blood at urethral meatus  - iraheta changed in ED with light red urine - clearing in tube  - pt without symptoms at this time  - WBC 12, had burning sensation when changed iraheta at home but no longer  - pt normally tachycardic to 100s at home  - monitor for symptoms/WBC/fever curve  - per pt, was supposed to start a medication that home nurse was supposed to  from pharmacy but did not start, from surescripts, seems was methenamine - pt unsure if the medication was for a UTI  - will start methenamine   - Urology consulted, no acute intervention at this time, observe until bleeding stops  - pt admitted to observation to monitor for bleeding to stop Patient with chronic iraheta, s/p exchange at home with home nurse found blood at urethral meatus  - iraheta changed in ED with light red urine - clearing in tube  - pt without symptoms at this time  - WBC 12, had burning sensation when changed iraheta at home but no longer  - pt normally tachycardic to 100s at home  - monitor for symptoms/WBC/fever curve  - started on LR 75cc/hr for 12 hours  - per pt, was supposed to start a medication that home nurse was supposed to  from pharmacy but did not start, from surescripts, seems was methenamine - pt unsure if the medication was for a UTI  - will start methenamine   - Urology consulted, no acute intervention at this time, observe until bleeding stops  - pt admitted to observation to monitor for bleeding to stop

## 2025-04-12 NOTE — ED PROVIDER NOTE - CLINICAL SUMMARY MEDICAL DECISION MAKING FREE TEXT BOX
Patient here for Steele issue.  Hematuria likely in the setting of traumatic insertion done at home.  We were able to place Steele here with dark yellow urine output and no blood.  However there is significant mount of blood noted around the urethral meatus.  Patient otherwise well-appearing.  On reassessment, bleeding is slowly decreasing to minimal output.  Case was discussed with urology PA who spoke with Dr. Bray, advised observation for another couple of hours prior to discharge to ensure no more bleeding occurs.  Patient understanding and agreeable with plan.

## 2025-04-12 NOTE — CONSULT NOTE ADULT - SUBJECTIVE AND OBJECTIVE BOX
UROLOGY PA CONSULT NOTE:      HPI FROM ED:  Patient with a past medical history of MS and chronic Iraheta is brought in with concern for Iraheta issue.  States that he has a chronic indwelling Iraheta that was being replaced by home health nurse.  Upon replacement, when they inserted the Iraheta catheter they noted some blood at the meatus and was unable to insert the catheter.  Denies other symptoms or complaints at this time.  No fevers, no recent trouble with catheter otherwise.  This was a scheduled exchange.    INTERVAL HPI:  49yM with PMHx of paraplegia, PE, DVT, MS, chronic Iraheta placement coming from home for bleeding from urethra after catheter exchange. Reports when nurse from an outside company was exchanging his catheter at home but had difficulty passing the iraheta and bleeding from urethra occured. Patient came to ED where iraheta was exchanged by nurse in ED but patient still had continued oozing from urethra therefore urology was consulted. Patient currently denies any pain, fever, chills, SOB, CP, abdominal pain, suprapubic pain or any other complaints.      PAST MEDICAL & SURGICAL HISTORY:  MS (multiple sclerosis)  since 1995    PE (pulmonary embolism)  2013    Cellulitis  november 2014    Nephrolithiasis    Environmental allergies    MS (multiple sclerosis)    Lower paraplegia    DVT, lower extremity    Pulmonary embolism    Nephrostomy status    No significant past surgical history      REVIEW OF SYSTEMS:  General/Constitutional: No acute distress, no headache, weakness, fevers, or chills   HEENT: Denies auditory or visual changes/disturbances, no vertigo, no throat pain, no dysphagia   Respiratory: Denies cough/hemoptysis, denies wheezing/SOB/dyspnea  Cardiac: Denies chest pain, palpitations  Abdomen: See HPI  Extremities: Denies sores, swelling, discoloration bilat UE/LE  Genitourinary: See HPI  Neuro: Denies weakness, paraesthesias, paralysis, syncope, loss of vision  Skin: Denies pruritus, pain, rashes        MEDICATIONS:  gabapentin 300 mg oral capsule: 1 cap(s) orally 3 times a day (29 Nov 2024 11:57)  Multiple Vitamins oral tablet: 1 tab(s) orally once a day (27 Nov 2024 03:34)    MEDICATIONS  (STANDING):    MEDICATIONS  (PRN):      ALLERGIES:  ciprofloxacin (Rash (Mild to Mod))  Cipro (Rash)  gabapentin (Unknown)  baclofen (Unknown)  Originally Entered as [Unknown] reaction to [KNA] (Unknown)      FAMILY HISTORY:  Family history of diabetes mellitus (DM) (Grandparent)  Family history of breast cancer (Grandparent, Aunt)  FH: breast cancer (Mother)      VITAL SIGNS:  Vital Signs Last 24 Hrs  T(C): 36.7 (12 Apr 2025 17:02), Max: 36.7 (12 Apr 2025 15:09)  T(F): 98 (12 Apr 2025 17:02), Max: 98 (12 Apr 2025 15:09)  HR: 102 (12 Apr 2025 17:02) (99 - 111)  BP: 150/97 (12 Apr 2025 17:02) (149/99 - 151/91)  RR: 19 (12 Apr 2025 17:02) (18 - 19)  SpO2: 97% (12 Apr 2025 17:02) (96% - 97%)    Parameters below as of 12 Apr 2025 17:02  Patient On (Oxygen Delivery Method): room air      PHYSICAL EXAM:  General: No acute distress, appears comfortable, well-groomed, appears stated age  Head, Eyes, Ears, Nose, Throat: Normal cephalic/atraumatic, anicteric, conjunctiva-non injected and moist, vision grossly intact, hearing grossly intact, no nasal discharge  Chest: Nonlabored breathing  Abdomen: Soft, nontender  : bleeding from urethra upon suprapubic palpation, iraheta tubing with pink colored urine

## 2025-04-12 NOTE — ED PROVIDER NOTE - CARE PLAN
Principal Discharge DX:	Steele catheter problem  Secondary Diagnosis:	Bloody urethral discharge in male   1

## 2025-04-12 NOTE — H&P ADULT - ASSESSMENT
48 yo M with PMHx of MS, paraplegia, DVT/PE and chronic Iraheta is brought in s/p chronic iraheta change at home with home health nurse when blood found at meatus. Admitted to observation for blood at urethral meatus.

## 2025-04-12 NOTE — ED PROVIDER NOTE - PROGRESS NOTE DETAILS
Patient still with minimal blood noted from the urethral meatus.  Heart rate improved here.  Based on my discussion with urology PA, will plan for discharge.  Will advise follow-up.  Plan to set up medical transport.  Plan to discharge patient. Return to ED precautions were discussed with the patient/family. All questions were answered. Juan R Sanchez MD. Called by urology PA, states patient needs re-evaluation. Upon re-evaluation by Uro PA continued bleeding noted. Requesting observation. Observing hospitalist made aware.

## 2025-04-12 NOTE — ED CLERICAL - NS ED CARE COORDINATION INFORMATION

## 2025-04-12 NOTE — ED PROVIDER NOTE - OBJECTIVE STATEMENT
no...
Patient with a past medical history of MS and chronic Steele is brought in with concern for Steele issue.  States that he has a chronic indwelling Steele that was being replaced by home health nurse.  Upon replacement, when they inserted the Steele catheter they noted some blood at the meatus and was unable to insert the catheter.  Denies other symptoms or complaints at this time.  No fevers, no recent trouble with catheter otherwise.  This was a scheduled exchange.

## 2025-04-13 VITALS
SYSTOLIC BLOOD PRESSURE: 137 MMHG | OXYGEN SATURATION: 95 % | RESPIRATION RATE: 18 BRPM | HEART RATE: 96 BPM | TEMPERATURE: 98 F | DIASTOLIC BLOOD PRESSURE: 96 MMHG

## 2025-04-13 DIAGNOSIS — R79.89 OTHER SPECIFIED ABNORMAL FINDINGS OF BLOOD CHEMISTRY: ICD-10-CM

## 2025-04-13 LAB
ALBUMIN SERPL ELPH-MCNC: 3.1 G/DL — LOW (ref 3.3–5)
ALP SERPL-CCNC: 94 U/L — SIGNIFICANT CHANGE UP (ref 40–120)
ALT FLD-CCNC: 103 U/L — HIGH (ref 12–78)
ANION GAP SERPL CALC-SCNC: 5 MMOL/L — SIGNIFICANT CHANGE UP (ref 5–17)
AST SERPL-CCNC: 61 U/L — HIGH (ref 15–37)
BASOPHILS # BLD AUTO: 0.07 K/UL — SIGNIFICANT CHANGE UP (ref 0–0.2)
BASOPHILS NFR BLD AUTO: 0.7 % — SIGNIFICANT CHANGE UP (ref 0–2)
BILIRUB SERPL-MCNC: 0.7 MG/DL — SIGNIFICANT CHANGE UP (ref 0.2–1.2)
BUN SERPL-MCNC: 16 MG/DL — SIGNIFICANT CHANGE UP (ref 7–23)
CALCIUM SERPL-MCNC: 9.2 MG/DL — SIGNIFICANT CHANGE UP (ref 8.5–10.1)
CHLORIDE SERPL-SCNC: 110 MMOL/L — HIGH (ref 96–108)
CO2 SERPL-SCNC: 28 MMOL/L — SIGNIFICANT CHANGE UP (ref 22–31)
CREAT SERPL-MCNC: 0.83 MG/DL — SIGNIFICANT CHANGE UP (ref 0.5–1.3)
EGFR: 108 ML/MIN/1.73M2 — SIGNIFICANT CHANGE UP
EGFR: 108 ML/MIN/1.73M2 — SIGNIFICANT CHANGE UP
EOSINOPHIL # BLD AUTO: 0.28 K/UL — SIGNIFICANT CHANGE UP (ref 0–0.5)
EOSINOPHIL NFR BLD AUTO: 2.7 % — SIGNIFICANT CHANGE UP (ref 0–6)
GLUCOSE SERPL-MCNC: 109 MG/DL — HIGH (ref 70–99)
HCT VFR BLD CALC: 45.2 % — SIGNIFICANT CHANGE UP (ref 39–50)
HGB BLD-MCNC: 15.2 G/DL — SIGNIFICANT CHANGE UP (ref 13–17)
IMM GRANULOCYTES NFR BLD AUTO: 0.4 % — SIGNIFICANT CHANGE UP (ref 0–0.9)
LYMPHOCYTES # BLD AUTO: 2.67 K/UL — SIGNIFICANT CHANGE UP (ref 1–3.3)
LYMPHOCYTES # BLD AUTO: 26.2 % — SIGNIFICANT CHANGE UP (ref 13–44)
MCHC RBC-ENTMCNC: 30.5 PG — SIGNIFICANT CHANGE UP (ref 27–34)
MCHC RBC-ENTMCNC: 33.6 G/DL — SIGNIFICANT CHANGE UP (ref 32–36)
MCV RBC AUTO: 90.8 FL — SIGNIFICANT CHANGE UP (ref 80–100)
MONOCYTES # BLD AUTO: 0.81 K/UL — SIGNIFICANT CHANGE UP (ref 0–0.9)
MONOCYTES NFR BLD AUTO: 7.9 % — SIGNIFICANT CHANGE UP (ref 2–14)
NEUTROPHILS # BLD AUTO: 6.33 K/UL — SIGNIFICANT CHANGE UP (ref 1.8–7.4)
NEUTROPHILS NFR BLD AUTO: 62.1 % — SIGNIFICANT CHANGE UP (ref 43–77)
NRBC BLD AUTO-RTO: 0 /100 WBCS — SIGNIFICANT CHANGE UP (ref 0–0)
PLATELET # BLD AUTO: 261 K/UL — SIGNIFICANT CHANGE UP (ref 150–400)
POTASSIUM SERPL-MCNC: 3.4 MMOL/L — LOW (ref 3.5–5.3)
POTASSIUM SERPL-SCNC: 3.4 MMOL/L — LOW (ref 3.5–5.3)
PROT SERPL-MCNC: 7.4 G/DL — SIGNIFICANT CHANGE UP (ref 6–8.3)
RBC # BLD: 4.98 M/UL — SIGNIFICANT CHANGE UP (ref 4.2–5.8)
RBC # FLD: 14.2 % — SIGNIFICANT CHANGE UP (ref 10.3–14.5)
SODIUM SERPL-SCNC: 143 MMOL/L — SIGNIFICANT CHANGE UP (ref 135–145)
WBC # BLD: 10.2 K/UL — SIGNIFICANT CHANGE UP (ref 3.8–10.5)
WBC # FLD AUTO: 10.2 K/UL — SIGNIFICANT CHANGE UP (ref 3.8–10.5)

## 2025-04-13 PROCEDURE — 99239 HOSP IP/OBS DSCHRG MGMT >30: CPT | Mod: GC

## 2025-04-13 PROCEDURE — 51702 INSERT TEMP BLADDER CATH: CPT

## 2025-04-13 PROCEDURE — 36415 COLL VENOUS BLD VENIPUNCTURE: CPT

## 2025-04-13 PROCEDURE — G0378: CPT

## 2025-04-13 PROCEDURE — 80053 COMPREHEN METABOLIC PANEL: CPT

## 2025-04-13 PROCEDURE — 85027 COMPLETE CBC AUTOMATED: CPT

## 2025-04-13 PROCEDURE — 85025 COMPLETE CBC W/AUTO DIFF WBC: CPT

## 2025-04-13 PROCEDURE — 85730 THROMBOPLASTIN TIME PARTIAL: CPT

## 2025-04-13 PROCEDURE — 99284 EMERGENCY DEPT VISIT MOD MDM: CPT | Mod: 25

## 2025-04-13 PROCEDURE — 85610 PROTHROMBIN TIME: CPT

## 2025-04-13 RX ORDER — METHENAMINE MANDELATE 1 G
0.5 TABLET ORAL
Refills: 0 | Status: DISCONTINUED | OUTPATIENT
Start: 2025-04-13 | End: 2025-04-15

## 2025-04-13 RX ADMIN — Medication 0.5 GRAM(S): at 05:38

## 2025-04-13 RX ADMIN — Medication 40 MILLIEQUIVALENT(S): at 12:23

## 2025-04-13 NOTE — PROGRESS NOTE ADULT - SUBJECTIVE AND OBJECTIVE BOX
S: Patient seen and examined at bedside.  No acute overnight events.  Patient reports no new complaints at this time.  As per AM RN, blood around iraheta insertion site has significantly decreased in amount since overnight. No leaking noted. Patient voiding in iraheta catheter with no hematuria present. Patient denies any fever, chills, chest pain, shortness of breath, nausea, vomiting, or urinary complaints.    MEDICATIONS:  acetaminophen     Tablet .. 650 milliGRAM(s) Oral every 6 hours PRN  influenza   Vaccine 0.5 milliLiter(s) IntraMuscular once  lactated ringers. 1000 milliLiter(s) IV Continuous <Continuous>  methenamine hippurate 0.5 Gram(s) Oral two times a day  potassium chloride    Tablet ER 40 milliEquivalent(s) Oral once      O:  Vital Signs Last 24 Hrs  T(C): 36.6 (13 Apr 2025 11:17), Max: 36.8 (12 Apr 2025 19:22)  T(F): 97.8 (13 Apr 2025 11:17), Max: 98.2 (12 Apr 2025 19:22)  HR: 96 (13 Apr 2025 11:17) (96 - 110)  BP: 137/96 (13 Apr 2025 11:17) (137/96 - 155/91)  BP(mean): --  RR: 18 (13 Apr 2025 11:17) (18 - 19)  SpO2: 95% (13 Apr 2025 11:17) (95% - 97%)    Parameters below as of 13 Apr 2025 11:17  Patient On (Oxygen Delivery Method): room air        PHYSICAL EXAM:  General: No acute distress, appears comfortable, well-groomed, appears stated age  Head, Eyes, Ears, Nose, Throat: Normal cephalic/atraumatic, anicteric, conjunctiva-non injected and moist, vision grossly intact, hearing grossly intact, no nasal discharge  Chest: Nonlabored breathing  Abdomen: Soft, nontender  : Minimal bleeding from urethra upon suprapubic palpation, Iraheta with light yellow urine with moderate amt of sediment noted in tubing and red tinged urine noted at bottom of reservoir.    I&O SUMMARY:    04-12-25 @ 07:01  -  04-13-25 @ 07:00  --------------------------------------------------------  IN:  Total IN: 0 mL    OUT:    Indwelling Catheter - Urethral (mL): 700 mL  Total OUT: 700 mL    Total NET: -700 mL          LABS:                        15.2   10.20 )-----------( 261      ( 13 Apr 2025 06:15 )             45.2     04-13    143  |  110[H]  |  16  ----------------------------<  109[H]  3.4[L]   |  28  |  0.83    Ca    9.2      13 Apr 2025 06:15    TPro  7.4  /  Alb  3.1[L]  /  TBili  0.7  /  DBili  x   /  AST  61[H]  /  ALT  103[H]  /  AlkPhos  94  04-13    PT/INR - ( 12 Apr 2025 18:50 )   PT: 12.9 sec;   INR: 1.10 ratio         PTT - ( 12 Apr 2025 18:50 )  PTT:35.7 sec            RADIOLOGY:    ASSESSMENT & PLAN:  49yM with PMHx of paraplegia, PE, DVT, MS, chronic Iraheta placement coming from home for bleeding from catheter exchange.    - Bleeding minimal, likely due to traumatic iraheta insertion. No hematuria noted in iraheta catheter. Patient cleared for discharge.

## 2025-04-13 NOTE — CASE MANAGEMENT PROGRESS NOTE - NSCMPROGRESSNOTE_GEN_ALL_CORE
Patient would like to be DC home today. CM met with patient at bedside to discuss transitional care plan. Patient contacted his aide, Abhijeet, on the phone while CM present and confirmed that aide is at patient's home waiting for patient to arrive home. CM spoke with Raiza at [facility] (on-call) at  and confirmed that patient can return home today and aide is waiting for patient. CM also faxed over DC summary to [facility] at fax . Referral sent to [facility] for resumption of [facility] services. Ambulance arranged for 2:30pm transport home today via [facility]. Spoke with Imelda at [facility] () who confirmed authorization #[id number]. IMM reviewed and explained to patient; he verbalized understanding. He is in agreement to DC plan. CM remains available.  Patient would like to be DC home today. CM met with patient at bedside to discuss transitional care plan. Patient contacted his aide, Abhijeet, on the phone while CM present and confirmed that aide is at patient's home waiting for patient to arrive home. CM spoke with Raiza at [facility] (on-call) at  and confirmed that patient can return home today and aide is waiting for patient. CM also faxed over DC summary to [facility] at fax . Referral sent to [facility] for resumption of [facility] services. Ambulance arranged for 2:30pm transport home today via [facility]. Spoke with Imelda at [facility] () who confirmed authorization #[id number]. He is in agreement to DC plan. CM remains available.

## 2025-04-13 NOTE — CARE COORDINATION ASSESSMENT. - OTHER PERTINENT DISCHARGE PLANNING INFORMATION:
CM met with patient at bedside, introduced self and explained role of CM and transitional care planning. He verbalized understanding. He wants to be DC home today. He is A&Ox4, resides in private apartment, alone. Has 24x7 [facility] Aides via [facility] (ph: [phone]/fax: [phone]). M-F aides are from [facility] (), and Sat-Sun aides are from [facility] (). [facility]. Will need [facility] transport arranged upon DC via [facility]. CM remains available.

## 2025-04-13 NOTE — DISCHARGE NOTE NURSING/CASE MANAGEMENT/SOCIAL WORK - PATIENT PORTAL LINK FT
You can access the FollowMyHealth Patient Portal offered by [facility] by registering at the following website: [email or web address]. By joining [employer]’s FollowMyHealth portal, you will also be able to view your health information using other applications (apps) compatible with our system.

## 2025-04-13 NOTE — DISCHARGE NOTE NURSING/CASE MANAGEMENT/SOCIAL WORK - NSDCPEFALRISK_GEN_ALL_CORE
For information on Fall & Injury Prevention, visit: [email or web address] OR  [email or web address] OR  https://www.cdc.gov/steadi/patient.html

## 2025-04-13 NOTE — DISCHARGE NOTE PROVIDER - HOSPITAL COURSE
49yM with PMHx of paraplegia, PE, DVT, MS, chronic Iraheta placement coming from home for bleeding from catheter exchange.  Patient to be monitored in the ED for continued urethral bleeding, if bleeding stops, can be d/c'ed from ED to home   as per urologist   Dr. Bray  but pt was  admitted  for observation for urethral bleed possible Iraheta induced  -   iraheta changed in ED with light red urine - clearing in tube  pt without symptoms   - WBC 12, had burning sensation when changed iraheta at home but no longer    later  remain  asymptomatic   repeat wnl  , afebrile.   post  LR 75cc/hr for 12 hours - pt unsure if the medication was for a UTI  will start methenamine prophy   - Urology consulted, no acute intervention at this time  ok dc home with iraheta.  Elevated LFTs on admission, hx of elevated LFTs in the past sec to-hx of hepatic steatosis on previous CT scan   remain asymptomatic tolerating diet.   Multiple sclerosis.   Patient with hx of MS. paraplegic home with aid . medically stable dc home  no further urethral bleed / hb remain stable follow up out pt urologist office in 1wk dr Asa thomas  .    physical day of discharge Vital Signs Last 24 Hrs  T(C): 36.6 (13 Apr 2025 11:17), Max: 36.8 (12 Apr 2025 19:22)  T(F): 97.8 (13 Apr 2025 11:17), Max: 98.2 (12 Apr 2025 19:22)  HR: 96 (13 Apr 2025 11:17) (96 - 111)  BP: 137/96 (13 Apr 2025 11:17) (137/96 - 155/91)  BP(mean): --  RR: 18 (13 Apr 2025 11:17) (18 - 19)  SpO2: 95% (13 Apr 2025 11:17) (95% - 97%)    Patient On (Oxygen Delivery Method): room air  physical exam   4/13/25   day of dc     GEN no distress , HEENT nt/nc , perrla , CVS s1s2 no tachy , CHEST bl air ent suman    no wheezing , no rale   , CNS aao/3 sensory / motor intact   , GI soft , bs present ,    Iraheta clear  urine  , EXT no edema, pedal pulse present , SKIN no rash , lt buttock small stage 2 poa . total time spend  45 min .

## 2025-04-13 NOTE — DISCHARGE NOTE NURSING/CASE MANAGEMENT/SOCIAL WORK - FINANCIAL ASSISTANCE
[facility] provides services at a reduced cost to those who are determined to be eligible through [facility]’s financial assistance program. Information regarding [facility]’s financial assistance program can be found by going to [email or web address] or by calling [phone].

## 2025-04-13 NOTE — DISCHARGE NOTE PROVIDER - NSDCFUADDAPPT_GEN_ALL_CORE_FT
follow up   out pt urologist  dr raquel thomas in 1to 2wk. irration of iraheta 20 to 30 cc  with NS as needed if occluded .

## 2025-04-13 NOTE — PATIENT CHOICE NOTE. - NSPTCHOICESTATE_GEN_ALL_CORE

## 2025-04-13 NOTE — CARE COORDINATION ASSESSMENT. - NSCAREPROVIDERS_GEN_ALL_CORE_FT
CARE PROVIDERS:  Accepting Physician: Anabel Rios  Admitting: Carly George  Attending: Carly George  Consultant: Toby Multani  Consultant: Anatoliy Merritt  Consultant: Star Thompson  Consultant: Nydia Barba  Consultant: Sridevi Chandra  Covering Team: Layla Lomeli  ED Attending: Dejan Bernal  ED Attending2: Juan R Sanchez ED Nurse: Massiel Okeefe  Nurse: Jyoti Doan  Nurse: Lalo Londono  Nurse: Maribel Clayton  Outpatient Provider: Alf Barriga  Outpatient Provider: Mitul Rvias  Outpatient Provider: Gerber Moreno  Override: Moody, April  Override: Romina Bhatia  PCA/Nursing Assistant: Romina Bhatia  Primary Team: Hernando Blair  Primary Team: Anabel Rios  Primary Team: Mary Anne Parker  Registered Dietitian: Bryanna Lovett  Team: [facility], Team   CARE PROVIDERS:  Accepting Physician: Anabel Rios  Admitting: Carly George  Attending: Carly George  Consultant: Toby Multani  Consultant: Anatoliy Merritt  Consultant: Star Thompson  Consultant: Nydia Barba  Consultant: Sridevi Chandra  Covering Team: Layla Lomeli  ED Attending: Dejan Bernal  ED Attending2: Juan R Sanchez ED Nurse: Massiel Okeefe  Nurse: Jyoti Doan  Nurse: Lalo Londono  Nurse: Maribel Clayton  Outpatient Provider: Alf Barriga  Outpatient Provider: Mitul Rivas  Outpatient Provider: Gerber Moreno  Override: Venus Carrillo  Override: Moody, April  Override: Romina Bhatia  PCA/Nursing Assistant: Romina Bhatia  Primary Team: Anabel Rios  Primary Team: Mary Anne Parker  Primary Team: Hernando Blair  Registered Dietitian: Bryanna Lovett  Team: [facility], Team

## 2025-04-13 NOTE — DISCHARGE NOTE PROVIDER - CARE PROVIDER_API CALL
Star Gerard  Urology  [address] [place] [id number]  Phone: [phone]  Fax: [phone]  Follow Up Time:

## 2025-04-13 NOTE — DISCHARGE NOTE PROVIDER - NSDCMRMEDTOKEN_GEN_ALL_CORE_FT
methenamine hippurate 1 g oral tablet: 0.5 tab(s) orally 2 times a day  Multiple Vitamins oral tablet: 1 tab(s) orally once a day

## 2025-04-13 NOTE — DISCHARGE NOTE PROVIDER - NSDCCPCAREPLAN_GEN_ALL_CORE_FT
PRINCIPAL DISCHARGE DIAGNOSIS  Diagnosis: Iraheta catheter problem  Assessment and Plan of Treatment: cause of possible uretrhal bleed  - but resolved / new iraheta placed in ED  - hb remain stable - follow up out pt urologist office in 1 to 2wk.      SECONDARY DISCHARGE DIAGNOSES  Diagnosis: Multiple sclerosis  Assessment and Plan of Treatment: stable continue home aid support.

## 2025-04-14 ENCOUNTER — NON-APPOINTMENT (OUTPATIENT)
Age: 50
End: 2025-04-14

## 2025-04-17 NOTE — BH CONSULTATION LIAISON ASSESSMENT NOTE - CURRENT MEDICATION
PHYSICAL THERAPY - MEDICARE DAILY TREATMENT NOTE (updated 3/23)      Date: 2025          Patient Name:  Chayo Hoffman :  1955   Medical   Diagnosis:  Other low back pain [M54.59]  Other chronic pain [G89.29] Treatment Diagnosis:  M54.59, G89.29  CHRONIC LOWER BACK PAIN    Referral Source:  Praful Jimenez MD Insurance:   Payor: [facility] MEDICARE / Plan: [facility] [employer]EEPERS MEDIBLUE PLUS / Product Type: *No Product type* /                     Patient  verified yes     Visit #   Current  / Total 2 24   Time   In / Out 2:00 pm 2:45 pm   Total Treatment Time 45   Total Timed Codes 45   1:1 Treatment Time 40      [facility] Totals Reminder:  bill using total billable   min of TIMED therapeutic procedures and modalities.   8-22 min = 1 unit; 23-37 min = 2 units; 38-52 min = 3 units; 53-67 min = 4 units; 68-82 min = 5 units          SUBJECTIVE    Pain Level (0-10 scale): 0/10    Any medication changes, allergies to medications, adverse drug reactions, diagnosis change, or new procedure performed?: [x] No    [] Yes (see summary sheet for update)  Medications: Verified on Patient Summary List    Subjective functional status/changes:     Patient reports she has been trying to do the exercises in her HEP nearly everyday at home. Reports she has not been experiencing much pain across low back, besides some continued stiffness in the mornings. Reports she has not been having any chest/sternum pain.    OBJECTIVE      Therapeutic Procedures:  Tx Min Billable or 1:1 Min (if diff from Tx Min) Procedure, Rationale, Specifics   [id number] Therapeutic Exercise (timed):  increase ROM, strength, coordination, balance, and proprioception to improve patient's ability to progress to PLOF and address remaining functional goals. (see flow sheet as applicable)     Details if applicable:            Details if applicable:           Details if applicable:           Details if applicable:            Details if applicable:   
MEDICATIONS  (STANDING):  clotrimazole 1% Cream 1 Application(s) Topical two times a day  dextrose 5% + sodium chloride 0.45%. 1000 milliLiter(s) (75 mL/Hr) IV Continuous <Continuous>  heparin   Injectable 5000 Unit(s) SubCutaneous every 8 hours  ketoconazole 2% Shampoo 1 Application(s) Topical <User Schedule>  triamcinolone 0.1% Cream 1 Application(s) Topical every 12 hours    MEDICATIONS  (PRN):  acetaminophen     Tablet .. 650 milliGRAM(s) Oral every 6 hours PRN Temp greater or equal to 38.5C (101.3F), Mild Pain (1 - 3)  ondansetron Injectable 4 milliGRAM(s) IV Push every 6 hours PRN Nausea  senna 2 Tablet(s) Oral at bedtime PRN Constipation

## 2025-04-22 ENCOUNTER — EMERGENCY (EMERGENCY)
Facility: HOSPITAL | Age: 50
LOS: 1 days | End: 2025-04-22
Attending: STUDENT IN AN ORGANIZED HEALTH CARE EDUCATION/TRAINING PROGRAM | Admitting: STUDENT IN AN ORGANIZED HEALTH CARE EDUCATION/TRAINING PROGRAM
Payer: MEDICARE

## 2025-04-22 ENCOUNTER — NON-APPOINTMENT (OUTPATIENT)
Age: 50
End: 2025-04-22

## 2025-04-22 ENCOUNTER — TRANSCRIPTION ENCOUNTER (OUTPATIENT)
Age: 50
End: 2025-04-22

## 2025-04-22 VITALS
SYSTOLIC BLOOD PRESSURE: 131 MMHG | DIASTOLIC BLOOD PRESSURE: 69 MMHG | OXYGEN SATURATION: 99 % | HEIGHT: 70 IN | WEIGHT: 270.07 LBS | HEART RATE: 118 BPM | RESPIRATION RATE: 18 BRPM | TEMPERATURE: 97 F

## 2025-04-22 DIAGNOSIS — Z93.6 OTHER ARTIFICIAL OPENINGS OF URINARY TRACT STATUS: Chronic | ICD-10-CM

## 2025-04-22 PROCEDURE — 51702 INSERT TEMP BLADDER CATH: CPT

## 2025-04-22 PROCEDURE — 99283 EMERGENCY DEPT VISIT LOW MDM: CPT

## 2025-04-22 RX ORDER — METHENAMINE MANDELATE 1 G
0.5 TABLET ORAL
Refills: 0 | DISCHARGE

## 2025-04-22 NOTE — ED PROVIDER NOTE - CLINICAL SUMMARY MEDICAL DECISION MAKING FREE TEXT BOX
48 yo M with PMHx of MS, paraplegia, DVT/PE and chronic Iraheta pw need for new iraheta. pt sattes his catheter came out due to bladder spasms last night. no abd pain, fevers, chills. pt states its very hot in ED and feels like he may pass out and its just due to being overheated   no other complaints   pt presents for replacement of iraheta catheter

## 2025-04-22 NOTE — ED PROVIDER NOTE - PHYSICAL EXAMINATION
Gen: Well appearing in NAD  Head: NC/AT  Neck: trachea midline  Resp:  No distress  cv: tachy  Ext: no deformities  abd nontender   Neuro:  A&O appears non focal  Skin:  Warm and dry as visualized  Psych:  Normal affect and mood

## 2025-04-22 NOTE — ED ADULT TRIAGE NOTE - CHIEF COMPLAINT QUOTE
H?o MS- has been having spasms - pulled out iraheta catheter last night- patient was very anxious- hence placed on oxygen 2 litres

## 2025-04-22 NOTE — ED ADULT NURSE NOTE - NSFALLRISKINTERV_ED_ALL_ED
Assistance OOB with selected safe patient handling equipment if applicable/Communicate fall risk and risk factors to all staff, patient, and family/Monitor gait and stability/Provide patient with walking aids/Provide visual cue: yellow wristband, yellow gown, etc/Reinforce activity limits and safety measures with patient and family/Toileting schedule using arm’s reach rule for commode and bathroom/Call bell, personal items and telephone in reach/Instruct patient to call for assistance before getting out of bed/chair/stretcher/Non-slip footwear applied when patient is off stretcher/[place] to call system/Physically safe environment - no spills, clutter or unnecessary equipment/Purposeful Proactive Rounding/Room/bathroom lighting operational, light cord in reach

## 2025-04-22 NOTE — ED PROVIDER NOTE - PROGRESS NOTE DETAILS
iraheta placed, pt feeling improved, sats 97% on RA, pt tachy to 110s, but states this is his baseline HR

## 2025-04-22 NOTE — ED ADULT NURSE NOTE - OBJECTIVE STATEMENT
Pt states that he had a bladder spasm today and his indwelling urinary catheter was dislodged. pt's urinary catheter was replaced per orders. pt in no acute distress. pt updated in plan of care.

## 2025-04-22 NOTE — ED PROVIDER NOTE - OBJECTIVE STATEMENT
50 yo M with PMHx of MS, paraplegia, DVT/PE and chronic Iraheta pw need for new iraheta. pt sattes his catheter came out due to bladder spasms last night. no abd pain, fevers, chills. pt states its very hot in ED and feels like he may pass out and its just due to being overheated   no other complaints 50 yo M with PMHx of MS, paraplegia, DVT/PE and chronic Iraheta pw need for new iraheta. pt sattes his catheter came out due to bladder spasms last night. no abd pain, fevers, chills. pt states its very hot in ED and feels like he may pass out and its just due to being overheated   no other complaints  pt currently on antibiotics for UTI

## 2025-04-22 NOTE — ED PROVIDER NOTE - PATIENT PORTAL LINK FT
You can access the FollowMyHealth Patient Portal offered by [facility] by registering at the following website: [email or web address]. By joining [facility]’s FollowMyHealth portal, you will also be able to view your health information using other applications (apps) compatible with our system.

## 2025-04-22 NOTE — ED PROVIDER NOTE - NSFOLLOWUPINSTRUCTIONS_ED_ALL_ED_FT
1. TAKE ALL MEDICATIONS AS DIRECTED.    2. FOR PAIN OR FEVER YOU CAN TAKE IBUPROFEN (MOTRIN, ADVIL) OR ACETAMINOPHEN (TYLENOL) AS NEEDED, AS DIRECTED ON PACKAGING.  3. FOLLOW UP WITH YOUR PRIMARY DOCTOR WITHIN 5 DAYS AS DIRECTED.  4. IF YOU HAD LABS OR IMAGING DONE, YOU WERE GIVEN COPIES OF ALL LABS AND/OR IMAGING RESULTS FROM YOUR ER VISIT--PLEASE TAKE THEM WITH YOU TO YOUR FOLLOW UP APPOINTMENTS.  5. IF NEEDED, CALL PATIENT ACCESS SERVICES AT [phone] TO FIND A PRIMARY CARE PHYSICIAN.  OR CALL [phone] TO MAKE AN APPOINTMENT WITH THE CLINIC.  6. RETURN TO THE ER FOR ANY WORSENING SYMPTOMS OR CONCERNS.    Follow up with your urologist     English    Indwelling Urinary Catheter Insertion, Care After  This sheet gives you information about how to care for yourself after your procedure. Your health care provider may also give you more specific instructions. If you have problems or questions, contact your health care provider.    What can I expect after the procedure?  After the procedure, it is common to have a slight discomfort around your urethra where the catheter enters your body.    Follow these instructions at home:  Caring for the catheter tubing and drainage bag    Secure the catheter tubing and drainage bag to your leg or thigh to keep it from moving.  Do not pull on your catheter.  Check the catheter tubing regularly to make sure there are no kinks or blockages.  Keep the drainage bag at or below the level of your bladder. This will enable your urine to only drain out instead of going back into your body.  Do not let the drainage bag or catheter tubing touch the floor.  Empty the drainage bag every 2–4 hours, or more often if needed. Do not let the bag get completely full.  Disconnect the tubing and drainage bag as little as possible.  General instructions    Take showers daily to keep the catheter clean. Do not take a bath.  Wash your hands with soap and water before and after touching the catheter, tubing, or drainage bag.  Drink enough fluids to keep your urine pale yellow, or as told by your health care provider.  How to remove the catheter    A person washing hands with soap and water.  Remove the catheter only if told by your health care provider. Follow instructions from your health care provider about when and how to remove the catheter. For most catheters, you will need to take the following steps:  Prepare your supplies. You will need:  A syringe. This would be given to you by your health care provider.  A towel.  A wastebasket.  Empty the drainage bag if needed.  Wash your hands with soap and warm water.  Remove the tape that secures the catheter to your leg or thigh.  Get into a comfortable position, such as:  Lying down with your head raised on pillows and your knees pointing to the ceiling.  Sitting on a chair or the edge of a bed.  Place the towel under you to catch any spilled urine.  Put the syringe into the balloon port of the catheter. Use a firm push and twist motion to fit the syringe into the balloon port.  The water from the balloon will empty into the syringe.  Gently pull out the catheter once the balloon is empty.  If the catheter doesn't slide easily, do not use force. Let your health care provider know that you are not able to remove the catheter.  Throw the used catheter and the syringe in the wastebasket.  Wipe any spilled urine or water with the towel.  Wash your hands with soap and warm water.  Safety    Let your health care provider know if:  Your bladder is full, but you are not able to urinate.  You have removed the catheter, but you are not able to urinate after 8 hours.  Contact a health care provider if:  Your urine looks cloudy, has a bad smell, or stops flowing into the drainage bag.  Your catheter gets clogged or starts to leak.  You feel pain or pressure in the bladder area.  You have back pain.  Your drainage bag or tubing looks dirty.  Get help right away if:  You have a fever or chills.  You have severe pain in your back or your lower abdomen.  You have warmth, redness, swelling, or pain in the urethra area.  You notice blood in your urine.  Your catheter gets pulled out.  Summary  Wash your hands with soap and water before and after touching the catheter, tubing, or drainage bag.  Do not pull on your catheter or try to remove it.  Keep the drainage bag at or below the level of your bladder, but do not let the drainage bag or catheter tubing touch the floor.  Get help right away if you have a fever, chills, or any other signs of infection.  This information is not intended to replace advice given to you by your health care provider. Make sure you discuss any questions you have with your health care provider.    Document Revised: 08/17/2022 Document Reviewed: 08/17/2022  Elsevier Patient Education © 2025 Elsevier Inc.  Elsevier logo  Terms and Conditions  Privacy Policy  Editorial Policy  All content on this site: Copyright © 2025 Elsevier, its licensors, and contributors. All rights are reserved, including those for text and data mining, AI training, and similar technologies. For all open access content, the Creative Commons licensing terms apply.  Cookies are used by this site. To decline or learn more, visit our Cookies page.  [employer]

## 2025-04-23 ENCOUNTER — NON-APPOINTMENT (OUTPATIENT)
Age: 50
End: 2025-04-23

## 2025-05-06 ENCOUNTER — APPOINTMENT (OUTPATIENT)
Dept: UROLOGY | Facility: CLINIC | Age: 50
End: 2025-05-06
Payer: MEDICARE

## 2025-05-06 VITALS — BODY MASS INDEX: 38.65 KG/M2 | WEIGHT: 270 LBS | HEIGHT: 70 IN | TEMPERATURE: 98.4 F

## 2025-05-06 DIAGNOSIS — N31.9 NEUROMUSCULAR DYSFUNCTION OF BLADDER, UNSPECIFIED: ICD-10-CM

## 2025-05-06 DIAGNOSIS — Z97.8 PRESENCE OF OTHER SPECIFIED DEVICES: ICD-10-CM

## 2025-05-06 DIAGNOSIS — R33.9 RETENTION OF URINE, UNSPECIFIED: ICD-10-CM

## 2025-05-06 PROCEDURE — 51703 INSERT BLADDER CATH COMPLEX: CPT

## 2025-05-06 RX ORDER — SULFAMETHOXAZOLE AND TRIMETHOPRIM 800; 160 MG/1; MG/1
800-160 TABLET ORAL TWICE DAILY
Qty: 14 | Refills: 0 | Status: ACTIVE | COMMUNITY
Start: 2025-05-06 | End: 1900-01-01

## 2025-05-13 LAB
APPEARANCE: ABNORMAL
BACTERIA UR CULT: ABNORMAL
BACTERIA: ABNORMAL /HPF
BILIRUBIN URINE: NEGATIVE
BLOOD URINE: ABNORMAL
CAST: NORMAL /LPF
COLOR: YELLOW
EPITHELIAL CELLS: 2 /HPF
GLUCOSE QUALITATIVE U: NEGATIVE MG/DL
KETONES URINE: NEGATIVE MG/DL
LEUKOCYTE ESTERASE URINE: ABNORMAL
NITRITE URINE: NEGATIVE
PH URINE: 7
PROTEIN URINE: 30 MG/DL
RED BLOOD CELLS URINE: 2 /HPF
REVIEW: NORMAL
SPECIFIC GRAVITY URINE: 1
UROBILINOGEN URINE: 0.2 MG/DL
WHITE BLOOD CELLS URINE: 391 /HPF

## 2025-05-15 ENCOUNTER — EMERGENCY (EMERGENCY)
Facility: HOSPITAL | Age: 50
LOS: 1 days | End: 2025-05-15
Attending: EMERGENCY MEDICINE | Admitting: EMERGENCY MEDICINE
Payer: MEDICARE

## 2025-05-15 VITALS
HEIGHT: 70 IN | OXYGEN SATURATION: 96 % | HEART RATE: 134 BPM | RESPIRATION RATE: 18 BRPM | TEMPERATURE: 97 F | SYSTOLIC BLOOD PRESSURE: 127 MMHG | DIASTOLIC BLOOD PRESSURE: 90 MMHG | WEIGHT: 270.07 LBS

## 2025-05-15 VITALS
RESPIRATION RATE: 16 BRPM | HEART RATE: 122 BPM | SYSTOLIC BLOOD PRESSURE: 149 MMHG | DIASTOLIC BLOOD PRESSURE: 70 MMHG | OXYGEN SATURATION: 97 %

## 2025-05-15 DIAGNOSIS — Z93.6 OTHER ARTIFICIAL OPENINGS OF URINARY TRACT STATUS: Chronic | ICD-10-CM

## 2025-05-15 PROCEDURE — 99282 EMERGENCY DEPT VISIT SF MDM: CPT

## 2025-05-15 PROCEDURE — 99283 EMERGENCY DEPT VISIT LOW MDM: CPT

## 2025-05-21 ENCOUNTER — NON-APPOINTMENT (OUTPATIENT)
Age: 50
End: 2025-05-21

## 2025-05-21 DIAGNOSIS — H00.013 HORDEOLUM EXTERNUM RIGHT EYE, UNSPECIFIED EYELID: ICD-10-CM

## 2025-05-21 RX ORDER — ERYTHROMYCIN 5 MG/G
5 OINTMENT OPHTHALMIC TWICE DAILY
Qty: 1 | Refills: 0 | Status: ACTIVE | COMMUNITY
Start: 2025-05-21 | End: 1900-01-01

## 2025-05-27 ENCOUNTER — EMERGENCY (EMERGENCY)
Facility: HOSPITAL | Age: 50
LOS: 1 days | End: 2025-05-27
Attending: EMERGENCY MEDICINE | Admitting: EMERGENCY MEDICINE
Payer: MEDICARE

## 2025-05-27 ENCOUNTER — NON-APPOINTMENT (OUTPATIENT)
Age: 50
End: 2025-05-27

## 2025-05-27 VITALS
RESPIRATION RATE: 19 BRPM | WEIGHT: 270.07 LBS | TEMPERATURE: 97 F | OXYGEN SATURATION: 98 % | HEART RATE: 95 BPM | DIASTOLIC BLOOD PRESSURE: 74 MMHG | SYSTOLIC BLOOD PRESSURE: 119 MMHG | HEIGHT: 70 IN

## 2025-05-27 VITALS
SYSTOLIC BLOOD PRESSURE: 142 MMHG | OXYGEN SATURATION: 96 % | RESPIRATION RATE: 16 BRPM | DIASTOLIC BLOOD PRESSURE: 89 MMHG | TEMPERATURE: 98 F | HEART RATE: 81 BPM

## 2025-05-27 DIAGNOSIS — Z93.6 OTHER ARTIFICIAL OPENINGS OF URINARY TRACT STATUS: Chronic | ICD-10-CM

## 2025-05-27 LAB
APPEARANCE UR: ABNORMAL
BILIRUB UR-MCNC: NEGATIVE — SIGNIFICANT CHANGE UP
COLOR SPEC: ABNORMAL
DIFF PNL FLD: ABNORMAL
GLUCOSE UR QL: NEGATIVE MG/DL — SIGNIFICANT CHANGE UP
KETONES UR QL: NEGATIVE MG/DL — SIGNIFICANT CHANGE UP
LEUKOCYTE ESTERASE UR-ACNC: ABNORMAL
NITRITE UR-MCNC: POSITIVE
PH UR: 7 — SIGNIFICANT CHANGE UP (ref 5–8)
PROT UR-MCNC: 300 MG/DL
SP GR SPEC: 1.02 — SIGNIFICANT CHANGE UP (ref 1–1.03)
UROBILINOGEN FLD QL: 1 MG/DL — SIGNIFICANT CHANGE UP (ref 0.2–1)

## 2025-05-27 PROCEDURE — 81001 URINALYSIS AUTO W/SCOPE: CPT

## 2025-05-27 PROCEDURE — 87077 CULTURE AEROBIC IDENTIFY: CPT

## 2025-05-27 PROCEDURE — 87086 URINE CULTURE/COLONY COUNT: CPT

## 2025-05-27 PROCEDURE — 87186 SC STD MICRODIL/AGAR DIL: CPT

## 2025-05-27 PROCEDURE — 99283 EMERGENCY DEPT VISIT LOW MDM: CPT | Mod: 25

## 2025-05-27 PROCEDURE — 99283 EMERGENCY DEPT VISIT LOW MDM: CPT

## 2025-05-27 PROCEDURE — 51702 INSERT TEMP BLADDER CATH: CPT

## 2025-05-27 NOTE — ED PROVIDER NOTE - OBJECTIVE STATEMENT
Patient is a 49-year-old male with MS paraplegia chronic Steele bedbound at baseline coming in for clogged Steele since last night.  Patient states he has a lot of sediment in his urine and is Steele gets clogged.  Patient is due to get his Steele replaced next week and states he is a difficult Steele catheter change.  Patient states he is on chronic antibiotics denies any nausea vomiting fever chills chest pain shortness of breath.

## 2025-05-27 NOTE — ED PROVIDER NOTE - PROGRESS NOTE DETAILS
irrigated and draining well  advised pt to keep po hydrated  Reevaluated patient at bedside.  Patient feeling well. Discussed the results of all diagnostic testing in ED and copies of all reports given.   An opportunity to ask questions was given.  Discussed the importance of prompt, close medical follow-up.  Patient will return with any changes, concerns or persistent / worsening symptoms.  Understanding of all instructions verbalized.  srinivasa storey pmd and daniela ffu Based on the H&P, I do not suspect any life- / limb- threatening pathology that requires further intervention at this time.

## 2025-05-27 NOTE — ED ADULT NURSE NOTE - CAS TRG GEN SKIN COLOR
Per pt request, pharmacy contacted. Pharmacy will be faxing prescriptions to [facility] to be filled.    Normal for race

## 2025-05-27 NOTE — ED ADULT NURSE NOTE - OBJECTIVE STATEMENT
Pt received in 4A. Pt is a&ox3, bed bound patient presenting with iraheta not draining. Pt has chronic iraheta, usually comes here for changes but states that he has a urologist in [place]. Iraheta has lots of sediment in tube, not draining. Attempted to remove unsuccessfully, pt states very painful and unsure if able to fully deflate balloon. Iraheta flushed with good flow, clear urine draining with some blood noted. Bladder scan showed no urine in bladder at this time. Will send UA when more urine drains. Pt noted to have a lot of redness around genital area, legs by groin, states that he has that usually but that it is worse than normal.

## 2025-05-27 NOTE — ED PROVIDER NOTE - CLINICAL SUMMARY MEDICAL DECISION MAKING FREE TEXT BOX
50 yo M with indwelling iraheta presents for iraheta not draining  vss    will irrigate and reassess  little benefit of sending ua as likely colonized;   no dysuria, unlikely uti

## 2025-05-27 NOTE — ED ADULT NURSE NOTE - DOES PATIENT HAVE ADVANCE DIRECTIVE
2022       Georgie Martínez MD  [address] [place] [address]  Via In Basket      Patient: Aurora Fontaine   YOB: 1992   Date of Visit: 2022       Dear Dr. Martínez:    Thank you for referring Aurora Fontaine to me for evaluation. Below are my notes for this visit with her.    If you have questions, please do not hesitate to call me. I look forward to following your patient along with you.      Sincerely,        [facility]        CC: No Recipients  Benton Egan MD  2022  5:44 PM  Signed        22    Dear Dr Martínez    Thank you for referring your patient, Aurora Fontaine for MFM consultation. She was referred because of suspected early signs of Preeclampsia and and has history of  Preeclampsia with her first pregnancy.       She is a 30 year old  female . Patient's last menstrual period was 2021.  and her EDC is 10/6/2022.  She is currently 29w5d and is doing well. Today, she denies signs of PPROM,  labor, vaginal bleeding, signs of Pre Eclampsia. FM+.       PMH:   Past Medical History:   Diagnosis Date   • Anemia of pregnancy 2022   • Gestational HTN 2022   • Preeclampsia, third trimester    • Vitamin D deficiency      OB:  OB History    Para Term  AB Living   2 1 1 0 0 1   SAB IAB Ectopic Molar Multiple Live Births   0 0 0 0 0 1     Family History:  Family History   Problem Relation Age of Onset   • Diabetes Mother    • Diabetes Father      Social history:  reports that she has never smoked. She has never used smokeless tobacco. She reports previous alcohol use. She reports that she does not use drugs.  Meds:PNV, LDA.       ALLERGIES:  No Known Allergies    Review of Systems:  Constitutional: Denies fatigue, excessive weakness, fever, or chills  HEENT: Denies sore throat, visual changes, or sinus drainage  Cardiovascular: Denies chest pain or palpitations  Respiratory: Denies shortness of  breath, cough, or wheezing  Breast: Denies pain, mass or nipple discharge  Gastrointestinal: Denies nausea, vomiting, diarrhea, or constipation  Female genitourinary: Denies abnormal vaginal discharge, irritation, burning, or malodor. Denies dysuria, frequency, or urgency  Musculoskeletal: Denies joint pain or swelling  Integumentary: Denies rash, erythema, or irritation  Neurologic: Denies headache or dizziness  Psychiatric: Denies depression or suicidal or homicidal ideations     Physical Examination:     Visit Vitals  /83 (BP Location: RUE - Right upper extremity, Patient Position: Sitting, Cuff Size: Large Adult)   Pulse (!) 107   Resp 18   Ht 5' 2\" (1.575 m)   Wt 110.9 kg (244 lb 6.1 oz)   LMP 2021   BMI 44.70 kg/m²         Home BP ~ 120/80s.   General: No acute distress  HEENT: Within normal limits  Cardiovascular: RRR  Lungs: Nonlabored respirations  Extremities: No edema or varicosities     Ultrasound: fetal size is AGA. .     Pertinent Labs: urine .  CBC and CMP wnl.      COUNSELING:    Findings are conssitent with mild chronic hypertengiven given prior ~ 10 weeks GA was 143/85 and subseuquent borderline Bps.     Thus, I would doubt superimpsed preeclampsia at this time. Still, she remains at risk for complications of chronic hypertension such as worsening HTN, superimposed preeclampsia,  delivery, fetal growth restriction, oligohydraminos, placental abruption,  birth, and  death. We recommend that antihypertensive medications be started when BP are persistently >140/90 or more. Labetalol is considered the current antihypertensive drug of choice. Dosing can start at 100mg BID with a maximum dose of 1200mg BID. Nifedipine is a reasonable alternative, started at 10mg BID, with a maximum dose of 120mg/day.     We also discussed that poor pregnancy outcomes and commodities are associated with a prepregnancy BMI >30 kgm2. Obesity is strongly correlated with miscarriage,  congenital malformations, gestational diabetes, hypertension, preeclampsia, still birth,  delivery, abnormal labor, macrosomia, anesthesia complications, wound infection, and thromboembolism. Nutritional counseling was offered and I instructed her on best food choices, daily walks.      Recommendations:   Follow up 24hr urine protein.     Repeat CBC, CMP only if significant BP or signs/symptoms of pre eclampsia.    Continue home BP twice daily and  review BP logs at every visit. Patient advise to seek care if signs/sx of pre eclampsia or severe range BP.     Start Antihypertensive therapy if BP >140/90. Admit if 160/110s or signs of pre eclamspa.      testing with NST/VALERIANO weekly, starting at 32 weeks      Deliver ~ 38 weeks GA      Thank-you for referring «Aurora Fontaine» and for allowing me to participate in her care. If I can be of further assistance to you, please do not hesitate to contact me. Of note, today's visit lasted about 60 mins reviewing chart and counseling patient face to face regarding  management of chronic hypertension in pregnancy.          _______________________________  Benton Egan MD       No

## 2025-05-27 NOTE — ED PROVIDER NOTE - NSFOLLOWUPINSTRUCTIONS_ED_ALL_ED_FT
Follow up with your urologist  return to er for any worsening symptoms     Steele Catheter Placement and Care    AMBULATORY CARE:    A Steele catheter is a sterile tube that is inserted into your bladder to drain urine. It is also called an indwelling urinary catheter. The tip of the catheter has a small balloon filled with solution that holds the catheter in your bladder.        How a Steele catheter is placed: Your genital area will be cleaned to prevent infection. The catheter will be inserted into your urethra. When urine begins to flow into the tubing, the balloon is filled to keep the catheter in place. Then, the open end will be attached to a drainage bag.    Seek care immediately if:    Your catheter comes out.    You suddenly have material that looks like sand in the tubing or drainage bag.    You see blood in the tubing or drainage bag.    Little or no urine is draining into the bag, and you have checked the system.    No urine is draining into the bag, and you have checked the system.    You have pain in your hip, back, pelvis, or lower abdomen.    You are confused or cannot think clearly.    You see swelling, redness, pus, or burning where the catheter goes into your body.  Call your doctor or urologist if:    You have a fever or shaking chills.    You have bladder spasms for more than 1 day after the catheter is placed.    Your urine has a strong smell.    You have a rash or itching where the catheter tube is secured to your skin.    Urine leaks from or around the catheter, tubing, or drainage bag.    The closed drainage system has accidently come open or apart.    You see a layer of crystals inside the tubing.    You have questions or concerns about your condition or care.  Care for your catheter and drainage bag: You can reduce your risk for infection and injury by caring for your catheter and drainage bag properly.    Wash your hands often. Wash before and after you touch your catheter, tubing, or drainage bag. Use soap and water. Wear clean disposable gloves when you care for your catheter or disconnect the drainage bag. Wash your hands before you prepare or eat food.  Handwashing      Clean your genital area 2 times every day. Clean your catheter area and anal opening after every bowel movement. Use a soapy cloth to clean the area:  If you are male, clean the tip of your penis. Start where the catheter enters. Wipe backward, making sure to pull back the foreskin. Then use a cloth with clear water in the same direction to clean away the soap.    If you are female, clean the area where the catheter enters your body. Separate your labia (the smaller folds of your skin around your vaginal opening) and wipe toward the anus. Then use a cloth with clear water and wipe in the same direction.    Secure the catheter tube so you do not pull or move the catheter. This helps prevent pain and bladder spasms. Healthcare providers will show you how to use medical tape or a strap to secure the catheter tube to your body.    Keep a closed drainage system. Your catheter should always be attached to the drainage bag to form a closed system. Do not disconnect any part of the closed system unless you need to change the bag.    Keep the drainage bag below the level of your waist. This helps stop urine from moving back up the tubing and into your bladder. Do not loop or kink the tubing. This can cause urine to back up and collect in your bladder. Do not let the drainage bag touch or lie on the floor.    Empty the drainage bag when needed. The weight of a full drainage bag can be painful. Empty the drainage bag every 3 to 6 hours or when it is ? full.    Clean and change the drainage bag as directed. Ask your healthcare provider how often you should change the drainage bag and which cleaning solution to use. Wear disposable gloves when you change the bag. Do not allow the end of the catheter or tubing to touch anything. Clean the ends with an alcohol pad before you reconnect them.    Drink liquids as directed. This will help keep your urine clear and prevent catheter blockage and infection. Good choices for most people include water, juice, and milk. Ask how much liquid to drink each day and which liquids are best for you.  What to do if problems develop:    No urine is draining into the bag:  Check for kinks in the tubing and straighten them out.    Check the tape or strap used to secure the catheter tube to your skin. Make sure it is not blocking the tube.    Make sure you are not sitting or lying on the tubing.    Make sure the urine bag is hanging below the level of your waist.    Urine leaks from or around the catheter, tubing, or drainage bag: Check if the closed drainage system has accidently come open or apart. Clean the catheter and tubing ends with a new alcohol pad and reconnect them.  Follow up with your doctor or urologist as directed: Write down your questions so you remember to ask them during your

## 2025-05-27 NOTE — ED PROVIDER NOTE - PATIENT PORTAL LINK FT
You can access the FollowMyHealth Patient Portal offered by [facility] by registering at the following website: [email or web address]. By joining [facility]’s FollowMyHealth portal, you will also be able to view your health information using other applications (apps) compatible with our system.

## 2025-05-27 NOTE — ED ADULT TRIAGE NOTE - CHIEF COMPLAINT QUOTE
c/o iraheta dislodged. pt states it is not draining. usually gets it changed in the ER. no c/o pain or blood in urine

## 2025-05-29 ENCOUNTER — TRANSCRIPTION ENCOUNTER (OUTPATIENT)
Age: 50
End: 2025-05-29

## 2025-05-29 ENCOUNTER — INPATIENT (INPATIENT)
Facility: HOSPITAL | Age: 50
LOS: 7 days | Discharge: HOME | DRG: 690 | End: 2025-06-06
Attending: STUDENT IN AN ORGANIZED HEALTH CARE EDUCATION/TRAINING PROGRAM
Payer: MEDICARE

## 2025-05-29 VITALS
WEIGHT: 270.07 LBS | HEART RATE: 124 BPM | TEMPERATURE: 98 F | RESPIRATION RATE: 20 BRPM | HEIGHT: 70 IN | SYSTOLIC BLOOD PRESSURE: 173 MMHG | OXYGEN SATURATION: 98 % | DIASTOLIC BLOOD PRESSURE: 120 MMHG

## 2025-05-29 DIAGNOSIS — Z93.6 OTHER ARTIFICIAL OPENINGS OF URINARY TRACT STATUS: Chronic | ICD-10-CM

## 2025-05-29 LAB
-  AMOXICILLIN/CLAVULANIC ACID: SIGNIFICANT CHANGE UP
-  AMPICILLIN/SULBACTAM: SIGNIFICANT CHANGE UP
-  AMPICILLIN: SIGNIFICANT CHANGE UP
-  AZTREONAM: SIGNIFICANT CHANGE UP
-  CEFAZOLIN: SIGNIFICANT CHANGE UP
-  CEFEPIME: SIGNIFICANT CHANGE UP
-  CEFOXITIN: SIGNIFICANT CHANGE UP
-  CEFTRIAXONE: SIGNIFICANT CHANGE UP
-  CIPROFLOXACIN: SIGNIFICANT CHANGE UP
-  ERTAPENEM: SIGNIFICANT CHANGE UP
-  GENTAMICIN: SIGNIFICANT CHANGE UP
-  IMIPENEM: SIGNIFICANT CHANGE UP
-  LEVOFLOXACIN: SIGNIFICANT CHANGE UP
-  MEROPENEM: SIGNIFICANT CHANGE UP
-  NITROFURANTOIN: SIGNIFICANT CHANGE UP
-  PIPERACILLIN/TAZOBACTAM: SIGNIFICANT CHANGE UP
-  TIGECYCLINE: SIGNIFICANT CHANGE UP
-  TOBRAMYCIN: SIGNIFICANT CHANGE UP
-  TRIMETHOPRIM/SULFAMETHOXAZOLE: SIGNIFICANT CHANGE UP
APPEARANCE UR: ABNORMAL
APPEARANCE UR: ABNORMAL
APTT BLD: 35.5 SEC — SIGNIFICANT CHANGE UP (ref 26.1–36.8)
BACTERIA # UR AUTO: ABNORMAL /HPF
BACTERIA # UR AUTO: ABNORMAL /HPF
BASOPHILS # BLD AUTO: 0.07 K/UL — SIGNIFICANT CHANGE UP (ref 0–0.2)
BASOPHILS NFR BLD AUTO: 0.4 % — SIGNIFICANT CHANGE UP (ref 0–2)
BILIRUB UR-MCNC: NEGATIVE — SIGNIFICANT CHANGE UP
BILIRUB UR-MCNC: NEGATIVE — SIGNIFICANT CHANGE UP
COD CRY URNS QL: PRESENT
COD CRY URNS QL: PRESENT
COLOR SPEC: ABNORMAL
COLOR SPEC: YELLOW — SIGNIFICANT CHANGE UP
COMMENT - URINE: SIGNIFICANT CHANGE UP
COMMENT - URINE: SIGNIFICANT CHANGE UP
CULTURE RESULTS: ABNORMAL
DIFF PNL FLD: ABNORMAL
DIFF PNL FLD: ABNORMAL
EOSINOPHIL # BLD AUTO: 0.16 K/UL — SIGNIFICANT CHANGE UP (ref 0–0.5)
EOSINOPHIL NFR BLD AUTO: 0.9 % — SIGNIFICANT CHANGE UP (ref 0–6)
EPI CELLS # UR: PRESENT
EPI CELLS # UR: PRESENT
FLUAV AG NPH QL: SIGNIFICANT CHANGE UP
FLUBV AG NPH QL: SIGNIFICANT CHANGE UP
GLUCOSE UR QL: 250 MG/DL
GLUCOSE UR QL: NEGATIVE MG/DL — SIGNIFICANT CHANGE UP
HCT VFR BLD CALC: 51.3 % — HIGH (ref 39–50)
HGB BLD-MCNC: 17.3 G/DL — HIGH (ref 13–17)
HYALINE CASTS # UR AUTO: PRESENT
HYALINE CASTS # UR AUTO: PRESENT
IMM GRANULOCYTES NFR BLD AUTO: 0.3 % — SIGNIFICANT CHANGE UP (ref 0–0.9)
INR BLD: 1.1 RATIO — SIGNIFICANT CHANGE UP (ref 0.85–1.16)
KETONES UR QL: NEGATIVE MG/DL — SIGNIFICANT CHANGE UP
KETONES UR QL: NEGATIVE MG/DL — SIGNIFICANT CHANGE UP
LEUKOCYTE ESTERASE UR-ACNC: ABNORMAL
LEUKOCYTE ESTERASE UR-ACNC: ABNORMAL
LYMPHOCYTES # BLD AUTO: 1.7 K/UL — SIGNIFICANT CHANGE UP (ref 1–3.3)
LYMPHOCYTES # BLD AUTO: 9.9 % — LOW (ref 13–44)
MCHC RBC-ENTMCNC: 30.3 PG — SIGNIFICANT CHANGE UP (ref 27–34)
MCHC RBC-ENTMCNC: 33.7 G/DL — SIGNIFICANT CHANGE UP (ref 32–36)
MCV RBC AUTO: 89.8 FL — SIGNIFICANT CHANGE UP (ref 80–100)
METHOD TYPE: SIGNIFICANT CHANGE UP
MONOCYTES # BLD AUTO: 1.16 K/UL — HIGH (ref 0–0.9)
MONOCYTES NFR BLD AUTO: 6.7 % — SIGNIFICANT CHANGE UP (ref 2–14)
NEUTROPHILS # BLD AUTO: 14.09 K/UL — HIGH (ref 1.8–7.4)
NEUTROPHILS NFR BLD AUTO: 81.8 % — HIGH (ref 43–77)
NITRITE UR-MCNC: NEGATIVE — SIGNIFICANT CHANGE UP
NITRITE UR-MCNC: POSITIVE
NRBC BLD AUTO-RTO: 0 /100 WBCS — SIGNIFICANT CHANGE UP (ref 0–0)
ORGANISM # SPEC MICROSCOPIC CNT: ABNORMAL
ORGANISM # SPEC MICROSCOPIC CNT: SIGNIFICANT CHANGE UP
PH UR: 8.5 (ref 5–8)
PH UR: >=9 (ref 5–8)
PLATELET # BLD AUTO: 294 K/UL — SIGNIFICANT CHANGE UP (ref 150–400)
PROT UR-MCNC: 100 MG/DL
PROT UR-MCNC: >=1000 MG/DL
PROTHROM AB SERPL-ACNC: 12.8 SEC — SIGNIFICANT CHANGE UP (ref 9.9–13.4)
RBC # BLD: 5.71 M/UL — SIGNIFICANT CHANGE UP (ref 4.2–5.8)
RBC # FLD: 14.3 % — SIGNIFICANT CHANGE UP (ref 10.3–14.5)
RBC CASTS # UR COMP ASSIST: 20 /HPF — HIGH (ref 0–4)
RBC CASTS # UR COMP ASSIST: 20 /HPF — HIGH (ref 0–4)
RSV RNA NPH QL NAA+NON-PROBE: SIGNIFICANT CHANGE UP
SARS-COV-2 RNA SPEC QL NAA+PROBE: SIGNIFICANT CHANGE UP
SOURCE RESPIRATORY: SIGNIFICANT CHANGE UP
SP GR SPEC: 1.01 — SIGNIFICANT CHANGE UP (ref 1–1.03)
SP GR SPEC: 1.02 — SIGNIFICANT CHANGE UP (ref 1–1.03)
SPECIMEN SOURCE: SIGNIFICANT CHANGE UP
TRI-PHOS CRY UR QL COMP ASSIST: PRESENT
TRI-PHOS CRY UR QL COMP ASSIST: PRESENT
UROBILINOGEN FLD QL: 0.2 MG/DL — SIGNIFICANT CHANGE UP (ref 0.2–1)
UROBILINOGEN FLD QL: 1 MG/DL — SIGNIFICANT CHANGE UP (ref 0.2–1)
WBC # BLD: 17.24 K/UL — HIGH (ref 3.8–10.5)
WBC # FLD AUTO: 17.24 K/UL — HIGH (ref 3.8–10.5)
WBC UR QL: 16 /HPF — HIGH (ref 0–5)
WBC UR QL: ABNORMAL /HPF (ref 0–5)

## 2025-05-29 PROCEDURE — 93010 ELECTROCARDIOGRAM REPORT: CPT

## 2025-05-29 PROCEDURE — 71045 X-RAY EXAM CHEST 1 VIEW: CPT | Mod: 26

## 2025-05-29 PROCEDURE — 99285 EMERGENCY DEPT VISIT HI MDM: CPT

## 2025-05-29 PROCEDURE — 71250 CT THORAX DX C-: CPT | Mod: 26

## 2025-05-29 PROCEDURE — 74176 CT ABD & PELVIS W/O CONTRAST: CPT | Mod: 26

## 2025-05-29 RX ORDER — PIPERACILLIN-TAZO-DEXTROSE,ISO 3.375G/5
3.38 IV SOLUTION, PIGGYBACK PREMIX FROZEN(ML) INTRAVENOUS ONCE
Refills: 0 | Status: COMPLETED | OUTPATIENT
Start: 2025-05-30 | End: 2025-05-30

## 2025-05-29 RX ORDER — ACETAMINOPHEN 500 MG/5ML
1000 LIQUID (ML) ORAL ONCE
Refills: 0 | Status: COMPLETED | OUTPATIENT
Start: 2025-05-29 | End: 2025-05-29

## 2025-05-29 RX ORDER — PIPERACILLIN-TAZO-DEXTROSE,ISO 3.375G/5
3.38 IV SOLUTION, PIGGYBACK PREMIX FROZEN(ML) INTRAVENOUS ONCE
Refills: 0 | Status: COMPLETED | OUTPATIENT
Start: 2025-05-29 | End: 2025-05-29

## 2025-05-29 RX ADMIN — Medication 200 GRAM(S): at 20:24

## 2025-05-29 RX ADMIN — Medication 2300 MILLILITER(S): at 20:24

## 2025-05-29 RX ADMIN — Medication 400 MILLIGRAM(S): at 21:41

## 2025-05-29 NOTE — ED ADULT NURSE REASSESSMENT NOTE - NS ED NURSE REASSESS COMMENT FT1
Patient is refusing to let staff change his iraheta. patient states that he has an appointment with his urologist on June 4th and he will have it changed then. MD Bernal aware.

## 2025-05-29 NOTE — ED ADULT TRIAGE NOTE - CHIEF COMPLAINT QUOTE
BIBEMS from home c/o increased general weakness, fevers, bed sores, difficulty breathing; h/o MS; pt recently in ED for UTI

## 2025-05-29 NOTE — ED CLERICAL - NS ED CLERK NOTE PRE-ARRIVAL INFORMATION; ADDITIONAL PRE-ARRIVAL INFORMATION

## 2025-05-29 NOTE — ED PROVIDER NOTE - PHYSICAL EXAMINATION
Vital signs as available reviewed.  General:  No acute distress.  Head:  Normocephalic, atraumatic.  Eyes:  Conjunctiva pink, no icterus.  Cardiovascular:  tachycardia  Respiratory:  Clear to auscultation, exam limited by habitus  Abdomen:  Soft, non-tender.  Musculoskeletal:  No obvious deformity.  Neurologic: Alert and oriented, moving all extremities.  Skin:  Warm and dry. old / chronic appearing sacral pressure injury without open wound.

## 2025-05-29 NOTE — ED PROVIDER NOTE - OBJECTIVE STATEMENT
48 yo M with PMHx of MS, paraplegia, DVT/PE and chronic Steele here due to pain at site of bed sore, fatigue, malaise. Per EMS patient having fevers and was recently diagnosed with UTI.

## 2025-05-29 NOTE — ED ADULT TRIAGE NOTE - NS ED TRIAGE AVPU SCALE
History of Present Illness


H&P Date: 12/14/21


Chief Complaint: Colon cancer screening





77-year-old female here today for colonoscopy.  Last colonoscopy 6 years ago.  

Patient with personal history of colon polyps.  No family history of colon 

cancer.  No bowel complaints.





Past Medical History


Past Medical History: Coronary Artery Disease (CAD), Cancer, Hyperlipidemia, 

Hypertension, Myocardial Infarction (MI), Osteoarthritis (OA)


Additional Past Medical History / Comment(s): BREAST CANCER, OSTEOPOROSIS, hx 

anemia


Last Myocardial Infarction Date:: 08/25/2019


History of Any Multi-Drug Resistant Organisms: None Reported


Past Surgical History: Breast Surgery, Heart Catheterization With Stent, 

Orthopedic Surgery


Additional Past Surgical History / Comment(s): LT BREAST LUMPECTOMY, LT KNEE 

arthroscopy, 2 cardiac stents, left knee replacement


Past Anesthesia/Blood Transfusion Reactions: Previous Problems w/ Anesthesia, 

Motion Sickness, Postoperative Nausea & Vomiting (PONV)


Additional Past Anesthesia/Blood Transfusion Reaction / Comment(s): HAD SEVERE 

PAIN AND BRUISING ON LEFT LOWER BACK AREA AFTER LT KNEE SCOPE


Date of Last Stent Placement:: 08/25/2019


Smoking Status: Former smoker, Second hand smoke exposure





- Past Family History


  ** Father


Family Medical History: Myocardial Infarction (MI)





  ** Mother


Family Medical History: CVA/TIA





  ** Daughter(s)


Additional Family Medical History / Comment(s): melanoma





Medications and Allergies


                                Home Medications











 Medication  Instructions  Recorded  Confirmed  Type


 


Melatonin 10 mg PO HS 12/09/14 12/14/21 History


 


Turmeric 475 mg PO DAILY 12/09/14 12/14/21 History


 


Aspirin [Adult Low Dose Aspirin EC] 81 mg PO DAILY 03/03/20 12/14/21 History


 


Losartan Potassium 100 mg PO DAILY 03/03/20 12/14/21 History


 


Metoprolol Succinate (ER) [Toprol 25 mg PO 2100 03/03/20 12/14/21 History





XL]    


 


hydroCHLOROthiazide 25 mg PO DAILY 03/03/20 12/14/21 History


 


Atorvastatin [Lipitor] 40 mg PO 2100 12/10/21 12/14/21 History


 


Calcium/Magnesium/Zinc 2 each PO DAILY 12/10/21 12/14/21 History





[Calcium-Magnesium-Zinc Tablet]    


 


Cholecalciferol [Vitamin D3 (125 125 mcg PO DAILY 12/10/21 12/14/21 History





Mcg = 5000 Iu)]    


 


Reclast Infusion 1 applicate IV AS DIRECTED 12/10/21 12/14/21 History


 


amLODIPine [Norvasc] 5 mg PO 2100 12/10/21 12/14/21 History








                                    Allergies











Allergy/AdvReac Type Severity Reaction Status Date / Time


 


No Known Allergies Allergy   Verified 12/14/21 09:22














Surgical - Exam


                                   Vital Signs











Temp Pulse Resp BP Pulse Ox


 


 97.3 F L  70   16   129/66   98 


 


 12/14/21 09:33  12/14/21 09:33  12/14/21 09:33  12/14/21 09:33  12/14/21 09:33

















Physical exam:


General: Well-developed, well-nourished


HEENT: Normocephalic, sclerae nonicteric


Abdomen: Nontender, nondistended


Extremities: No edema


Neuro: Alert and oriented





Assessment and Plan


(1) Colon cancer screening


Narrative/Plan: 


Will proceed with colonoscopy at this time


Current Visit: Yes   Status: Acute   Code(s): Z12.11 - ENCOUNTER FOR SCREENING 

FOR MALIGNANT NEOPLASM OF COLON   SNOMED Code(s): 417294346 Alert-The patient is alert, awake and responds to voice. The patient is oriented to time, place, and person. The triage nurse is able to obtain subjective information.

## 2025-05-29 NOTE — ED PROVIDER NOTE - CLINICAL SUMMARY MEDICAL DECISION MAKING FREE TEXT BOX
history ms, chronic iraheta here malaise, tachycardia, fever. urine likely source. check labs, ekg, imaging, antibiotics, fluids, control fever, admit. See progress notes for further updates.

## 2025-05-29 NOTE — ED ADULT NURSE NOTE - NSFALLHARMRISKINTERV_ED_ALL_ED

## 2025-05-29 NOTE — ED ADULT NURSE NOTE - OBJECTIVE STATEMENT
patient comes in with complaints of  increased general weakness, fevers, bed sores, difficulty breathing. patient has a h/o MS. patient was seen and treated 2 days ago here for a UTI.

## 2025-05-29 NOTE — ED PROVIDER NOTE - PROGRESS NOTE DETAILS
patient initially refusing iraheta change so urine at 2035 sent from old catheter. patient found to be febrile 1 rectal temp 101.7. patient then agreeable to catheter change- 20f coude placed. case discussed with hospitalist, will admit.

## 2025-05-30 ENCOUNTER — NON-APPOINTMENT (OUTPATIENT)
Age: 50
End: 2025-05-30

## 2025-05-30 DIAGNOSIS — N39.0 URINARY TRACT INFECTION, SITE NOT SPECIFIED: ICD-10-CM

## 2025-05-30 DIAGNOSIS — I82.409 ACUTE EMBOLISM AND THROMBOSIS OF UNSPECIFIED DEEP VEINS OF UNSPECIFIED LOWER EXTREMITY: ICD-10-CM

## 2025-05-30 DIAGNOSIS — A41.9 SEPSIS, UNSPECIFIED ORGANISM: ICD-10-CM

## 2025-05-30 DIAGNOSIS — R21 RASH AND OTHER NONSPECIFIC SKIN ERUPTION: ICD-10-CM

## 2025-05-30 DIAGNOSIS — Z29.9 ENCOUNTER FOR PROPHYLACTIC MEASURES, UNSPECIFIED: ICD-10-CM

## 2025-05-30 DIAGNOSIS — G35 MULTIPLE SCLEROSIS: ICD-10-CM

## 2025-05-30 LAB
ALBUMIN SERPL ELPH-MCNC: 2.7 G/DL — LOW (ref 3.3–5)
ALBUMIN SERPL ELPH-MCNC: 3 G/DL — LOW (ref 3.3–5)
ALP SERPL-CCNC: 75 U/L — SIGNIFICANT CHANGE UP (ref 40–120)
ALP SERPL-CCNC: 82 U/L — SIGNIFICANT CHANGE UP (ref 40–120)
ALT FLD-CCNC: 63 U/L — SIGNIFICANT CHANGE UP (ref 12–78)
ALT FLD-CCNC: 72 U/L — SIGNIFICANT CHANGE UP (ref 12–78)
ANION GAP SERPL CALC-SCNC: 7 MMOL/L — SIGNIFICANT CHANGE UP (ref 5–17)
ANION GAP SERPL CALC-SCNC: 7 MMOL/L — SIGNIFICANT CHANGE UP (ref 5–17)
AST SERPL-CCNC: 32 U/L — SIGNIFICANT CHANGE UP (ref 15–37)
AST SERPL-CCNC: 43 U/L — HIGH (ref 15–37)
BILIRUB SERPL-MCNC: 0.5 MG/DL — SIGNIFICANT CHANGE UP (ref 0.2–1.2)
BILIRUB SERPL-MCNC: 0.6 MG/DL — SIGNIFICANT CHANGE UP (ref 0.2–1.2)
BUN SERPL-MCNC: 13 MG/DL — SIGNIFICANT CHANGE UP (ref 7–23)
BUN SERPL-MCNC: 17 MG/DL — SIGNIFICANT CHANGE UP (ref 7–23)
CALCIUM SERPL-MCNC: 8.4 MG/DL — LOW (ref 8.5–10.1)
CALCIUM SERPL-MCNC: 9 MG/DL — SIGNIFICANT CHANGE UP (ref 8.5–10.1)
CHLORIDE SERPL-SCNC: 114 MMOL/L — HIGH (ref 96–108)
CHLORIDE SERPL-SCNC: 115 MMOL/L — HIGH (ref 96–108)
CO2 SERPL-SCNC: 20 MMOL/L — LOW (ref 22–31)
CO2 SERPL-SCNC: 23 MMOL/L — SIGNIFICANT CHANGE UP (ref 22–31)
CREAT SERPL-MCNC: 1 MG/DL — SIGNIFICANT CHANGE UP (ref 0.5–1.3)
CREAT SERPL-MCNC: 1.1 MG/DL — SIGNIFICANT CHANGE UP (ref 0.5–1.3)
EGFR: 83 ML/MIN/1.73M2 — SIGNIFICANT CHANGE UP
EGFR: 83 ML/MIN/1.73M2 — SIGNIFICANT CHANGE UP
EGFR: 93 ML/MIN/1.73M2 — SIGNIFICANT CHANGE UP
EGFR: 93 ML/MIN/1.73M2 — SIGNIFICANT CHANGE UP
GLUCOSE SERPL-MCNC: 103 MG/DL — HIGH (ref 70–99)
GLUCOSE SERPL-MCNC: 109 MG/DL — HIGH (ref 70–99)
HCT VFR BLD CALC: 44.4 % — SIGNIFICANT CHANGE UP (ref 39–50)
HGB BLD-MCNC: 15.2 G/DL — SIGNIFICANT CHANGE UP (ref 13–17)
LACTATE SERPL-SCNC: 1.7 MMOL/L — SIGNIFICANT CHANGE UP (ref 0.7–2)
MAGNESIUM SERPL-MCNC: 1.8 MG/DL — SIGNIFICANT CHANGE UP (ref 1.6–2.6)
MCHC RBC-ENTMCNC: 30.9 PG — SIGNIFICANT CHANGE UP (ref 27–34)
MCHC RBC-ENTMCNC: 34.2 G/DL — SIGNIFICANT CHANGE UP (ref 32–36)
MCV RBC AUTO: 90.2 FL — SIGNIFICANT CHANGE UP (ref 80–100)
NRBC BLD AUTO-RTO: 0 /100 WBCS — SIGNIFICANT CHANGE UP (ref 0–0)
PHOSPHATE SERPL-MCNC: 2.3 MG/DL — LOW (ref 2.5–4.5)
PLATELET # BLD AUTO: 229 K/UL — SIGNIFICANT CHANGE UP (ref 150–400)
POTASSIUM SERPL-MCNC: 3.6 MMOL/L — SIGNIFICANT CHANGE UP (ref 3.5–5.3)
POTASSIUM SERPL-MCNC: 3.7 MMOL/L — SIGNIFICANT CHANGE UP (ref 3.5–5.3)
POTASSIUM SERPL-SCNC: 3.6 MMOL/L — SIGNIFICANT CHANGE UP (ref 3.5–5.3)
POTASSIUM SERPL-SCNC: 3.7 MMOL/L — SIGNIFICANT CHANGE UP (ref 3.5–5.3)
PROT SERPL-MCNC: 7 G/DL — SIGNIFICANT CHANGE UP (ref 6–8.3)
PROT SERPL-MCNC: 7.3 G/DL — SIGNIFICANT CHANGE UP (ref 6–8.3)
RBC # BLD: 4.92 M/UL — SIGNIFICANT CHANGE UP (ref 4.2–5.8)
RBC # FLD: 14.4 % — SIGNIFICANT CHANGE UP (ref 10.3–14.5)
SODIUM SERPL-SCNC: 142 MMOL/L — SIGNIFICANT CHANGE UP (ref 135–145)
SODIUM SERPL-SCNC: 144 MMOL/L — SIGNIFICANT CHANGE UP (ref 135–145)
WBC # BLD: 11.69 K/UL — HIGH (ref 3.8–10.5)
WBC # FLD AUTO: 11.69 K/UL — HIGH (ref 3.8–10.5)

## 2025-05-30 PROCEDURE — 99223 1ST HOSP IP/OBS HIGH 75: CPT

## 2025-05-30 PROCEDURE — G0545: CPT

## 2025-05-30 PROCEDURE — 99222 1ST HOSP IP/OBS MODERATE 55: CPT

## 2025-05-30 PROCEDURE — 99221 1ST HOSP IP/OBS SF/LOW 40: CPT

## 2025-05-30 RX ORDER — CEFTRIAXONE 500 MG/1
1000 INJECTION, POWDER, FOR SOLUTION INTRAMUSCULAR; INTRAVENOUS EVERY 24 HOURS
Refills: 0 | Status: DISCONTINUED | OUTPATIENT
Start: 2025-05-30 | End: 2025-05-30

## 2025-05-30 RX ORDER — CYCLOBENZAPRINE HYDROCHLORIDE 15 MG/1
10 CAPSULE, EXTENDED RELEASE ORAL THREE TIMES A DAY
Refills: 0 | Status: DISCONTINUED | OUTPATIENT
Start: 2025-05-30 | End: 2025-05-31

## 2025-05-30 RX ORDER — MELATONIN 5 MG
3 TABLET ORAL AT BEDTIME
Refills: 0 | Status: DISCONTINUED | OUTPATIENT
Start: 2025-05-30 | End: 2025-06-06

## 2025-05-30 RX ORDER — B1/B2/B3/B5/B6/B12/VIT C/FOLIC 500-0.5 MG
1 TABLET ORAL DAILY
Refills: 0 | Status: DISCONTINUED | OUTPATIENT
Start: 2025-05-30 | End: 2025-06-06

## 2025-05-30 RX ORDER — MAGNESIUM, ALUMINUM HYDROXIDE 200-200 MG
30 TABLET,CHEWABLE ORAL EVERY 4 HOURS
Refills: 0 | Status: DISCONTINUED | OUTPATIENT
Start: 2025-05-30 | End: 2025-06-06

## 2025-05-30 RX ORDER — ACETAMINOPHEN 500 MG/5ML
650 LIQUID (ML) ORAL EVERY 6 HOURS
Refills: 0 | Status: DISCONTINUED | OUTPATIENT
Start: 2025-05-30 | End: 2025-06-06

## 2025-05-30 RX ORDER — CEFEPIME 2 G/20ML
INJECTION, POWDER, FOR SOLUTION INTRAVENOUS
Refills: 0 | Status: COMPLETED | OUTPATIENT
Start: 2025-05-30 | End: 2025-06-05

## 2025-05-30 RX ORDER — CLOTRIMAZOLE 1 G/100G
1 CREAM TOPICAL
Refills: 0 | Status: DISCONTINUED | OUTPATIENT
Start: 2025-05-30 | End: 2025-06-06

## 2025-05-30 RX ORDER — CEFEPIME 2 G/20ML
2000 INJECTION, POWDER, FOR SOLUTION INTRAVENOUS ONCE
Refills: 0 | Status: COMPLETED | OUTPATIENT
Start: 2025-05-30 | End: 2025-05-30

## 2025-05-30 RX ORDER — HEPARIN SODIUM 1000 [USP'U]/ML
5000 INJECTION INTRAVENOUS; SUBCUTANEOUS EVERY 8 HOURS
Refills: 0 | Status: DISCONTINUED | OUTPATIENT
Start: 2025-05-30 | End: 2025-06-06

## 2025-05-30 RX ORDER — CEFEPIME 2 G/20ML
2000 INJECTION, POWDER, FOR SOLUTION INTRAVENOUS EVERY 8 HOURS
Refills: 0 | Status: COMPLETED | OUTPATIENT
Start: 2025-05-30 | End: 2025-06-05

## 2025-05-30 RX ORDER — ONDANSETRON HCL/PF 4 MG/2 ML
4 VIAL (ML) INJECTION EVERY 8 HOURS
Refills: 0 | Status: DISCONTINUED | OUTPATIENT
Start: 2025-05-30 | End: 2025-06-06

## 2025-05-30 RX ADMIN — CEFEPIME 100 MILLIGRAM(S): 2 INJECTION, POWDER, FOR SOLUTION INTRAVENOUS at 14:56

## 2025-05-30 RX ADMIN — Medication 25 GRAM(S): at 00:43

## 2025-05-30 RX ADMIN — Medication 1 TABLET(S): at 11:45

## 2025-05-30 RX ADMIN — CEFEPIME 100 MILLIGRAM(S): 2 INJECTION, POWDER, FOR SOLUTION INTRAVENOUS at 21:52

## 2025-05-30 RX ADMIN — HEPARIN SODIUM 5000 UNIT(S): 1000 INJECTION INTRAVENOUS; SUBCUTANEOUS at 12:59

## 2025-05-30 RX ADMIN — CYCLOBENZAPRINE HYDROCHLORIDE 10 MILLIGRAM(S): 15 CAPSULE, EXTENDED RELEASE ORAL at 21:51

## 2025-05-30 RX ADMIN — CEFTRIAXONE 100 MILLIGRAM(S): 500 INJECTION, POWDER, FOR SOLUTION INTRAMUSCULAR; INTRAVENOUS at 10:31

## 2025-05-30 RX ADMIN — CYCLOBENZAPRINE HYDROCHLORIDE 10 MILLIGRAM(S): 15 CAPSULE, EXTENDED RELEASE ORAL at 12:59

## 2025-05-30 RX ADMIN — CLOTRIMAZOLE 1 APPLICATION(S): 1 CREAM TOPICAL at 15:39

## 2025-05-30 RX ADMIN — HEPARIN SODIUM 5000 UNIT(S): 1000 INJECTION INTRAVENOUS; SUBCUTANEOUS at 21:52

## 2025-05-30 RX ADMIN — Medication 40 MILLIEQUIVALENT(S): at 00:43

## 2025-05-30 RX ADMIN — HEPARIN SODIUM 5000 UNIT(S): 1000 INJECTION INTRAVENOUS; SUBCUTANEOUS at 05:31

## 2025-05-30 NOTE — CARE COORDINATION ASSESSMENT. - OTHER PERTINENT DISCHARGE PLANNING INFORMATION:
Met with patient at bedside to discuss the role of case management with verbalized understanding.  Patient resides home alone, has HHA as noted above.  Patient is non ambulatory, reagan lift as needed.  Patient will need resumption of HHA and likely [facility] referral.  WIll monitor and remain available .

## 2025-05-30 NOTE — PATIENT PROFILE ADULT - FALL HARM RISK - HARM RISK INTERVENTIONS

## 2025-05-30 NOTE — H&P ADULT - NSHPPHYSICALEXAM_GEN_ALL_CORE
CONSTITUTIONAL: Well groomed, no apparent distress  HEENT: PERRLA and EOMI  RESP: No respiratory distress, no use of accessory muscles; CTA b/l, no WRR  CV: tachycardia, regular rhythm +S1S2, no peripheral edema  GI: Soft, NT, ND  NEURO: 5/5 muscle strength B/L  PSYCH: Appropriate insight/judgment; A+O x 3, mood and affect appropriate

## 2025-05-30 NOTE — H&P ADULT - ASSESSMENT
Pt is a 48 y/o male w/ PMHx of MS, paraplegia, DVT/PE, and chronic iraheta who is being admitted for urosepsis.

## 2025-05-30 NOTE — CARE COORDINATION ASSESSMENT. - NSCAREPROVIDERS_GEN_ALL_CORE_FT
CARE PROVIDERS:  Accepting Physician: Gabriela Jensen  Access Services: Daniella Lu  Administration: Tania Resendiz  Administration: Jesus Calzada  Administration: Andreas Santiago  Admitting: Doctor, Pamella  Attending: Jenny Polk  Case Management: Melo Kamara  Consultant: Isadora Canada  Consultant: Juan Jose Fonseca  Consultant: Weil, Patricia  Consultant: Elaine Mendez  ED Attending: Dejan Bernal  ED Nurse: Sarkis Perry  Nurse: Sarkis Perry  Nurse: Qi Cary  Nurse: Marlene Narvaez  Nurse: Kaylynn Herrera  Ordered: ServiceAccount, SCMMLM  Outpatient Provider: Gerber Moreno  Outpatient Provider: Alf Barriga  Outpatient Provider: Mitul Rivas  Override: Kaylynn Herrera  PCA/Nursing Assistant: Irina Vaughan  Primary Team: Gabriela Jensen  Primary Team: Jack Bender  Primary Team: Kt Prakash  Registered Dietitian: Yuki Mesa  : Ned Prince  : Vanessa Dahl  Team: [facility], Team  UR// Supp. Assoc.: Faye Du

## 2025-05-30 NOTE — H&P ADULT - NSHPSOCIALHISTORY_GEN_ALL_CORE
Tobacco: Denies  Alcohol: Socially  Illicit Drug Use: Smokes marijuana  Ambulance: Bedbound  Lives with: Alone w/ aide 24/7  ADLs: Dependent

## 2025-05-30 NOTE — SBIRT NOTE ADULT - NSSBIRTDRGWITHDRSX_GEN_A_CORE
[FreeTextEntry1] : Spoke with patient's mother ([phone]). Patient refused to attend appointment scheduled on 11/15 as well as school on Mon/Tues/Wed. School recommended mother call [facility] which she did and was reportedly instructed to call 911. Patient was brought to [facility], evaluated, and discharged. Mother now reports patient only has several days left of medication and is requesting a refill. Mother denied he is experiencing any side effects. Patient has a follow up appt scheduled for Monday 11/21. Informed mom one week's worth of Lexapro 10mg would be sent to the pharmacy on file. 
No

## 2025-05-30 NOTE — PROGRESS NOTE ADULT - ASSESSMENT
50 y/o male w/ PMHx of MS, paraplegia, DVT/PE, and chronic iraheta who is being admitted for urosepsis.     Sepsis secondary to UTI/pyelo    - ID following    - on cefepime     - UCx following     - iraheta was changed in the ED    MS    - bedbound. will start flexeril TID. patient has tried baclofen and it did not help in the past.     rash    - start clotrimazole cream     DVT proph: heparin 5000 units TID

## 2025-05-30 NOTE — PROGRESS NOTE ADULT - SUBJECTIVE AND OBJECTIVE BOX
Patient is a 49y old  Male who presents with a chief complaint of Urosepsis (30 May 2025 13:36)    INTERVAL HPI/OVERNIGHT EVENTS: Patient was seen and examined. complaining of spasms in both lower extremities and back.     I&O's Summary    29 May 2025 07:01  -  30 May 2025 07:00  --------------------------------------------------------  IN: 0 mL / OUT: 1000 mL / NET: -1000 mL    30 May 2025 07:01  -  30 May 2025 17:29  --------------------------------------------------------  IN: 625 mL / OUT: 800 mL / NET: -175 mL        LABS:                        15.2   11.69 )-----------( 229      ( 30 May 2025 11:40 )             44.4     05-30    142  |  115[H]  |  13  ----------------------------<  109[H]  3.7   |  20[L]  |  1.00    Ca    8.4[L]      30 May 2025 11:40  Phos  2.3     05-30  Mg     1.8     05-30    TPro  7.0  /  Alb  2.7[L]  /  TBili  0.5  /  DBili  x   /  AST  32  /  ALT  63  /  AlkPhos  75  05-30    PT/INR - ( 29 May 2025 20:15 )   PT: 12.8 sec;   INR: 1.10 ratio         PTT - ( 29 May 2025 20:15 )  PTT:35.5 sec  Urinalysis Basic - ( 30 May 2025 11:40 )    Color: x / Appearance: x / SG: x / pH: x  Gluc: 109 mg/dL / Ketone: x  / Bili: x / Urobili: x   Blood: x / Protein: x / Nitrite: x   Leuk Esterase: x / RBC: x / WBC x   Sq Epi: x / Non Sq Epi: x / Bacteria: x      CAPILLARY BLOOD GLUCOSE            Urinalysis Basic - ( 30 May 2025 11:40 )    Color: x / Appearance: x / SG: x / pH: x  Gluc: 109 mg/dL / Ketone: x  / Bili: x / Urobili: x   Blood: x / Protein: x / Nitrite: x   Leuk Esterase: x / RBC: x / WBC x   Sq Epi: x / Non Sq Epi: x / Bacteria: x        MEDICATIONS  (STANDING):  cefepime   IVPB      cefepime   IVPB 2000 milliGRAM(s) IV Intermittent every 8 hours  clotrimazole 1% Cream 1 Application(s) Topical two times a day  cyclobenzaprine 10 milliGRAM(s) Oral three times a day  heparin   Injectable 5000 Unit(s) SubCutaneous every 8 hours  multivitamin 1 Tablet(s) Oral daily  sodium chloride 0.9%. 1000 milliLiter(s) (100 mL/Hr) IV Continuous <Continuous>    MEDICATIONS  (PRN):  acetaminophen     Tablet .. 650 milliGRAM(s) Oral every 6 hours PRN Temp greater or equal to 38C (100.4F), Mild Pain (1 - 3)  aluminum hydroxide/magnesium hydroxide/simethicone Suspension 30 milliLiter(s) Oral every 4 hours PRN Dyspepsia  melatonin 3 milliGRAM(s) Oral at bedtime PRN Insomnia  ondansetron Injectable 4 milliGRAM(s) IV Push every 8 hours PRN Nausea and/or Vomiting      REVIEW OF SYSTEMS:  CONSTITUTIONAL: No fever or chills  HEENT:  No headache  RESPIRATORY: No cough, wheezing, or shortness of breath  CARDIOVASCULAR: No chest pain  GASTROINTESTINAL: No abdominal pain, nausea, vomiting, or diarrhea  GENITOURINARY: No dysuria  NEUROLOGICAL: no focal weakness   MUSCULOSKELETAL: no myalgias  PSYCH: no recent changes in mood    RADIOLOGY & ADDITIONAL TESTS:    Imaging Personally Reviewed:  [x] YES  [ ] NO    Consultant(s) Notes Reviewed:  [x] YES  [ ] NO    PHYSICAL EXAM:  T(C): 36.7 (05-30-25 @ 13:00), Max: 38.7 (05-29-25 @ 21:24)  HR: 96 (05-30-25 @ 13:00) (96 - 124)  BP: 130/83 (05-30-25 @ 13:00) (120/82 - 173/120)  RR: 18 (05-30-25 @ 13:00) (18 - 20)  SpO2: 94% (05-30-25 @ 13:00) (94% - 98%)    GENERAL: awake, oriented   HEENT:  anicteric, moist mucous membranes  CHEST/LUNG:  CTA b/l, no rales, wheezes, or rhonchi  HEART:  RRR, S1, S2  ABDOMEN:  BS+, soft, nontender, nondistended, + iraheta   EXTREMITIES: +1 edema  NERVOUS SYSTEM: answers questions and follows commands appropriately  PSYCH: normal affect  + macular rash on groin, back and legs.     Care Discussed with Consultants/Other Providers [x] YES  [ ] NO

## 2025-05-30 NOTE — CONSULT NOTE ADULT - SUBJECTIVE AND OBJECTIVE BOX
Chief Complaint: stage 1 sacral ulcer and systemic rash    HPI: 50 Y/O white male paraplegic from MS who was admitted today for urosepsis has Hx of stage 1 sacral ulcer and systemic rash. Patient denies itching    PAST MEDICAL & SURGICAL HISTORY:  MS (multiple sclerosis)  since 1995      PE (pulmonary embolism)  2013      Cellulitis  november 2014      Nephrolithiasis      Environmental allergies      MS (multiple sclerosis)      Lower paraplegia      DVT, lower extremity      Pulmonary embolism      Nephrostomy status      No significant past surgical history          Allergies    Originally Entered as [Unknown] reaction to [KNA] (Unknown)  Cipro (Rash)  ciprofloxacin (Rash (Mild to Mod))  baclofen (Rash)  gabapentin (Rash)    Intolerances        MEDICATIONS  (STANDING):  cefepime   IVPB      cefepime   IVPB 2000 milliGRAM(s) IV Intermittent every 8 hours  clotrimazole 1% Cream 1 Application(s) Topical two times a day  cyclobenzaprine 10 milliGRAM(s) Oral three times a day  heparin   Injectable 5000 Unit(s) SubCutaneous every 8 hours  multivitamin 1 Tablet(s) Oral daily  sodium chloride 0.9%. 1000 milliLiter(s) (100 mL/Hr) IV Continuous <Continuous>    MEDICATIONS  (PRN):  acetaminophen     Tablet .. 650 milliGRAM(s) Oral every 6 hours PRN Temp greater or equal to 38C (100.4F), Mild Pain (1 - 3)  aluminum hydroxide/magnesium hydroxide/simethicone Suspension 30 milliLiter(s) Oral every 4 hours PRN Dyspepsia  melatonin 3 milliGRAM(s) Oral at bedtime PRN Insomnia  ondansetron Injectable 4 milliGRAM(s) IV Push every 8 hours PRN Nausea and/or Vomiting      FAMILY HISTORY:  Family history of diabetes mellitus (DM) (Grandparent)    Family history of breast cancer (Grandparent, Aunt)    FH: breast cancer (Mother)            ROS:  CONSTITUTIONAL: No fever, weight loss, or fatigue  EYES: No eye pain, visual disturbances, or discharge  ENMT:  No difficulty hearing, tinnitus, vertigo; No sinus or throat pain  NECK: No pain or stiffness  BREASTS: No pain, masses, or nipple discharge  RESPIRATORY: No cough, wheezing, chills or hemoptysis; No shortness of breath  CARDIOVASCULAR: No chest pain, palpitations, dizziness, or leg swelling  GASTROINTESTINAL: No abdominal or epigastric pain. No nausea, vomiting, or hematemesis; No diarrhea or constipation. No melena or hematochezia.  GENITOURINARY: No dysuria, frequency, hematuria, or incontinence  NEUROLOGICAL: No headaches, memory loss, loss of strength, numbness, or tremors  SKIN: No itching, burning, rashes, or lesions   LYMPH NODES: No enlarged glands  ENDOCRINE: No heat or cold intolerance; No hair loss  MUSCULOSKELETAL: No joint pain or swelling; No muscle, back, or extremity pain  PSYCHIATRIC: No depression, anxiety, mood swings, or difficulty sleeping  HEME/LYMPH: No easy bruising, or bleeding gums  ALLERGY AND IMMUNOLOGIC: No hives or eczema    PHYSICAL EXAM-    Height (cm): 177.8 (05-29 @ 19:17)  Weight (kg): 122.5 (05-29 @ 19:17)  BMI (kg/m2): 38.8 (05-29 @ 19:17)  Vital Signs Last 24 Hrs  T(C): 36.7 (30 May 2025 13:00), Max: 38.7 (29 May 2025 21:24)  T(F): 98 (30 May 2025 13:00), Max: 101.7 (29 May 2025 21:24)  HR: 96 (30 May 2025 13:00) (96 - 124)  BP: 130/83 (30 May 2025 13:00) (120/82 - 173/120)  BP(mean): 95 (30 May 2025 05:44) (95 - 95)  RR: 18 (30 May 2025 13:00) (18 - 20)  SpO2: 94% (30 May 2025 13:00) (94% - 98%)    Parameters below as of 30 May 2025 13:00  Patient On (Oxygen Delivery Method): room air        Constitutional: well developed, well nourished, no apparent distress, alert, oriented x 3.   Pulmonary: no respiratory distress, normal respiratory rhythm and effort, lungs are clear to auscultation/percussion. No CVA tenderness.  Cardiovascular: heart rate normal, normal sinus rhythm; no murmurs, gallops, rubs, heaves or thrills   Abdomen: soft, non-tender, +BS, no guarding/rebound/rigidity.  Vascular:   ENMT:  Neck:  Extremites:  Skin: sacral area no stage one skin changes noted but rash seen in that area and other scattered location.                            15.2   11.69 )-----------( 229      ( 30 May 2025 11:40 )             44.4     05-30    142  |  115[H]  |  13  ----------------------------<  109[H]  3.7   |  20[L]  |  1.00    Ca    8.4[L]      30 May 2025 11:40  Phos  2.3     05-30  Mg     1.8     05-30    TPro  7.0  /  Alb  2.7[L]  /  TBili  0.5  /  DBili  x   /  AST  32  /  ALT  63  /  AlkPhos  75  05-30      Radiology      Impression:      Recommendations:
[facility]  INFECTIOUS DISEASES - [address]onsec[address], [place] [address]  Tel: [phone]     Fax: [phone]  =======================================================    N-[id number]  KENNETH SAUL     CC: Patient is a 49y old  Male who presents with a chief complaint of Urosepsis (30 May 2025 00:55)    HPI:  Pt is a 50 y/o male w/ PMHx of MS, paraplegia, DVT/PE, chronic iraheta, recurrent UTI, who presented with abdominal pain d/t urinary retention. Pt has visited [facility] ED several times d/t iraehta getting clogged of which his iraheta has been replaced. However, in the ED, pt had a fever of 101.7. Iraheta was replaced in the ED and urine testing was done.       PAST MEDICAL & SURGICAL HISTORY:  MS (multiple sclerosis)  since 1995      PE (pulmonary embolism)  2013      Cellulitis  november 2014      Nephrolithiasis      Environmental allergies      MS (multiple sclerosis)      Lower paraplegia      DVT, lower extremity      Pulmonary embolism      Nephrostomy status      No significant past surgical history          Social Hx:     FAMILY HISTORY:  Family history of diabetes mellitus (DM) (Grandparent)    Family history of breast cancer (Grandparent, Aunt)    FH: breast cancer (Mother)        Allergies    Originally Entered as [Unknown] reaction to [KNA] (Unknown)  Cipro (Rash)  ciprofloxacin (Rash (Mild to Mod))  baclofen (Rash)  gabapentin (Rash)    Intolerances        Antibiotics:  MEDICATIONS  (STANDING):  cefTRIAXone   IVPB 1000 milliGRAM(s) IV Intermittent every 24 hours  cyclobenzaprine 10 milliGRAM(s) Oral three times a day  heparin   Injectable 5000 Unit(s) SubCutaneous every 8 hours  multivitamin 1 Tablet(s) Oral daily  sodium chloride 0.9%. 1000 milliLiter(s) (100 mL/Hr) IV Continuous <Continuous>    MEDICATIONS  (PRN):  acetaminophen     Tablet .. 650 milliGRAM(s) Oral every 6 hours PRN Temp greater or equal to 38C (100.4F), Mild Pain (1 - 3)  aluminum hydroxide/magnesium hydroxide/simethicone Suspension 30 milliLiter(s) Oral every 4 hours PRN Dyspepsia  melatonin 3 milliGRAM(s) Oral at bedtime PRN Insomnia  ondansetron Injectable 4 milliGRAM(s) IV Push every 8 hours PRN Nausea and/or Vomiting       REVIEW OF SYSTEMS:  CONSTITUTIONAL:  + fever  CARDIOVASCULAR:  No chest pain or SOB.  RESPIRATORY:  No cough, shortness of breath  GASTROINTESTINAL:  No nausea or vomiting, + loose stools  GENITOURINARY:  see history  MUSCULOSKELETAL:  + chronic B/L LE pain and spasms  NEUROLOGIC:  No headache or dizziness  PSYCHIATRIC:  No disorder of thought or mood.    Physical Exam:  Vital Signs Last 24 Hrs  T(C): 36.7 (30 May 2025 13:00), Max: 38.7 (29 May 2025 21:24)  T(F): 98 (30 May 2025 13:00), Max: 101.7 (29 May 2025 21:24)  HR: 96 (30 May 2025 13:00) (96 - 124)  BP: 130/83 (30 May 2025 13:00) (120/82 - 173/120)  BP(mean): 95 (30 May 2025 05:44) (95 - 95)  RR: 18 (30 May 2025 13:00) (18 - 20)  SpO2: 94% (30 May 2025 13:00) (94% - 98%)    Parameters below as of 30 May 2025 13:00  Patient On (Oxygen Delivery Method): room air      Height (cm): 177.8 (05-29 @ 19:17)  Weight (kg): 122.5 (05-29 @ 19:17)  BMI (kg/m2): 38.8 (05-29 @ 19:17)  BSA (m2): 2.37 (05-29 @ 19:17)  GEN: NAD  HEENT: normocephalic and atraumatic.   NECK: Supple.   LUNGS: Normal respiratory effort.  HEART: Regular rate and rhythm   ABDOMEN: Soft, nontender, and nondistended.    : + Iraheta  EXTREMITIES: B/L lower leg edema.  NEUROLOGIC: AO x 3  PSYCHIATRIC: Appropriate affect .      Labs:  05-30    142  |  115[H]  |  13  ----------------------------<  109[H]  3.7   |  20[L]  |  1.00    Ca    8.4[L]      30 May 2025 11:40  Phos  2.3     05-30  Mg     1.8     05-30    TPro  7.0  /  Alb  2.7[L]  /  TBili  0.5  /  DBili  x   /  AST  32  /  ALT  63  /  AlkPhos  75  05-30                          15.2   11.69 )-----------( 229      ( 30 May 2025 11:40 )             44.4     PT/INR - ( 29 May 2025 20:15 )   PT: 12.8 sec;   INR: 1.10 ratio         PTT - ( 29 May 2025 20:15 )  PTT:35.5 sec  Urinalysis Basic - ( 30 May 2025 11:40 )    Color: x / Appearance: x / SG: x / pH: x  Gluc: 109 mg/dL / Ketone: x  / Bili: x / Urobili: x   Blood: x / Protein: x / Nitrite: x   Leuk Esterase: x / RBC: x / WBC x   Sq Epi: x / Non Sq Epi: x / Bacteria: x      LIVER FUNCTIONS - ( 30 May 2025 11:40 )  Alb: 2.7 g/dL / Pro: 7.0 g/dL / ALK PHOS: 75 U/L / ALT: 63 U/L / AST: 32 U/L / GGT: x                       SARS-CoV-2 Result: NotDetec (05-29-25 @ 20:15)      RECENT CULTURES:  05-27 @ 12:50 Clean Catch Clean Catch (Midstream) Enterobacter cloacae complex    >100,000 CFU/ml Enterobacter cloacae complex              All imaging and other data have been reviewed.    < from: CT Abdomen and Pelvis No Cont (05.29.25 @ 22:28) >  FINDINGS:  CHEST:  LUNGS AND LARGE AIRWAYS: Patent central airways. No lung consolidation,   suspicious nodule, or mass. Chronic subsegmental atelectasis/scarring in   the left lower lobe and lingula adjacent to an elevated left   hemidiaphragm. Mild bibasilar dependent atelectasis.  PLEURA: No pleural effusion.  VESSELS: Thoracic aorta is normal in caliber. Coronary artery   atherosclerotic calcifications. Main pulmonary artery is not dilated.  HEART: Heart size is normal. No pericardial effusion.  MEDIASTINUM AND JUAN: No lymphadenopathy.  CHEST WALL AND LOWER NECK: Within normal limits.    ABDOMEN AND PELVIS:  LIVER: Hepatomegaly and diffuse hepatic steatosis. Nodular   hyperattenuating focus along the periphery of the right hepatic lobe in   the background of hepatic steatosis measuring 1.8 cm is unchanged   compared with prior exam.  BILE DUCTS: Normal caliber.  GALLBLADDER: Within normal limits.  SPLEEN: Within normal limits.  PANCREAS: Within normal limits.  ADRENALS: Within normal limits.  KIDNEYS/URETERS: Bilateral mildto moderate hydroureteronephrosis to the   level of the bladder. No obstructing urinary tract calculus. Mild   bilateral urothelial and periureteral/perinephric stranding.   Nonobstructing bilateral intrarenal calculi measuring up to 6 mm on the   right and 7 mm on the left. Isoattenuating lesion arising from the upper   pole of the right kidney measuring up to 2.1 cm was similarly seen on   prior exam and most likely represents a complex cyst.    BLADDER: Iraheta catheter. Thickening of the urinary bladder walls despite   underdistention and perivesicular fat stranding.  REPRODUCTIVE ORGANS: Prostate is within normal limits.    BOWEL: No bowel obstruction. Appendix is normal.  PERITONEUM/RETROPERITONEUM: No ascites or pneumoperitoneum.  VESSELS: Atherosclerotic calcifications of the aortoiliac tree. Normal   caliber abdominal aorta.  LYMPH NODES: Prominent subcentimeter retroperitoneal lymph nodes may be   reactive.  ABDOMINAL WALL: Mild generalized soft tissue edema.  BONES: Degenerative changes of the spine.    IMPRESSION:  No acute parenchymal or pleural lung disease.    Thickening of the urinary bladder walls despite underdistention and   perivesicular fat stranding concerning for cystitis. Bilateral mild to   moderate hydroureteronephrosis to the level of the bladder and mild   urothelial thickening and periureteral/perinephric stranding concerning   for ascending urinary tract infection. No obstructing urinary tract   calculus. Nonobstructing bilateral intrarenal calculi.    Isoattenuating lesion arising from the upper pole of the right kidney   measuring up to 2.1 cm which was similarly seen on prior exam and most   likely represents a complex cyst. Nonemergent renal sonogram is   recommended.        --- End of Report ---      < end of copied text >

## 2025-05-30 NOTE — H&P ADULT - NSHPREVIEWOFSYSTEMS_GEN_ALL_CORE
REVIEW OF SYSTEMS:  CONSTITUTIONAL: No fever/chills or appetite changes.  HENMT: No HA, lightheadedness/dizziness  RESPIRATORY: No shortness of breath.  CARDIOVASCULAR: No chest pain,  GASTROINTESTINAL: (+) abdominal pain. No nausea or vomiting; No diarrhea or constipation.

## 2025-05-30 NOTE — CARE COORDINATION ASSESSMENT. - PATIENT WAS INVOLVED WITH A HOMECARE AGENCY PRIOR TO ADMISSION?
Patient states no active [facility].  Previously with [facility], but issues with insurance and they stopped coming.  Patient open to referral to [facility] if recommended.    Patient has 24/[facility] [phone]  fax [phone]    CHRISTINE Live from [facility] ph: [phone]  fax: [phone]    CHRISTINE [facility] ph: [phone]  fax: [phone]

## 2025-05-30 NOTE — H&P ADULT - PROBLEM SELECTOR PLAN 1
T: 101.7, HR: 124, WBC: 17.24  -RVP (-) and UA (+)  -Sepsis likely 2/2 UTI  -CT Chest and CT Abdomen/Pelvis, f/u  -Bcxs x2, f/u  -Ucx x1, f/u. Ucx (5/27) shows sensitivities to Rocephin, zosyn, and meropenem  -s/p 2.3 L NS, c/w  mL/hr.   -s/p zosyn. Rocephin 1g q24h  -On Bactrim at home for UTI suppression. May resume per ID recs.   -ID consult (Dr. Fonseca), f/u

## 2025-05-30 NOTE — H&P ADULT - HISTORY OF PRESENT ILLNESS
Pt is a 48 y/o male w/ PMHx of MS, paraplegia, DVT/PE, and chronic iraheta who presents with abdominal pain d/t urinary retention. Pt has visited [facility] ED several times d/t iraheta getting clogged of which his iraheta has been replaced. However, in the ED, pt had a fever of 101.7. Iraheta was replaced in the ED and urine testing was done. Pt endorses having bed sore on back of which is painful.    IN THE ED:  Temp 97.9F, , /120, RR 20, SpO2 98%  S/P Zosyn IVPB x2, 1g ofirmev, 2.3 L NS  EKG:Vent Rate: 138 BPM, NV [id number] ms, QRS: 88 ms, QT/QTc: 292/442. Impression: Sinus tachycardia  Labs significant for WBC: 17.24,   UA (+): UTI

## 2025-05-30 NOTE — ED ADULT NURSE REASSESSMENT NOTE - NS ED NURSE REASSESS COMMENT FT1
pt medicated as per orders. tolerated po med well with water, no coughing noted. iv abx infusing well without complications, iv site WNL. hospitalist at bedside, pt able to answer questions appropriately. report given to Sarkis ED Holds RN, all questions answered.

## 2025-05-30 NOTE — CONSULT NOTE ADULT - PROBLEM SELECTOR RECOMMENDATION 9
turn patient frequently  alternating pressure mattress  dermatology consultation since rash appears generalized may need systemic treatment  might consider lotrisone cream for localized areas but dermatology consultation should be sort

## 2025-05-30 NOTE — H&P ADULT - ATTENDING COMMENTS
49 year old paraplegic male with a PMH of MS, DVT /PE, neurogenic bladder with chronic Steele. Patient was seen in the ED 5/27 for clogged Stelee - which was irrigated & discharged home. Returned to the ED today to be evaluated for fatigue/malaise, abdominal pain & urinary retention. In the ED patient found to septic with Leukocytolysis, tachycardia, febrile and source - UTI. Steele changed in the ED. Received 2.5L-NS, Zosyn, Ofirmev. Urine Cx from last admission shows good sensitivity to Ceftriaxone.   Wound care for chronic bed sores     Plan as above per resident's note

## 2025-05-30 NOTE — CONSULT NOTE ADULT - ASSESSMENT
48 y/o male w/ PMHx of MS, paraplegia, DVT/PE, chronic iraheta, recurrent UTI, who presented with abdominal pain d/t urinary retention. Pt has visited [facility] ED several times d/t iraheta getting clogged of which his iraheta has been replaced. However, in the ED, pt had a fever of 101.7. Iraheta was replaced in the ED and urine testing was done.     Recent urine culture from 5/27 grew enterobacter cloaecae, sensitivities reviewed. No fever and WBC improved today. Iraheta replaced in the ED.    #UTI/pyelonephritis  #Leukocytosis    -suggest cefepime  -discontinue ceftriaxone  -blood and urine cultures pending  -monitor WBC  -wound care    Thank you for courtesy of this consult. I will be covered by Dr. Fonseca this weekend, 5/31-6/1/25.    Discussed with the primary team.     Elanie Mendez MD  Division of Infectious Diseases   Cell [phone] between 8am and 6pm   After 6pm and weekends please call ID service at [phone].     55 minutes spent on total encounter assessing patient, examination, chart review, counseling and coordinating care by the attending physician/nurse/care manager.

## 2025-05-31 LAB
A1C WITH ESTIMATED AVERAGE GLUCOSE RESULT: 5.3 % — SIGNIFICANT CHANGE UP (ref 4–5.6)
CHOLEST SERPL-MCNC: 169 MG/DL — SIGNIFICANT CHANGE UP
CULTURE RESULTS: SIGNIFICANT CHANGE UP
CULTURE RESULTS: SIGNIFICANT CHANGE UP
ESTIMATED AVERAGE GLUCOSE: 105 MG/DL — SIGNIFICANT CHANGE UP (ref 68–114)
HDLC SERPL-MCNC: 30 MG/DL — LOW
LDLC SERPL-MCNC: 115 MG/DL — HIGH
LIPID PNL WITH DIRECT LDL SERPL: 115 MG/DL — HIGH
NONHDLC SERPL-MCNC: 139 MG/DL — HIGH
SPECIMEN SOURCE: SIGNIFICANT CHANGE UP
SPECIMEN SOURCE: SIGNIFICANT CHANGE UP
TRIGL SERPL-MCNC: 133 MG/DL — SIGNIFICANT CHANGE UP

## 2025-05-31 PROCEDURE — 99221 1ST HOSP IP/OBS SF/LOW 40: CPT

## 2025-05-31 PROCEDURE — 99233 SBSQ HOSP IP/OBS HIGH 50: CPT

## 2025-05-31 RX ORDER — TIZANIDINE 4 MG/1
2 TABLET ORAL EVERY 8 HOURS
Refills: 0 | Status: DISCONTINUED | OUTPATIENT
Start: 2025-05-31 | End: 2025-05-31

## 2025-05-31 RX ORDER — TIZANIDINE 4 MG/1
2 TABLET ORAL EVERY 8 HOURS
Refills: 0 | Status: DISCONTINUED | OUTPATIENT
Start: 2025-05-31 | End: 2025-06-02

## 2025-05-31 RX ADMIN — CEFEPIME 100 MILLIGRAM(S): 2 INJECTION, POWDER, FOR SOLUTION INTRAVENOUS at 14:09

## 2025-05-31 RX ADMIN — CLOTRIMAZOLE 1 APPLICATION(S): 1 CREAM TOPICAL at 05:33

## 2025-05-31 RX ADMIN — CYCLOBENZAPRINE HYDROCHLORIDE 10 MILLIGRAM(S): 15 CAPSULE, EXTENDED RELEASE ORAL at 05:32

## 2025-05-31 RX ADMIN — HEPARIN SODIUM 5000 UNIT(S): 1000 INJECTION INTRAVENOUS; SUBCUTANEOUS at 22:13

## 2025-05-31 RX ADMIN — HEPARIN SODIUM 5000 UNIT(S): 1000 INJECTION INTRAVENOUS; SUBCUTANEOUS at 05:33

## 2025-05-31 RX ADMIN — HEPARIN SODIUM 5000 UNIT(S): 1000 INJECTION INTRAVENOUS; SUBCUTANEOUS at 14:08

## 2025-05-31 RX ADMIN — Medication 1 TABLET(S): at 12:17

## 2025-05-31 RX ADMIN — CEFEPIME 100 MILLIGRAM(S): 2 INJECTION, POWDER, FOR SOLUTION INTRAVENOUS at 22:14

## 2025-05-31 RX ADMIN — TIZANIDINE 2 MILLIGRAM(S): 4 TABLET ORAL at 16:07

## 2025-05-31 RX ADMIN — CEFEPIME 100 MILLIGRAM(S): 2 INJECTION, POWDER, FOR SOLUTION INTRAVENOUS at 05:32

## 2025-05-31 RX ADMIN — CLOTRIMAZOLE 1 APPLICATION(S): 1 CREAM TOPICAL at 17:10

## 2025-05-31 NOTE — PROGRESS NOTE ADULT - ASSESSMENT
50 y/o male w/ PMHx of MS, paraplegia, DVT/PE, and chronic iraheta who is being admitted for urosepsis.     Sepsis secondary to UTI/pyelo    - ID following    - on cefepime     - UCx pending.    - iraheta was changed in the ED    MS    - bedbound. frequent positional change. patient has tried baclofen and it did not help in the past.     rash/dermatitis     - cont clotrimazole cream     DVT proph: heparin 5000 units TID

## 2025-05-31 NOTE — BH CONSULTATION LIAISON ASSESSMENT NOTE - NSSUICPROTFACT_PSY_ALL_CORE
Identifies reasons for living/Supportive social network of family or friends/Cultural, spiritual and/or moral attitudes against suicide/Gnosticism beliefs

## 2025-05-31 NOTE — BH CONSULTATION LIAISON ASSESSMENT NOTE - NSBHCHARTREVIEWINVESTIGATE_PSY_A_CORE FT
< from: 12 Lead ECG (05.29.25 @ 20:53) >    Ventricular Rate 138 BPM    Atrial Rate 138 BPM    P-R Interval 132 ms    QRS Duration 88 ms    Q-T Interval 292 ms    QTC Calculation(Bazett) 442 ms    P Axis 28 degrees    R Axis 8 degrees    T Axis 42 degrees    Diagnosis Line Sinus tachycardia  Otherwise normal ECG  When compared with ECG of 13-FEB-2025 13:16,  No significant change was found  Confirmed by Billy Bagley MD ([id number]) on 5/30/2025 3:59:20 PM    < end of copied text >

## 2025-05-31 NOTE — BH CONSULTATION LIAISON ASSESSMENT NOTE - NSBHCHARTREVIEWVS_PSY_A_CORE FT
Yes
Vital Signs Last 24 Hrs  T(C): 36.7 (31 May 2025 12:14), Max: 36.8 (30 May 2025 21:02)  T(F): 98 (31 May 2025 12:14), Max: 98.3 (30 May 2025 21:02)  HR: 92 (31 May 2025 12:14) (87 - 101)  BP: 124/88 (31 May 2025 12:14) (116/79 - 139/86)  BP(mean): --  RR: 18 (31 May 2025 12:14) (18 - 18)  SpO2: 97% (31 May 2025 12:14) (92% - 97%)    Parameters below as of 31 May 2025 12:14  Patient On (Oxygen Delivery Method): room air

## 2025-05-31 NOTE — BH CONSULTATION LIAISON ASSESSMENT NOTE - OTHER PAST PSYCHIATRIC HISTORY (INCLUDE DETAILS REGARDING ONSET, COURSE OF ILLNESS, INPATIENT/OUTPATIENT TREATMENT)
see HPI, pt recalls seeking both psychotherapy and psychiatry in his twenties, reports 2 remote stays at [facility].   no current psychiatric treatment, has been seen by C/L service for similar complaint in May of 2023.

## 2025-05-31 NOTE — BH CONSULTATION LIAISON ASSESSMENT NOTE - DETAILS
Remote history of suicide attempts, ( in past  Assessment 3 were mentioned), Pt reported to MD one attempt with overdose on Paxil, per chart review one attempt was not recalled, the other was via carbon monoxide poisoning. Pt reports he had been to [place] [id number] with those attempts, last in mid to late twenties.  as per above.  Mother anxiety/depression.

## 2025-05-31 NOTE — BH CONSULTATION LIAISON ASSESSMENT NOTE - HPI (INCLUDE ILLNESS QUALITY, SEVERITY, DURATION, TIMING, CONTEXT, MODIFYING FACTORS, ASSOCIATED SIGNS AND SYMPTOMS)
Patient seen, evaluated and chart reviewed. Pt is a 48 y/o male w/ PMHx of MS, paraplegia, DVT/PE, and chronic iraheta who presents with abdominal pain d/t urinary retention. Pt has visited [facility] ED several times d/t iraheta getting clogged of which his iraheta has been replaced. However, in the ED, pt had a fever of 101.7. Iraheta was replaced in the ED and urine testing was done. Pt endorses having bed sore on back of which is painful. Patient reportedly made statements regarding suicide and psychiatric consult was requested.   Pt reports he does not think of suicide , unless someone comes to question him about it. He states he lost equilibrium, hand/eye coordination, and bladder control. He states his life is "torture from breath to breath", but he has no intentions to be dead. He states he hates his life due to his physical state and that he has lost a lot of years due to Multiple sclerosis. He states he was diagnosed around the age of 20. He reportedly has tried antidepressants which worsened his depression and pt feels Paxil may have triggered a suicide attempt. Pt denied being hopeless or helpless, but endorses anger and frustration. No issues, with sleep, energy, or appetite, reports limited attention span, and does not want a trial of any psychiatric medications. No megha, no psychosis, no delusions or paranoid ideations, no homicidality either elicited or noted in the interview.

## 2025-05-31 NOTE — BH CONSULTATION LIAISON ASSESSMENT NOTE - CURRENT MEDICATION
MEDICATIONS  (STANDING):  cefepime   IVPB      cefepime   IVPB 2000 milliGRAM(s) IV Intermittent every 8 hours  clotrimazole 1% Cream 1 Application(s) Topical two times a day  heparin   Injectable 5000 Unit(s) SubCutaneous every 8 hours  multivitamin 1 Tablet(s) Oral daily  sodium chloride 0.9%. 1000 milliLiter(s) (100 mL/Hr) IV Continuous <Continuous>    MEDICATIONS  (PRN):  acetaminophen     Tablet .. 650 milliGRAM(s) Oral every 6 hours PRN Temp greater or equal to 38C (100.4F), Mild Pain (1 - 3)  aluminum hydroxide/magnesium hydroxide/simethicone Suspension 30 milliLiter(s) Oral every 4 hours PRN Dyspepsia  melatonin 3 milliGRAM(s) Oral at bedtime PRN Insomnia  ondansetron Injectable 4 milliGRAM(s) IV Push every 8 hours PRN Nausea and/or Vomiting  tiZANidine 2 milliGRAM(s) Oral every 8 hours PRN Muscle Spasm

## 2025-05-31 NOTE — BH CONSULTATION LIAISON ASSESSMENT NOTE - RISK ASSESSMENT
Pt appears to be low risk of self harm currently in that he has significant Adventism beliefs, is open about his past, is accepting of his illness, and   is not anhedonic, risk factors would be previous attempts - although as per patient antidepressants worsened this , which may have been due to MS itself or gathering enough energy physically to put in place an attempt.

## 2025-05-31 NOTE — PROGRESS NOTE ADULT - SUBJECTIVE AND OBJECTIVE BOX
Patient is a 49y old  Male who presents with a chief complaint of Urosepsis (30 May 2025 17:36)    INTERVAL HPI/OVERNIGHT EVENTS: Patient was seen and examined. afebrile. still with intermittent spasms. reports flexeril not helping as much.     I&O's Summary    30 May 2025 07:01  -  31 May 2025 07:00  --------------------------------------------------------  IN: 1525 mL / OUT: 3000 mL / NET: -1475 mL        LABS:                        15.2   11.69 )-----------( 229      ( 30 May 2025 11:40 )             44.4     05-30    142  |  115[H]  |  13  ----------------------------<  109[H]  3.7   |  20[L]  |  1.00    Ca    8.4[L]      30 May 2025 11:40  Phos  2.3     05-30  Mg     1.8     05-30    TPro  7.0  /  Alb  2.7[L]  /  TBili  0.5  /  DBili  x   /  AST  32  /  ALT  63  /  AlkPhos  75  05-30    PT/INR - ( 29 May 2025 20:15 )   PT: 12.8 sec;   INR: 1.10 ratio         PTT - ( 29 May 2025 20:15 )  PTT:35.5 sec  Urinalysis Basic - ( 30 May 2025 11:40 )    Color: x / Appearance: x / SG: x / pH: x  Gluc: 109 mg/dL / Ketone: x  / Bili: x / Urobili: x   Blood: x / Protein: x / Nitrite: x   Leuk Esterase: x / RBC: x / WBC x   Sq Epi: x / Non Sq Epi: x / Bacteria: x      CAPILLARY BLOOD GLUCOSE            Urinalysis Basic - ( 30 May 2025 11:40 )    Color: x / Appearance: x / SG: x / pH: x  Gluc: 109 mg/dL / Ketone: x  / Bili: x / Urobili: x   Blood: x / Protein: x / Nitrite: x   Leuk Esterase: x / RBC: x / WBC x   Sq Epi: x / Non Sq Epi: x / Bacteria: x        MEDICATIONS  (STANDING):  cefepime   IVPB      cefepime   IVPB 2000 milliGRAM(s) IV Intermittent every 8 hours  clotrimazole 1% Cream 1 Application(s) Topical two times a day  heparin   Injectable 5000 Unit(s) SubCutaneous every 8 hours  multivitamin 1 Tablet(s) Oral daily  sodium chloride 0.9%. 1000 milliLiter(s) (100 mL/Hr) IV Continuous <Continuous>    MEDICATIONS  (PRN):  acetaminophen     Tablet .. 650 milliGRAM(s) Oral every 6 hours PRN Temp greater or equal to 38C (100.4F), Mild Pain (1 - 3)  aluminum hydroxide/magnesium hydroxide/simethicone Suspension 30 milliLiter(s) Oral every 4 hours PRN Dyspepsia  melatonin 3 milliGRAM(s) Oral at bedtime PRN Insomnia  ondansetron Injectable 4 milliGRAM(s) IV Push every 8 hours PRN Nausea and/or Vomiting      REVIEW OF SYSTEMS:  CONSTITUTIONAL: No fever or chills  HEENT:  No headache  RESPIRATORY: No cough or shortness of breath  CARDIOVASCULAR: No chest pain, palpitations  GASTROINTESTINAL: No abdominal pain, nausea, vomiting, or diarrhea  GENITOURINARY: No dysuria  NEUROLOGICAL: no focal weakness   MUSCULOSKELETAL: + spasms   PSYCH: no recent changes in mood    RADIOLOGY & ADDITIONAL TESTS:    Imaging Personally Reviewed:  [x] YES  [ ] NO    Consultant(s) Notes Reviewed:  [x] YES  [ ] NO    PHYSICAL EXAM:  T(C): 36.8 (05-31-25 @ 04:36), Max: 36.8 (05-30-25 @ 21:02)  HR: 87 (05-31-25 @ 04:36) (87 - 101)  BP: 139/86 (05-31-25 @ 04:36) (116/79 - 139/86)  RR: 18 (05-31-25 @ 04:36) (18 - 18)  SpO2: 95% (05-31-25 @ 04:36) (92% - 95%)    GENERAL: awake, oriented   HEENT:  anicteric, moist mucous membranes  CHEST/LUNG:  CTA b/l, no rales, wheezes, or rhonchi  HEART:  RRR, S1, S2  ABDOMEN:  BS+, soft, nontender, nondistended, + iraheta   EXTREMITIES: +1 edema  NERVOUS SYSTEM: answers questions and follows commands appropriately  PSYCH: normal affect  + rash on groin, back and legs. improved from yesterday     Care Discussed with Consultants/Other Providers [x] YES  [ ] NO

## 2025-05-31 NOTE — BH CONSULTATION LIAISON ASSESSMENT NOTE - NSBHCHARTREVIEWLAB_PSY_A_CORE FT
15.2   11.69 )-----------( 229      ( 30 May 2025 11:40 )             44.4   05-30    142  |  115[H]  |  13  ----------------------------<  109[H]  3.7   |  20[L]  |  1.00    Ca    8.4[L]      30 May 2025 11:40  Phos  2.3     05-30  Mg     1.8     05-30    TPro  7.0  /  Alb  2.7[L]  /  TBili  0.5  /  DBili  x   /  AST  32  /  ALT  63  /  AlkPhos  75  05-30

## 2025-06-01 LAB
ALBUMIN SERPL ELPH-MCNC: 2.9 G/DL — LOW (ref 3.3–5)
ALP SERPL-CCNC: 75 U/L — SIGNIFICANT CHANGE UP (ref 40–120)
ALT FLD-CCNC: 65 U/L — SIGNIFICANT CHANGE UP (ref 12–78)
ANION GAP SERPL CALC-SCNC: 8 MMOL/L — SIGNIFICANT CHANGE UP (ref 5–17)
AST SERPL-CCNC: 42 U/L — HIGH (ref 15–37)
BILIRUB SERPL-MCNC: 0.3 MG/DL — SIGNIFICANT CHANGE UP (ref 0.2–1.2)
BUN SERPL-MCNC: 15 MG/DL — SIGNIFICANT CHANGE UP (ref 7–23)
CALCIUM SERPL-MCNC: 8.7 MG/DL — SIGNIFICANT CHANGE UP (ref 8.5–10.1)
CHLORIDE SERPL-SCNC: 109 MMOL/L — HIGH (ref 96–108)
CO2 SERPL-SCNC: 25 MMOL/L — SIGNIFICANT CHANGE UP (ref 22–31)
CREAT SERPL-MCNC: 0.95 MG/DL — SIGNIFICANT CHANGE UP (ref 0.5–1.3)
EGFR: 99 ML/MIN/1.73M2 — SIGNIFICANT CHANGE UP
EGFR: 99 ML/MIN/1.73M2 — SIGNIFICANT CHANGE UP
GLUCOSE SERPL-MCNC: 94 MG/DL — SIGNIFICANT CHANGE UP (ref 70–99)
HCT VFR BLD CALC: 42.7 % — SIGNIFICANT CHANGE UP (ref 39–50)
HGB BLD-MCNC: 14.7 G/DL — SIGNIFICANT CHANGE UP (ref 13–17)
MCHC RBC-ENTMCNC: 30.6 PG — SIGNIFICANT CHANGE UP (ref 27–34)
MCHC RBC-ENTMCNC: 34.4 G/DL — SIGNIFICANT CHANGE UP (ref 32–36)
MCV RBC AUTO: 88.8 FL — SIGNIFICANT CHANGE UP (ref 80–100)
NRBC BLD AUTO-RTO: 0 /100 WBCS — SIGNIFICANT CHANGE UP (ref 0–0)
PLATELET # BLD AUTO: 213 K/UL — SIGNIFICANT CHANGE UP (ref 150–400)
POTASSIUM SERPL-MCNC: 3.6 MMOL/L — SIGNIFICANT CHANGE UP (ref 3.5–5.3)
POTASSIUM SERPL-SCNC: 3.6 MMOL/L — SIGNIFICANT CHANGE UP (ref 3.5–5.3)
PROT SERPL-MCNC: 7.2 G/DL — SIGNIFICANT CHANGE UP (ref 6–8.3)
RBC # BLD: 4.81 M/UL — SIGNIFICANT CHANGE UP (ref 4.2–5.8)
RBC # FLD: 13.9 % — SIGNIFICANT CHANGE UP (ref 10.3–14.5)
SODIUM SERPL-SCNC: 142 MMOL/L — SIGNIFICANT CHANGE UP (ref 135–145)
WBC # BLD: 8.61 K/UL — SIGNIFICANT CHANGE UP (ref 3.8–10.5)
WBC # FLD AUTO: 8.61 K/UL — SIGNIFICANT CHANGE UP (ref 3.8–10.5)

## 2025-06-01 PROCEDURE — 99233 SBSQ HOSP IP/OBS HIGH 50: CPT

## 2025-06-01 RX ADMIN — TIZANIDINE 2 MILLIGRAM(S): 4 TABLET ORAL at 22:00

## 2025-06-01 RX ADMIN — CEFEPIME 100 MILLIGRAM(S): 2 INJECTION, POWDER, FOR SOLUTION INTRAVENOUS at 13:35

## 2025-06-01 RX ADMIN — HEPARIN SODIUM 5000 UNIT(S): 1000 INJECTION INTRAVENOUS; SUBCUTANEOUS at 05:37

## 2025-06-01 RX ADMIN — CEFEPIME 100 MILLIGRAM(S): 2 INJECTION, POWDER, FOR SOLUTION INTRAVENOUS at 05:37

## 2025-06-01 RX ADMIN — HEPARIN SODIUM 5000 UNIT(S): 1000 INJECTION INTRAVENOUS; SUBCUTANEOUS at 21:53

## 2025-06-01 RX ADMIN — CLOTRIMAZOLE 1 APPLICATION(S): 1 CREAM TOPICAL at 05:38

## 2025-06-01 RX ADMIN — HEPARIN SODIUM 5000 UNIT(S): 1000 INJECTION INTRAVENOUS; SUBCUTANEOUS at 13:35

## 2025-06-01 RX ADMIN — TIZANIDINE 2 MILLIGRAM(S): 4 TABLET ORAL at 05:41

## 2025-06-01 RX ADMIN — CEFEPIME 100 MILLIGRAM(S): 2 INJECTION, POWDER, FOR SOLUTION INTRAVENOUS at 21:53

## 2025-06-01 RX ADMIN — Medication 1 TABLET(S): at 12:06

## 2025-06-01 RX ADMIN — CLOTRIMAZOLE 1 APPLICATION(S): 1 CREAM TOPICAL at 17:34

## 2025-06-01 NOTE — PROGRESS NOTE ADULT - ASSESSMENT
48 y/o male w/ PMHx of MS, paraplegia, DVT/PE, and chronic iraheta who is being admitted for urosepsis.     Sepsis secondary to UTI/pyelo    - ID following    - on cefepime     - UCx contaminated. will discuss with ID on duration of cefepime     - iraheta was changed in the ED    MS    - bedbound. frequent positional change. patient has tried baclofen and it did not help in the past.     rash/dermatitis     - cont clotrimazole cream     DVT proph: heparin 5000 units TID   will need 24 hour notice to reinstate home aides.

## 2025-06-01 NOTE — PROGRESS NOTE ADULT - SUBJECTIVE AND OBJECTIVE BOX
Patient is a 49y old  Male who presents with a chief complaint of Urosepsis (31 May 2025 09:50)    INTERVAL HPI/OVERNIGHT EVENTS: Patient was seen and examined. No acute events occurred overnight.     I&O's Summary    31 May 2025 07:01  -  01 Jun 2025 07:00  --------------------------------------------------------  IN: 0 mL / OUT: 1650 mL / NET: -1650 mL        LABS:                        14.7   8.61  )-----------( 213      ( 01 Jun 2025 06:00 )             42.7     06-01    142  |  109[H]  |  15  ----------------------------<  94  3.6   |  25  |  0.95    Ca    8.7      01 Jun 2025 06:00  Phos  2.3     05-30  Mg     1.8     05-30    TPro  7.2  /  Alb  2.9[L]  /  TBili  0.3  /  DBili  x   /  AST  42[H]  /  ALT  65  /  AlkPhos  75  06-01      Urinalysis Basic - ( 01 Jun 2025 06:00 )    Color: x / Appearance: x / SG: x / pH: x  Gluc: 94 mg/dL / Ketone: x  / Bili: x / Urobili: x   Blood: x / Protein: x / Nitrite: x   Leuk Esterase: x / RBC: x / WBC x   Sq Epi: x / Non Sq Epi: x / Bacteria: x      CAPILLARY BLOOD GLUCOSE            Urinalysis Basic - ( 01 Jun 2025 06:00 )    Color: x / Appearance: x / SG: x / pH: x  Gluc: 94 mg/dL / Ketone: x  / Bili: x / Urobili: x   Blood: x / Protein: x / Nitrite: x   Leuk Esterase: x / RBC: x / WBC x   Sq Epi: x / Non Sq Epi: x / Bacteria: x        MEDICATIONS  (STANDING):  cefepime   IVPB      cefepime   IVPB 2000 milliGRAM(s) IV Intermittent every 8 hours  clotrimazole 1% Cream 1 Application(s) Topical two times a day  heparin   Injectable 5000 Unit(s) SubCutaneous every 8 hours  multivitamin 1 Tablet(s) Oral daily  sodium chloride 0.9%. 1000 milliLiter(s) (100 mL/Hr) IV Continuous <Continuous>    MEDICATIONS  (PRN):  acetaminophen     Tablet .. 650 milliGRAM(s) Oral every 6 hours PRN Temp greater or equal to 38C (100.4F), Mild Pain (1 - 3)  aluminum hydroxide/magnesium hydroxide/simethicone Suspension 30 milliLiter(s) Oral every 4 hours PRN Dyspepsia  melatonin 3 milliGRAM(s) Oral at bedtime PRN Insomnia  ondansetron Injectable 4 milliGRAM(s) IV Push every 8 hours PRN Nausea and/or Vomiting  tiZANidine 2 milliGRAM(s) Oral every 8 hours PRN Muscle Spasm      REVIEW OF SYSTEMS:  CONSTITUTIONAL: No fever or chills  HEENT:  No headache, no sore throat  RESPIRATORY: No cough, wheezing, or shortness of breath  CARDIOVASCULAR: No chest pain, palpitations  GASTROINTESTINAL: No abdominal pain, nausea, vomiting, or diarrhea  GENITOURINARY: No dysuria, frequency, or hematuria  NEUROLOGICAL: no focal weakness or dizziness  MUSCULOSKELETAL: no myalgias  PSYCH: no recent changes in mood    RADIOLOGY & ADDITIONAL TESTS:    Imaging Personally Reviewed:  [x] YES  [ ] NO    Consultant(s) Notes Reviewed:  [x] YES  [ ] NO    PHYSICAL EXAM:  T(C): 36.8 (06-01-25 @ 04:13), Max: 37.1 (06-01-25 @ 01:10)  HR: 98 (06-01-25 @ 04:13) (92 - 105)  BP: 125/83 (06-01-25 @ 04:13) (124/88 - 131/89)  RR: 18 (06-01-25 @ 04:13) (18 - 18)  SpO2: 95% (06-01-25 @ 04:13) (95% - 97%)    GENERAL: NAD, well-developed, well-groomed  HEENT:  anicteric, moist mucous membranes  CHEST/LUNG:  CTA b/l, no rales, wheezes, or rhonchi  HEART:  RRR, S1, S2  ABDOMEN:  BS+, soft, nontender, nondistended  EXTREMITIES: no edema, cyanosis, or calf tenderness  NERVOUS SYSTEM: answers questions and follows commands appropriately  PSYCH: normal affect    Care Discussed with Consultants/Other Providers [x] YES  [ ] NO Patient is a 49y old  Male who presents with a chief complaint of Urosepsis (31 May 2025 09:50)    INTERVAL HPI/OVERNIGHT EVENTS: Patient was seen and examined. No acute events occurred overnight. still having spasms but noticed relieve with tizanidine     I&O's Summary    31 May 2025 07:01  -  01 Jun 2025 07:00  --------------------------------------------------------  IN: 0 mL / OUT: 1650 mL / NET: -1650 mL        LABS:                        14.7   8.61  )-----------( 213      ( 01 Jun 2025 06:00 )             42.7     06-01    142  |  109[H]  |  15  ----------------------------<  94  3.6   |  25  |  0.95    Ca    8.7      01 Jun 2025 06:00  Phos  2.3     05-30  Mg     1.8     05-30    TPro  7.2  /  Alb  2.9[L]  /  TBili  0.3  /  DBili  x   /  AST  42[H]  /  ALT  65  /  AlkPhos  75  06-01      Urinalysis Basic - ( 01 Jun 2025 06:00 )    Color: x / Appearance: x / SG: x / pH: x  Gluc: 94 mg/dL / Ketone: x  / Bili: x / Urobili: x   Blood: x / Protein: x / Nitrite: x   Leuk Esterase: x / RBC: x / WBC x   Sq Epi: x / Non Sq Epi: x / Bacteria: x      CAPILLARY BLOOD GLUCOSE            Urinalysis Basic - ( 01 Jun 2025 06:00 )    Color: x / Appearance: x / SG: x / pH: x  Gluc: 94 mg/dL / Ketone: x  / Bili: x / Urobili: x   Blood: x / Protein: x / Nitrite: x   Leuk Esterase: x / RBC: x / WBC x   Sq Epi: x / Non Sq Epi: x / Bacteria: x        MEDICATIONS  (STANDING):  cefepime   IVPB      cefepime   IVPB 2000 milliGRAM(s) IV Intermittent every 8 hours  clotrimazole 1% Cream 1 Application(s) Topical two times a day  heparin   Injectable 5000 Unit(s) SubCutaneous every 8 hours  multivitamin 1 Tablet(s) Oral daily  sodium chloride 0.9%. 1000 milliLiter(s) (100 mL/Hr) IV Continuous <Continuous>    MEDICATIONS  (PRN):  acetaminophen     Tablet .. 650 milliGRAM(s) Oral every 6 hours PRN Temp greater or equal to 38C (100.4F), Mild Pain (1 - 3)  aluminum hydroxide/magnesium hydroxide/simethicone Suspension 30 milliLiter(s) Oral every 4 hours PRN Dyspepsia  melatonin 3 milliGRAM(s) Oral at bedtime PRN Insomnia  ondansetron Injectable 4 milliGRAM(s) IV Push every 8 hours PRN Nausea and/or Vomiting  tiZANidine 2 milliGRAM(s) Oral every 8 hours PRN Muscle Spasm      REVIEW OF SYSTEMS:  CONSTITUTIONAL: No fever or chills  HEENT:  No headache  RESPIRATORY: No cough, wheezing, or shortness of breath  CARDIOVASCULAR: No chest pain  GASTROINTESTINAL: No abdominal pain, nausea, vomiting, or diarrhea  GENITOURINARY: No dysuria  NEUROLOGICAL: no focal weakness  MUSCULOSKELETAL: no myalgias  PSYCH: no recent changes in mood    RADIOLOGY & ADDITIONAL TESTS:    Imaging Personally Reviewed:  [x] YES  [ ] NO    Consultant(s) Notes Reviewed:  [x] YES  [ ] NO    PHYSICAL EXAM:  T(C): 36.8 (06-01-25 @ 04:13), Max: 37.1 (06-01-25 @ 01:10)  HR: 98 (06-01-25 @ 04:13) (92 - 105)  BP: 125/83 (06-01-25 @ 04:13) (124/88 - 131/89)  RR: 18 (06-01-25 @ 04:13) (18 - 18)  SpO2: 95% (06-01-25 @ 04:13) (95% - 97%)    GENERAL: awake, oriented   HEENT:  anicteric, moist mucous membranes  CHEST/LUNG:  CTA b/l, no rales, wheezes, or rhonchi  HEART:  RRR, S1, S2  ABDOMEN:  BS+, soft, nontender, nondistended, + iraheta   EXTREMITIES: +1 edema  NERVOUS SYSTEM: answers questions and follows commands appropriately  PSYCH: normal affect  + rash on groin, back and legs. improved from yesterday     Care Discussed with Consultants/Other Providers [x] YES  [ ] NO

## 2025-06-02 LAB
ALBUMIN SERPL ELPH-MCNC: 3 G/DL — LOW (ref 3.3–5)
ALP SERPL-CCNC: 84 U/L — SIGNIFICANT CHANGE UP (ref 40–120)
ALT FLD-CCNC: 76 U/L — SIGNIFICANT CHANGE UP (ref 12–78)
ANION GAP SERPL CALC-SCNC: 7 MMOL/L — SIGNIFICANT CHANGE UP (ref 5–17)
AST SERPL-CCNC: 57 U/L — HIGH (ref 15–37)
BILIRUB SERPL-MCNC: 0.5 MG/DL — SIGNIFICANT CHANGE UP (ref 0.2–1.2)
BUN SERPL-MCNC: 13 MG/DL — SIGNIFICANT CHANGE UP (ref 7–23)
CALCIUM SERPL-MCNC: 9 MG/DL — SIGNIFICANT CHANGE UP (ref 8.5–10.1)
CHLORIDE SERPL-SCNC: 109 MMOL/L — HIGH (ref 96–108)
CO2 SERPL-SCNC: 24 MMOL/L — SIGNIFICANT CHANGE UP (ref 22–31)
CREAT SERPL-MCNC: 0.82 MG/DL — SIGNIFICANT CHANGE UP (ref 0.5–1.3)
EGFR: 108 ML/MIN/1.73M2 — SIGNIFICANT CHANGE UP
EGFR: 108 ML/MIN/1.73M2 — SIGNIFICANT CHANGE UP
GLUCOSE SERPL-MCNC: 92 MG/DL — SIGNIFICANT CHANGE UP (ref 70–99)
HCT VFR BLD CALC: 44.9 % — SIGNIFICANT CHANGE UP (ref 39–50)
HGB BLD-MCNC: 15.1 G/DL — SIGNIFICANT CHANGE UP (ref 13–17)
MCHC RBC-ENTMCNC: 30.3 PG — SIGNIFICANT CHANGE UP (ref 27–34)
MCHC RBC-ENTMCNC: 33.6 G/DL — SIGNIFICANT CHANGE UP (ref 32–36)
MCV RBC AUTO: 90.2 FL — SIGNIFICANT CHANGE UP (ref 80–100)
NRBC BLD AUTO-RTO: 0 /100 WBCS — SIGNIFICANT CHANGE UP (ref 0–0)
PLATELET # BLD AUTO: 244 K/UL — SIGNIFICANT CHANGE UP (ref 150–400)
POTASSIUM SERPL-MCNC: 3.6 MMOL/L — SIGNIFICANT CHANGE UP (ref 3.5–5.3)
POTASSIUM SERPL-SCNC: 3.6 MMOL/L — SIGNIFICANT CHANGE UP (ref 3.5–5.3)
PROT SERPL-MCNC: 7.7 G/DL — SIGNIFICANT CHANGE UP (ref 6–8.3)
RBC # BLD: 4.98 M/UL — SIGNIFICANT CHANGE UP (ref 4.2–5.8)
RBC # FLD: 13.7 % — SIGNIFICANT CHANGE UP (ref 10.3–14.5)
SODIUM SERPL-SCNC: 140 MMOL/L — SIGNIFICANT CHANGE UP (ref 135–145)
WBC # BLD: 7.81 K/UL — SIGNIFICANT CHANGE UP (ref 3.8–10.5)
WBC # FLD AUTO: 7.81 K/UL — SIGNIFICANT CHANGE UP (ref 3.8–10.5)

## 2025-06-02 PROCEDURE — G0545: CPT

## 2025-06-02 PROCEDURE — 99232 SBSQ HOSP IP/OBS MODERATE 35: CPT

## 2025-06-02 PROCEDURE — 99233 SBSQ HOSP IP/OBS HIGH 50: CPT

## 2025-06-02 RX ORDER — TIZANIDINE 4 MG/1
4 TABLET ORAL EVERY 8 HOURS
Refills: 0 | Status: DISCONTINUED | OUTPATIENT
Start: 2025-06-02 | End: 2025-06-06

## 2025-06-02 RX ADMIN — TIZANIDINE 2 MILLIGRAM(S): 4 TABLET ORAL at 06:46

## 2025-06-02 RX ADMIN — HEPARIN SODIUM 5000 UNIT(S): 1000 INJECTION INTRAVENOUS; SUBCUTANEOUS at 22:49

## 2025-06-02 RX ADMIN — CLOTRIMAZOLE 1 APPLICATION(S): 1 CREAM TOPICAL at 05:31

## 2025-06-02 RX ADMIN — Medication 1 TABLET(S): at 11:35

## 2025-06-02 RX ADMIN — Medication 20 MILLIEQUIVALENT(S): at 17:25

## 2025-06-02 RX ADMIN — CEFEPIME 100 MILLIGRAM(S): 2 INJECTION, POWDER, FOR SOLUTION INTRAVENOUS at 22:50

## 2025-06-02 RX ADMIN — CEFEPIME 100 MILLIGRAM(S): 2 INJECTION, POWDER, FOR SOLUTION INTRAVENOUS at 05:30

## 2025-06-02 RX ADMIN — CEFEPIME 100 MILLIGRAM(S): 2 INJECTION, POWDER, FOR SOLUTION INTRAVENOUS at 14:01

## 2025-06-02 RX ADMIN — TIZANIDINE 4 MILLIGRAM(S): 4 TABLET ORAL at 22:49

## 2025-06-02 RX ADMIN — HEPARIN SODIUM 5000 UNIT(S): 1000 INJECTION INTRAVENOUS; SUBCUTANEOUS at 15:24

## 2025-06-02 RX ADMIN — CLOTRIMAZOLE 1 APPLICATION(S): 1 CREAM TOPICAL at 17:25

## 2025-06-02 RX ADMIN — HEPARIN SODIUM 5000 UNIT(S): 1000 INJECTION INTRAVENOUS; SUBCUTANEOUS at 05:30

## 2025-06-02 RX ADMIN — TIZANIDINE 4 MILLIGRAM(S): 4 TABLET ORAL at 14:01

## 2025-06-02 NOTE — PROGRESS NOTE ADULT - ASSESSMENT
50 y/o male w/ PMHx of MS, paraplegia, DVT/PE, and chronic iraheta who is being admitted for urosepsis.     Sepsis secondary to UTI/pyelo    - ID following    - on cefepime     - UCx contaminated. plan for 7 days total of abx. last day 6/5 PM.     - iraheta was changed in the ED    MS    - bedbound. frequent positional change. patient has tried baclofen and it did not help in the past.     - cont zanaflex, increase to 4mg every 8 hours prn     rash/dermatitis     - cont clotrimazole cream     DVT proph: heparin 5000 units TID   will need 24 hour notice to reinstate home aides. Discharge planning for Fri 6/6.

## 2025-06-02 NOTE — PROGRESS NOTE ADULT - SUBJECTIVE AND OBJECTIVE BOX
Patient is a 49y old  Male who presents with a chief complaint of Urosepsis (01 Jun 2025 10:11)    INTERVAL HPI/OVERNIGHT EVENTS: Patient was seen and examined. Reports feeling a little better. Having spasms. afebrile.     I&O's Summary    01 Jun 2025 07:01  -  02 Jun 2025 07:00  --------------------------------------------------------  IN: 0 mL / OUT: 2500 mL / NET: -2500 mL        LABS:                        15.1   7.81  )-----------( 244      ( 02 Jun 2025 07:30 )             44.9     06-02    140  |  109[H]  |  13  ----------------------------<  92  3.6   |  24  |  0.82    Ca    9.0      02 Jun 2025 07:30    TPro  7.7  /  Alb  3.0[L]  /  TBili  0.5  /  DBili  x   /  AST  57[H]  /  ALT  76  /  AlkPhos  84  06-02      Urinalysis Basic - ( 02 Jun 2025 07:30 )    Color: x / Appearance: x / SG: x / pH: x  Gluc: 92 mg/dL / Ketone: x  / Bili: x / Urobili: x   Blood: x / Protein: x / Nitrite: x   Leuk Esterase: x / RBC: x / WBC x   Sq Epi: x / Non Sq Epi: x / Bacteria: x      CAPILLARY BLOOD GLUCOSE            Urinalysis Basic - ( 02 Jun 2025 07:30 )    Color: x / Appearance: x / SG: x / pH: x  Gluc: 92 mg/dL / Ketone: x  / Bili: x / Urobili: x   Blood: x / Protein: x / Nitrite: x   Leuk Esterase: x / RBC: x / WBC x   Sq Epi: x / Non Sq Epi: x / Bacteria: x        MEDICATIONS  (STANDING):  cefepime   IVPB      cefepime   IVPB 2000 milliGRAM(s) IV Intermittent every 8 hours  clotrimazole 1% Cream 1 Application(s) Topical two times a day  heparin   Injectable 5000 Unit(s) SubCutaneous every 8 hours  multivitamin 1 Tablet(s) Oral daily  sodium chloride 0.9%. 1000 milliLiter(s) (100 mL/Hr) IV Continuous <Continuous>    MEDICATIONS  (PRN):  acetaminophen     Tablet .. 650 milliGRAM(s) Oral every 6 hours PRN Temp greater or equal to 38C (100.4F), Mild Pain (1 - 3)  aluminum hydroxide/magnesium hydroxide/simethicone Suspension 30 milliLiter(s) Oral every 4 hours PRN Dyspepsia  melatonin 3 milliGRAM(s) Oral at bedtime PRN Insomnia  ondansetron Injectable 4 milliGRAM(s) IV Push every 8 hours PRN Nausea and/or Vomiting  tiZANidine 4 milliGRAM(s) Oral every 8 hours PRN Muscle Spasm      REVIEW OF SYSTEMS:  CONSTITUTIONAL: No fever or chills  HEENT:  No headache  RESPIRATORY: No cough or shortness of breath  CARDIOVASCULAR: No chest pain  GASTROINTESTINAL: No abdominal pain, nausea, vomiting, or diarrhea  GENITOURINARY: No dysuria  NEUROLOGICAL: no focal weakness, + spasms   PSYCH: no recent changes in mood    RADIOLOGY & ADDITIONAL TESTS:    Imaging Personally Reviewed:  [x] YES  [ ] NO    Consultant(s) Notes Reviewed:  [x] YES  [ ] NO    PHYSICAL EXAM:  T(C): 36.8 (06-02-25 @ 05:03), Max: 36.8 (06-02-25 @ 05:03)  HR: 93 (06-02-25 @ 05:03) (93 - 96)  BP: 148/87 (06-02-25 @ 05:03) (131/89 - 148/87)  RR: 20 (06-02-25 @ 05:03) (18 - 20)  SpO2: 96% (06-02-25 @ 05:03) (94% - 96%)    GENERAL: awake, oriented   HEENT:  anicteric, moist mucous membranes  CHEST/LUNG:  CTA b/l, no rales, wheezes, or rhonchi  HEART:  RRR, S1, S2  ABDOMEN:  BS+, soft, nontender, nondistended, + iraheta   EXTREMITIES: +1 edema  NERVOUS SYSTEM: answers questions and follows commands appropriately  PSYCH: normal affect  + rash on groin, back and legs. improved from yesterday       Care Discussed with Consultants/Other Providers [x] YES  [ ] NO

## 2025-06-02 NOTE — PROGRESS NOTE ADULT - ASSESSMENT
50 y/o male w/ PMHx of MS, paraplegia, DVT/PE, chronic iraheta, recurrent UTI, who presented with abdominal pain d/t urinary retention. Pt has visited \[facility]\"" ED several times d/t iraheta getting clogged of which his iraheta has been replaced. However, in the ED, pt had a fever of 101.7. Iraheta was replaced in the ED and urine testing was done.     Patient being treated for UTI/pyelonephritis. Urine culture grew ?= 3 organisms, although recent urine culture from 5/27 grew enterobacter cloacae. Blood cultures remain no growth. No fever and leukocytosis resolved. Iraheta replaced in the ED.    #UTI/pyelonephritis  #Leukocytosis  #Neurogenic bladder    -continue cefepime until 6/5 to complete 7 days  -follow cultures to completion  -wound care  -discussed with Dr. Jam Mendez MD  Division of Infectious Diseases   Cell [phone] between 8am and 6pm   After 6pm and weekends please call ID service at [phone].     35 minutes spent on total encounter assessing patient, examination, chart review, counseling and coordinating care by the attending physician/nurse/care manager.

## 2025-06-02 NOTE — CASE MANAGEMENT PROGRESS NOTE - NSCMPROGRESSNOTE_GEN_ALL_CORE
Contacted [facility]. Spoke with patients care manager Kandy Salmeron [phone] Fax# [phone]. Patient known to [facility] iraheta [facility]. Discussed patients discharge needs. Patient states he hospital bed is not working properly and that he does not fit into his current wheelchair. Discussed with Tena SANTOS she will call the [email or web address] about the patient hospital bed. Patient will need a RX for a new wheelchair. Patient also inquired about home meal delivery. since patient has 24/7 HHA he will not be entitled to meal delivery service. Will continue to follow patient and assist with discharge planning. needs

## 2025-06-03 ENCOUNTER — TRANSCRIPTION ENCOUNTER (OUTPATIENT)
Age: 50
End: 2025-06-03

## 2025-06-03 LAB
ALBUMIN SERPL ELPH-MCNC: 3 G/DL — LOW (ref 3.3–5)
ALP SERPL-CCNC: 87 U/L — SIGNIFICANT CHANGE UP (ref 40–120)
ALT FLD-CCNC: 77 U/L — SIGNIFICANT CHANGE UP (ref 12–78)
ANION GAP SERPL CALC-SCNC: 7 MMOL/L — SIGNIFICANT CHANGE UP (ref 5–17)
AST SERPL-CCNC: 51 U/L — HIGH (ref 15–37)
BILIRUB SERPL-MCNC: 0.4 MG/DL — SIGNIFICANT CHANGE UP (ref 0.2–1.2)
BUN SERPL-MCNC: 15 MG/DL — SIGNIFICANT CHANGE UP (ref 7–23)
CALCIUM SERPL-MCNC: 8.9 MG/DL — SIGNIFICANT CHANGE UP (ref 8.5–10.1)
CHLORIDE SERPL-SCNC: 109 MMOL/L — HIGH (ref 96–108)
CO2 SERPL-SCNC: 23 MMOL/L — SIGNIFICANT CHANGE UP (ref 22–31)
CREAT SERPL-MCNC: 0.94 MG/DL — SIGNIFICANT CHANGE UP (ref 0.5–1.3)
EGFR: 100 ML/MIN/1.73M2 — SIGNIFICANT CHANGE UP
EGFR: 100 ML/MIN/1.73M2 — SIGNIFICANT CHANGE UP
GLUCOSE SERPL-MCNC: 88 MG/DL — SIGNIFICANT CHANGE UP (ref 70–99)
HCT VFR BLD CALC: 46.5 % — SIGNIFICANT CHANGE UP (ref 39–50)
HGB BLD-MCNC: 15.7 G/DL — SIGNIFICANT CHANGE UP (ref 13–17)
MAGNESIUM SERPL-MCNC: 2.1 MG/DL — SIGNIFICANT CHANGE UP (ref 1.6–2.6)
MCHC RBC-ENTMCNC: 30.4 PG — SIGNIFICANT CHANGE UP (ref 27–34)
MCHC RBC-ENTMCNC: 33.8 G/DL — SIGNIFICANT CHANGE UP (ref 32–36)
MCV RBC AUTO: 90.1 FL — SIGNIFICANT CHANGE UP (ref 80–100)
NRBC BLD AUTO-RTO: 0 /100 WBCS — SIGNIFICANT CHANGE UP (ref 0–0)
PHOSPHATE SERPL-MCNC: 1.9 MG/DL — LOW (ref 2.5–4.5)
PLATELET # BLD AUTO: 228 K/UL — SIGNIFICANT CHANGE UP (ref 150–400)
POTASSIUM SERPL-MCNC: 3.8 MMOL/L — SIGNIFICANT CHANGE UP (ref 3.5–5.3)
POTASSIUM SERPL-SCNC: 3.8 MMOL/L — SIGNIFICANT CHANGE UP (ref 3.5–5.3)
PROT SERPL-MCNC: 7.9 G/DL — SIGNIFICANT CHANGE UP (ref 6–8.3)
RBC # BLD: 5.16 M/UL — SIGNIFICANT CHANGE UP (ref 4.2–5.8)
RBC # FLD: 13.9 % — SIGNIFICANT CHANGE UP (ref 10.3–14.5)
SODIUM SERPL-SCNC: 139 MMOL/L — SIGNIFICANT CHANGE UP (ref 135–145)
VIT D25+D1,25 OH+D1,25 PNL SERPL-MCNC: 37 PG/ML — SIGNIFICANT CHANGE UP (ref 19.9–79.3)
WBC # BLD: 7.72 K/UL — SIGNIFICANT CHANGE UP (ref 3.8–10.5)
WBC # FLD AUTO: 7.72 K/UL — SIGNIFICANT CHANGE UP (ref 3.8–10.5)

## 2025-06-03 PROCEDURE — 99233 SBSQ HOSP IP/OBS HIGH 50: CPT

## 2025-06-03 RX ADMIN — TIZANIDINE 4 MILLIGRAM(S): 4 TABLET ORAL at 08:23

## 2025-06-03 RX ADMIN — HEPARIN SODIUM 5000 UNIT(S): 1000 INJECTION INTRAVENOUS; SUBCUTANEOUS at 13:26

## 2025-06-03 RX ADMIN — CEFEPIME 100 MILLIGRAM(S): 2 INJECTION, POWDER, FOR SOLUTION INTRAVENOUS at 13:26

## 2025-06-03 RX ADMIN — HEPARIN SODIUM 5000 UNIT(S): 1000 INJECTION INTRAVENOUS; SUBCUTANEOUS at 06:31

## 2025-06-03 RX ADMIN — Medication 1 TABLET(S): at 11:56

## 2025-06-03 RX ADMIN — HEPARIN SODIUM 5000 UNIT(S): 1000 INJECTION INTRAVENOUS; SUBCUTANEOUS at 22:04

## 2025-06-03 RX ADMIN — CLOTRIMAZOLE 1 APPLICATION(S): 1 CREAM TOPICAL at 06:31

## 2025-06-03 RX ADMIN — CLOTRIMAZOLE 1 APPLICATION(S): 1 CREAM TOPICAL at 17:14

## 2025-06-03 RX ADMIN — TIZANIDINE 4 MILLIGRAM(S): 4 TABLET ORAL at 17:13

## 2025-06-03 RX ADMIN — CEFEPIME 100 MILLIGRAM(S): 2 INJECTION, POWDER, FOR SOLUTION INTRAVENOUS at 06:31

## 2025-06-03 RX ADMIN — CEFEPIME 100 MILLIGRAM(S): 2 INJECTION, POWDER, FOR SOLUTION INTRAVENOUS at 22:03

## 2025-06-03 NOTE — DISCHARGE NOTE NURSING/CASE MANAGEMENT/SOCIAL WORK - NSDCFUADDAPPT_GEN_ALL_CORE_FT
You have a follow up appt with your Urologist which we made for you on Friday June 20th at 1:20pm @ [address]

## 2025-06-03 NOTE — DISCHARGE NOTE NURSING/CASE MANAGEMENT/SOCIAL WORK - PATIENT PORTAL LINK FT
You can access the FollowMyHealth Patient Portal offered by [facility] by registering at the following website: [email or web address]. By joining [employer]’s FollowMyHealth portal, you will also be able to view your health information using other applications (apps) compatible with our system.

## 2025-06-03 NOTE — DISCHARGE NOTE NURSING/CASE MANAGEMENT/SOCIAL WORK - FINANCIAL ASSISTANCE
[facility] provides services at a reduced cost to those who are determined to be eligible through [facility]’s financial assistance program. Information regarding [facility]’s financial assistance program can be found by going to [email or web address] or by calling [phone].

## 2025-06-03 NOTE — PROGRESS NOTE ADULT - SUBJECTIVE AND OBJECTIVE BOX
Patient is a 49y old  Male who presents with a chief complaint of Urosepsis (02 Jun 2025 15:45)    INTERVAL HPI/OVERNIGHT EVENTS: Patient was seen and examined. feeling better. tizanidine 4mg is working better for him. afebrile.     I&O's Summary    02 Jun 2025 07:01  -  03 Jun 2025 07:00  --------------------------------------------------------  IN: 75 mL / OUT: 700 mL / NET: -625 mL        LABS:                        15.7   7.72  )-----------( 228      ( 03 Jun 2025 06:35 )             46.5     06-03    139  |  109[H]  |  15  ----------------------------<  88  3.8   |  23  |  0.94    Ca    8.9      03 Jun 2025 06:35  Phos  1.9     06-03  Mg     2.1     06-03    TPro  7.9  /  Alb  3.0[L]  /  TBili  0.4  /  DBili  x   /  AST  51[H]  /  ALT  77  /  AlkPhos  87  06-03      Urinalysis Basic - ( 03 Jun 2025 06:35 )    Color: x / Appearance: x / SG: x / pH: x  Gluc: 88 mg/dL / Ketone: x  / Bili: x / Urobili: x   Blood: x / Protein: x / Nitrite: x   Leuk Esterase: x / RBC: x / WBC x   Sq Epi: x / Non Sq Epi: x / Bacteria: x      CAPILLARY BLOOD GLUCOSE            Urinalysis Basic - ( 03 Jun 2025 06:35 )    Color: x / Appearance: x / SG: x / pH: x  Gluc: 88 mg/dL / Ketone: x  / Bili: x / Urobili: x   Blood: x / Protein: x / Nitrite: x   Leuk Esterase: x / RBC: x / WBC x   Sq Epi: x / Non Sq Epi: x / Bacteria: x        MEDICATIONS  (STANDING):  cefepime   IVPB      cefepime   IVPB 2000 milliGRAM(s) IV Intermittent every 8 hours  clotrimazole 1% Cream 1 Application(s) Topical two times a day  heparin   Injectable 5000 Unit(s) SubCutaneous every 8 hours  multivitamin 1 Tablet(s) Oral daily  sodium chloride 0.9%. 1000 milliLiter(s) (100 mL/Hr) IV Continuous <Continuous>    MEDICATIONS  (PRN):  acetaminophen     Tablet .. 650 milliGRAM(s) Oral every 6 hours PRN Temp greater or equal to 38C (100.4F), Mild Pain (1 - 3)  aluminum hydroxide/magnesium hydroxide/simethicone Suspension 30 milliLiter(s) Oral every 4 hours PRN Dyspepsia  melatonin 3 milliGRAM(s) Oral at bedtime PRN Insomnia  ondansetron Injectable 4 milliGRAM(s) IV Push every 8 hours PRN Nausea and/or Vomiting  tiZANidine 4 milliGRAM(s) Oral every 8 hours PRN Muscle Spasm      REVIEW OF SYSTEMS:  CONSTITUTIONAL: No fever or chills  HEENT:  No headache  RESPIRATORY: No cough or shortness of breath  CARDIOVASCULAR: No chest pain  GASTROINTESTINAL: No abdominal pain, nausea, vomiting, or diarrhea  GENITOURINARY: No dysuria  NEUROLOGICAL: no focal weakness, + spasms   PSYCH: no recent changes in mood    RADIOLOGY & ADDITIONAL TESTS:    Imaging Personally Reviewed:  [x] YES  [ ] NO    Consultant(s) Notes Reviewed:  [x] YES  [ ] NO    PHYSICAL EXAM:  T(C): 36.8 (06-03-25 @ 04:52), Max: 37.1 (06-02-25 @ 20:14)  HR: 87 (06-03-25 @ 04:52) (87 - 104)  BP: 126/85 (06-03-25 @ 04:52) (121/86 - 133/89)  RR: 19 (06-03-25 @ 04:52) (18 - 19)  SpO2: 95% (06-03-25 @ 04:52) (93% - 95%)    GENERAL: awake, oriented   HEENT:  anicteric, moist mucous membranes  CHEST/LUNG:  CTA b/l, no rales, wheezes, or rhonchi  HEART:  RRR, S1, S2  ABDOMEN:  BS+, soft, nontender, nondistended, + iraheta   EXTREMITIES: +trace edema  NERVOUS SYSTEM: answers questions and follows commands appropriately  PSYCH: normal affect  + rash on groin, back and legs resolved     Care Discussed with Consultants/Other Providers [x] YES  [ ] NO

## 2025-06-03 NOTE — PROGRESS NOTE ADULT - ASSESSMENT
48 y/o male w/ PMHx of MS, paraplegia, DVT/PE, and chronic iraheta who is being admitted for urosepsis.     Sepsis secondary to UTI/pyelo    - ID following    - on cefepime     - UCx contaminated. plan for 7 days total of abx. last day 6/5 PM.     - iraheta was changed in the ED    MS    - bedbound. frequent positional change. patient has tried baclofen and it did not help in the past.     - cont zanaflex 4mg every 8 hours prn. pt reports it is helping with the spasms.     rash/dermatitis     - cont clotrimazole cream. improved rash. Pt would like a script for home.     DVT proph: heparin 5000 units TID   will need 24 hour notice to reinstate home aides. Discharge planning for Fri 6/6.

## 2025-06-04 ENCOUNTER — APPOINTMENT (OUTPATIENT)
Dept: UROLOGY | Facility: CLINIC | Age: 50
End: 2025-06-04

## 2025-06-04 LAB
ALBUMIN SERPL ELPH-MCNC: 3.1 G/DL — LOW (ref 3.3–5)
ALP SERPL-CCNC: 88 U/L — SIGNIFICANT CHANGE UP (ref 40–120)
ALT FLD-CCNC: 88 U/L — HIGH (ref 12–78)
ANION GAP SERPL CALC-SCNC: 8 MMOL/L — SIGNIFICANT CHANGE UP (ref 5–17)
AST SERPL-CCNC: 56 U/L — HIGH (ref 15–37)
BILIRUB SERPL-MCNC: 0.5 MG/DL — SIGNIFICANT CHANGE UP (ref 0.2–1.2)
BUN SERPL-MCNC: 17 MG/DL — SIGNIFICANT CHANGE UP (ref 7–23)
CALCIUM SERPL-MCNC: 9.2 MG/DL — SIGNIFICANT CHANGE UP (ref 8.5–10.1)
CHLORIDE SERPL-SCNC: 108 MMOL/L — SIGNIFICANT CHANGE UP (ref 96–108)
CO2 SERPL-SCNC: 25 MMOL/L — SIGNIFICANT CHANGE UP (ref 22–31)
CREAT SERPL-MCNC: 0.86 MG/DL — SIGNIFICANT CHANGE UP (ref 0.5–1.3)
CULTURE RESULTS: SIGNIFICANT CHANGE UP
CULTURE RESULTS: SIGNIFICANT CHANGE UP
EGFR: 107 ML/MIN/1.73M2 — SIGNIFICANT CHANGE UP
EGFR: 107 ML/MIN/1.73M2 — SIGNIFICANT CHANGE UP
GLUCOSE SERPL-MCNC: 129 MG/DL — HIGH (ref 70–99)
HCT VFR BLD CALC: 45.4 % — SIGNIFICANT CHANGE UP (ref 39–50)
HGB BLD-MCNC: 15.6 G/DL — SIGNIFICANT CHANGE UP (ref 13–17)
MCHC RBC-ENTMCNC: 30.7 PG — SIGNIFICANT CHANGE UP (ref 27–34)
MCHC RBC-ENTMCNC: 34.4 G/DL — SIGNIFICANT CHANGE UP (ref 32–36)
MCV RBC AUTO: 89.4 FL — SIGNIFICANT CHANGE UP (ref 80–100)
NRBC BLD AUTO-RTO: 0 /100 WBCS — SIGNIFICANT CHANGE UP (ref 0–0)
PLATELET # BLD AUTO: 231 K/UL — SIGNIFICANT CHANGE UP (ref 150–400)
POTASSIUM SERPL-MCNC: 3.7 MMOL/L — SIGNIFICANT CHANGE UP (ref 3.5–5.3)
POTASSIUM SERPL-SCNC: 3.7 MMOL/L — SIGNIFICANT CHANGE UP (ref 3.5–5.3)
PROT SERPL-MCNC: 7.8 G/DL — SIGNIFICANT CHANGE UP (ref 6–8.3)
RBC # BLD: 5.08 M/UL — SIGNIFICANT CHANGE UP (ref 4.2–5.8)
RBC # FLD: 13.7 % — SIGNIFICANT CHANGE UP (ref 10.3–14.5)
SODIUM SERPL-SCNC: 141 MMOL/L — SIGNIFICANT CHANGE UP (ref 135–145)
SPECIMEN SOURCE: SIGNIFICANT CHANGE UP
SPECIMEN SOURCE: SIGNIFICANT CHANGE UP
WBC # BLD: 8.41 K/UL — SIGNIFICANT CHANGE UP (ref 3.8–10.5)
WBC # FLD AUTO: 8.41 K/UL — SIGNIFICANT CHANGE UP (ref 3.8–10.5)

## 2025-06-04 PROCEDURE — 99232 SBSQ HOSP IP/OBS MODERATE 35: CPT

## 2025-06-04 RX ORDER — SALINE 7; 19 G/118ML; G/118ML
1 ENEMA RECTAL ONCE
Refills: 0 | Status: COMPLETED | OUTPATIENT
Start: 2025-06-04 | End: 2025-06-04

## 2025-06-04 RX ORDER — BISACODYL 5 MG
10 TABLET, DELAYED RELEASE (ENTERIC COATED) ORAL ONCE
Refills: 0 | Status: COMPLETED | OUTPATIENT
Start: 2025-06-04 | End: 2025-06-04

## 2025-06-04 RX ADMIN — Medication 10 MILLIGRAM(S): at 14:14

## 2025-06-04 RX ADMIN — CEFEPIME 100 MILLIGRAM(S): 2 INJECTION, POWDER, FOR SOLUTION INTRAVENOUS at 14:14

## 2025-06-04 RX ADMIN — CEFEPIME 100 MILLIGRAM(S): 2 INJECTION, POWDER, FOR SOLUTION INTRAVENOUS at 05:42

## 2025-06-04 RX ADMIN — TIZANIDINE 4 MILLIGRAM(S): 4 TABLET ORAL at 14:14

## 2025-06-04 RX ADMIN — Medication 1 TABLET(S): at 12:16

## 2025-06-04 RX ADMIN — HEPARIN SODIUM 5000 UNIT(S): 1000 INJECTION INTRAVENOUS; SUBCUTANEOUS at 05:43

## 2025-06-04 RX ADMIN — HEPARIN SODIUM 5000 UNIT(S): 1000 INJECTION INTRAVENOUS; SUBCUTANEOUS at 14:22

## 2025-06-04 RX ADMIN — TIZANIDINE 4 MILLIGRAM(S): 4 TABLET ORAL at 05:44

## 2025-06-04 RX ADMIN — CEFEPIME 100 MILLIGRAM(S): 2 INJECTION, POWDER, FOR SOLUTION INTRAVENOUS at 21:40

## 2025-06-04 RX ADMIN — HEPARIN SODIUM 5000 UNIT(S): 1000 INJECTION INTRAVENOUS; SUBCUTANEOUS at 21:40

## 2025-06-04 RX ADMIN — CLOTRIMAZOLE 1 APPLICATION(S): 1 CREAM TOPICAL at 17:48

## 2025-06-04 RX ADMIN — CLOTRIMAZOLE 1 APPLICATION(S): 1 CREAM TOPICAL at 05:44

## 2025-06-04 NOTE — PROGRESS NOTE ADULT - ASSESSMENT
48 y/o male w/ PMHx of MS, paraplegia, DVT/PE, and chronic iraheta who is being admitted for urosepsis.     Sepsis secondary to UTI/pyelo    - ID following - sepsis POA and now resolved.    - on cefepime     - UCx contaminated. plan for 7 days total of abx. last day 6/5 PM.     - iraheta was changed in the ED    - DC plan for 6/6 -d/w CM/SW    MS    - bedbound. frequent positional change. patient has tried baclofen and it did not help in the past.     - cont zanaflex 4mg every 8 hours prn. pt reports it is helping with the spasms.     Transaminitis    - mild, similar/lower than in prior labs in april    - no abd pain - could be reactive from meds - outpt fu    rash/dermatitis     - cont clotrimazole cream. improved rash. Pt would like a script for home.     DVT proph: heparin 5000 units TID   will need 24 hour notice to reinstate home aides. Discharge planning for Fri 6/6.

## 2025-06-04 NOTE — CASE MANAGEMENT PROGRESS NOTE - NSCMPROGRESSNOTE_GEN_ALL_CORE
Anticipated discharge on Friday 6/6/25. After completion of IVABX. Faxed patients care manager Tena [employer] for wheelchair. [facility] declined to accept patient back on service. Discussed with Tena care manager at [facility] recommended to send referral to [facility] [place]. Referral to be sent to [facility]. Will continue to follow patient throughout hospital stay.

## 2025-06-04 NOTE — PROGRESS NOTE ADULT - SUBJECTIVE AND OBJECTIVE BOX
Neurology follow up note    KENNETH [id number]      Interval History:    Patient feels ok no new complaints.    Allergies    Originally Entered as [Unknown] reaction to [KNA] (Unknown)  Cipro (Rash)  ciprofloxacin (Rash (Mild to Mod))  baclofen (Rash)  gabapentin (Rash)    Intolerances        MEDICATIONS    acetaminophen     Tablet .. 650 milliGRAM(s) Oral every 6 hours PRN  aluminum hydroxide/magnesium hydroxide/simethicone Suspension 30 milliLiter(s) Oral every 4 hours PRN  cefepime   IVPB      cefepime   IVPB 2000 milliGRAM(s) IV Intermittent every 8 hours  clotrimazole 1% Cream 1 Application(s) Topical two times a day  heparin   Injectable 5000 Unit(s) SubCutaneous every 8 hours  melatonin 3 milliGRAM(s) Oral at bedtime PRN  multivitamin 1 Tablet(s) Oral daily  ondansetron Injectable 4 milliGRAM(s) IV Push every 8 hours PRN  sodium chloride 0.9%. 1000 milliLiter(s) IV Continuous <Continuous>  tiZANidine 4 milliGRAM(s) Oral every 8 hours PRN              Vital Signs Last 24 Hrs  T(C): 36.8 (04 Jun 2025 04:58), Max: 36.8 (04 Jun 2025 04:58)  T(F): 98.3 (04 Jun 2025 04:58), Max: 98.3 (04 Jun 2025 04:58)  HR: 94 (04 Jun 2025 04:58) (94 - 103)  BP: 129/91 (04 Jun 2025 04:58) (115/87 - 129/91)  BP(mean): --  RR: 18 (04 Jun 2025 04:58) (18 - 18)  SpO2: 94% (04 Jun 2025 04:58) (94% - 96%)    Parameters below as of 04 Jun 2025 04:58  Patient On (Oxygen Delivery Method): room air      REVIEW OF SYSTEMS:  CONSTITUTIONAL:  The patient denies fever, chills, or night sweats.  HEAD:  No headache.  EYES:  No double vision or blurry vision.  EARS:  No ringing in the ears.  NECK:  No neck pain.  CARDIOVASCULAR:  No chest pain.  RESPIRATORY:  No shortness of breath.  ABDOMEN:  No nausea, vomiting, or abdominal pain.  EXTREMITIES/NEUROLOGICAL:  Positive history of numbness and tingling in the extremities, not new.  MUSCULOSKELETAL:  Positive history of spasm, at present better.    PHYSICAL EXAMINATION:  HEAD:  Normocephalic, atraumatic.  EYES:  No scleral icterus.  EARS:  Hearing is intact.  NECK:  Supple.  CARDIOVASCULAR:  S1, S2 heard.  RESPIRATORY:  Air entry bilaterally.  ABDOMEN:  Soft, nontender.  EXTREMITIES:  No clubbing or cyanosis were noted.    NEUROLOGIC:  The patient is awake and alert.  Extraocular movements were intact.  Speech was fluent.  Smile symmetric.  The patient does have a central field circular cut in his left eye, which is not new.  Motor, left upper was 4/5, right upper 3+/5.  Does have a history of right-sided weakness compared to the left.  Decreased fine dexterity movements on his right compared to the left.  Bilateral lower extremities 0/5.  No movement of the toes.  Positive Villanueva signs of bilateral upper extremities.  Positive clonus in bilateral lower extremities.  No twitching or fasciculations were felt.       LABS:  CBC Full  -  ( 03 Jun 2025 06:35 )  WBC Count : 7.72 K/uL  RBC Count : 5.16 M/uL  Hemoglobin : 15.7 g/dL  Hematocrit : 46.5 %  Platelet Count - Automated : 228 K/uL  Mean Cell Volume : 90.1 fl  Mean Cell Hemoglobin : 30.4 pg  Mean Cell Hemoglobin Concentration : 33.8 g/dL  Auto Neutrophil # : x  Auto Lymphocyte # : x  Auto Monocyte # : x  Auto Eosinophil # : x  Auto Basophil # : x  Auto Neutrophil % : x  Auto Lymphocyte % : x  Auto Monocyte % : x  Auto Eosinophil % : x  Auto Basophil % : x    Urinalysis Basic - ( 03 Jun 2025 06:35 )    Color: x / Appearance: x / SG: x / pH: x  Gluc: 88 mg/dL / Ketone: x  / Bili: x / Urobili: x   Blood: x / Protein: x / Nitrite: x   Leuk Esterase: x / RBC: x / WBC x   Sq Epi: x / Non Sq Epi: x / Bacteria: x      06-03    139  |  109[H]  |  15  ----------------------------<  88  3.8   |  23  |  0.94    Ca    8.9      03 Jun 2025 06:35  Phos  1.9     06-03  Mg     2.1     06-03    TPro  7.9  /  Alb  3.0[L]  /  TBili  0.4  /  DBili  x   /  AST  51[H]  /  ALT  77  /  AlkPhos  87  06-03    Hemoglobin A1C:     LIVER FUNCTIONS - ( 03 Jun 2025 06:35 )  Alb: 3.0 g/dL / Pro: 7.9 g/dL / ALK PHOS: 87 U/L / ALT: 77 U/L / AST: 51 U/L / GGT: x           Vitamin B12         RADIOLOGY  ANALYSIS AND PLAN:  This is a 49-year-old with history of multiple sclerosis and spasm.    1.For episode of spasm, most likely secondary to multiple sclerosis exacerbation of possibly underlying neurapraxia type of component of the nerves when the patient repositioning himself causing increasing transmission.  The patient was started on Zanaflex.  The patient states that his spasms are better at present.  Continue on his current dose for now.  2.For history of multiple sclerosis, it appears to be advanced.  We would recommend supportive therapy.  3.For history of neuropathy, we will check basic chemistries.      48 minutes of time was spent with the patient, plan of care, reviewing data, speaking to multidisciplinary healthcare team with greater than 50% of time in counseling and care coordination.    Thank you for courtesy of consultation.    PATIENT HAS NOT YET BEEN SEEN AND EXAMINED TODAY. NOTE AND CHART REVIEWED IN AM AND EXAM FORM PREVIOUS.  ONCE PATIENT SEEN, CHART WILL BE UPDATE AT PRESENT NOTE IS INCOMPLETE     Neurology follow up note    KENNETH [id number]      Interval History:    Patient feels ok no new complaints.    Allergies    Originally Entered as [Unknown] reaction to [KNA] (Unknown)  Cipro (Rash)  ciprofloxacin (Rash (Mild to Mod))  baclofen (Rash)  gabapentin (Rash)    Intolerances        MEDICATIONS    acetaminophen     Tablet .. 650 milliGRAM(s) Oral every 6 hours PRN  aluminum hydroxide/magnesium hydroxide/simethicone Suspension 30 milliLiter(s) Oral every 4 hours PRN  cefepime   IVPB      cefepime   IVPB 2000 milliGRAM(s) IV Intermittent every 8 hours  clotrimazole 1% Cream 1 Application(s) Topical two times a day  heparin   Injectable 5000 Unit(s) SubCutaneous every 8 hours  melatonin 3 milliGRAM(s) Oral at bedtime PRN  multivitamin 1 Tablet(s) Oral daily  ondansetron Injectable 4 milliGRAM(s) IV Push every 8 hours PRN  sodium chloride 0.9%. 1000 milliLiter(s) IV Continuous <Continuous>  tiZANidine 4 milliGRAM(s) Oral every 8 hours PRN              Vital Signs Last 24 Hrs  T(C): 36.8 (04 Jun 2025 04:58), Max: 36.8 (04 Jun 2025 04:58)  T(F): 98.3 (04 Jun 2025 04:58), Max: 98.3 (04 Jun 2025 04:58)  HR: 94 (04 Jun 2025 04:58) (94 - 103)  BP: 129/91 (04 Jun 2025 04:58) (115/87 - 129/91)  BP(mean): --  RR: 18 (04 Jun 2025 04:58) (18 - 18)  SpO2: 94% (04 Jun 2025 04:58) (94% - 96%)    Parameters below as of 04 Jun 2025 04:58  Patient On (Oxygen Delivery Method): room air      REVIEW OF SYSTEMS:  CONSTITUTIONAL:  The patient denies fever, chills, or night sweats.  HEAD:  No headache.  EYES:  No double vision or blurry vision.  EARS:  No ringing in the ears.  NECK:  No neck pain.  CARDIOVASCULAR:  No chest pain.  RESPIRATORY:  No shortness of breath.  ABDOMEN:  No nausea, vomiting, or abdominal pain.  EXTREMITIES/NEUROLOGICAL:  Positive history of numbness and tingling in the extremities, not new.  MUSCULOSKELETAL:  Positive history of spasm, at present better.    PHYSICAL EXAMINATION:  HEAD:  Normocephalic, atraumatic.  EYES:  No scleral icterus.  EARS:  Hearing is intact.  NECK:  Supple.  CARDIOVASCULAR:  S1, S2 heard.  RESPIRATORY:  Air entry bilaterally.  ABDOMEN:  Soft, nontender.  EXTREMITIES:  No clubbing or cyanosis were noted.    NEUROLOGIC:  The patient is awake and alert.  Extraocular movements were intact.  Speech was fluent.  Smile symmetric.  The patient does have a central field circular cut in his left eye, which is not new.  Motor, left upper was 4/5, right upper 3+/5.  Does have a history of right-sided weakness compared to the left.  Decreased fine dexterity movements on his right compared to the left.  Bilateral lower extremities 0/5.  No movement of the toes.  Positive Villanueva signs of bilateral upper extremities.  Positive clonus in bilateral lower extremities.  No twitching or fasciculations were felt.       LABS:  CBC Full  -  ( 03 Jun 2025 06:35 )  WBC Count : 7.72 K/uL  RBC Count : 5.16 M/uL  Hemoglobin : 15.7 g/dL  Hematocrit : 46.5 %  Platelet Count - Automated : 228 K/uL  Mean Cell Volume : 90.1 fl  Mean Cell Hemoglobin : 30.4 pg  Mean Cell Hemoglobin Concentration : 33.8 g/dL  Auto Neutrophil # : x  Auto Lymphocyte # : x  Auto Monocyte # : x  Auto Eosinophil # : x  Auto Basophil # : x  Auto Neutrophil % : x  Auto Lymphocyte % : x  Auto Monocyte % : x  Auto Eosinophil % : x  Auto Basophil % : x    Urinalysis Basic - ( 03 Jun 2025 06:35 )    Color: x / Appearance: x / SG: x / pH: x  Gluc: 88 mg/dL / Ketone: x  / Bili: x / Urobili: x   Blood: x / Protein: x / Nitrite: x   Leuk Esterase: x / RBC: x / WBC x   Sq Epi: x / Non Sq Epi: x / Bacteria: x      06-03    139  |  109[H]  |  15  ----------------------------<  88  3.8   |  23  |  0.94    Ca    8.9      03 Jun 2025 06:35  Phos  1.9     06-03  Mg     2.1     06-03    TPro  7.9  /  Alb  3.0[L]  /  TBili  0.4  /  DBili  x   /  AST  51[H]  /  ALT  77  /  AlkPhos  87  06-03    Hemoglobin A1C:     LIVER FUNCTIONS - ( 03 Jun 2025 06:35 )  Alb: 3.0 g/dL / Pro: 7.9 g/dL / ALK PHOS: 87 U/L / ALT: 77 U/L / AST: 51 U/L / GGT: x           Vitamin B12         RADIOLOGY  ANALYSIS AND PLAN:  This is a 49-year-old with history of multiple sclerosis and spasm.    1.For episode of spasm, most likely secondary to multiple sclerosis exacerbation of possibly underlying neurapraxia type of component of the nerves when the patient repositioning himself causing increasing transmission.  The patient was started on Zanaflex.  The patient states that his spasms are better at present.  Continue on his current dose for now.  2.For history of multiple sclerosis, it appears to be advanced.  We would recommend supportive therapy.  3.For history of neuropathy, we will check basic chemistries.  4 overall spasm better monitor electrolytes  supplement as needed     48 minutes of time was spent with the patient, plan of care, reviewing data, speaking to multidisciplinary healthcare team with greater than 50% of time in counseling and care coordination.    Thank you for courtesy of consultation.

## 2025-06-05 ENCOUNTER — TRANSCRIPTION ENCOUNTER (OUTPATIENT)
Age: 50
End: 2025-06-05

## 2025-06-05 PROCEDURE — G0545: CPT

## 2025-06-05 PROCEDURE — 99231 SBSQ HOSP IP/OBS SF/LOW 25: CPT

## 2025-06-05 PROCEDURE — 99232 SBSQ HOSP IP/OBS MODERATE 35: CPT

## 2025-06-05 RX ORDER — TIZANIDINE 4 MG/1
1 TABLET ORAL
Qty: 90 | Refills: 0
Start: 2025-06-05 | End: 2025-07-04

## 2025-06-05 RX ORDER — SULFAMETHOXAZOLE/TRIMETHOPRIM 800-160 MG
0.5 TABLET ORAL
Refills: 0 | DISCHARGE

## 2025-06-05 RX ORDER — CLOTRIMAZOLE 1 G/100G
1 CREAM TOPICAL
Qty: 1 | Refills: 0
Start: 2025-06-05 | End: 2025-06-18

## 2025-06-05 RX ORDER — METHENAMINE MANDELATE 1 G
0.5 TABLET ORAL
Qty: 0 | Refills: 0 | DISCHARGE

## 2025-06-05 RX ADMIN — HEPARIN SODIUM 5000 UNIT(S): 1000 INJECTION INTRAVENOUS; SUBCUTANEOUS at 14:12

## 2025-06-05 RX ADMIN — CEFEPIME 100 MILLIGRAM(S): 2 INJECTION, POWDER, FOR SOLUTION INTRAVENOUS at 14:11

## 2025-06-05 RX ADMIN — HEPARIN SODIUM 5000 UNIT(S): 1000 INJECTION INTRAVENOUS; SUBCUTANEOUS at 05:27

## 2025-06-05 RX ADMIN — CEFEPIME 100 MILLIGRAM(S): 2 INJECTION, POWDER, FOR SOLUTION INTRAVENOUS at 05:27

## 2025-06-05 RX ADMIN — HEPARIN SODIUM 5000 UNIT(S): 1000 INJECTION INTRAVENOUS; SUBCUTANEOUS at 22:36

## 2025-06-05 RX ADMIN — Medication 1 TABLET(S): at 12:33

## 2025-06-05 RX ADMIN — CLOTRIMAZOLE 1 APPLICATION(S): 1 CREAM TOPICAL at 17:30

## 2025-06-05 RX ADMIN — CEFEPIME 100 MILLIGRAM(S): 2 INJECTION, POWDER, FOR SOLUTION INTRAVENOUS at 22:36

## 2025-06-05 RX ADMIN — TIZANIDINE 4 MILLIGRAM(S): 4 TABLET ORAL at 06:16

## 2025-06-05 RX ADMIN — TIZANIDINE 4 MILLIGRAM(S): 4 TABLET ORAL at 14:12

## 2025-06-05 RX ADMIN — CLOTRIMAZOLE 1 APPLICATION(S): 1 CREAM TOPICAL at 05:33

## 2025-06-05 RX ADMIN — TIZANIDINE 4 MILLIGRAM(S): 4 TABLET ORAL at 22:44

## 2025-06-05 NOTE — DISCHARGE NOTE PROVIDER - CARE PROVIDER_API CALL
Erik Lorenzo [facility]  Urology  [address] [place] [address]  Phone: [phone]  Fax: [phone]  Established Patient  Follow Up Time: 1 week   Star Gerard  Urology  [address] [place] [id number]  Phone: [phone]  Fax: [phone]  Established Patient  Follow Up Time: 2 weeks    Yessy Shaw [facility]  Internal Medicine  [address] [place] [address], [address]  [place] [address]  Phone: [phone]  Fax: [phone]  Established Patient  Follow Up Time:

## 2025-06-05 NOTE — DISCHARGE NOTE PROVIDER - NSDCCPCAREPLAN_GEN_ALL_CORE_FT
PRINCIPAL DISCHARGE DIAGNOSIS  Diagnosis: Acute UTI  Assessment and Plan of Treatment:       SECONDARY DISCHARGE DIAGNOSES  Diagnosis: Multiple sclerosis  Assessment and Plan of Treatment:     Diagnosis: Generalized rash  Assessment and Plan of Treatment:      PRINCIPAL DISCHARGE DIAGNOSIS  Diagnosis: Acute UTI  Assessment and Plan of Treatment: You completed your course of antibiotics. Your UTI is catheter related. You MUST follow up with Dr. Bray for regular iraheta changes. Continue home methenamine.      SECONDARY DISCHARGE DIAGNOSES  Diagnosis: Multiple sclerosis  Assessment and Plan of Treatment: Zanaflex PRN spasm    Diagnosis: Generalized rash  Assessment and Plan of Treatment: Clotrimazole PRN

## 2025-06-05 NOTE — PROGRESS NOTE ADULT - ASSESSMENT
50 y/o male w/ PMHx of MS, paraplegia, DVT/PE, chronic iraheta, recurrent UTI, who presented with abdominal pain d/t urinary retention. Pt has [facility] several times d/t iraheta getting clogged of which his iraheta has been replaced. However, in the ED, pt had a fever of 101.7. Iraheta was replaced in the ED and urine testing was done.     Patient treated for UTI/pyelonephritis. Urine culture grew ?= 3 organisms, although recent urine culture from 5/27 grew enterobacter cloacae. Blood cultures no growth. No fever and no leukocytosis. Iraheta replaced in the ED.    #UTI/pyelonephritis  #Leukocytosis  #Neurogenic bladder    -continue cefepime until today to complete 7 days  -wound care  -will sign off, please call ID if any further questions. Thank you.    Elaine Mendez MD  Division of Infectious Diseases   Cell [phone] between 8am and 6pm   After 6pm and weekends please call ID service at [phone].     25 minutes spent on total encounter assessing patient, examination, chart review, counseling and coordinating care by the attending physician/nurse/care manager.

## 2025-06-05 NOTE — PROGRESS NOTE ADULT - SUBJECTIVE AND OBJECTIVE BOX
Neurology follow up note    KENNETH [id number]      Interval History:    Patient feels ok no new complaints.    Allergies    Originally Entered as [Unknown] reaction to [KNA] (Unknown)  Cipro (Rash)  ciprofloxacin (Rash (Mild to Mod))  baclofen (Rash)  gabapentin (Rash)    Intolerances        MEDICATIONS    acetaminophen     Tablet .. 650 milliGRAM(s) Oral every 6 hours PRN  aluminum hydroxide/magnesium hydroxide/simethicone Suspension 30 milliLiter(s) Oral every 4 hours PRN  cefepime   IVPB      cefepime   IVPB 2000 milliGRAM(s) IV Intermittent every 8 hours  clotrimazole 1% Cream 1 Application(s) Topical two times a day  heparin   Injectable 5000 Unit(s) SubCutaneous every 8 hours  melatonin 3 milliGRAM(s) Oral at bedtime PRN  multivitamin 1 Tablet(s) Oral daily  ondansetron Injectable 4 milliGRAM(s) IV Push every 8 hours PRN  sodium chloride 0.9%. 1000 milliLiter(s) IV Continuous <Continuous>  tiZANidine 4 milliGRAM(s) Oral every 8 hours PRN              Vital Signs Last 24 Hrs  T(C): 36.6 (05 Jun 2025 04:56), Max: 36.7 (04 Jun 2025 20:47)  T(F): 97.8 (05 Jun 2025 04:56), Max: 98 (04 Jun 2025 20:47)  HR: 92 (05 Jun 2025 04:56) (84 - 93)  BP: 130/88 (05 Jun 2025 04:56) (125/84 - 130/88)  BP(mean): --  RR: 18 (05 Jun 2025 04:56) (17 - 18)  SpO2: 94% (05 Jun 2025 04:56) (94% - 97%)    Parameters below as of 05 Jun 2025 04:56  Patient On (Oxygen Delivery Method): room air          REVIEW OF SYSTEMS:  CONSTITUTIONAL:  The patient denies fever, chills, or night sweats.  HEAD:  No headache.  EYES:  No double vision or blurry vision.  EARS:  No ringing in the ears.  NECK:  No neck pain.  CARDIOVASCULAR:  No chest pain.  RESPIRATORY:  No shortness of breath.  ABDOMEN:  No nausea, vomiting, or abdominal pain.  EXTREMITIES/NEUROLOGICAL:  Positive history of numbness and tingling in the extremities, not new.  MUSCULOSKELETAL:  Positive history of spasm, at present better.    PHYSICAL EXAMINATION:  HEAD:  Normocephalic, atraumatic.  EYES:  No scleral icterus.  EARS:  Hearing is intact.  NECK:  Supple.  CARDIOVASCULAR:  S1, S2 heard.  RESPIRATORY:  Air entry bilaterally.  ABDOMEN:  Soft, nontender.  EXTREMITIES:  No clubbing or cyanosis were noted.    NEUROLOGIC:  The patient is awake and alert.  Extraocular movements were intact.  Speech was fluent.  Smile symmetric.  The patient does have a central field circular cut in his left eye, which is not new.  Motor, left upper was 4/5, right upper 3+/5.  Does have a history of right-sided weakness compared to the left.  Decreased fine dexterity movements on his right compared to the left.  Bilateral lower extremities 0/5.  No movement of the toes.  Positive Villanueva signs of bilateral upper extremities.  Positive clonus in bilateral lower extremities.  No twitching or fasciculations were felt.      LABS:  CBC Full  -  ( 04 Jun 2025 09:10 )  WBC Count : 8.41 K/uL  RBC Count : 5.08 M/uL  Hemoglobin : 15.6 g/dL  Hematocrit : 45.4 %  Platelet Count - Automated : 231 K/uL  Mean Cell Volume : 89.4 fl  Mean Cell Hemoglobin : 30.7 pg  Mean Cell Hemoglobin Concentration : 34.4 g/dL  Auto Neutrophil # : x  Auto Lymphocyte # : x  Auto Monocyte # : x  Auto Eosinophil # : x  Auto Basophil # : x  Auto Neutrophil % : x  Auto Lymphocyte % : x  Auto Monocyte % : x  Auto Eosinophil % : x  Auto Basophil % : x    Urinalysis Basic - ( 04 Jun 2025 09:10 )    Color: x / Appearance: x / SG: x / pH: x  Gluc: 129 mg/dL / Ketone: x  / Bili: x / Urobili: x   Blood: x / Protein: x / Nitrite: x   Leuk Esterase: x / RBC: x / WBC x   Sq Epi: x / Non Sq Epi: x / Bacteria: x      06-04    141  |  108  |  17  ----------------------------<  129[H]  3.7   |  25  |  0.86    Ca    9.2      04 Jun 2025 09:10    TPro  7.8  /  Alb  3.1[L]  /  TBili  0.5  /  DBili  x   /  AST  56[H]  /  ALT  88[H]  /  AlkPhos  88  06-04    Hemoglobin A1C:     LIVER FUNCTIONS - ( 04 Jun 2025 09:10 )  Alb: 3.1 g/dL / Pro: 7.8 g/dL / ALK PHOS: 88 U/L / ALT: 88 U/L / AST: 56 U/L / GGT: x           Vitamin B12         RADIOLOGY      ANALYSIS AND PLAN:  This is a 49-year-old with history of multiple sclerosis and spasm.    1.For episode of spasm, most likely secondary to multiple sclerosis exacerbation of possibly underlying neurapraxia type of component of the nerves when the patient repositioning himself causing increasing transmission.  The patient was started on Zanaflex.  The patient states that his spasms are better at present.  Continue on his current dose for now.  2.For history of multiple sclerosis, it appears to be advanced.  We would recommend supportive therapy.  3.For history of neuropathy, we will check basic chemistries.  4 overall spasm better monitor electrolytes  supplement as needed   5 neurologic  wise stable dc planning     44 minutes of time was spent with the patient, plan of care, reviewing data, speaking to multidisciplinary healthcare team with greater than 50% of time in counseling and care coordination.    Thank you for courtesy of consultation.

## 2025-06-05 NOTE — PROGRESS NOTE ADULT - TIME BILLING
time spent reviewing the hospital notes, laboratory values, imaging findings, assessing/counseling the patient, discussing with consultant physicians, social work, nursing staff. time spent teaching and/or separately reported services.   ---
time spent reviewing the hospital notes, laboratory values, imaging findings, assessing/counseling the patient, discussing with consultant physicians, social work, nursing staf  This time spent excludes time spent teaching and/or separately reported services.   ---
time spent reviewing the hospital notes, laboratory values, imaging findings, assessing/counseling the patient, discussing with consultant physicians, social work, nursing staff  This time spent excludes time spent teaching and/or separately reported services.   ---
activities including direct patient care, counseling and/or coordinating care, reviewing notes/lab data/imaging, and discussion with multidisciplinary team
time spent reviewing the hospital notes, laboratory values, imaging findings, assessing/counseling the patient, discussing with consultant physicians, social work, nursing staff  This time spent excludes time spent teaching and/or separately reported services.   ---
activities including direct patient care, counseling and/or coordinating care, reviewing notes/lab data/imaging, and discussion with multidisciplinary team (excluding time spent on resident teaching)
time spent reviewing the hospital notes, laboratory values, imaging findings, assessing/counseling the patient, discussing with consultant physicians, social work, nursing staff  This time spent excludes time spent teaching and/or separately reported services.   ---

## 2025-06-05 NOTE — DISCHARGE NOTE PROVIDER - PROVIDER TOKENS
PROVIDER:[TOKEN:[[id number]],FOLLOWUP:[1 week],ESTABLISHEDPATIENT:[T]] PROVIDER:[TOKEN:[[id number]],FOLLOWUP:[2 weeks],ESTABLISHEDPATIENT:[T]],PROVIDER:[TOKEN:[[id number]],ESTABLISHEDPATIENT:[T]]

## 2025-06-05 NOTE — PROGRESS NOTE ADULT - ASSESSMENT
50 y/o male w/ PMHx of MS, paraplegia, DVT/PE, and chronic iraheta who is being admitted for urosepsis.     Sepsis secondary to UTI/pyelo    - ID following - sepsis POA and now resolved.    - on cefepime     - UCx contaminated. plan for 7 days total of abx. last day 6/5 PM.     - iraheta was changed in the ED    - DC plan for 6/6 -d/w CM/SW    MS    - bedbound. frequent positional change. patient has tried baclofen and it did not help in the past.     - cont zanaflex 4mg every 8 hours prn. pt reports it is helping with the spasms.     Transaminitis    - mild, similar/lower than in prior labs in april    - no abd pain - could be reactive from meds - outpt fu    rash/dermatitis     - cont clotrimazole cream. improved rash. Pt would like a script for home.     DVT proph: heparin 5000 units TID   will need 24 hour notice to reinstate home aides. Discharge planning for Fri 6/6.      48 y/o male w/ PMHx of MS, paraplegia, DVT/PE, and chronic iraheta who is being admitted for urosepsis.     Sepsis secondary to UTI/pyelo    - ID following - sepsis POA and now resolved.    - on cefepime     - UCx contaminated. plan for 7 days total of abx. last day 6/5 PM.     - iraheta was changed in the ED    - DC plan for 6/6 -d/w CM/SW    MS    - bedbound. frequent positional change. patient has tried baclofen and it did not help in the past.     - cont zanaflex 4mg every 8 hours prn. pt reports it is helping with the spasms.     Transaminitis    - mild, similar/lower than in prior labs in april    - no abd pain - could be reactive from meds - outpt fu    rash/dermatitis     - cont clotrimazole cream. improved rash. Pt would like a script for home.     DVT proph: heparin 5000 units TID   will need 24 hour notice to reinstate home aides. Discharge planning for Fri 6/6.     outpatient fu with Dr. Bray  d/w DANIELLE - stable for DC - no labs

## 2025-06-05 NOTE — PROGRESS NOTE ADULT - SUBJECTIVE AND OBJECTIVE BOX
Patient is a 49y old  Male who presents with a chief complaint of Urosepsis (05 Jun 2025 10:36)      FROM ADMISSION H+P:   HPI:  Pt is a 48 y/o male w/ PMHx of MS, paraplegia, DVT/PE, and chronic iraheta who presents with abdominal pain d/t urinary retention. Pt has visited [facility] ED several times d/t iraheta getting clogged of which his iraheta has been replaced. However, in the ED, pt had a fever of 101.7. Iraheta was replaced in the ED and urine testing was done. Pt endorses having bed sore on back of which is painful.    IN THE ED:  Temp 97.9F, , /120, RR 20, SpO2 98%  S/P Zosyn IVPB x2, 1g ofirmev, 2.3 L NS  EKG:Vent Rate: 138 BPM, MD [id number] ms, QRS: 88 ms, QT/QTc: 292/442. Impression: Sinus tachycardia  Labs significant for WBC: 17.24,   UA (+): UTI     (30 May 2025 00:55)      INTERVAL HPI/OVERNIGHT EVENTS: Patient was seen and examined. No acute events occurred overnight. No new complaints.    I&O's Summary    04 Jun 2025 07:01  -  05 Jun 2025 07:00  --------------------------------------------------------  IN: 1425 mL / OUT: 1700 mL / NET: -275 mL    05 Jun 2025 07:01  -  05 Jun 2025 16:48  --------------------------------------------------------  IN: 0 mL / OUT: 700 mL / NET: -700 mL        PAST MEDICAL & SURGICAL HISTORY:  MS (multiple sclerosis)  since 1995      PE (pulmonary embolism)  2013      Cellulitis  november 2014      Nephrolithiasis      Environmental allergies      MS (multiple sclerosis)      Lower paraplegia      DVT, lower extremity      Pulmonary embolism      Nephrostomy status      No significant past surgical history          LABS:                        15.6   8.41  )-----------( 231      ( 04 Jun 2025 09:10 )             45.4     06-04    141  |  108  |  17  ----------------------------<  129[H]  3.7   |  25  |  0.86    Ca    9.2      04 Jun 2025 09:10    TPro  7.8  /  Alb  3.1[L]  /  TBili  0.5  /  DBili  x   /  AST  56[H]  /  ALT  88[H]  /  AlkPhos  88  06-04      Urinalysis Basic - ( 04 Jun 2025 09:10 )    Color: x / Appearance: x / SG: x / pH: x  Gluc: 129 mg/dL / Ketone: x  / Bili: x / Urobili: x   Blood: x / Protein: x / Nitrite: x   Leuk Esterase: x / RBC: x / WBC x   Sq Epi: x / Non Sq Epi: x / Bacteria: x      CAPILLARY BLOOD GLUCOSE            Urinalysis Basic - ( 04 Jun 2025 09:10 )    Color: x / Appearance: x / SG: x / pH: x  Gluc: 129 mg/dL / Ketone: x  / Bili: x / Urobili: x   Blood: x / Protein: x / Nitrite: x   Leuk Esterase: x / RBC: x / WBC x   Sq Epi: x / Non Sq Epi: x / Bacteria: x        MEDICATIONS  (STANDING):  cefepime   IVPB 2000 milliGRAM(s) IV Intermittent every 8 hours  cefepime   IVPB      clotrimazole 1% Cream 1 Application(s) Topical two times a day  heparin   Injectable 5000 Unit(s) SubCutaneous every 8 hours  multivitamin 1 Tablet(s) Oral daily  sodium chloride 0.9%. 1000 milliLiter(s) (100 mL/Hr) IV Continuous <Continuous>    MEDICATIONS  (PRN):  acetaminophen     Tablet .. 650 milliGRAM(s) Oral every 6 hours PRN Temp greater or equal to 38C (100.4F), Mild Pain (1 - 3)  aluminum hydroxide/magnesium hydroxide/simethicone Suspension 30 milliLiter(s) Oral every 4 hours PRN Dyspepsia  melatonin 3 milliGRAM(s) Oral at bedtime PRN Insomnia  ondansetron Injectable 4 milliGRAM(s) IV Push every 8 hours PRN Nausea and/or Vomiting  tiZANidine 4 milliGRAM(s) Oral every 8 hours PRN Muscle Spasm      REVIEW OF SYSTEMS:  CONSTITUTIONAL: No fever or chills  HEENT:  No headache, no sore throat  RESPIRATORY: No cough, wheezing, or shortness of breath  CARDIOVASCULAR: No chest pain, palpitations  GASTROINTESTINAL: No abdominal pain, nausea, vomiting, or diarrhea  GENITOURINARY: No dysuria, frequency, or hematuria  NEUROLOGICAL: no focal weakness or dizziness  MUSCULOSKELETAL: no myalgias  PSYCH: no recent changes in mood    RADIOLOGY & ADDITIONAL TESTS:    Imaging Personally Reviewed:  [x] YES  [ ] NO    Consultant(s) Notes Reviewed:  [x] YES  [ ] NO    PHYSICAL EXAM:  T(C): 36.6 (06-05-25 @ 11:44), Max: 36.7 (06-04-25 @ 20:47)  HR: 97 (06-05-25 @ 11:44) (92 - 97)  BP: 117/84 (06-05-25 @ 11:44) (117/84 - 130/88)  RR: 18 (06-05-25 @ 11:44) (17 - 18)  SpO2: 97% (06-05-25 @ 11:44) (94% - 97%)    GENERAL: NAD, well-developed, well-groomed  HEENT:  anicteric, moist mucous membranes  CHEST/LUNG:  CTA b/l, no rales, wheezes, or rhonchi  HEART:  RRR, S1, S2  ABDOMEN:  BS+, soft, nontender, nondistended  EXTREMITIES: no edema, cyanosis, or calf tenderness  NERVOUS SYSTEM: answers questions and follows commands appropriately  PSYCH: normal affect    Care Discussed with Consultants/Other Providers [x] YES  [ ] NO Patient is a 49y old  Male who presents with a chief complaint of Urosepsis (05 Jun 2025 10:36)      FROM ADMISSION H+P:   HPI:  Pt is a 50 y/o male w/ PMHx of MS, paraplegia, DVT/PE, and chronic iraheta who presents with abdominal pain d/t urinary retention. Pt has visited [facility] ED several times d/t iraheta getting clogged of which his iraheta has been replaced. However, in the ED, pt had a fever of 101.7. Iraheta was replaced in the ED and urine testing was done. Pt endorses having bed sore on back of which is painful.    IN THE ED:  Temp 97.9F, , /120, RR 20, SpO2 98%  S/P Zosyn IVPB x2, 1g ofirmev, 2.3 L NS  EKG:Vent Rate: 138 BPM, MT [id number] ms, QRS: 88 ms, QT/QTc: 292/442. Impression: Sinus tachycardia  Labs significant for WBC: 17.24,   UA (+): UTI     (30 May 2025 00:55)      INTERVAL HPI/OVERNIGHT EVENTS: Patient was seen and examined. No acute events occurred overnight. No new complaints. REports has hard time going to [employer]. States his insurance company directed him to go to ER for iraheta changes. Saw Dr. Bray for urology last recently in May    I&O's Summary    04 Jun 2025 07:01  -  05 Jun 2025 07:00  --------------------------------------------------------  IN: 1425 mL / OUT: 1700 mL / NET: -275 mL    05 Jun 2025 07:01  -  05 Jun 2025 16:48  --------------------------------------------------------  IN: 0 mL / OUT: 700 mL / NET: -700 mL        PAST MEDICAL & SURGICAL HISTORY:  MS (multiple sclerosis)  since 1995      PE (pulmonary embolism)  2013      Cellulitis  november 2014      Nephrolithiasis      Environmental allergies      MS (multiple sclerosis)      Lower paraplegia      DVT, lower extremity      Pulmonary embolism      Nephrostomy status      No significant past surgical history          LABS:                        15.6   8.41  )-----------( 231      ( 04 Jun 2025 09:10 )             45.4     06-04    141  |  108  |  17  ----------------------------<  129[H]  3.7   |  25  |  0.86    Ca    9.2      04 Jun 2025 09:10    TPro  7.8  /  Alb  3.1[L]  /  TBili  0.5  /  DBili  x   /  AST  56[H]  /  ALT  88[H]  /  AlkPhos  88  06-04      Urinalysis Basic - ( 04 Jun 2025 09:10 )    Color: x / Appearance: x / SG: x / pH: x  Gluc: 129 mg/dL / Ketone: x  / Bili: x / Urobili: x   Blood: x / Protein: x / Nitrite: x   Leuk Esterase: x / RBC: x / WBC x   Sq Epi: x / Non Sq Epi: x / Bacteria: x      CAPILLARY BLOOD GLUCOSE            Urinalysis Basic - ( 04 Jun 2025 09:10 )    Color: x / Appearance: x / SG: x / pH: x  Gluc: 129 mg/dL / Ketone: x  / Bili: x / Urobili: x   Blood: x / Protein: x / Nitrite: x   Leuk Esterase: x / RBC: x / WBC x   Sq Epi: x / Non Sq Epi: x / Bacteria: x        MEDICATIONS  (STANDING):  cefepime   IVPB 2000 milliGRAM(s) IV Intermittent every 8 hours  cefepime   IVPB      clotrimazole 1% Cream 1 Application(s) Topical two times a day  heparin   Injectable 5000 Unit(s) SubCutaneous every 8 hours  multivitamin 1 Tablet(s) Oral daily  sodium chloride 0.9%. 1000 milliLiter(s) (100 mL/Hr) IV Continuous <Continuous>    MEDICATIONS  (PRN):  acetaminophen     Tablet .. 650 milliGRAM(s) Oral every 6 hours PRN Temp greater or equal to 38C (100.4F), Mild Pain (1 - 3)  aluminum hydroxide/magnesium hydroxide/simethicone Suspension 30 milliLiter(s) Oral every 4 hours PRN Dyspepsia  melatonin 3 milliGRAM(s) Oral at bedtime PRN Insomnia  ondansetron Injectable 4 milliGRAM(s) IV Push every 8 hours PRN Nausea and/or Vomiting  tiZANidine 4 milliGRAM(s) Oral every 8 hours PRN Muscle Spasm      REVIEW OF SYSTEMS:  CONSTITUTIONAL: No fever or chills  HEENT:  No headache, no sore throat  RESPIRATORY: No cough, wheezing, or shortness of breath  CARDIOVASCULAR: No chest pain, palpitations  GASTROINTESTINAL: No abdominal pain, nausea, vomiting, or diarrhea  GENITOURINARY: No dysuria, frequency, or hematuria  NEUROLOGICAL: no focal weakness or dizziness  MUSCULOSKELETAL: no myalgias  PSYCH: no recent changes in mood    RADIOLOGY & ADDITIONAL TESTS:    Imaging Personally Reviewed:  [x] YES  [ ] NO    Consultant(s) Notes Reviewed:  [x] YES  [ ] NO    PHYSICAL EXAM:  T(C): 36.6 (06-05-25 @ 11:44), Max: 36.7 (06-04-25 @ 20:47)  HR: 97 (06-05-25 @ 11:44) (92 - 97)  BP: 117/84 (06-05-25 @ 11:44) (117/84 - 130/88)  RR: 18 (06-05-25 @ 11:44) (17 - 18)  SpO2: 97% (06-05-25 @ 11:44) (94% - 97%)    GENERAL: NAD  HEENT:  anicteric, moist mucous membranes  CHEST/LUNG:  CTA b/l, no rales, wheezes, or rhonchi  HEART:  RRR, S1, S2  ABDOMEN:  BS+, soft, nontender, nondistended  EXTREMITIES: no edema, cyanosis, or calf tenderness; papraplegia  NERVOUS SYSTEM: answers questions and follows commands appropriately  PSYCH: normal affect    Care Discussed with Consultants/Other Providers [x] YES  [ ] NO

## 2025-06-05 NOTE — PROGRESS NOTE ADULT - SUBJECTIVE AND OBJECTIVE BOX
[facility]  INFECTIOUS DISEASES - [address], [place] [address]  Tel: [phone]     Fax: [phone]  =======================================================    KENNETH SAUL [id number]    Follow up: No fevers. Seen earlier. Lower extremity spasms improved.    Allergies:  Originally Entered as [Unknown] reaction to [KNA] (Unknown)  Cipro (Rash)  ciprofloxacin (Rash (Mild to Mod))  baclofen (Rash)  gabapentin (Rash)      Antibiotics:  acetaminophen     Tablet .. 650 milliGRAM(s) Oral every 6 hours PRN  aluminum hydroxide/magnesium hydroxide/simethicone Suspension 30 milliLiter(s) Oral every 4 hours PRN  clotrimazole 1% Cream 1 Application(s) Topical two times a day  heparin   Injectable 5000 Unit(s) SubCutaneous every 8 hours  melatonin 3 milliGRAM(s) Oral at bedtime PRN  multivitamin 1 Tablet(s) Oral daily  ondansetron Injectable 4 milliGRAM(s) IV Push every 8 hours PRN  sodium chloride 0.9%. 1000 milliLiter(s) IV Continuous <Continuous>  tiZANidine 4 milliGRAM(s) Oral every 8 hours PRN       REVIEW OF SYSTEMS:  CONSTITUTIONAL:  No Fevers  CARDIOVASCULAR:  No chest pain or SOB.  RESPIRATORY:  No cough, shortness of breath  GASTROINTESTINAL:  No nausea or vomiting, + loose stools  GENITOURINARY:  see history  MUSCULOSKELETAL:  + chronic B/L LE pain and spasms  NEUROLOGIC:  No headache or dizziness  PSYCHIATRIC:  No disorder of thought or mood.     Physical Exam:  ICU Vital Signs Last 24 Hrs  T(C): 36.3 (06 Jun 2025 04:49), Max: 36.7 (05 Jun 2025 20:34)  T(F): 97.4 (06 Jun 2025 04:49), Max: 98.1 (05 Jun 2025 20:34)  HR: 91 (06 Jun 2025 04:49) (91 - 97)  BP: 120/78 (06 Jun 2025 04:49) (117/84 - 130/91)  BP(mean): --  ABP: --  ABP(mean): --  RR: 18 (06 Jun 2025 04:49) (18 - 18)  SpO2: 96% (06 Jun 2025 04:49) (96% - 97%)    O2 Parameters below as of 06 Jun 2025 04:49  Patient On (Oxygen Delivery Method): room air    GEN: NAD  HEENT: normocephalic and atraumatic.   NECK: Supple.   LUNGS: Normal respiratory effort.  HEART: Regular rate and rhythm   ABDOMEN: Soft, nontender, and nondistended.    : + Steele  EXTREMITIES: B/L lower leg edema.  NEUROLOGIC: AO x 3  PSYCHIATRIC: Appropriate affect .      Labs:                  RECENT CULTURES:  05-29 @ 21:30 Catheterized     >=3 organisms. Probable collection contamination.        05-29 @ 20:35 Clean Catch     >=3 organisms. Probable collection contamination.        05-29 @ 20:15 Blood Blood-Peripheral     No growth at 5 days        05-29 @ 20:10 Blood Blood-Peripheral     No growth at 5 days        05-27 @ 12:50 Clean Catch Clean Catch (Midstream) Enterobacter cloacae complex    >100,000 CFU/ml Enterobacter cloacae complex              All imaging and data are reviewed.

## 2025-06-05 NOTE — DISCHARGE NOTE PROVIDER - HOSPITAL COURSE
ADMISSION DATE:  05-30-25    ---  FROM ADMISSION H+P:   HPI:  Pt is a 48 y/o male w/ PMHx of MS, paraplegia, DVT/PE, and chronic iraheta who presents with abdominal pain d/t urinary retention. Pt has visited [facility] ED several times d/t iraheta getting clogged of which his iraheta has been replaced. However, in the ED, pt had a fever of 101.7. Iraheta was replaced in the ED and urine testing was done. Pt endorses having bed sore on back of which is painful.    IN THE ED:  Temp 97.9F, , /120, RR 20, SpO2 98%  S/P Zosyn IVPB x2, 1g ofirmev, 2.3 L NS  EKG:Vent Rate: 138 BPM, AZ [id number] ms, QRS: 88 ms, QT/QTc: 292/442. Impression: Sinus tachycardia  Labs significant for WBC: 17.24,   UA (+): UTI     (30 May 2025 00:55)      ---  HOSPITAL COURSE/PERTINENT LABS/PROCEDURES PERFORMED/PENDING TESTS:  Patient was admitted for sepsis (POA) secondary to CAUTI with acute urinaty retention. Treated with IV antibiotics. Urine cultures with >3 organisms. Treated with IV cefepime x 7 days course. Sepsis resolved. Completed course of antibiotics. Patient with MS and spasms, seen by neurology. Improved with addition of zanaflex. 24hr HHA to be reinstated - patient is paraplegic with FUNCTIONAL QUADRIPLEGIA. CM followed to try for [facility]. Patient will be followed by house calls. Has coude cath and needs routine iraheta changes with urology Dr. Lorenzo.  ---  PATIENT CONDITION:  - stable   ADMISSION DATE:  05-30-25    ---  FROM ADMISSION H+P:   HPI:  Pt is a 50 y/o male w/ PMHx of MS, paraplegia, DVT/PE, and chronic iraheta who presents with abdominal pain d/t urinary retention. Pt has visited [facility] ED several times d/t iraheta getting clogged of which his iraheta has been replaced. However, in the ED, pt had a fever of 101.7. Iraheta was replaced in the ED and urine testing was done. Pt endorses having bed sore on back of which is painful.    IN THE ED:  Temp 97.9F, , /120, RR 20, SpO2 98%  S/P Zosyn IVPB x2, 1g ofirmev, 2.3 L NS  EKG:Vent Rate: 138 BPM, ID [id number] ms, QRS: 88 ms, QT/QTc: 292/442. Impression: Sinus tachycardia  Labs significant for WBC: 17.24,   UA (+): UTI     (30 May 2025 00:55)      ---  HOSPITAL COURSE/PERTINENT LABS/PROCEDURES PERFORMED/PENDING TESTS:  Patient was admitted for sepsis (POA) secondary to CAUTI with acute urinaty retention. Treated with IV antibiotics. Urine cultures with >3 organisms. Treated with IV cefepime x 7 days course. Sepsis resolved. Completed course of antibiotics. Patient with MS and spasms, seen by neurology. Improved with addition of zanaflex. 24hr HHA to be reinstated - patient is paraplegic with FUNCTIONAL QUADRIPLEGIA. CM followed to try for [facility]. Patient will be followed by house calls. Has coude cath and needs routine iraheta changes with urology Dr. Bray  ---  PATIENT CONDITION:  - stable

## 2025-06-05 NOTE — CASE MANAGEMENT PROGRESS NOTE - NSCMPROGRESSNOTE_GEN_ALL_CORE
Referral to [facility] sent declined to service patient. Notified Care manager at [facility] of home care denials

## 2025-06-05 NOTE — DISCHARGE NOTE PROVIDER - CARE PROVIDERS DIRECT ADDRESSES
[email or web address]

## 2025-06-05 NOTE — DISCHARGE NOTE PROVIDER - NSDCMRMEDTOKEN_GEN_ALL_CORE_FT
clotrimazole 1% topical cream: Apply topically to affected area 2 times a day 1 Apply topically to affected area 2 times a day  heavy duty manual wheelchair: please dispense one heavy duty manual wheelchair with leg rest and high back.     ICD10: G35  G82.2  Multiple Vitamins oral tablet: 1 tab(s) orally once a day  tiZANidine 4 mg oral tablet: 1 tab(s) orally every 8 hours as needed for Muscle Spasm   clotrimazole 1% topical cream: Apply topically to affected area 2 times a day 1 Apply topically to affected area 2 times a day  methenamine hippurate 1 g oral tablet: 0.5 tab(s) orally 2 times a day (Rx by Dr. Lorenzo)  Multiple Vitamins oral tablet: 1 tab(s) orally once a day  tiZANidine 4 mg oral tablet: 1 tab(s) orally every 8 hours as needed for Muscle Spasm

## 2025-06-05 NOTE — DISCHARGE NOTE PROVIDER - NSDCHHNEEDSERVICE_GEN_ALL_CORE
Observation and assessment/Rehabilitation services/Teaching and training/Wound care and assessment
within normal limits

## 2025-06-06 VITALS
OXYGEN SATURATION: 94 % | RESPIRATION RATE: 18 BRPM | HEART RATE: 102 BPM | SYSTOLIC BLOOD PRESSURE: 132 MMHG | TEMPERATURE: 99 F | DIASTOLIC BLOOD PRESSURE: 91 MMHG

## 2025-06-06 PROCEDURE — 99285 EMERGENCY DEPT VISIT HI MDM: CPT

## 2025-06-06 PROCEDURE — 87086 URINE CULTURE/COLONY COUNT: CPT

## 2025-06-06 PROCEDURE — 87637 SARSCOV2&INF A&B&RSV AMP PRB: CPT

## 2025-06-06 PROCEDURE — 71250 CT THORAX DX C-: CPT

## 2025-06-06 PROCEDURE — 83036 HEMOGLOBIN GLYCOSYLATED A1C: CPT

## 2025-06-06 PROCEDURE — 81001 URINALYSIS AUTO W/SCOPE: CPT

## 2025-06-06 PROCEDURE — 74176 CT ABD & PELVIS W/O CONTRAST: CPT

## 2025-06-06 PROCEDURE — 0241U: CPT

## 2025-06-06 PROCEDURE — 85025 COMPLETE CBC W/AUTO DIFF WBC: CPT

## 2025-06-06 PROCEDURE — 80061 LIPID PANEL: CPT

## 2025-06-06 PROCEDURE — 83605 ASSAY OF LACTIC ACID: CPT

## 2025-06-06 PROCEDURE — 84100 ASSAY OF PHOSPHORUS: CPT

## 2025-06-06 PROCEDURE — 85730 THROMBOPLASTIN TIME PARTIAL: CPT

## 2025-06-06 PROCEDURE — 82652 VIT D 1 25-DIHYDROXY: CPT

## 2025-06-06 PROCEDURE — 96374 THER/PROPH/DIAG INJ IV PUSH: CPT

## 2025-06-06 PROCEDURE — 87040 BLOOD CULTURE FOR BACTERIA: CPT

## 2025-06-06 PROCEDURE — 85027 COMPLETE CBC AUTOMATED: CPT

## 2025-06-06 PROCEDURE — 99239 HOSP IP/OBS DSCHRG MGMT >30: CPT

## 2025-06-06 PROCEDURE — 96375 TX/PRO/DX INJ NEW DRUG ADDON: CPT

## 2025-06-06 PROCEDURE — 85610 PROTHROMBIN TIME: CPT

## 2025-06-06 PROCEDURE — 36415 COLL VENOUS BLD VENIPUNCTURE: CPT

## 2025-06-06 PROCEDURE — 71045 X-RAY EXAM CHEST 1 VIEW: CPT

## 2025-06-06 PROCEDURE — 83735 ASSAY OF MAGNESIUM: CPT

## 2025-06-06 PROCEDURE — 93005 ELECTROCARDIOGRAM TRACING: CPT

## 2025-06-06 PROCEDURE — 80053 COMPREHEN METABOLIC PANEL: CPT

## 2025-06-06 RX ADMIN — Medication 1 TABLET(S): at 11:49

## 2025-06-06 RX ADMIN — HEPARIN SODIUM 5000 UNIT(S): 1000 INJECTION INTRAVENOUS; SUBCUTANEOUS at 06:23

## 2025-06-06 RX ADMIN — TIZANIDINE 4 MILLIGRAM(S): 4 TABLET ORAL at 07:58

## 2025-06-06 RX ADMIN — CLOTRIMAZOLE 1 APPLICATION(S): 1 CREAM TOPICAL at 06:24

## 2025-06-06 NOTE — DIETITIAN INITIAL EVALUATION ADULT - OTHER INFO
Pt is a "48 y/o male w/ PMHx of MS, paraplegia, DVT/PE, and chronic iraheta who is being admitted for urosepsis."    Visited pt at bedside this am. Pt reports good appetite/intake. PO intakes % per nursing documentation. Tolerating diet well. No food allergies. No chewing/swallowing difficulties. Denies N/V. +BM 6/4. Bedscale wt of 276# obtained. Pt reports wt typically ranges from 250-280#. 1+ BL foot edema noted. Skin: stage I to sacrum. Pt currently on DASH/TLC diet. Verbal education provided. Food preferences obtained to optimize po intake/tolerance. Plan for possible d/c home today. RD remains available and will continue to follow-up.

## 2025-06-06 NOTE — DIETITIAN INITIAL EVALUATION ADULT - PERTINENT MEDS FT
MEDICATIONS  (STANDING):  clotrimazole 1% Cream 1 Application(s) Topical two times a day  heparin   Injectable 5000 Unit(s) SubCutaneous every 8 hours  multivitamin 1 Tablet(s) Oral daily  sodium chloride 0.9%. 1000 milliLiter(s) (100 mL/Hr) IV Continuous <Continuous>    MEDICATIONS  (PRN):  acetaminophen     Tablet .. 650 milliGRAM(s) Oral every 6 hours PRN Temp greater or equal to 38C (100.4F), Mild Pain (1 - 3)  aluminum hydroxide/magnesium hydroxide/simethicone Suspension 30 milliLiter(s) Oral every 4 hours PRN Dyspepsia  melatonin 3 milliGRAM(s) Oral at bedtime PRN Insomnia  ondansetron Injectable 4 milliGRAM(s) IV Push every 8 hours PRN Nausea and/or Vomiting  tiZANidine 4 milliGRAM(s) Oral every 8 hours PRN Muscle Spasm

## 2025-06-06 NOTE — DIETITIAN INITIAL EVALUATION ADULT - ORAL INTAKE PTA/DIET HISTORY
Pt reports good appetite/intake PTA. Typically consumes 3 meals/day. Has aides at home 24/7. Pt has pre-made meals/groceries delivered to home. Aides assist with heating up food. Regular diet. Does not add salt to food. ~2 years ago pt was receiving "[employer]" which were delivered to his home.

## 2025-06-06 NOTE — DIETITIAN INITIAL EVALUATION ADULT - PERTINENT LABORATORY DATA
A1C with Estimated Average Glucose Result: 5.3 % (05-30-25 @ 11:40)  A1C with Estimated Average Glucose Result: 5.6 % (11-27-24 @ 06:10)

## 2025-06-06 NOTE — PROGRESS NOTE ADULT - PROVIDER SPECIALTY LIST ADULT
Hospitalist
Hospitalist
Infectious Disease
Neurology
Hospitalist
Neurology
Neurology
Hospitalist
Infectious Disease
Hospitalist

## 2025-06-06 NOTE — CASE MANAGEMENT PROGRESS NOTE - NSCMPROGRESSNOTE_GEN_ALL_CORE
Discussed patient with Dr. Du and plan for transition to home today. Patient is being D/C'd home with coude catheter in place and I scheduled his follow up appt with Uro Dr. Bray for June 20 at 1:20 pm. Patient aware and understands importance of making follow up visit. [facility], no accepting HC agency plan to f/u in Uro office. Patient's HHA is at patient's apartment at this time and is going to accept pt on arrival. [employer] arranged for 12:00 noon pickup with [employer]. DC summary faxed to DANIELLE Gaitan @ fax# [phone]. Patient verbalizes understanding and agrees with plan. CM will remain available.

## 2025-06-06 NOTE — PROGRESS NOTE ADULT - SUBJECTIVE AND OBJECTIVE BOX
Neurology follow up note    KENNETH [id number]      Interval History:    Patient feels ok no new complaints.    Allergies    Originally Entered as [Unknown] reaction to [KNA] (Unknown)  Cipro (Rash)  ciprofloxacin (Rash (Mild to Mod))  baclofen (Rash)  gabapentin (Rash)    Intolerances        MEDICATIONS    acetaminophen     Tablet .. 650 milliGRAM(s) Oral every 6 hours PRN  aluminum hydroxide/magnesium hydroxide/simethicone Suspension 30 milliLiter(s) Oral every 4 hours PRN  clotrimazole 1% Cream 1 Application(s) Topical two times a day  heparin   Injectable 5000 Unit(s) SubCutaneous every 8 hours  melatonin 3 milliGRAM(s) Oral at bedtime PRN  multivitamin 1 Tablet(s) Oral daily  ondansetron Injectable 4 milliGRAM(s) IV Push every 8 hours PRN  sodium chloride 0.9%. 1000 milliLiter(s) IV Continuous <Continuous>  tiZANidine 4 milliGRAM(s) Oral every 8 hours PRN              Vital Signs Last 24 Hrs  T(C): 36.3 (06 Jun 2025 04:49), Max: 36.7 (05 Jun 2025 20:34)  T(F): 97.4 (06 Jun 2025 04:49), Max: 98.1 (05 Jun 2025 20:34)  HR: 91 (06 Jun 2025 04:49) (91 - 97)  BP: 120/78 (06 Jun 2025 04:49) (117/84 - 130/91)  BP(mean): --  RR: 18 (06 Jun 2025 04:49) (18 - 18)  SpO2: 96% (06 Jun 2025 04:49) (96% - 97%)    Parameters below as of 06 Jun 2025 04:49  Patient On (Oxygen Delivery Method): room air      REVIEW OF SYSTEMS:  CONSTITUTIONAL:  The patient denies fever, chills, or night sweats.  HEAD:  No headache.  EYES:  No double vision or blurry vision.  EARS:  No ringing in the ears.  NECK:  No neck pain.  CARDIOVASCULAR:  No chest pain.  RESPIRATORY:  No shortness of breath.  ABDOMEN:  No nausea, vomiting, or abdominal pain.  EXTREMITIES/NEUROLOGICAL:  Positive history of numbness and tingling in the extremities, not new.  MUSCULOSKELETAL:  Positive history of spasm, at present better.    PHYSICAL EXAMINATION:  HEAD:  Normocephalic, atraumatic.  EYES:  No scleral icterus.  EARS:  Hearing is intact.  NECK:  Supple.  CARDIOVASCULAR:  S1, S2 heard.  RESPIRATORY:  Air entry bilaterally.  ABDOMEN:  Soft, nontender.  EXTREMITIES:  No clubbing or cyanosis were noted.    NEUROLOGIC:  The patient is awake and alert.  Extraocular movements were intact.  Speech was fluent.  Smile symmetric.  The patient does have a central field circular cut in his left eye, which is not new.  Motor, left upper was 4/5, right upper 3+/5.  Does have a history of right-sided weakness compared to the left.  Decreased fine dexterity movements on his right compared to the left.  Bilateral lower extremities 0/5.  No movement of the toes.  Positive Villanueva signs of bilateral upper extremities.  Positive clonus in bilateral lower extremities.  No twitching or fasciculations were felt.      LABS:            Hemoglobin A1C:       Vitamin B12         RADIOLOGY    ANALYSIS AND PLAN:  This is a 49-year-old with history of multiple sclerosis and spasm.    1.For episode of spasm, most likely secondary to multiple sclerosis exacerbation of possibly underlying neurapraxia type of component of the nerves when the patient repositioning himself causing increasing transmission.  The patient was started on Zanaflex.  The patient states that his spasms are better at present.  Continue on his current dose for now.  2.For history of multiple sclerosis, it appears to be advanced.  We would recommend supportive therapy.  3.For history of neuropathy, we will check basic chemistries.  4 overall spasm better monitor electrolytes  supplement as needed   5 neurologic  wise stable dc planning   6 no new events     43 minutes of time was spent with the patient, plan of care, reviewing data, speaking to multidisciplinary healthcare team with greater than 50% of time in counseling and care coordination.    Thank you for courtesy of consultation.

## 2025-06-06 NOTE — DIETITIAN INITIAL EVALUATION ADULT - ADD RECOMMEND
1) Continue current diet as tolerated  2) Continue MVI daily  3) Monitor po intake, diet tolerance, weight trends, labs, GI function, skin integrity

## 2025-06-06 NOTE — PROGRESS NOTE ADULT - REASON FOR ADMISSION
Urosepsis

## 2025-06-07 ENCOUNTER — NON-APPOINTMENT (OUTPATIENT)
Age: 50
End: 2025-06-07

## 2025-06-07 RX ORDER — TIZANIDINE 4 MG/1
4 TABLET ORAL EVERY 8 HOURS
Refills: 0 | Status: ACTIVE | COMMUNITY
Start: 2025-06-07

## 2025-06-11 ENCOUNTER — APPOINTMENT (OUTPATIENT)
Dept: HOME HEALTH SERVICES | Facility: HOME HEALTH | Age: 50
End: 2025-06-11
Payer: MEDICARE

## 2025-06-11 VITALS
HEART RATE: 98 BPM | OXYGEN SATURATION: 98 % | SYSTOLIC BLOOD PRESSURE: 132 MMHG | RESPIRATION RATE: 17 BRPM | DIASTOLIC BLOOD PRESSURE: 88 MMHG

## 2025-06-11 PROCEDURE — 99495 TRANSJ CARE MGMT MOD F2F 14D: CPT

## 2025-06-13 ENCOUNTER — APPOINTMENT (OUTPATIENT)
Dept: HOME HEALTH SERVICES | Facility: HOME HEALTH | Age: 50
End: 2025-06-13

## 2025-06-17 NOTE — H&P ADULT - PMH
Refill passed [facility] protocol.   
Cellulitis  november 2014  MS (multiple sclerosis)  since 1995  PE (pulmonary embolism)  2013

## 2025-06-20 ENCOUNTER — APPOINTMENT (OUTPATIENT)
Dept: UROLOGY | Facility: CLINIC | Age: 50
End: 2025-06-20
Payer: MEDICARE

## 2025-06-20 PROCEDURE — 51702 INSERT TEMP BLADDER CATH: CPT

## 2025-06-20 RX ORDER — SULFAMETHOXAZOLE AND TRIMETHOPRIM 800; 160 MG/1; MG/1
800-160 TABLET ORAL
Refills: 0 | Status: COMPLETED | OUTPATIENT
Start: 2025-06-20

## 2025-06-20 RX ORDER — SULFAMETHOXAZOLE AND TRIMETHOPRIM 800; 160 MG/1; MG/1
800-160 TABLET ORAL
Qty: 1 | Refills: 0 | Status: ACTIVE | COMMUNITY
Start: 2025-06-20

## 2025-06-20 RX ADMIN — SULFAMETHOXAZOLE AND TRIMETHOPRIM MG: 800; 160 TABLET ORAL at 00:00

## 2025-06-20 NOTE — ED PROVIDER NOTE - CONSTITUTIONAL MOOD
Problem: Chronic Conditions and Co-morbidities  Goal: Patient's chronic conditions and co-morbidity symptoms are monitored and maintained or improved  Outcome: Progressing     Problem: Discharge Planning  Goal: Discharge to home or other facility with appropriate resources  Outcome: Progressing     Problem: Safety - Adult  Goal: Free from fall injury  Outcome: Progressing     Problem: ABCDS Injury Assessment  Goal: Absence of physical injury  Outcome: Progressing     Problem: Skin/Tissue Integrity  Goal: Skin integrity remains intact  Description: 1.  Monitor for areas of redness and/or skin breakdown  2.  Assess vascular access sites hourly  3.  Every 4-6 hours minimum:  Change oxygen saturation probe site  4.  Every 4-6 hours:  If on nasal continuous positive airway pressure, respiratory therapy assess nares and determine need for appliance change or resting period  Outcome: Progressing     Problem: Pain  Goal: Verbalizes/displays adequate comfort level or baseline comfort level  Outcome: Progressing      appropriate

## 2025-06-21 LAB
APPEARANCE: CLEAR
BACTERIA: ABNORMAL /HPF
BILIRUBIN URINE: NEGATIVE
BLOOD URINE: ABNORMAL
CAST: 22 /LPF
COLOR: YELLOW
EPITHELIAL CELLS: 3 /HPF
GLUCOSE QUALITATIVE U: NEGATIVE MG/DL
HYALINE CASTS: PRESENT
KETONES URINE: NEGATIVE MG/DL
LEUKOCYTE ESTERASE URINE: ABNORMAL
MICROSCOPIC-UA: NORMAL
NITRITE URINE: NEGATIVE
PH URINE: 7
PROTEIN URINE: NEGATIVE MG/DL
RED BLOOD CELLS URINE: 0 /HPF
REVIEW: NORMAL
SPECIFIC GRAVITY URINE: <1.005
UROBILINOGEN URINE: 0.2 MG/DL
WHITE BLOOD CELLS URINE: 16 /HPF

## 2025-06-26 NOTE — ED ADULT NURSE NOTE - FINAL NURSING ELECTRONIC SIGNATURE
Patient ID: Levy Gregory is a 69 y.o. male.  Referring Physician: Freddie Osborn, APRN-CNP  [address] [place] [address] [place] [address]  Primary Care Provider: Chris Huizar MD  Visit Type: Follow up visit     Location: [facility]  Diagnosis/Reason: Anemia    HPI:  Levy Gregory is a 69 y.o. male referred for consultation of anemia. Denies self or family history of cancers, blood disorders or autoimmune conditions.     Per review of records:  Hemoglobin intermittently low (11.4-15.3 g/dL) in 2019, 2021  Normal hemoglobin in 2022  Anemia noted in 2023 during prolonged hospital stay   Mild hemoglobin noted in 2024 (hgb 9.8 - 13.8 g/dL)   Hemoglobin dropped to 7.7 - 8.5 g/dL so far in 2025 (MCV 98, MCHC 29.7)    Previous Hematological Background:  Hx of hematological disorders: No - Patient denies prior hematologic history, none in family either   Hx of blood transfusions: Yes -    Hx of iron supplementation: Yes - Oral supplementation - Denies adverse effect and reaction - Agent: OTC iron pill 65 mg daily for sometime, now increased to TID. Never needed IV iron.  Hx of B12 supplementation: No - Denies any prior supplementation  Hx of folate supplementation: No - Denies any prior supplementation    Relevant medications:  He is on baby asprin 81 mg daily in the morning by cardiologist   Currently using NSAID's (Naproxen, Ibuprofen etc.): No  Currently taking any supplements: One a day MV for men. OTC oral iron TID   Pantoprazole 40 mg BID     HPI:   Levy Gregory is a 70 y/o male referred for anemia, PMH COPD, T2DM, GERD, DVT (now off AC), acalculous cholecystitis, peripheral neuropathy, type II achalasia, paraesophageal hernia s/p robotic laparoscopic myotomy w/ fundoplication c/b esophageal perforation. He is accompanied by his wife today. He went to the ED on 2/13/25 after having syncope at home and black stools (for about 2 weeks). Reports having lower GI bleed approximately 6 years ago and  having syncope at that time as well. EGD found to have old debris and dried blood and no active bleeding or varices. CT angiogram was negative for active hemorrhage. He had 1 unit PRBC. Octreotide drip stopped once confirmed no esophageal varices.     He was started on Midodrine while inpatient for low BP. Recommended to wear compression stockings, stay well-hydrated, follow up with PCP and GI, and have fibroscan to evaluate for cirrhosis and possible gastric emptying study. Started on tamiflu for Flu A.     Patient reports being sick and tired of hospitals and needles. At times feels depressed. States his legs are weak and he notices OSHEA easily, dizziness at times, lack of appetite, difficulty walking due to neuropathy and fatigue. He does watch his BP at home. Says it has been running low since 2023 health issues. Tries to hydrate well but thinks about 20 ounces or so per day.     Denies chest pain, palpitations, SOB at rest, fevers, chills, drenching night sweats, pain, recent infections, nausea, vomiting, constipation, diarrhea, dysphagia, new lumps/bumps, urinary changes, further melena, hematochezia, hematuria, epistaxis, hemoptysis or worsening in LE edema. Denies other concerns.     Interval history:  3/18/2025:  Virtual or Telephone Consent  Verbal consent was requested and obtained from Levy Gregory on this date, 06/26/25 for a telehealth visit and the patient's location was confirmed at the time of the visit.  - Patient states he continues to feel fatigued and weak  - Gets tired quickly  - Saw cardiology and will continue on Midodrine for BP   - Says no further signs of bleeding including melena or hematochezia  - Denies chest pain, palpitations, cough, fevers, SOB at rest, rashes, signs of blood loss or other concerns    5/5/2025:   - Patient presents ahead of his scheduled follow-up visit due to feeling symptomatic and therefore we wanted to recheck his lab work  - He received IV Venofer 300 mg on  3/21, 3/28 and 4/4/2025  - Labs on 5/2/2025 showed WBC 5.9, hemoglobin 11.2, MCV 98, platelets 128,000, iron saturation 13%, iron 47, ferritin 63, creatinine 1.53, alk phos 142, ALT 29, AST 25  - His hemoglobin, iron percent, iron and ferritin have improved  - He was to have a scope and VCE with Dr. Zayas but it was not fully completed due to food in the stomach   - He goes back to have a visit on 5/13/25  - He reports he continues to have some SOB, weakness/off balance, legs hurting while walking and fatigue   - Says he thinks he feels somewhat better since IV iron including some improvement in energy  - He is having a fibroscan on 6/23  - He has not been on oral iron since he started IV iron   - Continues on PPI BID, aspirin once per day and denies signs of bleeding such as melena, hematochezia, hematuria, epistaxis or other signs/symptoms   - Appetite is doing well, just can't eat much at a time due to GI surgery in the past   - Reports issues with last iron infusion, says IV burned and now has small bump where IV was, no skin changes or edema at this time he says   - Denies drenching night sweats, lymphadenopathy, dysphagia, fevers, recent or recurrent infections  - Denies chest pain, palpitations, cough, SOB at rest, rashes, worsening edema or other concerns    7/1/2025:   - Since last visit pt had EGD   - Started iron tab daily last visit   - CKD   - 6 months         PMHx:  Active Ambulatory Problems     Diagnosis Date Noted    Colon polyp 02/17/2023    Diabetic neuropathy associated with type 2 diabetes mellitus 02/17/2023    ED (erectile dysfunction) 02/17/2023    High serum bone-specific alkaline phosphatase 02/17/2023    Gout 02/17/2023    History of pulmonary embolism 02/17/2023    Hyperlipidemia 02/17/2023    Iron deficiency anemia 02/17/2023    Nocturia 02/17/2023    Obstructive sleep apnea, adult 02/17/2023    Osteopenia 02/17/2023    Vitamin D deficiency 02/17/2023    Achalasia, esophageal 06/22/2023     Type 2 diabetes mellitus with hyperglycemia, without long-term current use of insulin 06/22/2023    Orthostatic hypotension 07/06/2023    Solar purpura 07/24/2023    Anticoagulant long-term use 11/29/2023    Other dysphagia 10/31/2023    Hypertensive heart disease with heart failure 02/04/2024    Chronic obstructive pulmonary disease, unspecified COPD type (Multi) 02/04/2024    Acute renal failure superimposed on chronic kidney disease 03/21/2024    Adjustment disorder with depressed mood 02/17/2023    Edema of both lower legs due to peripheral venous insufficiency 02/10/2024    Schatzki's ring 03/21/2024    Coronary artery disease involving native coronary artery of native heart without angina pectoris 04/09/2024    Deep vein thrombosis (DVT) of left lower extremity 04/09/2024    Macrocytosis 07/25/2024    Closed fracture of right hip requiring operative repair with routine healing 08/15/2024    S/P right hip fracture 09/03/2024    Chronic kidney disease, stage 5 (Multi) 09/03/2024    Other cirrhosis of liver 04/10/2025     Resolved Ambulatory Problems     Diagnosis Date Noted    Acne 02/17/2023    Decreased GFR 02/17/2023    Esophageal mass 02/17/2023    Esophageal stricture 02/17/2023    Gastric ulcer 02/17/2023    History of DVT (deep vein thrombosis) 02/17/2023    Microalbuminuria 02/17/2023    Rib pain on left side 02/17/2023    Sialoadenitis 02/17/2023    Dysphagia 02/17/2023    Obesity (BMI 30-39.9) 02/17/2023    Deep vein thrombosis (DVT) of popliteal vein of left lower extremity 06/22/2023    Dislodged gastrostomy tube 06/22/2023    Empyema (Multi) 06/22/2023    Gastrostomy in place (Multi) 06/22/2023    Mediastinitis 06/22/2023    Fall 07/06/2023    Malfunction of jejunostomy tube (Multi) 07/10/2023    Esophageal perforation 07/20/2023    Chronic atrial fibrillation (Multi) 07/24/2023    Hyponatremia 07/28/2023    Malnutrition of moderate degree (Multi) 10/03/2023    Impaired oral gastric feeding tube,  initial encounter 10/03/2023    Moderate protein-calorie malnutrition (Multi) 10/19/2023    Hypotension due to drugs 11/04/2023    Dysfunction of both eustachian tubes 11/21/2023    Elevated liver function tests 11/29/2023    Hypotension 11/30/2023    Infection requiring contact isolation precautions 12/07/2023    Confusion 01/22/2024    Encounter for immunization 02/04/2024    Hypotension 02/07/2024    Pedal edema 02/13/2024    At risk for falls 02/26/2024    Type 2 diabetes mellitus with diabetic polyneuropathy, with long-term current use of insulin 03/08/2024    Generalized abdominal pain 03/08/2024    Dizziness 03/08/2024    Syncope and collapse 03/08/2024    Abnormal ECG 11/26/2022    Prolonged QT interval 03/21/2024    ACS (acute coronary syndrome) (Multi) 08/16/2023    Acute posthemorrhagic anemia 08/15/2023    Acute respiratory failure 02/12/2023    CORA (acute kidney injury) 03/21/2024    Anemia 03/21/2024    Bacteremia 05/22/2023    Balance problems 11/20/2023    Bruise, trunk 03/21/2024    Closed head injury 03/21/2024    Coagulopathy (Multi) 03/21/2024    Difficulty walking 11/20/2023    Do not resuscitate 08/15/2023    Epistaxis 03/21/2024    Fatigue 09/06/2023    Food bolus obstruction of intestine (Multi) 03/21/2024    History of anemia 03/21/2024    History of diabetes mellitus 03/21/2024    History of infectious disease 03/21/2024    Hypernatremia 03/21/2024    Hypocalcemia 03/21/2024    Hypokalemia 03/21/2024    Hypoxia 03/21/2024    Lactic acidosis 03/21/2024    Leukocytosis 03/21/2024    Low kidney function 02/17/2023    Lung field abnormal 05/24/2023    Malnutrition (Multi) 03/21/2024    Morbid obesity (Multi) 03/21/2024    Nosocomial condition 08/15/2023    Onychodystrophy 11/20/2023    Onychomycosis 11/20/2023    Pain in toes of both feet 11/20/2023    Pleural effusion exudative 03/21/2024    CAP (community acquired pneumonia) 08/15/2023    Post-operative pain 03/21/2024    Postoperative septic  shock 03/21/2024    Other pulmonary embolism without acute cor pulmonale 02/10/2024    Pulmonary embolism 02/12/2023    Respiratory distress 03/21/2024    Rib contusion, left, sequela 03/21/2024    History of inferior vena caval filter placement 03/21/2024    Status post endoscopy 03/21/2024    Vagal arrhythmia 07/23/2018    Mild left ventricular systolic dysfunction 04/09/2024    Upper respiratory tract infection 05/13/2024    Palpitations 05/21/2024    Weight loss 07/25/2024    Closed right hip fracture, initial encounter 08/15/2024    Asthmatic bronchitis without complication (HHS-HCC) 10/29/2024    GI bleed 02/13/2025     Past Medical History:   Diagnosis Date    ARF (acute renal failure)     Arthritis     Asthmatic bronchitis (HHS-HCC)     Bilateral leg edema     Cirrhosis (Multi)     Cirrhosis of liver (Multi)     CKD (chronic kidney disease)     Contusion of abdominal wall, initial encounter 01/12/2021    COPD (chronic obstructive pulmonary disease) (Multi)     Coronary artery disease     COVID-19     Depression     Diabetes mellitus (Multi)     Diabetic neuropathy (Multi)     DVT (deep venous thrombosis) (Multi)     Erectile dysfunction     Fractures     GERD (gastroesophageal reflux disease)     Heart failure     Hemoptysis 05/12/2020    Herpes labialis     Hiatal hernia     Irregular heart beat     OAB (overactive bladder)     Pain in left knee     PE (pulmonary thromboembolism) (Multi)     Peripheral neuropathy     Peripheral vascular insufficiency     Personal history of other diseases of the respiratory system 06/19/2019    Sleep apnea     Type 2 diabetes mellitus     Wears glasses       PSHx:  Past Surgical History:   Procedure Laterality Date    COLONOSCOPY      CONDUIT REPLACEMENT  12/07/2023    Gastric conduit revision; mini laparotomy, General Ex-lap, ISA, Gastric pull up with anastmosis in neck, MIGUELINA to bulb sx. New J tube.    ESOPHAGOGASTRECTOMY      with pull through    ESOPHAGOGASTRODUODENOSCOPY       with stents x2    GASTROSTOMY TUBE PLACEMENT N/A     REMOVED    HIP ARTHROPLASTY Right     IR CVC PICC  2023    IR CVC PICC 2023 [facility]PAT[facility] - REMOVED    JOINT REPLACEMENT Right     RIGHT HIP REPLACEMENT    OTHER SURGICAL HISTORY  2019    [place] filter placement - REMOVED    PARAESOPHAGEAL HERNIA REPAIR      with fundoplication    UMBILICAL HERNIA REPAIR N/A     UPPER GASTROINTESTINAL ENDOSCOPY        FHx:   Maternal Grandmother: DM  Mother: No medical issues   Father: Heart disease  Siblings: 1 full biological sister. 4 half brother/sisters. No cancer or blood disorders.   Children: 2 boys, age 36 and 39. Healthy     Social Hx:  Levy Gregory    reports that he quit smoking about 2 years ago. His smoking use included cigars. He started smoking about 35 years ago. He has been exposed to tobacco smoke. He has never used smokeless tobacco.  He  reports no history of alcohol use.  He  reports no history of drug use.    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 From [place]. No special diets or restrictions.     Social History     Socioeconomic History    Marital status:    Tobacco Use    Smoking status: Former     Types: Cigars     Start date: 1989     Quit date: 2023     Years since quittin.3     Passive exposure: Past    Smokeless tobacco: Never   Vaping Use    Vaping status: Never Used   Substance and Sexual Activity    Alcohol use: Never    Drug use: Never    Sexual activity: Defer     Social Drivers of Health     Financial Resource Strain: Low Risk  (2025)    Overall Financial Resource Strain (CARDIA)     Difficulty of Paying Living Expenses: Not hard at all   Food Insecurity: No Food Insecurity (2025)    Hunger Vital Sign     Worried About Running Out of Food in the Last Year: Never true     Ran Out of Food in the Last Year: Never true   Transportation Needs: No Transportation Needs (2025)     PRAPARE - Transportation     Lack of Transportation (Medical): No     Lack of Transportation (Non-Medical): No   Physical Activity: Unknown (10/20/2023)    Exercise Vital Sign     Minutes of Exercise per Session: 30 min   Stress: Stress Concern Present (10/20/2023)    Citizen of [place] of Occupational Health - Occupational Stress Questionnaire     Feeling of Stress : To some extent   Social Connections: Socially Isolated (10/20/2023)    Social Connection and Isolation Panel [NHANES]     Frequency of Communication with Friends and Family: More than three times a week     Attends Temple Services: Never     Active Member of Clubs or Organizations: No     Attends Club or Organization Meetings: Never     Marital Status:    Intimate Partner Violence: Not At Risk (2/13/2025)    Humiliation, Afraid, Rape, and Kick questionnaire     Fear of Current or Ex-Partner: No     Emotionally Abused: No     Physically Abused: No     Sexually Abused: No   Housing Stability: Low Risk  (2/13/2025)    Housing Stability Vital Sign     Unable to Pay for Housing in the Last Year: No     Number of Times Moved in the Last Year: 1     Homeless in the Last Year: No       Cancer Screenings:  Upper EGD: 1/4/21, 11/25/22, 2/11/23, 2/23/23  More Recently:   - 2/14/25: Findings  Patient is s/p esophagectomy with gastric pull through  Large amount of old blood and food debris in the stomach body and antrum that could not be lavaged or suctioned. There was otherwise no fresh blood seen to extent reached. Procedure aborted in the antrum due to poor visualization and to minimize risk of aspiration  - 2/17/25: Findings  Patient is s/p esophagectomy with gastric pull through  Moderate amount of old food debris and retained gastric fluid in the stomach body and antrum precluding visualization of stomach body. There was otherwise no evidence of active bleeding. The pylorus was characterized by edematous mucosa but traversable with minimal  resistance.  The duodenum appeared normal.  Colonoscopy: 3/12/2021 (repeat in 5 years)      Medications and allergies reviewed in EMR.    ROS:  Review of Systems - Oncology   10 point review of systems negative except as state in HPI.    Vitals & Statistics:  Objective   Visit Vitals  Smoking Status Former     Visit Vitals  Smoking Status Former     Physical Exam  Vitals reviewed.   Constitutional:       Appearance: Normal appearance.   HENT:      Head: Normocephalic and atraumatic.      Nose: Nose normal.      Mouth/Throat:      Mouth: Mucous membranes are moist.      Pharynx: Oropharynx is clear.   Eyes:      Extraocular Movements: Extraocular movements intact.      Conjunctiva/sclera: Conjunctivae normal.      Pupils: Pupils are equal, round, and reactive to light.   Cardiovascular:      Rate and Rhythm: Normal rate and regular rhythm.      Pulses: Normal pulses.      Heart sounds: Normal heart sounds.   Pulmonary:      Effort: Pulmonary effort is normal.      Breath sounds: Normal breath sounds.   Abdominal:      General: Bowel sounds are normal.      Palpations: Abdomen is soft.   Musculoskeletal:         General: Normal range of motion.      Cervical back: Normal range of motion.   Skin:     General: Skin is warm and dry.   Neurological:      General: No focal deficit present.      Mental Status: He is alert and oriented to person, place, and time.   Psychiatric:         Mood and Affect: Mood normal.         Behavior: Behavior normal.         Thought Content: Thought content normal.         Judgment: Judgment normal.       Results:  Lab Results   Component Value Date    WBC 5.1 06/24/2025    NEUTROABS 2.85 06/24/2025    IGABSOL 0.04 06/24/2025    LYMPHSABS 1.57 06/24/2025    MONOSABS 0.50 06/24/2025    EOSABS 0.11 06/24/2025    BASOSABS 0.03 06/24/2025    RBC 3.73 (L) 06/24/2025     06/24/2025    MCHC 31.0 (L) 06/24/2025    HGB 11.5 (L) 06/24/2025    HCT 37.1 (L) 06/24/2025     06/24/2025     Lab  "Results   Component Value Date    RETICCTPCT 3.2 (H) 03/10/2025      Lab Results   Component Value Date    CREATININE 1.47 (H) 06/24/2025    BUN 31 (H) 06/24/2025    EGFR 52 (L) 06/24/2025     06/24/2025    K 4.6 06/24/2025     06/24/2025    CO2 26 06/24/2025      Lab Results   Component Value Date    ALT 29 06/24/2025    AST 27 06/24/2025    ALKPHOS 140 (H) 06/24/2025    BILITOT 0.6 06/24/2025      Lab Results   Component Value Date    TSH 1.22 06/24/2025     Lab Results   Component Value Date    TSH 1.22 06/24/2025    THYROIDPAB <28 07/24/2023     Lab Results   Component Value Date    IRON 101 06/24/2025    TIBC 413 06/24/2025    FERRITIN 75 06/24/2025      Lab Results   Component Value Date    GMLZKWIX80 734 03/10/2025      Lab Results   Component Value Date    FOLATE 17.3 06/24/2025     Lab Results   Component Value Date    FERNANDO Positive (A) 03/10/2025    RF <10 03/10/2025    SEDRATE 22 (H) 03/10/2025      Lab Results   Component Value Date    CRP 0.73 03/10/2025      No results found for: \"JOSELUIS\"  Lab Results   Component Value Date     03/10/2025     Lab Results   Component Value Date    HAPTOGLOBIN 135 03/10/2025     Lab Results   Component Value Date    SPEP  03/10/2025     Aberrant bands detected. See immunofixation. Hypoalbuminemia.        Lab Results   Component Value Date    IGG 1,540 06/24/2025    IGM 91 06/24/2025     06/24/2025     Lab Results   Component Value Date    HEPAIGM NONREACTIVE 07/24/2023    HEPBCIGM NONREACTIVE 07/24/2023    HEPBSAG NONREACTIVE 07/24/2023    HEPCAB REACTIVE (A) 07/24/2023       Assessment/Plan:  Levy Gregory is a 68 y/o referred for consultation of anemia 2/2 recent GI bleed. Reports no further melena occurring since hospital visit.      I reviewed patient's chart including but not limited to labs, imaging, surgical/procedure notes, pathology, hospital notes, doctor's notes.    2/19/2025 results:  WBC WNL  Normocytic, hypochromic anemia. Hgb 8.5, " RBC 2.91, Hematocrit 28.6%  Platelets WNL  Creatinine 1.37, GFR 56, calcium 8.0    Plan:  Discussed possible etiologies including multifactorial, nutritional deficiencies, anemia of chronic disease, malabsorption, hemopathy, medications, bleeding, malignancy, etc. Will start hematological workup today.    Anemia   Patient declined recommended labs today due to not having enough time. States he has a FUV on Monday with his primary care, he will go for labs after that visit in case PCP also orders testing since patient reports he is a very difficult stick.  Continue oral iron as already prescribed, TID, monitor for side effects, which were discussed  SW to meet with patient today for depression score/report   Has FUV with GI on 4/10/25  RTC on 3/18/25 via virtual/telehealth visit - F/U sooner if needed/urgent    I had an extensive discussion with the patient regarding the diagnosis and discussed the plan of therapy, including general considerations regarding side effects and outcomes. Pt understood and gave appropriate teach back about the plan of care. All questions were answered to the patient's satisfaction. The patient is instructed to contact us at any time if questions or problems arise. Thank you for the opportunity to participate in the care of this very pleasant patient.    Total time = 50 minutes. 50% or more of this time was spent in counseling and/or coordination of care including reviewing medical history/radiology/labs, examining patient, formulating outlined plan with team, and discussing plan with patient/family.    Interval History:   3/18/2025:   - Labs from initial workup showed: WBC 5.6, normal dif, hgb 8.8, hematocrit 30.7%, , MCHC 28.7, plt 184,000, retic 3.2%, sed rate 22, FERNANDO + with negative reflex, Cr 1.47, GFR 52, calcium 8.1, alk phos 205, ALT 37, AST 32, glucose 259, iron saturation 6%, ferritin 46, serum iron 21, , CRP 0.73, haptoglobin 135, JOSELUIS neg, IgG 1,670, IgA 186, IgM  "78, Ig Kappa FLC 7.08, Ig Lambda FLC 7.27, FLC ratio 0.97, SPEP \"Several discrete bands are detected within the gamma region suggesting an oligoclonal or reactive pattern.  Repeat testing is recommended after the resolution of any acute illness to monitor the evolution of this band.\"  - Labs show SHARONA   - He has been on oral iron   - SHARONA likely 2/2 recent GI bleeding  - Sees GI next month for follow up   - Discussed we will proceed with IV Venofer   - Drug information including risks and benefits discussed with patient including risk of hypersensitivity, patient provided informed consent to proceed   - Discussed that we will closely monitor his labs and once iron is improved if he remains anemic we will likely recommend additional workup   - He can continue his oral iron   - RTC in 8 weeks after IV venofer, early June, for repeat labs and FUV     5/5/2025:   - Patient presents ahead of his scheduled follow-up visit due to feeling symptomatic and therefore we wanted to recheck his lab work  - He received IV Venofer 300 mg on 3/21, 3/28 and 4/4/2025  - Stopped oral iron after starting IV venofer   - Labs on 5/2/2025 showed WBC 5.9, hemoglobin 11.2, MCV 98, platelets 128,000, iron saturation 13%, iron 47, ferritin 63, creatinine 1.53, alk phos 142, ALT 29, AST 25  - His hemoglobin, iron percent, iron and ferritin have improved  - With iron saturation still below 15% and some mild anemia we discussed 1-2 IV iron doses or restarting oral iron, he opted for oral iron   - Discussed potential side effects,  from PPI by at least two hours and taking vitamin C 500 mg with iron tab (ferrous sulfate 325 mg/daily)   - He is aware I will confirm plan for oral iron with Dr. Zayas to ensure no contraindications from a GI standpoint   - He does have an isolated thrombocytopenia which we will repeat with next blood work, may be 2/2 possible cirrhosis noted on Fibroscan - has repeat Fibroscan on 6/23   - He continues to " follow with urology for BPH and overactive bladder  - He has seen gastroenterology, Dr. Zayas, since last visit and continues on PPI twice a day  - He was to have scoping done on 4/30/2025 but it was not completed due to food in his stomach, has a visit on 5/13/25 with Dr. Zayas for office visit   - We will continue to closely monitor his labs and symptoms, so he will RTC in 6 weeks with repeat cbc-d, cmp, iron panel, ferritin, immunoglobulins, FLC, SPEP a few days before (repeat SPEP due to reactive results, may be improved with no further GI blood loss).       There are no diagnoses linked to this encounter.        Freddie Osborn, APRN-CNP      27-[email or web address] 15:50

## 2025-07-23 ENCOUNTER — INPATIENT (INPATIENT)
Facility: HOSPITAL | Age: 50
LOS: 6 days | Discharge: HOME | DRG: 690 | End: 2025-07-30
Attending: INTERNAL MEDICINE | Admitting: INTERNAL MEDICINE
Payer: MEDICARE

## 2025-07-23 VITALS
DIASTOLIC BLOOD PRESSURE: 100 MMHG | HEART RATE: 108 BPM | RESPIRATION RATE: 18 BRPM | WEIGHT: 279.99 LBS | HEIGHT: 70 IN | SYSTOLIC BLOOD PRESSURE: 160 MMHG | OXYGEN SATURATION: 96 % | TEMPERATURE: 98 F

## 2025-07-23 DIAGNOSIS — G35 MULTIPLE SCLEROSIS: ICD-10-CM

## 2025-07-23 DIAGNOSIS — A41.9 SEPSIS, UNSPECIFIED ORGANISM: ICD-10-CM

## 2025-07-23 DIAGNOSIS — N39.0 URINARY TRACT INFECTION, SITE NOT SPECIFIED: ICD-10-CM

## 2025-07-23 DIAGNOSIS — Z86.718 PERSONAL HISTORY OF OTHER VENOUS THROMBOSIS AND EMBOLISM: ICD-10-CM

## 2025-07-23 DIAGNOSIS — Z93.6 OTHER ARTIFICIAL OPENINGS OF URINARY TRACT STATUS: Chronic | ICD-10-CM

## 2025-07-23 DIAGNOSIS — Z29.9 ENCOUNTER FOR PROPHYLACTIC MEASURES, UNSPECIFIED: ICD-10-CM

## 2025-07-23 LAB
ALBUMIN SERPL ELPH-MCNC: 3.4 G/DL — SIGNIFICANT CHANGE UP (ref 3.3–5)
ALP SERPL-CCNC: 110 U/L — SIGNIFICANT CHANGE UP (ref 40–120)
ALT FLD-CCNC: 91 U/L — HIGH (ref 12–78)
ANION GAP SERPL CALC-SCNC: 4 MMOL/L — LOW (ref 5–17)
APPEARANCE UR: ABNORMAL
AST SERPL-CCNC: 44 U/L — HIGH (ref 15–37)
BACTERIA # UR AUTO: ABNORMAL /HPF
BASOPHILS # BLD AUTO: 0.08 K/UL — SIGNIFICANT CHANGE UP (ref 0–0.2)
BASOPHILS NFR BLD AUTO: 0.5 % — SIGNIFICANT CHANGE UP (ref 0–2)
BILIRUB SERPL-MCNC: 0.6 MG/DL — SIGNIFICANT CHANGE UP (ref 0.2–1.2)
BILIRUB UR-MCNC: NEGATIVE — SIGNIFICANT CHANGE UP
BUN SERPL-MCNC: 17 MG/DL — SIGNIFICANT CHANGE UP (ref 7–23)
CALCIUM SERPL-MCNC: 9.8 MG/DL — SIGNIFICANT CHANGE UP (ref 8.5–10.1)
CHLORIDE SERPL-SCNC: 110 MMOL/L — HIGH (ref 96–108)
CO2 SERPL-SCNC: 27 MMOL/L — SIGNIFICANT CHANGE UP (ref 22–31)
COLOR SPEC: YELLOW — SIGNIFICANT CHANGE UP
CREAT SERPL-MCNC: 1.3 MG/DL — SIGNIFICANT CHANGE UP (ref 0.5–1.3)
DIFF PNL FLD: ABNORMAL
EGFR: 67 ML/MIN/1.73M2 — SIGNIFICANT CHANGE UP
EGFR: 67 ML/MIN/1.73M2 — SIGNIFICANT CHANGE UP
EOSINOPHIL # BLD AUTO: 0.08 K/UL — SIGNIFICANT CHANGE UP (ref 0–0.5)
EOSINOPHIL NFR BLD AUTO: 0.5 % — SIGNIFICANT CHANGE UP (ref 0–6)
EPI CELLS # UR: PRESENT
GLUCOSE SERPL-MCNC: 129 MG/DL — HIGH (ref 70–99)
GLUCOSE UR QL: NEGATIVE MG/DL — SIGNIFICANT CHANGE UP
HCT VFR BLD CALC: 50.3 % — HIGH (ref 39–50)
HGB BLD-MCNC: 16.8 G/DL — SIGNIFICANT CHANGE UP (ref 13–17)
IMM GRANULOCYTES # BLD AUTO: 0.06 K/UL — SIGNIFICANT CHANGE UP (ref 0–0.07)
IMM GRANULOCYTES NFR BLD AUTO: 0.4 % — SIGNIFICANT CHANGE UP (ref 0–0.9)
KETONES UR QL: NEGATIVE MG/DL — SIGNIFICANT CHANGE UP
LACTATE SERPL-SCNC: 1.7 MMOL/L — SIGNIFICANT CHANGE UP (ref 0.7–2)
LACTATE SERPL-SCNC: 2.8 MMOL/L — HIGH (ref 0.7–2)
LEUKOCYTE ESTERASE UR-ACNC: ABNORMAL
LYMPHOCYTES # BLD AUTO: 1.71 K/UL — SIGNIFICANT CHANGE UP (ref 1–3.3)
LYMPHOCYTES NFR BLD AUTO: 10.5 % — LOW (ref 13–44)
MCHC RBC-ENTMCNC: 30.2 PG — SIGNIFICANT CHANGE UP (ref 27–34)
MCHC RBC-ENTMCNC: 33.4 G/DL — SIGNIFICANT CHANGE UP (ref 32–36)
MCV RBC AUTO: 90.5 FL — SIGNIFICANT CHANGE UP (ref 80–100)
MONOCYTES # BLD AUTO: 1.36 K/UL — HIGH (ref 0–0.9)
MONOCYTES NFR BLD AUTO: 8.4 % — SIGNIFICANT CHANGE UP (ref 2–14)
NEUTROPHILS # BLD AUTO: 12.99 K/UL — HIGH (ref 1.8–7.4)
NEUTROPHILS NFR BLD AUTO: 79.7 % — HIGH (ref 43–77)
NITRITE UR-MCNC: NEGATIVE — SIGNIFICANT CHANGE UP
NRBC # BLD AUTO: 0 K/UL — SIGNIFICANT CHANGE UP (ref 0–0)
NRBC # FLD: 0 K/UL — SIGNIFICANT CHANGE UP (ref 0–0)
NRBC BLD AUTO-RTO: 0 /100 WBCS — SIGNIFICANT CHANGE UP (ref 0–0)
PH UR: 7.5 — SIGNIFICANT CHANGE UP (ref 5–8)
PLATELET # BLD AUTO: 298 K/UL — SIGNIFICANT CHANGE UP (ref 150–400)
PMV BLD: 9.8 FL — SIGNIFICANT CHANGE UP (ref 7–13)
POTASSIUM SERPL-MCNC: 4.1 MMOL/L — SIGNIFICANT CHANGE UP (ref 3.5–5.3)
POTASSIUM SERPL-SCNC: 4.1 MMOL/L — SIGNIFICANT CHANGE UP (ref 3.5–5.3)
PROT SERPL-MCNC: 8.2 G/DL — SIGNIFICANT CHANGE UP (ref 6–8.3)
PROT UR-MCNC: 300 MG/DL
RBC # BLD: 5.56 M/UL — SIGNIFICANT CHANGE UP (ref 4.2–5.8)
RBC # FLD: 14.4 % — SIGNIFICANT CHANGE UP (ref 10.3–14.5)
RBC CASTS # UR COMP ASSIST: 8 /HPF — SIGNIFICANT CHANGE UP
SODIUM SERPL-SCNC: 141 MMOL/L — SIGNIFICANT CHANGE UP (ref 135–145)
SP GR SPEC: 1.01 — SIGNIFICANT CHANGE UP (ref 1–1.03)
UROBILINOGEN FLD QL: 0.2 MG/DL — SIGNIFICANT CHANGE UP (ref 0.2–1)
WBC # BLD: 16.28 K/UL — HIGH (ref 3.8–10.5)
WBC # FLD AUTO: 16.28 K/UL — HIGH (ref 3.8–10.5)
WBC UR QL: ABNORMAL /HPF (ref 0–5)

## 2025-07-23 PROCEDURE — 85025 COMPLETE CBC W/AUTO DIFF WBC: CPT

## 2025-07-23 PROCEDURE — 71045 X-RAY EXAM CHEST 1 VIEW: CPT

## 2025-07-23 PROCEDURE — 71045 X-RAY EXAM CHEST 1 VIEW: CPT | Mod: 26

## 2025-07-23 PROCEDURE — 83605 ASSAY OF LACTIC ACID: CPT

## 2025-07-23 PROCEDURE — 99285 EMERGENCY DEPT VISIT HI MDM: CPT

## 2025-07-23 PROCEDURE — 80053 COMPREHEN METABOLIC PANEL: CPT

## 2025-07-23 PROCEDURE — 36415 COLL VENOUS BLD VENIPUNCTURE: CPT

## 2025-07-23 PROCEDURE — 81001 URINALYSIS AUTO W/SCOPE: CPT

## 2025-07-23 RX ORDER — CLOTRIMAZOLE 1 G/100G
1 CREAM TOPICAL
Refills: 0 | DISCHARGE

## 2025-07-23 RX ORDER — TIZANIDINE 4 MG/1
1 TABLET ORAL
Refills: 0 | DISCHARGE

## 2025-07-23 RX ORDER — MELATONIN 5 MG
3 TABLET ORAL AT BEDTIME
Refills: 0 | Status: DISCONTINUED | OUTPATIENT
Start: 2025-07-23 | End: 2025-07-30

## 2025-07-23 RX ORDER — PIPERACILLIN-TAZO-DEXTROSE,ISO 3.375G/5
3.38 IV SOLUTION, PIGGYBACK PREMIX FROZEN(ML) INTRAVENOUS ONCE
Refills: 0 | Status: COMPLETED | OUTPATIENT
Start: 2025-07-23 | End: 2025-07-23

## 2025-07-23 RX ORDER — CEFEPIME 2 G/20ML
INJECTION, POWDER, FOR SOLUTION INTRAVENOUS
Refills: 0 | Status: DISCONTINUED | OUTPATIENT
Start: 2025-07-23 | End: 2025-07-27

## 2025-07-23 RX ORDER — B1/B2/B3/B5/B6/B12/VIT C/FOLIC 500-0.5 MG
1 TABLET ORAL DAILY
Refills: 0 | Status: DISCONTINUED | OUTPATIENT
Start: 2025-07-23 | End: 2025-07-30

## 2025-07-23 RX ORDER — ONDANSETRON HCL/PF 4 MG/2 ML
4 VIAL (ML) INJECTION EVERY 8 HOURS
Refills: 0 | Status: DISCONTINUED | OUTPATIENT
Start: 2025-07-23 | End: 2025-07-30

## 2025-07-23 RX ORDER — MAGNESIUM, ALUMINUM HYDROXIDE 200-200 MG
30 TABLET,CHEWABLE ORAL EVERY 4 HOURS
Refills: 0 | Status: DISCONTINUED | OUTPATIENT
Start: 2025-07-23 | End: 2025-07-30

## 2025-07-23 RX ORDER — CEFEPIME 2 G/20ML
2000 INJECTION, POWDER, FOR SOLUTION INTRAVENOUS ONCE
Refills: 0 | Status: COMPLETED | OUTPATIENT
Start: 2025-07-23 | End: 2025-07-23

## 2025-07-23 RX ORDER — HEPARIN SODIUM 1000 [USP'U]/ML
5000 INJECTION INTRAVENOUS; SUBCUTANEOUS EVERY 8 HOURS
Refills: 0 | Status: DISCONTINUED | OUTPATIENT
Start: 2025-07-23 | End: 2025-07-23

## 2025-07-23 RX ORDER — CEFEPIME 2 G/20ML
2000 INJECTION, POWDER, FOR SOLUTION INTRAVENOUS EVERY 8 HOURS
Refills: 0 | Status: DISCONTINUED | OUTPATIENT
Start: 2025-07-24 | End: 2025-07-27

## 2025-07-23 RX ORDER — CLOTRIMAZOLE 1 G/100G
1 CREAM TOPICAL
Refills: 0 | Status: DISCONTINUED | OUTPATIENT
Start: 2025-07-23 | End: 2025-07-30

## 2025-07-23 RX ORDER — ACETAMINOPHEN 500 MG/5ML
1000 LIQUID (ML) ORAL ONCE
Refills: 0 | Status: COMPLETED | OUTPATIENT
Start: 2025-07-23 | End: 2025-07-23

## 2025-07-23 RX ORDER — TIZANIDINE 4 MG/1
4 TABLET ORAL EVERY 8 HOURS
Refills: 0 | Status: DISCONTINUED | OUTPATIENT
Start: 2025-07-23 | End: 2025-07-28

## 2025-07-23 RX ORDER — ACETAMINOPHEN 500 MG/5ML
650 LIQUID (ML) ORAL EVERY 6 HOURS
Refills: 0 | Status: DISCONTINUED | OUTPATIENT
Start: 2025-07-23 | End: 2025-07-30

## 2025-07-23 RX ADMIN — Medication 1000 MILLILITER(S): at 14:46

## 2025-07-23 RX ADMIN — Medication 200 GRAM(S): at 15:13

## 2025-07-23 RX ADMIN — Medication 1000 MILLIGRAM(S): at 15:46

## 2025-07-23 RX ADMIN — CEFEPIME 100 MILLIGRAM(S): 2 INJECTION, POWDER, FOR SOLUTION INTRAVENOUS at 21:09

## 2025-07-23 RX ADMIN — Medication 1300 MILLILITER(S): at 17:11

## 2025-07-23 RX ADMIN — Medication 400 MILLIGRAM(S): at 14:46

## 2025-07-24 ENCOUNTER — APPOINTMENT (OUTPATIENT)
Dept: HOME HEALTH SERVICES | Facility: HOME HEALTH | Age: 50
End: 2025-07-24

## 2025-07-24 ENCOUNTER — NON-APPOINTMENT (OUTPATIENT)
Age: 50
End: 2025-07-24

## 2025-07-24 LAB
ALBUMIN SERPL ELPH-MCNC: 2.7 G/DL — LOW (ref 3.3–5)
ALP SERPL-CCNC: 83 U/L — SIGNIFICANT CHANGE UP (ref 40–120)
ALT FLD-CCNC: 78 U/L — SIGNIFICANT CHANGE UP (ref 12–78)
ANION GAP SERPL CALC-SCNC: 6 MMOL/L — SIGNIFICANT CHANGE UP (ref 5–17)
AST SERPL-CCNC: 39 U/L — HIGH (ref 15–37)
BILIRUB SERPL-MCNC: 0.8 MG/DL — SIGNIFICANT CHANGE UP (ref 0.2–1.2)
BUN SERPL-MCNC: 11 MG/DL — SIGNIFICANT CHANGE UP (ref 7–23)
CALCIUM SERPL-MCNC: 9.2 MG/DL — SIGNIFICANT CHANGE UP (ref 8.5–10.1)
CHLORIDE SERPL-SCNC: 112 MMOL/L — HIGH (ref 96–108)
CO2 SERPL-SCNC: 26 MMOL/L — SIGNIFICANT CHANGE UP (ref 22–31)
CREAT SERPL-MCNC: 0.82 MG/DL — SIGNIFICANT CHANGE UP (ref 0.5–1.3)
EGFR: 108 ML/MIN/1.73M2 — SIGNIFICANT CHANGE UP
EGFR: 108 ML/MIN/1.73M2 — SIGNIFICANT CHANGE UP
GLUCOSE SERPL-MCNC: 103 MG/DL — HIGH (ref 70–99)
HCT VFR BLD CALC: 43.5 % — SIGNIFICANT CHANGE UP (ref 39–50)
HGB BLD-MCNC: 14.3 G/DL — SIGNIFICANT CHANGE UP (ref 13–17)
MCHC RBC-ENTMCNC: 30.4 PG — SIGNIFICANT CHANGE UP (ref 27–34)
MCHC RBC-ENTMCNC: 32.9 G/DL — SIGNIFICANT CHANGE UP (ref 32–36)
MCV RBC AUTO: 92.6 FL — SIGNIFICANT CHANGE UP (ref 80–100)
NRBC # BLD AUTO: 0 K/UL — SIGNIFICANT CHANGE UP (ref 0–0)
NRBC # FLD: 0 K/UL — SIGNIFICANT CHANGE UP (ref 0–0)
NRBC BLD AUTO-RTO: 0 /100 WBCS — SIGNIFICANT CHANGE UP (ref 0–0)
PLATELET # BLD AUTO: 215 K/UL — SIGNIFICANT CHANGE UP (ref 150–400)
PMV BLD: 9.8 FL — SIGNIFICANT CHANGE UP (ref 7–13)
POTASSIUM SERPL-MCNC: 3.5 MMOL/L — SIGNIFICANT CHANGE UP (ref 3.5–5.3)
POTASSIUM SERPL-SCNC: 3.5 MMOL/L — SIGNIFICANT CHANGE UP (ref 3.5–5.3)
PROT SERPL-MCNC: 7 G/DL — SIGNIFICANT CHANGE UP (ref 6–8.3)
RBC # BLD: 4.7 M/UL — SIGNIFICANT CHANGE UP (ref 4.2–5.8)
RBC # FLD: 14.3 % — SIGNIFICANT CHANGE UP (ref 10.3–14.5)
SODIUM SERPL-SCNC: 144 MMOL/L — SIGNIFICANT CHANGE UP (ref 135–145)
WBC # BLD: 9.34 K/UL — SIGNIFICANT CHANGE UP (ref 3.8–10.5)
WBC # FLD AUTO: 9.34 K/UL — SIGNIFICANT CHANGE UP (ref 3.8–10.5)

## 2025-07-24 PROCEDURE — 80053 COMPREHEN METABOLIC PANEL: CPT

## 2025-07-24 PROCEDURE — 36415 COLL VENOUS BLD VENIPUNCTURE: CPT

## 2025-07-24 PROCEDURE — G0545: CPT

## 2025-07-24 PROCEDURE — 87086 URINE CULTURE/COLONY COUNT: CPT

## 2025-07-24 PROCEDURE — 83605 ASSAY OF LACTIC ACID: CPT

## 2025-07-24 PROCEDURE — 99222 1ST HOSP IP/OBS MODERATE 55: CPT

## 2025-07-24 PROCEDURE — 81001 URINALYSIS AUTO W/SCOPE: CPT

## 2025-07-24 PROCEDURE — 87040 BLOOD CULTURE FOR BACTERIA: CPT

## 2025-07-24 PROCEDURE — 85027 COMPLETE CBC AUTOMATED: CPT

## 2025-07-24 PROCEDURE — 71045 X-RAY EXAM CHEST 1 VIEW: CPT

## 2025-07-24 PROCEDURE — 85025 COMPLETE CBC W/AUTO DIFF WBC: CPT

## 2025-07-24 RX ORDER — MAGNESIUM HYDROXIDE 400 MG/5ML
30 SUSPENSION ORAL DAILY
Refills: 0 | Status: DISCONTINUED | OUTPATIENT
Start: 2025-07-24 | End: 2025-07-30

## 2025-07-24 RX ORDER — LACTOBACILLUS ACIDOPHILUS/PECT 75 MM-100
1 CAPSULE ORAL DAILY
Refills: 0 | Status: DISCONTINUED | OUTPATIENT
Start: 2025-07-24 | End: 2025-07-30

## 2025-07-24 RX ORDER — DIPHENHYDRAMINE HCL 12.5MG/5ML
25 ELIXIR ORAL DAILY
Refills: 0 | Status: DISCONTINUED | OUTPATIENT
Start: 2025-07-24 | End: 2025-07-27

## 2025-07-24 RX ORDER — DIPHENHYDRAMINE HCL 12.5MG/5ML
25 ELIXIR ORAL EVERY 6 HOURS
Refills: 0 | Status: DISCONTINUED | OUTPATIENT
Start: 2025-07-24 | End: 2025-07-24

## 2025-07-24 RX ADMIN — TIZANIDINE 4 MILLIGRAM(S): 4 TABLET ORAL at 15:16

## 2025-07-24 RX ADMIN — CEFEPIME 100 MILLIGRAM(S): 2 INJECTION, POWDER, FOR SOLUTION INTRAVENOUS at 13:18

## 2025-07-24 RX ADMIN — Medication 1 TABLET(S): at 11:37

## 2025-07-24 RX ADMIN — CEFEPIME 100 MILLIGRAM(S): 2 INJECTION, POWDER, FOR SOLUTION INTRAVENOUS at 05:18

## 2025-07-24 RX ADMIN — CEFEPIME 100 MILLIGRAM(S): 2 INJECTION, POWDER, FOR SOLUTION INTRAVENOUS at 21:27

## 2025-07-24 RX ADMIN — Medication 25 MILLIGRAM(S): at 15:17

## 2025-07-24 RX ADMIN — MAGNESIUM HYDROXIDE 30 MILLILITER(S): 400 SUSPENSION ORAL at 20:11

## 2025-07-24 RX ADMIN — Medication 650 MILLIGRAM(S): at 11:37

## 2025-07-24 RX ADMIN — Medication 1000 UNIT(S): at 11:38

## 2025-07-24 RX ADMIN — CLOTRIMAZOLE 1 APPLICATION(S): 1 CREAM TOPICAL at 17:46

## 2025-07-24 RX ADMIN — CLOTRIMAZOLE 1 APPLICATION(S): 1 CREAM TOPICAL at 06:25

## 2025-07-25 LAB
ALBUMIN SERPL ELPH-MCNC: 3 G/DL — LOW (ref 3.3–5)
ALP SERPL-CCNC: 83 U/L — SIGNIFICANT CHANGE UP (ref 40–120)
ALT FLD-CCNC: 75 U/L — SIGNIFICANT CHANGE UP (ref 12–78)
ANION GAP SERPL CALC-SCNC: 6 MMOL/L — SIGNIFICANT CHANGE UP (ref 5–17)
AST SERPL-CCNC: 38 U/L — HIGH (ref 15–37)
BASOPHILS # BLD AUTO: 0.06 K/UL — SIGNIFICANT CHANGE UP (ref 0–0.2)
BASOPHILS NFR BLD AUTO: 0.7 % — SIGNIFICANT CHANGE UP (ref 0–2)
BILIRUB SERPL-MCNC: 0.5 MG/DL — SIGNIFICANT CHANGE UP (ref 0.2–1.2)
BUN SERPL-MCNC: 19 MG/DL — SIGNIFICANT CHANGE UP (ref 7–23)
CALCIUM SERPL-MCNC: 9 MG/DL — SIGNIFICANT CHANGE UP (ref 8.5–10.1)
CHLORIDE SERPL-SCNC: 110 MMOL/L — HIGH (ref 96–108)
CO2 SERPL-SCNC: 27 MMOL/L — SIGNIFICANT CHANGE UP (ref 22–31)
CREAT SERPL-MCNC: 0.77 MG/DL — SIGNIFICANT CHANGE UP (ref 0.5–1.3)
EGFR: 110 ML/MIN/1.73M2 — SIGNIFICANT CHANGE UP
EGFR: 110 ML/MIN/1.73M2 — SIGNIFICANT CHANGE UP
EOSINOPHIL # BLD AUTO: 0.35 K/UL — SIGNIFICANT CHANGE UP (ref 0–0.5)
EOSINOPHIL NFR BLD AUTO: 4 % — SIGNIFICANT CHANGE UP (ref 0–6)
GLUCOSE SERPL-MCNC: 95 MG/DL — SIGNIFICANT CHANGE UP (ref 70–99)
HCT VFR BLD CALC: 44.2 % — SIGNIFICANT CHANGE UP (ref 39–50)
HGB BLD-MCNC: 14.4 G/DL — SIGNIFICANT CHANGE UP (ref 13–17)
IMM GRANULOCYTES # BLD AUTO: 0.03 K/UL — SIGNIFICANT CHANGE UP (ref 0–0.07)
IMM GRANULOCYTES NFR BLD AUTO: 0.3 % — SIGNIFICANT CHANGE UP (ref 0–0.9)
LYMPHOCYTES # BLD AUTO: 1.7 K/UL — SIGNIFICANT CHANGE UP (ref 1–3.3)
LYMPHOCYTES NFR BLD AUTO: 19.4 % — SIGNIFICANT CHANGE UP (ref 13–44)
MAGNESIUM SERPL-MCNC: 2 MG/DL — SIGNIFICANT CHANGE UP (ref 1.6–2.6)
MCHC RBC-ENTMCNC: 30 PG — SIGNIFICANT CHANGE UP (ref 27–34)
MCHC RBC-ENTMCNC: 32.6 G/DL — SIGNIFICANT CHANGE UP (ref 32–36)
MCV RBC AUTO: 92.1 FL — SIGNIFICANT CHANGE UP (ref 80–100)
MONOCYTES # BLD AUTO: 0.72 K/UL — SIGNIFICANT CHANGE UP (ref 0–0.9)
MONOCYTES NFR BLD AUTO: 8.2 % — SIGNIFICANT CHANGE UP (ref 2–14)
NEUTROPHILS # BLD AUTO: 5.9 K/UL — SIGNIFICANT CHANGE UP (ref 1.8–7.4)
NEUTROPHILS NFR BLD AUTO: 67.4 % — SIGNIFICANT CHANGE UP (ref 43–77)
NRBC # BLD AUTO: 0 K/UL — SIGNIFICANT CHANGE UP (ref 0–0)
NRBC # FLD: 0 K/UL — SIGNIFICANT CHANGE UP (ref 0–0)
NRBC BLD AUTO-RTO: 0 /100 WBCS — SIGNIFICANT CHANGE UP (ref 0–0)
PHOSPHATE SERPL-MCNC: 2.8 MG/DL — SIGNIFICANT CHANGE UP (ref 2.5–4.5)
PLATELET # BLD AUTO: 229 K/UL — SIGNIFICANT CHANGE UP (ref 150–400)
PMV BLD: 9.7 FL — SIGNIFICANT CHANGE UP (ref 7–13)
POTASSIUM SERPL-MCNC: 3.6 MMOL/L — SIGNIFICANT CHANGE UP (ref 3.5–5.3)
POTASSIUM SERPL-SCNC: 3.6 MMOL/L — SIGNIFICANT CHANGE UP (ref 3.5–5.3)
PROT SERPL-MCNC: 7.2 G/DL — SIGNIFICANT CHANGE UP (ref 6–8.3)
RBC # BLD: 4.8 M/UL — SIGNIFICANT CHANGE UP (ref 4.2–5.8)
RBC # FLD: 13.9 % — SIGNIFICANT CHANGE UP (ref 10.3–14.5)
SODIUM SERPL-SCNC: 143 MMOL/L — SIGNIFICANT CHANGE UP (ref 135–145)
WBC # BLD: 8.76 K/UL — SIGNIFICANT CHANGE UP (ref 3.8–10.5)
WBC # FLD AUTO: 8.76 K/UL — SIGNIFICANT CHANGE UP (ref 3.8–10.5)

## 2025-07-25 PROCEDURE — G0545: CPT

## 2025-07-25 PROCEDURE — 99232 SBSQ HOSP IP/OBS MODERATE 35: CPT

## 2025-07-25 RX ORDER — MAGNESIUM OXIDE 400 MG
400 TABLET ORAL
Refills: 0 | Status: DISCONTINUED | OUTPATIENT
Start: 2025-07-25 | End: 2025-07-30

## 2025-07-25 RX ORDER — ACETAMINOPHEN 500 MG/5ML
1000 LIQUID (ML) ORAL ONCE
Refills: 0 | Status: COMPLETED | OUTPATIENT
Start: 2025-07-25 | End: 2025-07-25

## 2025-07-25 RX ORDER — LIDOCAINE HYDROCHLORIDE 20 MG/ML
1 JELLY TOPICAL DAILY
Refills: 0 | Status: DISCONTINUED | OUTPATIENT
Start: 2025-07-25 | End: 2025-07-30

## 2025-07-25 RX ADMIN — CLOTRIMAZOLE 1 APPLICATION(S): 1 CREAM TOPICAL at 18:02

## 2025-07-25 RX ADMIN — Medication 400 MILLIGRAM(S): at 12:00

## 2025-07-25 RX ADMIN — CEFEPIME 100 MILLIGRAM(S): 2 INJECTION, POWDER, FOR SOLUTION INTRAVENOUS at 05:04

## 2025-07-25 RX ADMIN — Medication 1000 UNIT(S): at 12:01

## 2025-07-25 RX ADMIN — Medication 1000 MILLIGRAM(S): at 18:02

## 2025-07-25 RX ADMIN — Medication 400 MILLIGRAM(S): at 14:54

## 2025-07-25 RX ADMIN — Medication 1 TABLET(S): at 12:01

## 2025-07-25 RX ADMIN — Medication 650 MILLIGRAM(S): at 13:00

## 2025-07-25 RX ADMIN — MAGNESIUM HYDROXIDE 30 MILLILITER(S): 400 SUSPENSION ORAL at 12:00

## 2025-07-25 RX ADMIN — CEFEPIME 100 MILLIGRAM(S): 2 INJECTION, POWDER, FOR SOLUTION INTRAVENOUS at 13:47

## 2025-07-25 RX ADMIN — Medication 400 MILLIGRAM(S): at 17:35

## 2025-07-25 RX ADMIN — Medication 40 MILLIEQUIVALENT(S): at 09:44

## 2025-07-25 RX ADMIN — LIDOCAINE HYDROCHLORIDE 1 PATCH: 20 JELLY TOPICAL at 14:55

## 2025-07-25 RX ADMIN — TIZANIDINE 4 MILLIGRAM(S): 4 TABLET ORAL at 06:01

## 2025-07-25 RX ADMIN — LIDOCAINE HYDROCHLORIDE 1 PATCH: 20 JELLY TOPICAL at 19:30

## 2025-07-25 RX ADMIN — CEFEPIME 100 MILLIGRAM(S): 2 INJECTION, POWDER, FOR SOLUTION INTRAVENOUS at 22:54

## 2025-07-25 RX ADMIN — CLOTRIMAZOLE 1 APPLICATION(S): 1 CREAM TOPICAL at 05:10

## 2025-07-25 RX ADMIN — Medication 25 MILLIGRAM(S): at 14:54

## 2025-07-26 LAB
-  AMOXICILLIN/CLAVULANIC ACID: SIGNIFICANT CHANGE UP
-  AMPICILLIN/SULBACTAM: SIGNIFICANT CHANGE UP
-  AMPICILLIN: SIGNIFICANT CHANGE UP
-  AZTREONAM: SIGNIFICANT CHANGE UP
-  CEFAZOLIN: SIGNIFICANT CHANGE UP
-  CEFEPIME: SIGNIFICANT CHANGE UP
-  CEFOXITIN: SIGNIFICANT CHANGE UP
-  CEFTRIAXONE: SIGNIFICANT CHANGE UP
-  CIPROFLOXACIN: SIGNIFICANT CHANGE UP
-  ERTAPENEM: SIGNIFICANT CHANGE UP
-  GENTAMICIN: SIGNIFICANT CHANGE UP
-  IMIPENEM: SIGNIFICANT CHANGE UP
-  LEVOFLOXACIN: SIGNIFICANT CHANGE UP
-  MEROPENEM: SIGNIFICANT CHANGE UP
-  NITROFURANTOIN: SIGNIFICANT CHANGE UP
-  PIPERACILLIN/TAZOBACTAM: SIGNIFICANT CHANGE UP
-  TIGECYCLINE: SIGNIFICANT CHANGE UP
-  TOBRAMYCIN: SIGNIFICANT CHANGE UP
-  TRIMETHOPRIM/SULFAMETHOXAZOLE: SIGNIFICANT CHANGE UP
ALBUMIN SERPL ELPH-MCNC: 3 G/DL — LOW (ref 3.3–5)
ALP SERPL-CCNC: 90 U/L — SIGNIFICANT CHANGE UP (ref 40–120)
ALT FLD-CCNC: 76 U/L — SIGNIFICANT CHANGE UP (ref 12–78)
ANION GAP SERPL CALC-SCNC: 3 MMOL/L — LOW (ref 5–17)
AST SERPL-CCNC: 40 U/L — HIGH (ref 15–37)
BASOPHILS # BLD AUTO: 0.07 K/UL — SIGNIFICANT CHANGE UP (ref 0–0.2)
BASOPHILS NFR BLD AUTO: 0.9 % — SIGNIFICANT CHANGE UP (ref 0–2)
BILIRUB SERPL-MCNC: 0.4 MG/DL — SIGNIFICANT CHANGE UP (ref 0.2–1.2)
BUN SERPL-MCNC: 15 MG/DL — SIGNIFICANT CHANGE UP (ref 7–23)
CALCIUM SERPL-MCNC: 9.1 MG/DL — SIGNIFICANT CHANGE UP (ref 8.5–10.1)
CHLORIDE SERPL-SCNC: 109 MMOL/L — HIGH (ref 96–108)
CO2 SERPL-SCNC: 30 MMOL/L — SIGNIFICANT CHANGE UP (ref 22–31)
CREAT SERPL-MCNC: 0.94 MG/DL — SIGNIFICANT CHANGE UP (ref 0.5–1.3)
CULTURE RESULTS: ABNORMAL
EGFR: 99 ML/MIN/1.73M2 — SIGNIFICANT CHANGE UP
EGFR: 99 ML/MIN/1.73M2 — SIGNIFICANT CHANGE UP
EOSINOPHIL # BLD AUTO: 0.32 K/UL — SIGNIFICANT CHANGE UP (ref 0–0.5)
EOSINOPHIL NFR BLD AUTO: 4.2 % — SIGNIFICANT CHANGE UP (ref 0–6)
GLUCOSE SERPL-MCNC: 89 MG/DL — SIGNIFICANT CHANGE UP (ref 70–99)
HCT VFR BLD CALC: 45.7 % — SIGNIFICANT CHANGE UP (ref 39–50)
HGB BLD-MCNC: 14.8 G/DL — SIGNIFICANT CHANGE UP (ref 13–17)
IMM GRANULOCYTES # BLD AUTO: 0.03 K/UL — SIGNIFICANT CHANGE UP (ref 0–0.07)
IMM GRANULOCYTES NFR BLD AUTO: 0.4 % — SIGNIFICANT CHANGE UP (ref 0–0.9)
LYMPHOCYTES # BLD AUTO: 1.74 K/UL — SIGNIFICANT CHANGE UP (ref 1–3.3)
LYMPHOCYTES NFR BLD AUTO: 22.8 % — SIGNIFICANT CHANGE UP (ref 13–44)
MAGNESIUM SERPL-MCNC: 2.2 MG/DL — SIGNIFICANT CHANGE UP (ref 1.6–2.6)
MCHC RBC-ENTMCNC: 29.7 PG — SIGNIFICANT CHANGE UP (ref 27–34)
MCHC RBC-ENTMCNC: 32.4 G/DL — SIGNIFICANT CHANGE UP (ref 32–36)
MCV RBC AUTO: 91.8 FL — SIGNIFICANT CHANGE UP (ref 80–100)
METHOD TYPE: SIGNIFICANT CHANGE UP
MONOCYTES # BLD AUTO: 0.61 K/UL — SIGNIFICANT CHANGE UP (ref 0–0.9)
MONOCYTES NFR BLD AUTO: 8 % — SIGNIFICANT CHANGE UP (ref 2–14)
NEUTROPHILS # BLD AUTO: 4.86 K/UL — SIGNIFICANT CHANGE UP (ref 1.8–7.4)
NEUTROPHILS NFR BLD AUTO: 63.7 % — SIGNIFICANT CHANGE UP (ref 43–77)
NRBC # BLD AUTO: 0 K/UL — SIGNIFICANT CHANGE UP (ref 0–0)
NRBC # FLD: 0 K/UL — SIGNIFICANT CHANGE UP (ref 0–0)
NRBC BLD AUTO-RTO: 0 /100 WBCS — SIGNIFICANT CHANGE UP (ref 0–0)
ORGANISM # SPEC MICROSCOPIC CNT: ABNORMAL
ORGANISM # SPEC MICROSCOPIC CNT: SIGNIFICANT CHANGE UP
PHOSPHATE SERPL-MCNC: 3 MG/DL — SIGNIFICANT CHANGE UP (ref 2.5–4.5)
PLATELET # BLD AUTO: 264 K/UL — SIGNIFICANT CHANGE UP (ref 150–400)
PMV BLD: 9.9 FL — SIGNIFICANT CHANGE UP (ref 7–13)
POTASSIUM SERPL-MCNC: 3.9 MMOL/L — SIGNIFICANT CHANGE UP (ref 3.5–5.3)
POTASSIUM SERPL-SCNC: 3.9 MMOL/L — SIGNIFICANT CHANGE UP (ref 3.5–5.3)
PROT SERPL-MCNC: 7.4 G/DL — SIGNIFICANT CHANGE UP (ref 6–8.3)
RBC # BLD: 4.98 M/UL — SIGNIFICANT CHANGE UP (ref 4.2–5.8)
RBC # FLD: 13.7 % — SIGNIFICANT CHANGE UP (ref 10.3–14.5)
SODIUM SERPL-SCNC: 142 MMOL/L — SIGNIFICANT CHANGE UP (ref 135–145)
SPECIMEN SOURCE: SIGNIFICANT CHANGE UP
WBC # BLD: 7.63 K/UL — SIGNIFICANT CHANGE UP (ref 3.8–10.5)
WBC # FLD AUTO: 7.63 K/UL — SIGNIFICANT CHANGE UP (ref 3.8–10.5)

## 2025-07-26 RX ADMIN — TIZANIDINE 4 MILLIGRAM(S): 4 TABLET ORAL at 21:37

## 2025-07-26 RX ADMIN — Medication 1000 UNIT(S): at 11:24

## 2025-07-26 RX ADMIN — TIZANIDINE 4 MILLIGRAM(S): 4 TABLET ORAL at 06:57

## 2025-07-26 RX ADMIN — CEFEPIME 100 MILLIGRAM(S): 2 INJECTION, POWDER, FOR SOLUTION INTRAVENOUS at 11:23

## 2025-07-26 RX ADMIN — Medication 400 MILLIGRAM(S): at 11:41

## 2025-07-26 RX ADMIN — LIDOCAINE HYDROCHLORIDE 1 PATCH: 20 JELLY TOPICAL at 23:00

## 2025-07-26 RX ADMIN — CLOTRIMAZOLE 1 APPLICATION(S): 1 CREAM TOPICAL at 07:09

## 2025-07-26 RX ADMIN — Medication 1 TABLET(S): at 11:25

## 2025-07-26 RX ADMIN — LIDOCAINE HYDROCHLORIDE 1 PATCH: 20 JELLY TOPICAL at 11:23

## 2025-07-26 RX ADMIN — LIDOCAINE HYDROCHLORIDE 1 PATCH: 20 JELLY TOPICAL at 19:20

## 2025-07-26 RX ADMIN — Medication 400 MILLIGRAM(S): at 17:14

## 2025-07-26 RX ADMIN — CEFEPIME 100 MILLIGRAM(S): 2 INJECTION, POWDER, FOR SOLUTION INTRAVENOUS at 21:31

## 2025-07-26 RX ADMIN — Medication 400 MILLIGRAM(S): at 07:49

## 2025-07-26 RX ADMIN — Medication 25 MILLIGRAM(S): at 16:09

## 2025-07-26 RX ADMIN — Medication 1 TABLET(S): at 11:24

## 2025-07-26 RX ADMIN — LIDOCAINE HYDROCHLORIDE 1 PATCH: 20 JELLY TOPICAL at 02:55

## 2025-07-26 RX ADMIN — CEFEPIME 100 MILLIGRAM(S): 2 INJECTION, POWDER, FOR SOLUTION INTRAVENOUS at 06:54

## 2025-07-26 RX ADMIN — CLOTRIMAZOLE 1 APPLICATION(S): 1 CREAM TOPICAL at 17:14

## 2025-07-27 LAB
ALBUMIN SERPL ELPH-MCNC: 3.1 G/DL — LOW (ref 3.3–5)
ALP SERPL-CCNC: 88 U/L — SIGNIFICANT CHANGE UP (ref 40–120)
ALT FLD-CCNC: 74 U/L — SIGNIFICANT CHANGE UP (ref 12–78)
ANION GAP SERPL CALC-SCNC: 5 MMOL/L — SIGNIFICANT CHANGE UP (ref 5–17)
AST SERPL-CCNC: 43 U/L — HIGH (ref 15–37)
BILIRUB SERPL-MCNC: 0.5 MG/DL — SIGNIFICANT CHANGE UP (ref 0.2–1.2)
BUN SERPL-MCNC: 16 MG/DL — SIGNIFICANT CHANGE UP (ref 7–23)
CALCIUM SERPL-MCNC: 8.8 MG/DL — SIGNIFICANT CHANGE UP (ref 8.5–10.1)
CHLORIDE SERPL-SCNC: 108 MMOL/L — SIGNIFICANT CHANGE UP (ref 96–108)
CO2 SERPL-SCNC: 27 MMOL/L — SIGNIFICANT CHANGE UP (ref 22–31)
CREAT SERPL-MCNC: 0.81 MG/DL — SIGNIFICANT CHANGE UP (ref 0.5–1.3)
EGFR: 108 ML/MIN/1.73M2 — SIGNIFICANT CHANGE UP
EGFR: 108 ML/MIN/1.73M2 — SIGNIFICANT CHANGE UP
GLUCOSE SERPL-MCNC: 97 MG/DL — SIGNIFICANT CHANGE UP (ref 70–99)
HCT VFR BLD CALC: 47.9 % — SIGNIFICANT CHANGE UP (ref 39–50)
HGB BLD-MCNC: 15.5 G/DL — SIGNIFICANT CHANGE UP (ref 13–17)
MAGNESIUM SERPL-MCNC: 2.3 MG/DL — SIGNIFICANT CHANGE UP (ref 1.6–2.6)
MCHC RBC-ENTMCNC: 29.5 PG — SIGNIFICANT CHANGE UP (ref 27–34)
MCHC RBC-ENTMCNC: 32.4 G/DL — SIGNIFICANT CHANGE UP (ref 32–36)
MCV RBC AUTO: 91.2 FL — SIGNIFICANT CHANGE UP (ref 80–100)
NRBC # BLD AUTO: 0 K/UL — SIGNIFICANT CHANGE UP (ref 0–0)
NRBC # FLD: 0 K/UL — SIGNIFICANT CHANGE UP (ref 0–0)
NRBC BLD AUTO-RTO: 0 /100 WBCS — SIGNIFICANT CHANGE UP (ref 0–0)
PHOSPHATE SERPL-MCNC: 2.1 MG/DL — LOW (ref 2.5–4.5)
PLATELET # BLD AUTO: 275 K/UL — SIGNIFICANT CHANGE UP (ref 150–400)
PMV BLD: 9.8 FL — SIGNIFICANT CHANGE UP (ref 7–13)
POTASSIUM SERPL-MCNC: 3.7 MMOL/L — SIGNIFICANT CHANGE UP (ref 3.5–5.3)
POTASSIUM SERPL-SCNC: 3.7 MMOL/L — SIGNIFICANT CHANGE UP (ref 3.5–5.3)
PROT SERPL-MCNC: 7.7 G/DL — SIGNIFICANT CHANGE UP (ref 6–8.3)
RBC # BLD: 5.25 M/UL — SIGNIFICANT CHANGE UP (ref 4.2–5.8)
RBC # FLD: 13.6 % — SIGNIFICANT CHANGE UP (ref 10.3–14.5)
SODIUM SERPL-SCNC: 140 MMOL/L — SIGNIFICANT CHANGE UP (ref 135–145)
WBC # BLD: 7.72 K/UL — SIGNIFICANT CHANGE UP (ref 3.8–10.5)
WBC # FLD AUTO: 7.72 K/UL — SIGNIFICANT CHANGE UP (ref 3.8–10.5)

## 2025-07-27 PROCEDURE — G0545: CPT

## 2025-07-27 PROCEDURE — 99233 SBSQ HOSP IP/OBS HIGH 50: CPT

## 2025-07-27 RX ORDER — DIPHENHYDRAMINE HCL 12.5MG/5ML
25 ELIXIR ORAL EVERY 12 HOURS
Refills: 0 | Status: DISCONTINUED | OUTPATIENT
Start: 2025-07-27 | End: 2025-07-30

## 2025-07-27 RX ORDER — AMMONIUM LACTATE 12 %
1 LOTION (ML) TOPICAL
Refills: 0 | Status: DISCONTINUED | OUTPATIENT
Start: 2025-07-27 | End: 2025-07-30

## 2025-07-27 RX ORDER — SOD PHOS DI, MONO/K PHOS MONO 250 MG
2 TABLET ORAL ONCE
Refills: 0 | Status: COMPLETED | OUTPATIENT
Start: 2025-07-27 | End: 2025-07-27

## 2025-07-27 RX ORDER — CEFTRIAXONE 500 MG/1
1000 INJECTION, POWDER, FOR SOLUTION INTRAMUSCULAR; INTRAVENOUS EVERY 24 HOURS
Refills: 0 | Status: COMPLETED | OUTPATIENT
Start: 2025-07-27 | End: 2025-07-29

## 2025-07-27 RX ADMIN — Medication 400 MILLIGRAM(S): at 17:09

## 2025-07-27 RX ADMIN — CEFEPIME 100 MILLIGRAM(S): 2 INJECTION, POWDER, FOR SOLUTION INTRAVENOUS at 06:10

## 2025-07-27 RX ADMIN — Medication 25 MILLIGRAM(S): at 20:37

## 2025-07-27 RX ADMIN — CLOTRIMAZOLE 1 APPLICATION(S): 1 CREAM TOPICAL at 17:17

## 2025-07-27 RX ADMIN — Medication 1000 UNIT(S): at 11:47

## 2025-07-27 RX ADMIN — TIZANIDINE 4 MILLIGRAM(S): 4 TABLET ORAL at 08:06

## 2025-07-27 RX ADMIN — LIDOCAINE HYDROCHLORIDE 1 PATCH: 20 JELLY TOPICAL at 23:08

## 2025-07-27 RX ADMIN — MAGNESIUM HYDROXIDE 30 MILLILITER(S): 400 SUSPENSION ORAL at 11:48

## 2025-07-27 RX ADMIN — Medication 400 MILLIGRAM(S): at 08:03

## 2025-07-27 RX ADMIN — Medication 400 MILLIGRAM(S): at 11:47

## 2025-07-27 RX ADMIN — Medication 1 TABLET(S): at 11:47

## 2025-07-27 RX ADMIN — Medication 1 TABLET(S): at 11:46

## 2025-07-27 RX ADMIN — Medication 40 MILLIEQUIVALENT(S): at 09:29

## 2025-07-27 RX ADMIN — Medication 2 PACKET(S): at 09:30

## 2025-07-27 RX ADMIN — Medication 25 MILLIGRAM(S): at 06:38

## 2025-07-27 RX ADMIN — Medication 1 APPLICATION(S): at 17:11

## 2025-07-27 RX ADMIN — LIDOCAINE HYDROCHLORIDE 1 PATCH: 20 JELLY TOPICAL at 12:24

## 2025-07-27 RX ADMIN — LIDOCAINE HYDROCHLORIDE 1 PATCH: 20 JELLY TOPICAL at 20:31

## 2025-07-27 RX ADMIN — CLOTRIMAZOLE 1 APPLICATION(S): 1 CREAM TOPICAL at 06:24

## 2025-07-27 RX ADMIN — CEFTRIAXONE 100 MILLIGRAM(S): 500 INJECTION, POWDER, FOR SOLUTION INTRAMUSCULAR; INTRAVENOUS at 16:52

## 2025-07-28 ENCOUNTER — TRANSCRIPTION ENCOUNTER (OUTPATIENT)
Age: 50
End: 2025-07-28

## 2025-07-28 LAB
ALBUMIN SERPL ELPH-MCNC: 3.1 G/DL — LOW (ref 3.3–5)
ALP SERPL-CCNC: 87 U/L — SIGNIFICANT CHANGE UP (ref 40–120)
ALT FLD-CCNC: 76 U/L — SIGNIFICANT CHANGE UP (ref 12–78)
ANION GAP SERPL CALC-SCNC: 6 MMOL/L — SIGNIFICANT CHANGE UP (ref 5–17)
AST SERPL-CCNC: 63 U/L — HIGH (ref 15–37)
BASOPHILS # BLD AUTO: 0.09 K/UL — SIGNIFICANT CHANGE UP (ref 0–0.2)
BASOPHILS NFR BLD AUTO: 1.1 % — SIGNIFICANT CHANGE UP (ref 0–2)
BILIRUB SERPL-MCNC: 0.4 MG/DL — SIGNIFICANT CHANGE UP (ref 0.2–1.2)
BUN SERPL-MCNC: 19 MG/DL — SIGNIFICANT CHANGE UP (ref 7–23)
CALCIUM SERPL-MCNC: 8.9 MG/DL — SIGNIFICANT CHANGE UP (ref 8.5–10.1)
CHLORIDE SERPL-SCNC: 110 MMOL/L — HIGH (ref 96–108)
CO2 SERPL-SCNC: 25 MMOL/L — SIGNIFICANT CHANGE UP (ref 22–31)
CREAT SERPL-MCNC: 0.74 MG/DL — SIGNIFICANT CHANGE UP (ref 0.5–1.3)
EGFR: 111 ML/MIN/1.73M2 — SIGNIFICANT CHANGE UP
EGFR: 111 ML/MIN/1.73M2 — SIGNIFICANT CHANGE UP
EOSINOPHIL # BLD AUTO: 0.2 K/UL — SIGNIFICANT CHANGE UP (ref 0–0.5)
EOSINOPHIL NFR BLD AUTO: 2.5 % — SIGNIFICANT CHANGE UP (ref 0–6)
GLUCOSE SERPL-MCNC: 89 MG/DL — SIGNIFICANT CHANGE UP (ref 70–99)
HCT VFR BLD CALC: 47.4 % — SIGNIFICANT CHANGE UP (ref 39–50)
HGB BLD-MCNC: 16.2 G/DL — SIGNIFICANT CHANGE UP (ref 13–17)
IMM GRANULOCYTES # BLD AUTO: 0.14 K/UL — HIGH (ref 0–0.07)
IMM GRANULOCYTES NFR BLD AUTO: 1.8 % — HIGH (ref 0–0.9)
LYMPHOCYTES # BLD AUTO: 2.79 K/UL — SIGNIFICANT CHANGE UP (ref 1–3.3)
LYMPHOCYTES NFR BLD AUTO: 35.4 % — SIGNIFICANT CHANGE UP (ref 13–44)
MAGNESIUM SERPL-MCNC: 2.4 MG/DL — SIGNIFICANT CHANGE UP (ref 1.6–2.6)
MCHC RBC-ENTMCNC: 30.7 PG — SIGNIFICANT CHANGE UP (ref 27–34)
MCHC RBC-ENTMCNC: 34.2 G/DL — SIGNIFICANT CHANGE UP (ref 32–36)
MCV RBC AUTO: 89.9 FL — SIGNIFICANT CHANGE UP (ref 80–100)
MONOCYTES # BLD AUTO: 0.51 K/UL — SIGNIFICANT CHANGE UP (ref 0–0.9)
MONOCYTES NFR BLD AUTO: 6.5 % — SIGNIFICANT CHANGE UP (ref 2–14)
NEUTROPHILS # BLD AUTO: 4.16 K/UL — SIGNIFICANT CHANGE UP (ref 1.8–7.4)
NEUTROPHILS NFR BLD AUTO: 52.7 % — SIGNIFICANT CHANGE UP (ref 43–77)
NRBC # BLD AUTO: 0 K/UL — SIGNIFICANT CHANGE UP (ref 0–0)
NRBC # FLD: 0 K/UL — SIGNIFICANT CHANGE UP (ref 0–0)
NRBC BLD AUTO-RTO: 0 /100 WBCS — SIGNIFICANT CHANGE UP (ref 0–0)
PHOSPHATE SERPL-MCNC: 2.4 MG/DL — LOW (ref 2.5–4.5)
PLATELET # BLD AUTO: 144 K/UL — LOW (ref 150–400)
PMV BLD: 11 FL — SIGNIFICANT CHANGE UP (ref 7–13)
POTASSIUM SERPL-MCNC: 4.5 MMOL/L — SIGNIFICANT CHANGE UP (ref 3.5–5.3)
POTASSIUM SERPL-SCNC: 4.5 MMOL/L — SIGNIFICANT CHANGE UP (ref 3.5–5.3)
PROT SERPL-MCNC: 7.5 G/DL — SIGNIFICANT CHANGE UP (ref 6–8.3)
RBC # BLD: 5.27 M/UL — SIGNIFICANT CHANGE UP (ref 4.2–5.8)
RBC # FLD: 13.6 % — SIGNIFICANT CHANGE UP (ref 10.3–14.5)
SODIUM SERPL-SCNC: 141 MMOL/L — SIGNIFICANT CHANGE UP (ref 135–145)
WBC # BLD: 7.89 K/UL — SIGNIFICANT CHANGE UP (ref 3.8–10.5)
WBC # FLD AUTO: 7.89 K/UL — SIGNIFICANT CHANGE UP (ref 3.8–10.5)

## 2025-07-28 PROCEDURE — 99232 SBSQ HOSP IP/OBS MODERATE 35: CPT

## 2025-07-28 PROCEDURE — G0545: CPT

## 2025-07-28 RX ORDER — TIZANIDINE 4 MG/1
4 TABLET ORAL EVERY 8 HOURS
Refills: 0 | Status: DISCONTINUED | OUTPATIENT
Start: 2025-07-28 | End: 2025-07-30

## 2025-07-28 RX ORDER — DIPHENHYDRAMINE HCL 12.5MG/5ML
25 ELIXIR ORAL ONCE
Refills: 0 | Status: COMPLETED | OUTPATIENT
Start: 2025-07-28 | End: 2025-07-28

## 2025-07-28 RX ADMIN — CLOTRIMAZOLE 1 APPLICATION(S): 1 CREAM TOPICAL at 17:43

## 2025-07-28 RX ADMIN — Medication 400 MILLIGRAM(S): at 13:17

## 2025-07-28 RX ADMIN — Medication 25 MILLIGRAM(S): at 18:22

## 2025-07-28 RX ADMIN — Medication 1 TABLET(S): at 11:18

## 2025-07-28 RX ADMIN — TIZANIDINE 4 MILLIGRAM(S): 4 TABLET ORAL at 22:32

## 2025-07-28 RX ADMIN — MAGNESIUM HYDROXIDE 30 MILLILITER(S): 400 SUSPENSION ORAL at 11:18

## 2025-07-28 RX ADMIN — LIDOCAINE HYDROCHLORIDE 1 PATCH: 20 JELLY TOPICAL at 22:57

## 2025-07-28 RX ADMIN — Medication 25 MILLIGRAM(S): at 05:29

## 2025-07-28 RX ADMIN — LIDOCAINE HYDROCHLORIDE 1 PATCH: 20 JELLY TOPICAL at 19:20

## 2025-07-28 RX ADMIN — Medication 25 MILLIGRAM(S): at 23:25

## 2025-07-28 RX ADMIN — Medication 1000 UNIT(S): at 11:18

## 2025-07-28 RX ADMIN — Medication 400 MILLIGRAM(S): at 17:05

## 2025-07-28 RX ADMIN — TIZANIDINE 4 MILLIGRAM(S): 4 TABLET ORAL at 09:37

## 2025-07-28 RX ADMIN — Medication 1 APPLICATION(S): at 05:30

## 2025-07-28 RX ADMIN — CLOTRIMAZOLE 1 APPLICATION(S): 1 CREAM TOPICAL at 06:14

## 2025-07-28 RX ADMIN — LIDOCAINE HYDROCHLORIDE 1 PATCH: 20 JELLY TOPICAL at 11:18

## 2025-07-28 RX ADMIN — Medication 400 MILLIGRAM(S): at 07:33

## 2025-07-28 RX ADMIN — CEFTRIAXONE 100 MILLIGRAM(S): 500 INJECTION, POWDER, FOR SOLUTION INTRAMUSCULAR; INTRAVENOUS at 16:19

## 2025-07-28 RX ADMIN — Medication 1 APPLICATION(S): at 17:00

## 2025-07-29 DIAGNOSIS — T14.8XXA OTHER INJURY OF UNSPECIFIED BODY REGION, INITIAL ENCOUNTER: ICD-10-CM

## 2025-07-29 LAB
ALBUMIN SERPL ELPH-MCNC: 3.1 G/DL — LOW (ref 3.3–5)
ALP SERPL-CCNC: 89 U/L — SIGNIFICANT CHANGE UP (ref 40–120)
ALT FLD-CCNC: 79 U/L — HIGH (ref 12–78)
ANION GAP SERPL CALC-SCNC: 4 MMOL/L — LOW (ref 5–17)
AST SERPL-CCNC: 50 U/L — HIGH (ref 15–37)
BILIRUB SERPL-MCNC: 0.3 MG/DL — SIGNIFICANT CHANGE UP (ref 0.2–1.2)
BUN SERPL-MCNC: 17 MG/DL — SIGNIFICANT CHANGE UP (ref 7–23)
CALCIUM SERPL-MCNC: 8.9 MG/DL — SIGNIFICANT CHANGE UP (ref 8.5–10.1)
CHLORIDE SERPL-SCNC: 109 MMOL/L — HIGH (ref 96–108)
CO2 SERPL-SCNC: 28 MMOL/L — SIGNIFICANT CHANGE UP (ref 22–31)
CREAT SERPL-MCNC: 0.79 MG/DL — SIGNIFICANT CHANGE UP (ref 0.5–1.3)
CULTURE RESULTS: SIGNIFICANT CHANGE UP
CULTURE RESULTS: SIGNIFICANT CHANGE UP
EGFR: 109 ML/MIN/1.73M2 — SIGNIFICANT CHANGE UP
EGFR: 109 ML/MIN/1.73M2 — SIGNIFICANT CHANGE UP
GLUCOSE SERPL-MCNC: 92 MG/DL — SIGNIFICANT CHANGE UP (ref 70–99)
HCT VFR BLD CALC: 45.4 % — SIGNIFICANT CHANGE UP (ref 39–50)
HGB BLD-MCNC: 15.1 G/DL — SIGNIFICANT CHANGE UP (ref 13–17)
MCHC RBC-ENTMCNC: 30.2 PG — SIGNIFICANT CHANGE UP (ref 27–34)
MCHC RBC-ENTMCNC: 33.3 G/DL — SIGNIFICANT CHANGE UP (ref 32–36)
MCV RBC AUTO: 90.8 FL — SIGNIFICANT CHANGE UP (ref 80–100)
NRBC # BLD AUTO: 0 K/UL — SIGNIFICANT CHANGE UP (ref 0–0)
NRBC # FLD: 0 K/UL — SIGNIFICANT CHANGE UP (ref 0–0)
NRBC BLD AUTO-RTO: 0 /100 WBCS — SIGNIFICANT CHANGE UP (ref 0–0)
PLATELET # BLD AUTO: 248 K/UL — SIGNIFICANT CHANGE UP (ref 150–400)
PMV BLD: 9.7 FL — SIGNIFICANT CHANGE UP (ref 7–13)
POTASSIUM SERPL-MCNC: 3.8 MMOL/L — SIGNIFICANT CHANGE UP (ref 3.5–5.3)
POTASSIUM SERPL-SCNC: 3.8 MMOL/L — SIGNIFICANT CHANGE UP (ref 3.5–5.3)
PROT SERPL-MCNC: 7.2 G/DL — SIGNIFICANT CHANGE UP (ref 6–8.3)
RBC # BLD: 5 M/UL — SIGNIFICANT CHANGE UP (ref 4.2–5.8)
RBC # FLD: 13.7 % — SIGNIFICANT CHANGE UP (ref 10.3–14.5)
SODIUM SERPL-SCNC: 141 MMOL/L — SIGNIFICANT CHANGE UP (ref 135–145)
SPECIMEN SOURCE: SIGNIFICANT CHANGE UP
SPECIMEN SOURCE: SIGNIFICANT CHANGE UP
WBC # BLD: 7.92 K/UL — SIGNIFICANT CHANGE UP (ref 3.8–10.5)
WBC # FLD AUTO: 7.92 K/UL — SIGNIFICANT CHANGE UP (ref 3.8–10.5)

## 2025-07-29 PROCEDURE — 99221 1ST HOSP IP/OBS SF/LOW 40: CPT

## 2025-07-29 PROCEDURE — G0545: CPT

## 2025-07-29 PROCEDURE — 99232 SBSQ HOSP IP/OBS MODERATE 35: CPT

## 2025-07-29 RX ORDER — CLOTRIMAZOLE 1 G/100G
1 CREAM TOPICAL
Refills: 0 | Status: DISCONTINUED | OUTPATIENT
Start: 2025-07-29 | End: 2025-07-30

## 2025-07-29 RX ADMIN — LIDOCAINE HYDROCHLORIDE 1 PATCH: 20 JELLY TOPICAL at 13:05

## 2025-07-29 RX ADMIN — CLOTRIMAZOLE 1 APPLICATION(S): 1 CREAM TOPICAL at 05:16

## 2025-07-29 RX ADMIN — TIZANIDINE 4 MILLIGRAM(S): 4 TABLET ORAL at 21:20

## 2025-07-29 RX ADMIN — Medication 400 MILLIGRAM(S): at 16:54

## 2025-07-29 RX ADMIN — Medication 25 MILLIGRAM(S): at 18:34

## 2025-07-29 RX ADMIN — Medication 400 MILLIGRAM(S): at 13:05

## 2025-07-29 RX ADMIN — MAGNESIUM HYDROXIDE 30 MILLILITER(S): 400 SUSPENSION ORAL at 13:06

## 2025-07-29 RX ADMIN — CLOTRIMAZOLE 1 APPLICATION(S): 1 CREAM TOPICAL at 18:41

## 2025-07-29 RX ADMIN — LIDOCAINE HYDROCHLORIDE 1 PATCH: 20 JELLY TOPICAL at 19:35

## 2025-07-29 RX ADMIN — Medication 1 TABLET(S): at 13:05

## 2025-07-29 RX ADMIN — Medication 1 APPLICATION(S): at 05:15

## 2025-07-29 RX ADMIN — TIZANIDINE 4 MILLIGRAM(S): 4 TABLET ORAL at 13:05

## 2025-07-29 RX ADMIN — Medication 1000 UNIT(S): at 13:06

## 2025-07-29 RX ADMIN — TIZANIDINE 4 MILLIGRAM(S): 4 TABLET ORAL at 05:14

## 2025-07-29 RX ADMIN — CEFTRIAXONE 100 MILLIGRAM(S): 500 INJECTION, POWDER, FOR SOLUTION INTRAMUSCULAR; INTRAVENOUS at 16:54

## 2025-07-29 RX ADMIN — Medication 1 APPLICATION(S): at 16:53

## 2025-07-30 VITALS
TEMPERATURE: 99 F | RESPIRATION RATE: 18 BRPM | DIASTOLIC BLOOD PRESSURE: 99 MMHG | SYSTOLIC BLOOD PRESSURE: 147 MMHG | OXYGEN SATURATION: 95 % | HEART RATE: 86 BPM

## 2025-07-30 LAB
ALBUMIN SERPL ELPH-MCNC: 2.9 G/DL — LOW (ref 3.3–5)
ALP SERPL-CCNC: 86 U/L — SIGNIFICANT CHANGE UP (ref 40–120)
ALT FLD-CCNC: 73 U/L — SIGNIFICANT CHANGE UP (ref 12–78)
ANION GAP SERPL CALC-SCNC: 2 MMOL/L — LOW (ref 5–17)
AST SERPL-CCNC: 44 U/L — HIGH (ref 15–37)
BASOPHILS # BLD AUTO: 0.05 K/UL — SIGNIFICANT CHANGE UP (ref 0–0.2)
BASOPHILS NFR BLD AUTO: 0.7 % — SIGNIFICANT CHANGE UP (ref 0–2)
BILIRUB SERPL-MCNC: 0.2 MG/DL — SIGNIFICANT CHANGE UP (ref 0.2–1.2)
BUN SERPL-MCNC: 18 MG/DL — SIGNIFICANT CHANGE UP (ref 7–23)
CALCIUM SERPL-MCNC: 8.6 MG/DL — SIGNIFICANT CHANGE UP (ref 8.5–10.1)
CHLORIDE SERPL-SCNC: 110 MMOL/L — HIGH (ref 96–108)
CO2 SERPL-SCNC: 29 MMOL/L — SIGNIFICANT CHANGE UP (ref 22–31)
CREAT SERPL-MCNC: 0.77 MG/DL — SIGNIFICANT CHANGE UP (ref 0.5–1.3)
EGFR: 110 ML/MIN/1.73M2 — SIGNIFICANT CHANGE UP
EGFR: 110 ML/MIN/1.73M2 — SIGNIFICANT CHANGE UP
EOSINOPHIL # BLD AUTO: 0.21 K/UL — SIGNIFICANT CHANGE UP (ref 0–0.5)
EOSINOPHIL NFR BLD AUTO: 2.8 % — SIGNIFICANT CHANGE UP (ref 0–6)
GLUCOSE SERPL-MCNC: 95 MG/DL — SIGNIFICANT CHANGE UP (ref 70–99)
HCT VFR BLD CALC: 44.2 % — SIGNIFICANT CHANGE UP (ref 39–50)
HGB BLD-MCNC: 14.1 G/DL — SIGNIFICANT CHANGE UP (ref 13–17)
IMM GRANULOCYTES # BLD AUTO: 0.03 K/UL — SIGNIFICANT CHANGE UP (ref 0–0.07)
IMM GRANULOCYTES NFR BLD AUTO: 0.4 % — SIGNIFICANT CHANGE UP (ref 0–0.9)
LYMPHOCYTES # BLD AUTO: 2.38 K/UL — SIGNIFICANT CHANGE UP (ref 1–3.3)
LYMPHOCYTES NFR BLD AUTO: 32.1 % — SIGNIFICANT CHANGE UP (ref 13–44)
MCHC RBC-ENTMCNC: 29.4 PG — SIGNIFICANT CHANGE UP (ref 27–34)
MCHC RBC-ENTMCNC: 31.9 G/DL — LOW (ref 32–36)
MCV RBC AUTO: 92.1 FL — SIGNIFICANT CHANGE UP (ref 80–100)
MONOCYTES # BLD AUTO: 0.59 K/UL — SIGNIFICANT CHANGE UP (ref 0–0.9)
MONOCYTES NFR BLD AUTO: 8 % — SIGNIFICANT CHANGE UP (ref 2–14)
NEUTROPHILS # BLD AUTO: 4.15 K/UL — SIGNIFICANT CHANGE UP (ref 1.8–7.4)
NEUTROPHILS NFR BLD AUTO: 56 % — SIGNIFICANT CHANGE UP (ref 43–77)
NRBC # BLD AUTO: 0 K/UL — SIGNIFICANT CHANGE UP (ref 0–0)
NRBC # FLD: 0 K/UL — SIGNIFICANT CHANGE UP (ref 0–0)
NRBC BLD AUTO-RTO: 0 /100 WBCS — SIGNIFICANT CHANGE UP (ref 0–0)
PLATELET # BLD AUTO: 237 K/UL — SIGNIFICANT CHANGE UP (ref 150–400)
PMV BLD: 9.4 FL — SIGNIFICANT CHANGE UP (ref 7–13)
POTASSIUM SERPL-MCNC: 3.6 MMOL/L — SIGNIFICANT CHANGE UP (ref 3.5–5.3)
POTASSIUM SERPL-SCNC: 3.6 MMOL/L — SIGNIFICANT CHANGE UP (ref 3.5–5.3)
PROT SERPL-MCNC: 6.7 G/DL — SIGNIFICANT CHANGE UP (ref 6–8.3)
RBC # BLD: 4.8 M/UL — SIGNIFICANT CHANGE UP (ref 4.2–5.8)
RBC # FLD: 13.3 % — SIGNIFICANT CHANGE UP (ref 10.3–14.5)
SODIUM SERPL-SCNC: 141 MMOL/L — SIGNIFICANT CHANGE UP (ref 135–145)
WBC # BLD: 7.41 K/UL — SIGNIFICANT CHANGE UP (ref 3.8–10.5)
WBC # FLD AUTO: 7.41 K/UL — SIGNIFICANT CHANGE UP (ref 3.8–10.5)

## 2025-07-30 PROCEDURE — 36415 COLL VENOUS BLD VENIPUNCTURE: CPT

## 2025-07-30 PROCEDURE — 87040 BLOOD CULTURE FOR BACTERIA: CPT

## 2025-07-30 PROCEDURE — 96375 TX/PRO/DX INJ NEW DRUG ADDON: CPT

## 2025-07-30 PROCEDURE — 84100 ASSAY OF PHOSPHORUS: CPT

## 2025-07-30 PROCEDURE — 87186 SC STD MICRODIL/AGAR DIL: CPT

## 2025-07-30 PROCEDURE — 80053 COMPREHEN METABOLIC PANEL: CPT

## 2025-07-30 PROCEDURE — 85027 COMPLETE CBC AUTOMATED: CPT

## 2025-07-30 PROCEDURE — 87077 CULTURE AEROBIC IDENTIFY: CPT

## 2025-07-30 PROCEDURE — 83735 ASSAY OF MAGNESIUM: CPT

## 2025-07-30 PROCEDURE — 83605 ASSAY OF LACTIC ACID: CPT

## 2025-07-30 PROCEDURE — 99285 EMERGENCY DEPT VISIT HI MDM: CPT

## 2025-07-30 PROCEDURE — 96374 THER/PROPH/DIAG INJ IV PUSH: CPT

## 2025-07-30 PROCEDURE — 81001 URINALYSIS AUTO W/SCOPE: CPT

## 2025-07-30 PROCEDURE — 71045 X-RAY EXAM CHEST 1 VIEW: CPT

## 2025-07-30 PROCEDURE — 85025 COMPLETE CBC W/AUTO DIFF WBC: CPT

## 2025-07-30 PROCEDURE — 87086 URINE CULTURE/COLONY COUNT: CPT

## 2025-07-30 RX ORDER — NYSTATIN 100000 [USP'U]/G
1 CREAM TOPICAL
Refills: 0 | Status: DISCONTINUED | OUTPATIENT
Start: 2025-07-30 | End: 2025-07-30

## 2025-07-30 RX ORDER — CEFTRIAXONE 500 MG/1
1000 INJECTION, POWDER, FOR SOLUTION INTRAMUSCULAR; INTRAVENOUS EVERY 24 HOURS
Refills: 0 | Status: COMPLETED | OUTPATIENT
Start: 2025-07-30 | End: 2025-07-30

## 2025-07-30 RX ORDER — LIDOCAINE HYDROCHLORIDE 20 MG/ML
1 JELLY TOPICAL
Qty: 2 | Refills: 0
Start: 2025-07-30 | End: 2025-08-08

## 2025-07-30 RX ORDER — AMMONIUM LACTATE 12 %
1 LOTION (ML) TOPICAL
Qty: 1 | Refills: 0
Start: 2025-07-30

## 2025-07-30 RX ADMIN — MAGNESIUM HYDROXIDE 30 MILLILITER(S): 400 SUSPENSION ORAL at 12:08

## 2025-07-30 RX ADMIN — Medication 1 TABLET(S): at 12:09

## 2025-07-30 RX ADMIN — Medication 400 MILLIGRAM(S): at 07:55

## 2025-07-30 RX ADMIN — Medication 25 MILLIGRAM(S): at 09:26

## 2025-07-30 RX ADMIN — CLOTRIMAZOLE 1 APPLICATION(S): 1 CREAM TOPICAL at 05:30

## 2025-07-30 RX ADMIN — TIZANIDINE 4 MILLIGRAM(S): 4 TABLET ORAL at 05:30

## 2025-07-30 RX ADMIN — Medication 1000 UNIT(S): at 12:10

## 2025-07-30 RX ADMIN — LIDOCAINE HYDROCHLORIDE 1 PATCH: 20 JELLY TOPICAL at 12:09

## 2025-07-30 RX ADMIN — CEFTRIAXONE 100 MILLIGRAM(S): 500 INJECTION, POWDER, FOR SOLUTION INTRAMUSCULAR; INTRAVENOUS at 07:54

## 2025-07-30 RX ADMIN — Medication 1 APPLICATION(S): at 05:31

## 2025-07-30 RX ADMIN — CLOTRIMAZOLE 1 APPLICATION(S): 1 CREAM TOPICAL at 03:07

## 2025-07-30 RX ADMIN — Medication 400 MILLIGRAM(S): at 12:09

## 2025-07-31 ENCOUNTER — NON-APPOINTMENT (OUTPATIENT)
Age: 50
End: 2025-07-31

## 2025-07-31 ENCOUNTER — TRANSCRIPTION ENCOUNTER (OUTPATIENT)
Age: 50
End: 2025-07-31

## 2025-07-31 RX ORDER — TURMERIC/TURMERIC EXT/PEPR EXT 900-100 MG
5-1000 CAPSULE ORAL
Refills: 0 | Status: ACTIVE | COMMUNITY
Start: 2025-07-31

## 2025-07-31 RX ORDER — LIDOCAINE 4% 4 G/100G
4 CREAM TOPICAL
Refills: 0 | Status: ACTIVE | COMMUNITY
Start: 2025-07-31

## 2025-08-11 ENCOUNTER — APPOINTMENT (OUTPATIENT)
Dept: HOME HEALTH SERVICES | Facility: HOME HEALTH | Age: 50
End: 2025-08-11

## 2025-08-11 VITALS
OXYGEN SATURATION: 96 % | TEMPERATURE: 98.1 F | RESPIRATION RATE: 16 BRPM | SYSTOLIC BLOOD PRESSURE: 114 MMHG | HEART RATE: 86 BPM | DIASTOLIC BLOOD PRESSURE: 87 MMHG

## 2025-08-11 DIAGNOSIS — B36.9 SUPERFICIAL MYCOSIS, UNSPECIFIED: ICD-10-CM

## 2025-08-11 DIAGNOSIS — Z86.711 PERSONAL HISTORY OF PULMONARY EMBOLISM: ICD-10-CM

## 2025-08-11 DIAGNOSIS — L30.8 OTHER SPECIFIED DERMATITIS: ICD-10-CM

## 2025-08-11 DIAGNOSIS — R53.2 FUNCTIONAL QUADRIPLEGIA: ICD-10-CM

## 2025-08-11 DIAGNOSIS — Z97.8 PRESENCE OF OTHER SPECIFIED DEVICES: ICD-10-CM

## 2025-08-11 DIAGNOSIS — L85.3 XEROSIS CUTIS: ICD-10-CM

## 2025-08-11 DIAGNOSIS — I10 ESSENTIAL (PRIMARY) HYPERTENSION: ICD-10-CM

## 2025-08-11 DIAGNOSIS — D68.69 OTHER THROMBOPHILIA: ICD-10-CM

## 2025-08-11 DIAGNOSIS — N39.0 URINARY TRACT INFECTION, SITE NOT SPECIFIED: ICD-10-CM

## 2025-08-11 DIAGNOSIS — F32.A DEPRESSION, UNSPECIFIED: ICD-10-CM

## 2025-08-11 DIAGNOSIS — N39.0 INFECTION AND INFLAMMATORY REACTION DUE TO INDWELLING URETHRAL CATHETER, INITIAL ENCOUNTER: ICD-10-CM

## 2025-08-11 DIAGNOSIS — Z79.899 OTHER LONG TERM (CURRENT) DRUG THERAPY: ICD-10-CM

## 2025-08-11 DIAGNOSIS — G35 MULTIPLE SCLEROSIS: ICD-10-CM

## 2025-08-11 DIAGNOSIS — T83.511A INFECTION AND INFLAMMATORY REACTION DUE TO INDWELLING URETHRAL CATHETER, INITIAL ENCOUNTER: ICD-10-CM

## 2025-08-11 DIAGNOSIS — M79.2 NEURALGIA AND NEURITIS, UNSPECIFIED: ICD-10-CM

## 2025-08-11 DIAGNOSIS — Z87.09 PERSONAL HISTORY OF OTHER DISEASES OF THE RESPIRATORY SYSTEM: ICD-10-CM

## 2025-08-11 DIAGNOSIS — N32.89 OTHER SPECIFIED DISORDERS OF BLADDER: ICD-10-CM

## 2025-08-11 PROCEDURE — 99495 TRANSJ CARE MGMT MOD F2F 14D: CPT

## 2025-08-11 RX ORDER — AMMONIUM LACTATE 12 %
12 CREAM (GRAM) TOPICAL TWICE DAILY
Qty: 2 | Refills: 4 | Status: ACTIVE | COMMUNITY
Start: 2025-07-31 | End: 1900-01-01

## 2025-08-13 ENCOUNTER — NON-APPOINTMENT (OUTPATIENT)
Age: 50
End: 2025-08-13

## 2025-08-20 ENCOUNTER — NON-APPOINTMENT (OUTPATIENT)
Age: 50
End: 2025-08-20

## 2025-08-21 RX ORDER — SULFAMETHOXAZOLE AND TRIMETHOPRIM 800; 160 MG/1; MG/1
800-160 TABLET ORAL TWICE DAILY
Qty: 28 | Refills: 0 | Status: ACTIVE | COMMUNITY
Start: 2025-08-21 | End: 1900-01-01

## 2025-08-23 LAB
APPEARANCE: ABNORMAL
BACTERIA: ABNORMAL /HPF
BILIRUBIN URINE: NEGATIVE
BLOOD URINE: ABNORMAL
CAST: 6 /LPF
COLOR: YELLOW
EPITHELIAL CELLS: 0 /HPF
GLUCOSE QUALITATIVE U: NEGATIVE MG/DL
HYALINE CASTS: PRESENT
KETONES URINE: NEGATIVE MG/DL
LEUKOCYTE ESTERASE URINE: ABNORMAL
MICROSCOPIC-UA: NORMAL
NITRITE URINE: POSITIVE
PH URINE: 7.5
PROTEIN URINE: 30 MG/DL
RED BLOOD CELLS URINE: 22 /HPF
REVIEW: NORMAL
SPECIFIC GRAVITY URINE: 1.02
UROBILINOGEN URINE: 0.2 MG/DL
WHITE BLOOD CELLS URINE: 130 /HPF

## 2025-08-24 PROBLEM — N39.0 CHRONIC UTI: Noted: 2025-06-11

## 2025-08-24 PROBLEM — D68.69 SECONDARY HYPERCOAGULABLE STATE: Status: ACTIVE | Noted: 2025-08-24

## 2025-08-26 LAB — BACTERIA UR CULT: NORMAL

## 2025-08-27 ENCOUNTER — NON-APPOINTMENT (OUTPATIENT)
Age: 50
End: 2025-08-27

## 2025-08-27 DIAGNOSIS — G89.4 CHRONIC PAIN SYNDROME: ICD-10-CM

## 2025-08-27 DIAGNOSIS — N39.0 URINARY TRACT INFECTION, SITE NOT SPECIFIED: ICD-10-CM

## 2025-08-27 RX ORDER — LIDOCAINE 5% 700 MG/1
5 PATCH TOPICAL
Qty: 180 | Refills: 3 | Status: ACTIVE | COMMUNITY
Start: 2025-08-27 | End: 1900-01-01

## 2025-08-28 ENCOUNTER — TRANSCRIPTION ENCOUNTER (OUTPATIENT)
Age: 50
End: 2025-08-28

## 2025-08-30 LAB
APPEARANCE: ABNORMAL
BACTERIA: ABNORMAL /HPF
BILIRUBIN URINE: NEGATIVE
BLOOD URINE: ABNORMAL
CAST: 3 /LPF
COLOR: YELLOW
EPITHELIAL CELLS: 0 /HPF
GLUCOSE QUALITATIVE U: NEGATIVE MG/DL
KETONES URINE: NEGATIVE MG/DL
LEUKOCYTE ESTERASE URINE: ABNORMAL
MICROSCOPIC-UA: NORMAL
NITRITE URINE: POSITIVE
PH URINE: 7
PROTEIN URINE: 100 MG/DL
RED BLOOD CELLS URINE: 45 /HPF
SPECIFIC GRAVITY URINE: 1.02
UROBILINOGEN URINE: 0.2 MG/DL
WHITE BLOOD CELLS URINE: 327 /HPF

## 2025-09-02 LAB — BACTERIA UR CULT: NORMAL

## 2025-09-12 ENCOUNTER — NON-APPOINTMENT (OUTPATIENT)
Age: 50
End: 2025-09-12

## 2025-09-23 RX ORDER — METHOCARBAMOL 500 MG/1
500 TABLET, FILM COATED ORAL 3 TIMES DAILY
Qty: 90 | Refills: 2 | Status: ACTIVE | COMMUNITY
Start: 2025-09-19

## (undated) DEVICE — IRR BULB PATHFINDER + 10"

## (undated) DEVICE — BOSTON SCIENTIFC PUMPING SYSTEM SAPS SINGLE ACTION 10CC

## (undated) DEVICE — DRAPE EQUIPMENT COVER 27"

## (undated) DEVICE — GOWN TRIMAX LG

## (undated) DEVICE — SOL IRR POUR H2O 1500ML

## (undated) DEVICE — SOL IRR BAG H2O 3000ML

## (undated) DEVICE — FOLEY CATH 2-WAY 22FR 5CC LATEX COUNCIL RED

## (undated) DEVICE — TUBING SUCTION 20FT

## (undated) DEVICE — TUBING TUR 2 PRONG

## (undated) DEVICE — DRSG OPSITE 13.75 X 4"

## (undated) DEVICE — ADAPTER CHECK FLO 9FR STERILE

## (undated) DEVICE — BOSTON SCIENTIFC ENCORE 26 INFLATOR

## (undated) DEVICE — ACMI SELF-SEALING SEAL UP TO 7FR

## (undated) DEVICE — VENODYNE/SCD SLEEVE CALF LARGE

## (undated) DEVICE — AMPLATZ RENAL DILATOR 6FR-30FR X 20CM

## (undated) DEVICE — FOLEY HOLDER STATLOCK 2 WAY ADULT

## (undated) DEVICE — FOLEY CATH 2-WAY 18FR 5CC LATEX COUNCIL RED

## (undated) DEVICE — FOLEY TRAY 16FR 5CC LTX UMETER CLOSED

## (undated) DEVICE — GLV 7.5 PROTEXIS (WHITE)

## (undated) DEVICE — GLV 6.5 PROTEXIS (WHITE)

## (undated) DEVICE — SYR ASEPTO

## (undated) DEVICE — TAPE SILK 3"

## (undated) DEVICE — POSITIONER CUSHION INSERT PRONE VIEW LG

## (undated) DEVICE — DRAPE MAYO STAND 30"

## (undated) DEVICE — NDL 20CM TROCAR

## (undated) DEVICE — TUBING RANGER FLUID IRRIGATION SET DISP

## (undated) DEVICE — DRAPE NEPHROSCOPY 72X118"

## (undated) DEVICE — GLV 7 PROTEXIS (WHITE)

## (undated) DEVICE — DRSG TEGADERM 6"X8"

## (undated) DEVICE — POSITIONER FOAM HEADREST (PINK)

## (undated) DEVICE — SOL IRR BAG NS 0.9% 3000ML

## (undated) DEVICE — PACK CYSTO

## (undated) DEVICE — SPECIMEN CONTAINER 100ML

## (undated) DEVICE — DRAPE EQUIPMENT BANDED BAG 30 X 30" (SHOWER CAP)

## (undated) DEVICE — WARMING BLANKET UPPER ADULT

## (undated) DEVICE — SUT POLYSORB 2-0 18" V-20

## (undated) DEVICE — Device

## (undated) DEVICE — POSITIONER FOAM EGG CRATE ULNAR 2PCS (PINK)

## (undated) DEVICE — DRAPE TOWEL BLUE 17" X 24"

## (undated) DEVICE — COVER PROBE W/GEL 18X120CM STRL 50/BX

## (undated) DEVICE — SOL IRR POUR H2O 250ML

## (undated) DEVICE — GLV 8 PROTEXIS (WHITE)

## (undated) DEVICE — DRSG STERISTRIPS 0.5 X 4"

## (undated) DEVICE — NDL BIOPSY TROCAR TWO-PART 18G X 20CM

## (undated) DEVICE — SOL IRR POUR NS 0.9% 500ML

## (undated) DEVICE — MEDICATION LABELS W MARKER

## (undated) DEVICE — PREP BETADINE SPONGE STICKS

## (undated) DEVICE — DRAPE SURGICAL #1010